# Patient Record
Sex: FEMALE | Race: WHITE | NOT HISPANIC OR LATINO | Employment: FULL TIME | URBAN - METROPOLITAN AREA
[De-identification: names, ages, dates, MRNs, and addresses within clinical notes are randomized per-mention and may not be internally consistent; named-entity substitution may affect disease eponyms.]

---

## 2018-09-13 ENCOUNTER — APPOINTMENT (OUTPATIENT)
Dept: GENERAL RADIOLOGY | Facility: HOSPITAL | Age: 58
End: 2018-09-13

## 2018-09-13 ENCOUNTER — APPOINTMENT (OUTPATIENT)
Dept: CARDIOLOGY | Facility: HOSPITAL | Age: 58
End: 2018-09-13
Attending: INTERNAL MEDICINE

## 2018-09-13 ENCOUNTER — HOSPITAL ENCOUNTER (INPATIENT)
Facility: HOSPITAL | Age: 58
LOS: 4 days | Discharge: HOME | End: 2018-09-17
Attending: EMERGENCY MEDICINE | Admitting: INTERNAL MEDICINE

## 2018-09-13 DIAGNOSIS — R94.31 ABNORMAL EKG: ICD-10-CM

## 2018-09-13 DIAGNOSIS — J96.01 ACUTE RESPIRATORY FAILURE WITH HYPOXIA (HCC): Primary | ICD-10-CM

## 2018-09-13 DIAGNOSIS — J44.1 COPD EXACERBATION (HCC): ICD-10-CM

## 2018-09-13 DIAGNOSIS — J18.9 PNEUMONIA OF LEFT LOWER LOBE DUE TO INFECTIOUS ORGANISM: ICD-10-CM

## 2018-09-13 PROBLEM — J96.00 ACUTE RESPIRATORY FAILURE (HCC): Status: ACTIVE | Noted: 2018-09-13

## 2018-09-13 LAB
ALBUMIN SERPL-MCNC: 4.2 G/DL (ref 3.5–5.2)
ALBUMIN/GLOB SERPL: 1.1 G/DL
ALP SERPL-CCNC: 73 U/L (ref 39–117)
ALT SERPL W P-5'-P-CCNC: 40 U/L (ref 1–33)
ANION GAP SERPL CALCULATED.3IONS-SCNC: 12.6 MMOL/L
ARTERIAL PATENCY WRIST A: POSITIVE
AST SERPL-CCNC: 51 U/L (ref 1–32)
ATMOSPHERIC PRESS: 756 MMHG
B PERT DNA SPEC QL NAA+PROBE: NOT DETECTED
BASE EXCESS BLDA CALC-SCNC: -1.7 MMOL/L (ref 0–2)
BASOPHILS # BLD AUTO: 0.06 10*3/MM3 (ref 0–0.2)
BASOPHILS NFR BLD AUTO: 0.7 % (ref 0–1.5)
BDY SITE: ABNORMAL
BH CV ECHO MEAS - ACS: 1.3 CM
BH CV ECHO MEAS - AO MAX PG: 5 MMHG
BH CV ECHO MEAS - AO MEAN PG (FULL): 0 MMHG
BH CV ECHO MEAS - AO MEAN PG: 2 MMHG
BH CV ECHO MEAS - AO ROOT AREA (BSA CORRECTED): 2
BH CV ECHO MEAS - AO ROOT AREA: 6.6 CM^2
BH CV ECHO MEAS - AO ROOT DIAM: 2.9 CM
BH CV ECHO MEAS - AO V2 MAX: 116 CM/SEC
BH CV ECHO MEAS - AO V2 MEAN: 65.5 CM/SEC
BH CV ECHO MEAS - AO V2 VTI: 17 CM
BH CV ECHO MEAS - AVA(I,A): 2.5 CM^2
BH CV ECHO MEAS - AVA(I,D): 2.5 CM^2
BH CV ECHO MEAS - BSA(HAYCOCK): 1.4 M^2
BH CV ECHO MEAS - BSA: 1.4 M^2
BH CV ECHO MEAS - BZI_BMI: 16.5 KILOGRAMS/M^2
BH CV ECHO MEAS - BZI_METRIC_HEIGHT: 162.6 CM
BH CV ECHO MEAS - BZI_METRIC_WEIGHT: 43.5 KG
BH CV ECHO MEAS - EDV(CUBED): 68.9 ML
BH CV ECHO MEAS - EDV(MOD-SP2): 95 ML
BH CV ECHO MEAS - EDV(MOD-SP4): 64 ML
BH CV ECHO MEAS - EDV(TEICH): 74.2 ML
BH CV ECHO MEAS - EF(CUBED): 60.8 %
BH CV ECHO MEAS - EF(MOD-BP): 35 %
BH CV ECHO MEAS - EF(MOD-SP2): 33.7 %
BH CV ECHO MEAS - EF(MOD-SP4): 34.4 %
BH CV ECHO MEAS - EF(TEICH): 52.8 %
BH CV ECHO MEAS - ESV(CUBED): 27 ML
BH CV ECHO MEAS - ESV(MOD-SP2): 63 ML
BH CV ECHO MEAS - ESV(MOD-SP4): 42 ML
BH CV ECHO MEAS - ESV(TEICH): 35 ML
BH CV ECHO MEAS - FS: 26.8 %
BH CV ECHO MEAS - IVS/LVPW: 1
BH CV ECHO MEAS - IVSD: 0.8 CM
BH CV ECHO MEAS - LAT PEAK E' VEL: 8 CM/SEC
BH CV ECHO MEAS - LV DIASTOLIC VOL/BSA (35-75): 44.7 ML/M^2
BH CV ECHO MEAS - LV MASS(C)D: 97.3 GRAMS
BH CV ECHO MEAS - LV MASS(C)DI: 68 GRAMS/M^2
BH CV ECHO MEAS - LV MEAN PG: 2 MMHG
BH CV ECHO MEAS - LV SYSTOLIC VOL/BSA (12-30): 29.3 ML/M^2
BH CV ECHO MEAS - LV V1 MAX: 96 CM/SEC
BH CV ECHO MEAS - LV V1 MEAN: 57.2 CM/SEC
BH CV ECHO MEAS - LV V1 VTI: 14.9 CM
BH CV ECHO MEAS - LVIDD: 4.1 CM
BH CV ECHO MEAS - LVIDS: 3 CM
BH CV ECHO MEAS - LVLD AP2: 6.2 CM
BH CV ECHO MEAS - LVLD AP4: 6 CM
BH CV ECHO MEAS - LVLS AP2: 5.9 CM
BH CV ECHO MEAS - LVLS AP4: 6 CM
BH CV ECHO MEAS - LVOT AREA (M): 2.8 CM^2
BH CV ECHO MEAS - LVOT AREA: 2.8 CM^2
BH CV ECHO MEAS - LVOT DIAM: 1.9 CM
BH CV ECHO MEAS - LVPWD: 0.8 CM
BH CV ECHO MEAS - MED PEAK E' VEL: 9 CM/SEC
BH CV ECHO MEAS - MR MAX PG: 53.9 MMHG
BH CV ECHO MEAS - MR MAX VEL: 365.3 CM/SEC
BH CV ECHO MEAS - MV A DUR: 0.13 SEC
BH CV ECHO MEAS - MV A MAX VEL: 58.7 CM/SEC
BH CV ECHO MEAS - MV DEC SLOPE: 576 CM/SEC^2
BH CV ECHO MEAS - MV DEC TIME: 0.13 SEC
BH CV ECHO MEAS - MV E MAX VEL: 84.9 CM/SEC
BH CV ECHO MEAS - MV E/A: 1.4
BH CV ECHO MEAS - MV MEAN PG: 2 MMHG
BH CV ECHO MEAS - MV P1/2T MAX VEL: 98.9 CM/SEC
BH CV ECHO MEAS - MV P1/2T: 50.3 MSEC
BH CV ECHO MEAS - MV V2 MEAN: 61.7 CM/SEC
BH CV ECHO MEAS - MV V2 VTI: 16 CM
BH CV ECHO MEAS - MVA P1/2T LCG: 2.2 CM^2
BH CV ECHO MEAS - MVA(P1/2T): 4.4 CM^2
BH CV ECHO MEAS - MVA(VTI): 2.6 CM^2
BH CV ECHO MEAS - PA ACC SLOPE: 11 CM/SEC^2
BH CV ECHO MEAS - PA ACC TIME: 0.14 SEC
BH CV ECHO MEAS - PA MAX PG (FULL): -0.94 MMHG
BH CV ECHO MEAS - PA MAX PG: 3.5 MMHG
BH CV ECHO MEAS - PA PR(ACCEL): 15.6 MMHG
BH CV ECHO MEAS - PA V2 MAX: 93.1 CM/SEC
BH CV ECHO MEAS - PULM A REVS DUR: 0.09 SEC
BH CV ECHO MEAS - PULM A REVS VEL: 31.1 CM/SEC
BH CV ECHO MEAS - PULM DIAS VEL: 54.3 CM/SEC
BH CV ECHO MEAS - PULM S/D: 0.75
BH CV ECHO MEAS - PULM SYS VEL: 40.5 CM/SEC
BH CV ECHO MEAS - RAP SYSTOLE: 3 MMHG
BH CV ECHO MEAS - RV MAX PG: 4.4 MMHG
BH CV ECHO MEAS - RV MEAN PG: 2 MMHG
BH CV ECHO MEAS - RV V1 MAX: 105 CM/SEC
BH CV ECHO MEAS - RV V1 MEAN: 68.2 CM/SEC
BH CV ECHO MEAS - RV V1 VTI: 14.4 CM
BH CV ECHO MEAS - SI(AO): 78.4 ML/M^2
BH CV ECHO MEAS - SI(CUBED): 29.3 ML/M^2
BH CV ECHO MEAS - SI(LVOT): 29.5 ML/M^2
BH CV ECHO MEAS - SI(MOD-SP2): 22.3 ML/M^2
BH CV ECHO MEAS - SI(MOD-SP4): 15.4 ML/M^2
BH CV ECHO MEAS - SI(TEICH): 27.4 ML/M^2
BH CV ECHO MEAS - SV(AO): 112.3 ML
BH CV ECHO MEAS - SV(CUBED): 41.9 ML
BH CV ECHO MEAS - SV(LVOT): 42.2 ML
BH CV ECHO MEAS - SV(MOD-SP2): 32 ML
BH CV ECHO MEAS - SV(MOD-SP4): 22 ML
BH CV ECHO MEAS - SV(TEICH): 39.2 ML
BH CV ECHO MEAS - TAPSE (>1.6): 1.6 CM2
BH CV ECHO MEASUREMENTS AVERAGE E/E' RATIO: 9.99
BH CV VAS BP RIGHT ARM: NORMAL MMHG
BH CV XLRA - TDI S': 10 CM/SEC
BILIRUB SERPL-MCNC: 0.4 MG/DL (ref 0.1–1.2)
BILIRUB UR QL STRIP: NEGATIVE
BUN BLD-MCNC: 14 MG/DL (ref 6–20)
BUN/CREAT SERPL: 21.9 (ref 7–25)
C PNEUM DNA NPH QL NAA+NON-PROBE: NOT DETECTED
CALCIUM SPEC-SCNC: 9.4 MG/DL (ref 8.6–10.5)
CHLORIDE SERPL-SCNC: 104 MMOL/L (ref 98–107)
CLARITY UR: CLEAR
CO2 SERPL-SCNC: 23.4 MMOL/L (ref 22–29)
COLOR UR: YELLOW
CREAT BLD-MCNC: 0.64 MG/DL (ref 0.57–1)
D DIMER PPP FEU-MCNC: 0.29 MCGFEU/ML (ref 0–0.49)
D-LACTATE SERPL-SCNC: 1.6 MMOL/L (ref 0.5–2)
D-LACTATE SERPL-SCNC: 4.5 MMOL/L (ref 0.5–2)
DEPRECATED RDW RBC AUTO: 48.7 FL (ref 37–54)
EOSINOPHIL # BLD AUTO: 0.51 10*3/MM3 (ref 0–0.7)
EOSINOPHIL NFR BLD AUTO: 5.7 % (ref 0.3–6.2)
ERYTHROCYTE [DISTWIDTH] IN BLOOD BY AUTOMATED COUNT: 13.3 % (ref 11.7–13)
FLUAV H1 2009 PAND RNA NPH QL NAA+PROBE: NOT DETECTED
FLUAV H1 HA GENE NPH QL NAA+PROBE: NOT DETECTED
FLUAV H3 RNA NPH QL NAA+PROBE: NOT DETECTED
FLUAV SUBTYP SPEC NAA+PROBE: NOT DETECTED
FLUBV RNA ISLT QL NAA+PROBE: NOT DETECTED
GFR SERPL CREATININE-BSD FRML MDRD: 96 ML/MIN/1.73
GLOBULIN UR ELPH-MCNC: 3.7 GM/DL
GLUCOSE BLD-MCNC: 197 MG/DL (ref 65–99)
GLUCOSE BLDC GLUCOMTR-MCNC: 122 MG/DL (ref 70–130)
GLUCOSE BLDC GLUCOMTR-MCNC: 157 MG/DL (ref 70–130)
GLUCOSE UR STRIP-MCNC: NEGATIVE MG/DL
HADV DNA SPEC NAA+PROBE: NOT DETECTED
HCO3 BLDA-SCNC: 22.7 MMOL/L (ref 22–28)
HCOV 229E RNA SPEC QL NAA+PROBE: NOT DETECTED
HCOV HKU1 RNA SPEC QL NAA+PROBE: NOT DETECTED
HCOV NL63 RNA SPEC QL NAA+PROBE: NOT DETECTED
HCOV OC43 RNA SPEC QL NAA+PROBE: NOT DETECTED
HCT VFR BLD AUTO: 47.7 % (ref 35.6–45.5)
HGB BLD-MCNC: 15.1 G/DL (ref 11.9–15.5)
HGB UR QL STRIP.AUTO: NEGATIVE
HMPV RNA NPH QL NAA+NON-PROBE: NOT DETECTED
HOLD SPECIMEN: NORMAL
HOROWITZ INDEX BLD+IHG-RTO: 25 %
HPIV1 RNA SPEC QL NAA+PROBE: NOT DETECTED
HPIV2 RNA SPEC QL NAA+PROBE: NOT DETECTED
HPIV3 RNA NPH QL NAA+PROBE: NOT DETECTED
HPIV4 P GENE NPH QL NAA+PROBE: NOT DETECTED
IMM GRANULOCYTES # BLD: 0.01 10*3/MM3 (ref 0–0.03)
IMM GRANULOCYTES NFR BLD: 0.1 % (ref 0–0.5)
INR PPP: 1.01 (ref 0.9–1.1)
KETONES UR QL STRIP: NEGATIVE
LEFT ATRIUM VOLUME INDEX: 22 ML/M2
LEUKOCYTE ESTERASE UR QL STRIP.AUTO: NEGATIVE
LIPASE SERPL-CCNC: 35 U/L (ref 13–60)
LV EF 2D ECHO EST: 30 %
LYMPHOCYTES # BLD AUTO: 4.33 10*3/MM3 (ref 0.9–4.8)
LYMPHOCYTES NFR BLD AUTO: 48.2 % (ref 19.6–45.3)
M PNEUMO IGG SER IA-ACNC: NOT DETECTED
MAGNESIUM SERPL-MCNC: 2.7 MG/DL (ref 1.6–2.6)
MAXIMAL PREDICTED HEART RATE: 163 BPM
MCH RBC QN AUTO: 31.5 PG (ref 26.9–32)
MCHC RBC AUTO-ENTMCNC: 31.7 G/DL (ref 32.4–36.3)
MCV RBC AUTO: 99.6 FL (ref 80.5–98.2)
MODALITY: ABNORMAL
MONOCYTES # BLD AUTO: 0.57 10*3/MM3 (ref 0.2–1.2)
MONOCYTES NFR BLD AUTO: 6.3 % (ref 5–12)
NEUTROPHILS # BLD AUTO: 3.52 10*3/MM3 (ref 1.9–8.1)
NEUTROPHILS NFR BLD AUTO: 39.1 % (ref 42.7–76)
NITRITE UR QL STRIP: NEGATIVE
NT-PROBNP SERPL-MCNC: 78.3 PG/ML (ref 0–900)
O2 A-A PPRESDIFF RESPIRATORY: 0.8 MMHG
PCO2 BLDA: 37.2 MM HG (ref 35–45)
PH BLDA: 7.39 PH UNITS (ref 7.35–7.45)
PH UR STRIP.AUTO: 5.5 [PH] (ref 5–8)
PLATELET # BLD AUTO: 311 10*3/MM3 (ref 140–500)
PMV BLD AUTO: 11.4 FL (ref 6–12)
PO2 BLDA: 111.8 MM HG (ref 80–100)
POTASSIUM BLD-SCNC: 4.3 MMOL/L (ref 3.5–5.2)
PROCALCITONIN SERPL-MCNC: <0.02 NG/ML (ref 0.1–0.25)
PROT SERPL-MCNC: 7.9 G/DL (ref 6–8.5)
PROT UR QL STRIP: NEGATIVE
PROTHROMBIN TIME: 13.1 SECONDS (ref 11.7–14.2)
RBC # BLD AUTO: 4.79 10*6/MM3 (ref 3.9–5.2)
RHINOVIRUS RNA SPEC NAA+PROBE: NOT DETECTED
RSV RNA NPH QL NAA+NON-PROBE: NOT DETECTED
SAO2 % BLDCOA: 98.4 % (ref 92–99)
SET MECH RESP RATE: 16
SODIUM BLD-SCNC: 140 MMOL/L (ref 136–145)
SP GR UR STRIP: 1.01 (ref 1–1.03)
STRESS TARGET HR: 139 BPM
TOTAL RATE: 22 BREATHS/MINUTE
TROPONIN T SERPL-MCNC: 0.02 NG/ML (ref 0–0.03)
TROPONIN T SERPL-MCNC: 0.41 NG/ML (ref 0–0.03)
UROBILINOGEN UR QL STRIP: NORMAL
VT ON VENT VENT: 436 ML
WBC NRBC COR # BLD: 8.99 10*3/MM3 (ref 4.5–10.7)

## 2018-09-13 PROCEDURE — 93458 L HRT ARTERY/VENTRICLE ANGIO: CPT | Performed by: INTERNAL MEDICINE

## 2018-09-13 PROCEDURE — 87798 DETECT AGENT NOS DNA AMP: CPT | Performed by: INTERNAL MEDICINE

## 2018-09-13 PROCEDURE — 25010000002 MIDAZOLAM PER 1 MG: Performed by: INTERNAL MEDICINE

## 2018-09-13 PROCEDURE — 85610 PROTHROMBIN TIME: CPT | Performed by: EMERGENCY MEDICINE

## 2018-09-13 PROCEDURE — 94799 UNLISTED PULMONARY SVC/PX: CPT

## 2018-09-13 PROCEDURE — 4A023N7 MEASUREMENT OF CARDIAC SAMPLING AND PRESSURE, LEFT HEART, PERCUTANEOUS APPROACH: ICD-10-PCS | Performed by: INTERNAL MEDICINE

## 2018-09-13 PROCEDURE — 25010000002 METHYLPREDNISOLONE PER 125 MG: Performed by: EMERGENCY MEDICINE

## 2018-09-13 PROCEDURE — B2151ZZ FLUOROSCOPY OF LEFT HEART USING LOW OSMOLAR CONTRAST: ICD-10-PCS | Performed by: INTERNAL MEDICINE

## 2018-09-13 PROCEDURE — 25010000002 AZITHROMYCIN PER 500 MG: Performed by: EMERGENCY MEDICINE

## 2018-09-13 PROCEDURE — 83690 ASSAY OF LIPASE: CPT | Performed by: EMERGENCY MEDICINE

## 2018-09-13 PROCEDURE — 25010000002 FUROSEMIDE PER 20 MG: Performed by: INTERNAL MEDICINE

## 2018-09-13 PROCEDURE — 93306 TTE W/DOPPLER COMPLETE: CPT | Performed by: INTERNAL MEDICINE

## 2018-09-13 PROCEDURE — 85018 HEMOGLOBIN: CPT

## 2018-09-13 PROCEDURE — 87040 BLOOD CULTURE FOR BACTERIA: CPT | Performed by: EMERGENCY MEDICINE

## 2018-09-13 PROCEDURE — 81003 URINALYSIS AUTO W/O SCOPE: CPT | Performed by: EMERGENCY MEDICINE

## 2018-09-13 PROCEDURE — 93005 ELECTROCARDIOGRAM TRACING: CPT | Performed by: INTERNAL MEDICINE

## 2018-09-13 PROCEDURE — C1894 INTRO/SHEATH, NON-LASER: HCPCS | Performed by: INTERNAL MEDICINE

## 2018-09-13 PROCEDURE — 99153 MOD SED SAME PHYS/QHP EA: CPT | Performed by: INTERNAL MEDICINE

## 2018-09-13 PROCEDURE — 36600 WITHDRAWAL OF ARTERIAL BLOOD: CPT

## 2018-09-13 PROCEDURE — 94660 CPAP INITIATION&MGMT: CPT

## 2018-09-13 PROCEDURE — 83735 ASSAY OF MAGNESIUM: CPT | Performed by: EMERGENCY MEDICINE

## 2018-09-13 PROCEDURE — 83605 ASSAY OF LACTIC ACID: CPT | Performed by: EMERGENCY MEDICINE

## 2018-09-13 PROCEDURE — 93306 TTE W/DOPPLER COMPLETE: CPT

## 2018-09-13 PROCEDURE — 87581 M.PNEUMON DNA AMP PROBE: CPT | Performed by: INTERNAL MEDICINE

## 2018-09-13 PROCEDURE — 85014 HEMATOCRIT: CPT

## 2018-09-13 PROCEDURE — 84484 ASSAY OF TROPONIN QUANT: CPT | Performed by: EMERGENCY MEDICINE

## 2018-09-13 PROCEDURE — 87633 RESP VIRUS 12-25 TARGETS: CPT | Performed by: INTERNAL MEDICINE

## 2018-09-13 PROCEDURE — 82803 BLOOD GASES ANY COMBINATION: CPT

## 2018-09-13 PROCEDURE — 85025 COMPLETE CBC W/AUTO DIFF WBC: CPT | Performed by: EMERGENCY MEDICINE

## 2018-09-13 PROCEDURE — 0 IOPAMIDOL PER 1 ML: Performed by: INTERNAL MEDICINE

## 2018-09-13 PROCEDURE — 5A09357 ASSISTANCE WITH RESPIRATORY VENTILATION, LESS THAN 24 CONSECUTIVE HOURS, CONTINUOUS POSITIVE AIRWAY PRESSURE: ICD-10-PCS | Performed by: INTERNAL MEDICINE

## 2018-09-13 PROCEDURE — 99285 EMERGENCY DEPT VISIT HI MDM: CPT

## 2018-09-13 PROCEDURE — 93005 ELECTROCARDIOGRAM TRACING: CPT | Performed by: EMERGENCY MEDICINE

## 2018-09-13 PROCEDURE — C1769 GUIDE WIRE: HCPCS | Performed by: INTERNAL MEDICINE

## 2018-09-13 PROCEDURE — 99152 MOD SED SAME PHYS/QHP 5/>YRS: CPT | Performed by: INTERNAL MEDICINE

## 2018-09-13 PROCEDURE — B2111ZZ FLUOROSCOPY OF MULTIPLE CORONARY ARTERIES USING LOW OSMOLAR CONTRAST: ICD-10-PCS | Performed by: INTERNAL MEDICINE

## 2018-09-13 PROCEDURE — 25010000002 PERFLUTREN (DEFINITY) 8.476 MG IN SODIUM CHLORIDE 0.9 % 10 ML INJECTION: Performed by: INTERNAL MEDICINE

## 2018-09-13 PROCEDURE — 80053 COMPREHEN METABOLIC PANEL: CPT | Performed by: EMERGENCY MEDICINE

## 2018-09-13 PROCEDURE — 99255 IP/OBS CONSLTJ NEW/EST HI 80: CPT | Performed by: INTERNAL MEDICINE

## 2018-09-13 PROCEDURE — 25010000002 HEPARIN (PORCINE) PER 1000 UNITS: Performed by: INTERNAL MEDICINE

## 2018-09-13 PROCEDURE — 25010000002 VANCOMYCIN 750 MG RECONSTITUTED SOLUTION: Performed by: EMERGENCY MEDICINE

## 2018-09-13 PROCEDURE — 84484 ASSAY OF TROPONIN QUANT: CPT | Performed by: INTERNAL MEDICINE

## 2018-09-13 PROCEDURE — 85379 FIBRIN DEGRADATION QUANT: CPT | Performed by: EMERGENCY MEDICINE

## 2018-09-13 PROCEDURE — 93010 ELECTROCARDIOGRAM REPORT: CPT | Performed by: INTERNAL MEDICINE

## 2018-09-13 PROCEDURE — 87486 CHLMYD PNEUM DNA AMP PROBE: CPT | Performed by: INTERNAL MEDICINE

## 2018-09-13 PROCEDURE — 25010000003 CEFTRIAXONE PER 250 MG: Performed by: EMERGENCY MEDICINE

## 2018-09-13 PROCEDURE — 25010000002 FENTANYL CITRATE (PF) 100 MCG/2ML SOLUTION: Performed by: INTERNAL MEDICINE

## 2018-09-13 PROCEDURE — 83880 ASSAY OF NATRIURETIC PEPTIDE: CPT | Performed by: EMERGENCY MEDICINE

## 2018-09-13 PROCEDURE — 71045 X-RAY EXAM CHEST 1 VIEW: CPT

## 2018-09-13 PROCEDURE — 94640 AIRWAY INHALATION TREATMENT: CPT

## 2018-09-13 PROCEDURE — 84145 PROCALCITONIN (PCT): CPT | Performed by: EMERGENCY MEDICINE

## 2018-09-13 PROCEDURE — 25010000002 MAGNESIUM SULFATE 2 GM/50ML SOLUTION: Performed by: EMERGENCY MEDICINE

## 2018-09-13 PROCEDURE — 25010000002 METHYLPREDNISOLONE PER 125 MG: Performed by: INTERNAL MEDICINE

## 2018-09-13 PROCEDURE — 82962 GLUCOSE BLOOD TEST: CPT

## 2018-09-13 RX ORDER — ACETAMINOPHEN 325 MG/1
650 TABLET ORAL EVERY 6 HOURS PRN
Status: DISCONTINUED | OUTPATIENT
Start: 2018-09-13 | End: 2018-09-18 | Stop reason: HOSPADM

## 2018-09-13 RX ORDER — METHYLPREDNISOLONE SODIUM SUCCINATE 125 MG/2ML
125 INJECTION, POWDER, LYOPHILIZED, FOR SOLUTION INTRAMUSCULAR; INTRAVENOUS EVERY 6 HOURS
Status: DISCONTINUED | OUTPATIENT
Start: 2018-09-13 | End: 2018-09-14

## 2018-09-13 RX ORDER — ALBUTEROL SULFATE 2.5 MG/3ML
10 SOLUTION RESPIRATORY (INHALATION) CONTINUOUS
Status: DISPENSED | OUTPATIENT
Start: 2018-09-13 | End: 2018-09-13

## 2018-09-13 RX ORDER — CHOLECALCIFEROL (VITAMIN D3) 125 MCG
5 CAPSULE ORAL NIGHTLY PRN
Status: DISCONTINUED | OUTPATIENT
Start: 2018-09-13 | End: 2018-09-18 | Stop reason: HOSPADM

## 2018-09-13 RX ORDER — CEFTRIAXONE SODIUM 1 G/50ML
1 INJECTION, SOLUTION INTRAVENOUS EVERY 24 HOURS
Status: DISCONTINUED | OUTPATIENT
Start: 2018-09-13 | End: 2018-09-13

## 2018-09-13 RX ORDER — NALOXONE HCL 0.4 MG/ML
0.4 VIAL (ML) INJECTION
Status: DISCONTINUED | OUTPATIENT
Start: 2018-09-13 | End: 2018-09-18 | Stop reason: HOSPADM

## 2018-09-13 RX ORDER — ONDANSETRON 4 MG/1
4 TABLET, ORALLY DISINTEGRATING ORAL EVERY 6 HOURS PRN
Status: DISCONTINUED | OUTPATIENT
Start: 2018-09-13 | End: 2018-09-18 | Stop reason: HOSPADM

## 2018-09-13 RX ORDER — CEFTRIAXONE SODIUM 2 G/50ML
2 INJECTION, SOLUTION INTRAVENOUS ONCE
Status: COMPLETED | OUTPATIENT
Start: 2018-09-13 | End: 2018-09-13

## 2018-09-13 RX ORDER — NICOTINE POLACRILEX 4 MG
15 LOZENGE BUCCAL
Status: DISCONTINUED | OUTPATIENT
Start: 2018-09-13 | End: 2018-09-18 | Stop reason: HOSPADM

## 2018-09-13 RX ORDER — SODIUM CHLORIDE 9 MG/ML
125 INJECTION, SOLUTION INTRAVENOUS CONTINUOUS
Status: DISCONTINUED | OUTPATIENT
Start: 2018-09-13 | End: 2018-09-14

## 2018-09-13 RX ORDER — METHYLPREDNISOLONE SODIUM SUCCINATE 125 MG/2ML
125 INJECTION, POWDER, LYOPHILIZED, FOR SOLUTION INTRAMUSCULAR; INTRAVENOUS ONCE
Status: DISCONTINUED | OUTPATIENT
Start: 2018-09-13 | End: 2018-09-13

## 2018-09-13 RX ORDER — METHYLPREDNISOLONE SODIUM SUCCINATE 125 MG/2ML
125 INJECTION, POWDER, LYOPHILIZED, FOR SOLUTION INTRAMUSCULAR; INTRAVENOUS ONCE
Status: COMPLETED | OUTPATIENT
Start: 2018-09-13 | End: 2018-09-13

## 2018-09-13 RX ORDER — LIDOCAINE HYDROCHLORIDE 20 MG/ML
INJECTION, SOLUTION INFILTRATION; PERINEURAL AS NEEDED
Status: DISCONTINUED | OUTPATIENT
Start: 2018-09-13 | End: 2018-09-13 | Stop reason: HOSPADM

## 2018-09-13 RX ORDER — ONDANSETRON 4 MG/1
4 TABLET, FILM COATED ORAL EVERY 6 HOURS PRN
Status: DISCONTINUED | OUTPATIENT
Start: 2018-09-13 | End: 2018-09-18 | Stop reason: HOSPADM

## 2018-09-13 RX ORDER — CEFTRIAXONE SODIUM 1 G/50ML
1 INJECTION, SOLUTION INTRAVENOUS EVERY 24 HOURS
Status: DISCONTINUED | OUTPATIENT
Start: 2018-09-14 | End: 2018-09-18 | Stop reason: HOSPADM

## 2018-09-13 RX ORDER — MIDAZOLAM HYDROCHLORIDE 1 MG/ML
INJECTION INTRAMUSCULAR; INTRAVENOUS AS NEEDED
Status: DISCONTINUED | OUTPATIENT
Start: 2018-09-13 | End: 2018-09-13 | Stop reason: HOSPADM

## 2018-09-13 RX ORDER — ASPIRIN 325 MG
325 TABLET ORAL ONCE
Status: COMPLETED | OUTPATIENT
Start: 2018-09-13 | End: 2018-09-13

## 2018-09-13 RX ORDER — IPRATROPIUM BROMIDE AND ALBUTEROL SULFATE 2.5; .5 MG/3ML; MG/3ML
3 SOLUTION RESPIRATORY (INHALATION)
Status: DISCONTINUED | OUTPATIENT
Start: 2018-09-13 | End: 2018-09-14

## 2018-09-13 RX ORDER — ONDANSETRON 2 MG/ML
4 INJECTION INTRAMUSCULAR; INTRAVENOUS EVERY 6 HOURS PRN
Status: DISCONTINUED | OUTPATIENT
Start: 2018-09-13 | End: 2018-09-18 | Stop reason: HOSPADM

## 2018-09-13 RX ORDER — SODIUM CHLORIDE 0.9 % (FLUSH) 0.9 %
10 SYRINGE (ML) INJECTION AS NEEDED
Status: DISCONTINUED | OUTPATIENT
Start: 2018-09-13 | End: 2018-09-18 | Stop reason: HOSPADM

## 2018-09-13 RX ORDER — MORPHINE SULFATE 2 MG/ML
1 INJECTION, SOLUTION INTRAMUSCULAR; INTRAVENOUS EVERY 4 HOURS PRN
Status: DISCONTINUED | OUTPATIENT
Start: 2018-09-13 | End: 2018-09-18 | Stop reason: HOSPADM

## 2018-09-13 RX ORDER — SODIUM CHLORIDE 9 MG/ML
INJECTION, SOLUTION INTRAVENOUS CONTINUOUS PRN
Status: COMPLETED | OUTPATIENT
Start: 2018-09-13 | End: 2018-09-13

## 2018-09-13 RX ORDER — HYDROCODONE BITARTRATE AND ACETAMINOPHEN 5; 325 MG/1; MG/1
1 TABLET ORAL EVERY 4 HOURS PRN
Status: DISCONTINUED | OUTPATIENT
Start: 2018-09-13 | End: 2018-09-18 | Stop reason: HOSPADM

## 2018-09-13 RX ORDER — MAGNESIUM SULFATE HEPTAHYDRATE 40 MG/ML
2 INJECTION, SOLUTION INTRAVENOUS ONCE
Status: COMPLETED | OUTPATIENT
Start: 2018-09-13 | End: 2018-09-13

## 2018-09-13 RX ORDER — ALBUTEROL SULFATE 90 UG/1
2 AEROSOL, METERED RESPIRATORY (INHALATION) EVERY 4 HOURS PRN
COMMUNITY
End: 2019-02-11

## 2018-09-13 RX ORDER — SODIUM CHLORIDE 0.9 % (FLUSH) 0.9 %
1-10 SYRINGE (ML) INJECTION AS NEEDED
Status: DISCONTINUED | OUTPATIENT
Start: 2018-09-13 | End: 2018-09-18 | Stop reason: HOSPADM

## 2018-09-13 RX ORDER — FUROSEMIDE 10 MG/ML
40 INJECTION INTRAMUSCULAR; INTRAVENOUS ONCE
Status: COMPLETED | OUTPATIENT
Start: 2018-09-13 | End: 2018-09-13

## 2018-09-13 RX ORDER — DEXTROSE MONOHYDRATE 25 G/50ML
25 INJECTION, SOLUTION INTRAVENOUS
Status: DISCONTINUED | OUTPATIENT
Start: 2018-09-13 | End: 2018-09-18 | Stop reason: HOSPADM

## 2018-09-13 RX ORDER — FENTANYL CITRATE 50 UG/ML
INJECTION, SOLUTION INTRAMUSCULAR; INTRAVENOUS AS NEEDED
Status: DISCONTINUED | OUTPATIENT
Start: 2018-09-13 | End: 2018-09-13 | Stop reason: HOSPADM

## 2018-09-13 RX ADMIN — SODIUM CHLORIDE 125 ML/HR: 9 INJECTION, SOLUTION INTRAVENOUS at 12:40

## 2018-09-13 RX ADMIN — AZITHROMYCIN MONOHYDRATE 500 MG: 500 INJECTION, POWDER, LYOPHILIZED, FOR SOLUTION INTRAVENOUS at 17:21

## 2018-09-13 RX ADMIN — PERFLUTREN 3 ML: 6.52 INJECTION, SUSPENSION INTRAVENOUS at 13:59

## 2018-09-13 RX ADMIN — SODIUM CHLORIDE 1000 ML: 9 INJECTION, SOLUTION INTRAVENOUS at 12:31

## 2018-09-13 RX ADMIN — SODIUM CHLORIDE 125 ML/HR: 9 INJECTION, SOLUTION INTRAVENOUS at 20:53

## 2018-09-13 RX ADMIN — METHYLPREDNISOLONE SODIUM SUCCINATE 125 MG: 125 INJECTION, POWDER, FOR SOLUTION INTRAMUSCULAR; INTRAVENOUS at 12:04

## 2018-09-13 RX ADMIN — Medication 5 MG: at 22:57

## 2018-09-13 RX ADMIN — FUROSEMIDE 40 MG: 10 INJECTION, SOLUTION INTRAMUSCULAR; INTRAVENOUS at 17:32

## 2018-09-13 RX ADMIN — VANCOMYCIN HYDROCHLORIDE 750 MG: 750 INJECTION, POWDER, LYOPHILIZED, FOR SOLUTION INTRAVENOUS at 14:24

## 2018-09-13 RX ADMIN — SODIUM CHLORIDE 1305 ML: 9 INJECTION, SOLUTION INTRAVENOUS at 14:23

## 2018-09-13 RX ADMIN — CEFTRIAXONE SODIUM 2 G: 2 INJECTION, SOLUTION INTRAVENOUS at 13:41

## 2018-09-13 RX ADMIN — METHYLPREDNISOLONE SODIUM SUCCINATE 125 MG: 125 INJECTION, POWDER, FOR SOLUTION INTRAMUSCULAR; INTRAVENOUS at 19:50

## 2018-09-13 RX ADMIN — ASPIRIN 325 MG: 325 TABLET ORAL at 15:05

## 2018-09-13 RX ADMIN — IPRATROPIUM BROMIDE AND ALBUTEROL SULFATE 3 ML: .5; 3 SOLUTION RESPIRATORY (INHALATION) at 23:16

## 2018-09-13 RX ADMIN — ACETAMINOPHEN 650 MG: 325 TABLET ORAL at 21:20

## 2018-09-13 RX ADMIN — MAGNESIUM SULFATE IN WATER 2 G: 40 INJECTION, SOLUTION INTRAVENOUS at 12:10

## 2018-09-13 RX ADMIN — ALBUTEROL SULFATE 10 MG: 2.5 SOLUTION RESPIRATORY (INHALATION) at 12:06

## 2018-09-13 NOTE — PROGRESS NOTES
Clinical Pharmacy Services: Medication History    Scarlet Chakraborty is a 58 y.o. female presenting to [facility] for   Chief Complaint   Patient presents with   • Shortness of Breath       She  has a past medical history of Asthma and COPD (chronic obstructive pulmonary disease) (CMS/[facility]).    Allergies as of 09/13/2018   • (No Known Allergies)       Medication information was obtained from: Patient  Pharmacy and Phone Number: [place] [phone]    Prior to Admission Medications     Prescriptions Last Dose Informant Patient Reported? Taking?    albuterol (PROVENTIL HFA;VENTOLIN HFA) 108 (90 Base) MCG/ACT inhaler  Self Yes Yes    Inhale 2 puffs Every 4 (Four) Hours As Needed for Wheezing.    umeclidinium-vilanterol (ANORO ELLIPTA) 62.5-25 MCG/INH aerosol powder  inhaler  Self Yes Yes    Inhale 1 puff Daily.            Medication notes: None    This medication list is complete to the best of my knowledge as of 9/13/2018    Please call if questions.    Selene Barahona, Medication History Technician  9/13/2018 3:02 PM

## 2018-09-13 NOTE — PLAN OF CARE
Problem: Patient Care Overview  Goal: Plan of Care Review  Outcome: Ongoing (interventions implemented as appropriate)   09/13/18 5640   OTHER   Outcome Summary Presented to ED with SOA and chest pains. Positive for PNA. ABT therapy started. To cath lab. Coronary arteries clear, Takotsubo.      Goal: Individualization and Mutuality  Outcome: Ongoing (interventions implemented as appropriate)    Goal: Discharge Needs Assessment  Outcome: Ongoing (interventions implemented as appropriate)    Goal: Interprofessional Rounds/Family Conf  Outcome: Ongoing (interventions implemented as appropriate)      Problem: Pneumonia (Adult)  Goal: Signs and Symptoms of Listed Potential Problems Will be Absent, Minimized or Managed (Pneumonia)  Outcome: Ongoing (interventions implemented as appropriate)      Problem: Cardiac Output Decreased (Adult)  Goal: Identify Related Risk Factors and Signs and Symptoms  Outcome: Ongoing (interventions implemented as appropriate)    Goal: Effective Tissue Perfusion  Outcome: Ongoing (interventions implemented as appropriate)      Problem: Fall Risk (Adult)  Goal: Identify Related Risk Factors and Signs and Symptoms  Outcome: Ongoing (interventions implemented as appropriate)    Goal: Absence of Fall  Outcome: Ongoing (interventions implemented as appropriate)      Problem: Pain, Acute (Adult)  Goal: Identify Related Risk Factors and Signs and Symptoms  Outcome: Ongoing (interventions implemented as appropriate)    Goal: Acceptable Pain Control/Comfort Level  Outcome: Ongoing (interventions implemented as appropriate)

## 2018-09-13 NOTE — ED PROVIDER NOTES
" EMERGENCY DEPARTMENT ENCOUNTER    CHIEF COMPLAINT  Chief Complaint: SOA  History given by: Patient   History limited by: Due to severe respiratory distress on CPAP  Room Number: [place]/[facility]  PMD: Provider, No Known      HPI:  Pt is a 58 y.o. female who presents complaining of SOA that began a couple of weeks, worsening today. Pt reports productive cough (\"yellow and clear\" sputum), and chest tightness.  Pt denies fever or leg swelling. Pt denies h/o lung problems or heart problems and doesn't see a specialist. Pt has smoked her whole life. Pt has Hx of PNA with CPAP treatment. Pt states she is feeling better since when paramedics found her. Per EMS, pt was found in severe respiratory distress by coworker who called EMS. Per EMS, Pt was unresponsive and breathing 40 times a minute. Pt had decreased responsiveness. O2sats = 82% Pt Given bipap and half duo-neb.    This history in history of present illness was obtained after going to see the patient several times over the first 45 minutes to an hour when she was in the emergency department in her respiratory status improved and she was able to communicate much better and breathing much easier.    Duration:  Just PTA  Onset: gradual  Timing: constant  Radiation: NA  Quality: SOA  Intensity/Severity: severe  Progression: uncahnged  Associated Symptoms: productive cough (\"yellow and clear\" sputum), and chest tightness  Previous Episodes: Pt has Hx of PNA.  Treatment before arrival: PT was given BiPAP and half duo-neb en route.    PAST MEDICAL HISTORY  Active Ambulatory Problems     Diagnosis Date Noted   • No Active Ambulatory Problems     Resolved Ambulatory Problems     Diagnosis Date Noted   • No Resolved Ambulatory Problems     Past Medical History:   Diagnosis Date   • Asthma    • COPD (chronic obstructive pulmonary disease) (CMS/[facility])        PAST SURGICAL HISTORY  History reviewed. No pertinent surgical history.    FAMILY HISTORY  History reviewed. No pertinent family " "history.    SOCIAL HISTORY  Social History     Social History   • Marital status: Single     Spouse name: N/A   • Number of children: N/A   • Years of education: N/A     Occupational History   • Not on file.     Social History Main Topics   • Smoking status: Current Every Day Smoker     Packs/day: 0.50     Types: Cigarettes   • Smokeless tobacco: Not on file   • Alcohol use Yes      Comment: occassional   • Drug use: No   • Sexual activity: Defer     Other Topics Concern   • Not on file     Social History Narrative   • No narrative on file       ALLERGIES  Patient has no known allergies.    REVIEW OF SYSTEMS  Review of Systems   Constitutional: Negative for fever.   HENT: Negative for sore throat.    Eyes: Negative.    Respiratory: Positive for cough (productive) and shortness of breath.    Cardiovascular: Positive for chest pain (\"tightness\").   Gastrointestinal: Negative for abdominal pain, diarrhea and vomiting.   Genitourinary: Negative for dysuria.   Musculoskeletal: Negative for neck pain.   Skin: Negative for rash.   Allergic/Immunologic: Negative.    Neurological: Negative for weakness, numbness and headaches.   Hematological: Negative.    Psychiatric/Behavioral: Negative.    All other systems reviewed and are negative.      PHYSICAL EXAM  ED Triage Vitals [09/13/18 1153]   Temp Heart Rate Resp BP SpO2   -- (!) 144 24 -- 96 %      Temp src Heart Rate Source Patient Position BP Location FiO2 (%)   -- Monitor -- -- --       Physical Exam   Constitutional: She is oriented to person, place, and time. She appears distressed (Review respiratory distress).   Thin. Appears older than stated age.   HENT:   Head: Normocephalic and atraumatic.   Eyes: Pupils are equal, round, and reactive to light. EOM are normal.   Neck: Normal range of motion. Neck supple.   Cardiovascular: Regular rhythm and normal heart sounds.  Tachycardia present.    HR = 140-150s   Pulmonary/Chest: She is in respiratory distress (severe). She " has decreased breath sounds.   CPAP. Communicates in short phrases.  For decreased breath sounds were very likely because of decreased air movement.   Abdominal: Soft. There is no tenderness. There is no rebound and no guarding.   Musculoskeletal: Normal range of motion. She exhibits no edema.   Neurological: She is alert and oriented to person, place, and time. She has normal sensation and normal strength.   Able to follow commands. No neuro deficit.   Skin: Skin is warm. No rash noted. She is diaphoretic.   Psychiatric: Mood and affect normal.   Nursing note and vitals reviewed.      LAB RESULTS  Lab Results (last 24 hours)     Procedure Component Value Units Date/Time    Comprehensive Metabolic Panel [[id number]]  (Abnormal) Collected:  09/13/18 1157    Specimen:  Blood Updated:  09/13/18 1246     Glucose 197 (H) mg/dL      BUN 14 mg/dL      Creatinine 0.64 mg/dL      Sodium 140 mmol/L      Potassium 4.3 mmol/L      Chloride 104 mmol/L      CO2 23.4 mmol/L      Calcium 9.4 mg/dL      Total Protein 7.9 g/dL      Albumin 4.20 g/dL      ALT (SGPT) 40 (H) U/L      AST (SGOT) 51 (H) U/L      Alkaline Phosphatase 73 U/L      Total Bilirubin 0.4 mg/dL      eGFR Non African Amer 96 mL/min/1.73      Globulin 3.7 gm/dL      A/G Ratio 1.1 g/dL      BUN/Creatinine Ratio 21.9     Anion Gap 12.6 mmol/L     Protime-INR [[id number]]  (Normal) Collected:  09/13/18 1157    Specimen:  Blood Updated:  09/13/18 1237     Protime 13.1 Seconds      INR 1.01    BNP [[id number]]  (Normal) Collected:  09/13/18 1157    Specimen:  Blood Updated:  09/13/18 1242     proBNP 78.3 pg/mL     Narrative:       Among patients with dyspnea, NT-proBNP is highly sensitive for the detection of acute congestive heart failure. In addition NT-proBNP of <300 pg/ml effectively rules out acute congestive heart failure with 99% negative predictive value.    Troponin [[id number]]  (Normal) Collected:  09/13/18 1157    Specimen:  Blood Updated:  09/13/18 1246      "Troponin T 0.016 ng/mL     Narrative:       Troponin T Reference Ranges:  Less than 0.03 ng/mL:    Negative for AMI  0.03 to 0.09 ng/mL:      Indeterminant for AMI  Greater than 0.09 ng/mL: Positive for AMI    Lipase [[id number]]  (Normal) Collected:  09/13/18 1157    Specimen:  Blood Updated:  09/13/18 1246     Lipase 35 U/L     Procalcitonin [[id number]]  (Abnormal) Collected:  09/13/18 1157    Specimen:  Blood Updated:  09/13/18 1247     Procalcitonin <0.02 (L) ng/mL     Narrative:       As a Marker for Sepsis (Non-Neonates):   1. <0.5 ng/mL represents a low risk of severe sepsis and/or septic shock.  1. >2 ng/mL represents a high risk of severe sepsis and/or septic shock.    As a Marker for Lower Respiratory Tract Infections that require antibiotic therapy:  PCT on Admission     Antibiotic Therapy             6-12 Hrs later  > 0.5                Strongly Recommended            >0.25 - <0.5         Recommended  0.1 - 0.25           Discouraged                   Remeasure/reassess PCT  <0.1                 Strongly Discouraged          Remeasure/reassess PCT      As 28 day mortality risk marker: \"Change in Procalcitonin Result\" (> 80 % or <=80 %) if Day 0 (or Day 1) and Day 4 values are available. Refer to http://www.[email or web address]-pct-calculator.com/   Change in PCT <=80 %   A decrease of PCT levels below or equal to 80 % defines a positive change in PCT test result representing a higher risk for 28-day all-cause mortality of patients diagnosed with severe sepsis or septic shock.  Change in PCT > 80 %   A decrease of PCT levels of more than 80 % defines a negative change in PCT result representing a lower risk for 28-day all-cause mortality of patients diagnosed with severe sepsis or septic shock.                Blood Culture - Blood, [[id number]] Collected:  09/13/18 1157    Specimen:  Blood from Arm, Left Updated:  09/13/18 1207    Magnesium [[id number]]  (Abnormal) Collected:  09/13/18 1157    Specimen:  Blood Updated:  " 09/13/18 1246     Magnesium 2.7 (H) mg/dL     D-dimer, Quantitative [[id number]]  (Normal) Collected:  09/13/18 1157    Specimen:  Blood Updated:  09/13/18 1237     D-Dimer, Quantitative 0.29 MCGFEU/mL     Narrative:       The Stago D-Dimer test used in conjunction with a clinical pretest probability (PTP) assessment model, has been approved by the FDA to rule out the presence of venous thromboembolism (VTE) in outpatients suspected of deep venous thrombosis (DVT) or pulmonary embolism (PE).     CBC & Differential [[id number]] Collected:  09/13/18 1158    Specimen:  Blood Updated:  09/13/18 1220    Narrative:       The following orders were created for panel order CBC & Differential.  Procedure                               Abnormality         Status                     ---------                               -----------         ------                     CBC Auto Differential[[id number]]        Abnormal            Final result                 Please view results for these tests on the individual orders.    Lactic Acid, Plasma [[id number]]  (Abnormal) Collected:  09/13/18 1158    Specimen:  Blood Updated:  09/13/18 1250     Lactate 4.5 (C) mmol/L     CBC Auto Differential [[id number]]  (Abnormal) Collected:  09/13/18 1158    Specimen:  Blood Updated:  09/13/18 1220     WBC 8.99 10*3/mm3      RBC 4.79 10*6/mm3      Hemoglobin 15.1 g/dL      Hematocrit 47.7 (H) %      MCV 99.6 (H) fL      MCH 31.5 pg      MCHC 31.7 (L) g/dL      RDW 13.3 (H) %      RDW-SD 48.7 fl      MPV 11.4 fL      Platelets 311 10*3/mm3      Neutrophil % 39.1 (L) %      Lymphocyte % 48.2 (H) %      Monocyte % 6.3 %      Eosinophil % 5.7 %      Basophil % 0.7 %      Immature Grans % 0.1 %      Neutrophils, Absolute 3.52 10*3/mm3      Lymphocytes, Absolute 4.33 10*3/mm3      Monocytes, Absolute 0.57 10*3/mm3      Eosinophils, Absolute 0.51 10*3/mm3      Basophils, Absolute 0.06 10*3/mm3      Immature Grans, Absolute 0.01 10*3/mm3     Lactic Acid, Reflex  Timer (This will reflex a repeat order 3-3:15 hours after ordered.) [[id number]] Collected:  09/13/18 1158    Specimen:  Blood Updated:  09/13/18 1600     Extra Tube Hold for add-ons.     Comment: Auto resulted.       Blood Gas, Arterial [[id number]]  (Abnormal) Collected:  09/13/18 1228    Specimen:  Arterial Blood Updated:  09/13/18 1232     Site Arterial: left radial     Troy's Test Positive     pH, Arterial 7.394 pH units      pCO2, Arterial 37.2 mm Hg      pO2, Arterial 111.8 (H) mm Hg      HCO3, Arterial 22.7 mmol/L      Base Excess, Arterial -1.7 (L) mmol/L      O2 Saturation Calculated 98.4 %      A-a Gradiant 0.8 mmHg      Barometric Pressure for Blood Gas 756.0 mmHg      Modality BiPap     FIO2 25 %      Set Tidal Volume 436     Set Mech Resp Rate 16     Rate 22 Breaths/minute     Narrative:       o2 sat 98, avaps 380 Meter: [id number] : [id number] Lisandro Winkler    Blood Culture - Blood, [[id number]] Collected:  09/13/18 1256    Specimen:  Blood from Arm, Left Updated:  09/13/18 1302    Urinalysis With Microscopic If Indicated (No Culture) - Urine, Clean Catch [[id number]]  (Normal) Collected:  09/13/18 1352    Specimen:  Urine from Urine, Clean Catch Updated:  09/13/18 1425     Color, UA Yellow     Appearance, UA Clear     pH, UA 5.5     Specific Gravity, UA 1.010     Glucose, UA Negative     Ketones, UA Negative     Bilirubin, UA Negative     Blood, UA Negative     Protein, UA Negative     Leuk Esterase, UA Negative     Nitrite, UA Negative     Urobilinogen, UA 0.2 E.U./dL    Narrative:       Urine microscopic not indicated.    Respiratory Panel, PCR - Swab, Nasopharynx [[id number]] Collected:  09/13/18 1511    Specimen:  Swab from Nasopharynx Updated:  09/13/18 1517          I ordered the above labs and reviewed the results    RADIOLOGY  XR Chest 1 View   Final Result   1.  Suboptimal evaluation due to patient positioning. Bibasilar   pulmonary opacification and pulmonary vascular congestion, left  greater   than right. Findings most concerning for asymmetric pulmonary edema   versus left basilar pneumonia and clinical correlation is recommended as   there are currently no notes in the patient's chart.       This report was finalized on 9/13/2018 12:35 PM by Dr. Shashank Nieto M.D.               I ordered the above noted radiological studies. Interpreted by radiologist. Reviewed by me in PACS.       PROCEDURES  Critical Care  Performed by: LAVERNE OWEN  Authorized by: LAVERNE OWEN     Critical care provider statement:     Critical care time (minutes):  75    Critical care was necessary to treat or prevent imminent or life-threatening deterioration of the following conditions:  Sepsis, cardiac failure, circulatory failure and respiratory failure    Critical care was time spent personally by me on the following activities:  Development of treatment plan with patient or surrogate, discussions with consultants, evaluation of patient's response to treatment, examination of patient, interpretation of cardiac output measurements, obtaining history from patient or surrogate, ordering and performing treatments and interventions, ordering and review of laboratory studies, ordering and review of radiographic studies, pulse oximetry, re-evaluation of patient's condition and review of old charts          EKG          EKG time: 1159  Rhythm/Rate: Tachy, 139  P waves and MI: MI downsloping v3-v6 with Jpoint elevation v3-v6  QRS, axis: Narrow q waves v1 v2, nml     ST and T waves are nonspecific and there is definitely a lot of movement artifact     Interpreted Contemporaneously by me, independently viewed      EKG           EKG time: 1212  Rhythm/Rate: Tachy, 128  QRS, axis: Narrow, Nml   ST and T waves: St elevation [id number]. No reciprocal ST depression    Interpreted Contemporaneously by me, independently viewed          PROGRESS AND CONSULTS       1210  Pt breathing better. Pt states chest pain has improved. RR in  mid 20s. Blood pressure = 119/62; HR = 125    1221  Pt recheck.  Pt denies current CP.    1230  Dr. Dozier, Cardiology bedside evaluation.     1300  Pt breathing is much better.     1306  Dr. Brito bedside evaluation.    1406  Dr. Boston bedside evaluation.    1413  Pt recheck. Pt reports mild discomfort in chest. Pt looking much better. Pt is smiling and family at bedside.      MEDICAL DECISION MAKING  Results were reviewed/discussed with the patient and they were also made aware of online access. Pt also made aware that some labs, such as cultures, will not be resulted during ER visit and follow up with PMD is necessary.     MDM  Number of Diagnoses or Management Options  Abnormal EKG:   Acute respiratory failure with hypoxia (CMS/HCC):   COPD exacerbation (CMS/HCC):   Pneumonia of left lower lobe due to infectious organism (CMS/HCC):      Amount and/or Complexity of Data Reviewed  Clinical lab tests: ordered and reviewed (Lactate 4.5, D-Dimer negative, BNP - 78)  Tests in the radiology section of CPT®: ordered and reviewed (CXR shows bibasilar PNA. )  Tests in the medicine section of CPT®: reviewed and ordered (See EKG results in procedure. )  Discussion of test results with the performing providers: yes  Decide to obtain previous medical records or to obtain history from someone other than the patient: yes  Review and summarize past medical records: yes (No old records)  Discuss the patient with other providers: yes  Independent visualization of images, tracings, or specimens: yes    Critical Care  Total time providing critical care:  minutes         DIAGNOSIS  Final diagnoses:   Acute respiratory failure with hypoxia (CMS/HCC)   COPD exacerbation (CMS/HCC)   Abnormal EKG   Pneumonia of left lower lobe due to infectious organism (CMS/HCC)         DISPOSITION  ADMISSION    Discussed treatment plan and reason for admission with pt/family and admitting physician.  Pt/family voiced understanding of the  plan for admission for further testing/treatment as needed.         Latest Documented Vital Signs:  As of 4:17 PM  BP- 107/59 HR- 107 Temp-   O2 sat- 96%    --  Documentation assistance provided by singh Rico for Dr. Egan.  Information recorded by the scribe was done at my direction and has been verified and validated by me.       Ross Rico  09/13/18 1611       Holden Egan MD  09/13/18 1617       Holden Egan MD  09/13/18 1611

## 2018-09-13 NOTE — CONSULTS
[facility]  Consult Note                                                                              9/13/2018  No ref. provider found    Patient Identification:  Scarlet Chakraborty:   58 y.o.  female  1960     Date of Admission:9/13/2018    CC:   respiratory distress    History of Present Illness:   Ms. Chakraborty is a 57 y/o who has a history of COPD and smokes 1PPD. She presented to the ED today in severe respiratory distress, having chest pain and with a HR of 150 and SBP 87. She was placed on Bipap with improvement in her breathing, chest pain and HR (currently ST rate of 110). Labs show an unremarkable CBC, trop 0.016, proBNP 78, neg d dimer, lactate 4.5, and an unremarkable CMP except for slightly elevated AST/ALT. There are bilbasilar opacities and pulmonary vascular congestion on the CXR. Initial EKG showed some ST elevation, repeat pending. A stat echocardiogram has been ordered.     Patient on further questioning states that over the past 4 months she's had worsening of what she thought was her asthma.  She describes midsternal chest tightness that occurs with exertion and on occasion at rest.  There is no radiation.  She does get quite short of breath with this she usually uses her inhalers and rests and it resolves within 10 minutes.  On occasion she is awoken from her sleep with dyspnea in the recent weeks but has not had any chest tightness at those times.  She states that today her symptoms came on suddenly describes 10 out of 10 chest tightness associated with severe shortness of breath.  She is now currently resting comfortably in complaints of 2 out of 10 chest tightness.    Past Medical History:  Past Medical History:   Diagnosis Date   • Asthma    • COPD (chronic obstructive pulmonary disease) (CMS/[facility], located within [facility])        Past Surgical History:  History reviewed. No pertinent surgical history.    Allergies:  No Known Allergies    Home Meds:    (Not in a hospital admission)    Current  "Meds  Scheduled Meds:  sodium chloride 1,000 mL Intravenous Once     Continuous Infusions:  albuterol 10 mg    sodium chloride 125 mL/hr Last Rate: 125 mL/hr (09/13/18 1240)     Social History:   Social History     Social History   • Marital status: Single     Spouse name: N/A   • Number of children: N/A   • Years of education: N/A     Occupational History   • Not on file.     Social History Main Topics   • Smoking status: Current Every Day Smoker     Packs/day: 0.50     Types: Cigarettes   • Smokeless tobacco: Not on file   • Alcohol use Yes      Comment: occassional   • Drug use: No   • Sexual activity: Defer     Other Topics Concern   • Not on file     Social History Narrative   • No narrative on file       Family History:  History reviewed. No pertinent family history.    REVIEW OF SYSTEMS:   CONSTITUTIONAL: No weight loss, fever, chills   HEENT: Eyes: No visual loss, blurred vision, double vision or yellow sclerae. Ears, Nose, Throat: No hearing loss, sneezing, congestion, runny nose or sore throat.   SKIN: No rash or itching.     RESPIRATORY: No hemoptysis, cough or sputum.   GASTROINTESTINAL: No anorexia, nausea, vomiting or diarrhea. No abdominal pain, bright red blood per rectum or melena.  GENITOURINARY: No burning on urination, hematuria or increased frequency.  NEUROLOGICAL: No headache, dizziness, syncope, paralysis, ataxia, numbness or tingling in the extremities. No change in bowel or bladder control.   MUSCULOSKELETAL: No muscle, back pain, joint pain or stiffness.   HEMATOLOGIC: No anemia, bleeding or bruising.   LYMPHATICS: No enlarged nodes. No history of splenectomy.   PSYCHIATRIC: No history of depression, anxiety, hallucinations.   ENDOCRINOLOGIC: No reports of sweating, cold or heat intolerance. No polyuria or polydipsia.     Physical Exam    BP (!) 87/57   Pulse 119   Resp 24   Ht 162.6 cm (64\")   Wt 43.5 kg (96 lb)   SpO2 97%   BMI 16.48 kg/m²     General Appearance Well developed, " cooperative and well nourished and no acute distress   Head Normocephalic, without abnormality, atraumatic   Ears Ears appear intact with no abnormalities noted   Throat No oral lesions, no thrush, oral mucosa moist   Neck No adenopathy, supple, trachea midline, no thyromegaly, no carotid bruit, no JVD   Back No skin lesions, erythema or scars, no tenderness to percussion or palptaion,range of motion is normal   Lungs Clear to auscultation,respirations regular, even and unlabored   Heart Regular rhythm and normal rate, normal S1 and S2, no murmur, no gallop, no rub, no click   Chest wall No abnormalities observed   Abdomen Normal bowel sounds, no masses, no hepatomegaly,    Extremities Moves all extremities well, no edema, no cyanosis, no redness   Pulses Pulses palpable and equal bilaterally. Normal radial, carotid, femoral, dorsalis pedis and posterior tibial pulses bilaterally. Normal abdominal aorta   Skin No bleeding, bruising or rash   Psyhiatric Alert and oriented x 3, normal mood and affect      Results from last 7 days  Lab Units 09/13/18  1157   SODIUM mmol/L 140   POTASSIUM mmol/L 4.3   CHLORIDE mmol/L 104   CO2 mmol/L 23.4   BUN mg/dL 14   CREATININE mg/dL 0.64   CALCIUM mg/dL 9.4   BILIRUBIN mg/dL 0.4   ALK PHOS U/L 73   ALT (SGPT) U/L 40*   AST (SGOT) U/L 51*   GLUCOSE mg/dL 197*       Results from last 7 days  Lab Units 09/13/18  1157   TROPONIN T ng/mL 0.016     )  Results from last 7 days  Lab Units 09/13/18  1158   WBC 10*3/mm3 8.99   HEMOGLOBIN g/dL 15.1   HEMATOCRIT % 47.7*   PLATELETS 10*3/mm3 311       Results from last 7 days  Lab Units 09/13/18  1157   INR  1.01       Results from last 7 days  Lab Units 09/13/18  1157   MAGNESIUM mg/dL 2.7*     CXR:  IMPRESSION:  1.  Suboptimal evaluation due to patient positioning. Bibasilar  pulmonary opacification and pulmonary vascular congestion, left greater  than right. Findings most concerning for asymmetric pulmonary edema  versus left basilar  pneumonia and clinical correlation is recommended as  there are currently no notes in the patient's chart.      EKG:      I personally viewed and interpreted the patient's EKG/Telemetry data.    Assessment and Plan  1.  Acute respiratory failure.  Chest x-ray suggestive of heart failure though BNP normal.  EKG concerning with ST elevation in the setting of tachycardia.  Lactate level also quite high.  Repeat EKG with flattening of the T waves in the anterolateral leads. Echocardiogram showed significant wall motion abnormalities.  With these findings are recommended she proceed to cardiac catheterization.  The risks and benefits of this including risk of myocardial infarction, death, renal flow, bleeding trauma stroke and local injury to the artery nerve and vein were discussed.  She understands and wishes to proceed.  She had been listed as having an aspirin allergy however it appears this is not correct.  We'll therefore give aspirin.  Pressure borderline low to give beta blocker therapy at this point but will reconsider in the next few hours.  2.  COPD  3.  History of asthma  4.  Unknown lipid status  5.  Elevated lactic acid, blood cultures pending.  No obvious source of infection noted at this point  6.  Elevated liver function tests, likely due to passive congestion with LV systolic dysfunction.  Recheck in a.mDeyanira Boston  9/13/2018  12:48 PM    80min spent in reviewing records, discussion and examination of the patient and discussion with other members of the patient's medical team.     Dictated utilizing Dragon dictation

## 2018-09-13 NOTE — H&P
Patient Care Team:  Provider, No Known as PCP - General    Chief complaint:  Acute shortness of breath    History of present illness:  This is a 58-year-old female patient, current smoker with history of COPD.  He does not use oxygen at home.  She does not have a pulmonologist.  She stated that she was last hospitalized in April for pneumonia.  She is currently smoking in about 1 pack a day and she started smoking about 30 years ago.  She uses albuterol as needed and Anoro at home.  She stated that she uses the albuterol about 5 times a day.    She woke up today and had symptoms of acute shortness of breath associated with nonproductive cough.  Dyspnea was worse with minimal exertion.  Albuterol rescue inhaler was not alleviating her symptoms.  She had associated chest tightness but no chest pain or radiation.  She denies fever or chills or recent upper respiratory tract infection.    She called EMS.  He shouldn't was apparently in severe respiratory distress and placed on CPAP.  On arrival to the ED, she was promptly switched to BiPAP.  She received continuous nebulizer in the ED and reportedly improved.    On my assessment, patient was on BiPAP with obvious tripoding but she stated that she is feeling better.    She had some EKG changes with concerns for ST elevation but her troponin is normal.  Dr. Dozier has just assessed her few minutes ago and ordered a stat echocardiogram.    Labs reviewed:  ABG on BiPAP: 7.39/37/111; glucose 197; AST 51; ALP 40; lactic acid 4.5; lymphocytes 48%     Review of Systems:  Constitutional: No fever or chills.   ENMT: No sinus congestion  Cardiovascular: Chest tightness, mild in severity.  No radiation.  No palpitation no laxity edema  Respiratory: Dyspnea and shortness of breath and nonproductive cough  Gastrointestinal: No constipation, diarrhea or abdominal pain   Neurology: No headache, weakness, numbness or dizziness.   Musculoskeletal: No joints pain, stiffness  or swelling.   Psychiatry: No depression or anxiety  Genitourinary: No dysuria or frequent urination  Endo: No weight changes. No cold or warm intolerance.  Lymphatic: No swollen glands.  Integumentary: No rash.    History  Past Medical History:   Diagnosis Date   • Asthma    • COPD (chronic obstructive pulmonary disease) (CMS/HCC)      History reviewed. No pertinent surgical history.  History reviewed. No pertinent family history.  Social History   Substance Use Topics   • Smoking status: Current Every Day Smoker     Packs/day: 0.50     Types: Cigarettes   • Smokeless tobacco: Not on file   • Alcohol use Yes      Comment: occassional       (Not in a hospital admission)  Allergies:  Patient has no known allergies.    Vital Signs  Heart Rate:  [118-144] 119  Resp:  [24] 24  BP: ()/(57-87) 87/57  FiO2 (%):  [25 %-50 %] 25 %    Physical Exam:  Constitutional: In mild respiratory distress on BiPAP  Eyes: Injected conjunctiva, EOMI.  ENMT: Ron 3. No oral thrush.   Heart: RRR, no murmur distant heart sounds  Lungs: Significantly decreased air entry bilaterally.  Bilateral expiratory wheezing, mild        Abdomen: Soft. No tenderness or dullness.  Extremities: No cyanosis, clubbing or pitting edema. Moves all extremities.  Neuro: Conscious, alert, oriented x3  Psych: Appropriate mood and affect.    Integumentary: No rash  Lymphatic: No palpable cervical or supraclavicular lymph nodes.            Diagnostic imaging:  I personally and independently reviewed the following images:   CXR benign 9/13/18  COPD changes.  Lucent right lung which could represent emboli versus pneumothorax.  Left lower lobe infiltrates      EKG: High takeoff ST changes in the precordial leads.  Sinus tachycardia    Assessment:  1. Acute respiratory distress  2. COPD exacerbation, severe  3. Left lower lobe pneumonia, likely, unclear if viral or bacterial  4. Lactic acidosis  5. Elevated transaminases  6. Atypical chest pain  7. EKG  changes  8. Tobacco abuse      Plan:  I did change the BiPAP settings to AVAPS tidal volume of 350.  We can actually take the patient off the BiPAP for breaks.  Resume BiPAP for increased work of breathing or signs of respiratory distress.    I performed a quick chest ultrasound to look for pneumothorax given the presence of significant lucencies on the right lung on the chest x-ray.  There was good lung sliding sign.  Pneumothorax is very unlikely.    Initiate bronchodilators with DuoNeb every 4 hours    Solu-Medrol 125 every 6 hours.     Check respiratory viral panel.    Will treat possible bacterial pneumonia with the Rocephin and azithromycin.  Pro-calcitonin is negative.  Check urine strep antigen.    Check hepatitis panel due to elevated transaminase    IV hydration.  Lactic acid is elevated but could be related to hypoxemia.  We will follow lactic acid level.  Blood cultures collected.    The chest pain is atypical and likely related to COPD exacerbation.  Neurology on board.    DVT prophylaxis with Lovenox.    Counseled for smoking cessation            Rafael Brito MD  09/13/18  1:13 PM    Time: Critical care 39 min      This note was dictated utilizing [email or web address] dictation

## 2018-09-14 LAB
ALBUMIN SERPL-MCNC: 3.3 G/DL (ref 3.5–5.2)
ALBUMIN/GLOB SERPL: 1.2 G/DL
ALP SERPL-CCNC: 61 U/L (ref 39–117)
ALT SERPL W P-5'-P-CCNC: 77 U/L (ref 1–33)
ANION GAP SERPL CALCULATED.3IONS-SCNC: 12.8 MMOL/L
AST SERPL-CCNC: 68 U/L (ref 1–32)
BASOPHILS # BLD AUTO: 0 10*3/MM3 (ref 0–0.2)
BASOPHILS NFR BLD AUTO: 0 % (ref 0–1.5)
BILIRUB SERPL-MCNC: 0.3 MG/DL (ref 0.1–1.2)
BUN BLD-MCNC: 16 MG/DL (ref 6–20)
BUN/CREAT SERPL: 30.2 (ref 7–25)
CALCIUM SPEC-SCNC: 7.9 MG/DL (ref 8.6–10.5)
CHLORIDE SERPL-SCNC: 110 MMOL/L (ref 98–107)
CO2 SERPL-SCNC: 20.2 MMOL/L (ref 22–29)
CREAT BLD-MCNC: 0.53 MG/DL (ref 0.57–1)
D-LACTATE SERPL-SCNC: 1.4 MMOL/L (ref 0.5–2)
DEPRECATED RDW RBC AUTO: 49.2 FL (ref 37–54)
EOSINOPHIL # BLD AUTO: 0 10*3/MM3 (ref 0–0.7)
EOSINOPHIL NFR BLD AUTO: 0 % (ref 0.3–6.2)
ERYTHROCYTE [DISTWIDTH] IN BLOOD BY AUTOMATED COUNT: 13.5 % (ref 11.7–13)
GFR SERPL CREATININE-BSD FRML MDRD: 119 ML/MIN/1.73
GLOBULIN UR ELPH-MCNC: 2.8 GM/DL
GLUCOSE BLD-MCNC: 137 MG/DL (ref 65–99)
GLUCOSE BLDC GLUCOMTR-MCNC: 126 MG/DL (ref 70–130)
GLUCOSE BLDC GLUCOMTR-MCNC: 142 MG/DL (ref 70–130)
GLUCOSE BLDC GLUCOMTR-MCNC: 146 MG/DL (ref 70–130)
GLUCOSE BLDC GLUCOMTR-MCNC: 210 MG/DL (ref 70–130)
HCT VFR BLD AUTO: 40 % (ref 35.6–45.5)
HCT VFR BLDA CALC: 39 % (ref 38–51)
HGB BLD-MCNC: 12.6 G/DL (ref 11.9–15.5)
HGB BLDA-MCNC: 13.3 G/DL (ref 12–17)
IMM GRANULOCYTES # BLD: 0 10*3/MM3 (ref 0–0.03)
IMM GRANULOCYTES NFR BLD: 0 % (ref 0–0.5)
LYMPHOCYTES # BLD AUTO: 0.85 10*3/MM3 (ref 0.9–4.8)
LYMPHOCYTES NFR BLD AUTO: 14.9 % (ref 19.6–45.3)
MCH RBC QN AUTO: 31.2 PG (ref 26.9–32)
MCHC RBC AUTO-ENTMCNC: 31.5 G/DL (ref 32.4–36.3)
MCV RBC AUTO: 99 FL (ref 80.5–98.2)
MONOCYTES # BLD AUTO: 0.14 10*3/MM3 (ref 0.2–1.2)
MONOCYTES NFR BLD AUTO: 2.5 % (ref 5–12)
NEUTROPHILS # BLD AUTO: 4.7 10*3/MM3 (ref 1.9–8.1)
NEUTROPHILS NFR BLD AUTO: 82.6 % (ref 42.7–76)
PLATELET # BLD AUTO: 218 10*3/MM3 (ref 140–500)
PMV BLD AUTO: 12.3 FL (ref 6–12)
POTASSIUM BLD-SCNC: 4.1 MMOL/L (ref 3.5–5.2)
PROT SERPL-MCNC: 6.1 G/DL (ref 6–8.5)
RBC # BLD AUTO: 4.04 10*6/MM3 (ref 3.9–5.2)
SAO2 % BLDA: 97 % (ref 95–98)
SODIUM BLD-SCNC: 143 MMOL/L (ref 136–145)
TROPONIN T SERPL-MCNC: 0.22 NG/ML (ref 0–0.03)
WBC NRBC COR # BLD: 5.69 10*3/MM3 (ref 4.5–10.7)

## 2018-09-14 PROCEDURE — 99233 SBSQ HOSP IP/OBS HIGH 50: CPT | Performed by: INTERNAL MEDICINE

## 2018-09-14 PROCEDURE — 93005 ELECTROCARDIOGRAM TRACING: CPT | Performed by: INTERNAL MEDICINE

## 2018-09-14 PROCEDURE — 80053 COMPREHEN METABOLIC PANEL: CPT | Performed by: INTERNAL MEDICINE

## 2018-09-14 PROCEDURE — 25010000002 CEFTRIAXONE PER 250 MG: Performed by: INTERNAL MEDICINE

## 2018-09-14 PROCEDURE — 94640 AIRWAY INHALATION TREATMENT: CPT

## 2018-09-14 PROCEDURE — 63710000001 INSULIN ASPART PER 5 UNITS: Performed by: INTERNAL MEDICINE

## 2018-09-14 PROCEDURE — 25010000002 AZITHROMYCIN PER 500 MG: Performed by: INTERNAL MEDICINE

## 2018-09-14 PROCEDURE — 25010000002 METHYLPREDNISOLONE PER 40 MG: Performed by: INTERNAL MEDICINE

## 2018-09-14 PROCEDURE — 94664 DEMO&/EVAL PT USE INHALER: CPT

## 2018-09-14 PROCEDURE — 25010000002 ENOXAPARIN PER 10 MG: Performed by: INTERNAL MEDICINE

## 2018-09-14 PROCEDURE — 94799 UNLISTED PULMONARY SVC/PX: CPT

## 2018-09-14 PROCEDURE — 25010000002 FUROSEMIDE PER 20 MG: Performed by: INTERNAL MEDICINE

## 2018-09-14 PROCEDURE — 25010000002 METHYLPREDNISOLONE PER 125 MG: Performed by: INTERNAL MEDICINE

## 2018-09-14 PROCEDURE — 83605 ASSAY OF LACTIC ACID: CPT | Performed by: INTERNAL MEDICINE

## 2018-09-14 PROCEDURE — 85025 COMPLETE CBC W/AUTO DIFF WBC: CPT | Performed by: INTERNAL MEDICINE

## 2018-09-14 PROCEDURE — 84484 ASSAY OF TROPONIN QUANT: CPT | Performed by: INTERNAL MEDICINE

## 2018-09-14 PROCEDURE — 82962 GLUCOSE BLOOD TEST: CPT

## 2018-09-14 PROCEDURE — 93010 ELECTROCARDIOGRAM REPORT: CPT | Performed by: INTERNAL MEDICINE

## 2018-09-14 RX ORDER — IPRATROPIUM BROMIDE AND ALBUTEROL SULFATE 2.5; .5 MG/3ML; MG/3ML
3 SOLUTION RESPIRATORY (INHALATION) 3 TIMES DAILY
Status: DISCONTINUED | OUTPATIENT
Start: 2018-09-14 | End: 2018-09-14

## 2018-09-14 RX ORDER — METHYLPREDNISOLONE SODIUM SUCCINATE 40 MG/ML
40 INJECTION, POWDER, LYOPHILIZED, FOR SOLUTION INTRAMUSCULAR; INTRAVENOUS EVERY 6 HOURS
Status: DISCONTINUED | OUTPATIENT
Start: 2018-09-14 | End: 2018-09-15

## 2018-09-14 RX ORDER — BUDESONIDE AND FORMOTEROL FUMARATE DIHYDRATE 160; 4.5 UG/1; UG/1
2 AEROSOL RESPIRATORY (INHALATION)
Status: DISCONTINUED | OUTPATIENT
Start: 2018-09-14 | End: 2018-09-18 | Stop reason: HOSPADM

## 2018-09-14 RX ORDER — FUROSEMIDE 10 MG/ML
20 INJECTION INTRAMUSCULAR; INTRAVENOUS ONCE
Status: COMPLETED | OUTPATIENT
Start: 2018-09-14 | End: 2018-09-14

## 2018-09-14 RX ORDER — IPRATROPIUM BROMIDE AND ALBUTEROL SULFATE 2.5; .5 MG/3ML; MG/3ML
3 SOLUTION RESPIRATORY (INHALATION)
Status: DISCONTINUED | OUTPATIENT
Start: 2018-09-14 | End: 2018-09-16

## 2018-09-14 RX ADMIN — SODIUM CHLORIDE 125 ML/HR: 9 INJECTION, SOLUTION INTRAVENOUS at 03:59

## 2018-09-14 RX ADMIN — BUDESONIDE AND FORMOTEROL FUMARATE DIHYDRATE 2 PUFF: 160; 4.5 AEROSOL RESPIRATORY (INHALATION) at 20:35

## 2018-09-14 RX ADMIN — FUROSEMIDE 20 MG: 10 INJECTION, SOLUTION INTRAMUSCULAR; INTRAVENOUS at 09:11

## 2018-09-14 RX ADMIN — AZITHROMYCIN MONOHYDRATE 500 MG: 500 INJECTION, POWDER, LYOPHILIZED, FOR SOLUTION INTRAVENOUS at 11:41

## 2018-09-14 RX ADMIN — IPRATROPIUM BROMIDE AND ALBUTEROL SULFATE 3 ML: .5; 3 SOLUTION RESPIRATORY (INHALATION) at 16:24

## 2018-09-14 RX ADMIN — METHYLPREDNISOLONE SODIUM SUCCINATE 40 MG: 40 INJECTION, POWDER, LYOPHILIZED, FOR SOLUTION INTRAMUSCULAR; INTRAVENOUS at 20:23

## 2018-09-14 RX ADMIN — CEFTRIAXONE SODIUM 1 G: 1 INJECTION, SOLUTION INTRAVENOUS at 11:42

## 2018-09-14 RX ADMIN — IPRATROPIUM BROMIDE AND ALBUTEROL SULFATE 3 ML: .5; 3 SOLUTION RESPIRATORY (INHALATION) at 20:35

## 2018-09-14 RX ADMIN — INSULIN ASPART 4 UNITS: 100 INJECTION, SOLUTION INTRAVENOUS; SUBCUTANEOUS at 20:21

## 2018-09-14 RX ADMIN — METHYLPREDNISOLONE SODIUM SUCCINATE 125 MG: 125 INJECTION, POWDER, FOR SOLUTION INTRAMUSCULAR; INTRAVENOUS at 09:06

## 2018-09-14 RX ADMIN — METHYLPREDNISOLONE SODIUM SUCCINATE 125 MG: 125 INJECTION, POWDER, FOR SOLUTION INTRAMUSCULAR; INTRAVENOUS at 02:35

## 2018-09-14 RX ADMIN — IPRATROPIUM BROMIDE AND ALBUTEROL SULFATE 3 ML: .5; 3 SOLUTION RESPIRATORY (INHALATION) at 08:07

## 2018-09-14 RX ADMIN — METHYLPREDNISOLONE SODIUM SUCCINATE 125 MG: 125 INJECTION, POWDER, FOR SOLUTION INTRAMUSCULAR; INTRAVENOUS at 13:42

## 2018-09-14 RX ADMIN — ENOXAPARIN SODIUM 30 MG: 30 INJECTION SUBCUTANEOUS at 15:14

## 2018-09-14 NOTE — PAYOR COMM NOTE
"Scarlet Chavez  (58 y.o. Female)                     ATTENTION;   AUTH PENDING [id number], PATIENT ADMITTED TO CRITICAL CARE UNIT                  REPLY TO [facility] DEPT, ERICA JETT LPN  [phone] OR CALL        Date of Birth Social Security Number Address Home Phone MRN    1960  [id number]   [facility] [address] [phone] [id number]    [employer] Mar[employer] Status          None Single       Admission Date Admission Type Admitting Provider Attending Provider Department, Room/Bed    9/13/18 Emergency Rafael Brito MD Jambeih, Rami, MD [facility] CORONARY CARE, N341/1    Discharge Date Discharge Disposition Discharge Destination                       Attending Provider:  Rafael Brito MD    Allergies:  No Known Allergies    Isolation:  None   Infection:  None   Code Status:  CPR    Ht:  162.6 cm (64\")   Wt:  43.5 kg (96 lb)    Admission Cmt:  None   Principal Problem:  None                Active Insurance as of 9/13/2018     Primary Coverage     Payor Plan Insurance Group Employer/Plan Group    [employer] [facility] [employer] [facility] PPO [id number]     Payor Plan Address Payor Plan Phone Number Effective From Effective To    [address] [phone] 9/1/2018     [facility] [id number]       Subscriber Name Subscriber Birth Date Member ID       SCARLET CHAVEZ 1960 [id number]                 Emergency Contacts      (Rel.) Home Phone Work Phone Mobile Phone    Yvonne Mccollum (Sister) [phone] -- --               History & Physical      Rafael Brito MD at 9/13/2018  1:13 PM                        Patient Care Team:  Provider, No Known as PCP - General    Chief complaint:  Acute shortness of breath    History of present illness:  This is a 58-year-old female patient, current smoker with history of COPD.  He does not use oxygen at home.  She does not have a pulmonologist.  She stated that she was last hospitalized in April for pneumonia.  She " is currently smoking in about 1 pack a day and she started smoking about 30 years ago.  She uses albuterol as needed and Anoro at home.  She stated that she uses the albuterol about 5 times a day.    She woke up today and had symptoms of acute shortness of breath associated with nonproductive cough.  Dyspnea was worse with minimal exertion.  Albuterol rescue inhaler was not alleviating her symptoms.  She had associated chest tightness but no chest pain or radiation.  She denies fever or chills or recent upper respiratory tract infection.    She called EMS.  He shouldn't was apparently in severe respiratory distress and placed on CPAP.  On arrival to the ED, she was promptly switched to BiPAP.  She received continuous nebulizer in the ED and reportedly improved.    On my assessment, patient was on BiPAP with obvious tripoding but she stated that she is feeling better.    She had some EKG changes with concerns for ST elevation but her troponin is normal.  Dr. Dozier has just assessed her few minutes ago and ordered a stat echocardiogram.    Labs reviewed:  ABG on BiPAP: 7.39/37/111; glucose 197; AST 51; ALP 40; lactic acid 4.5; lymphocytes 48%     Review of Systems:  Constitutional: No fever or chills.   ENMT: No sinus congestion  Cardiovascular: Chest tightness, mild in severity.  No radiation.  No palpitation no laxity edema  Respiratory: Dyspnea and shortness of breath and nonproductive cough  Gastrointestinal: No constipation, diarrhea or abdominal pain   Neurology: No headache, weakness, numbness or dizziness.   Musculoskeletal: No joints pain, stiffness or swelling.   Psychiatry: No depression or anxiety  Genitourinary: No dysuria or frequent urination  Endo: No weight changes. No cold or warm intolerance.  Lymphatic: No swollen glands.  Integumentary: No rash.    History  Past Medical History:   Diagnosis Date   • Asthma    • COPD (chronic obstructive pulmonary disease) (CMS/[facility])      History reviewed. No  pertinent surgical history.  History reviewed. No pertinent family history.  Social History   Substance Use Topics   • Smoking status: Current Every Day Smoker     Packs/day: 0.50     Types: Cigarettes   • Smokeless tobacco: Not on file   • Alcohol use Yes      Comment: occassional       (Not in a hospital admission)  Allergies:  Patient has no known allergies.    Vital Signs  Heart Rate:  [118-144] 119  Resp:  [24] 24  BP: ()/(57-87) 87/57  FiO2 (%):  [25 %-50 %] 25 %    Physical Exam:  Constitutional: In mild respiratory distress on BiPAP  Eyes: Injected conjunctiva, EOMI.  ENMT: Ron 3. No oral thrush.   Heart: RRR, no murmur distant heart sounds  Lungs: Significantly decreased air entry bilaterally.  Bilateral expiratory wheezing, mild        Abdomen: Soft. No tenderness or dullness.  Extremities: No cyanosis, clubbing or pitting edema. Moves all extremities.  Neuro: Conscious, alert, oriented x3  Psych: Appropriate mood and affect.    Integumentary: No rash  Lymphatic: No palpable cervical or supraclavicular lymph nodes.            Diagnostic imaging:  I personally and independently reviewed the following images:   CXR benign 9/13/18  COPD changes.  Lucent right lung which could represent emboli versus pneumothorax.  Left lower lobe infiltrates      EKG: High takeoff ST changes in the precordial leads.  Sinus tachycardia    Assessment:  1. Acute respiratory distress  2. COPD exacerbation, severe  3. Left lower lobe pneumonia, likely, unclear if viral or bacterial  4. Lactic acidosis  5. Elevated transaminases  6. Atypical chest pain  7. EKG changes  8. Tobacco abuse      Plan:  I did change the BiPAP settings to AVAPS tidal volume of 350.  We can actually take the patient off the BiPAP for breaks.  Resume BiPAP for increased work of breathing or signs of respiratory distress.    I performed a quick chest ultrasound to look for pneumothorax given the presence of significant lucencies on the right lung  "on the chest x-ray.  There was good lung sliding sign.  Pneumothorax is very unlikely.    Initiate bronchodilators with DuoNeb every 4 hours    Solu-Medrol 125 every 6 hours.     Check respiratory viral panel.    Will treat possible bacterial pneumonia with the Rocephin and azithromycin.  Pro-calcitonin is negative.  Check urine strep antigen.    Check hepatitis panel due to elevated transaminase    IV hydration.  Lactic acid is elevated but could be related to hypoxemia.  We will follow lactic acid level.  Blood cultures collected.    The chest pain is atypical and likely related to COPD exacerbation.  Neurology on board.    DVT prophylaxis with Lovenox.    Counseled for smoking cessation            Rafael Brito MD  09/13/18  1:13 PM    Time: Critical care 39 min      This note was dictated utilizing Dragon dictation    Electronically signed by Rafael Brito MD at 9/13/2018  2:33 PM          Emergency Department Notes      Holden Egan MD at 9/13/2018 11:58 AM      Procedure Orders:    1. Critical Care [[id number]] ordered by Holden Egan MD at 09/13/18 1553                 EMERGENCY DEPARTMENT ENCOUNTER    CHIEF COMPLAINT  Chief Complaint: SOA  History given by: Patient   History limited by: Due to severe respiratory distress on CPAP  Room Number: [place]  PMD: Provider, No Known      HPI:  Pt is a 58 y.o. female who presents complaining of SOA that began a couple of weeks, worsening today. Pt reports productive cough (\"yellow and clear\" sputum), and chest tightness.  Pt denies fever or leg swelling. Pt denies h/o lung problems or heart problems and doesn't see a specialist. Pt has smoked her whole life. Pt has Hx of PNA with CPAP treatment. Pt states she is feeling better since when paramedics found her. Per EMS, pt was found in severe respiratory distress by coworker who called EMS. Per EMS, Pt was unresponsive and breathing 40 times a minute. Pt had decreased responsiveness. O2sats = 82% Pt Given bipap " "and half duo-neb.    This history in history of present illness was obtained after going to see the patient several times over the first 45 minutes to an hour when she was in the emergency department in her respiratory status improved and she was able to communicate much better and breathing much easier.    Duration:  Just PTA  Onset: gradual  Timing: constant  Radiation: NA  Quality: SOA  Intensity/Severity: severe  Progression: uncahnged  Associated Symptoms: productive cough (\"yellow and clear\" sputum), and chest tightness  Previous Episodes: Pt has Hx of PNA.  Treatment before arrival: PT was given BiPAP and half duo-neb en route.    PAST MEDICAL HISTORY  Active Ambulatory Problems     Diagnosis Date Noted   • No Active Ambulatory Problems     Resolved Ambulatory Problems     Diagnosis Date Noted   • No Resolved Ambulatory Problems     Past Medical History:   Diagnosis Date   • Asthma    • COPD (chronic obstructive pulmonary disease) (CMS/[facility])        PAST SURGICAL HISTORY  History reviewed. No pertinent surgical history.    FAMILY HISTORY  History reviewed. No pertinent family history.    SOCIAL HISTORY  Social History     Social History   • Marital status: Single     Spouse name: N/A   • Number of children: N/A   • Years of education: N/A     Occupational History   • Not on file.     Social History Main Topics   • Smoking status: Current Every Day Smoker     Packs/day: 0.50     Types: Cigarettes   • Smokeless tobacco: Not on file   • Alcohol use Yes      Comment: occassional   • Drug use: No   • Sexual activity: Defer     Other Topics Concern   • Not on file     Social History Narrative   • No narrative on file       ALLERGIES  Patient has no known allergies.    REVIEW OF SYSTEMS  Review of Systems   Constitutional: Negative for fever.   HENT: Negative for sore throat.    Eyes: Negative.    Respiratory: Positive for cough (productive) and shortness of breath.    Cardiovascular: Positive for chest pain " "(\"tightness\").   Gastrointestinal: Negative for abdominal pain, diarrhea and vomiting.   Genitourinary: Negative for dysuria.   Musculoskeletal: Negative for neck pain.   Skin: Negative for rash.   Allergic/Immunologic: Negative.    Neurological: Negative for weakness, numbness and headaches.   Hematological: Negative.    Psychiatric/Behavioral: Negative.    All other systems reviewed and are negative.      PHYSICAL EXAM  ED Triage Vitals [09/13/18 1153]   Temp Heart Rate Resp BP SpO2   -- (!) 144 24 -- 96 %      Temp src Heart Rate Source Patient Position BP Location FiO2 (%)   -- Monitor -- -- --       Physical Exam   Constitutional: She is oriented to person, place, and time. She appears distressed (Review respiratory distress).   Thin. Appears older than stated age.   HENT:   Head: Normocephalic and atraumatic.   Eyes: Pupils are equal, round, and reactive to light. EOM are normal.   Neck: Normal range of motion. Neck supple.   Cardiovascular: Regular rhythm and normal heart sounds.  Tachycardia present.    HR = 140-150s   Pulmonary/Chest: She is in respiratory distress (severe). She has decreased breath sounds.   CPAP. Communicates in short phrases.  For decreased breath sounds were very likely because of decreased air movement.   Abdominal: Soft. There is no tenderness. There is no rebound and no guarding.   Musculoskeletal: Normal range of motion. She exhibits no edema.   Neurological: She is alert and oriented to person, place, and time. She has normal sensation and normal strength.   Able to follow commands. No neuro deficit.   Skin: Skin is warm. No rash noted. She is diaphoretic.   Psychiatric: Mood and affect normal.   Nursing note and vitals reviewed.      LAB RESULTS  Lab Results (last 24 hours)     Procedure Component Value Units Date/Time    Comprehensive Metabolic Panel [[id number]]  (Abnormal) Collected:  09/13/18 1157    Specimen:  Blood Updated:  09/13/18 1246     Glucose 197 (H) mg/dL      BUN 14 " mg/dL      Creatinine 0.64 mg/dL      Sodium 140 mmol/L      Potassium 4.3 mmol/L      Chloride 104 mmol/L      CO2 23.4 mmol/L      Calcium 9.4 mg/dL      Total Protein 7.9 g/dL      Albumin 4.20 g/dL      ALT (SGPT) 40 (H) U/L      AST (SGOT) 51 (H) U/L      Alkaline Phosphatase 73 U/L      Total Bilirubin 0.4 mg/dL      eGFR Non African Amer 96 mL/min/1.73      Globulin 3.7 gm/dL      A/G Ratio 1.1 g/dL      BUN/Creatinine Ratio 21.9     Anion Gap 12.6 mmol/L     Protime-INR [[id number]]  (Normal) Collected:  09/13/18 1157    Specimen:  Blood Updated:  09/13/18 1237     Protime 13.1 Seconds      INR 1.01    BNP [[id number]]  (Normal) Collected:  09/13/18 1157    Specimen:  Blood Updated:  09/13/18 1242     proBNP 78.3 pg/mL     Narrative:       Among patients with dyspnea, NT-proBNP is highly sensitive for the detection of acute congestive heart failure. In addition NT-proBNP of <300 pg/ml effectively rules out acute congestive heart failure with 99% negative predictive value.    Troponin [[id number]]  (Normal) Collected:  09/13/18 1157    Specimen:  Blood Updated:  09/13/18 1246     Troponin T 0.016 ng/mL     Narrative:       Troponin T Reference Ranges:  Less than 0.03 ng/mL:    Negative for AMI  0.03 to 0.09 ng/mL:      Indeterminant for AMI  Greater than 0.09 ng/mL: Positive for AMI    Lipase [[id number]]  (Normal) Collected:  09/13/18 1157    Specimen:  Blood Updated:  09/13/18 1246     Lipase 35 U/L     Procalcitonin [[id number]]  (Abnormal) Collected:  09/13/18 1157    Specimen:  Blood Updated:  09/13/18 1247     Procalcitonin <0.02 (L) ng/mL     Narrative:       As a Marker for Sepsis (Non-Neonates):   1. <0.5 ng/mL represents a low risk of severe sepsis and/or septic shock.  1. >2 ng/mL represents a high risk of severe sepsis and/or septic shock.    As a Marker for Lower Respiratory Tract Infections that require antibiotic therapy:  PCT on Admission     Antibiotic Therapy             6-12 Hrs later  > 0.5      "           Strongly Recommended            >0.25 - <0.5         Recommended  0.1 - 0.25           Discouraged                   Remeasure/reassess PCT  <0.1                 Strongly Discouraged          Remeasure/reassess PCT      As 28 day mortality risk marker: \"Change in Procalcitonin Result\" (> 80 % or <=80 %) if Day 0 (or Day 1) and Day 4 values are available. Refer to http://[email or web address]-calculator.com/   Change in PCT <=80 %   A decrease of PCT levels below or equal to 80 % defines a positive change in PCT test result representing a higher risk for 28-day all-cause mortality of patients diagnosed with severe sepsis or septic shock.  Change in PCT > 80 %   A decrease of PCT levels of more than 80 % defines a negative change in PCT result representing a lower risk for 28-day all-cause mortality of patients diagnosed with severe sepsis or septic shock.                Blood Culture - Blood, [[id number]] Collected:  09/13/18 1157    Specimen:  Blood from Arm, Left Updated:  09/13/18 1207    Magnesium [[id number]]  (Abnormal) Collected:  09/13/18 1157    Specimen:  Blood Updated:  09/13/18 1246     Magnesium 2.7 (H) mg/dL     D-dimer, Quantitative [[id number]]  (Normal) Collected:  09/13/18 1157    Specimen:  Blood Updated:  09/13/18 1237     D-Dimer, Quantitative 0.29 MCGFEU/mL     Narrative:       The Stago D-Dimer test used in conjunction with a clinical pretest probability (PTP) assessment model, has been approved by the FDA to rule out the presence of venous thromboembolism (VTE) in outpatients suspected of deep venous thrombosis (DVT) or pulmonary embolism (PE).     CBC & Differential [[id number]] Collected:  09/13/18 1158    Specimen:  Blood Updated:  09/13/18 1220    Narrative:       The following orders were created for panel order CBC & Differential.  Procedure                               Abnormality         Status                     ---------                               -----------         ------      "                CBC Auto Differential[[id number]]        Abnormal            Final result                 Please view results for these tests on the individual orders.    Lactic Acid, Plasma [[id number]]  (Abnormal) Collected:  09/13/18 1158    Specimen:  Blood Updated:  09/13/18 1250     Lactate 4.5 (C) mmol/L     CBC Auto Differential [[id number]]  (Abnormal) Collected:  09/13/18 1158    Specimen:  Blood Updated:  09/13/18 1220     WBC 8.99 10*3/mm3      RBC 4.79 10*6/mm3      Hemoglobin 15.1 g/dL      Hematocrit 47.7 (H) %      MCV 99.6 (H) fL      MCH 31.5 pg      MCHC 31.7 (L) g/dL      RDW 13.3 (H) %      RDW-SD 48.7 fl      MPV 11.4 fL      Platelets 311 10*3/mm3      Neutrophil % 39.1 (L) %      Lymphocyte % 48.2 (H) %      Monocyte % 6.3 %      Eosinophil % 5.7 %      Basophil % 0.7 %      Immature Grans % 0.1 %      Neutrophils, Absolute 3.52 10*3/mm3      Lymphocytes, Absolute 4.33 10*3/mm3      Monocytes, Absolute 0.57 10*3/mm3      Eosinophils, Absolute 0.51 10*3/mm3      Basophils, Absolute 0.06 10*3/mm3      Immature Grans, Absolute 0.01 10*3/mm3     Lactic Acid, Reflex Timer (This will reflex a repeat order 3-3:15 hours after ordered.) [[id number]] Collected:  09/13/18 1158    Specimen:  Blood Updated:  09/13/18 1600     Extra Tube Hold for add-ons.     Comment: Auto resulted.       Blood Gas, Arterial [[id number]]  (Abnormal) Collected:  09/13/18 1228    Specimen:  Arterial Blood Updated:  09/13/18 1232     Site Arterial: left radial     Troy's Test Positive     pH, Arterial 7.394 pH units      pCO2, Arterial 37.2 mm Hg      pO2, Arterial 111.8 (H) mm Hg      HCO3, Arterial 22.7 mmol/L      Base Excess, Arterial -1.7 (L) mmol/L      O2 Saturation Calculated 98.4 %      A-a Gradiant 0.8 mmHg      Barometric Pressure for Blood Gas 756.0 mmHg      Modality BiPap     FIO2 25 %      Set Tidal Volume 436     Set Mech Resp Rate 16     Rate 22 Breaths/minute     Narrative:       o2 sat 98, avaps 380 Meter:  [id number] : [id number] Lisandro Winkler    Blood Culture - Blood, [[id number]] Collected:  09/13/18 1256    Specimen:  Blood from Arm, Left Updated:  09/13/18 1302    Urinalysis With Microscopic If Indicated (No Culture) - Urine, Clean Catch [[id number]]  (Normal) Collected:  09/13/18 1352    Specimen:  Urine from Urine, Clean Catch Updated:  09/13/18 1425     Color, UA Yellow     Appearance, UA Clear     pH, UA 5.5     Specific Gravity, UA 1.010     Glucose, UA Negative     Ketones, UA Negative     Bilirubin, UA Negative     Blood, UA Negative     Protein, UA Negative     Leuk Esterase, UA Negative     Nitrite, UA Negative     Urobilinogen, UA 0.2 E.U./dL    Narrative:       Urine microscopic not indicated.    Respiratory Panel, PCR - Swab, Nasopharynx [[id number]] Collected:  09/13/18 1511    Specimen:  Swab from Nasopharynx Updated:  09/13/18 1517          I ordered the above labs and reviewed the results    RADIOLOGY  XR Chest 1 View   Final Result   1.  Suboptimal evaluation due to patient positioning. Bibasilar   pulmonary opacification and pulmonary vascular congestion, left greater   than right. Findings most concerning for asymmetric pulmonary edema   versus left basilar pneumonia and clinical correlation is recommended as   there are currently no notes in the patient's chart.       This report was finalized on 9/13/2018 12:35 PM by Dr. Shashank Nieto M.D.               I ordered the above noted radiological studies. Interpreted by radiologist. Reviewed by me in PACS.       PROCEDURES  Critical Care  Performed by: LAVERNE OWEN  Authorized by: LAVERNE OWEN     Critical care provider statement:     Critical care time (minutes):  75    Critical care was necessary to treat or prevent imminent or life-threatening deterioration of the following conditions:  Sepsis, cardiac failure, circulatory failure and respiratory failure    Critical care was time spent personally by me on the following activities:   Development of treatment plan with patient or surrogate, discussions with consultants, evaluation of patient's response to treatment, examination of patient, interpretation of cardiac output measurements, obtaining history from patient or surrogate, ordering and performing treatments and interventions, ordering and review of laboratory studies, ordering and review of radiographic studies, pulse oximetry, re-evaluation of patient's condition and review of old charts          EKG          EKG time: 1159  Rhythm/Rate: Tachy, 139  P waves and CT: CT downsloping v3-v6 with Jpoint elevation v3-v6  QRS, axis: Narrow q waves v1 v2, nml     ST and T waves are nonspecific and there is definitely a lot of movement artifact     Interpreted Contemporaneously by me, independently viewed      EKG           EKG time: 1212  Rhythm/Rate: Tachy, 128  QRS, axis: Narrow, Nml   ST and T waves: St elevation r1h6v6x5. No reciprocal ST depression    Interpreted Contemporaneously by me, independently viewed          PROGRESS AND CONSULTS       1210  Pt breathing better. Pt states chest pain has improved. RR in mid 20s. Blood pressure = 119/62; HR = 125    1221  Pt recheck.  Pt denies current CP.    1230  Dr. Dozier, Cardiology bedside evaluation.     1300  Pt breathing is much better.     1306  Dr. Brito bedside evaluation.    1406  Dr. Boston bedside evaluation.    1413  Pt recheck. Pt reports mild discomfort in chest. Pt looking much better. Pt is smiling and family at bedside.      MEDICAL DECISION MAKING  Results were reviewed/discussed with the patient and they were also made aware of online access. Pt also made aware that some labs, such as cultures, will not be resulted during ER visit and follow up with PMD is necessary.     MDM  Number of Diagnoses or Management Options  Abnormal EKG:   Acute respiratory failure with hypoxia (CMS/HCC):   COPD exacerbation (CMS/HCC):   Pneumonia of left lower lobe due to infectious organism  (CMS/[facility]):      Amount and/or Complexity of Data Reviewed  Clinical lab tests: ordered and reviewed (Lactate 4.5, D-Dimer negative, BNP - 78)  Tests in the radiology section of CPT®:  ordered and reviewed (CXR shows bibasilar PNA. )  Tests in the medicine section of CPT®:  reviewed and ordered (See EKG results in procedure. )  Discussion of test results with the performing providers: yes  Decide to obtain previous medical records or to obtain history from someone other than the patient: yes  Review and summarize past medical records: yes (No old records)  Discuss the patient with other providers: yes  Independent visualization of images, tracings, or specimens: yes    Critical Care  Total time providing critical care:  minutes         DIAGNOSIS  Final diagnoses:   Acute respiratory failure with hypoxia (CMS/HCC)   COPD exacerbation (CMS/HCC)   Abnormal EKG   Pneumonia of left lower lobe due to infectious organism (CMS/HCC)         DISPOSITION  ADMISSION    Discussed treatment plan and reason for admission with pt/family and admitting physician.  Pt/family voiced understanding of the plan for admission for further testing/treatment as needed.         Latest Documented Vital Signs:  As of 4:17 PM  BP- 107/59 HR- 107 Temp-   O2 sat- 96%    --  Documentation assistance provided by singh Rico for Dr. Egan.  Information recorded by the scribe was done at my direction and has been verified and validated by me.       Ross Rico  09/13/18 1611       Holden Egan MD  09/13/18 1617       Holden Egan MD  09/13/18 1618      Electronically signed by Holden Egan MD at 9/13/2018  4:18 PM             ICU Vital Signs     Row Name 09/14/18 1234 09/14/18 1233 09/14/18 1221 09/14/18 1203 09/14/18 1200       Vitals    Temp 98.8 °F (37.1 °C)  --  --  --  --    Pulse 91  -- 90  --  --    BP  -- 110/64  -- 112/62  --    Noninvasive MAP (mmHg)  -- 78  -- 72  --       Oxygen Therapy    SpO2 94 %  -- 93 %  --   --    Device (Oxygen Therapy)  --  --  --  -- nasal cannula    Flow (L/min)  --  --  --  -- 3    Row Name 09/14/18 1149 09/14/18 1133 09/14/18 1119 09/14/18 1118 09/14/18 1104       Vitals    Pulse 95  --  -- 98  --    BP  -- 108/60  --  -- 106/61    Noninvasive MAP (mmHg)  -- 74  --  -- 73       Oxygen Therapy    SpO2 92 %  --  --  --  --    Device (Oxygen Therapy)  --  -- nasal cannula  --  --    Flow (L/min)  --  -- 3  --  --    Row Name 09/14/18 1052 09/14/18 1032 09/14/18 1030 09/14/18 1002 09/14/18 0920       Vitals    Pulse 96  -- 92  -- 103    BP  -- 101/61  -- 103/69  --    Noninvasive MAP (mmHg)  -- 70  -- 78  --       Oxygen Therapy    SpO2 93 %  -- 92 %  -- 90 %    Row Name 09/14/18 0903 09/14/18 0836 09/14/18 0833 09/14/18 0807 09/14/18 0803       Vitals    Pulse  -- 101  -- 96  --    Heart Rate Source  --  --  -- Monitor  --    Resp  --  --  -- 18  --    Resp Rate Source  --  --  -- Visual  --    /57  -- 101/56  -- 97/60    Noninvasive MAP (mmHg) 73  -- 71  -- 70       Oxygen Therapy    SpO2  --  --  -- 93 %  --    Pulse Oximetry Type  --  --  -- Continuous  --    Device (Oxygen Therapy)  --  --  -- nasal cannula  --    Flow (L/min)  --  --  -- 1  --    Row Name 09/14/18 0800 09/14/18 0733 09/14/18 0703 09/14/18 0632 09/14/18 0603       Vitals    Pulse  -- 82 81 98 80    BP  -- 98/53 94/56 94/62 103/70    Noninvasive MAP (mmHg)  -- 67 69 71 78       Oxygen Therapy    SpO2  -- 94 % 94 % 93 % 94 %    Device (Oxygen Therapy) nasal cannula  --  --  --  --    Flow (L/min) 1  --  --  --  --    Row Name 09/14/18 0557 09/14/18 0537 09/14/18 0532 09/14/18 0502 09/14/18 0433       Vitals    Pulse  --  -- 89 80 76    BP  --  -- 97/55 94/55 103/64    Noninvasive MAP (mmHg)  --  -- 63 67 76       Oxygen Therapy    SpO2  --  -- 95 % 93 % 93 %    Device (Oxygen Therapy) nasal cannula room air  --  --  --    Flow (L/min) 1  --  --  --  --    Row Name 09/14/18 0408 09/14/18 0403 09/14/18 0333 09/14/18 0302  09/14/18 0252       Vitals    Pulse  -- 80 80 79  --    BP  -- 95/52 94/56 (!)  86/55  --    Noninvasive MAP (mmHg)  -- 68 67 66  --       Oxygen Therapy    SpO2  -- 92 % 91 % 94 %  --    Device (Oxygen Therapy) nasal cannula  --  --  -- nasal cannula    Flow (L/min) 1.5  --  --  -- 1.5    Row Name 09/14/18 0233 09/14/18 0203 09/14/18 0200 09/14/18 0133 09/14/18 0102       Vitals    Pulse 82 83  -- 84 82    BP (!)  82/56 (!)  84/54  -- (!)  85/56 92/53    Noninvasive MAP (mmHg) 62 69  -- 73 73       Oxygen Therapy    SpO2 96 % 98 %  -- 95 % 96 %    Device (Oxygen Therapy)  --  -- nasal cannula  --  --    Flow (L/min)  --  -- 2.5  --  --    Row Name 09/14/18 0033 09/14/18 0007 09/14/18 0006 09/14/18 0002 09/14/18 0000       Vitals    Temp  --  -- 98.1 °F (36.7 °C)  --  --    Temp src  --  -- Oral  --  --    Pulse 87 87  -- 86  --    BP (!)  86/66 (!)  86/56  -- (!)  77/48  --    Noninvasive MAP (mmHg) 74 62  -- 56  --       Oxygen Therapy    SpO2 92 % 91 %  -- (!)  88 %  --    Device (Oxygen Therapy)  --  --  --  -- nasal cannula    Flow (L/min)  --  --  --  -- 3    Row Name 09/13/18 2332 09/13/18 2320 09/13/18 2316 09/13/18 2305 09/13/18 2303       Vitals    Pulse 88 84 82  -- 87    Heart Rate Source  --  -- Monitor  --  --    Resp  -- 20 20  --  --    Resp Rate Source  --  -- Visual  --  --    BP (!)  84/47  --  --  -- 90/53    Noninvasive MAP (mmHg) 62  --  --  -- 65       Oxygen Therapy    SpO2 92 % 92 % 92 %  -- 92 %    Pulse Oximetry Type  --  -- Continuous  --  --    Device (Oxygen Therapy)  --  -- nasal cannula nasal cannula  --    Flow (L/min)  --  -- 3 3  --    Row Name 09/13/18 2233 09/13/18 2202 09/13/18 2132 09/13/18 2103 09/13/18 2059       Vitals    Pulse 96 96 87 91 96    Heart Rate Source  --  --  --  -- Monitor    Resp  --  --  --  -- 20    Resp Rate Source  --  --  --  -- Visual    BP (!)  87/50 95/57 (!)  86/59 90/55  --    Noninvasive MAP (mmHg) 62 70 65 68  --       Oxygen Therapy    SpO2 92 %  92 % 96 % 93 % 95 %    Pulse Oximetry Type  --  --  --  -- Continuous    Device (Oxygen Therapy)  --  --  --  -- nasal cannula    Flow (L/min)  --  --  --  -- 4    Row Name 09/13/18 2033 09/13/18 2003 09/13/18 2000 09/13/18 1932 09/13/18 1903       Vitals    Pulse 88 97  -- 97 98    BP (!)  82/53 90/57  -- (!)  84/54 (!)  86/56    Noninvasive MAP (mmHg) 64 73  -- 69 71       Oxygen Therapy    SpO2 94 % 96 %  -- 94 % 95 %    Device (Oxygen Therapy)  --  -- nasal cannula  --  --    Flow (L/min)  --  -- 2  --  --    Row Name 09/13/18 1832 09/13/18 1817 09/13/18 1802 09/13/18 1747 09/13/18 1733       Vitals    Pulse 101 101 104 103 96    BP (!)  85/52 (!)  86/60 (!)  77/58 (!)  89/60 (!)  86/50    Noninvasive MAP (mmHg) 70 69 66 72 63       Oxygen Therapy    SpO2 95 % 95 % 94 % 93 % 93 %    Row Name 09/13/18 1718 09/13/18 1703 09/13/18 1648 09/13/18 1638 09/13/18 1631       Vitals    Pulse 97 95 96 102 105    BP (!)  78/63 (!)  87/52 (!)  85/48 90/57  --    Noninvasive MAP (mmHg) 68 64 66 72  --       Oxygen Therapy    SpO2 93 % 95 % 95 % 97 % 97 %    Row Name 09/13/18 1615 09/13/18 16:07:16 09/13/18 15:59:46 09/13/18 15:54:59 09/13/18 15:54:27       Vitals    Resp  -- 22 24 22 22       Oxygen Therapy    Device (Oxygen Therapy) nasal cannula  --  --  --  --    Flow (L/min) 4  --  --  --  --    Row Name 09/13/18 15:49:33 09/13/18 15:44:16 09/13/18 15:38:59 09/13/18 1538 09/13/18 1517       Vitals    Resp 22 24 26  --  --    BP  --  --  --  -- 107/59    Noninvasive MAP (mmHg)  --  --  --  -- 82       Oxygen Therapy    SpO2  --  --  --  -- 96 %    Device (Oxygen Therapy)  --  --  -- nasal cannula  --    Flow (L/min)  --  --  -- 4  --    Row Name 09/13/18 15:15:18 09/13/18 1502 09/13/18 15:00:26 09/13/18 1452 09/13/18 1448       Vitals    Pulse  -- 107  -- 109 111    BP  -- 100/51  -- 105/83 (!)  76/62    Noninvasive MAP (mmHg)  -- 79  -- 88 66       Oxygen Therapy    SpO2 96 % 100 % 100 % 95 % 97 %    Row Name 09/13/18  1445 09/13/18 14:44:35 09/13/18 1433 09/13/18 14:29:43 09/13/18 1417       Vitals    Pulse  --  -- 105 106 102    BP (!)  87/65  -- (!)  81/70  -- 97/64    Noninvasive MAP (mmHg) 72  -- 75  -- 74       Oxygen Therapy    SpO2 91 % 96 % 97 % 97 % 96 %    Row Name 09/13/18 1332                   Vitals    BP 99/75        Noninvasive MAP (mmHg) 86           Oxygen Therapy    SpO2 96 %            Lines, Drains & Airways    Active LDAs     Name:   Placement date:   Placement time:   Site:   Days:    Peripheral IV 09/13/18 1200 Right Wrist  09/13/18    1200    Wrist    1    Peripheral IV 09/13/18 2000 Left Antecubital  09/13/18    2000    Antecubital    less than 1    External Urinary Catheter  09/13/18    1757        less than 1         Inactive LDAs     Name:   Placement date:   Placement time:   Removal date:   Removal time:   Site:   Days:    [REMOVED] Peripheral IV 09/13/18 1200 Right Antecubital  09/13/18    1200    09/13/18    2000 incorrect site   Antecubital    less than 1    [REMOVED] Arterial Sheath  Right Radial  09/13/18    1550    09/13/18    1604    Radial    less than 1                Hospital Medications (all)       Dose Frequency Start End    acetaminophen (TYLENOL) tablet 650 mg 650 mg Every 6 Hours PRN 9/13/2018     Sig - Route: Take 2 tablets by mouth Every 6 (Six) Hours As Needed for Mild Pain . - Oral    albuterol (PROVENTIL) nebulizer solution 0.083% 2.5 mg/3mL 10 mg Continuous 9/13/2018 9/13/2018    Sig - Route: Take 10 mg by nebulization Continuous. - Nebulization    aspirin tablet 325 mg 325 mg Once 9/13/2018 9/13/2018    Sig - Route: Take 1 tablet by mouth 1 (One) Time. - Oral    azithromycin (ZITHROMAX) 500 mg 0.9% NaCl (Add-vantage) 250 mL 500 mg Once 9/13/2018 9/13/2018    Sig - Route: Infuse 250 mL into a venous catheter 1 (One) Time. - Intravenous    azithromycin (ZITHROMAX) 500 mg 0.9% NaCl (Add-vantage) 250 mL 500 mg Every 24 Hours 9/14/2018 9/18/2018    Sig - Route: Infuse 250 mL into a  venous catheter Daily. - Intravenous    cefTRIAXone (ROCEPHIN) IVPB 1 g 1 g Every 24 Hours 9/14/2018 9/20/2018    Sig - Route: Infuse 50 mL into a venous catheter Daily. - Intravenous    cefTRIAXone (ROCEPHIN) IVPB 2 g 2 g Once 9/13/2018 9/13/2018    Sig - Route: Infuse 50 mL into a venous catheter 1 (One) Time. - Intravenous    dextrose (D50W) 25 g/ 50mL Intravenous Solution 25 g 25 g Every 15 Minutes PRN 9/13/2018     Sig - Route: Infuse 50 mL into a venous catheter Every 15 (Fifteen) Minutes As Needed for Low Blood Sugar (Blood Sugar Less Than 70). - Intravenous    dextrose (GLUTOSE) oral gel 15 g 15 g Every 15 Minutes PRN 9/13/2018     Sig - Route: Take 15 g by mouth Every 15 (Fifteen) Minutes As Needed for Low Blood Sugar (Blood sugar less than 70). - Oral    furosemide (LASIX) injection 20 mg 20 mg Once 9/14/2018 9/14/2018    Sig - Route: Infuse 2 mL into a venous catheter 1 (One) Time. - Intravenous    furosemide (LASIX) injection 40 mg 40 mg Once 9/13/2018 9/13/2018    Sig - Route: Infuse 4 mL into a venous catheter 1 (One) Time. - Intravenous    glucagon (human recombinant) (GLUCAGEN DIAGNOSTIC) injection 1 mg 1 mg As Needed 9/13/2018     Sig - Route: Inject 1 mg under the skin into the appropriate area as directed As Needed (Blood Glucose Less Than 70). - Subcutaneous    HYDROcodone-acetaminophen (NORCO) 5-325 MG per tablet 1 tablet 1 tablet Every 4 Hours PRN 9/13/2018 9/23/2018    Sig - Route: Take 1 tablet by mouth Every 4 (Four) Hours As Needed for Moderate Pain . - Oral    insulin aspart (novoLOG) injection 0-9 Units 0-9 Units Every 4 Hours 9/14/2018     Sig - Route: Inject 0-9 Units under the skin into the appropriate area as directed Every 4 (Four) Hours. - Subcutaneous    ipratropium-albuterol (DUO-NEB) nebulizer solution 3 mL 3 mL 3 Times Daily 9/14/2018     Sig - Route: Take 3 mL by nebulization 3 (Three) Times a Day. - Nebulization    magnesium sulfate 2g/50 mL (PREMIX) infusion 2 g Once  "9/13/2018 9/13/2018    Sig - Route: Infuse 50 mL into a venous catheter 1 (One) Time. - Intravenous    melatonin tablet 5 mg 5 mg Nightly PRN 9/13/2018     Sig - Route: Take 1 tablet by mouth At Night As Needed for Sleep. - Oral    methylPREDNISolone sodium succinate (SOLU-Medrol) injection 125 mg 125 mg Once 9/13/2018 9/13/2018    Sig - Route: Infuse 2 mL into a venous catheter 1 (One) Time. - Intravenous    methylPREDNISolone sodium succinate (SOLU-Medrol) injection 125 mg 125 mg Every 6 Hours 9/13/2018     Sig - Route: Infuse 2 mL into a venous catheter Every 6 (Six) Hours. - Intravenous    morphine injection 1 mg 1 mg Every 4 Hours PRN 9/13/2018 9/23/2018    Sig - Route: Infuse 0.5 mL into a venous catheter Every 4 (Four) Hours As Needed for Severe Pain . - Intravenous    Linked Group 1:  \"And\" Linked Group Details        naloxone (NARCAN) injection 0.4 mg 0.4 mg Every 5 Minutes PRN 9/13/2018     Sig - Route: Infuse 1 mL into a venous catheter Every 5 (Five) Minutes As Needed for Respiratory Depression. - Intravenous    Linked Group 1:  \"And\" Linked Group Details        ondansetron (ZOFRAN) injection 4 mg 4 mg Every 6 Hours PRN 9/13/2018     Sig - Route: Infuse 2 mL into a venous catheter Every 6 (Six) Hours As Needed for Nausea or Vomiting. - Intravenous    Linked Group 2:  \"Or\" Linked Group Details        ondansetron (ZOFRAN) tablet 4 mg 4 mg Every 6 Hours PRN 9/13/2018     Sig - Route: Take 1 tablet by mouth Every 6 (Six) Hours As Needed for Nausea or Vomiting. - Oral    Linked Group 2:  \"Or\" Linked Group Details        ondansetron ODT (ZOFRAN-ODT) disintegrating tablet 4 mg 4 mg Every 6 Hours PRN 9/13/2018     Sig - Route: Take 1 tablet by mouth Every 6 (Six) Hours As Needed for Nausea or Vomiting. - Oral    Linked Group 2:  \"Or\" Linked Group Details        perflutren (DEFINITY) 8.476 mg in sodium chloride 0.9 % 10 mL injection 3 mL Once 9/13/2018 9/13/2018    Sig - Route: Infuse 3 mL into a venous catheter " "1 (One) Time. - Intravenous    pneumococcal polysaccharide 23-valent (PNEUMOVAX-23) vaccine 0.5 mL 0.5 mL During Hospitalization 9/13/2018     Sig - Route: Inject 0.5 mL into the appropriate muscle as directed by prescriber During Hospitalization for Immunization. - Intramuscular    sodium chloride 0.9 % bolus 1,000 mL 1,000 mL Once 9/13/2018 9/13/2018    Sig - Route: Infuse 1,000 mL into a venous catheter 1 (One) Time. - Intravenous    sodium chloride 0.9 % bolus 1,305 mL 30 mL/kg × 43.5 kg Once 9/13/2018 9/13/2018    Sig - Route: Infuse 1,305 mL into a venous catheter 1 (One) Time. - Intravenous    sodium chloride 0.9 % bolus  Continuous PRN 9/13/2018 9/13/2018    Sig: Continuous As Needed.    sodium chloride 0.9 % flush 1-10 mL 1-10 mL As Needed 9/13/2018     Sig - Route: Infuse 1-10 mL into a venous catheter As Needed for Line Care. - Intravenous    sodium chloride 0.9 % flush 10 mL 10 mL As Needed 9/13/2018     Sig - Route: Infuse 10 mL into a venous catheter As Needed for Line Care. - Intravenous    Linked Group 3:  \"And\" Linked Group Details        sodium chloride 0.9 % infusion 125 mL/hr Continuous 9/13/2018     Sig - Route: Infuse 125 mL/hr into a venous catheter Continuous. - Intravenous    sodium chloride 0.9 % infusion  Continuous PRN 9/13/2018 9/13/2018    Sig: Continuous As Needed.    sodium chloride 0.9 % infusion 125 mL/hr Continuous 9/13/2018 9/14/2018    Sig - Route: Infuse 125 mL/hr into a venous catheter Continuous. - Intravenous    vancomycin 750 mg/250 mL 0.9% NS add-vantage 20 mg/kg × 43.5 kg Once 9/13/2018 9/13/2018    Sig - Route: Infuse 250 mL into a venous catheter 1 (One) Time. - Intravenous    cefTRIAXone (ROCEPHIN) IVPB 1 g (Discontinued) 1 g Every 24 Hours 9/13/2018 9/13/2018    Sig - Route: Infuse 50 mL into a venous catheter Daily. - Intravenous    enoxaparin (LOVENOX) syringe 40 mg (Discontinued) 40 mg Every 24 Hours 9/13/2018 9/13/2018    Sig - Route: Inject 0.4 mL under the skin " into the appropriate area as directed Daily. - Subcutaneous    fentaNYL citrate (PF) (SUBLIMAZE) injection (Discontinued)  As Needed 9/13/2018 9/13/2018    Sig: As Needed.    Reason for Discontinue: Patient Discharge    iopamidol (ISOVUE-370) 76 % injection (Discontinued)  As Needed 9/13/2018 9/13/2018    Sig: As Needed.    Reason for Discontinue: Patient Discharge    ipratropium-albuterol (DUO-NEB) nebulizer solution 3 mL (Discontinued) 3 mL Every 4 Hours - RT 9/13/2018 9/14/2018    Sig - Route: Take 3 mL by nebulization Every 4 (Four) Hours. - Nebulization    lidocaine (XYLOCAINE) 2% injection (Discontinued)  As Needed 9/13/2018 9/13/2018    Sig: As Needed.    Reason for Discontinue: Patient Discharge    methylPREDNISolone sodium succinate (SOLU-Medrol) injection 125 mg (Discontinued) 125 mg Once 9/13/2018 9/13/2018    Sig - Route: Infuse 2 mL into a venous catheter 1 (One) Time. - Intravenous    midazolam (VERSED) injection (Discontinued)  As Needed 9/13/2018 9/13/2018    Sig: As Needed.    Reason for Discontinue: Patient Discharge    verapamil (ISOPTIN) 500 mcg, nitroglycerin (TRIDIL) 200 mcg, heparin (porcine) 3,000 Units radial artery injection (Discontinued)  As Needed 9/13/2018 9/13/2018    Sig: As Needed.    Reason for Discontinue: Patient Discharge          Orders (last 24 hrs)     Start     Ordered    09/15/18 0600  Basic Metabolic Panel  Morning Draw      09/14/18 0836    09/14/18 1223  POC Glucose Once  Once      09/14/18 1222    09/14/18 1103  Transfer Patient  Once      09/14/18 1102    09/14/18 1100  cefTRIAXone (ROCEPHIN) IVPB 1 g  Every 24 Hours      09/13/18 1318    09/14/18 1100  azithromycin (ZITHROMAX) 500 mg 0.9% NaCl (Add-vantage) 250 mL  Every 24 Hours      09/13/18 1318    09/14/18 0930  furosemide (LASIX) injection 20 mg  Once      09/14/18 0836    09/14/18 0900  ipratropium-albuterol (DUO-NEB) nebulizer solution 3 mL  3 Times Daily      09/14/18 0334    09/14/18 0842  POC Glucose Once   Once      09/14/18 0841    09/14/18 0700  ECG 12 Lead  Once      09/13/18 1616    09/14/18 0649  POC Panel 9, ISTAT  Once      09/14/18 0648    09/14/18 0600  Lactic Acid, Plasma  Morning Draw      09/13/18 1320    09/14/18 0600  CBC & Differential  Morning Draw      09/13/18 1320    09/14/18 0600  Basic Metabolic Panel  Morning Draw,   Status:  Canceled      09/13/18 1320    09/14/18 0600  Comprehensive Metabolic Panel  Morning Draw      09/13/18 1421    09/14/18 0600  CBC (No Diff)  Morning Draw,   Status:  Canceled      09/13/18 1616    09/14/18 0600  Basic Metabolic Panel  Morning Draw,   Status:  Canceled      09/13/18 1616    09/14/18 0600  Troponin  Morning Draw      09/13/18 1616    09/14/18 0600  CBC Auto Differential  PROCEDURE ONCE      09/14/18 0002    09/14/18 0500  ECG 12 Lead  Tomorrow AM,   Status:  Canceled      09/13/18 1305    09/14/18 0020  Keep MAP >60  Physician Communication Order  Once     Comments:  Keep MAP >60    09/14/18 0020    09/14/18 0006  POC Glucose Once  Once      09/14/18 0006    09/14/18 0000  POC Glucose Q4H  Every 4 Hours      09/13/18 2248    09/14/18 0000  insulin aspart (novoLOG) injection 0-9 Units  Every 4 Hours      09/13/18 2248    09/13/18 2245  Do NOT Hold Basal Insulin When Patient is NPO, Hold Bolus Dose if NPO  Continuous      09/13/18 2248    09/13/18 2245  Follow [facility] Hypoglycemia Standing Orders For Blood Glucose Less Than 70 mg/dL  Until Discontinued      09/13/18 2248 09/13/18 2245  Hypoglycemia Treatment - Alert Patient That is Not NPO and Can Safely Swallow  Until Discontinued     Comments:  Administer 4 oz Fruit Juice OR 4 oz Regular Soda OR 8 oz Milk OR 15-30 grams (1 tube) of Glucose Gel.  Recheck Blood Glucose 15 Minutes After Ingestion, Repeat Treatment & Continue to Recheck Blood Sugar Every 15 Minutes Until Blood Glucose is 70 mg/dL or Higher.  Once Blood Glucose is 70 mg/dL or Higher and if It Will Be more than 60 Minutes Until the Next Meal,  Provide Appropriate Snack (Including Carbohydrate Food) Based on Meal Plan Order. Give Meal Tray As Soon As Possible.    09/13/18 2248 09/13/18 2245  Hypoglycemia Treatment - Patient Has IV Access - Unresponsive, NPO or Unable To Safely Swallow  Until Discontinued     Comments:  Administer 25g (50ml) D50W IV Push.  Recheck Blood Glucose 15 Minutes After Administration, if Blood Glucose Remains Less Than 70 mg/dl, Repeat Treatment   Recheck Blood Glucose 15 Minutes After 2nd Administration, if Blood Glucose Remains Less Than 70 mg/dL After 2nd Dose of D50W, Contact Provider for Further Treatment Orders & Consider Adding IVF With D5W for Maintenance    09/13/18 2248 09/13/18 2245  Hypoglycemia Treatment - Patient Without IV Access - Unresponsive, NPO or Unable To Safely Swallow  Until Discontinued     Comments:  Administer 1mg Glucagon SQ & Establish IV Access.  Turn Patient on Side - Nausea / Vomiting May Occur.  Recheck Blood Glucose 15 Minutes After Administration.  If Blood Glucose Remains Less Than 70, Administer 25g D50W IV Push (50ml).  Recheck Blood Glucose 15 Minutes After Administration of D50W, if Blood Glucose Remains Less Than 70 mg/dl, Contact Provider for Further Treatment Orders & Consider Adding IVF With D5 for Maintenance    09/13/18 2248 09/13/18 2245  Notify Provider of event. Communicate needs and document in EMR.  Once      09/13/18 2248 09/13/18 2245  Document event and patients response to treatment in EMR, any medications given to be documented on MAR.  Once      09/13/18 2248    09/13/18 2244  dextrose (GLUTOSE) oral gel 15 g  Every 15 Minutes PRN      09/13/18 2248 09/13/18 2244  dextrose (D50W) 25 g/ 50mL Intravenous Solution 25 g  Every 15 Minutes PRN      09/13/18 2248    09/13/18 2244  glucagon (human recombinant) (GLUCAGEN DIAGNOSTIC) injection 1 mg  As Needed      09/13/18 2248 09/13/18 2052  POC Glucose Once  Once      09/13/18 2051 09/13/18 2045  melatonin tablet  5 mg  Nightly PRN      09/13/18 2045 09/13/18 2044  acetaminophen (TYLENOL) tablet 650 mg  Every 6 Hours PRN      09/13/18 2045    09/13/18 2000  Strict intake and output  Every 4 Hours      09/13/18 1616    09/13/18 1800  Troponin  Once      09/13/18 1303    09/13/18 1800  methylPREDNISolone sodium succinate (SOLU-Medrol) injection 125 mg  Every 6 Hours      09/13/18 1314    09/13/18 1657  Inpatient Case Management  Consult  Once     Provider:  (Not yet assigned)    09/13/18 1657    09/13/18 1650  POC Glucose Once  Once      09/13/18 1649    09/13/18 1649  pneumococcal polysaccharide 23-valent (PNEUMOVAX-23) vaccine 0.5 mL  During Hospitalization      09/13/18 1657    09/13/18 1618  sodium chloride 0.9 % infusion  Continuous      09/13/18 1616    09/13/18 1618  furosemide (LASIX) injection 40 mg  Once      09/13/18 1616    09/13/18 1613  Obtain STAT EKG during chest pain. Notify MD of any change in rhythm, ST segments or complaints of chest pain.  Until Discontinued      09/13/18 1616    09/13/18 1613  Encourage fluids  Until Discontinued      09/13/18 1616    09/13/18 1613  Verify Discontinuation of enoxaparin (LOVENOX) and / or heparin  Once      09/13/18 1616    09/13/18 1613  Notify MD of hypotension (SBP less than 95), bleeding, or dysrythmia and follow Sheath Removal Policy if needed.  Until Discontinued      09/13/18 1616 09/13/18 1613  Oxygen Therapy- Nasal Cannula; Titrate for SPO2: equal to or greater than, 92%, per policy  Continuous      09/13/18 1616    09/13/18 1613  Continuous Pulse Oximetry  Continuous      09/13/18 1616    09/13/18 1613  Diet Regular; Cardiac  Diet Effective Now      09/13/18 1616    09/13/18 1613  Advance diet as tolerated  Until Discontinued      09/13/18 1616    09/13/18 1613  Remove Radial Pressure Device / Dressing  Once      09/13/18 1616    09/13/18 1613  Vital Signs & Reverse Troy's Test (While Radial Compression Device in Place)  Continuous      Comments:  Every 15 Minutes x4, Every 30 Minutes x4, & Every 1 Hour x2    09/13/18 1616    09/13/18 1613  Keep Affected Arm Straight & Elevated  Continuous      09/13/18 1616    09/13/18 1613  Activity As Tolerated  Until Discontinued      09/13/18 1616    09/13/18 1612  HYDROcodone-acetaminophen (NORCO) 5-325 MG per tablet 1 tablet  Every 4 Hours PRN      09/13/18 1616    09/13/18 1612  morphine injection 1 mg  Every 4 Hours PRN      09/13/18 1616    09/13/18 1612  naloxone (NARCAN) injection 0.4 mg  Every 5 Minutes PRN      09/13/18 1616    09/13/18 1612  ondansetron (ZOFRAN) tablet 4 mg  Every 6 Hours PRN      09/13/18 1616    09/13/18 1612  ondansetron ODT (ZOFRAN-ODT) disintegrating tablet 4 mg  Every 6 Hours PRN      09/13/18 1616    09/13/18 1612  ondansetron (ZOFRAN) injection 4 mg  Every 6 Hours PRN      09/13/18 1616    09/13/18 1605  iopamidol (ISOVUE-370) 76 % injection  As Needed,   Status:  Discontinued      09/13/18 1606    09/13/18 1601  Lactic Acid, Reflex  STAT      09/13/18 1600    09/13/18 1554  Critical Care  Once     Comments:  This order was created via procedure documentation    09/13/18 1553    09/13/18 1553  sodium chloride 0.9 % bolus  Continuous PRN      09/13/18 1554    09/13/18 1551  verapamil (ISOPTIN) 500 mcg, nitroglycerin (TRIDIL) 200 mcg, heparin (porcine) 3,000 Units radial artery injection  As Needed,   Status:  Discontinued      09/13/18 1551    09/13/18 1548  lidocaine (XYLOCAINE) 2% injection  As Needed,   Status:  Discontinued      09/13/18 1548    09/13/18 1540  sodium chloride 0.9 % infusion  Continuous PRN      09/13/18 1540    09/13/18 1540  fentaNYL citrate (PF) (SUBLIMAZE) injection  As Needed,   Status:  Discontinued      09/13/18 1540    09/13/18 1540  midazolam (VERSED) injection  As Needed,   Status:  Discontinued      09/13/18 1540    09/13/18 1530  ipratropium-albuterol (DUO-NEB) nebulizer solution 3 mL  Every 4 Hours - RT,   Status:  Discontinued      09/13/18  1433    09/13/18 1415  aspirin tablet 325 mg  Once      09/13/18 1413    09/13/18 1413  Cardiac Catheterization/Vascular Study  Once      09/13/18 1412    09/13/18 1411  Case Request Cath Lab: Left Heart Cath and cors  Once      09/13/18 1411    09/13/18 1411  Obtain Informed Consent  Once      09/13/18 1411    09/13/18 1400  Vital Signs Every Hour and Per Hospital Policy Based on Patient Condition  Every Hour      09/13/18 1314    09/13/18 1400  Intake and Output  Every Hour      09/13/18 1314    09/13/18 1359  ECG 12 Lead  Once      09/13/18 1359    09/13/18 1358  perflutren (DEFINITY) 8.476 mg in sodium chloride 0.9 % 10 mL injection  Once      09/13/18 1357    09/13/18 1317  cefTRIAXone (ROCEPHIN) IVPB 1 g  Every 24 Hours,   Status:  Discontinued      09/13/18 1315    09/13/18 1316  enoxaparin (LOVENOX) syringe 40 mg  Every 24 Hours,   Status:  Discontinued      09/13/18 1314    09/13/18 1313  sodium chloride 0.9 % flush 1-10 mL  As Needed      09/13/18 1314    09/13/18 1259  sodium chloride 0.9 % bolus 1,305 mL  Once      09/13/18 1257    09/13/18 1259  cefTRIAXone (ROCEPHIN) IVPB 2 g  Once      09/13/18 1257    09/13/18 1259  azithromycin (ZITHROMAX) 500 mg 0.9% NaCl (Add-vantage) 250 mL  Once      09/13/18 1257    09/13/18 1259  vancomycin 750 mg/250 mL 0.9% NS add-vantage  Once      09/13/18 1257    09/13/18 1158  magnesium sulfate 2g/50 mL (PREMIX) infusion  Once      09/13/18 1156    09/13/18 1158  albuterol (PROVENTIL) nebulizer solution 0.083% 2.5 mg/3mL  Continuous      09/13/18 1156    09/13/18 1158  sodium chloride 0.9 % bolus 1,000 mL  Once      09/13/18 1156    09/13/18 1158  sodium chloride 0.9 % infusion  Continuous      09/13/18 1156    09/13/18 1154  sodium chloride 0.9 % flush 10 mL  As Needed      09/13/18 1156    Unscheduled  Vital Signs  As Needed      09/13/18 1616    Unscheduled  Check distal extremity for warmth, color, sensation and pulses with each vital sign and site check.  As Needed       18 1616    Unscheduled  Change site dressing  As Needed      18 1616    --  umeclidinium-vilanterol (ANORO ELLIPTA) 62.5-25 MCG/INH aerosol powder  inhaler  Daily - RT      18 1225    --  albuterol (PROVENTIL HFA;VENTOLIN HFA) 108 (90 Base) MCG/ACT inhaler  Every 4 Hours PRN      18 1225             Physician Progress Notes (last 24 hours) (Notes from 2018  1:28 PM through 2018  1:28 PM)      Emi Boston MD at 2018  7:38 AM          [facility]  Progress note: 2018    Patient Identification:  Name:Scarlet Chakraborty  Age:58 y.o.  Sex: female  :  1960  MRN: [id number]           CC:  Follow-up of cardiomyopathy    Interval history:  Cardiac catheter results noted and agree likely variant of Takotsubo is cardiomyopathy, blood pressure low yesterday.  O2 was able to be weaned to 1 L.  Continued to have chest pain treated with Tylenol he feels better with less dyspnea.    Vital Signs:   Temp:  [98.1 °F (36.7 °C)] 98.1 °F (36.7 °C)  Heart Rate:  [] 82  Resp:  [20-26] 20  BP: ()/(47-87) 98/53  FiO2 (%):  [25 %-50 %] 25 %    Intake/Output Summary (Last 24 hours) at 18 0738  Last data filed at 18 0532   Gross per 24 hour   Intake             3559 ml   Output             1550 ml   Net             2009 ml       Physical Examination:    General Appearance No acute distress   Neck No adenopathy, supple, trachea midline, no thyromegaly, no carotid bruit, no JVD   Lungs Bilateral scattered wheeze,respirations regular, even and unlabored   Heart Regular rhythm and normal rate, normal S1 and S2, no murmur, no gallop, no rub, no click   Chest wall No abnormalities observed   Abdomen Normal bowel sounds, no masses, no hepatomegaly, soft   Extremities Moves all extremities well, no edema, no cyanosis, no redness   Neurological Alert and oriented x 3     Lab Review:  Personally reviewed the labs, radiology imaging and other cardiac procedures.    Results from last 7 days  Lab Units 09/14/18  0327   SODIUM mmol/L 143   POTASSIUM mmol/L 4.1   CHLORIDE mmol/L 110*   CO2 mmol/L 20.2*   BUN mg/dL 16   CREATININE mg/dL 0.53*   CALCIUM mg/dL 7.9*   BILIRUBIN mg/dL 0.3   ALK PHOS U/L 61   ALT (SGPT) U/L 77*   AST (SGOT) U/L 68*   GLUCOSE mg/dL 137*       Results from last 7 days  Lab Units 09/14/18  0327 09/13/18  1840 09/13/18  1157   TROPONIN T ng/mL 0.219* 0.413* 0.016     )  Results from last 7 days  Lab Units 09/14/18  0327 09/13/18  1554 09/13/18  1158   WBC 10*3/mm3 5.69  --  8.99   HEMOGLOBIN g/dL 12.6  --  15.1   HEMOGLOBIN, POC g/dL  --  13.3  --    HEMATOCRIT % 40.0  --  47.7*   HEMATOCRIT POC %  --  39  --    PLATELETS 10*3/mm3 218  --  311       Results from last 7 days  Lab Units 09/13/18  1157   INR  1.01     Medication Review:   Meds reviewed  Scheduled Meds:  azithromycin 500 mg Intravenous Q24H   ceftriaxone 1 g Intravenous Q24H   insulin aspart 0-9 Units Subcutaneous Q4H   ipratropium-albuterol 3 mL Nebulization TID   methylPREDNISolone sodium succinate 125 mg Intravenous Q6H     Continuous Infusions:  sodium chloride 125 mL/hr Last Rate: Stopped (09/13/18 1344)     I personally viewed and interpreted the patient's EKG/Telemetry data    Assessment and Plan    1.  Acute respiratory failure.  Congestive heart flight and cardiomyopathy certainly playing a role but pro-calcitonin was also quite high.  Suspect underlying pulmonary etiology also playing a role.  2.  Cardiomyopathy, likely Takotsubo cardiomyopathy with increased stresses at her work.  Continue with supportive measures and ace inhibitors as blood pressure and her blockers pulmonary status allow.   In addition would diurese gently.  Would discontinue IV fluids and give 20 mg of Lasix IV  3.  History of asthma and COPD, recommendations per pulmonary service  4.  Unknown lipid status  5.  Elevated lactic acid, blood cultures pending.  No obvious source of infection noted at this point  6.   Elevated liver function tests, still elevated, would observe with diuresis      Mini Boston  9/14/20187:38 AM  35min spent in reviewing records, discussion and examination of the patient and discussion with other members of the patient's medical team.     Dictated utilizing Dragon dictation      Electronically signed by Emi Boston MD at 9/14/2018 10:12 AM

## 2018-09-14 NOTE — PROGRESS NOTES
Discharge Planning Assessment  [facility]     Patient Name: Scarlet Chakraborty  MRN: [id number]  Today's Date: 9/14/2018    Admit Date: 9/13/2018          Discharge Needs Assessment     Row Name 09/14/18 1348       Living Environment    Lives With alone    Name(s) of Who Lives With Patient frequently either her boyfriend or her niece stay with her    Current Living Arrangements home/apartment/condo    Primary Care Provided by self    Provides Primary Care For no one    Family Caregiver if Needed none    Quality of Family Relationships unable to assess    Able to Return to Prior Arrangements yes       Resource/Environmental Concerns    Resource/Environmental Concerns none    Transportation Concerns car, none       Transition Planning    Patient/Family Anticipates Transition to home with family    Patient/Family Anticipated Services at Transition none    Transportation Anticipated family or friend will provide       Discharge Needs Assessment    Readmission Within the Last 30 Days no previous admission in last 30 days    Concerns to be Addressed denies needs/concerns at this time    Equipment Currently Used at Home none    Anticipated Changes Related to Illness none    Equipment Needed After Discharge none    Offered/Gave Vendor List yes    Current Discharge Risk dependent with mobility/activities of daily living            Discharge Plan     Row Name 09/14/18 0889       Plan    Plan Home with family support    Patient/Family in Agreement with Plan yes    Plan Comments CCP spoke with pt at bedside.  Introduced self and explained role.  CCP contact information provided.  Face sheet and pharmacy verified.  Pt was independent with all ADLs prior to admission.  She lives alone usually but at times her niece or her boyfriend stay with her.  She drives.  She uses no DME.  She denies past use of HH or SNF.  No needs identified at this time.  CCP will follow.        Destination     No service coordination in this encounter.       Durable Medical Equipment     No service coordination in this encounter.      Dialysis/Infusion     No service coordination in this encounter.      Home Medical Care     No service coordination in this encounter.      Social Care     No service coordination in this encounter.                Demographic Summary     Row Name 09/14/18 1343       General Information    Admission Type inpatient    Arrived From home    Referral Source admission list    Reason for Consult discharge planning    Preferred Language English     Used During This Interaction no       Contact Information    Permission Granted to Share Info With family/designee            Functional Status     Row Name 09/14/18 1341       Functional Status    Usual Activity Tolerance good    Current Activity Tolerance good       Functional Status, IADL    Medications independent    Meal Preparation independent    Housekeeping independent    Laundry independent    Shopping independent       Mental Status    General Appearance WDL WDL       Mental Status Summary    Recent Changes in Mental Status/Cognitive Functioning no changes       Employment/    Employment Status employed full time            Psychosocial    No documentation.           Abuse/Neglect    No documentation.           Legal    No documentation.           Substance Abuse    No documentation.           Patient Forms    No documentation.         Maggie Anand RN

## 2018-09-14 NOTE — PROGRESS NOTES
[facility]  Progress note: 2018    Patient Identification:  Name:Scarlet Chakraborty  Age:58 y.o.  Sex: female  :  1960  MRN: [id number]           CC:  Follow-up of cardiomyopathy    Interval history:  Cardiac catheter results noted and agree likely variant of Takotsubo is cardiomyopathy, blood pressure low yesterday.  O2 was able to be weaned to 1 L.  Continued to have chest pain treated with Tylenol he feels better with less dyspnea.    Vital Signs:   Temp:  [98.1 °F (36.7 °C)] 98.1 °F (36.7 °C)  Heart Rate:  [] 82  Resp:  [20-26] 20  BP: ()/(47-87) 98/53  FiO2 (%):  [25 %-50 %] 25 %    Intake/Output Summary (Last 24 hours) at 18 0738  Last data filed at 18 0532   Gross per 24 hour   Intake             3559 ml   Output             1550 ml   Net             2009 ml       Physical Examination:    General Appearance No acute distress   Neck No adenopathy, supple, trachea midline, no thyromegaly, no carotid bruit, no JVD   Lungs Bilateral scattered wheeze,respirations regular, even and unlabored   Heart Regular rhythm and normal rate, normal S1 and S2, no murmur, no gallop, no rub, no click   Chest wall No abnormalities observed   Abdomen Normal bowel sounds, no masses, no hepatomegaly, soft   Extremities Moves all extremities well, no edema, no cyanosis, no redness   Neurological Alert and oriented x 3     Lab Review:  Personally reviewed the labs, radiology imaging and other cardiac procedures.   Results from last 7 days  Lab Units 18  0327   SODIUM mmol/L 143   POTASSIUM mmol/L 4.1   CHLORIDE mmol/L 110*   CO2 mmol/L 20.2*   BUN mg/dL 16   CREATININE mg/dL 0.53*   CALCIUM mg/dL 7.9*   BILIRUBIN mg/dL 0.3   ALK PHOS U/L 61   ALT (SGPT) U/L 77*   AST (SGOT) U/L 68*   GLUCOSE mg/dL 137*       Results from last 7 days  Lab Units 18  0327 18  1840 18  1157   TROPONIN T ng/mL 0.219* 0.413* 0.016     )  Results from last 7 days  Lab Units 18  5798  09/13/18  1554 09/13/18  1158   WBC 10*3/mm3 5.69  --  8.99   HEMOGLOBIN g/dL 12.6  --  15.1   HEMOGLOBIN, POC g/dL  --  13.3  --    HEMATOCRIT % 40.0  --  47.7*   HEMATOCRIT POC %  --  39  --    PLATELETS 10*3/mm3 218  --  311       Results from last 7 days  Lab Units 09/13/18  1157   INR  1.01     Medication Review:   Meds reviewed  Scheduled Meds:  azithromycin 500 mg Intravenous Q24H   ceftriaxone 1 g Intravenous Q24H   insulin aspart 0-9 Units Subcutaneous Q4H   ipratropium-albuterol 3 mL Nebulization TID   methylPREDNISolone sodium succinate 125 mg Intravenous Q6H     Continuous Infusions:  sodium chloride 125 mL/hr Last Rate: Stopped (09/13/18 1344)     I personally viewed and interpreted the patient's EKG/Telemetry data    Assessment and Plan    1.  Acute respiratory failure.  Congestive heart flight and cardiomyopathy certainly playing a role but pro-calcitonin was also quite high.  Suspect underlying pulmonary etiology also playing a role.  2.  Cardiomyopathy, likely Takotsubo cardiomyopathy with increased stresses at her work.  Continue with supportive measures and ace inhibitors as blood pressure and her blockers pulmonary status allow.   In addition would diurese gently.  Would discontinue IV fluids and give 20 mg of Lasix IV  3.  History of asthma and COPD, recommendations per pulmonary service  4.  Unknown lipid status  5.  Elevated lactic acid, blood cultures pending.  No obvious source of infection noted at this point  6.  Elevated liver function tests, still elevated, would observe with diuresis      Mini Darvin  9/14/20187:38 AM  35min spent in reviewing records, discussion and examination of the patient and discussion with other members of the patient's medical team.     Dictated utilizing Dragon dictation

## 2018-09-14 NOTE — PLAN OF CARE
Problem: Pneumonia (Adult)  Goal: Signs and Symptoms of Listed Potential Problems Will be Absent, Minimized or Managed (Pneumonia)  Outcome: Ongoing (interventions implemented as appropriate)      Problem: Cardiac Output Decreased (Adult)  Goal: Identify Related Risk Factors and Signs and Symptoms  Outcome: Ongoing (interventions implemented as appropriate)    Goal: Effective Tissue Perfusion  Outcome: Ongoing (interventions implemented as appropriate)      Problem: Fall Risk (Adult)  Goal: Identify Related Risk Factors and Signs and Symptoms  Outcome: Ongoing (interventions implemented as appropriate)    Goal: Absence of Fall  Outcome: Ongoing (interventions implemented as appropriate)      Problem: Pain, Acute (Adult)  Goal: Identify Related Risk Factors and Signs and Symptoms  Outcome: Ongoing (interventions implemented as appropriate)    Goal: Acceptable Pain Control/Comfort Level  Outcome: Ongoing (interventions implemented as appropriate)      Problem: Skin Injury Risk (Adult)  Goal: Identify Related Risk Factors and Signs and Symptoms  Outcome: Ongoing (interventions implemented as appropriate)    Goal: Skin Health and Integrity  Outcome: Ongoing (interventions implemented as appropriate)

## 2018-09-14 NOTE — PROGRESS NOTES
"                                              LOS: 1 day   Patient Care Team:  MARILYN Fernandez MD as PCP - General (General Practice)    Chief Complaint:  Follow-up on severe COPD, possible pneumonia, respiratory distress needing BiPAP and critical care management    Interval History:   She used the BiPAP for a few hours yesterday.  She did not use at night.  She feels better today but continues to have intermittent shortness of breath.  She has mild dry cough.  No sputum production.  She denies chest pain.     REVIEW OF SYSTEMS:   CARDIOVASCULAR: No chest pain, chest pressure or chest discomfort. No palpitations or edema.   RESPIRATORY: Intermittent shortness of breath and dry cough.  She is on oxygen 2 L/m.  GASTROINTESTINAL: No anorexia, nausea, vomiting or diarrhea. No abdominal pain or blood.   HEMATOLOGIC: No bleeding or bruising.     Ventilator/Non-Invasive Ventilation Settings     None            Vital Signs  Temp:  [98.1 °F (36.7 °C)-98.8 °F (37.1 °C)] 98.8 °F (37.1 °C)  Heart Rate:  [] 103  Resp:  [18-26] 18  BP: ()/(42-70) 112/57    Intake/Output Summary (Last 24 hours) at 09/14/18 1530  Last data filed at 09/14/18 1118   Gross per 24 hour   Intake             2559 ml   Output             4000 ml   Net            -1441 ml     Flowsheet Rows      First Filed Value   Admission Height  162.6 cm (64\") Documented at 09/13/2018 1227   Admission Weight  43.5 kg (96 lb) Documented at 09/13/2018 1227          Physical Exam:   General Appearance:    Alert, cooperative, in no acute distress   HEENT:  Mallampati score 2, moist mucous membrane   Neck:   Soft   Lungs:    Significantly decreased air entry bilaterally.  Minimal crackles on the left lower lobe.  No wheezing on today's exam     Heart:    Regular rhythm and normal rate, normal S1 and S2, no            murmur   Skin:    No abnormalities observed   Abdomen:     Soft. No tonderness. No dullness.   Neuro:   Conscious, alert, oriented x3 "   Extremities:   Moves all extremities well, no edema, no cyanosis, no             Redness          Results Review:          Results from last 7 days  Lab Units 09/14/18  0327 09/13/18  1157   SODIUM mmol/L 143 140   POTASSIUM mmol/L 4.1 4.3   CHLORIDE mmol/L 110* 104   CO2 mmol/L 20.2* 23.4   BUN mg/dL 16 14   CREATININE mg/dL 0.53* 0.64   GLUCOSE mg/dL 137* 197*   CALCIUM mg/dL 7.9* 9.4       Results from last 7 days  Lab Units 09/14/18  0327 09/13/18  1840 09/13/18  1157   TROPONIN T ng/mL 0.219* 0.413* 0.016       Results from last 7 days  Lab Units 09/14/18  0327 09/13/18  1554 09/13/18  1158   WBC 10*3/mm3 5.69  --  8.99   HEMOGLOBIN g/dL 12.6  --  15.1   HEMOGLOBIN, POC g/dL  --  13.3  --    HEMATOCRIT % 40.0  --  47.7*   HEMATOCRIT POC %  --  39  --    PLATELETS 10*3/mm3 218  --  311       Results from last 7 days  Lab Units 09/13/18  1157   INR  1.01       Results from last 7 days  Lab Units 09/13/18  1157   PROBNP pg/mL 78.3       I reviewed the patient's new clinical results.  I personally viewed and interpreted the patient's CXR        Medication Review:     azithromycin 500 mg Intravenous Q24H   ceftriaxone 1 g Intravenous Q24H   enoxaparin 30 mg Subcutaneous Q24H   insulin aspart 0-9 Units Subcutaneous Q4H   ipratropium-albuterol 3 mL Nebulization TID   methylPREDNISolone sodium succinate 125 mg Intravenous Q6H         sodium chloride 125 mL/hr Last Rate: Stopped (09/13/18 1344)     Diagnostic imaging:  I personally and independently reviewed the following images:   CXR benign 9/13/18  COPD changes.  Lucent right lung which could represent emboli versus pneumothorax.  Left lower lobe infiltrates          Assessment:  1. Acute respiratory distress  2. COPD exacerbation, severe  3. Left lower lobe pneumonia, Procal and RVP -ve but crackles on exam  4. Acute systolic CHF, Takotsubo cardiomyopathy, likely secondary to the above  5. Lactic acidosis, resolved  6. Steroid-induced hyperglycemia  7. Elevated  transaminases  8. Atypical chest pain, resolved  9. EKG changes  10. Tobacco abuse        Plan:  · Adjust nebulized bronchodilators to 4 times a day  · Add Symbicort.  Needs outpatient PFT and alpha-1 antitrypsin  · Off BiPAP for now  · I'll decrease the IV steroid dose to 40 mg every 8 hours.  She received 125 mg every 6 for 24 hours due to severe COPD  · Continue antibiotics for possible community-acquired pneumonia  · Stop IV fluid.  Agree with Lasix administered by cardiology  · Subcutaneous subcutaneous for hyperglycemia  · Check hepatitis panel  · Transfer to floor    Rafael Brito MD  09/14/18  3:30 PM            This note was dictated utilizing Dragon dictation

## 2018-09-14 NOTE — PLAN OF CARE
Problem: Nutrition, Imbalanced: Inadequate Oral Intake (Adult)  Intervention: Promote/Optimize Nutrition   09/14/18 1544   Nutrition Interventions   Oral Nutrition Promotion nutritional therapy counseling provided;calorie dense foods provided;calorie dense liquids provided       Goal: Identify Related Risk Factors and Signs and Symptoms  Outcome: Outcome(s) achieved Date Met: 09/14/18    Goal: Improved Oral Intake  Outcome: Ongoing (interventions implemented as appropriate)    Goal: Prevent Further Weight Loss  Outcome: Ongoing (interventions implemented as appropriate)

## 2018-09-14 NOTE — PROGRESS NOTES
Adult Nutrition  Assessment/PES    Patient Name:  Scarlet Chakraborty  YOB: 1960  MRN: [id number]  Admit Date:  9/13/2018    Assessment Date:  9/14/2018    Comments:  Nutrition assessment complete due to low BMI, Pt reports decreased po intake and weight loss of about 24 lbs over the past few years.  Willing to drink Ensure. Will add with meals. MSA complete as well.           Reason for Assessment     Row Name 09/14/18 1531          Reason for Assessment    Reason For Assessment identified at risk by screening criteria     Diagnosis pulmonary disease   COPD, smoker     Identified At Risk by Screening Criteria BMI             Nutrition/Diet History     Row Name 09/14/18 1533          Nutrition/Diet History    Factors Affecting Nutritional Intake compromised airway   appetite is fair             Anthropometrics     Row Name 09/14/18 1533          Usual Body Weight (UBW)    Usual Body Weight 54.4 kg (120 lb)     Weight Loss unintentional     Weight Loss Time Frame 2-3 years        Body Mass Index (BMI)    BMI Assessment BMI 16-16.9: protein-energy malnutrition grade II             Labs/Tests/Procedures/Meds     Row Name 09/14/18 1533          Labs/Procedures/Meds    Lab Results Reviewed reviewed, pertinent     Lab Results Comments glu, alt, ast        Diagnostic Tests/Procedures    Diagnostic Test/Procedure Reviewed reviewed, pertinent        Medications    Pertinent Medications Reviewed reviewed, pertinent     Pertinent Medications Comments Abx, insulin, steroids             Physical Findings     Row Name 09/14/18 1535          Physical Findings    Overall Physical Appearance underweight     Skin other (see comments)   intact             Estimated/Assessed Needs     Row Name 09/14/18 1536          Estimated/Assessed Needs    Additional Documentation --   1522 kcals (35/kg), 52 g pro (1.2/kg), 1500cc fluid             Nutrition Prescription Ordered     Row Name 09/14/18 1537          Nutrition  Prescription PO    Current PO Diet Regular             Evaluation of Received Nutrient/Fluid Intake     Row Name 09/14/18 1538          PO Evaluation    Number of Meals 1     % PO Intake 50%               Malnutrition Severity Assessment     Row Name 09/14/18 1538          Malnutrition Severity Assessment    Malnutrition Type Chronic Illness Malnutrition        Physical Signs of Malnutrition (Chronic)    Muscle Wasting Mild     Fat Loss Mild        Weight Status (Chronic)    BMI Mod (<17)     %UBW Mod (75-85%)     Weight Loss Mod (20% / 1 yr)        Energy Intake Status (Chronic)    Energy Intake Mod (<75% / > or equal to 1 mo)        Criteria Met (Must meet criteria for severity in at least 2 of these categories: M Wasting, Fat Loss, Fluid, Secondary Signs, Wt. Status, Intake)    Patient meets criteria for  Moderate malnutrition         Problem/Interventions:        Problem 1     Row Name 09/14/18 1539          Nutrition Diagnoses Problem 1    Problem 1 Malnutrition     Signs/Symptoms (evidenced by) Unintended Weight Change   poor po intake     Unintended Weight Change Loss     Number of Pounds Lost 24 lbs     Weight loss time period 2-3 years               Intervention Goal     Row Name 09/14/18 1539          Intervention Goal    General Maintain nutrition;Reduce/improve symptoms;Meet nutritional needs for age/condition;Disease management/therapy     PO Tolerate PO;Increase intake;PO intake (%)     PO Intake % 75 %     Weight Appropriate weight gain             Nutrition Intervention     Row Name 09/14/18 1540          Nutrition Intervention    RD/Tech Action Advise alternate selection;Interview for preference;Advise available snack;Encourage intake;Care plan reviewd;Follow Tx progress;Recommend/ordered     Recommended/Ordered Supplement             Nutrition Prescription     Row Name 09/14/18 1540          Nutrition Prescription PO    PO Prescription Begin/change supplement     Supplement Ensure Enlive      Supplement Frequency 3 times a day     New PO Prescription Ordered? Yes             Education/Evaluation     Row Name 09/14/18 3343          Education    Education Will Instruct as appropriate        Monitor/Evaluation    Monitor Per protocol;Symptoms;I&O;PO intake;Supplement intake;Pertinent labs;Weight;Skin status         Electronically signed by:  Jeannie Kilpatrick RD  09/14/18 3:40 PM

## 2018-09-14 NOTE — PLAN OF CARE
Problem: Patient Care Overview  Goal: Plan of Care Review  Outcome: Ongoing (interventions implemented as appropriate)   09/14/18 0610   OTHER   Outcome Summary Pt remains in CCU. VSS through night, borderline hypotension, MAP >60 per MD order. O2 weaned to 1L NC. Tylenol x1 for chest (respiratory) pain. Troponin trending down. Urine output adequate. See am labs. CTM.    Coping/Psychosocial   Plan of Care Reviewed With patient   Plan of Care Review   Progress improving

## 2018-09-14 NOTE — PROGRESS NOTES
Malnutrition Severity Assessment    Patient Name:  Scarlet Chakraborty  YOB: 1960  MRN: [id number]  Admit Date:  9/13/2018    Patient meets criteria for : Moderate malnutrition    Comments:  Pt exhibits mild hollowing/scooping of temporal region, prominent & protruding clavicle bone, depression between ribs and thigh is noted. These findings are suggestive of muscle wasting and loss of subcutaneous fat.  Significant weight loss as well. Adding Ensure with meals. See assessment for details.    Malnutrition Type: Chronic Illness Malnutrition     Malnutrition Type (last 8 hours)      Malnutrition Severity Assessment     Row Name 09/14/18 1538       Malnutrition Severity Assessment    Malnutrition Type Chronic Illness Malnutrition    Row Name 09/14/18 1538       Physical Signs of Malnutrition (Chronic)    Muscle Wasting Mild    Fat Loss Mild    Row Name 09/14/18 1538       Weight Status (Chronic)    BMI Mod (<17)    %UBW Mod (75-85%)    Weight Loss Mod (20% / 1 yr)    Row Name 09/14/18 1538       Energy Intake Status (Chronic)    Energy Intake Mod (<75% / > or equal to 1 mo)    Row Name 09/14/18 1538       Criteria Met (Must meet criteria for severity in at least 2 of these categories: M Wasting, Fat Loss, Fluid, Secondary Signs, Wt. Status, Intake)    Patient meets criteria for  Moderate malnutrition          Electronically signed by:  Jeannie Kilpatrick RD  09/14/18 3:42 PM

## 2018-09-14 NOTE — PROGRESS NOTES
Continued Stay Note  [facility]     Patient Name: Scarlet Chakraborty  MRN: [id number]  Today's Date: 9/14/2018    Admit Date: 9/13/2018          Discharge Plan     Row Name 09/14/18 2848       Plan    Plan Comments CCP spoke with pt concerning her medication and cost concerns.  According to her pharmacy, her insurance has a deductible for medications and she has not yet reached her deductible.  Pt gave CCP permission to search online for a coupon for Anoro Ellipta.  Coupon obtained and given to pt.  Pt's cost for a 30 day supply will be $10 for the next 12 months.  Pt states that she can afford the rest of her medications.      Row Name 09/14/18 8312       Plan    Plan Home with family support    Patient/Family in Agreement with Plan yes    Plan Comments CCP spoke with pt at bedside.  Introduced self and explained role.  CCP contact information provided.  Face sheet and pharmacy verified.  Pt was independent with all ADLs prior to admission.  She lives alone usually but at times her niece or her boyfriend stay with her.  She drives.  She uses no DME.  She denies past use of HH or SNF.  No needs identified at this time.  CCP will follow.              Discharge Codes    No documentation.           Maggie Anand RN

## 2018-09-15 LAB
ANION GAP SERPL CALCULATED.3IONS-SCNC: 9.8 MMOL/L
BUN BLD-MCNC: 20 MG/DL (ref 6–20)
BUN/CREAT SERPL: 40.8 (ref 7–25)
CALCIUM SPEC-SCNC: 8.6 MG/DL (ref 8.6–10.5)
CHLORIDE SERPL-SCNC: 107 MMOL/L (ref 98–107)
CO2 SERPL-SCNC: 26.2 MMOL/L (ref 22–29)
CREAT BLD-MCNC: 0.49 MG/DL (ref 0.57–1)
GFR SERPL CREATININE-BSD FRML MDRD: 130 ML/MIN/1.73
GLUCOSE BLD-MCNC: 128 MG/DL (ref 65–99)
GLUCOSE BLDC GLUCOMTR-MCNC: 101 MG/DL (ref 70–130)
GLUCOSE BLDC GLUCOMTR-MCNC: 129 MG/DL (ref 70–130)
GLUCOSE BLDC GLUCOMTR-MCNC: 130 MG/DL (ref 70–130)
GLUCOSE BLDC GLUCOMTR-MCNC: 132 MG/DL (ref 70–130)
GLUCOSE BLDC GLUCOMTR-MCNC: 171 MG/DL (ref 70–130)
GLUCOSE BLDC GLUCOMTR-MCNC: 197 MG/DL (ref 70–130)
POTASSIUM BLD-SCNC: 4.2 MMOL/L (ref 3.5–5.2)
SODIUM BLD-SCNC: 143 MMOL/L (ref 136–145)

## 2018-09-15 PROCEDURE — 82962 GLUCOSE BLOOD TEST: CPT

## 2018-09-15 PROCEDURE — 94799 UNLISTED PULMONARY SVC/PX: CPT

## 2018-09-15 PROCEDURE — 25010000002 AZITHROMYCIN PER 500 MG: Performed by: INTERNAL MEDICINE

## 2018-09-15 PROCEDURE — 63710000001 INSULIN ASPART PER 5 UNITS: Performed by: INTERNAL MEDICINE

## 2018-09-15 PROCEDURE — 25010000002 METHYLPREDNISOLONE PER 40 MG: Performed by: INTERNAL MEDICINE

## 2018-09-15 PROCEDURE — 99232 SBSQ HOSP IP/OBS MODERATE 35: CPT | Performed by: NURSE PRACTITIONER

## 2018-09-15 PROCEDURE — 80048 BASIC METABOLIC PNL TOTAL CA: CPT | Performed by: INTERNAL MEDICINE

## 2018-09-15 PROCEDURE — 25010000002 ENOXAPARIN PER 10 MG: Performed by: INTERNAL MEDICINE

## 2018-09-15 PROCEDURE — 25010000002 CEFTRIAXONE PER 250 MG: Performed by: INTERNAL MEDICINE

## 2018-09-15 RX ORDER — PREDNISONE 20 MG/1
40 TABLET ORAL
Status: DISCONTINUED | OUTPATIENT
Start: 2018-09-16 | End: 2018-09-18 | Stop reason: HOSPADM

## 2018-09-15 RX ORDER — AZITHROMYCIN 250 MG/1
500 TABLET, FILM COATED ORAL EVERY 24 HOURS
Status: COMPLETED | OUTPATIENT
Start: 2018-09-16 | End: 2018-09-17

## 2018-09-15 RX ORDER — LISINOPRIL 2.5 MG/1
2.5 TABLET ORAL DAILY
Status: DISCONTINUED | OUTPATIENT
Start: 2018-09-15 | End: 2018-09-18 | Stop reason: HOSPADM

## 2018-09-15 RX ADMIN — METHYLPREDNISOLONE SODIUM SUCCINATE 40 MG: 40 INJECTION, POWDER, LYOPHILIZED, FOR SOLUTION INTRAMUSCULAR; INTRAVENOUS at 08:50

## 2018-09-15 RX ADMIN — INSULIN ASPART 2 UNITS: 100 INJECTION, SOLUTION INTRAVENOUS; SUBCUTANEOUS at 21:01

## 2018-09-15 RX ADMIN — METHYLPREDNISOLONE SODIUM SUCCINATE 40 MG: 40 INJECTION, POWDER, LYOPHILIZED, FOR SOLUTION INTRAMUSCULAR; INTRAVENOUS at 03:00

## 2018-09-15 RX ADMIN — IPRATROPIUM BROMIDE AND ALBUTEROL SULFATE 3 ML: .5; 3 SOLUTION RESPIRATORY (INHALATION) at 08:29

## 2018-09-15 RX ADMIN — METHYLPREDNISOLONE SODIUM SUCCINATE 40 MG: 40 INJECTION, POWDER, LYOPHILIZED, FOR SOLUTION INTRAMUSCULAR; INTRAVENOUS at 13:43

## 2018-09-15 RX ADMIN — BUDESONIDE AND FORMOTEROL FUMARATE DIHYDRATE 2 PUFF: 160; 4.5 AEROSOL RESPIRATORY (INHALATION) at 20:32

## 2018-09-15 RX ADMIN — LISINOPRIL 2.5 MG: 2.5 TABLET ORAL at 13:42

## 2018-09-15 RX ADMIN — CEFTRIAXONE SODIUM 1 G: 1 INJECTION, SOLUTION INTRAVENOUS at 11:13

## 2018-09-15 RX ADMIN — IPRATROPIUM BROMIDE AND ALBUTEROL SULFATE 3 ML: .5; 3 SOLUTION RESPIRATORY (INHALATION) at 15:43

## 2018-09-15 RX ADMIN — AZITHROMYCIN MONOHYDRATE 500 MG: 500 INJECTION, POWDER, LYOPHILIZED, FOR SOLUTION INTRAVENOUS at 13:43

## 2018-09-15 RX ADMIN — BUDESONIDE AND FORMOTEROL FUMARATE DIHYDRATE 2 PUFF: 160; 4.5 AEROSOL RESPIRATORY (INHALATION) at 08:30

## 2018-09-15 RX ADMIN — ENOXAPARIN SODIUM 30 MG: 30 INJECTION SUBCUTANEOUS at 18:35

## 2018-09-15 RX ADMIN — IPRATROPIUM BROMIDE AND ALBUTEROL SULFATE 3 ML: .5; 3 SOLUTION RESPIRATORY (INHALATION) at 12:06

## 2018-09-15 NOTE — PROGRESS NOTES
Cardiology Follow-Up Note      Patient Name: Scarlet Chakraborty  Age/Sex: 58 y.o. female  : 1960  MRN: [id number]      Day of Service: 09/15/18       Chief Complaint/Follow-up: Cardiomyopathy (stress induced)    S: Breathing better today      Temp:  [97.6 °F (36.4 °C)-98.8 °F (37.1 °C)] 97.6 °F (36.4 °C)  Heart Rate:  [] 91  Resp:  [16-18] 16  BP: ()/(42-71) 108/60     PHYSICAL EXAM:    General Appearance: No acute distress, thin female.   Eyes: Conjunctiva and lids: No erythema, swelling, or discharge. Sclera non-icteric.   HENT: Atraumatic, normocephalic. External eyes, ears, and nose normal.   Respiratory: No signs of respiratory distress. Respiration rhythm and depth normal.   Lungs decreased with few scattered wheezes.   Cardiovascular:  Heart Rate and Rhythm: Normal, Heart Sounds: Normal S1 and S2. No S3 or S4 noted.  Murmurs: No murmurs noted. No rubs, thrills, or gallops.   Arterial Pulses: Posterior tibialis and dorsalis pedis pulses normal.   Lower Extremities: No edema noted.  Gastrointestinal:  Abdomen soft, non-distended, non-tender.  Musculoskeletal: Normal movement of extremities  Skin: Warm and dry.   Psychiatric: Patient alert and oriented to person, place, and time. Speech and behavior appropriate. Normal mood and affect.       ECG/TELE:           Results from last 7 days  Lab Units 09/15/18  0715 18  0327 18  1157   SODIUM mmol/L 143 143 140   POTASSIUM mmol/L 4.2 4.1 4.3   CHLORIDE mmol/L 107 110* 104   CO2 mmol/L 26.2 20.2* 23.4   BUN mg/dL 20 16 14   CREATININE mg/dL 0.49* 0.53* 0.64   GLUCOSE mg/dL 128* 137* 197*   CALCIUM mg/dL 8.6 7.9* 9.4       Results from last 7 days  Lab Units 18  0327 18  1554 18  1158   WBC 10*3/mm3 5.69  --  8.99   HEMOGLOBIN g/dL 12.6  --  15.1   HEMOGLOBIN, POC g/dL  --  13.3  --    HEMATOCRIT % 40.0  --  47.7*   HEMATOCRIT POC %  --  39  --    PLATELETS 10*3/mm3 218  --  311       Results from last 7  days  Lab Units 09/13/18  1157   INR  1.01       Results from last 7 days  Lab Units 09/14/18  0327 09/13/18  1840 09/13/18  1157   TROPONIN T ng/mL 0.219* 0.413* 0.016               Current Medications:   Scheduled Meds:  azithromycin 500 mg Intravenous Q24H   budesonide-formoterol 2 puff Inhalation BID - RT   ceftriaxone 1 g Intravenous Q24H   enoxaparin 30 mg Subcutaneous Q24H   insulin aspart 0-9 Units Subcutaneous Q4H   ipratropium-albuterol 3 mL Nebulization 4x Daily - RT   methylPREDNISolone sodium succinate 40 mg Intravenous Q6H         Active Problems:    Acute respiratory failure (CMS/HCC)    Acute respiratory failure with hypoxia (CMS/HCC)     9/13/18 Echo:    Interpretation Summary     · Calculated EF = 35%. Estimated EF was in disagreement with the calculated EF. Estimated EF = 30%. Normal left ventricular wall thickness noted. There are extensive wall motion abnormalities as described below suggestive of either multivessel coronary artery disease or Takotsubo is cardiomyopathy. The left ventricular cavity is mildly dilated. Left ventricular diastolic function was indeterminate.  · Trace mitral valve regurgitation is present  · Physiologic tricuspid valve regurgitation is present. Insufficient TR velocity profile to estimate the right ventricular systolic pressure.     9/13/18 Cath:       SUMMARY: Essentially normal coronary arteries severely reduced LV function severely elevated left ventricular end-diastolic pressure        RECOMMENDATIONS: Likely this is a variant of a Takasabo cardiomyopathy  Plan:     Assessment and Plan     1.  Acute respiratory failure.  CHF and cardiomyopathy certainly playing a role but pro-calcitonin was also quite high, possible PNA, on antibiotics per pulmonary.  2.  Cardiomyopathy, likely Takotsubo cardiomyopathy with increased stresses at her work. Will trying adding low dose ACE, see if b/p tolerates.   3.  History of asthma and COPD, recommendations per pulmonary  service  4.  Unknown lipid status  5.  Elevated lactic acid, blood cultures NGTD, viral panel negative.  No obvious source of infection noted at this point. No fever.  6.  Elevated liver function tests, will check labs tomorrow.    Anette Castellanos, APRN  09/15/18  11:57 AM

## 2018-09-15 NOTE — PROGRESS NOTES
"                                              LOS: 2 days   Patient Care Team:  MARILYN Fernandez MD as PCP - General (General Practice)    Chief Complaint:  Follow-up on severe COPD, possible pneumonia, respiratory distress needing BiPAP and critical care management    Interval History:   Dong much better. Likes nebs but not much symbicort. Not using bipap anymore.      REVIEW OF SYSTEMS: .   RESPIRATORY: Intermittent shortness of breath and dry cough.  She is on oxygen 2 L/m.  GASTROINTESTINAL: No anorexia, nausea, vomiting or diarrhea. No abdominal pain or blood.   HEMATOLOGIC: No bleeding or bruising.     Ventilator/Non-Invasive Ventilation Settings     None        Vital Signs  Temp:  [97.3 °F (36.3 °C)-98.4 °F (36.9 °C)] 97.3 °F (36.3 °C)  Heart Rate:  [] 99  Resp:  [16-18] 16  BP: (108-119)/(56-71) 119/59    Intake/Output Summary (Last 24 hours) at 09/15/18 1724  Last data filed at 09/15/18 0829   Gross per 24 hour   Intake              670 ml   Output                0 ml   Net              670 ml     Flowsheet Rows      First Filed Value   Admission Height  162.6 cm (64\") Documented at 09/13/2018 1227   Admission Weight  43.5 kg (96 lb) Documented at 09/13/2018 1227          Physical Exam:   General Appearance:    Alert, cooperative, in no acute distress   HEENT:  Mallampati score 2, moist mucous membrane   Neck:   Soft   Lungs:    Significantly decreased air entry bilaterally. No crackles/ wheezing    Heart:    Regular rhythm and normal rate, normal S1 and S2, no            murmur   Skin:    No abnormalities observed   Abdomen:     Soft. No tonderness. No dullness.   Neuro:   Conscious, alert, oriented x3   Extremities:   Moves all extremities well, no edema, no cyanosis, no             Redness          Results Review:          Results from last 7 days  Lab Units 09/15/18  0715 09/14/18  0327 09/13/18  1157   SODIUM mmol/L 143 143 140   POTASSIUM mmol/L 4.2 4.1 4.3   CHLORIDE mmol/L 107 110* 104   CO2 " mmol/L 26.2 20.2* 23.4   BUN mg/dL 20 16 14   CREATININE mg/dL 0.49* 0.53* 0.64   GLUCOSE mg/dL 128* 137* 197*   CALCIUM mg/dL 8.6 7.9* 9.4       Results from last 7 days  Lab Units 09/14/18  0327 09/13/18  1840 09/13/18  1157   TROPONIN T ng/mL 0.219* 0.413* 0.016       Results from last 7 days  Lab Units 09/14/18  0327 09/13/18  1554 09/13/18  1158   WBC 10*3/mm3 5.69  --  8.99   HEMOGLOBIN g/dL 12.6  --  15.1   HEMOGLOBIN, POC g/dL  --  13.3  --    HEMATOCRIT % 40.0  --  47.7*   HEMATOCRIT POC %  --  39  --    PLATELETS 10*3/mm3 218  --  311       Results from last 7 days  Lab Units 09/13/18  1157   INR  1.01       Results from last 7 days  Lab Units 09/13/18  1157   PROBNP pg/mL 78.3       I reviewed the patient's new clinical results.  I personally viewed and interpreted the patient's CXR        Medication Review:     [START ON 9/16/2018] azithromycin 500 mg Oral Q24H   budesonide-formoterol 2 puff Inhalation BID - RT   ceftriaxone 1 g Intravenous Q24H   enoxaparin 30 mg Subcutaneous Q24H   insulin aspart 0-9 Units Subcutaneous Q4H   ipratropium-albuterol 3 mL Nebulization 4x Daily - RT   lisinopril 2.5 mg Oral Daily   methylPREDNISolone sodium succinate 40 mg Intravenous Q6H          Diagnostic imaging:  I personally and independently reviewed the following images:   CXR benign 9/13/18  COPD changes.  Lucent right lung which could represent emboli versus pneumothorax.  Left lower lobe infiltrates          Assessment:  1. Acute respiratory distress  2. COPD exacerbation, severe  3. Left lower lobe pneumonia, Procal and RVP -ve but crackles on exam  4. Acute systolic CHF, Takotsubo cardiomyopathy, likely secondary to the above  5. Lactic acidosis, resolved  6. Steroid-induced hyperglycemia  7. Elevated transaminases  8. Atypical chest pain, resolved  9. EKG changes  10. Tobacco abuse        Plan:  · Adjust nebulized bronchodilators to 4 times a day  · C/w Symbicort.   · Needs outpatient PFT and alpha-1  antitrypsin  · Doing well off BiPAP for now  · Wean down steroids  · Finish antibiotics for possible community-acquired pneumonia  · Subcutaneous insulin for hyperglycemia  · Appreciate cards input    Flaco Aguayo MD  09/15/18  5:24 PM

## 2018-09-15 NOTE — PLAN OF CARE
Problem: Patient Care Overview  Goal: Plan of Care Review  Outcome: Ongoing (interventions implemented as appropriate)   09/15/18 8152   OTHER   Outcome Summary Pt A&O. No c/o pain. Does c/o SOB with activity. Currently at 1L O2. Q4 blood sugar checks. Given insulin x1 with blood sugar of 210. Solu-medrol given throughout night. No other complaints. VSS, will continue to monitor pt.   Coping/Psychosocial   Plan of Care Reviewed With patient   Plan of Care Review   Progress improving     Goal: Individualization and Mutuality  Outcome: Ongoing (interventions implemented as appropriate)    Goal: Discharge Needs Assessment  Outcome: Ongoing (interventions implemented as appropriate)    Goal: Interprofessional Rounds/Family Conf  Outcome: Ongoing (interventions implemented as appropriate)      Problem: Pneumonia (Adult)  Goal: Signs and Symptoms of Listed Potential Problems Will be Absent, Minimized or Managed (Pneumonia)  Outcome: Ongoing (interventions implemented as appropriate)      Problem: Cardiac Output Decreased (Adult)  Goal: Identify Related Risk Factors and Signs and Symptoms  Outcome: Ongoing (interventions implemented as appropriate)    Goal: Effective Tissue Perfusion  Outcome: Ongoing (interventions implemented as appropriate)      Problem: Fall Risk (Adult)  Goal: Identify Related Risk Factors and Signs and Symptoms  Outcome: Ongoing (interventions implemented as appropriate)    Goal: Absence of Fall  Outcome: Ongoing (interventions implemented as appropriate)      Problem: Pain, Acute (Adult)  Goal: Identify Related Risk Factors and Signs and Symptoms  Outcome: Ongoing (interventions implemented as appropriate)    Goal: Acceptable Pain Control/Comfort Level  Outcome: Ongoing (interventions implemented as appropriate)      Problem: Skin Injury Risk (Adult)  Goal: Identify Related Risk Factors and Signs and Symptoms  Outcome: Ongoing (interventions implemented as appropriate)    Goal: Skin Health and  Integrity  Outcome: Ongoing (interventions implemented as appropriate)      Problem: Nutrition, Imbalanced: Inadequate Oral Intake (Adult)  Goal: Improved Oral Intake  Outcome: Ongoing (interventions implemented as appropriate)    Goal: Prevent Further Weight Loss  Outcome: Ongoing (interventions implemented as appropriate)

## 2018-09-15 NOTE — PLAN OF CARE
Problem: Patient Care Overview  Goal: Plan of Care Review  Outcome: Ongoing (interventions implemented as appropriate)   09/15/18 6755   OTHER   Outcome Summary Continued complaints of throat pain during shift. Medicated twice for pain. Resting in bed at this time. WIll continue to monitor   Coping/Psychosocial   Plan of Care Reviewed With patient   Plan of Care Review   Progress no change       Problem: Pneumonia (Adult)  Goal: Signs and Symptoms of Listed Potential Problems Will be Absent, Minimized or Managed (Pneumonia)  Outcome: Ongoing (interventions implemented as appropriate)      Problem: Cardiac Output Decreased (Adult)  Goal: Identify Related Risk Factors and Signs and Symptoms  Outcome: Ongoing (interventions implemented as appropriate)    Goal: Effective Tissue Perfusion  Outcome: Ongoing (interventions implemented as appropriate)      Problem: Fall Risk (Adult)  Goal: Identify Related Risk Factors and Signs and Symptoms  Outcome: Ongoing (interventions implemented as appropriate)    Goal: Absence of Fall  Outcome: Ongoing (interventions implemented as appropriate)      Problem: Pain, Acute (Adult)  Goal: Identify Related Risk Factors and Signs and Symptoms  Outcome: Ongoing (interventions implemented as appropriate)    Goal: Acceptable Pain Control/Comfort Level  Outcome: Ongoing (interventions implemented as appropriate)      Problem: Skin Injury Risk (Adult)  Goal: Identify Related Risk Factors and Signs and Symptoms  Outcome: Ongoing (interventions implemented as appropriate)    Goal: Skin Health and Integrity  Outcome: Ongoing (interventions implemented as appropriate)      Problem: Nutrition, Imbalanced: Inadequate Oral Intake (Adult)  Goal: Improved Oral Intake  Outcome: Ongoing (interventions implemented as appropriate)    Goal: Prevent Further Weight Loss  Outcome: Ongoing (interventions implemented as appropriate)

## 2018-09-16 LAB
ALBUMIN SERPL-MCNC: 3.4 G/DL (ref 3.5–5.2)
ALBUMIN/GLOB SERPL: 1.9 G/DL
ALP SERPL-CCNC: 47 U/L (ref 39–117)
ALT SERPL W P-5'-P-CCNC: 40 U/L (ref 1–33)
ANION GAP SERPL CALCULATED.3IONS-SCNC: 6.4 MMOL/L
AST SERPL-CCNC: 17 U/L (ref 1–32)
BILIRUB SERPL-MCNC: 0.2 MG/DL (ref 0.1–1.2)
BUN BLD-MCNC: 21 MG/DL (ref 6–20)
BUN/CREAT SERPL: 33.3 (ref 7–25)
CALCIUM SPEC-SCNC: 8.5 MG/DL (ref 8.6–10.5)
CHLORIDE SERPL-SCNC: 107 MMOL/L (ref 98–107)
CO2 SERPL-SCNC: 29.6 MMOL/L (ref 22–29)
CREAT BLD-MCNC: 0.63 MG/DL (ref 0.57–1)
GFR SERPL CREATININE-BSD FRML MDRD: 97 ML/MIN/1.73
GLOBULIN UR ELPH-MCNC: 1.8 GM/DL
GLUCOSE BLD-MCNC: 87 MG/DL (ref 65–99)
GLUCOSE BLDC GLUCOMTR-MCNC: 103 MG/DL (ref 70–130)
GLUCOSE BLDC GLUCOMTR-MCNC: 139 MG/DL (ref 70–130)
GLUCOSE BLDC GLUCOMTR-MCNC: 83 MG/DL (ref 70–130)
GLUCOSE BLDC GLUCOMTR-MCNC: 95 MG/DL (ref 70–130)
POTASSIUM BLD-SCNC: 4 MMOL/L (ref 3.5–5.2)
PROT SERPL-MCNC: 5.2 G/DL (ref 6–8.5)
SODIUM BLD-SCNC: 143 MMOL/L (ref 136–145)

## 2018-09-16 PROCEDURE — 99232 SBSQ HOSP IP/OBS MODERATE 35: CPT | Performed by: NURSE PRACTITIONER

## 2018-09-16 PROCEDURE — 94799 UNLISTED PULMONARY SVC/PX: CPT

## 2018-09-16 PROCEDURE — 82962 GLUCOSE BLOOD TEST: CPT

## 2018-09-16 PROCEDURE — 80053 COMPREHEN METABOLIC PANEL: CPT | Performed by: NURSE PRACTITIONER

## 2018-09-16 PROCEDURE — 25010000002 ENOXAPARIN PER 10 MG: Performed by: INTERNAL MEDICINE

## 2018-09-16 PROCEDURE — 63710000001 PREDNISONE PER 1 MG: Performed by: INTERNAL MEDICINE

## 2018-09-16 PROCEDURE — 25010000002 CEFTRIAXONE PER 250 MG: Performed by: INTERNAL MEDICINE

## 2018-09-16 RX ORDER — IPRATROPIUM BROMIDE AND ALBUTEROL SULFATE 2.5; .5 MG/3ML; MG/3ML
3 SOLUTION RESPIRATORY (INHALATION) EVERY 4 HOURS PRN
Status: DISCONTINUED | OUTPATIENT
Start: 2018-09-16 | End: 2018-09-18 | Stop reason: HOSPADM

## 2018-09-16 RX ADMIN — PREDNISONE 40 MG: 20 TABLET ORAL at 09:14

## 2018-09-16 RX ADMIN — BUDESONIDE AND FORMOTEROL FUMARATE DIHYDRATE 2 PUFF: 160; 4.5 AEROSOL RESPIRATORY (INHALATION) at 07:57

## 2018-09-16 RX ADMIN — ENOXAPARIN SODIUM 30 MG: 30 INJECTION SUBCUTANEOUS at 17:35

## 2018-09-16 RX ADMIN — IPRATROPIUM BROMIDE AND ALBUTEROL SULFATE 3 ML: .5; 3 SOLUTION RESPIRATORY (INHALATION) at 11:25

## 2018-09-16 RX ADMIN — AZITHROMYCIN 500 MG: 250 TABLET, FILM COATED ORAL at 11:39

## 2018-09-16 RX ADMIN — CEFTRIAXONE SODIUM 1 G: 1 INJECTION, SOLUTION INTRAVENOUS at 11:39

## 2018-09-16 RX ADMIN — BUDESONIDE AND FORMOTEROL FUMARATE DIHYDRATE 2 PUFF: 160; 4.5 AEROSOL RESPIRATORY (INHALATION) at 19:45

## 2018-09-16 RX ADMIN — LISINOPRIL 2.5 MG: 2.5 TABLET ORAL at 09:14

## 2018-09-16 RX ADMIN — IPRATROPIUM BROMIDE AND ALBUTEROL SULFATE 3 ML: .5; 3 SOLUTION RESPIRATORY (INHALATION) at 07:57

## 2018-09-16 NOTE — PROGRESS NOTES
Cardiology Follow-Up Note      Patient Name: Scarlet Chakraborty  Age/Sex: 58 y.o. female  : 1960  MRN: [id number]      Day of Service: 18       Chief Complaint/Follow-up: Cardiomyopathy (stress induced)    S: No complaints, thinks she will get to go home tomorrow.      Temp:  [97.3 °F (36.3 °C)-98.4 °F (36.9 °C)] 98.4 °F (36.9 °C)  Heart Rate:  [] 95  Resp:  [16-18] 16  BP: (106-120)/(59-64) 111/62     PHYSICAL EXAM:    General Appearance: No acute distress, well developed and well nourished.   Eyes: Conjunctiva and lids: No erythema, swelling, or discharge. Sclera non-icteric.   HENT: Atraumatic, normocephalic. External eyes, ears, and nose normal.   Respiratory: No signs of respiratory distress. Respiration rhythm and depth normal.   Decreased bases, right >left but moving more air and no wheezing.   Cardiovascular:  Heart Rate and Rhythm: Normal, Heart Sounds: Normal S1 and S2. No S3 or S4 noted.  Murmurs: No murmurs noted. No rubs, thrills, or gallops.   Arterial Pulses: Posterior tibialis and dorsalis pedis pulses normal.   Lower Extremities: No edema noted.  Gastrointestinal:  Abdomen soft, non-distended, non-tender.  Musculoskeletal: Normal movement of extremities  Skin: Warm and dry.   Psychiatric: Patient alert and oriented to person, place, and time. Speech and behavior appropriate. Normal mood and affect.       ECG/TELE:         Results from last 7 days  Lab Units 18  0641 09/15/18  0715 18  0327   SODIUM mmol/L 143 143 143   POTASSIUM mmol/L 4.0 4.2 4.1   CHLORIDE mmol/L 107 107 110*   CO2 mmol/L 29.6* 26.2 20.2*   BUN mg/dL 21* 20 16   CREATININE mg/dL 0.63 0.49* 0.53*   GLUCOSE mg/dL 87 128* 137*   CALCIUM mg/dL 8.5* 8.6 7.9*       Results from last 7 days  Lab Units 18  0327 18  1554 18  1158   WBC 10*3/mm3 5.69  --  8.99   HEMOGLOBIN g/dL 12.6  --  15.1   HEMOGLOBIN, POC g/dL  --  13.3  --    HEMATOCRIT % 40.0  --  47.7*   HEMATOCRIT POC %  --   39  --    PLATELETS 10*3/mm3 218  --  311       Results from last 7 days  Lab Units 09/13/18  1157   INR  1.01       Results from last 7 days  Lab Units 09/14/18  0327 09/13/18  1840 09/13/18  1157   TROPONIN T ng/mL 0.219* 0.413* 0.016               Current Medications:   Scheduled Meds:  azithromycin 500 mg Oral Q24H   budesonide-formoterol 2 puff Inhalation BID - RT   ceftriaxone 1 g Intravenous Q24H   enoxaparin 30 mg Subcutaneous Q24H   insulin aspart 0-9 Units Subcutaneous Q4H   ipratropium-albuterol 3 mL Nebulization 4x Daily - RT   lisinopril 2.5 mg Oral Daily   predniSONE 40 mg Oral Daily With Breakfast         Active Problems:    Acute respiratory failure (CMS/HCC)    Acute respiratory failure with hypoxia (CMS/HCC)       Plan:        1.  Acute respiratory failure.  CHF and cardiomyopathy certainly playing a role but pro-calcitonin was also quite high, possible PNA, on antibiotics per pulmonary.  2.  Cardiomyopathy, likely Takotsubo cardiomyopathy with increased stresses at her work. Added low dose ACE yesterday, tolerating okay.   3.  History of asthma and COPD, recommendations per pulmonary service  4.  Unknown lipid status  5.  Elevated lactic acid, blood cultures NGTD, viral panel negative.  No obvious source of infection noted at this point. No fever.  6.  Elevated liver function tests, improved, AST normal, ALT still mildly elevated     Doing okay, lungs sound better today. Pt says she thinks she will get to go home tomorrow. Tolerating addition of low dose ACE.    Anette Castellanos, APRTAMEKA  09/16/18  10:47 AM

## 2018-09-16 NOTE — PLAN OF CARE
Problem: Patient Care Overview  Goal: Plan of Care Review  Outcome: Ongoing (interventions implemented as appropriate)   09/16/18 0358   OTHER   Outcome Summary Pt A&O. Pt does not have any needs or complaints. Given 2 units insulin for blood sugar of 171 at 2000. No more insulin has been needed so far. VSS, will continue to monitor.   Coping/Psychosocial   Plan of Care Reviewed With patient   Plan of Care Review   Progress improving     Goal: Individualization and Mutuality  Outcome: Ongoing (interventions implemented as appropriate)    Goal: Discharge Needs Assessment  Outcome: Ongoing (interventions implemented as appropriate)    Goal: Interprofessional Rounds/Family Conf  Outcome: Ongoing (interventions implemented as appropriate)      Problem: Pneumonia (Adult)  Goal: Signs and Symptoms of Listed Potential Problems Will be Absent, Minimized or Managed (Pneumonia)  Outcome: Ongoing (interventions implemented as appropriate)      Problem: Cardiac Output Decreased (Adult)  Goal: Identify Related Risk Factors and Signs and Symptoms  Outcome: Ongoing (interventions implemented as appropriate)    Goal: Effective Tissue Perfusion  Outcome: Ongoing (interventions implemented as appropriate)      Problem: Fall Risk (Adult)  Goal: Identify Related Risk Factors and Signs and Symptoms  Outcome: Ongoing (interventions implemented as appropriate)    Goal: Absence of Fall  Outcome: Ongoing (interventions implemented as appropriate)      Problem: Pain, Acute (Adult)  Goal: Identify Related Risk Factors and Signs and Symptoms  Outcome: Ongoing (interventions implemented as appropriate)    Goal: Acceptable Pain Control/Comfort Level  Outcome: Ongoing (interventions implemented as appropriate)      Problem: Skin Injury Risk (Adult)  Goal: Identify Related Risk Factors and Signs and Symptoms  Outcome: Ongoing (interventions implemented as appropriate)    Goal: Skin Health and Integrity  Outcome: Ongoing (interventions implemented  as appropriate)      Problem: Nutrition, Imbalanced: Inadequate Oral Intake (Adult)  Goal: Improved Oral Intake  Outcome: Ongoing (interventions implemented as appropriate)    Goal: Prevent Further Weight Loss  Outcome: Ongoing (interventions implemented as appropriate)

## 2018-09-16 NOTE — PROGRESS NOTES
"                                              LOS: 3 days   Patient Care Team:  MARILYN Fernandez MD as PCP - General (General Practice)    Chief Complaint:  Follow-up on severe COPD, possible pneumonia, respiratory distress needing BiPAP and critical care management    Interval History:   Doing much better. Walking around without issues. ON ra.       REVIEW OF SYSTEMS: .   RESPIRATORY: Intermittent shortness of breath and dry cough.  She is on oxygen 2 L/m.  GASTROINTESTINAL: No anorexia, nausea, vomiting or diarrhea. No abdominal pain or blood.   HEMATOLOGIC: No bleeding or bruising.     Ventilator/Non-Invasive Ventilation Settings     None        Vital Signs  Temp:  [98 °F (36.7 °C)-98.4 °F (36.9 °C)] 98.1 °F (36.7 °C)  Heart Rate:  [] 96  Resp:  [16-18] 16  BP: (106-120)/(50-64) 109/50    Intake/Output Summary (Last 24 hours) at 09/16/18 1456  Last data filed at 09/16/18 1418   Gross per 24 hour   Intake              930 ml   Output                0 ml   Net              930 ml     Flowsheet Rows      First Filed Value   Admission Height  162.6 cm (64\") Documented at 09/13/2018 1227   Admission Weight  43.5 kg (96 lb) Documented at 09/13/2018 1227          Physical Exam:   General Appearance:    Alert, cooperative, in no acute distress   HEENT:  Mallampati score 2, moist mucous membrane   Neck:   Soft   Lungs:    Significantly decreased air entry bilaterally. No crackles/ wheezing    Heart:    Regular rhythm and normal rate, normal S1 and S2, no            murmur   Skin:    No abnormalities observed   Abdomen:     Soft. No tonderness. No dullness.   Neuro:   Conscious, alert, oriented x3   Extremities:   Moves all extremities well, no edema, no cyanosis, no             Redness          Results Review:          Results from last 7 days  Lab Units 09/16/18  0641 09/15/18  0715 09/14/18  0327   SODIUM mmol/L 143 143 143   POTASSIUM mmol/L 4.0 4.2 4.1   CHLORIDE mmol/L 107 107 110*   CO2 mmol/L 29.6* 26.2 20.2* "   BUN mg/dL 21* 20 16   CREATININE mg/dL 0.63 0.49* 0.53*   GLUCOSE mg/dL 87 128* 137*   CALCIUM mg/dL 8.5* 8.6 7.9*       Results from last 7 days  Lab Units 09/14/18  0327 09/13/18  1840 09/13/18  1157   TROPONIN T ng/mL 0.219* 0.413* 0.016       Results from last 7 days  Lab Units 09/14/18  0327 09/13/18  1554 09/13/18  1158   WBC 10*3/mm3 5.69  --  8.99   HEMOGLOBIN g/dL 12.6  --  15.1   HEMOGLOBIN, POC g/dL  --  13.3  --    HEMATOCRIT % 40.0  --  47.7*   HEMATOCRIT POC %  --  39  --    PLATELETS 10*3/mm3 218  --  311       Results from last 7 days  Lab Units 09/13/18  1157   INR  1.01       Results from last 7 days  Lab Units 09/13/18  1157   PROBNP pg/mL 78.3       I reviewed the patient's new clinical results.  I personally viewed and interpreted the patient's CXR        Medication Review:     azithromycin 500 mg Oral Q24H   budesonide-formoterol 2 puff Inhalation BID - RT   ceftriaxone 1 g Intravenous Q24H   enoxaparin 30 mg Subcutaneous Q24H   insulin aspart 0-9 Units Subcutaneous Q4H   ipratropium-albuterol 3 mL Nebulization 4x Daily - RT   lisinopril 2.5 mg Oral Daily   predniSONE 40 mg Oral Daily With Breakfast          Diagnostic imaging:  I personally and independently reviewed the following images:   CXR benign 9/13/18  COPD changes.  Lucent right lung which could represent emboli versus pneumothorax.  Left lower lobe infiltrates          Assessment:  1. Acute respiratory distress  2. COPD exacerbation, severe  3. Left lower lobe pneumonia, Procal and RVP -ve but crackles on exam  4. Acute systolic CHF, Takotsubo cardiomyopathy s/p LHC - Neg  5. Lactic acidosis, resolved  6. Steroid-induced hyperglycemia  7. Elevated transaminases  8. Atypical chest pain, resolved  9. EKG changes  10. Tobacco abuse      Plan:  · C/w Symbicort and prn nebs.   · Needs outpatient PFT and alpha-1 antitrypsin  · Wean down steroids  · Finish antibiotics for possible community-acquired pneumonia  · Subcutaneous insulin for  hyperglycemia  · Appreciate cards input - tolerating low dose ACEi    DISPO - Will dc in am if ok per cards.     Flaco Aguayo MD  09/16/18  2:56 PM

## 2018-09-17 VITALS
OXYGEN SATURATION: 93 % | DIASTOLIC BLOOD PRESSURE: 55 MMHG | WEIGHT: 98 LBS | RESPIRATION RATE: 18 BRPM | HEART RATE: 93 BPM | HEIGHT: 64 IN | SYSTOLIC BLOOD PRESSURE: 100 MMHG | TEMPERATURE: 98.4 F | BODY MASS INDEX: 16.73 KG/M2

## 2018-09-17 PROBLEM — J96.00 ACUTE RESPIRATORY FAILURE: Status: RESOLVED | Noted: 2018-09-13 | Resolved: 2018-09-17

## 2018-09-17 PROBLEM — J96.01 ACUTE RESPIRATORY FAILURE WITH HYPOXIA (HCC): Status: RESOLVED | Noted: 2018-09-13 | Resolved: 2018-09-17

## 2018-09-17 LAB
GLUCOSE BLDC GLUCOMTR-MCNC: 103 MG/DL (ref 70–130)
GLUCOSE BLDC GLUCOMTR-MCNC: 113 MG/DL (ref 70–130)
GLUCOSE BLDC GLUCOMTR-MCNC: 76 MG/DL (ref 70–130)
GLUCOSE BLDC GLUCOMTR-MCNC: 78 MG/DL (ref 70–130)

## 2018-09-17 PROCEDURE — 94799 UNLISTED PULMONARY SVC/PX: CPT

## 2018-09-17 PROCEDURE — 99232 SBSQ HOSP IP/OBS MODERATE 35: CPT | Performed by: INTERNAL MEDICINE

## 2018-09-17 PROCEDURE — 82962 GLUCOSE BLOOD TEST: CPT

## 2018-09-17 PROCEDURE — 25010000002 CEFTRIAXONE PER 250 MG: Performed by: INTERNAL MEDICINE

## 2018-09-17 PROCEDURE — 63710000001 PREDNISONE PER 1 MG: Performed by: INTERNAL MEDICINE

## 2018-09-17 RX ORDER — LISINOPRIL 2.5 MG/1
2.5 TABLET ORAL DAILY
Qty: 30 TABLET | Refills: 0 | Status: SHIPPED | OUTPATIENT
Start: 2018-09-18 | End: 2018-10-29 | Stop reason: SDUPTHER

## 2018-09-17 RX ORDER — PREDNISONE 20 MG/1
40 TABLET ORAL
Qty: 6 TABLET | Refills: 0 | Status: SHIPPED | OUTPATIENT
Start: 2018-09-18 | End: 2018-09-17

## 2018-09-17 RX ORDER — PREDNISONE 20 MG/1
40 TABLET ORAL
Qty: 6 TABLET | Refills: 0 | Status: SHIPPED | OUTPATIENT
Start: 2018-09-18 | End: 2018-09-21

## 2018-09-17 RX ORDER — IPRATROPIUM BROMIDE AND ALBUTEROL SULFATE 2.5; .5 MG/3ML; MG/3ML
3 SOLUTION RESPIRATORY (INHALATION) EVERY 4 HOURS PRN
Qty: 360 ML | Refills: 0 | Status: SHIPPED | OUTPATIENT
Start: 2018-09-17 | End: 2019-02-11

## 2018-09-17 RX ORDER — IPRATROPIUM BROMIDE AND ALBUTEROL SULFATE 2.5; .5 MG/3ML; MG/3ML
3 SOLUTION RESPIRATORY (INHALATION) EVERY 4 HOURS PRN
Qty: 360 ML | Refills: 0 | Status: SHIPPED | OUTPATIENT
Start: 2018-09-17 | End: 2018-09-17

## 2018-09-17 RX ORDER — CEFDINIR 300 MG/1
300 CAPSULE ORAL DAILY
Qty: 4 CAPSULE | Refills: 0 | Status: SHIPPED | OUTPATIENT
Start: 2018-09-17 | End: 2018-09-21

## 2018-09-17 RX ORDER — CEFDINIR 300 MG/1
300 CAPSULE ORAL DAILY
Qty: 4 CAPSULE | Refills: 0 | Status: SHIPPED | OUTPATIENT
Start: 2018-09-17 | End: 2018-09-17

## 2018-09-17 RX ORDER — LISINOPRIL 2.5 MG/1
2.5 TABLET ORAL DAILY
Qty: 30 TABLET | Refills: 0 | Status: SHIPPED | OUTPATIENT
Start: 2018-09-18 | End: 2018-09-17

## 2018-09-17 RX ADMIN — PREDNISONE 40 MG: 20 TABLET ORAL at 09:11

## 2018-09-17 RX ADMIN — LISINOPRIL 2.5 MG: 2.5 TABLET ORAL at 09:11

## 2018-09-17 RX ADMIN — CEFTRIAXONE SODIUM 1 G: 1 INJECTION, SOLUTION INTRAVENOUS at 12:24

## 2018-09-17 RX ADMIN — BUDESONIDE AND FORMOTEROL FUMARATE DIHYDRATE 2 PUFF: 160; 4.5 AEROSOL RESPIRATORY (INHALATION) at 07:14

## 2018-09-17 RX ADMIN — AZITHROMYCIN 500 MG: 250 TABLET, FILM COATED ORAL at 12:23

## 2018-09-17 NOTE — PROGRESS NOTES
[facility]  Progress note: 2018    Patient Identification:  Name:Scarlet Chakraborty  Age:58 y.o.  Sex: female  :  1960  MRN: [id number]           CC:  Followup of cardiomyopathy, congestive heart failure    Interval history:  No chest pains.  Still with modest dyspnea.    Vital Signs:   Temp:  [97.7 °F (36.5 °C)-98.4 °F (36.9 °C)] 98.4 °F (36.9 °C)  Heart Rate:  [65-99] 93  Resp:  [14-18] 18  BP: (100-114)/(55-66) 100/55    Intake/Output Summary (Last 24 hours) at 18 1354  Last data filed at 18 1300   Gross per 24 hour   Intake              670 ml   Output                0 ml   Net              670 ml       Physical Examination:    General Appearance No acute distress   Neck No adenopathy, supple, trachea midline, no thyromegaly, no carotid bruit, no JVD   Lungs Clear to auscultation,respirations regular, even and unlabored   Heart Regular rhythm and normal rate, normal S1 and S2, no murmur, no gallop, no rub, no click   Chest wall No abnormalities observed   Abdomen Normal bowel sounds, no masses, no hepatomegaly, soft   Extremities Moves all extremities well, no edema, no cyanosis, no redness   Neurological Alert and oriented x 3     Lab Review:  Personally reviewed the labs, radiology imaging and other cardiac procedures.   Results from last 7 days  Lab Units 18  0641   SODIUM mmol/L 143   POTASSIUM mmol/L 4.0   CHLORIDE mmol/L 107   CO2 mmol/L 29.6*   BUN mg/dL 21*   CREATININE mg/dL 0.63   CALCIUM mg/dL 8.5*   BILIRUBIN mg/dL 0.2   ALK PHOS U/L 47   ALT (SGPT) U/L 40*   AST (SGOT) U/L 17   GLUCOSE mg/dL 87       Results from last 7 days  Lab Units 18  0327 18  1840 18  1157   TROPONIN T ng/mL 0.219* 0.413* 0.016     )  Results from last 7 days  Lab Units 18  0327 18  1554 18  1158   WBC 10*3/mm3 5.69  --  8.99   HEMOGLOBIN g/dL 12.6  --  15.1   HEMOGLOBIN, POC g/dL  --  13.3  --    HEMATOCRIT % 40.0  --  47.7*   HEMATOCRIT POC %  --  39   --    PLATELETS 10*3/mm3 218  --  311       Results from last 7 days  Lab Units 09/13/18  1157   INR  1.01     Medication Review:   Meds reviewed  Scheduled Meds:  budesonide-formoterol 2 puff Inhalation BID - RT   ceftriaxone 1 g Intravenous Q24H   enoxaparin 30 mg Subcutaneous Q24H   insulin aspart 0-9 Units Subcutaneous Q4H   lisinopril 2.5 mg Oral Daily   predniSONE 40 mg Oral Daily With Breakfast     I personally viewed and interpreted the patient's EKG/Telemetry data    Assessment and Plan    1.  Acute respiratory failure.   Bllood cultures negative and to be dismissed home later today.  2.  Cardiomyopathy, likely Takotsubo cardiomyopathy with increased stresses at her work. on suboptimal medical therapy due to hypotension and pulmonary wheeze.  We'll continue with lisinopril for now and try to add low-dose beta blocker therapy in the next 2 weeks at follow-up.  Hopefully her blood pressure will have improved by then.  Also could consider aEntresto as blood pressure allows.  I also recommended she not work for the next 4-6 weeks as this was quite stressful for her.  3.  History of asthma and COPD, recommendations per pulmonary service  4.  Unknown lipid status  5.  Elevated lactic acid, blood cultures NGTD, viral panel negative.  No obvious source of infection noted at this point. No fever.  6.  Elevated liver function tests, improved, AST normal, ALT still mildly elevated       Mini Boston  9/17/20185:34 PM  25min spent in reviewing records, discussion and examination of the patient and discussion with other members of the patient's medical team.     Dictated utilizing Dragon dictation

## 2018-09-17 NOTE — PLAN OF CARE
Problem: Patient Care Overview  Goal: Plan of Care Review  Outcome: Ongoing (interventions implemented as appropriate)   09/17/18 4967   OTHER   Outcome Summary Pt A&O. No c/o pain or SOB. Pt has not needed insulin during night. Pt is on room air, lungs sound diminished, equal bilateral. Anticipating d/c later today. VSS, will continue to monitor.   Coping/Psychosocial   Plan of Care Reviewed With patient   Plan of Care Review   Progress improving     Goal: Individualization and Mutuality  Outcome: Ongoing (interventions implemented as appropriate)    Goal: Discharge Needs Assessment  Outcome: Ongoing (interventions implemented as appropriate)    Goal: Interprofessional Rounds/Family Conf  Outcome: Ongoing (interventions implemented as appropriate)      Problem: Pneumonia (Adult)  Goal: Signs and Symptoms of Listed Potential Problems Will be Absent, Minimized or Managed (Pneumonia)  Outcome: Ongoing (interventions implemented as appropriate)      Problem: Cardiac Output Decreased (Adult)  Goal: Identify Related Risk Factors and Signs and Symptoms  Outcome: Ongoing (interventions implemented as appropriate)    Goal: Effective Tissue Perfusion  Outcome: Ongoing (interventions implemented as appropriate)      Problem: Fall Risk (Adult)  Goal: Identify Related Risk Factors and Signs and Symptoms  Outcome: Ongoing (interventions implemented as appropriate)    Goal: Absence of Fall  Outcome: Ongoing (interventions implemented as appropriate)      Problem: Pain, Acute (Adult)  Goal: Identify Related Risk Factors and Signs and Symptoms  Outcome: Ongoing (interventions implemented as appropriate)    Goal: Acceptable Pain Control/Comfort Level  Outcome: Ongoing (interventions implemented as appropriate)      Problem: Skin Injury Risk (Adult)  Goal: Identify Related Risk Factors and Signs and Symptoms  Outcome: Ongoing (interventions implemented as appropriate)    Goal: Skin Health and Integrity  Outcome: Ongoing  (interventions implemented as appropriate)      Problem: Nutrition, Imbalanced: Inadequate Oral Intake (Adult)  Goal: Improved Oral Intake  Outcome: Ongoing (interventions implemented as appropriate)    Goal: Prevent Further Weight Loss  Outcome: Ongoing (interventions implemented as appropriate)

## 2018-09-17 NOTE — PROGRESS NOTES
Continued Stay Note  [facility]     Patient Name: Scarlet Chakraborty  MRN: [id number]  Today's Date: 9/17/2018    Admit Date: 9/13/2018          Discharge Plan     Row Name 09/17/18 1454       Plan    Plan Plan home.  BISI Glez    Patient/Family in Agreement with Plan yes    Plan Comments Spoke to pt at bedside.  Pt denies any discharge needs.  Pt states may need home nebulizer.  Plan home.  BISI Glez              Discharge Codes    No documentation.           Brooke Benavides RN

## 2018-09-18 ENCOUNTER — READMISSION MANAGEMENT (OUTPATIENT)
Dept: CALL CENTER | Facility: HOSPITAL | Age: 58
End: 2018-09-18

## 2018-09-18 LAB
BACTERIA SPEC AEROBE CULT: NORMAL
BACTERIA SPEC AEROBE CULT: NORMAL

## 2018-09-18 NOTE — PROGRESS NOTES
Case Management Discharge Note    Final Note: Pt discharged home.  BISI Glez    Destination     No service has been selected for the patient.      Durable Medical Equipment     No service has been selected for the patient.      Dialysis/Infusion     No service has been selected for the patient.      Home Medical Care     No service has been selected for the patient.      Social Care     No service has been selected for the patient.        Other: Other (Private Auto)    Final Discharge Disposition Code: 01 - home or self-care

## 2018-09-18 NOTE — PROGRESS NOTES
Continued Stay Note  [facility]     Patient Name: Scarlet Chakraborty  MRN: [id number]  Today's Date: 9/18/2018    Admit Date: 9/13/2018          Discharge Plan     Row Name 09/18/18 1018       Plan    Plan Plan home with nebulizer provided by Karlos Glez RN    Patient/Family in Agreement with Plan yes    Plan Comments Called and spoke to pt.  Pt was discharged late yesterday evening without her nebulizer.  Odette  ( [facility]  [phone]) called and order faxed to [facility].  Pt to  at [employer].  Plan home with nebulizer provided by Abner Benavides RN    Row Name 09/18/18 1011       Plan    Final Discharge Disposition Code 01 - home or self-care    Final Note Pt discharged home.  BISI Glez              Discharge Codes    No documentation.       Expected Discharge Date and Time     Expected Discharge Date Expected Discharge Time    Sep 17, 2018             Brooke Benavides RN

## 2018-09-19 NOTE — OUTREACH NOTE
Prep Survey      Responses   Facility patient discharged from?  [facility]   Is patient eligible?  Yes   Discharge diagnosis  Resp failure-heart cath this visit   Does the patient have one of the following disease processes/diagnoses(primary or secondary)?  Other   Does the patient have Home health ordered?  No   Is there a DME ordered?  Yes   What DME was ordered?  nebulizer via [facility]   Prep survey completed?  Yes          Yamile Cheek RN

## 2018-09-19 NOTE — PAYOR COMM NOTE
"Scarlet Chavez  (58 y.o. Female)     Date of Birth Social Security Number Address Home Phone MRN    1960  [id number]  [facility] [address] [phone] [id number]    Buddhist Marital Status          None Single       Admission Date Admission Type Admitting Provider Attending Provider Department, Room/Bed    9/13/18 Emergency Rafael Brito MD  [address], [address]1    Discharge Date Discharge Disposition Discharge Destination        9/17/2018 Home or Self Care              Attending Provider:  (none)   Allergies:  No Known Allergies    Isolation:  None   Infection:  None   Code Status:  Prior    Ht:  162.6 cm (64\")   Wt:  44.5 kg (98 lb)    Admission Cmt:  None   Principal Problem:  None                Active Insurance as of 9/13/2018     Primary Coverage     Payor Plan Insurance Group Employer/Plan Group    [employer] BLUE CROSS [employer] CROSS BLUE SHIELD PPO [id number]     Payor Plan Address Payor Plan Phone Number Effective From Effective To    [address] [phone] 9/1/2018     [facility] [id number]       Subscriber Name Subscriber Birth Date Member ID       SCARLET CHAVEZ 1960 [id number]                 Emergency Contacts      (Rel.) Home Phone Work Phone Mobile Phone    Yvonne Mccollum (Sister) [phone] -- --            "

## 2018-09-19 NOTE — DISCHARGE SUMMARY
"                                                                  PHYSICIAN DISCHARGE SUMMARY                                                                          [facility]      Patient Identification:    Name: Scarlet Chakraborty  Age: 58 y.o.  Sex: female  :  1960  MRN: [id number]    Admit date: 2018    Discharge date 2018    Discharged Condition: Stable    Discharge Diagnoses:    1. Acute respiratory distress  2. COPD exacerbation, severe  3. Left lower lobe pneumonia, Procal and RVP -ve but crackles on exam  4. Acute systolic CHF, Takotsubo cardiomyopathy s/p LHC - Neg  5. Lactic acidosis, resolved  6. Steroid-induced hyperglycemia  7. Elevated transaminases  8. Atypical chest pain, resolved  9. EKG changes  10. Tobacco abuse    HPI  \"This is a 58-year-old female patient, current smoker with history of COPD.  He does not use oxygen at home.  She does not have a pulmonologist.  She stated that she was last hospitalized in April for pneumonia.  She is currently smoking in about 1 pack a day and she started smoking about 30 years ago.  She uses albuterol as needed and Anoro at home.  She stated that she uses the albuterol about 5 times a day.     She woke up today and had symptoms of acute shortness of breath associated with nonproductive cough.  Dyspnea was worse with minimal exertion.  Albuterol rescue inhaler was not alleviating her symptoms.  She had associated chest tightness but no chest pain or radiation.  She denies fever or chills or recent upper respiratory tract infection.     She called EMS.  He shouldn't was apparently in severe respiratory distress and placed on CPAP.  On arrival to the ED, she was promptly switched to BiPAP.  She received continuous nebulizer in the ED and reportedly improved.     On my assessment, patient was on BiPAP with obvious tripoding but she stated that she is feeling better.     She had some EKG changes with concerns for ST elevation but " "her troponin is normal.  Dr. Dozier has just assessed her few minutes ago and ordered a stat echocardiogram.\" per     Consults:   Patient Care Team:  MARILYN Fernandez MD as PCP - General (General Practice)    Procedures Performed:  Procedure(s):  Left Heart Cath and cors  Left ventriculography       Hospital Course:     Patient was admitted to ICU due to respiratory failure and severe COPD exacerbation.  She was also noted to be in acute systolic heart failure.  She underwent a left heart catheterization by cardiology which was within normal limits.  She was diagnosed with stress-induced cardiomyopathy.  Her lactic acidosis and pneumonia improved.  Her COPD exacerbation has improved as well.  She was discharged on a course of antibiotics as well as steroids as mentioned below.  She'll need to follow up with the cardiologist as well as set up an appointment with pulmonology to diagnose and treat her COPD has an outpatient.  She was given a prescription for nebulizer as well as new rescue inhaler and maintenance inhaler.  She was asked to quit smoking and limit stressors in her life.     Objective:          on       No intake or output data in the 24 hours ending 09/19/18 1729  Body mass index is 16.82 kg/m².  1    09/15/18  0600 09/16/18  0621 09/17/18  0500   Weight: 44.4 kg (97 lb 15.9 oz) 45.9 kg (101 lb 4.8 oz) 44.5 kg (98 lb)     Weight change:       Physical Exam:  General Appearance:    Alert, cooperative, in no acute distress   HEENT:  Mallampati score 2, moist mucous membrane   Neck:   Soft   Lungs:    Significantly decreased air entry bilaterally. No crackles/ wheezing    Heart:    Regular rhythm and normal rate, normal S1 and S2, no            murmur   Skin:    No abnormalities observed   Abdomen:     Soft. No tonderness. No dullness.   Neuro:   Conscious, alert, oriented x3   Extremities:   Moves all extremities well, no edema, no cyanosis, no             Redness              Significant " Discharge Diagnostics     Pertinent Lab Results:    Results from last 7 days  Lab Units 09/16/18  0641 09/15/18  0715 09/14/18  0327 09/13/18  1157   SODIUM mmol/L 143 143 143 140   POTASSIUM mmol/L 4.0 4.2 4.1 4.3   CHLORIDE mmol/L 107 107 110* 104   CO2 mmol/L 29.6* 26.2 20.2* 23.4   BUN mg/dL 21* 20 16 14   CREATININE mg/dL 0.63 0.49* 0.53* 0.64   GLUCOSE mg/dL 87 128* 137* 197*   CALCIUM mg/dL 8.5* 8.6 7.9* 9.4   AST (SGOT) U/L 17  --  68* 51*   ALT (SGPT) U/L 40*  --  77* 40*       Results from last 7 days  Lab Units 09/14/18  0327 09/13/18  1840 09/13/18  1157   TROPONIN T ng/mL 0.219* 0.413* 0.016       Results from last 7 days  Lab Units 09/14/18  0327 09/13/18  1554 09/13/18  1158   WBC 10*3/mm3 5.69  --  8.99   HEMOGLOBIN g/dL 12.6  --  15.1   HEMOGLOBIN, POC g/dL  --  13.3  --    HEMATOCRIT % 40.0  --  47.7*   HEMATOCRIT POC %  --  39  --    PLATELETS 10*3/mm3 218  --  311   MCV fL 99.0*  --  99.6*   MCH pg 31.2  --  31.5   MCHC g/dL 31.5*  --  31.7*   RDW % 13.5*  --  13.3*   RDW-SD fl 49.2  --  48.7   MPV fL 12.3*  --  11.4   NEUTROPHIL % % 82.6*  --  39.1*   LYMPHOCYTE % % 14.9*  --  48.2*   MONOCYTES % % 2.5*  --  6.3   EOSINOPHIL % % 0.0*  --  5.7   BASOPHIL % % 0.0  --  0.7   IMM GRAN % % 0.0  --  0.1   NEUTROS ABS 10*3/mm3 4.70  --  3.52   LYMPHS ABS 10*3/mm3 0.85*  --  4.33   MONOS ABS 10*3/mm3 0.14*  --  0.57   EOS ABS 10*3/mm3 0.00  --  0.51   BASOS ABS 10*3/mm3 0.00  --  0.06   IMMATURE GRANS (ABS) 10*3/mm3 0.00  --  0.01       Results from last 7 days  Lab Units 09/13/18  1157   INR  1.01       Results from last 7 days  Lab Units 09/13/18  1157   MAGNESIUM mg/dL 2.7*           Invalid input(s): LDLCALC    Results from last 7 days  Lab Units 09/13/18  1157   PROBNP pg/mL 78.3           Results from last 7 days  Lab Units 09/13/18  1228   PH, ARTERIAL pH units 7.394   PO2 ART mm Hg 111.8*   PCO2, ARTERIAL mm Hg 37.2   HCO3 ART mmol/L 22.7       Results from last 7 days  Lab Units 09/14/18  0327  09/13/18  1720 09/13/18  1158 09/13/18  1157   PROCALCITONIN ng/mL  --   --   --  <0.02*   LACTATE mmol/L 1.4 1.6 4.5*  --            Results from last 7 days  Lab Units 09/13/18  1157   LIPASE U/L 35       Results from last 7 days  Lab Units 09/13/18  1256 09/13/18  1157   BLOODCX  No growth at 5 days No growth at 5 days       Results from last 7 days  Lab Units 09/13/18  1352   NITRITE UA  Negative       Results from last 7 days  Lab Units 09/13/18  1511   ADENOVIRUS DETECTION BY PCR  Not Detected   CORONAVIRUS 229E  Not Detected   CORONAVIRUS HKU1  Not Detected   CORONAVIRUS NL63  Not Detected   CORONAVIRUS OC43  Not Detected   HUMAN METAPNEUMOVIRUS  Not Detected   HUMAN RHINOVIRUS/ENTEROVIRUS  Not Detected   INFLUENZA B PCR  Not Detected   PARAINFLUENZA 1  Not Detected   PARAINFLUENZA VIRUS 2  Not Detected   PARAINFLUENZA VIRUS 3  Not Detected   PARAINFLUENZA VIRUS 4  Not Detected   BORDETELLA PERTUSSIS PCR  Not Detected   CHLAMYDOPHILA PNEUMONIAE PCR  Not Detected   MYCOPLAMA PNEUMO PCR  Not Detected   INFLUENZA A PCR  Not Detected   INFLUENZA A H3  Not Detected   INFLUENZA A H1  Not Detected   RSV, PCR  Not Detected           Imaging Results:  Imaging Results (all)     Procedure Component Value Units Date/Time    XR Chest 1 View [[id number]] Collected:  09/13/18 1231     Updated:  09/13/18 1238    Narrative:       Portable chest radiograph     HISTORY: Shortness of air     TECHNIQUE: Single AP portable radiograph of the chest     COMPARISON: None     FINDINGS:  Evaluation is suboptimal due to patient positioning.     There is increased bibasilar pulmonary opacification, left greater  right. Pulmonary vascular congestion is also present and asymmetrically  affecting the left side. The heart is normal in size. No pneumothorax or  pleural effusion is seen.       Impression:       1.  Suboptimal evaluation due to patient positioning. Bibasilar  pulmonary opacification and pulmonary vascular congestion, left  greater  than right. Findings most concerning for asymmetric pulmonary edema  versus left basilar pneumonia and clinical correlation is recommended as  there are currently no notes in the patient's chart.     This report was finalized on 9/13/2018 12:35 PM by Dr. Shashank Nieto M.D.             Discharge Medications and Instructions:     Discharge Medications     Discharge Medications      New Medications      Instructions Start Date   cefdinir 300 MG capsule  Commonly known as:  OMNICEF   300 mg, Oral, Daily      ipratropium-albuterol 0.5-2.5 mg/3 ml nebulizer  Commonly known as:  DUO-NEB   3 mL, Nebulization, Every 4 Hours PRN      lisinopril 2.5 MG tablet  Commonly known as:  PRINIVIL,ZESTRIL   2.5 mg, Oral, Daily      predniSONE 20 MG tablet  Commonly known as:  DELTASONE   40 mg, Oral, Daily With Breakfast         Continue These Medications      Instructions Start Date   albuterol 108 (90 Base) MCG/ACT inhaler  Commonly known as:  PROVENTIL HFA;VENTOLIN HFA   2 puffs, Inhalation, Every 4 Hours PRN      umeclidinium-vilanterol 62.5-25 MCG/INH aerosol powder  inhaler  Commonly known as:  ANORO ELLIPTA   1 puff, Inhalation, Daily - RT             Disposition:  Home    Follow-up Information     Emi Boston MD. Schedule an appointment as soon as possible for a visit in 2 week(s).    Specialty:  Cardiology  Contact information:  [address]  [place] [address]  [phone]             Flaco Aguayo MD. Schedule an appointment as soon as possible for a visit in 1 week(s).    Specialty:  Pulmonary Disease  Contact information:  [address]  [place] [id number] [phone]             MARILYN Fernandez MD. Schedule an appointment as soon as possible for a visit in 3 day(s).    Specialty:  General Practice  Contact information:  [address] [facility] [address]  [place] [id number] [phone]                   Total time spent discharging patient including evaluation, medication  reconciliation, arranging follow up, and post hospitalization instructions and education total time exceeds 30 minutes.    Signed:  Flaco Aguayo MD  9/19/2018  5:29 PM

## 2018-09-20 ENCOUNTER — TELEPHONE (OUTPATIENT)
Dept: CARDIOLOGY | Facility: CLINIC | Age: 58
End: 2018-09-20

## 2018-09-22 ENCOUNTER — READMISSION MANAGEMENT (OUTPATIENT)
Dept: CALL CENTER | Facility: HOSPITAL | Age: 58
End: 2018-09-22

## 2018-09-22 NOTE — OUTREACH NOTE
Medical Week 1 Survey      Responses   Facility patient discharged from?  [place]   Does the patient have one of the following disease processes/diagnoses(primary or secondary)?  Other   Is there a successful TCM telephone encounter documented?  No   Week 1 attempt successful?  No   Unsuccessful attempts  Attempt 1          Meenu Tinajero RN

## 2018-09-24 ENCOUNTER — APPOINTMENT (OUTPATIENT)
Dept: MRI IMAGING | Facility: HOSPITAL | Age: 58
End: 2018-09-24

## 2018-09-24 ENCOUNTER — APPOINTMENT (OUTPATIENT)
Dept: CT IMAGING | Facility: HOSPITAL | Age: 58
End: 2018-09-24

## 2018-09-24 ENCOUNTER — APPOINTMENT (OUTPATIENT)
Dept: GENERAL RADIOLOGY | Facility: HOSPITAL | Age: 58
End: 2018-09-24

## 2018-09-24 ENCOUNTER — READMISSION MANAGEMENT (OUTPATIENT)
Dept: CALL CENTER | Facility: HOSPITAL | Age: 58
End: 2018-09-24

## 2018-09-24 ENCOUNTER — HOSPITAL ENCOUNTER (INPATIENT)
Facility: HOSPITAL | Age: 58
LOS: 5 days | Discharge: HOME | End: 2018-09-29
Attending: EMERGENCY MEDICINE | Admitting: INTERNAL MEDICINE

## 2018-09-24 DIAGNOSIS — I63.9 CEREBROVASCULAR ACCIDENT (CVA), UNSPECIFIED MECHANISM (HCC): Primary | ICD-10-CM

## 2018-09-24 LAB
ABO GROUP BLD: NORMAL
AMPHET+METHAMPHET UR QL: NEGATIVE
ANION GAP SERPL CALCULATED.3IONS-SCNC: 9.1 MMOL/L
APTT PPP: 23.4 SECONDS (ref 22.7–35.4)
BARBITURATES UR QL SCN: NEGATIVE
BASOPHILS # BLD AUTO: 0.01 10*3/MM3 (ref 0–0.2)
BASOPHILS NFR BLD AUTO: 0.1 % (ref 0–1.5)
BENZODIAZ UR QL SCN: NEGATIVE
BLD GP AB SCN SERPL QL: NEGATIVE
BUN BLD-MCNC: 13 MG/DL (ref 6–20)
BUN/CREAT SERPL: 17.8 (ref 7–25)
CALCIUM SPEC-SCNC: 9.1 MG/DL (ref 8.6–10.5)
CANNABINOIDS SERPL QL: NEGATIVE
CHLORIDE SERPL-SCNC: 103 MMOL/L (ref 98–107)
CO2 SERPL-SCNC: 27.9 MMOL/L (ref 22–29)
COCAINE UR QL: NEGATIVE
CREAT BLD-MCNC: 0.73 MG/DL (ref 0.57–1)
DEPRECATED RDW RBC AUTO: 47.2 FL (ref 37–54)
EOSINOPHIL # BLD AUTO: 0.32 10*3/MM3 (ref 0–0.7)
EOSINOPHIL NFR BLD AUTO: 3.4 % (ref 0.3–6.2)
ERYTHROCYTE [DISTWIDTH] IN BLOOD BY AUTOMATED COUNT: 13.4 % (ref 11.7–13)
ETHANOL BLD-MCNC: <10 MG/DL (ref 0–10)
ETHANOL UR QL: <0.01 %
GFR SERPL CREATININE-BSD FRML MDRD: 82 ML/MIN/1.73
GLUCOSE BLD-MCNC: 95 MG/DL (ref 65–99)
GLUCOSE BLDC GLUCOMTR-MCNC: 102 MG/DL (ref 70–130)
GLUCOSE BLDC GLUCOMTR-MCNC: 75 MG/DL (ref 70–130)
HCT VFR BLD AUTO: 45 % (ref 35.6–45.5)
HGB BLD-MCNC: 14.5 G/DL (ref 11.9–15.5)
IMM GRANULOCYTES # BLD: 0.05 10*3/MM3 (ref 0–0.03)
IMM GRANULOCYTES NFR BLD: 0.5 % (ref 0–0.5)
INR PPP: 0.97 (ref 0.9–1.1)
LYMPHOCYTES # BLD AUTO: 1.63 10*3/MM3 (ref 0.9–4.8)
LYMPHOCYTES NFR BLD AUTO: 17.2 % (ref 19.6–45.3)
MCH RBC QN AUTO: 31.2 PG (ref 26.9–32)
MCHC RBC AUTO-ENTMCNC: 32.2 G/DL (ref 32.4–36.3)
MCV RBC AUTO: 96.8 FL (ref 80.5–98.2)
METHADONE UR QL SCN: NEGATIVE
MONOCYTES # BLD AUTO: 1.04 10*3/MM3 (ref 0.2–1.2)
MONOCYTES NFR BLD AUTO: 10.9 % (ref 5–12)
NEUTROPHILS # BLD AUTO: 6.5 10*3/MM3 (ref 1.9–8.1)
NEUTROPHILS NFR BLD AUTO: 68.4 % (ref 42.7–76)
OPIATES UR QL: NEGATIVE
OXYCODONE UR QL SCN: NEGATIVE
PLATELET # BLD AUTO: 258 10*3/MM3 (ref 140–500)
PMV BLD AUTO: 10.7 FL (ref 6–12)
POTASSIUM BLD-SCNC: 4.3 MMOL/L (ref 3.5–5.2)
PROTHROMBIN TIME: 12.7 SECONDS (ref 11.7–14.2)
RBC # BLD AUTO: 4.65 10*6/MM3 (ref 3.9–5.2)
RH BLD: POSITIVE
SODIUM BLD-SCNC: 140 MMOL/L (ref 136–145)
T&S EXPIRATION DATE: NORMAL
TROPONIN T SERPL-MCNC: <0.01 NG/ML (ref 0–0.03)
TSH SERPL DL<=0.05 MIU/L-ACNC: 1.01 MIU/ML (ref 0.27–4.2)
WBC NRBC COR # BLD: 9.5 10*3/MM3 (ref 4.5–10.7)

## 2018-09-24 PROCEDURE — 70450 CT HEAD/BRAIN W/O DYE: CPT

## 2018-09-24 PROCEDURE — 70553 MRI BRAIN STEM W/O & W/DYE: CPT

## 2018-09-24 PROCEDURE — 71275 CT ANGIOGRAPHY CHEST: CPT

## 2018-09-24 PROCEDURE — 80307 DRUG TEST PRSMV CHEM ANLYZR: CPT | Performed by: EMERGENCY MEDICINE

## 2018-09-24 PROCEDURE — 80048 BASIC METABOLIC PNL TOTAL CA: CPT | Performed by: EMERGENCY MEDICINE

## 2018-09-24 PROCEDURE — A9577 INJ MULTIHANCE: HCPCS | Performed by: INTERNAL MEDICINE

## 2018-09-24 PROCEDURE — 0 IOPAMIDOL PER 1 ML: Performed by: EMERGENCY MEDICINE

## 2018-09-24 PROCEDURE — 93010 ELECTROCARDIOGRAM REPORT: CPT | Performed by: INTERNAL MEDICINE

## 2018-09-24 PROCEDURE — 3E03317 INTRODUCTION OF OTHER THROMBOLYTIC INTO PERIPHERAL VEIN, PERCUTANEOUS APPROACH: ICD-10-PCS | Performed by: PSYCHIATRY & NEUROLOGY

## 2018-09-24 PROCEDURE — 94799 UNLISTED PULMONARY SVC/PX: CPT

## 2018-09-24 PROCEDURE — 85025 COMPLETE CBC W/AUTO DIFF WBC: CPT | Performed by: EMERGENCY MEDICINE

## 2018-09-24 PROCEDURE — 82962 GLUCOSE BLOOD TEST: CPT

## 2018-09-24 PROCEDURE — 70498 CT ANGIOGRAPHY NECK: CPT

## 2018-09-24 PROCEDURE — 0042T HC CT CEREBRAL PERFUSION W/WO CONTRAST: CPT

## 2018-09-24 PROCEDURE — 25010000002 ALTEPLASE PER 1 MG

## 2018-09-24 PROCEDURE — 86850 RBC ANTIBODY SCREEN: CPT | Performed by: EMERGENCY MEDICINE

## 2018-09-24 PROCEDURE — 82565 ASSAY OF CREATININE: CPT

## 2018-09-24 PROCEDURE — 99253 IP/OBS CNSLTJ NEW/EST LOW 45: CPT | Performed by: PSYCHIATRY & NEUROLOGY

## 2018-09-24 PROCEDURE — 99285 EMERGENCY DEPT VISIT HI MDM: CPT

## 2018-09-24 PROCEDURE — 0 GADOBENATE DIMEGLUMINE 529 MG/ML SOLUTION: Performed by: INTERNAL MEDICINE

## 2018-09-24 PROCEDURE — 85730 THROMBOPLASTIN TIME PARTIAL: CPT | Performed by: EMERGENCY MEDICINE

## 2018-09-24 PROCEDURE — 84443 ASSAY THYROID STIM HORMONE: CPT | Performed by: PSYCHIATRY & NEUROLOGY

## 2018-09-24 PROCEDURE — 25010000002 ALTEPLASE PER 1 MG: Performed by: EMERGENCY MEDICINE

## 2018-09-24 PROCEDURE — 93005 ELECTROCARDIOGRAM TRACING: CPT | Performed by: EMERGENCY MEDICINE

## 2018-09-24 PROCEDURE — 85610 PROTHROMBIN TIME: CPT | Performed by: EMERGENCY MEDICINE

## 2018-09-24 PROCEDURE — 94640 AIRWAY INHALATION TREATMENT: CPT

## 2018-09-24 PROCEDURE — 71046 X-RAY EXAM CHEST 2 VIEWS: CPT

## 2018-09-24 PROCEDURE — 70496 CT ANGIOGRAPHY HEAD: CPT

## 2018-09-24 PROCEDURE — 84484 ASSAY OF TROPONIN QUANT: CPT | Performed by: EMERGENCY MEDICINE

## 2018-09-24 PROCEDURE — 99255 IP/OBS CONSLTJ NEW/EST HI 80: CPT | Performed by: NEUROLOGICAL SURGERY

## 2018-09-24 PROCEDURE — 0 IOPAMIDOL PER 1 ML: Performed by: INTERNAL MEDICINE

## 2018-09-24 PROCEDURE — 86900 BLOOD TYPING SEROLOGIC ABO: CPT | Performed by: EMERGENCY MEDICINE

## 2018-09-24 PROCEDURE — 86901 BLOOD TYPING SEROLOGIC RH(D): CPT | Performed by: EMERGENCY MEDICINE

## 2018-09-24 RX ORDER — IPRATROPIUM BROMIDE AND ALBUTEROL SULFATE 2.5; .5 MG/3ML; MG/3ML
3 SOLUTION RESPIRATORY (INHALATION) EVERY 4 HOURS PRN
Status: DISCONTINUED | OUTPATIENT
Start: 2018-09-24 | End: 2018-09-29 | Stop reason: HOSPADM

## 2018-09-24 RX ORDER — SODIUM CHLORIDE 9 MG/ML
100 INJECTION, SOLUTION INTRAVENOUS ONCE
Status: COMPLETED | OUTPATIENT
Start: 2018-09-24 | End: 2018-09-24

## 2018-09-24 RX ORDER — SODIUM CHLORIDE 0.9 % (FLUSH) 0.9 %
1-10 SYRINGE (ML) INJECTION AS NEEDED
Status: DISCONTINUED | OUTPATIENT
Start: 2018-09-24 | End: 2018-09-26

## 2018-09-24 RX ORDER — LISINOPRIL 2.5 MG/1
2.5 TABLET ORAL DAILY
Status: DISCONTINUED | OUTPATIENT
Start: 2018-09-24 | End: 2018-09-29 | Stop reason: HOSPADM

## 2018-09-24 RX ORDER — ASPIRIN 81 MG/1
324 TABLET, CHEWABLE ORAL ONCE
Status: DISCONTINUED | OUTPATIENT
Start: 2018-09-24 | End: 2018-09-25

## 2018-09-24 RX ORDER — IPRATROPIUM BROMIDE AND ALBUTEROL SULFATE 2.5; .5 MG/3ML; MG/3ML
3 SOLUTION RESPIRATORY (INHALATION)
Status: DISCONTINUED | OUTPATIENT
Start: 2018-09-24 | End: 2018-09-29 | Stop reason: HOSPADM

## 2018-09-24 RX ORDER — SODIUM CHLORIDE 0.9 % (FLUSH) 0.9 %
1-10 SYRINGE (ML) INJECTION AS NEEDED
Status: DISCONTINUED | OUTPATIENT
Start: 2018-09-24 | End: 2018-09-29 | Stop reason: HOSPADM

## 2018-09-24 RX ORDER — ATORVASTATIN CALCIUM 80 MG/1
80 TABLET, FILM COATED ORAL NIGHTLY
Status: DISCONTINUED | OUTPATIENT
Start: 2018-09-24 | End: 2018-09-29 | Stop reason: HOSPADM

## 2018-09-24 RX ORDER — SODIUM CHLORIDE, SODIUM LACTATE, POTASSIUM CHLORIDE, CALCIUM CHLORIDE 600; 310; 30; 20 MG/100ML; MG/100ML; MG/100ML; MG/100ML
75 INJECTION, SOLUTION INTRAVENOUS CONTINUOUS
Status: DISCONTINUED | OUTPATIENT
Start: 2018-09-24 | End: 2018-09-26

## 2018-09-24 RX ORDER — THIAMINE MONONITRATE (VIT B1) 100 MG
100 TABLET ORAL ONCE
Status: DISCONTINUED | OUTPATIENT
Start: 2018-09-24 | End: 2018-09-29 | Stop reason: HOSPADM

## 2018-09-24 RX ORDER — ASPIRIN 300 MG/1
300 SUPPOSITORY RECTAL DAILY
Status: DISCONTINUED | OUTPATIENT
Start: 2018-09-25 | End: 2018-09-25

## 2018-09-24 RX ORDER — SODIUM CHLORIDE 0.9 % (FLUSH) 0.9 %
10 SYRINGE (ML) INJECTION AS NEEDED
Status: DISCONTINUED | OUTPATIENT
Start: 2018-09-24 | End: 2018-09-29 | Stop reason: HOSPADM

## 2018-09-24 RX ORDER — ONDANSETRON 2 MG/ML
4 INJECTION INTRAMUSCULAR; INTRAVENOUS EVERY 6 HOURS PRN
Status: DISCONTINUED | OUTPATIENT
Start: 2018-09-24 | End: 2018-09-29 | Stop reason: HOSPADM

## 2018-09-24 RX ORDER — ASPIRIN 325 MG
325 TABLET ORAL DAILY
Status: DISCONTINUED | OUTPATIENT
Start: 2018-09-25 | End: 2018-09-25

## 2018-09-24 RX ADMIN — IOPAMIDOL 95 ML: 755 INJECTION, SOLUTION INTRAVENOUS at 11:35

## 2018-09-24 RX ADMIN — SODIUM CHLORIDE 100 ML: 9 INJECTION, SOLUTION INTRAVENOUS at 13:00

## 2018-09-24 RX ADMIN — SODIUM CHLORIDE, POTASSIUM CHLORIDE, SODIUM LACTATE AND CALCIUM CHLORIDE 1000 ML: 600; 310; 30; 20 INJECTION, SOLUTION INTRAVENOUS at 13:32

## 2018-09-24 RX ADMIN — WATER 36.2 MG: 1 INJECTION INTRAMUSCULAR; INTRAVENOUS; SUBCUTANEOUS at 12:29

## 2018-09-24 RX ADMIN — IOPAMIDOL 150 ML: 755 INJECTION, SOLUTION INTRAVENOUS at 12:16

## 2018-09-24 RX ADMIN — IPRATROPIUM BROMIDE AND ALBUTEROL SULFATE 3 ML: .5; 3 SOLUTION RESPIRATORY (INHALATION) at 19:10

## 2018-09-24 RX ADMIN — GADOBENATE DIMEGLUMINE 8 ML: 529 INJECTION, SOLUTION INTRAVENOUS at 15:50

## 2018-09-24 RX ADMIN — SODIUM CHLORIDE, POTASSIUM CHLORIDE, SODIUM LACTATE AND CALCIUM CHLORIDE 75 ML/HR: 600; 310; 30; 20 INJECTION, SOLUTION INTRAVENOUS at 17:38

## 2018-09-24 RX ADMIN — IOPAMIDOL 95 ML: 755 INJECTION, SOLUTION INTRAVENOUS at 16:15

## 2018-09-24 NOTE — OUTREACH NOTE
Medical Week 1 Survey      Responses   Facility patient discharged from?  [place]   Does the patient have one of the following disease processes/diagnoses(primary or secondary)?  Other   Is there a successful TCM telephone encounter documented?  No   Week 1 attempt successful?  No   Revoke  Readmitted          Gilma Taylor RN

## 2018-09-25 ENCOUNTER — APPOINTMENT (OUTPATIENT)
Dept: CARDIOLOGY | Facility: HOSPITAL | Age: 58
End: 2018-09-25
Attending: INTERNAL MEDICINE

## 2018-09-25 ENCOUNTER — APPOINTMENT (OUTPATIENT)
Dept: CT IMAGING | Facility: HOSPITAL | Age: 58
End: 2018-09-25

## 2018-09-25 LAB
ANION GAP SERPL CALCULATED.3IONS-SCNC: 12.6 MMOL/L
AORTIC DIMENSIONLESS INDEX: 0.7 (DI)
BASOPHILS # BLD AUTO: 0.02 10*3/MM3 (ref 0–0.2)
BASOPHILS NFR BLD AUTO: 0.3 % (ref 0–1.5)
BH CV ECHO MEAS - ACS: 2.1 CM
BH CV ECHO MEAS - AO MAX PG (FULL): 2.9 MMHG
BH CV ECHO MEAS - AO MAX PG: 8.8 MMHG
BH CV ECHO MEAS - AO MEAN PG (FULL): 1 MMHG
BH CV ECHO MEAS - AO MEAN PG: 4 MMHG
BH CV ECHO MEAS - AO ROOT AREA (BSA CORRECTED): 1.9
BH CV ECHO MEAS - AO ROOT AREA: 5.7 CM^2
BH CV ECHO MEAS - AO ROOT DIAM: 2.7 CM
BH CV ECHO MEAS - AO V2 MAX: 148 CM/SEC
BH CV ECHO MEAS - AO V2 MEAN: 96.6 CM/SEC
BH CV ECHO MEAS - AO V2 VTI: 27.4 CM
BH CV ECHO MEAS - AVA(I,A): 2.3 CM^2
BH CV ECHO MEAS - AVA(I,D): 2.3 CM^2
BH CV ECHO MEAS - AVA(V,A): 2.6 CM^2
BH CV ECHO MEAS - AVA(V,D): 2.6 CM^2
BH CV ECHO MEAS - BSA(HAYCOCK): 1.4 M^2
BH CV ECHO MEAS - BSA: 1.4 M^2
BH CV ECHO MEAS - BZI_BMI: 16.5 KILOGRAMS/M^2
BH CV ECHO MEAS - BZI_METRIC_HEIGHT: 162.6 CM
BH CV ECHO MEAS - BZI_METRIC_WEIGHT: 43.5 KG
BH CV ECHO MEAS - EDV(CUBED): 54.9 ML
BH CV ECHO MEAS - EDV(MOD-SP2): 48 ML
BH CV ECHO MEAS - EDV(MOD-SP4): 76 ML
BH CV ECHO MEAS - EDV(TEICH): 62 ML
BH CV ECHO MEAS - EF(CUBED): 80.6 %
BH CV ECHO MEAS - EF(MOD-BP): 72 %
BH CV ECHO MEAS - EF(MOD-SP2): 68.8 %
BH CV ECHO MEAS - EF(MOD-SP4): 72.4 %
BH CV ECHO MEAS - EF(TEICH): 73.8 %
BH CV ECHO MEAS - ESV(CUBED): 10.6 ML
BH CV ECHO MEAS - ESV(MOD-SP2): 15 ML
BH CV ECHO MEAS - ESV(MOD-SP4): 21 ML
BH CV ECHO MEAS - ESV(TEICH): 16.2 ML
BH CV ECHO MEAS - FS: 42.1 %
BH CV ECHO MEAS - IVS/LVPW: 0.88
BH CV ECHO MEAS - IVSD: 0.7 CM
BH CV ECHO MEAS - LAT PEAK E' VEL: 10 CM/SEC
BH CV ECHO MEAS - LV DIASTOLIC VOL/BSA (35-75): 53.1 ML/M^2
BH CV ECHO MEAS - LV MASS(C)D: 78.8 GRAMS
BH CV ECHO MEAS - LV MASS(C)DI: 55 GRAMS/M^2
BH CV ECHO MEAS - LV MAX PG: 5.9 MMHG
BH CV ECHO MEAS - LV MEAN PG: 3 MMHG
BH CV ECHO MEAS - LV SYSTOLIC VOL/BSA (12-30): 14.7 ML/M^2
BH CV ECHO MEAS - LV V1 MAX: 121 CM/SEC
BH CV ECHO MEAS - LV V1 MEAN: 75 CM/SEC
BH CV ECHO MEAS - LV V1 VTI: 19.7 CM
BH CV ECHO MEAS - LVIDD: 3.8 CM
BH CV ECHO MEAS - LVIDS: 2.2 CM
BH CV ECHO MEAS - LVLD AP2: 5.6 CM
BH CV ECHO MEAS - LVLD AP4: 6.2 CM
BH CV ECHO MEAS - LVLS AP2: 5.1 CM
BH CV ECHO MEAS - LVLS AP4: 4.8 CM
BH CV ECHO MEAS - LVOT AREA (M): 3.1 CM^2
BH CV ECHO MEAS - LVOT AREA: 3.1 CM^2
BH CV ECHO MEAS - LVOT DIAM: 2 CM
BH CV ECHO MEAS - LVPWD: 0.8 CM
BH CV ECHO MEAS - MED PEAK E' VEL: 7 CM/SEC
BH CV ECHO MEAS - MV A DUR: 0.1 SEC
BH CV ECHO MEAS - MV A MAX VEL: 83.4 CM/SEC
BH CV ECHO MEAS - MV DEC SLOPE: 340 CM/SEC^2
BH CV ECHO MEAS - MV DEC TIME: 0.27 SEC
BH CV ECHO MEAS - MV E MAX VEL: 69.4 CM/SEC
BH CV ECHO MEAS - MV E/A: 0.83
BH CV ECHO MEAS - MV MAX PG: 3.3 MMHG
BH CV ECHO MEAS - MV MEAN PG: 2 MMHG
BH CV ECHO MEAS - MV P1/2T MAX VEL: 90.9 CM/SEC
BH CV ECHO MEAS - MV P1/2T: 78.3 MSEC
BH CV ECHO MEAS - MV V2 MAX: 90.4 CM/SEC
BH CV ECHO MEAS - MV V2 MEAN: 59.2 CM/SEC
BH CV ECHO MEAS - MV V2 VTI: 29.1 CM
BH CV ECHO MEAS - MVA P1/2T LCG: 2.4 CM^2
BH CV ECHO MEAS - MVA(P1/2T): 2.8 CM^2
BH CV ECHO MEAS - MVA(VTI): 2.1 CM^2
BH CV ECHO MEAS - PA ACC TIME: 0.1 SEC
BH CV ECHO MEAS - PA MAX PG (FULL): 1.5 MMHG
BH CV ECHO MEAS - PA MAX PG: 6 MMHG
BH CV ECHO MEAS - PA PR(ACCEL): 33.1 MMHG
BH CV ECHO MEAS - PA V2 MAX: 122 CM/SEC
BH CV ECHO MEAS - PULM A REVS DUR: 0.1 SEC
BH CV ECHO MEAS - PULM A REVS VEL: 37.3 CM/SEC
BH CV ECHO MEAS - PULM DIAS VEL: 58.2 CM/SEC
BH CV ECHO MEAS - PULM S/D: 0.9
BH CV ECHO MEAS - PULM SYS VEL: 52.4 CM/SEC
BH CV ECHO MEAS - PVA(V,A): 2 CM^2
BH CV ECHO MEAS - PVA(V,D): 2 CM^2
BH CV ECHO MEAS - QP/QS: 0.77
BH CV ECHO MEAS - RAP SYSTOLE: 3 MMHG
BH CV ECHO MEAS - RV MAX PG: 4.4 MMHG
BH CV ECHO MEAS - RV MEAN PG: 2 MMHG
BH CV ECHO MEAS - RV V1 MAX: 105 CM/SEC
BH CV ECHO MEAS - RV V1 MEAN: 72.6 CM/SEC
BH CV ECHO MEAS - RV V1 VTI: 20.9 CM
BH CV ECHO MEAS - RVOT AREA: 2.3 CM^2
BH CV ECHO MEAS - RVOT DIAM: 1.7 CM
BH CV ECHO MEAS - RVSP: 17 MMHG
BH CV ECHO MEAS - SI(AO): 109.6 ML/M^2
BH CV ECHO MEAS - SI(CUBED): 30.9 ML/M^2
BH CV ECHO MEAS - SI(LVOT): 43.2 ML/M^2
BH CV ECHO MEAS - SI(MOD-SP2): 23 ML/M^2
BH CV ECHO MEAS - SI(MOD-SP4): 38.4 ML/M^2
BH CV ECHO MEAS - SI(TEICH): 32 ML/M^2
BH CV ECHO MEAS - SUP REN AO DIAM: 1.8 CM
BH CV ECHO MEAS - SV(AO): 156.9 ML
BH CV ECHO MEAS - SV(CUBED): 44.2 ML
BH CV ECHO MEAS - SV(LVOT): 61.9 ML
BH CV ECHO MEAS - SV(MOD-SP2): 33 ML
BH CV ECHO MEAS - SV(MOD-SP4): 55 ML
BH CV ECHO MEAS - SV(RVOT): 47.4 ML
BH CV ECHO MEAS - SV(TEICH): 45.7 ML
BH CV ECHO MEAS - TAPSE (>1.6): 2.58 CM2
BH CV ECHO MEAS - TR MAX VEL: 185 CM/SEC
BH CV ECHO MEASUREMENTS AVERAGE E/E' RATIO: 8.16
BH CV VAS BP RIGHT ARM: NORMAL MMHG
BH CV XLRA - RV BASE: 2 CM
BH CV XLRA - TDI S': 15.2 CM/SEC
BUN BLD-MCNC: 11 MG/DL (ref 6–20)
BUN/CREAT SERPL: 18.6 (ref 7–25)
CALCIUM SPEC-SCNC: 8.9 MG/DL (ref 8.6–10.5)
CHLORIDE SERPL-SCNC: 105 MMOL/L (ref 98–107)
CHOLEST SERPL-MCNC: 168 MG/DL (ref 0–200)
CO2 SERPL-SCNC: 23.4 MMOL/L (ref 22–29)
CREAT BLD-MCNC: 0.59 MG/DL (ref 0.57–1)
DEPRECATED RDW RBC AUTO: 48.1 FL (ref 37–54)
EOSINOPHIL # BLD AUTO: 0.23 10*3/MM3 (ref 0–0.7)
EOSINOPHIL NFR BLD AUTO: 2.9 % (ref 0.3–6.2)
ERYTHROCYTE [DISTWIDTH] IN BLOOD BY AUTOMATED COUNT: 13.5 % (ref 11.7–13)
GFR SERPL CREATININE-BSD FRML MDRD: 105 ML/MIN/1.73
GLUCOSE BLD-MCNC: 69 MG/DL (ref 65–99)
GLUCOSE BLDC GLUCOMTR-MCNC: 70 MG/DL (ref 70–130)
GLUCOSE BLDC GLUCOMTR-MCNC: 70 MG/DL (ref 70–130)
GLUCOSE BLDC GLUCOMTR-MCNC: 71 MG/DL (ref 70–130)
GLUCOSE BLDC GLUCOMTR-MCNC: 91 MG/DL (ref 70–130)
HBA1C MFR BLD: 5.4 % (ref 4.8–5.6)
HCT VFR BLD AUTO: 37 % (ref 35.6–45.5)
HDLC SERPL-MCNC: 53 MG/DL (ref 40–60)
HGB BLD-MCNC: 11.7 G/DL (ref 11.9–15.5)
IMM GRANULOCYTES # BLD: 0.02 10*3/MM3 (ref 0–0.03)
IMM GRANULOCYTES NFR BLD: 0.3 % (ref 0–0.5)
LDLC SERPL CALC-MCNC: 90 MG/DL (ref 0–100)
LDLC/HDLC SERPL: 1.7 {RATIO}
LEFT ATRIUM VOLUME INDEX: 18 ML/M2
LYMPHOCYTES # BLD AUTO: 2.08 10*3/MM3 (ref 0.9–4.8)
LYMPHOCYTES NFR BLD AUTO: 26.3 % (ref 19.6–45.3)
MAXIMAL PREDICTED HEART RATE: 163 BPM
MCH RBC QN AUTO: 30.6 PG (ref 26.9–32)
MCHC RBC AUTO-ENTMCNC: 31.6 G/DL (ref 32.4–36.3)
MCV RBC AUTO: 96.9 FL (ref 80.5–98.2)
MONOCYTES # BLD AUTO: 0.67 10*3/MM3 (ref 0.2–1.2)
MONOCYTES NFR BLD AUTO: 8.5 % (ref 5–12)
NEUTROPHILS # BLD AUTO: 4.91 10*3/MM3 (ref 1.9–8.1)
NEUTROPHILS NFR BLD AUTO: 62 % (ref 42.7–76)
NRBC BLD MANUAL-RTO: 0 /100 WBC (ref 0–0)
PLATELET # BLD AUTO: 206 10*3/MM3 (ref 140–500)
PMV BLD AUTO: 11.8 FL (ref 6–12)
POTASSIUM BLD-SCNC: 4.4 MMOL/L (ref 3.5–5.2)
RBC # BLD AUTO: 3.82 10*6/MM3 (ref 3.9–5.2)
SODIUM BLD-SCNC: 141 MMOL/L (ref 136–145)
STRESS TARGET HR: 139 BPM
TRIGL SERPL-MCNC: 125 MG/DL (ref 0–150)
VIT B12 BLD-MCNC: 327 PG/ML (ref 211–946)
VLDLC SERPL-MCNC: 25 MG/DL (ref 5–40)
WBC NRBC COR # BLD: 7.91 10*3/MM3 (ref 4.5–10.7)

## 2018-09-25 PROCEDURE — 80048 BASIC METABOLIC PNL TOTAL CA: CPT | Performed by: INTERNAL MEDICINE

## 2018-09-25 PROCEDURE — 97162 PT EVAL MOD COMPLEX 30 MIN: CPT

## 2018-09-25 PROCEDURE — 93306 TTE W/DOPPLER COMPLETE: CPT | Performed by: INTERNAL MEDICINE

## 2018-09-25 PROCEDURE — 70450 CT HEAD/BRAIN W/O DYE: CPT

## 2018-09-25 PROCEDURE — 97110 THERAPEUTIC EXERCISES: CPT

## 2018-09-25 PROCEDURE — 94799 UNLISTED PULMONARY SVC/PX: CPT

## 2018-09-25 PROCEDURE — 85025 COMPLETE CBC W/AUTO DIFF WBC: CPT | Performed by: INTERNAL MEDICINE

## 2018-09-25 PROCEDURE — 25010000002 IOPAMIDOL 61 % SOLUTION: Performed by: INTERNAL MEDICINE

## 2018-09-25 PROCEDURE — 83036 HEMOGLOBIN GLYCOSYLATED A1C: CPT | Performed by: PSYCHIATRY & NEUROLOGY

## 2018-09-25 PROCEDURE — 71260 CT THORAX DX C+: CPT

## 2018-09-25 PROCEDURE — 80061 LIPID PANEL: CPT | Performed by: PSYCHIATRY & NEUROLOGY

## 2018-09-25 PROCEDURE — 82962 GLUCOSE BLOOD TEST: CPT

## 2018-09-25 PROCEDURE — 99254 IP/OBS CNSLTJ NEW/EST MOD 60: CPT | Performed by: INTERNAL MEDICINE

## 2018-09-25 PROCEDURE — 82607 VITAMIN B-12: CPT | Performed by: PSYCHIATRY & NEUROLOGY

## 2018-09-25 PROCEDURE — 92610 EVALUATE SWALLOWING FUNCTION: CPT

## 2018-09-25 PROCEDURE — 99232 SBSQ HOSP IP/OBS MODERATE 35: CPT | Performed by: NURSE PRACTITIONER

## 2018-09-25 PROCEDURE — 25010000002 PERFLUTREN (DEFINITY) 8.476 MG IN SODIUM CHLORIDE 0.9 % 10 ML INJECTION: Performed by: INTERNAL MEDICINE

## 2018-09-25 PROCEDURE — 25010000002 CYANOCOBALAMIN PER 1000 MCG: Performed by: NURSE PRACTITIONER

## 2018-09-25 PROCEDURE — 93306 TTE W/DOPPLER COMPLETE: CPT

## 2018-09-25 RX ORDER — ASPIRIN 81 MG/1
81 TABLET, CHEWABLE ORAL DAILY
Status: DISCONTINUED | OUTPATIENT
Start: 2018-09-26 | End: 2018-09-29

## 2018-09-25 RX ORDER — CYANOCOBALAMIN 1000 UG/ML
1000 INJECTION, SOLUTION INTRAMUSCULAR; SUBCUTANEOUS ONCE
Status: COMPLETED | OUTPATIENT
Start: 2018-09-25 | End: 2018-09-25

## 2018-09-25 RX ORDER — CHOLECALCIFEROL (VITAMIN D3) 125 MCG
1000 CAPSULE ORAL DAILY
Status: DISCONTINUED | OUTPATIENT
Start: 2018-09-26 | End: 2018-09-29 | Stop reason: HOSPADM

## 2018-09-25 RX ORDER — CLOPIDOGREL BISULFATE 75 MG/1
75 TABLET ORAL DAILY
Status: DISCONTINUED | OUTPATIENT
Start: 2018-09-26 | End: 2018-09-29

## 2018-09-25 RX ADMIN — IPRATROPIUM BROMIDE AND ALBUTEROL SULFATE 3 ML: .5; 3 SOLUTION RESPIRATORY (INHALATION) at 12:41

## 2018-09-25 RX ADMIN — PERFLUTREN 2 ML: 6.52 INJECTION, SUSPENSION INTRAVENOUS at 14:52

## 2018-09-25 RX ADMIN — ASPIRIN 325 MG: 325 TABLET ORAL at 15:27

## 2018-09-25 RX ADMIN — ATORVASTATIN CALCIUM 80 MG: 80 TABLET, FILM COATED ORAL at 20:11

## 2018-09-25 RX ADMIN — IPRATROPIUM BROMIDE AND ALBUTEROL SULFATE 3 ML: .5; 3 SOLUTION RESPIRATORY (INHALATION) at 16:14

## 2018-09-25 RX ADMIN — IPRATROPIUM BROMIDE AND ALBUTEROL SULFATE 3 ML: .5; 3 SOLUTION RESPIRATORY (INHALATION) at 08:45

## 2018-09-25 RX ADMIN — IPRATROPIUM BROMIDE AND ALBUTEROL SULFATE 3 ML: .5; 3 SOLUTION RESPIRATORY (INHALATION) at 19:40

## 2018-09-25 RX ADMIN — IOPAMIDOL 75 ML: 612 INJECTION, SOLUTION INTRAVENOUS at 13:15

## 2018-09-25 RX ADMIN — CYANOCOBALAMIN 1000 MCG: 1000 INJECTION, SOLUTION INTRAMUSCULAR at 17:38

## 2018-09-25 NOTE — PAYOR COMM NOTE
"UR CONTACT:   CHRISTIAN                             P:  [phone]                               F:  [phone]        Scarlet Chavez  (58 y.o. Female)     Date of Birth Social Security Number Address Home Phone MRN    1960  [id number] [facility]   [facility] [id number] [phone] [id number]    Voodoo Marital Status          None Single       Admission Date Admission Type Admitting Provider Attending Provider Department, Room/Bed    18 Emergency Flaco Aguayo MD  [address], [address]/1    Discharge Date Discharge Disposition Discharge Destination        2018 Home or Self Care              Attending Provider:  (none)   Allergies:  No Known Allergies    Isolation:  None   Infection:  None   Code Status:  CPR    Ht:  162.6 cm (64\")   Wt:  44.5 kg (98 lb)    Admission Cmt:  None   Principal Problem:  None                Active Insurance as of 2018     Primary Coverage     Payor Plan Insurance Group Employer/Plan Group    [employer]HEM BLUE CROSS ANTHEM BLUE CROSS BLUE SHIELD PPO [id number]     Payor Plan Address Payor Plan Phone Number Effective From Effective To    [address] [phone] 2018     [facility] [id number]       Subscriber Name Subscriber Birth Date Member ID       SCARLET CHAVEZ 1960 [id number]                 Emergency Contacts      (Rel.) Home Phone Work Phone Mobile Phone    Yvonne Mccollum (Sister) [phone] -- --    BrandonDon (Significant Other) -- -- [phone]               Physician Progress Notes       Emi Boston MD at 2018  5:34 PM          [facility]  Progress note: 2018    Patient Identification:  Name:Scarlet Chavez  Age:58 y.o.  Sex: female  :  1960  MRN: [id number]           CC:  Followup of cardiomyopathy, congestive heart failure    Interval history:  No chest pains.  Still with modest dyspnea.    Vital Signs:   Temp:  [97.7 °F (36.5 °C)-98.4 °F (36.9 °C)] 98.4 °F (36.9 " °C)  Heart Rate:  [65-99] 93  Resp:  [14-18] 18  BP: (100-114)/(55-66) 100/55    Intake/Output Summary (Last 24 hours) at 09/17/18 1734  Last data filed at 09/17/18 1300   Gross per 24 hour   Intake              670 ml   Output                0 ml   Net              670 ml       Physical Examination:    General Appearance No acute distress   Neck No adenopathy, supple, trachea midline, no thyromegaly, no carotid bruit, no JVD   Lungs Clear to auscultation,respirations regular, even and unlabored   Heart Regular rhythm and normal rate, normal S1 and S2, no murmur, no gallop, no rub, no click   Chest wall No abnormalities observed   Abdomen Normal bowel sounds, no masses, no hepatomegaly, soft   Extremities Moves all extremities well, no edema, no cyanosis, no redness   Neurological Alert and oriented x 3     Lab Review:  Personally reviewed the labs, radiology imaging and other cardiac procedures.   Results from last 7 days  Lab Units 09/16/18  0641   SODIUM mmol/L 143   POTASSIUM mmol/L 4.0   CHLORIDE mmol/L 107   CO2 mmol/L 29.6*   BUN mg/dL 21*   CREATININE mg/dL 0.63   CALCIUM mg/dL 8.5*   BILIRUBIN mg/dL 0.2   ALK PHOS U/L 47   ALT (SGPT) U/L 40*   AST (SGOT) U/L 17   GLUCOSE mg/dL 87       Results from last 7 days  Lab Units 09/14/18  0327 09/13/18  1840 09/13/18  1157   TROPONIN T ng/mL 0.219* 0.413* 0.016     )  Results from last 7 days  Lab Units 09/14/18  0327 09/13/18  1554 09/13/18  1158   WBC 10*3/mm3 5.69  --  8.99   HEMOGLOBIN g/dL 12.6  --  15.1   HEMOGLOBIN, POC g/dL  --  13.3  --    HEMATOCRIT % 40.0  --  47.7*   HEMATOCRIT POC %  --  39  --    PLATELETS 10*3/mm3 218  --  311       Results from last 7 days  Lab Units 09/13/18  1157   INR  1.01     Medication Review:   Meds reviewed  Scheduled Meds:  budesonide-formoterol 2 puff Inhalation BID - RT   ceftriaxone 1 g Intravenous Q24H   enoxaparin 30 mg Subcutaneous Q24H   insulin aspart 0-9 Units Subcutaneous Q4H   lisinopril 2.5 mg Oral Daily    predniSONE 40 mg Oral Daily With Breakfast     I personally viewed and interpreted the patient's EKG/Telemetry data    Assessment and Plan    1.  Acute respiratory failure.   Bllood cultures negative and to be dismissed home later today.  2.  Cardiomyopathy, likely Takotsubo cardiomyopathy with increased stresses at her work. on suboptimal medical therapy due to hypotension and pulmonary wheeze.  We'll continue with lisinopril for now and try to add low-dose beta blocker therapy in the next 2 weeks at follow-up.  Hopefully her blood pressure will have improved by then.  Also could consider aEntresto as blood pressure allows.  I also recommended she not work for the next 4-6 weeks as this was quite stressful for her.  3.  History of asthma and COPD, recommendations per pulmonary service  4.  Unknown lipid status  5.  Elevated lactic acid, blood cultures NGTD, viral panel negative.  No obvious source of infection noted at this point. No fever.  6.  Elevated liver function tests, improved, AST normal, ALT still mildly elevated       Emi Boston  9/17/20185:34 PM  25min spent in reviewing records, discussion and examination of the patient and discussion with other members of the patient's medical team.     Dictated utilizing Dragon dictation      Electronically signed by Emi Boston MD at 9/17/2018  7:08 PM     Flaco Aguayo MD at 9/16/2018  2:56 PM                                                        LOS: 3 days   Patient Care Team:  MRAILYN Fernandez MD as PCP - General (General Practice)    Chief Complaint:  Follow-up on severe COPD, possible pneumonia, respiratory distress needing BiPAP and critical care management    Interval History:   Doing much better. Walking around without issues. ON ra.    Objective   REVIEW OF SYSTEMS: .   RESPIRATORY: Intermittent shortness of breath and dry cough.  She is on oxygen 2 L/m.  GASTROINTESTINAL: No anorexia, nausea, vomiting or diarrhea. No abdominal pain or blood.  "  HEMATOLOGIC: No bleeding or bruising.     Ventilator/Non-Invasive Ventilation Settings     None        Vital Signs  Temp:  [98 °F (36.7 °C)-98.4 °F (36.9 °C)] 98.1 °F (36.7 °C)  Heart Rate:  [] 96  Resp:  [16-18] 16  BP: (106-120)/(50-64) 109/50    Intake/Output Summary (Last 24 hours) at 09/16/18 1456  Last data filed at 09/16/18 1418   Gross per 24 hour   Intake              930 ml   Output                0 ml   Net              930 ml     Flowsheet Rows      First Filed Value   Admission Height  162.6 cm (64\") Documented at 09/13/2018 1227   Admission Weight  43.5 kg (96 lb) Documented at 09/13/2018 1227          Physical Exam:   General Appearance:    Alert, cooperative, in no acute distress   HEENT:  Mallampati score 2, moist mucous membrane   Neck:   Soft   Lungs:    Significantly decreased air entry bilaterally. No crackles/ wheezing    Heart:    Regular rhythm and normal rate, normal S1 and S2, no            murmur   Skin:    No abnormalities observed   Abdomen:     Soft. No tonderness. No dullness.   Neuro:   Conscious, alert, oriented x3   Extremities:   Moves all extremities well, no edema, no cyanosis, no             Redness          Results Review:          Results from last 7 days  Lab Units 09/16/18  0641 09/15/18  0715 09/14/18  0327   SODIUM mmol/L 143 143 143   POTASSIUM mmol/L 4.0 4.2 4.1   CHLORIDE mmol/L 107 107 110*   CO2 mmol/L 29.6* 26.2 20.2*   BUN mg/dL 21* 20 16   CREATININE mg/dL 0.63 0.49* 0.53*   GLUCOSE mg/dL 87 128* 137*   CALCIUM mg/dL 8.5* 8.6 7.9*       Results from last 7 days  Lab Units 09/14/18  0327 09/13/18  1840 09/13/18  1157   TROPONIN T ng/mL 0.219* 0.413* 0.016       Results from last 7 days  Lab Units 09/14/18  0327 09/13/18  1554 09/13/18  1158   WBC 10*3/mm3 5.69  --  8.99   HEMOGLOBIN g/dL 12.6  --  15.1   HEMOGLOBIN, POC g/dL  --  13.3  --    HEMATOCRIT % 40.0  --  47.7*   HEMATOCRIT POC %  --  39  --    PLATELETS 10*3/mm3 218  --  311       Results from last " 7 days  Lab Units 18  1157   INR  1.01       Results from last 7 days  Lab Units 18  1157   PROBNP pg/mL 78.3       I reviewed the patient's new clinical results.  I personally viewed and interpreted the patient's CXR        Medication Review:     azithromycin 500 mg Oral Q24H   budesonide-formoterol 2 puff Inhalation BID - RT   ceftriaxone 1 g Intravenous Q24H   enoxaparin 30 mg Subcutaneous Q24H   insulin aspart 0-9 Units Subcutaneous Q4H   ipratropium-albuterol 3 mL Nebulization 4x Daily - RT   lisinopril 2.5 mg Oral Daily   predniSONE 40 mg Oral Daily With Breakfast          Diagnostic imaging:  I personally and independently reviewed the following images:   CXR benign 18  COPD changes.  Lucent right lung which could represent emboli versus pneumothorax.  Left lower lobe infiltrates          Assessment:  1. Acute respiratory distress  2. COPD exacerbation, severe  3. Left lower lobe pneumonia, Procal and RVP -ve but crackles on exam  4. Acute systolic CHF, Takotsubo cardiomyopathy s/p LHC - Neg  5. Lactic acidosis, resolved  6. Steroid-induced hyperglycemia  7. Elevated transaminases  8. Atypical chest pain, resolved  9. EKG changes  10. Tobacco abuse      Plan:  · C/w Symbicort and prn nebs.   · Needs outpatient PFT and alpha-1 antitrypsin  · Wean down steroids  · Finish antibiotics for possible community-acquired pneumonia  · Subcutaneous insulin for hyperglycemia  · Appreciate cards input - tolerating low dose ACEi    DISPO - Will dc in am if ok per cards.     Flaco Aguayo MD  18  2:56 PM      Electronically signed by Flaco Aguayo MD at 2018  3:00 PM     Anette Castellanos APRN at 2018 10:47 AM                    Cardiology Follow-Up Note      Patient Name: Scarlet Chakraborty  Age/Sex: 58 y.o. female  : 1960  MRN: [id number]      Day of Service: 18       Chief Complaint/Follow-up: Cardiomyopathy (stress induced)    S: No complaints, thinks she  will get to go home tomorrow.      Temp:  [97.3 °F (36.3 °C)-98.4 °F (36.9 °C)] 98.4 °F (36.9 °C)  Heart Rate:  [] 95  Resp:  [16-18] 16  BP: (106-120)/(59-64) 111/62     PHYSICAL EXAM:    General Appearance: No acute distress, well developed and well nourished.   Eyes: Conjunctiva and lids: No erythema, swelling, or discharge. Sclera non-icteric.   HENT: Atraumatic, normocephalic. External eyes, ears, and nose normal.   Respiratory: No signs of respiratory distress. Respiration rhythm and depth normal.   Decreased bases, right >left but moving more air and no wheezing.   Cardiovascular:  Heart Rate and Rhythm: Normal, Heart Sounds: Normal S1 and S2. No S3 or S4 noted.  Murmurs: No murmurs noted. No rubs, thrills, or gallops.   Arterial Pulses: Posterior tibialis and dorsalis pedis pulses normal.   Lower Extremities: No edema noted.  Gastrointestinal:  Abdomen soft, non-distended, non-tender.  Musculoskeletal: Normal movement of extremities  Skin: Warm and dry.   Psychiatric: Patient alert and oriented to person, place, and time. Speech and behavior appropriate. Normal mood and affect.       ECG/TELE:         Results from last 7 days  Lab Units 09/16/18  0641 09/15/18  0715 09/14/18  0327   SODIUM mmol/L 143 143 143   POTASSIUM mmol/L 4.0 4.2 4.1   CHLORIDE mmol/L 107 107 110*   CO2 mmol/L 29.6* 26.2 20.2*   BUN mg/dL 21* 20 16   CREATININE mg/dL 0.63 0.49* 0.53*   GLUCOSE mg/dL 87 128* 137*   CALCIUM mg/dL 8.5* 8.6 7.9*       Results from last 7 days  Lab Units 09/14/18  0327 09/13/18  1554 09/13/18  1158   WBC 10*3/mm3 5.69  --  8.99   HEMOGLOBIN g/dL 12.6  --  15.1   HEMOGLOBIN, POC g/dL  --  13.3  --    HEMATOCRIT % 40.0  --  47.7*   HEMATOCRIT POC %  --  39  --    PLATELETS 10*3/mm3 218  --  311       Results from last 7 days  Lab Units 09/13/18  1157   INR  1.01       Results from last 7 days  Lab Units 09/14/18  0327 09/13/18  1840 09/13/18  1157   TROPONIN T ng/mL 0.219* 0.413* 0.016                Current Medications:   Scheduled Meds:  azithromycin 500 mg Oral Q24H   budesonide-formoterol 2 puff Inhalation BID - RT   ceftriaxone 1 g Intravenous Q24H   enoxaparin 30 mg Subcutaneous Q24H   insulin aspart 0-9 Units Subcutaneous Q4H   ipratropium-albuterol 3 mL Nebulization 4x Daily - RT   lisinopril 2.5 mg Oral Daily   predniSONE 40 mg Oral Daily With Breakfast         Active Problems:    Acute respiratory failure (CMS/HCC)    Acute respiratory failure with hypoxia (CMS/HCC)       Plan:        1.  Acute respiratory failure.  CHF and cardiomyopathy certainly playing a role but pro-calcitonin was also quite high, possible PNA, on antibiotics per pulmonary.  2.  Cardiomyopathy, likely Takotsubo cardiomyopathy with increased stresses at her work. Added low dose ACE yesterday, tolerating okay.   3.  History of asthma and COPD, recommendations per pulmonary service  4.  Unknown lipid status  5.  Elevated lactic acid, blood cultures NGTD, viral panel negative.  No obvious source of infection noted at this point. No fever.  6.  Elevated liver function tests, improved, AST normal, ALT still mildly elevated     Doing okay, lungs sound better today. Pt says she thinks she will get to go home tomorrow. Tolerating addition of low dose ACE.    SHANKAR Chowdhury  09/16/18  10:47 AM      Electronically signed by Anette Castellanos APRN at 9/16/2018 11:12 AM     Flaco Aguayo MD at 9/15/2018  5:23 PM                                                        LOS: 2 days   Patient Care Team:  MARILYN Fernandez MD as PCP - General (General Practice)    Chief Complaint:  Follow-up on severe COPD, possible pneumonia, respiratory distress needing BiPAP and critical care management    Interval History:   Dong much better. Likes nebs but not much symbicort. Not using bipap anymore.   Objective   REVIEW OF SYSTEMS: .   RESPIRATORY: Intermittent shortness of breath and dry cough.  She is on oxygen 2 L/m.  GASTROINTESTINAL:  "No anorexia, nausea, vomiting or diarrhea. No abdominal pain or blood.   HEMATOLOGIC: No bleeding or bruising.     Ventilator/Non-Invasive Ventilation Settings     None        Vital Signs  Temp:  [97.3 °F (36.3 °C)-98.4 °F (36.9 °C)] 97.3 °F (36.3 °C)  Heart Rate:  [] 99  Resp:  [16-18] 16  BP: (108-119)/(56-71) 119/59    Intake/Output Summary (Last 24 hours) at 09/15/18 1724  Last data filed at 09/15/18 0829   Gross per 24 hour   Intake              670 ml   Output                0 ml   Net              670 ml     Flowsheet Rows      First Filed Value   Admission Height  162.6 cm (64\") Documented at 09/13/2018 1227   Admission Weight  43.5 kg (96 lb) Documented at 09/13/2018 1227          Physical Exam:   General Appearance:    Alert, cooperative, in no acute distress   HEENT:  Mallampati score 2, moist mucous membrane   Neck:   Soft   Lungs:    Significantly decreased air entry bilaterally. No crackles/ wheezing    Heart:    Regular rhythm and normal rate, normal S1 and S2, no            murmur   Skin:    No abnormalities observed   Abdomen:     Soft. No tonderness. No dullness.   Neuro:   Conscious, alert, oriented x3   Extremities:   Moves all extremities well, no edema, no cyanosis, no             Redness          Results Review:          Results from last 7 days  Lab Units 09/15/18  0715 09/14/18  0327 09/13/18  1157   SODIUM mmol/L 143 143 140   POTASSIUM mmol/L 4.2 4.1 4.3   CHLORIDE mmol/L 107 110* 104   CO2 mmol/L 26.2 20.2* 23.4   BUN mg/dL 20 16 14   CREATININE mg/dL 0.49* 0.53* 0.64   GLUCOSE mg/dL 128* 137* 197*   CALCIUM mg/dL 8.6 7.9* 9.4       Results from last 7 days  Lab Units 09/14/18  0327 09/13/18  1840 09/13/18  1157   TROPONIN T ng/mL 0.219* 0.413* 0.016       Results from last 7 days  Lab Units 09/14/18  0327 09/13/18  1554 09/13/18  1158   WBC 10*3/mm3 5.69  --  8.99   HEMOGLOBIN g/dL 12.6  --  15.1   HEMOGLOBIN, POC g/dL  --  13.3  --    HEMATOCRIT % 40.0  --  47.7*   HEMATOCRIT POC " %  --  39  --    PLATELETS 10*3/mm3 218  --  311       Results from last 7 days  Lab Units 18  1157   INR  1.01       Results from last 7 days  Lab Units 18  1157   PROBNP pg/mL 78.3       I reviewed the patient's new clinical results.  I personally viewed and interpreted the patient's CXR        Medication Review:     [START ON 2018] azithromycin 500 mg Oral Q24H   budesonide-formoterol 2 puff Inhalation BID - RT   ceftriaxone 1 g Intravenous Q24H   enoxaparin 30 mg Subcutaneous Q24H   insulin aspart 0-9 Units Subcutaneous Q4H   ipratropium-albuterol 3 mL Nebulization 4x Daily - RT   lisinopril 2.5 mg Oral Daily   methylPREDNISolone sodium succinate 40 mg Intravenous Q6H          Diagnostic imaging:  I personally and independently reviewed the following images:   CXR benign 18  COPD changes.  Lucent right lung which could represent emboli versus pneumothorax.  Left lower lobe infiltrates          Assessment:  11. Acute respiratory distress  12. COPD exacerbation, severe  13. Left lower lobe pneumonia, Procal and RVP -ve but crackles on exam  14. Acute systolic CHF, Takotsubo cardiomyopathy, likely secondary to the above  15. Lactic acidosis, resolved  16. Steroid-induced hyperglycemia  17. Elevated transaminases  18. Atypical chest pain, resolved  19. EKG changes  20. Tobacco abuse        Plan:  · Adjust nebulized bronchodilators to 4 times a day  · C/w Symbicort.   · Needs outpatient PFT and alpha-1 antitrypsin  · Doing well off BiPAP for now  · Wean down steroids  · Finish antibiotics for possible community-acquired pneumonia  · Subcutaneous insulin for hyperglycemia  · Appreciate cards input    Flaco Aguayo MD  09/15/18  5:24 PM      Electronically signed by Flaco Aguayo MD at 9/15/2018  5:27 PM     Anette Castellanos APRN at 9/15/2018 11:57 AM                    Cardiology Follow-Up Note      Patient Name: Scarlet Chakraborty  Age/Sex: 58 y.o. female  : 1960  MRN:  [id number]      Day of Service: 09/15/18       Chief Complaint/Follow-up: Cardiomyopathy (stress induced)    S: Breathing better today      Temp:  [97.6 °F (36.4 °C)-98.8 °F (37.1 °C)] 97.6 °F (36.4 °C)  Heart Rate:  [] 91  Resp:  [16-18] 16  BP: ()/(42-71) 108/60     PHYSICAL EXAM:    General Appearance: No acute distress, thin female.   Eyes: Conjunctiva and lids: No erythema, swelling, or discharge. Sclera non-icteric.   HENT: Atraumatic, normocephalic. External eyes, ears, and nose normal.   Respiratory: No signs of respiratory distress. Respiration rhythm and depth normal.   Lungs decreased with few scattered wheezes.   Cardiovascular:  Heart Rate and Rhythm: Normal, Heart Sounds: Normal S1 and S2. No S3 or S4 noted.  Murmurs: No murmurs noted. No rubs, thrills, or gallops.   Arterial Pulses: Posterior tibialis and dorsalis pedis pulses normal.   Lower Extremities: No edema noted.  Gastrointestinal:  Abdomen soft, non-distended, non-tender.  Musculoskeletal: Normal movement of extremities  Skin: Warm and dry.   Psychiatric: Patient alert and oriented to person, place, and time. Speech and behavior appropriate. Normal mood and affect.       ECG/TELE:           Results from last 7 days  Lab Units 09/15/18  0715 09/14/18  0327 09/13/18  1157   SODIUM mmol/L 143 143 140   POTASSIUM mmol/L 4.2 4.1 4.3   CHLORIDE mmol/L 107 110* 104   CO2 mmol/L 26.2 20.2* 23.4   BUN mg/dL 20 16 14   CREATININE mg/dL 0.49* 0.53* 0.64   GLUCOSE mg/dL 128* 137* 197*   CALCIUM mg/dL 8.6 7.9* 9.4       Results from last 7 days  Lab Units 09/14/18  0327 09/13/18  1554 09/13/18  1158   WBC 10*3/mm3 5.69  --  8.99   HEMOGLOBIN g/dL 12.6  --  15.1   HEMOGLOBIN, POC g/dL  --  13.3  --    HEMATOCRIT % 40.0  --  47.7*   HEMATOCRIT POC %  --  39  --    PLATELETS 10*3/mm3 218  --  311       Results from last 7 days  Lab Units 09/13/18  1157   INR  1.01       Results from last 7 days  Lab Units 09/14/18  0327 09/13/18  0476  09/13/18  1157   TROPONIN T ng/mL 0.219* 0.413* 0.016               Current Medications:   Scheduled Meds:  azithromycin 500 mg Intravenous Q24H   budesonide-formoterol 2 puff Inhalation BID - RT   ceftriaxone 1 g Intravenous Q24H   enoxaparin 30 mg Subcutaneous Q24H   insulin aspart 0-9 Units Subcutaneous Q4H   ipratropium-albuterol 3 mL Nebulization 4x Daily - RT   methylPREDNISolone sodium succinate 40 mg Intravenous Q6H         Active Problems:    Acute respiratory failure (CMS/HCC)    Acute respiratory failure with hypoxia (CMS/HCC)     9/13/18 Echo:    Interpretation Summary     · Calculated EF = 35%. Estimated EF was in disagreement with the calculated EF. Estimated EF = 30%. Normal left ventricular wall thickness noted. There are extensive wall motion abnormalities as described below suggestive of either multivessel coronary artery disease or Takotsubo is cardiomyopathy. The left ventricular cavity is mildly dilated. Left ventricular diastolic function was indeterminate.  · Trace mitral valve regurgitation is present  · Physiologic tricuspid valve regurgitation is present. Insufficient TR velocity profile to estimate the right ventricular systolic pressure.     9/13/18 Cath:       SUMMARY: Essentially normal coronary arteries severely reduced LV function severely elevated left ventricular end-diastolic pressure        RECOMMENDATIONS: Likely this is a variant of a Takasabo cardiomyopathy  Plan:     Assessment and Plan     1.  Acute respiratory failure.  C HF and cardiomyopathy certainly playing a role but pro-calcitonin was also quite high, possible PNA, on antibiotics per pulmonary.  2.  Cardiomyopathy, likely Takotsubo cardiomyopathy with increased stresses at her work. Will trying adding low dose ACE, see if b/p tolerates.   3.  History of asthma and COPD, recommendations per pulmonary service  4.  Unknown lipid status  5.  Elevated lactic acid, blood cultures NGTD, viral panel negative.  No obvious  "source of infection noted at this point. No fever.  6.  Elevated liver function tests, will check labs tomorrow.    SHANKAR Chowdhury  09/15/18  11:57 AM      Electronically signed by Anette Castellanos APRN at 9/15/2018 12:19 PM            Discharge Summary      Flaco Aguayo MD at 2018 10:27 PM                                                                            PHYSICIAN DISCHARGE SUMMARY                                                                          [facility]      Patient Identification:    Name: Scarlet Chakraborty  Age: 58 y.o.  Sex: female  :  1960  MRN: [id number]    Admit date: 2018    Discharge date 2018    Discharged Condition: Stable    Discharge Diagnoses:    1. Acute respiratory distress  2. COPD exacerbation, severe  3. Left lower lobe pneumonia, Procal and RVP -ve but crackles on exam  4. Acute systolic CHF, Takotsubo cardiomyopathy s/p LHC - Neg  5. Lactic acidosis, resolved  6. Steroid-induced hyperglycemia  7. Elevated transaminases  8. Atypical chest pain, resolved  9. EKG changes  10. Tobacco abuse    HPI  \"This is a 58-year-old female patient, current smoker with history of COPD.  He does not use oxygen at home.  She does not have a pulmonologist.  She stated that she was last hospitalized in April for pneumonia.  She is currently smoking in about 1 pack a day and she started smoking about 30 years ago.  She uses albuterol as needed and Anoro at home.  She stated that she uses the albuterol about 5 times a day.     She woke up today and had symptoms of acute shortness of breath associated with nonproductive cough.  Dyspnea was worse with minimal exertion.  Albuterol rescue inhaler was not alleviating her symptoms.  She had associated chest tightness but no chest pain or radiation.  She denies fever or chills or recent upper respiratory tract infection.     She called EMS.  He shouldn't was apparently in severe respiratory distress " "and placed on CPAP.  On arrival to the ED, she was promptly switched to BiPAP.  She received continuous nebulizer in the ED and reportedly improved.     On my assessment, patient was on BiPAP with obvious tripoding but she stated that she is feeling better.     She had some EKG changes with concerns for ST elevation but her troponin is normal.  Dr. Dozier has just assessed her few minutes ago and ordered a stat echocardiogram.\" per     Consults:   Patient Care Team:  MARILYN Fernandez MD as PCP - General (General Practice)    Procedures Performed:  Procedure(s):  Left Heart Cath and cors  Left ventriculography       Hospital Course:     Patient was admitted to ICU due to respiratory failure and severe COPD exacerbation.  She was also noted to be in acute systolic heart failure.  She underwent a left heart catheterization by cardiology which was within normal limits.  She was diagnosed with stress-induced cardiomyopathy.  Her lactic acidosis and pneumonia improved.  Her COPD exacerbation has improved as well.  She was discharged on a course of antibiotics as well as steroids as mentioned below.  She'll need to follow up with the cardiologist as well as set up an appointment with pulmonology to diagnose and treat her COPD has an outpatient.  She was given a prescription for nebulizer as well as new rescue inhaler and maintenance inhaler.  She was asked to quit smoking and limit stressors in her life.     Objective:          on       No intake or output data in the 24 hours ending 09/19/18 1729  Body mass index is 16.82 kg/m².  1    09/15/18  0600 09/16/18  0621 09/17/18  0500   Weight: 44.4 kg (97 lb 15.9 oz) 45.9 kg (101 lb 4.8 oz) 44.5 kg (98 lb)     Weight change:       Physical Exam:  General Appearance:    Alert, cooperative, in no acute distress   HEENT:  Mallampati score 2, moist mucous membrane   Neck:   Soft   Lungs:    Significantly decreased air entry bilaterally. No crackles/ wheezing    Heart:    " Regular rhythm and normal rate, normal S1 and S2, no            murmur   Skin:    No abnormalities observed   Abdomen:     Soft. No tonderness. No dullness.   Neuro:   Conscious, alert, oriented x3   Extremities:   Moves all extremities well, no edema, no cyanosis, no             Redness              Significant Discharge Diagnostics     Pertinent Lab Results:    Results from last 7 days  Lab Units 09/16/18  0641 09/15/18  0715 09/14/18  0327 09/13/18  1157   SODIUM mmol/L 143 143 143 140   POTASSIUM mmol/L 4.0 4.2 4.1 4.3   CHLORIDE mmol/L 107 107 110* 104   CO2 mmol/L 29.6* 26.2 20.2* 23.4   BUN mg/dL 21* 20 16 14   CREATININE mg/dL 0.63 0.49* 0.53* 0.64   GLUCOSE mg/dL 87 128* 137* 197*   CALCIUM mg/dL 8.5* 8.6 7.9* 9.4   AST (SGOT) U/L 17  --  68* 51*   ALT (SGPT) U/L 40*  --  77* 40*       Results from last 7 days  Lab Units 09/14/18  0327 09/13/18  1840 09/13/18  1157   TROPONIN T ng/mL 0.219* 0.413* 0.016       Results from last 7 days  Lab Units 09/14/18  0327 09/13/18  1554 09/13/18  1158   WBC 10*3/mm3 5.69  --  8.99   HEMOGLOBIN g/dL 12.6  --  15.1   HEMOGLOBIN, POC g/dL  --  13.3  --    HEMATOCRIT % 40.0  --  47.7*   HEMATOCRIT POC %  --  39  --    PLATELETS 10*3/mm3 218  --  311   MCV fL 99.0*  --  99.6*   MCH pg 31.2  --  31.5   MCHC g/dL 31.5*  --  31.7*   RDW % 13.5*  --  13.3*   RDW-SD fl 49.2  --  48.7   MPV fL 12.3*  --  11.4   NEUTROPHIL % % 82.6*  --  39.1*   LYMPHOCYTE % % 14.9*  --  48.2*   MONOCYTES % % 2.5*  --  6.3   EOSINOPHIL % % 0.0*  --  5.7   BASOPHIL % % 0.0  --  0.7   IMM GRAN % % 0.0  --  0.1   NEUTROS ABS 10*3/mm3 4.70  --  3.52   LYMPHS ABS 10*3/mm3 0.85*  --  4.33   MONOS ABS 10*3/mm3 0.14*  --  0.57   EOS ABS 10*3/mm3 0.00  --  0.51   BASOS ABS 10*3/mm3 0.00  --  0.06   IMMATURE GRANS (ABS) 10*3/mm3 0.00  --  0.01       Results from last 7 days  Lab Units 09/13/18  1157   INR  1.01       Results from last 7 days  Lab Units 09/13/18  1157   MAGNESIUM mg/dL 2.7*           Invalid  input(s): LDLCALC    Results from last 7 days  Lab Units 09/13/18  1157   PROBNP pg/mL 78.3           Results from last 7 days  Lab Units 09/13/18  1228   PH, ARTERIAL pH units 7.394   PO2 ART mm Hg 111.8*   PCO2, ARTERIAL mm Hg 37.2   HCO3 ART mmol/L 22.7       Results from last 7 days  Lab Units 09/14/18  0327 09/13/18  1720 09/13/18  1158 09/13/18  1157   PROCALCITONIN ng/mL  --   --   --  <0.02*   LACTATE mmol/L 1.4 1.6 4.5*  --            Results from last 7 days  Lab Units 09/13/18  1157   LIPASE U/L 35       Results from last 7 days  Lab Units 09/13/18  1256 09/13/18  1157   BLOODCX  No growth at 5 days No growth at 5 days       Results from last 7 days  Lab Units 09/13/18  1352   NITRITE UA  Negative       Results from last 7 days  Lab Units 09/13/18  1511   ADENOVIRUS DETECTION BY PCR  Not Detected   CORONAVIRUS 229E  Not Detected   CORONAVIRUS HKU1  Not Detected   CORONAVIRUS NL63  Not Detected   CORONAVIRUS OC43  Not Detected   HUMAN METAPNEUMOVIRUS  Not Detected   HUMAN RHINOVIRUS/ENTEROVIRUS  Not Detected   INFLUENZA B PCR  Not Detected   PARAINFLUENZA 1  Not Detected   PARAINFLUENZA VIRUS 2  Not Detected   PARAINFLUENZA VIRUS 3  Not Detected   PARAINFLUENZA VIRUS 4  Not Detected   BORDETELLA PERTUSSIS PCR  Not Detected   CHLAMYDOPHILA PNEUMONIAE PCR  Not Detected   MYCOPLAMA PNEUMO PCR  Not Detected   INFLUENZA A PCR  Not Detected   INFLUENZA A H3  Not Detected   INFLUENZA A H1  Not Detected   RSV, PCR  Not Detected           Imaging Results:  Imaging Results (all)     Procedure Component Value Units Date/Time    XR Chest 1 View [[id number]] Collected:  09/13/18 1231     Updated:  09/13/18 1238    Narrative:       Portable chest radiograph     HISTORY: Shortness of air     TECHNIQUE: Single AP portable radiograph of the chest     COMPARISON: None     FINDINGS:  Evaluation is suboptimal due to patient positioning.     There is increased bibasilar pulmonary opacification, left greater  right. Pulmonary  vascular congestion is also present and asymmetrically  affecting the left side. The heart is normal in size. No pneumothorax or  pleural effusion is seen.       Impression:       1.  Suboptimal evaluation due to patient positioning. Bibasilar  pulmonary opacification and pulmonary vascular congestion, left greater  than right. Findings most concerning for asymmetric pulmonary edema  versus left basilar pneumonia and clinical correlation is recommended as  there are currently no notes in the patient's chart.     This report was finalized on 9/13/2018 12:35 PM by Dr. Shashank Nieto M.D.             Discharge Medications and Instructions:     Discharge Medications     Discharge Medications      New Medications      Instructions Start Date   cefdinir 300 MG capsule  Commonly known as:  OMNICEF   300 mg, Oral, Daily      ipratropium-albuterol 0.5-2.5 mg/3 ml nebulizer  Commonly known as:  DUO-NEB   3 mL, Nebulization, Every 4 Hours PRN      lisinopril 2.5 MG tablet  Commonly known as:  PRINIVIL,ZESTRIL   2.5 mg, Oral, Daily      predniSONE 20 MG tablet  Commonly known as:  DELTASONE   40 mg, Oral, Daily With Breakfast         Continue These Medications      Instructions Start Date   albuterol 108 (90 Base) MCG/ACT inhaler  Commonly known as:  PROVENTIL HFA;VENTOLIN HFA   2 puffs, Inhalation, Every 4 Hours PRN      umeclidinium-vilanterol 62.5-25 MCG/INH aerosol powder  inhaler  Commonly known as:  ANORO ELLIPTA   1 puff, Inhalation, Daily - RT             Disposition:  Home    Follow-up Information     Emi Boston MD. Schedule an appointment as soon as possible for a visit in 2 week(s).    Specialty:  Cardiology  Contact information:  [address]  [facility] [address] [phone]             Flaco Aguayo MD. Schedule an appointment as soon as possible for a visit in 1 week(s).    Specialty:  Pulmonary Disease  Contact information:  [address]  [facility]  [address]  [phone]             MARILYN Fernandez MD. Schedule an appointment as soon as possible for a visit in 3 day(s).    Specialty:  General Practice  Contact information:  [address] [id number] [phone]                   Total time spent discharging patient including evaluation, medication reconciliation, arranging follow up, and post hospitalization instructions and education total time exceeds 30 minutes.    Signed:  Flaco Aguayo MD  9/19/2018  5:29 PM    Electronically signed by Flaco Aguayo MD at 9/19/2018  5:31 PM

## 2018-09-26 LAB
ANION GAP SERPL CALCULATED.3IONS-SCNC: 11 MMOL/L
BASOPHILS # BLD AUTO: 0.03 10*3/MM3 (ref 0–0.2)
BASOPHILS NFR BLD AUTO: 0.4 % (ref 0–1.5)
BUN BLD-MCNC: 9 MG/DL (ref 6–20)
BUN/CREAT SERPL: 16.4 (ref 7–25)
CALCIUM SPEC-SCNC: 8.7 MG/DL (ref 8.6–10.5)
CHLORIDE SERPL-SCNC: 108 MMOL/L (ref 98–107)
CO2 SERPL-SCNC: 24 MMOL/L (ref 22–29)
CREAT BLD-MCNC: 0.55 MG/DL (ref 0.57–1)
CREAT BLDA-MCNC: 0.7 MG/DL (ref 0.6–1.3)
DEPRECATED RDW RBC AUTO: 46.9 FL (ref 37–54)
EOSINOPHIL # BLD AUTO: 0.29 10*3/MM3 (ref 0–0.7)
EOSINOPHIL NFR BLD AUTO: 3.9 % (ref 0.3–6.2)
ERYTHROCYTE [DISTWIDTH] IN BLOOD BY AUTOMATED COUNT: 13.2 % (ref 11.7–13)
GFR SERPL CREATININE-BSD FRML MDRD: 114 ML/MIN/1.73
GLUCOSE BLD-MCNC: 91 MG/DL (ref 65–99)
GLUCOSE BLDC GLUCOMTR-MCNC: 93 MG/DL (ref 70–130)
HCT VFR BLD AUTO: 41.5 % (ref 35.6–45.5)
HGB BLD-MCNC: 12.9 G/DL (ref 11.9–15.5)
IMM GRANULOCYTES # BLD: 0.02 10*3/MM3 (ref 0–0.03)
IMM GRANULOCYTES NFR BLD: 0.3 % (ref 0–0.5)
LYMPHOCYTES # BLD AUTO: 2.1 10*3/MM3 (ref 0.9–4.8)
LYMPHOCYTES NFR BLD AUTO: 27.9 % (ref 19.6–45.3)
MCH RBC QN AUTO: 30.3 PG (ref 26.9–32)
MCHC RBC AUTO-ENTMCNC: 31.1 G/DL (ref 32.4–36.3)
MCV RBC AUTO: 97.4 FL (ref 80.5–98.2)
MONOCYTES # BLD AUTO: 0.62 10*3/MM3 (ref 0.2–1.2)
MONOCYTES NFR BLD AUTO: 8.2 % (ref 5–12)
NEUTROPHILS # BLD AUTO: 4.46 10*3/MM3 (ref 1.9–8.1)
NEUTROPHILS NFR BLD AUTO: 59.3 % (ref 42.7–76)
PLATELET # BLD AUTO: 247 10*3/MM3 (ref 140–500)
PMV BLD AUTO: 10.7 FL (ref 6–12)
POTASSIUM BLD-SCNC: 3.8 MMOL/L (ref 3.5–5.2)
RBC # BLD AUTO: 4.26 10*6/MM3 (ref 3.9–5.2)
SODIUM BLD-SCNC: 143 MMOL/L (ref 136–145)
WBC NRBC COR # BLD: 7.52 10*3/MM3 (ref 4.5–10.7)

## 2018-09-26 PROCEDURE — 99231 SBSQ HOSP IP/OBS SF/LOW 25: CPT | Performed by: NEUROLOGICAL SURGERY

## 2018-09-26 PROCEDURE — 99231 SBSQ HOSP IP/OBS SF/LOW 25: CPT | Performed by: NURSE PRACTITIONER

## 2018-09-26 PROCEDURE — 99233 SBSQ HOSP IP/OBS HIGH 50: CPT | Performed by: INTERNAL MEDICINE

## 2018-09-26 PROCEDURE — 94799 UNLISTED PULMONARY SVC/PX: CPT

## 2018-09-26 PROCEDURE — 82962 GLUCOSE BLOOD TEST: CPT

## 2018-09-26 PROCEDURE — 85025 COMPLETE CBC W/AUTO DIFF WBC: CPT | Performed by: INTERNAL MEDICINE

## 2018-09-26 PROCEDURE — 80048 BASIC METABOLIC PNL TOTAL CA: CPT | Performed by: INTERNAL MEDICINE

## 2018-09-26 PROCEDURE — 92526 ORAL FUNCTION THERAPY: CPT

## 2018-09-26 RX ADMIN — CLOPIDOGREL 75 MG: 75 TABLET, FILM COATED ORAL at 09:13

## 2018-09-26 RX ADMIN — IPRATROPIUM BROMIDE AND ALBUTEROL SULFATE 3 ML: .5; 3 SOLUTION RESPIRATORY (INHALATION) at 08:22

## 2018-09-26 RX ADMIN — IPRATROPIUM BROMIDE AND ALBUTEROL SULFATE 3 ML: .5; 3 SOLUTION RESPIRATORY (INHALATION) at 12:03

## 2018-09-26 RX ADMIN — ASPIRIN 81 MG: 81 TABLET, CHEWABLE ORAL at 09:13

## 2018-09-26 RX ADMIN — ATORVASTATIN CALCIUM 80 MG: 80 TABLET, FILM COATED ORAL at 22:21

## 2018-09-26 RX ADMIN — IPRATROPIUM BROMIDE AND ALBUTEROL SULFATE 3 ML: .5; 3 SOLUTION RESPIRATORY (INHALATION) at 15:28

## 2018-09-26 RX ADMIN — Medication 1000 MCG: at 09:12

## 2018-09-27 ENCOUNTER — APPOINTMENT (OUTPATIENT)
Dept: CARDIOLOGY | Facility: HOSPITAL | Age: 58
End: 2018-09-27
Attending: INTERNAL MEDICINE

## 2018-09-27 LAB
ANION GAP SERPL CALCULATED.3IONS-SCNC: 10 MMOL/L
BH CV ECHO MEAS - BSA(HAYCOCK): 1.4 M^2
BH CV ECHO MEAS - BSA: 1.4 M^2
BH CV ECHO MEAS - BZI_BMI: 16.3 KILOGRAMS/M^2
BH CV ECHO MEAS - BZI_METRIC_HEIGHT: 162.6 CM
BH CV ECHO MEAS - BZI_METRIC_WEIGHT: 43.1 KG
BUN BLD-MCNC: 9 MG/DL (ref 6–20)
BUN/CREAT SERPL: 15.5 (ref 7–25)
CALCIUM SPEC-SCNC: 8.7 MG/DL (ref 8.6–10.5)
CHLORIDE SERPL-SCNC: 110 MMOL/L (ref 98–107)
CO2 SERPL-SCNC: 25 MMOL/L (ref 22–29)
CREAT BLD-MCNC: 0.58 MG/DL (ref 0.57–1)
DEPRECATED RDW RBC AUTO: 48.2 FL (ref 37–54)
ERYTHROCYTE [DISTWIDTH] IN BLOOD BY AUTOMATED COUNT: 13.4 % (ref 11.7–13)
GFR SERPL CREATININE-BSD FRML MDRD: 107 ML/MIN/1.73
GLUCOSE BLD-MCNC: 102 MG/DL (ref 65–99)
HCT VFR BLD AUTO: 39.9 % (ref 35.6–45.5)
HGB BLD-MCNC: 12.6 G/DL (ref 11.9–15.5)
MCH RBC QN AUTO: 30.8 PG (ref 26.9–32)
MCHC RBC AUTO-ENTMCNC: 31.6 G/DL (ref 32.4–36.3)
MCV RBC AUTO: 97.6 FL (ref 80.5–98.2)
PLATELET # BLD AUTO: 247 10*3/MM3 (ref 140–500)
PMV BLD AUTO: 10.7 FL (ref 6–12)
POTASSIUM BLD-SCNC: 3.7 MMOL/L (ref 3.5–5.2)
RBC # BLD AUTO: 4.09 10*6/MM3 (ref 3.9–5.2)
SODIUM BLD-SCNC: 145 MMOL/L (ref 136–145)
WBC NRBC COR # BLD: 7.39 10*3/MM3 (ref 4.5–10.7)

## 2018-09-27 PROCEDURE — 0JH632Z INSERTION OF MONITORING DEVICE INTO CHEST SUBCUTANEOUS TISSUE AND FASCIA, PERCUTANEOUS APPROACH: ICD-10-PCS | Performed by: INTERNAL MEDICINE

## 2018-09-27 PROCEDURE — 25010000002 MIDAZOLAM PER 1 MG: Performed by: INTERNAL MEDICINE

## 2018-09-27 PROCEDURE — 93312 ECHO TRANSESOPHAGEAL: CPT | Performed by: INTERNAL MEDICINE

## 2018-09-27 PROCEDURE — 85027 COMPLETE CBC AUTOMATED: CPT | Performed by: INTERNAL MEDICINE

## 2018-09-27 PROCEDURE — 93325 DOPPLER ECHO COLOR FLOW MAPG: CPT | Performed by: INTERNAL MEDICINE

## 2018-09-27 PROCEDURE — 33282 HC INSERTION PT ACTIV CARD EVNT REC: CPT | Performed by: INTERNAL MEDICINE

## 2018-09-27 PROCEDURE — 80048 BASIC METABOLIC PNL TOTAL CA: CPT | Performed by: INTERNAL MEDICINE

## 2018-09-27 PROCEDURE — 93312 ECHO TRANSESOPHAGEAL: CPT

## 2018-09-27 PROCEDURE — 99232 SBSQ HOSP IP/OBS MODERATE 35: CPT | Performed by: NURSE PRACTITIONER

## 2018-09-27 PROCEDURE — 93325 DOPPLER ECHO COLOR FLOW MAPG: CPT

## 2018-09-27 PROCEDURE — 99152 MOD SED SAME PHYS/QHP 5/>YRS: CPT

## 2018-09-27 PROCEDURE — 33282 PR IMPLANTATION PT-ACTIVATED CARDIAC EVENT RECORDER: CPT | Performed by: INTERNAL MEDICINE

## 2018-09-27 PROCEDURE — B246ZZ4 ULTRASONOGRAPHY OF RIGHT AND LEFT HEART, TRANSESOPHAGEAL: ICD-10-PCS | Performed by: INTERNAL MEDICINE

## 2018-09-27 PROCEDURE — 94799 UNLISTED PULMONARY SVC/PX: CPT

## 2018-09-27 PROCEDURE — 93320 DOPPLER ECHO COMPLETE: CPT

## 2018-09-27 PROCEDURE — 99232 SBSQ HOSP IP/OBS MODERATE 35: CPT | Performed by: INTERNAL MEDICINE

## 2018-09-27 PROCEDURE — 25010000002 FENTANYL CITRATE (PF) 100 MCG/2ML SOLUTION: Performed by: INTERNAL MEDICINE

## 2018-09-27 PROCEDURE — C1764 EVENT RECORDER, CARDIAC: HCPCS | Performed by: INTERNAL MEDICINE

## 2018-09-27 PROCEDURE — 93320 DOPPLER ECHO COMPLETE: CPT | Performed by: INTERNAL MEDICINE

## 2018-09-27 DEVICE — ICM LP/RECRD REVEAL LINQ MEDTRONIC: Type: IMPLANTABLE DEVICE | Status: FUNCTIONAL

## 2018-09-27 RX ORDER — FENTANYL CITRATE 50 UG/ML
INJECTION, SOLUTION INTRAMUSCULAR; INTRAVENOUS
Status: COMPLETED | OUTPATIENT
Start: 2018-09-27 | End: 2018-09-27

## 2018-09-27 RX ORDER — LIDOCAINE HYDROCHLORIDE AND EPINEPHRINE 10; 10 MG/ML; UG/ML
INJECTION, SOLUTION INFILTRATION; PERINEURAL AS NEEDED
Status: DISCONTINUED | OUTPATIENT
Start: 2018-09-27 | End: 2018-09-27 | Stop reason: HOSPADM

## 2018-09-27 RX ORDER — MIDAZOLAM HYDROCHLORIDE 1 MG/ML
INJECTION INTRAMUSCULAR; INTRAVENOUS
Status: COMPLETED | OUTPATIENT
Start: 2018-09-27 | End: 2018-09-27

## 2018-09-27 RX ORDER — SODIUM CHLORIDE 9 MG/ML
INJECTION, SOLUTION INTRAVENOUS CONTINUOUS PRN
Status: COMPLETED | OUTPATIENT
Start: 2018-09-27 | End: 2018-09-27

## 2018-09-27 RX ORDER — SODIUM CHLORIDE 9 MG/ML
INJECTION, SOLUTION INTRAVENOUS
Status: COMPLETED | OUTPATIENT
Start: 2018-09-27 | End: 2018-09-27

## 2018-09-27 RX ADMIN — MIDAZOLAM 1 MG: 1 INJECTION INTRAMUSCULAR; INTRAVENOUS at 11:08

## 2018-09-27 RX ADMIN — IPRATROPIUM BROMIDE AND ALBUTEROL SULFATE 3 ML: .5; 3 SOLUTION RESPIRATORY (INHALATION) at 08:42

## 2018-09-27 RX ADMIN — SODIUM CHLORIDE 50 ML/HR: 9 INJECTION, SOLUTION INTRAVENOUS at 10:45

## 2018-09-27 RX ADMIN — MIDAZOLAM 1 MG: 1 INJECTION INTRAMUSCULAR; INTRAVENOUS at 11:04

## 2018-09-27 RX ADMIN — ATORVASTATIN CALCIUM 80 MG: 80 TABLET, FILM COATED ORAL at 20:05

## 2018-09-27 RX ADMIN — ASPIRIN 81 MG: 81 TABLET, CHEWABLE ORAL at 16:03

## 2018-09-27 RX ADMIN — LIDOCAINE HYDROCHLORIDE 10 ML: 20 SOLUTION ORAL; TOPICAL at 10:46

## 2018-09-27 RX ADMIN — CLOPIDOGREL 75 MG: 75 TABLET, FILM COATED ORAL at 16:03

## 2018-09-27 RX ADMIN — IPRATROPIUM BROMIDE AND ALBUTEROL SULFATE 3 ML: .5; 3 SOLUTION RESPIRATORY (INHALATION) at 20:39

## 2018-09-27 RX ADMIN — FENTANYL CITRATE 25 MCG: 50 INJECTION INTRAMUSCULAR; INTRAVENOUS at 11:01

## 2018-09-27 RX ADMIN — IPRATROPIUM BROMIDE AND ALBUTEROL SULFATE 3 ML: .5; 3 SOLUTION RESPIRATORY (INHALATION) at 12:12

## 2018-09-27 RX ADMIN — Medication 1000 MCG: at 16:03

## 2018-09-27 RX ADMIN — LISINOPRIL 2.5 MG: 2.5 TABLET ORAL at 16:03

## 2018-09-27 RX ADMIN — MIDAZOLAM 2 MG: 1 INJECTION INTRAMUSCULAR; INTRAVENOUS at 11:01

## 2018-09-28 PROCEDURE — 94799 UNLISTED PULMONARY SVC/PX: CPT

## 2018-09-28 PROCEDURE — 99232 SBSQ HOSP IP/OBS MODERATE 35: CPT | Performed by: INTERNAL MEDICINE

## 2018-09-28 PROCEDURE — 99231 SBSQ HOSP IP/OBS SF/LOW 25: CPT | Performed by: NURSE PRACTITIONER

## 2018-09-28 RX ADMIN — IPRATROPIUM BROMIDE AND ALBUTEROL SULFATE 3 ML: .5; 3 SOLUTION RESPIRATORY (INHALATION) at 21:16

## 2018-09-28 RX ADMIN — IPRATROPIUM BROMIDE AND ALBUTEROL SULFATE 3 ML: .5; 3 SOLUTION RESPIRATORY (INHALATION) at 16:25

## 2018-09-28 RX ADMIN — ASPIRIN 81 MG: 81 TABLET, CHEWABLE ORAL at 09:30

## 2018-09-28 RX ADMIN — ATORVASTATIN CALCIUM 80 MG: 80 TABLET, FILM COATED ORAL at 21:03

## 2018-09-28 RX ADMIN — IPRATROPIUM BROMIDE AND ALBUTEROL SULFATE 3 ML: .5; 3 SOLUTION RESPIRATORY (INHALATION) at 08:51

## 2018-09-28 RX ADMIN — LISINOPRIL 2.5 MG: 2.5 TABLET ORAL at 09:30

## 2018-09-28 RX ADMIN — Medication 1000 MCG: at 09:30

## 2018-09-28 RX ADMIN — CLOPIDOGREL 75 MG: 75 TABLET, FILM COATED ORAL at 09:30

## 2018-09-28 RX ADMIN — IPRATROPIUM BROMIDE AND ALBUTEROL SULFATE 3 ML: .5; 3 SOLUTION RESPIRATORY (INHALATION) at 12:44

## 2018-09-29 ENCOUNTER — APPOINTMENT (OUTPATIENT)
Dept: CT IMAGING | Facility: HOSPITAL | Age: 58
End: 2018-09-29

## 2018-09-29 VITALS
WEIGHT: 94.36 LBS | BODY MASS INDEX: 16.11 KG/M2 | DIASTOLIC BLOOD PRESSURE: 57 MMHG | OXYGEN SATURATION: 100 % | TEMPERATURE: 98.5 F | HEIGHT: 64 IN | HEART RATE: 79 BPM | RESPIRATION RATE: 18 BRPM | SYSTOLIC BLOOD PRESSURE: 93 MMHG

## 2018-09-29 PROBLEM — I63.9 CEREBROVASCULAR ACCIDENT (CVA) (HCC): Status: RESOLVED | Noted: 2018-09-24 | Resolved: 2018-09-29

## 2018-09-29 PROCEDURE — 94799 UNLISTED PULMONARY SVC/PX: CPT

## 2018-09-29 PROCEDURE — 70450 CT HEAD/BRAIN W/O DYE: CPT

## 2018-09-29 RX ORDER — ATORVASTATIN CALCIUM 80 MG/1
80 TABLET, FILM COATED ORAL NIGHTLY
Qty: 30 TABLET | Refills: 0 | Status: SHIPPED | OUTPATIENT
Start: 2018-09-29 | End: 2019-06-03

## 2018-09-29 RX ORDER — ATORVASTATIN CALCIUM 80 MG/1
80 TABLET, FILM COATED ORAL NIGHTLY
Qty: 30 TABLET | Refills: 0 | Status: SHIPPED | OUTPATIENT
Start: 2018-09-29 | End: 2018-09-29

## 2018-09-29 RX ADMIN — LISINOPRIL 2.5 MG: 2.5 TABLET ORAL at 08:21

## 2018-09-29 RX ADMIN — IPRATROPIUM BROMIDE AND ALBUTEROL SULFATE 3 ML: .5; 3 SOLUTION RESPIRATORY (INHALATION) at 10:59

## 2018-09-29 RX ADMIN — CLOPIDOGREL 75 MG: 75 TABLET, FILM COATED ORAL at 08:21

## 2018-09-29 RX ADMIN — IPRATROPIUM BROMIDE AND ALBUTEROL SULFATE 3 ML: .5; 3 SOLUTION RESPIRATORY (INHALATION) at 07:08

## 2018-09-29 RX ADMIN — ASPIRIN 81 MG: 81 TABLET, CHEWABLE ORAL at 08:21

## 2018-09-29 RX ADMIN — APIXABAN 5 MG: 5 TABLET, FILM COATED ORAL at 12:22

## 2018-09-29 RX ADMIN — Medication 1000 MCG: at 08:21

## 2018-09-30 ENCOUNTER — READMISSION MANAGEMENT (OUTPATIENT)
Dept: CALL CENTER | Facility: HOSPITAL | Age: 58
End: 2018-09-30

## 2018-09-30 NOTE — OUTREACH NOTE
Prep Survey      Responses   Facility patient discharged from?  [facility]   Is patient eligible?  Yes   Discharge diagnosis  CVA - TPA given - symptoms resolved   Does the patient have one of the following disease processes/diagnoses(primary or secondary)?  Stroke (TIA)   Does the patient have Home health ordered?  No   Is there a DME ordered?  No   Medication alerts for this patient  Eliquis started   Prep survey completed?  Yes          Guera Gerard RN

## 2018-10-01 NOTE — PAYOR COMM NOTE
"UR CONTACT:   CHRISTIAN              P: [phone]  F: [phone]          Scarlet Chavez  (58 y.o. Female)     Date of Birth Social Security Number Address Home Phone MRN    1960  [id number] [facility] [address]  [facility] [address] [phone] [id number]    [employer] Marital Status          None Single       Admission Date Admission Type Admitting Provider Attending Provider Department, Room/Bed    9/24/18 Emergency Donald Ryder MD  [address], S519/1    Discharge Date Discharge Disposition Discharge Destination        9/29/2018 Home or Self Care              Attending Provider:  (none)   Allergies:  No Known Allergies    Isolation:  None   Infection:  None   Code Status:  Prior    Ht:  162.6 cm (64\")   Wt:  42.8 kg (94 lb 5.7 oz)    Admission Cmt:  None   Principal Problem:  None                Active Insurance as of 9/24/2018     Primary Coverage     Payor Plan Insurance Group Employer/Plan Group    [id number] PPO [id number]     Payor Plan Address Payor Plan Phone Number Effective From Effective To    [address] [phone] 9/1/2018     [facility] [id number]       Subscriber Name Subscriber Birth Date Member ID       SCARLET CHAVEZ 1960 [id number]                 Emergency Contacts      (Rel.) Home Phone Work Phone Mobile Phone    Gaye Mccollumdy (Sister) [phone] -- --    Don Taylor (Significant Other) -- -- [phone]               Physician Progress Notes      Raeann Alexis APRN at 9/29/2018 10:23 AM        Results note:     9/29/18 Head CT: per Dr. King, evolving RMCA stroke, no hemorrhage    Plan: okay to start anticoagulation today, no antiplatelets. FU in 3 months with SHANKAR Wilson D/W RN who will notify admitting and or cardiology     Electronically signed by Raeann Alexis APRN at 9/29/2018 10:25 AM     Donald Ryder MD at 9/28/2018  7:15 PM                   LOS: 4 days "   Patient Care Team:  MARILYN Fernandez MD as PCP - General (General Practice)    Subjective      No Complaints whatsoever no pain no neuro deficits          Objective     Vital Signs  Vital Sign Min/Max for last 24 hours  Temp  Min: 98.3 °F (36.8 °C)  Max: 98.6 °F (37 °C)   BP  Min: 99/55  Max: 117/57   Pulse  Min: 68  Max: 91   Resp  Min: 14  Max: 16   SpO2  Min: 95 %  Max: 98 %   No Data Recorded   Weight  Min: 42.8 kg (94 lb 5.7 oz)  Max: 42.8 kg (94 lb 5.7 oz)        Ventilator/Non-Invasive Ventilation Settings     None                       Body mass index is 16.2 kg/m².  I/O last 3 completed shifts:  In: 480 [P.O.:480]  Out: -   No intake/output data recorded.        Physical Exam:  General Appearance:   Well-developed thin almost cachectic white female resting in bed she was sleeping on my arrival she awakened easily does not appear in any acute distress  Eyes:  Conjunctiva are clear and anicteric pupils are equal and reactive to light extraocular muscles are intact she is not the best doing visual field checks at think she is intact   ENT:  No facial asymmetry tongue is midline oral mucous membranes are little dry nasal septum midline  Neck:  No adenopathy or thyromegaly no jugular venous distention trachea midline  Lungs:  Clear nonlabored symmetric expansion no wheezes rales or rhonchi  Cardiac:  Regular rate and rhythm no murmur or gallop  Abdomen: soft nontender no palpable organomegaly or masses  :  Not examined  Musc/Skel:  She doesn't have a whole lot of muscle mass but otherwise grossly normal  Skin:  No jaundice no petechiae no rashes noted  Neuro:  Patient is alert and oriented ×3, dorsiflexion plantar flexion good ,straight leg raise bilaterally and heel-to-shin without difficulty there is no pronator drift she's got good  bilaterally and equal and finger to nose without difficulty  Extremities/P Vascular:  No clubbing no cyanosis no edema palpable radial and dorsalis pedis pulses  bilaterally  MSE:  Seems to be in very good spirits today           aspirin 81 mg Oral Daily   atorvastatin 80 mg Oral Nightly   clopidogrel 75 mg Oral Daily   ipratropium-albuterol 3 mL Nebulization 4x Daily - RT   lisinopril 2.5 mg Oral Daily   thiamine 100 mg Oral Once   vitamin B-12 1,000 mcg Oral Daily              Active Hospital Problems    Diagnosis Date Noted   • Cerebrovascular accident (CVA) (CMS/[facility], located within [facility]) [I63.9] 09/24/2018      Resolved Hospital Problems    Diagnosis Date Noted Date Resolved   No resolved problems to display.         Assessment/Plan     1. Left MCA territory CVA status post TPA currently appears to have  complete resolution of symptoms we'll continue to observe her on stroke protocol the stroke team is following and we will follow their leads on workup and management of stroke. MRI suggested multiple strokes  and involvement of both hemispears suggesting an embolic source.  Likely cardioembolic even though echocardiogram doesn't show clot now entire clot may have embolized.  Neurology recommends repeating CT head on 5/29/18 and if no bleeding starting anticoagulation their note reviewed details outlined.  ZARINA  negative linq device placed today  2. Stress cardiomyopathy recently diagnosed with an LVEF of 35% last week LVEF up to 72% now grade 1 diastolic dysfunction, no clot noted in cardiology following and I will certainly defer to cardiology on management  3. COPD with recent exacerbation will continue bronchodilators.  She is moving air very well I think if beta blockers are needed for her cardiac function that will be fine  4. Tobacco abuse she stopped smoking after her last admission hopefully she will be able to remain off cigarettes  5. Regular alcohol use she was off for 6 days and only drank for a couple of days since then I don't think we have to worry about withdrawal at this point  6.  chronic malnourished  she apparently took by mouth well yesterday    Plan for disposition:  "Following of neurology's lead CT head has been ordered for Saturday the 29th per neurology and if that is good start anticoagulation at that time, cardiology has chosen Eliquis.    Donald Ryder MD  09/28/18  7:15 PM    Time:       Electronically signed by Donald Ryder MD at 9/28/2018  7:21 PM     Raeann Alexis APRN at 9/28/2018 10:20 AM             LOS: 4 days   Patient Care Team:  MARILYN Fernandez MD as PCP - General (General Practice)    Chief Complaint:    Chief Complaint   Patient presents with   • Extremity Weakness     pt was standing in the kitchen when her \"legs gave out\". per EMS pt was flaccid on the left side , upon arrival to ED all symptoms are resolved.        Subjective     Interval History: we discussed that by state law she has to be seen by her eye doctor and the degree of vision loss needs to be determined and then determine if she can drive.     Patient Complaints: none  Patient Denies: H/A, new or recurrent S/S of stroke  History taken from: patient chart family RN    Objective     Vital Signs  Temp:  [98.4 °F (36.9 °C)-98.8 °F (37.1 °C)] 98.4 °F (36.9 °C)  Heart Rate:  [68-91] 91  Resp:  [6-18] 16  BP: ()/(42-71) 106/42      Physical Examination: patient was examined   HEENT: Normocephalic, atraumatic   COR: RRR  Resp: Even and unlabored      Neurological:   MS: AO. Language normal. No neglect. Higher integrative function normal  CN: decreased sensation on the left with mild extinction, left inferior quadrantanopsia         Results Review:     I reviewed the patient's new clinical results.      Results from last 7 days  Lab Units 09/25/18  0419   HEMOGLOBIN A1C % 5.40       Results from last 7 days  Lab Units 09/27/18  0354 09/26/18  0348 09/25/18  0419   WBC 10*3/mm3 7.39 7.52 7.91   HEMOGLOBIN g/dL 12.6 12.9 11.7*   HEMATOCRIT % 39.9 41.5 37.0   PLATELETS 10*3/mm3 247 247 206       Lab Results   Component Value Date    CHOL 168 09/25/2018     Lab Results   Component Value " Date    HDL 53 09/25/2018     Lab Results   Component Value Date    LDL 90 09/25/2018     Lab Results   Component Value Date    TRIG 125 09/25/2018         Results from last 7 days  Lab Units 09/27/18  0354 09/26/18  0348 09/25/18  0419   SODIUM mmol/L 145 143 141   POTASSIUM mmol/L 3.7 3.8 4.4   CHLORIDE mmol/L 110* 108* 105   CO2 mmol/L 25.0 24.0 23.4   BUN mg/dL 9 9 11   CREATININE mg/dL 0.58 0.55* 0.59   CALCIUM mg/dL 8.7 8.7 8.9   GLUCOSE mg/dL 102* 91 69     Lab Results   Component Value Date    GWOWMEUV49 327 09/25/2018     Lab Results   Component Value Date    TSH 1.010 09/24/2018         Medication Review: reviewed, no changes made    Assessment/Plan   Ms. Chakraborty is a 57yo with COPD, tobacco abuse and cardiomyopathy (now resolved) who presented on 9/24 with sudden onset left side weakness and a fall. She was also noted to have neglect and vision loss on the left. She had an NIHSS of 3. Imaging revealed a distal Right M1 occlusion. She was given IV thrombolysis and improved neurologically. Considering her improving symptoms she was not an endovascular candidate. Repeat arterial imaging revealed that the Right M1 thrombus was persistent but she has remained neurologically stable. The MRI revealed bilateral infarcts of different ages. The etiology is likely cardioembolic, cardiomyopathy resolved, ? Atrial fib. Per nursing the ZARINA was negative. The patient is going for a LINQ today.     Continue ASA and Plavix for now, on no antithrombotics at home. Dr. King recommends anticoagualtion for 3 months, starting day 5 92/29/18, and if no afib on LINQ then okay to switch back to a single antiplatelet.  Begin to mobilize, PT/OT/ST. CCP for D/C planning.     Plan:   - CT head in the morning    - continue ASA and plavix until anticoagulation started   - we recommend anticoagulation starting 9/29/18 for 3 months and if LINQ negative then okay to put back on a single antiplatelet    - Lipitor 80   - B12 replacement     - Neurochecks   - Non-pharmacological DVT prophylaxis   - EKG Tele   - PT/OT/ST   - Stroke Education   - Blood pressure control to <130/80   - Goal LDL <70-recommend high dose statins-    - Serum glucose < 140   - CCP for D/C planning  Active Problems:    Cerebrovascular accident (CVA) (CMS/HCC)      I have discussed the above with the patient and family     SHANKAR Jones  18  10:20 AM            Electronically signed by Raeann Alexis APRN at 2018 10:21 AM     Emi Boston MD at 2018  8:53 AM          [facility]  Progress note: 2018    Patient Identification:  Name:Scarlet Chakraborty  Age:58 y.o.  Sex: female  :  1960  MRN: [id number]           CC:  Follow-up of stroke, transient cardiomyopthy    Interval history:  Vitals stable overnight.  No chest pain, shortness of breath, palpitations    Vital Signs:   Temp:  [98.4 °F (36.9 °C)-98.8 °F (37.1 °C)] 98.4 °F (36.9 °C)  Heart Rate:  [68-87] 71  Resp:  [6-18] 16  BP: ()/(42-71) 106/42  No intake or output data in the 24 hours ending 18 0853    Physical Examination:    General Appearance No acute distress   Neck No adenopathy, supple, trachea midline, no thyromegaly, no carotid bruit, no JVD   Lungs Clear to auscultation,respirations regular, even and unlabored   Heart Regular rhythm and normal rate, normal S1 and S2, no murmur, no gallop, no rub, no click.  Leg incision site dressed   Chest wall No abnormalities observed   Abdomen Normal bowel sounds, no masses, no hepatomegaly, soft   Extremities Moves all extremities well, no edema, no cyanosis, no redness   Neurological Alert and oriented x 3     Lab Review:  Personally reviewed the labs, radiology imaging and other cardiac procedures.   Results from last 7 days  Lab Units 18  0354   SODIUM mmol/L 145   POTASSIUM mmol/L 3.7   CHLORIDE mmol/L 110*   CO2 mmol/L 25.0   BUN mg/dL 9   CREATININE mg/dL 0.58   CALCIUM mg/dL 8.7   GLUCOSE mg/dL 102*        Results from last 7 days  Lab Units 09/24/18  1106   TROPONIN T ng/mL <0.010     )  Results from last 7 days  Lab Units 09/27/18  0354 09/26/18  0348 09/25/18  0419   WBC 10*3/mm3 7.39 7.52 7.91   HEMOGLOBIN g/dL 12.6 12.9 11.7*   HEMATOCRIT % 39.9 41.5 37.0   PLATELETS 10*3/mm3 247 247 206       Results from last 7 days  Lab Units 09/24/18  1147   INR  0.97   APTT seconds 23.4       Medication Review:   Meds reviewed  Scheduled Meds:  aspirin 81 mg Oral Daily   atorvastatin 80 mg Oral Nightly   clopidogrel 75 mg Oral Daily   ipratropium-albuterol 3 mL Nebulization 4x Daily - RT   lisinopril 2.5 mg Oral Daily   thiamine 100 mg Oral Once   vitamin B-12 1,000 mcg Oral Daily     I personally viewed and interpreted the patient's EKG/Telemetry data    Assessment and Plan  1.  Acute stroke.    ZARINA with out evidence of cardiomyopathy first.  Linq in place.  Noted plans for repeat CT scan by neurology on 9/29/2018 with plans for anticoagulation if no evidence of bleeding at that time and to continue for 3 months.  If length device is negative in the interim, we'll transition to single antiplatelet therapy such as Plavix at that point.  Defer choice of anticoagulant therapy to neurology.   2.  Tansient nonischemic cardiomyopathy, likely Takotsubo variant and significantly improved already.  Blood pressure remains still too low to add beta blocker therapy.  3.  Severe COPD with recent exacerbation   4.  History of regular alcohol use.  Again I once again recommended abstinence  5.  History of nicotine use.  She has been smoke free since her last admission       CV status stable. F/p with me in office on 10/10/2018.  We'll sign off.    Emi Boston  9/28/20188:53 AM  25min spent in reviewing records, discussion and examination of the patient and discussion with other members of the patient's medical team.     Dictated utilizing Dragon dictation      Electronically signed by Emi Boston MD at 9/28/2018  1:21 PM           "  Discharge Summary      Flaco Aguayo MD at 2018  4:12 PM                                                                            PHYSICIAN DISCHARGE SUMMARY                                                                          [facility]      Patient Identification:    Name: Scarlet Chakraborty  Age: 58 y.o.  Sex: female  :  1960  MRN: [id number]    Admit date: 2018    Discharge date 2018    Discharged Condition: Stable    Discharge Diagnoses:   Left MCA territory CVA status post TPA   Stress Cardiomyopathy EF - 35% improved to 72%  Suspected Embolic Stroke - Started on eliquis    COPD not in exac   Tobacco Abuse   Alcohol Abuse     HPI  \" Patient is a 58 y.o. female.  Whom around 9:30 today had some difficulty speaking on the fall with some left-sided weakness.  She's never had anything similar her symptoms have completely resolved.  She was evaluated by the stroke team in the emergency room she had an NIH stroke scale apparently of 3 on arrival to the head was negative for acute stroke but CT of the head and neck demonstrated distal right M1 MCA occlusive thrombus that she was administered TPA.  No prior history of seizures or strokes.  She was recently in the hospital with an exacerbation of COPD     She has fairly significant COPD also was found to have acute systolic CHF she underwent a left heart catheterization was negative and it was felt to be a Takotsubo cardiomyopathy he also had some elevated transaminases when she came in and diffuse EKG changes.\"    Consults:   Patient Care Team:  MARILYN Fernandez MD as PCP - General (General Practice)    Procedures Performed:  Procedure(s):  Loop insertion  LINQ       Hospital Course:     Pt was admitted to the ICU. She was given Tpa and we noted complete resolution of symptoms. Cardiology and neurology consulted and rec'd to start AC with eliquis @ dc due to concern for embolic stroke. She will need to be off " antiplatelets while on AC. She will restart home meds and inhalers as below. Started on high dose lipitor as well. F/u with stroke and cards as o/p I will see her in clinic in a month for COPD.     LINQ device @ DC     Objective:   Temp:  [97.6 °F (36.4 °C)-98.5 °F (36.9 °C)] 98.5 °F (36.9 °C)  Heart Rate:  [71-81] 79  Resp:  [16-18] 18  BP: ()/(43-58) 93/57   SpO2:  [95 %-100 %] 100 %  on    Device (Oxygen Therapy): room air    Intake/Output Summary (Last 24 hours) at 09/29/18 1613  Last data filed at 09/28/18 2105   Gross per 24 hour   Intake              240 ml   Output                0 ml   Net              240 ml     Body mass index is 16.2 kg/m².  1    09/26/18  0500 09/28/18  0524 09/29/18  0533   Weight: 43.3 kg (95 lb 7.4 oz) 42.8 kg (94 lb 5.7 oz) 42.8 kg (94 lb 5.7 oz)     Weight change: 0 kg (0 lb)      Physical Exam:  Constitutional: No acute distress  Head: - NCAT  Eyes: No pallor, Anicteric conjunctiva, EOMI.  ENMT:  Mallampati 1 ,No oral thrush. No palpable cervical LApathy  Heart: RRR, no murmur  Lungs: WANDY +, Distant BS, No wheezes/ crackles heard    Abdomen: Soft. No tenderness, guarding or rigidity  Extremities: No cyanosis or clubbing.No Pitting edema  Neuro: Conscious, alert, oriented x3, no gross focal neuro deficits        Significant Discharge Diagnostics     Pertinent Lab Results:    Results from last 7 days  Lab Units 09/27/18  0354 09/26/18  0348 09/25/18  0419 09/24/18  1205 09/24/18  1106   SODIUM mmol/L 145 143 141  --  140   POTASSIUM mmol/L 3.7 3.8 4.4  --  4.3   CHLORIDE mmol/L 110* 108* 105  --  103   CO2 mmol/L 25.0 24.0 23.4  --  27.9   BUN mg/dL 9 9 11  --  13   CREATININE mg/dL 0.58 0.55* 0.59 0.70 0.73   GLUCOSE mg/dL 102* 91 69  --  95   CALCIUM mg/dL 8.7 8.7 8.9  --  9.1       Results from last 7 days  Lab Units 09/24/18  1106   TROPONIN T ng/mL <0.010       Results from last 7 days  Lab Units 09/27/18  0354 09/26/18  0348 09/25/18  0419 09/24/18  1147   WBC 10*3/mm3  7.39 7.52 7.91 9.50   HEMOGLOBIN g/dL 12.6 12.9 11.7* 14.5   HEMATOCRIT % 39.9 41.5 37.0 45.0   PLATELETS 10*3/mm3 247 247 206 258   MCV fL 97.6 97.4 96.9 96.8   MCH pg 30.8 30.3 30.6 31.2   MCHC g/dL 31.6* 31.1* 31.6* 32.2*   RDW % 13.4* 13.2* 13.5* 13.4*   RDW-SD fl 48.2 46.9 48.1 47.2   MPV fL 10.7 10.7 11.8 10.7   NEUTROPHIL % %  --  59.3 62.0 68.4   LYMPHOCYTE % %  --  27.9 26.3 17.2*   MONOCYTES % %  --  8.2 8.5 10.9   EOSINOPHIL % %  --  3.9 2.9 3.4   BASOPHIL % %  --  0.4 0.3 0.1   IMM GRAN % %  --  0.3 0.3 0.5   NEUTROS ABS 10*3/mm3  --  4.46 4.91 6.50   LYMPHS ABS 10*3/mm3  --  2.10 2.08 1.63   MONOS ABS 10*3/mm3  --  0.62 0.67 1.04   EOS ABS 10*3/mm3  --  0.29 0.23 0.32   BASOS ABS 10*3/mm3  --  0.03 0.02 0.01   IMMATURE GRANS (ABS) 10*3/mm3  --  0.02 0.02 0.05*   NRBC /100 WBC  --   --  0.0  --        Results from last 7 days  Lab Units 09/24/18  1147   INR  0.97   APTT seconds 23.4           Results from last 7 days  Lab Units 09/25/18  0419   CHOLESTEROL mg/dL 168   TRIGLYCERIDES mg/dL 125   HDL CHOL mg/dL 53           Results from last 7 days  Lab Units 09/24/18  1106   TSH mIU/mL 1.010                                       Imaging Results:  Imaging Results (all)     Procedure Component Value Units Date/Time    CT Head Without Contrast [[id number]] Collected:  09/29/18 0809     Updated:  09/29/18 0820    Narrative:       CT HEAD WO CONTRAST-     INDICATIONS: Stroke     TECHNIQUE: Radiation dose reduction techniques were utilized, including  automated exposure control and exposure modulation based on body size.      Noncontrast head CT     COMPARISON: 9/25/2018     FINDINGS:              No acute intracranial hemorrhage, midline shift or mass effect. Interval  increase in conspicuity of infarct changes predominantly in the right  frontal lobe and insula. Associated edema in the right frontal lobe  slightly effaces the anterior horn of the right lateral ventricle.           Ventricles, cisterns, cerebral  sulci are otherwise unremarkable for  patient age.     The visualized paranasal sinuses, orbits, mastoid air cells are  unremarkable.                   Impression:              No acute intracranial hemorrhage or hydrocephalus. Increased  conspicuity of previously demonstrated infarct changes in right MCA  distribution.     This report was finalized on 9/29/2018 8:17 AM by Dr. Wilber Florence M.D.       CT Angiogram Chest With Contrast [[id number]] Collected:  09/24/18 1240     Updated:  09/26/18 1315    Narrative:       CT ANGIOGRAM CHEST WITH CONTRAST, PULMONARY EMBOLISM PROTOCOL     HISTORY: Syncope. Abnormal ECG. Shortness of breath. Rule out pulmonary  embolism.     TECHNIQUE: Spiral CT images were obtained from the lung apices to the  diaphragmatic domes following administration of intravenous contrast in  the angiographic phase. Coronal, sagittal and 3-D volume rendered  reformats were then obtained.     COMPARISON: Chest radiograph performed same day.     FINDINGS: The pulmonary arteries are well opacified with intravenous  contrast. No convincing filling defects are identified within the  pulmonary artery to suggest pulmonary artery, embolism. No pleural or  pericardial effusion is seen. No pathological mediastinal  lymphadenopathy. The central airways are patent without endobronchial  lesion. Small amount of dependent secretions are seen within the  trachea. Lung windows demonstrate advanced background emphysematous  change that is upper lobe predominant. No suspicious airspace disease is  present.     No pathological axillary lymphadenopathy. The visualized upper abdomen  is unremarkable.        Impression:       1. No convincing evidence of pulmonary artery thromboembolism.  2. Severe emphysema.     These findings were discussed with Dr. Naga Kilpatrick by telephone.     Radiation dose reduction techniques were utilized, including automated  exposure control and exposure modulation based on body size.      This report was finalized on 9/26/2018 1:12 PM by Dr. Yeimy Ayala M.D.       MRI Brain With & Without Contrast [[id number]] Collected:  09/24/18 1705     Updated:  09/25/18 1543    Narrative:       MRI EXAMINATION OF THE BRAIN WITH AND WITHOUT CONTRAST     HISTORY: Stroke.     TECHNIQUE: A MRI examination of the brain was performed before and after  the intravenous administration of contrast utilizing sagittal T1, axial  diffusion, T1, T2, T2 FLAIR, gradient echo T2, sagittal, axial and  coronal T1 postcontrast weighted sequences.     FINDINGS: The diffusion sequence demonstrates increased signal intensity  involving the insular cortex, gyriform as well as involving the anterior  aspect of the operculum. There is gyriform increased signal intensity on  the diffusion sequence related to the superior aspect of the right  temporal lobe posteriorly. The areas of restricted diffusion  corresponding to areas of decreased attenuation noted on the prior CT  examination. Axial T1 sequence demonstrates increased signal intensity  involving the proximal aspect of the posterior division of the right  middle cerebral artery. There is increased signal intensity on the T2  FLAIR sequence consistent with slow flow within the sylvian branches of  the posterior division of the right middle cerebral artery. After  contrast administration, there was vascular enhancement involving the  posterior division of the right middle cerebral artery.     The axial T2 FLAIR sequence also demonstrates increased signal intensity  involving the white matter of the parietal lobes bilaterally, more  prominent to the left. A small subcortical area of increased signal  intensity was appreciated involving the left occipital lobe posteriorly  on the T2 FLAIR sequence. This measures approximately 8 mm in AP  dimension and corresponds to the area of increased attenuation noted on  the CT examination which was performed immediately prior to the  MRI  examination. There is no evidence of signal loss on the gradient echo T2  sequence at this location. After contrast administration, there is mild  enhancement at the same location.       Impression:       1. There is no evidence of intracranial hemorrhage. There is an area of  enhancement involving the subcortical white matter of the left frontal  lobe suggesting a subacute infarct. There is increased signal intensity  on the diffusion sequence at this location but with no convincing signal  loss on the ADC map. The presence of infarcts in multiple vascular  distributions and of different ages suggest a proximal or cardiac  source.  2. There is abnormal signal within the branches of the posterior  division of the right middle cerebral artery consistent with slow flow.  3. Areas of acute infarction including the insular cortex on the right,  superior aspect of the right temporal lobe posteriorly and to a lesser  extent operculum.  4. Mild small vessel ischemic disease.     The findings were called to and discussed with both Dr. Green and Dr. King prior to the dictation.     This report was finalized on 9/25/2018 3:40 PM by Dr. Ellis Amador M.D.       CT Angiogram Head With & Without Contrast [[id number]] Collected:  09/24/18 1626     Updated:  09/25/18 1543    Narrative:       CT ANGIOGRAM OF THE HEAD WITH CONTRAST     HISTORY: Stroke.     TECHNIQUE: A CT angiogram of the head was performed prior to the  intravenous administration of contrast. However, residual contrast from  the recent CT examinations remains. This limits evaluation somewhat.     FINDINGS: The initial CT examination demonstrates an ovoid area of  increased attenuation involving the occipital lobe on the left  posteriorly measuring 7 mm x 3 mm x 4 mm in size. The appearance is  nonspecific. This may represent an area of enhancement or potentially  subcortical hemorrhage. There is no subjacent edema. There was no  evidence of hemorrhage on  the CT examination from earlier the same day.     There is decreased attenuation appreciated involving the insular cortex  on the right.     TECHNIQUE: A CT angiogram of the head was performed. Multiplanar as well  as 3-dimensional reconstructions were also generated.     FINDINGS: The initial CT angiogram of the head demonstrated thrombus  within the distal aspect of the right M1 segment and proximal aspect of  the posterior division of the right middle cerebral artery. The distal  M1 segment appears improved and there may be slight recanalization of  the most proximal 2 mm of the posterior division but thrombus still  remains within the posterior division of the right middle cerebral  artery proximally.  The thrombus measures approximately 9 mm in size. No  new thrombus or occlusion is identified. The standard postcontrast CT  examination of the brain demonstrated an ovoid area of increased  attenuation involving the subcortical white matter of the left occipital  lobe.       Impression:       1. Persistent thrombus within the proximal aspect of the posterior  division of the right middle cerebral artery measuring approximately 9  mm in length. The thrombus within the most distal aspect of the right M1  segment has diminished.  2. There is an ovoid area of increased attenuation involving the  subcortical white matter of the left frontal lobe posteriorly. This may  represent an area of enhancement. Hemorrhage cannot be entirely  excluded.     The above information was called to and discussed with Dr. King at 1520  hours. The information was then called to Dr. Green.      Radiation dose reduction techniques were utilized, including automated  exposure control and exposure modulation based on body size.     This report was finalized on 9/25/2018 3:40 PM by Dr. Ellis Amador M.D.       CT Angiogram Neck With & Without Contrast [[id number]] Collected:  09/24/18 1403     Updated:  09/25/18 1541    Narrative:       CT  ANGIOGRAM NECK AND HEAD WITH CONTRAST AND CT PERFUSION WITH CONTRAST     HISTORY: Left arm weakness, abnormal head CT.     The patient was brought back to the scanner and a CT perfusion  performed.     CT PERFUSION: CT perfusion is abnormal with a large area of delayed  perfusion. 4 slices were obtained with abnormal perfusion scan. The area  of perfusion is likely larger. The visualized volume is 85 millimeters.  This involves the lateral aspect of the right frontal, parietal and  temporal lobes and is consistent with a right MCA distribution thrombus.  There is decreased cerebral blood flow with a volume of 19 mL involving  the right MCA vascular distribution.     CT ANGIOGRAM NECK AND HEAD WITH CONTRAST: A CT angiogram of the neck and  head was performed. Multiplanar as well as 3-dimensional reconstructions  were also generated.     FINDINGS: Severe emphysematous disease involving the lung apices are  noted bilaterally.     The great vessels are arranged in a classic configuration. There is mild  calcified plaque appreciated involving the right internal carotid artery  proximally but with 0% stenosis using NASCET criteria. The distal  aspects of the internal carotid arteries are of normal caliber. The left  A1 segment is mildly hypoplastic. There is thrombus present within the  right middle cerebral artery beginning at the distal right M1 segment  and extending into the posterior division of the right middle cerebral  artery. The thrombus measures approximately 1 cm in length. There is  decreased perfusion involving the right middle cerebral artery of  vascular distribution most prominent laterally. The left middle cerebral  artery is of normal caliber.     Both vertebral arteries were opacified. The vertebral arteries are  codominant. The basilar artery and the proximal aspects of the posterior  cerebral arteries appear unremarkable. A standard postcontrast CT  examination of brain showed no evidence of abnormal  enhancement.     The CT angiogram was made available for interpretation at 1201 hours.  The results of the CT angiogram and the CT perfusion were called to Dr. King at 1201 hours while the patient was still on the scanner.        Radiation dose reduction techniques were utilized, including automated  exposure control and exposure modulation based on body size.     This report was finalized on 9/25/2018 3:38 PM by Dr. Ellis Amador M.D.       CT Angiogram Head With & Without Contrast [[id number]] Collected:  09/24/18 1403     Updated:  09/25/18 1541    Narrative:       CT ANGIOGRAM NECK AND HEAD WITH CONTRAST AND CT PERFUSION WITH CONTRAST     HISTORY: Left arm weakness, abnormal head CT.     The patient was brought back to the scanner and a CT perfusion  performed.     CT PERFUSION: CT perfusion is abnormal with a large area of delayed  perfusion. 4 slices were obtained with abnormal perfusion scan. The area  of perfusion is likely larger. The visualized volume is 85 millimeters.  This involves the lateral aspect of the right frontal, parietal and  temporal lobes and is consistent with a right MCA distribution thrombus.  There is decreased cerebral blood flow with a volume of 19 mL involving  the right MCA vascular distribution.     CT ANGIOGRAM NECK AND HEAD WITH CONTRAST: A CT angiogram of the neck and  head was performed. Multiplanar as well as 3-dimensional reconstructions  were also generated.     FINDINGS: Severe emphysematous disease involving the lung apices are  noted bilaterally.     The great vessels are arranged in a classic configuration. There is mild  calcified plaque appreciated involving the right internal carotid artery  proximally but with 0% stenosis using NASCET criteria. The distal  aspects of the internal carotid arteries are of normal caliber. The left  A1 segment is mildly hypoplastic. There is thrombus present within the  right middle cerebral artery beginning at the distal right M1  segment  and extending into the posterior division of the right middle cerebral  artery. The thrombus measures approximately 1 cm in length. There is  decreased perfusion involving the right middle cerebral artery of  vascular distribution most prominent laterally. The left middle cerebral  artery is of normal caliber.     Both vertebral arteries were opacified. The vertebral arteries are  codominant. The basilar artery and the proximal aspects of the posterior  cerebral arteries appear unremarkable. A standard postcontrast CT  examination of brain showed no evidence of abnormal enhancement.     The CT angiogram was made available for interpretation at 1201 hours.  The results of the CT angiogram and the CT perfusion were called to Dr. King at 1201 hours while the patient was still on the scanner.        Radiation dose reduction techniques were utilized, including automated  exposure control and exposure modulation based on body size.     This report was finalized on 9/25/2018 3:38 PM by Dr. Ellis Amador M.D.       CT Cerebral Perfusion With & Without Contrast [[id number]] Collected:  09/24/18 1403     Updated:  09/25/18 1541    Narrative:       CT ANGIOGRAM NECK AND HEAD WITH CONTRAST AND CT PERFUSION WITH CONTRAST     HISTORY: Left arm weakness, abnormal head CT.     The patient was brought back to the scanner and a CT perfusion  performed.     CT PERFUSION: CT perfusion is abnormal with a large area of delayed  perfusion. 4 slices were obtained with abnormal perfusion scan. The area  of perfusion is likely larger. The visualized volume is 85 millimeters.  This involves the lateral aspect of the right frontal, parietal and  temporal lobes and is consistent with a right MCA distribution thrombus.  There is decreased cerebral blood flow with a volume of 19 mL involving  the right MCA vascular distribution.     CT ANGIOGRAM NECK AND HEAD WITH CONTRAST: A CT angiogram of the neck and  head was performed.  Multiplanar as well as 3-dimensional reconstructions  were also generated.     FINDINGS: Severe emphysematous disease involving the lung apices are  noted bilaterally.     The great vessels are arranged in a classic configuration. There is mild  calcified plaque appreciated involving the right internal carotid artery  proximally but with 0% stenosis using NASCET criteria. The distal  aspects of the internal carotid arteries are of normal caliber. The left  A1 segment is mildly hypoplastic. There is thrombus present within the  right middle cerebral artery beginning at the distal right M1 segment  and extending into the posterior division of the right middle cerebral  artery. The thrombus measures approximately 1 cm in length. There is  decreased perfusion involving the right middle cerebral artery of  vascular distribution most prominent laterally. The left middle cerebral  artery is of normal caliber.     Both vertebral arteries were opacified. The vertebral arteries are  codominant. The basilar artery and the proximal aspects of the posterior  cerebral arteries appear unremarkable. A standard postcontrast CT  examination of brain showed no evidence of abnormal enhancement.     The CT angiogram was made available for interpretation at 1201 hours.  The results of the CT angiogram and the CT perfusion were called to Dr. King at 1201 hours while the patient was still on the scanner.        Radiation dose reduction techniques were utilized, including automated  exposure control and exposure modulation based on body size.     This report was finalized on 9/25/2018 3:38 PM by Dr. Ellis Amador M.D.       CT Head Without Contrast [[id number]] Collected:  09/25/18 1253     Updated:  09/25/18 1413    Narrative:       NONCONTRAST HEAD CT 09/25/2018     CLINICAL HISTORY: Follow-up stroke.      TECHNIQUE: Spiral CT images were obtained from the base of the skull to  the vertex without intravenous contrast and images were  reformatted and  are submitted in 3 mm thick axial CT section with brain algorithm and 2  mm thick sagittal and coronal reconstructions were performed.      This is correlated to an MRI of the brain from [facility]  yesterday, 09/24/2018 at 3:26 PM, CT angiogram of the head and neck  yesterday, 09/24/2018 at 3 PM.     FINDINGS: There is blurring of the gray-white matter differentiation  involving the majority of the right insular cortex and right frontal  temporal operculum extending into the right external capsule.  The area  is consistent with an acute infarct measuring 4.4 x 1.5 x 2.5 cm in  anterior posterior, medial lateral and cranial caudal dimension in the  right middle cerebral artery territory. There is an additional patchy  area of low-density in the adjacent anterior inferior lateral right  frontal lobe compatible with an acute infarct.  These were present on  yesterday's MRI of the brain.  The remainder of the brain parenchyma is  normal in attenuation.  The ventricles are normal in size.  There is no  midline shift.  No extra-axial fluid collections are identified. There  is no evidence of acute intracranial hemorrhage. Paranasal sinuses and  the mastoid air cells and middle ear cavities are clear.       Impression:       No significant change when compared to yesterday's MRI of  the brain 09/24/2018 at 3:26 PM. There is a 4.4 x 1.5 x 2.5 cm acute  infarct throughout the right insular cortex involving the right  frontotemporal operculum and external capsule within the distribution of  the frontal temporal branches of the right middle cerebral artery  territory.  The remainder of the head CT is within normal limits. There  is an additional 2.5 x 1 cm infarct in the anterior inferior lateral  right frontal lobe, also in the right middle cerebral artery territory.   The remainder of the head CT is within normal limits.     Radiation dose reduction techniques were utilized, including  automated  exposure control and exposure modulation based on body size.     This report was finalized on 9/25/2018 2:10 PM by Dr. Manfred Larson M.D.       CT Chest With Contrast [[id number]] Collected:  09/25/18 1248     Updated:  09/25/18 1257    Narrative:       CT CHEST WITHOUT CONTRAST     HISTORY: COPD     COMPARISON: September 24, 2018     FINDINGS:  Background emphysematous changes are noted. Chronic scarring is  identified within the right middle lobe and lingula. No suspicious  pulmonary nodules or masses are seen. The thyroid gland, trachea, and  esophagus are unremarkable. There is a trace pericardial effusion. This  appears stable when compared to yesterday's exam. The thoracic aorta  measures within normal size limits. There is no evidence of dissection.  Mediastinal lymph nodes do not appear pathologically enlarged.     Excreted contrast material is seen within the gallbladder. No acute  findings are seen within the upper abdomen. No aggressive osseous  abnormalities are seen.       Impression:       No significant interval change when compared to yesterday's exam.     Radiation dose reduction techniques were utilized, including automated  exposure control and exposure modulation based on body size.     This report was finalized on 9/25/2018 12:53 PM by Dr. Faby Quiros M.D.       CT Head Without Contrast [[id number]] Collected:  09/24/18 1219     Updated:  09/24/18 1703    Narrative:       CT HEAD WITHOUT CONTRAST     HISTORY: Left-sided weakness, fall, hit head.     TECHNIQUE: A noncontrasted CT examination of the brain was performed.     FINDINGS: The brain and ventricles are symmetrical. There is no evidence  of hemorrhage, hydrocephalus or of abnormal extra-axial fluid. There is  an area of decreased attenuation suggested, subtle, involving the  posterior aspect of the insular cortex of the right and the superior  aspect of the right temporal lobe. This may represent an area of  acute  infarction. It potentially may be artifactual secondary to be  beam-hardening artifact. Further evaluation could be performed with a CT  perfusion/CT angiography as well as MRI imaging of the brain.     The above information was called to and discussed with Dr. Kilpatrick prior  to the dictation.     Radiation dose reduction techniques were utilized, including automated  exposure control and exposure modulation based on body size.     This report was finalized on 9/24/2018 5:00 PM by Dr. Ellis Amador M.D.       XR Chest 2 View [[id number]] Collected:  09/24/18 1145     Updated:  09/24/18 1150    Narrative:       Chest radiograph     HISTORY: TIA     TECHNIQUE: Two PA and lateral radiographs     COMPARISON: Chest radiograph 9/13/2018     FINDINGS:  The lungs are hyperinflated otherwise clear. The hemidiaphragms are  flattened. There is no pleural effusion. No pneumothorax is seen. The  heart is normal in size.       Impression:       No findings of acute cardiopulmonary pathology. Findings of emphysema.     This report was finalized on 9/24/2018 11:47 AM by Dr. Shashank Nieto M.D.             Discharge Medications and Instructions:     Discharge Medications     Discharge Medications      New Medications      Instructions Start Date   apixaban 5 MG tablet tablet  Commonly known as:  ELIQUIS   5 mg, Oral, Every 12 Hours Scheduled      atorvastatin 80 MG tablet  Commonly known as:  LIPITOR   80 mg, Oral, Nightly      cyanocobalamin 1000 MCG tablet  Commonly known as:  VITAMIN B-12   1,000 mcg, Oral, Daily         Continue These Medications      Instructions Start Date   albuterol 108 (90 Base) MCG/ACT inhaler  Commonly known as:  PROVENTIL HFA;VENTOLIN HFA   2 puffs, Inhalation, Every 4 Hours PRN      ipratropium-albuterol 0.5-2.5 mg/3 ml nebulizer  Commonly known as:  DUO-NEB   3 mL, Nebulization, Every 4 Hours PRN      lisinopril 2.5 MG tablet  Commonly known as:  PRINIVIL,ZESTRIL   2.5 mg, Oral, Daily       umeclidinium-vilanterol 62.5-25 MCG/INH aerosol powder  inhaler  Commonly known as:  ANORO ELLIPTA   1 puff, Inhalation, Daily - RT             Disposition:  Home    Follow-up Information     Beulah Joshi APRN. Schedule an appointment as soon as possible for a visit in 3 month(s).    Specialty:  Neurology  Contact information:  [address]  [place] [address]  [phone]             Flaco Aguayo MD. Schedule an appointment as soon as possible for a visit in 1 month(s).    Specialty:  Pulmonary Disease  Contact information:  [address]  [place] [address]  [phone]             Emi Boston MD. Schedule an appointment as soon as possible for a visit in 2 week(s).    Specialty:  Cardiology  Contact information:  [address]  [place] [address]  [phone]             MARILYN Fernandez MD. Schedule an appointment as soon as possible for a visit in 1 week(s).    Specialty:  General Practice  Contact information:  [address]  [place] [address]  [phone]                   Total time spent discharging patient including evaluation, medication reconciliation, arranging follow up, and post hospitalization instructions and education total time exceeds 30 minutes.    Signed:  Flaco Aguayo MD  9/29/2018  4:13 PM    Electronically signed by Flaco Aguayo MD at 9/29/2018  4:18 PM

## 2018-10-03 ENCOUNTER — READMISSION MANAGEMENT (OUTPATIENT)
Dept: CALL CENTER | Facility: HOSPITAL | Age: 58
End: 2018-10-03

## 2018-10-03 NOTE — OUTREACH NOTE
Stroke Week 1 Survey      Responses   Facility patient discharged from?  [facility]   Does the patient have one of the following disease processes/diagnoses(primary or secondary)?  Stroke (TIA)   Is there a successful TCM telephone encounter documented?  No   Week 1 attempt successful?  Yes   Call start time  1403   Call end time  1412   Discharge diagnosis  CVA - TPA given - symptoms resolved   List who call center can speak with  Don shearer   Medication alerts for this patient  Eliquis    Meds reviewed with patient/caregiver?  Yes   Is the patient having any side effects they believe may be caused by any medication additions or changes?  No   Does the patient have all medications ordered at discharge?  Yes   Is the patient taking all medications as directed (includes completed medication regime)?  Yes   Does the patient have a primary care provider?   Yes   Does the patient have an appointment with their PCP within 7 days of discharge?  Greater than 7 days   Comments regarding PCP  Dr Fernandez   What is preventing the patient from scheduling follow up appointments within 7 days of discharge?  Earlier appointment not available   Nursing Interventions  Verified appointment date/time/provider, Advised patient to make appointment   Has the patient kept scheduled appointments due by today?  N/A   Has home health visited the patient within 72 hours of discharge?  N/A   Has all DME been delivered?  Yes   Psychosocial issues?  No   Does the patient require any assistance with activities of daily living such as eating, bathing, dressing, walking, etc.?  No   Does the patient have any residual symptoms from stroke/TIA?  No   Does the patient understand the diet ordered at discharge?  Yes   Did the patient receive a copy of their discharge instructions?  Yes   Nursing interventions  Reviewed instructions with patient, Educated on MyChart   What is the patient's perception of their health status since discharge?  Returned to  baseline/stable   Nursing interventions  Nurse provided patient education   Is the patient able to teach back FAST for Stroke?  Yes   Is the patient/caregiver able to teach back the risk factors for a stroke?  High blood pressure-goal below 120/80, Smoking, High Cholesterol, Excessive alcohol intake, History of Afib, History of TIAs, Sleep apnea, Physical inactivity and obesity   Is the patient/caregiver able to teach back signs and symptoms related to disease process for when to call PCP?  Yes   Is the patient/caregiver able to teach back signs and symptoms related to disease process for when to call 911?  Yes   If the patient is a current smoker, are they able to teach back resources for cessation?  -- [Has quit smoking]   Is the patient/caregiver able to teach back the hierarchy of who to call/visit for symptoms/problems? PCP, Specialist, Home health nurse, Urgent Care, ED, 911  Yes   Week 1 call completed?  Yes          Marie Pepe RN

## 2018-10-10 ENCOUNTER — OFFICE VISIT (OUTPATIENT)
Dept: CARDIOLOGY | Facility: CLINIC | Age: 58
End: 2018-10-10

## 2018-10-10 ENCOUNTER — CLINICAL SUPPORT NO REQUIREMENTS (OUTPATIENT)
Dept: CARDIOLOGY | Facility: CLINIC | Age: 58
End: 2018-10-10

## 2018-10-10 VITALS
WEIGHT: 98 LBS | HEIGHT: 64 IN | BODY MASS INDEX: 16.73 KG/M2 | SYSTOLIC BLOOD PRESSURE: 118 MMHG | HEART RATE: 71 BPM | DIASTOLIC BLOOD PRESSURE: 74 MMHG

## 2018-10-10 DIAGNOSIS — I51.81 STRESS-INDUCED CARDIOMYOPATHY: ICD-10-CM

## 2018-10-10 DIAGNOSIS — I63.9 CRYPTOGENIC STROKE (HCC): Primary | ICD-10-CM

## 2018-10-10 DIAGNOSIS — I63.311 CEREBROVASCULAR ACCIDENT (CVA) DUE TO THROMBOSIS OF RIGHT MIDDLE CEREBRAL ARTERY (HCC): ICD-10-CM

## 2018-10-10 DIAGNOSIS — I42.8 OTHER CARDIOMYOPATHY (HCC): Primary | ICD-10-CM

## 2018-10-10 PROCEDURE — 93000 ELECTROCARDIOGRAM COMPLETE: CPT | Performed by: INTERNAL MEDICINE

## 2018-10-10 PROCEDURE — 93291 INTERROG DEV EVAL SCRMS IP: CPT | Performed by: INTERNAL MEDICINE

## 2018-10-10 PROCEDURE — 99214 OFFICE O/P EST MOD 30 MIN: CPT | Performed by: INTERNAL MEDICINE

## 2018-10-10 NOTE — PROGRESS NOTES
Date of Office Visit: 10/10/2018  Encounter Provider: Emi Boston MD  Place of Service: [facility] CARDIOLOGY  Patient Name: Scarlet Chakraborty  :1960    Chief complaint  Follow-up of cardiomyopathy, stroke    History of Present Illness the patient is a 58-year-old female with history of modest alcohol use, nicotine use, COPD who presented to [facility] on  with acute respiratory failure, congestive heart failure and ST elevation myocardial infarction.  However cardiac catheterization showed an ejection fraction of 20% with normal coronary arteries.  Her echocardiogram showed an ejection fraction of 35% and she was felt to have probable Takotsubo cardiomyopathy.  Her blood pressure was too low for adequate goal-directed medical therapy and only lisinopril 2.5 mg a day was able to be utilized.  She also had severe bronchospasm from COPD exacerbation.  She was subsequently dismissed home but then presented on  with left-sided weakness, slurred speech and a fall.  She was felt to have had a embolic stroke for which she received TPA with good response.  She had minimal residual symptoms.  A transthoracic echocardiogram revealed normal systolic function with grade 1 diastolic dysfunction.  A transesophageal echocardiogram was unremarkable including a negative saline study.  She subsequently underwent Linq placement.    Since dismissal from hospital.  She her dyspnea on exertion is present.  Still slightly more than the baseline for her.  She denies any palpitations chest pain syncope near syncope.  Her blood pressure home is slow typically with systolics in the upper 90s.  She is ambulating around her home and did go to the ball game this weekend and noticed dyspnea when having to walk extensively.    Past Medical History:   Diagnosis Date   • Asthma    • COPD (chronic obstructive pulmonary disease) (CMS/HCC)    • Nonischemic cardiomyopathy (CMS/HCC)    •  Stroke (CMS/HCC)      Past Surgical History:   Procedure Laterality Date   • CARDIAC CATHETERIZATION N/A 9/13/2018    Procedure: Left Heart Cath and cors;  Surgeon: Gabino Dozier MD;  Location: [facility] CATH INVASIVE LOCATION;  Service: Cardiology   • CARDIAC CATHETERIZATION N/A 9/13/2018    Procedure: Left ventriculography;  Surgeon: Gabino Dozier MD;  Location: [facility] CATH INVASIVE LOCATION;  Service: Cardiology   • CARDIAC ELECTROPHYSIOLOGY PROCEDURE N/A 9/27/2018    Procedure: Loop insertion  LINQ;  Surgeon: Jose R Barker MD;  Location: [facility] CATH INVASIVE LOCATION;  Service: Cardiovascular     Outpatient Medications Prior to Visit   Medication Sig Dispense Refill   • albuterol (PROVENTIL HFA;VENTOLIN HFA) 108 (90 Base) MCG/ACT inhaler Inhale 2 puffs Every 4 (Four) Hours As Needed for Wheezing.     • apixaban (ELIQUIS) 5 MG tablet tablet Take 1 tablet by mouth Every 12 (Twelve) Hours. 60 tablet 0   • atorvastatin (LIPITOR) 80 MG tablet Take 1 tablet by mouth Every Night. 30 tablet 0   • cyanocobalamin (VITAMIN B-12) 1000 MCG tablet Take 1 tablet by mouth Daily. 30 tablet 0   • ipratropium-albuterol (DUO-NEB) 0.5-2.5 mg/3 ml nebulizer Take 3 mL by nebulization Every 4 (Four) Hours As Needed for Shortness of Air. 360 mL 0   • lisinopril (PRINIVIL,ZESTRIL) 2.5 MG tablet Take 1 tablet by mouth Daily. 30 tablet 0   • umeclidinium-vilanterol (ANORO ELLIPTA) 62.5-25 MCG/INH aerosol powder  inhaler Inhale 1 puff by mouth Daily. 60 each 0     No facility-administered medications prior to visit.        Allergies as of 10/10/2018   • (No Known Allergies)     Social History     Social History   • Marital status: Single     Spouse name: N/A   • Number of children: N/A   • Years of education: N/A     Occupational History   • Not on file.     Social History Main Topics   • Smoking status: Current Every Day Smoker     Packs/day: 0.50     Years: 15.00     Types: Cigarettes   • Smokeless tobacco: Not on file   • Alcohol  "use Yes      Comment: occassional   • Drug use: No   • Sexual activity: Defer     Other Topics Concern   • Not on file     Social History Narrative   • No narrative on file     History reviewed. No pertinent family history.  Review of Systems   Constitution: Negative for fever, malaise/fatigue, weight gain and weight loss.   HENT: Negative for ear pain, hearing loss, nosebleeds and sore throat.    Eyes: Negative for double vision, pain, vision loss in left eye and vision loss in right eye.   Cardiovascular:        See history of present illness.   Respiratory: Positive for cough and shortness of breath. Negative for sleep disturbances due to breathing, snoring and wheezing.    Endocrine: Negative for cold intolerance, heat intolerance and polyuria.   Skin: Negative for itching, poor wound healing and rash.   Musculoskeletal: Negative for joint pain, joint swelling and myalgias.   Gastrointestinal: Negative for abdominal pain, diarrhea, hematochezia, nausea and vomiting.   Genitourinary: Negative for hematuria and hesitancy.   Neurological: Positive for headaches. Negative for numbness, paresthesias and seizures.   Psychiatric/Behavioral: Negative for depression. The patient is not nervous/anxious.         Objective:     Vitals:    10/10/18 1353   BP: 118/74   Pulse: 71   Weight: 44.5 kg (98 lb)   Height: 162.6 cm (64\")     Body mass index is 16.82 kg/m².    Physical Exam   Constitutional: She is oriented to person, place, and time. She appears well-developed and well-nourished.   HENT:   Head: Normocephalic.   Nose: Nose normal.   Mouth/Throat: Oropharynx is clear and moist.   Eyes: Pupils are equal, round, and reactive to light. Conjunctivae and EOM are normal. Right eye exhibits no discharge. No scleral icterus.   Neck: Normal range of motion. Neck supple. No JVD present. No thyromegaly present.   Cardiovascular: Normal rate, regular rhythm, normal heart sounds and intact distal pulses.  Exam reveals no friction " rub.    No murmur heard.  Pulses:       Carotid pulses are 2+ on the right side, and 2+ on the left side.       Radial pulses are 2+ on the right side, and 2+ on the left side.        Femoral pulses are 2+ on the right side, and 2+ on the left side.       Popliteal pulses are 2+ on the right side, and 2+ on the left side.        Dorsalis pedis pulses are 2+ on the right side, and 2+ on the left side.        Posterior tibial pulses are 2+ on the right side, and 2+ on the left side.   Pulmonary/Chest: Effort normal and breath sounds normal. No respiratory distress. She has no wheezes. She has no rales.   Abdominal: Soft. Bowel sounds are normal. She exhibits no distension. There is no hepatosplenomegaly. There is no tenderness. There is no rebound.   Musculoskeletal: Normal range of motion. She exhibits no edema or tenderness.   Neurological: She is alert and oriented to person, place, and time.   Skin: Skin is warm and dry. No rash noted. No erythema.   Psychiatric: She has a normal mood and affect. Her behavior is normal. Judgment and thought content normal.   Vitals reviewed.    Lab Review:     ECG 12 Lead  Date/Time: 10/10/2018 2:02 PM  Performed by: MARYSOL MARTINEZ  Authorized by: MARYSOL MARTINEZ   Comparison: compared with previous ECG   Similar to previous ECG  Rhythm: sinus rhythm  Conduction comments: Nonspecific ST-T wave changes  Clinical impression: abnormal ECG          Assessment:       Diagnosis Plan   1. Other cardiomyopathy (CMS/HCC)  ECG 12 Lead   2. Stress-induced cardiomyopathy     3. Cerebrovascular accident (CVA) due to thrombosis of right middle cerebral artery (CMS/HCC)       Plan:       1.  Recent demand related non-ST elevation myocardial infarction with nonischemic cardiomyopathy, likely Takotsubo related.  Ejection fraction had improved/normalized though EKG still with significant ST-T wave changes.  We'll continue with lisinopril and again have recommended she not return to work for at least a  month.  We'll have her return for follow-up at that time.  In addition to return back to work full-time she'll also need neurology clearance given recent stroke  2.  Recent stroke felt to be embolic.  Linq device in place and she is on Eliquis.  If after 6 months there is no arrhythmia and ejection fraction remains preserved, can discontinue and change to coated aspirin  3.  Severe COPD    Heart Failure  Assessment  • NYHA class I - There is no limitation of physical activity. Physical activity does not cause fatigue, palpitations or shortness of breath.  • ACE inhibitor prescribed  • Left ventricular function is normal by qualitative assessment    Subjective/Objective  • The patient reports dyspnea    • Physical exam findings negative for rales, peripheral edema and elevated JVP.             Your medication list          Accurate as of 10/10/18 11:59 PM. If you have any questions, ask your nurse or doctor.               CONTINUE taking these medications      Instructions Last Dose Given Next Dose Due   albuterol 108 (90 Base) MCG/ACT inhaler  Commonly known as:  PROVENTIL HFA;VENTOLIN HFA      Inhale 2 puffs Every 4 (Four) Hours As Needed for Wheezing.       apixaban 5 MG tablet tablet  Commonly known as:  ELIQUIS      Take 1 tablet by mouth Every 12 (Twelve) Hours.       atorvastatin 80 MG tablet  Commonly known as:  LIPITOR      Take 1 tablet by mouth Every Night.       cyanocobalamin 1000 MCG tablet  Commonly known as:  VITAMIN B-12      Take 1 tablet by mouth Daily.       ipratropium-albuterol 0.5-2.5 mg/3 ml nebulizer  Commonly known as:  DUO-NEB      Take 3 mL by nebulization Every 4 (Four) Hours As Needed for Shortness of Air.       lisinopril 2.5 MG tablet  Commonly known as:  PRINIVIL,ZESTRIL      Take 1 tablet by mouth Daily.          STOP taking these medications    umeclidinium-vilanterol 62.5-25 MCG/INH aerosol powder  inhaler  Commonly known as:  ANORO ELLIPTA  Stopped by:  Emi Boston MD                Patient is no longer taking -.  I corrected the med list to reflect this.  I did not stop these medications.    Dictated utilizing Dragon dictation

## 2018-10-11 ENCOUNTER — READMISSION MANAGEMENT (OUTPATIENT)
Dept: CALL CENTER | Facility: HOSPITAL | Age: 58
End: 2018-10-11

## 2018-10-11 NOTE — OUTREACH NOTE
Stroke Week 2 Survey      Responses   Facility patient discharged from?  [facility]   Does the patient have one of the following disease processes/diagnoses(primary or secondary)?  Stroke (TIA)   Week 2 attempt successful?  No   Unsuccessful attempts  Attempt 1          Bertrand Ellis RN

## 2018-10-12 ENCOUNTER — READMISSION MANAGEMENT (OUTPATIENT)
Dept: CALL CENTER | Facility: HOSPITAL | Age: 58
End: 2018-10-12

## 2018-10-12 NOTE — OUTREACH NOTE
Stroke Week 2 Survey      Responses   Facility patient discharged from?  [facility]   Does the patient have one of the following disease processes/diagnoses(primary or secondary)?  Stroke (TIA)   Week 2 attempt successful?  Yes   Call start time  1357   Call end time  1359   Discharge diagnosis  CVA - TPA given - symptoms resolved   Is patient permission given to speak with other caregiver?  Yes   List who call center can speak with  Don friend   Meds reviewed with patient/caregiver?  Yes   Is the patient taking all medications as directed (includes completed medication regime)?  Yes   Does the patient have a primary care provider?   Yes   Has the patient kept scheduled appointments due by today?  Yes   What DME was ordered?  nebulizer via [facility]   Has all DME been delivered?  Yes   Psychosocial issues?  No   Does the patient require any assistance with activities of daily living such as eating, bathing, dressing, walking, etc.?  No   Does the patient have any residual symptoms from stroke/TIA?  No   Does the patient understand the diet ordered at discharge?  Yes   Did the patient receive a copy of their discharge instructions?  Yes   Nursing interventions  Reviewed instructions with patient   What is the patient's perception of their health status since discharge?  Improving   Nursing interventions  Nurse provided patient education   Is the patient able to teach back FAST for Stroke?  Yes   Is the patient/caregiver able to teach back the risk factors for a stroke?  High blood pressure-goal below 120/80, Diabetes, High Cholesterol, Physical inactivity and obesity   Week 2 call completed?  Yes          Yazmin Cano RN

## 2018-10-14 ENCOUNTER — CLINICAL SUPPORT NO REQUIREMENTS (OUTPATIENT)
Dept: CARDIOLOGY | Facility: CLINIC | Age: 58
End: 2018-10-14

## 2018-10-14 DIAGNOSIS — I63.9 CRYPTOGENIC STROKE (HCC): Primary | ICD-10-CM

## 2018-10-14 PROBLEM — I51.81 STRESS-INDUCED CARDIOMYOPATHY: Status: ACTIVE | Noted: 2018-10-14

## 2018-10-14 PROBLEM — J44.1 CHRONIC OBSTRUCTIVE PULMONARY DISEASE WITH ACUTE EXACERBATION (HCC): Status: ACTIVE | Noted: 2018-10-14

## 2018-10-17 ENCOUNTER — TELEPHONE (OUTPATIENT)
Dept: NEUROLOGY | Facility: CLINIC | Age: 58
End: 2018-10-17

## 2018-10-17 NOTE — TELEPHONE ENCOUNTER
Two Week Stroke Phone Call  Spoke with the patient    · Admission Date:  9/24/2018    · Discharge Date: 9/29/2018    · Discharge Destination: Home    · Meds reviewed with patient/caregiver?     [x]Yes [] No   o Cholesterol Reducing: Lipitor  o Understands purpose   [x]  Yes     []  No    o Understands how to take    [x]  Yes     []  No   o Anit-Coagulate: Eliquis  o Understands purpose   [x]  Yes     []  No    o Understands how to take    [x]  Yes     []  No      · Is the patient taking all medication as directed?    [x]  Yes  []  No    · Discussed personal risk factors     [x]  Yes []  No    o Smoking or other tobacco use  - Has attempted to quit smoking [x]  Yes     []  No   o High blood pressure   - Has been monitoring BP []  Yes     [x]  No  - Encouraged monitoring  - Bp goal <130/80   o High cholesterol   - Review desired LDL goal <70      • Discussed signs and symptoms of stroke and when patient to call 911?      [x]  Yes []  No  o Sudden weakness or numbness of the face, arm, or leg especially on one side of the body  o Sudden confusion, trouble speaking or understanding  o Sudden trouble seeing in one or both eyes   o Sudden trouble walking, dizziness, loss of balance or coordination  o Sudden severe headaches with no known cause      Notified Patient that if any of these symptoms occur to call 911    · Does the patient have any new signs or symptoms of a stroke?     []  Yes     [x]  No    · Does the patietnt have an appointment with PCP?    []  Yes     [x]  No     Encouraged following up with her PCP     · Does the patient have 3 month Stroke Clinic appointment?   [x]  Yes     []  No    With L.Q 12/18/18    · Is the patient currently in therapy, outpatient, or home health?    []  Yes     [x]  No    Needs a referral?  []  Yes     [x]  No      Patient Satisfaction     · How often were you kept up to date with your plan of care?  []  Never  [] Sometimes  [x] Usually  [] Always    · When test were ordered how  often did someone explain to you the results?   []  Never  [] Sometimes  [x] Usually  [] Always    · What suggestions does the patient have of ways to improve the care to stroke patients?    No suggestions at this time       · Overall how satisfied were you with the stroke care you received at [facility]?   []  Very Dissatisfied [] Dissatisfied   [x] Satisfied [] Very Satisfied

## 2018-10-20 ENCOUNTER — READMISSION MANAGEMENT (OUTPATIENT)
Dept: CALL CENTER | Facility: HOSPITAL | Age: 58
End: 2018-10-20

## 2018-10-20 NOTE — OUTREACH NOTE
Stroke Week 3 Survey      Responses   Facility patient discharged from?  [place]   Does the patient have one of the following disease processes/diagnoses(primary or secondary)?  Stroke (TIA)   Week 3 attempt successful?  No   Unsuccessful attempts  Attempt 1          Odette Jay RN

## 2018-10-22 ENCOUNTER — READMISSION MANAGEMENT (OUTPATIENT)
Dept: CALL CENTER | Facility: HOSPITAL | Age: 58
End: 2018-10-22

## 2018-10-22 NOTE — OUTREACH NOTE
Stroke Week 3 Survey      Responses   Facility patient discharged from?  [facility]   Does the patient have one of the following disease processes/diagnoses(primary or secondary)?  Stroke (TIA)   Week 3 attempt successful?  No   Unsuccessful attempts  Attempt 2          Odette Jay RN

## 2018-10-29 RX ORDER — LISINOPRIL 2.5 MG/1
2.5 TABLET ORAL DAILY
Qty: 30 TABLET | Refills: 3 | Status: SHIPPED | OUTPATIENT
Start: 2018-10-29 | End: 2019-06-03

## 2018-11-07 ENCOUNTER — OFFICE VISIT (OUTPATIENT)
Dept: CARDIOLOGY | Facility: CLINIC | Age: 58
End: 2018-11-07

## 2018-11-07 VITALS
SYSTOLIC BLOOD PRESSURE: 112 MMHG | HEART RATE: 75 BPM | HEIGHT: 64 IN | WEIGHT: 98 LBS | DIASTOLIC BLOOD PRESSURE: 68 MMHG | BODY MASS INDEX: 16.73 KG/M2

## 2018-11-07 DIAGNOSIS — I51.81 STRESS-INDUCED CARDIOMYOPATHY: ICD-10-CM

## 2018-11-07 DIAGNOSIS — I63.311 CEREBROVASCULAR ACCIDENT (CVA) DUE TO THROMBOSIS OF RIGHT MIDDLE CEREBRAL ARTERY (HCC): ICD-10-CM

## 2018-11-07 DIAGNOSIS — I42.8 OTHER CARDIOMYOPATHY (HCC): Primary | ICD-10-CM

## 2018-11-07 DIAGNOSIS — J44.1 CHRONIC OBSTRUCTIVE PULMONARY DISEASE WITH ACUTE EXACERBATION (HCC): ICD-10-CM

## 2018-11-07 PROCEDURE — 99213 OFFICE O/P EST LOW 20 MIN: CPT | Performed by: INTERNAL MEDICINE

## 2018-11-07 PROCEDURE — 93000 ELECTROCARDIOGRAM COMPLETE: CPT | Performed by: INTERNAL MEDICINE

## 2018-11-07 NOTE — PROGRESS NOTES
Date of Office Visit: 2018  Encounter Provider: Emi Boston MD  Place of Service: [facility] CARDIOLOGY  Patient Name: Scarlet Chakraborty  :1960    Chief complaint  Follow-up of cardiomyopathy, stroke    History of Present Illness  The patient is a 58-year-old female with history of modest alcohol use, nicotine use, COPD who presented to [facility] on  with acute respiratory failure, congestive heart failure and ST elevation myocardial infarction.  However cardiac catheterization showed an ejection fraction of 20% with normal coronary arteries.  Her echocardiogram showed an ejection fraction of 35% and she was felt to have probable Takotsubo cardiomyopathy.  Her blood pressure was too low for adequate goal-directed medical therapy and only lisinopril 2.5 mg a day was able to be utilized.  She also had severe bronchospasm from COPD exacerbation.  She was subsequently dismissed home but then presented on  with left-sided weakness, slurred speech and a fall.  She was felt to have had a embolic stroke for which she received TPA with good response.  She had minimal residual symptoms.  A transthoracic echocardiogram revealed normal systolic function with grade 1 diastolic dysfunction.  A transesophageal echocardiogram was unremarkable including a negative saline study.  She subsequently underwent Linq placement.    Since last visit she still has dyspnea on exertion.  She denies any syncope near syncope chest pain or palpitations.    Past Medical History:   Diagnosis Date   • Asthma    • Cerebrovascular accident (CVA) due to thrombosis of right middle cerebral artery (CMS/HCC)    • COPD (chronic obstructive pulmonary disease) (CMS/HCC)    • Nonischemic cardiomyopathy (CMS/HCC)    • Stroke (CMS/HCC)      History reviewed. No pertinent surgical history.  Outpatient Medications Prior to Visit   Medication Sig Dispense Refill   • albuterol (PROVENTIL  HFA;VENTOLIN HFA) 108 (90 Base) MCG/ACT inhaler Inhale 2 puffs Every 4 (Four) Hours As Needed for Wheezing.     • atorvastatin (LIPITOR) 80 MG tablet Take 1 tablet by mouth Every Night. 30 tablet 0   • cyanocobalamin (VITAMIN B-12) 1000 MCG tablet Take 1 tablet by mouth Daily. 30 tablet 0   • ipratropium-albuterol (DUO-NEB) 0.5-2.5 mg/3 ml nebulizer Take 3 mL by nebulization Every 4 (Four) Hours As Needed for Shortness of Air. 360 mL 0   • lisinopril (PRINIVIL,ZESTRIL) 2.5 MG tablet Take 1 tablet by mouth Daily. 30 tablet 3   • apixaban (ELIQUIS) 5 MG tablet tablet Take 1 tablet by mouth Every 12 (Twelve) Hours. 60 tablet 0     No facility-administered medications prior to visit.        Allergies as of 11/07/2018   • (No Known Allergies)     Social History     Socioeconomic History   • Marital status: Single     Spouse name: Not on file   • Number of children: Not on file   • Years of education: Not on file   • Highest education level: Not on file   Social Needs   • Financial resource strain: Not on file   • Food insecurity - worry: Not on file   • Food insecurity - inability: Not on file   • Transportation needs - medical: Not on file   • Transportation needs - non-medical: Not on file   Occupational History   • Not on file   Tobacco Use   • Smoking status: Current Every Day Smoker     Packs/day: 0.50     Years: 15.00     Pack years: 7.50     Types: Cigarettes   Substance and Sexual Activity   • Alcohol use: Yes     Comment: occassional   • Drug use: No   • Sexual activity: Defer   Other Topics Concern   • Not on file   Social History Narrative   • Not on file     History reviewed. No pertinent family history.  Review of Systems   Constitution: Negative for fever, malaise/fatigue, weight gain and weight loss.   HENT: Negative for ear pain, hearing loss, nosebleeds and sore throat.    Eyes: Negative for double vision, pain, vision loss in left eye and vision loss in right eye.   Cardiovascular:        See history of  "present illness.   Respiratory: Positive for cough and shortness of breath. Negative for sleep disturbances due to breathing, snoring and wheezing.    Endocrine: Negative for cold intolerance, heat intolerance and polyuria.   Skin: Negative for itching, poor wound healing and rash.   Musculoskeletal: Negative for joint pain, joint swelling and myalgias.   Gastrointestinal: Negative for abdominal pain, diarrhea, hematochezia, nausea and vomiting.   Genitourinary: Negative for hematuria and hesitancy.   Neurological: Negative for numbness, paresthesias and seizures.   Psychiatric/Behavioral: Negative for depression. The patient is not nervous/anxious.         Objective:     Vitals:    11/07/18 1314   BP: 112/68   Pulse: 75   Weight: 44.5 kg (98 lb)   Height: 162.6 cm (64\")     Body mass index is 16.82 kg/m².    Physical Exam   Constitutional: She is oriented to person, place, and time. She appears well-developed and well-nourished.   HENT:   Head: Normocephalic.   Nose: Nose normal.   Mouth/Throat: Oropharynx is clear and moist.   Eyes: Conjunctivae and EOM are normal. Pupils are equal, round, and reactive to light. Right eye exhibits no discharge. No scleral icterus.   Neck: Normal range of motion. Neck supple. No JVD present. No thyromegaly present.   Cardiovascular: Normal rate, regular rhythm, normal heart sounds and intact distal pulses. Exam reveals no friction rub.   No murmur heard.  Pulses:       Carotid pulses are 2+ on the right side, and 2+ on the left side.       Radial pulses are 2+ on the right side, and 2+ on the left side.        Femoral pulses are 2+ on the right side, and 2+ on the left side.       Popliteal pulses are 2+ on the right side, and 2+ on the left side.        Dorsalis pedis pulses are 2+ on the right side, and 2+ on the left side.        Posterior tibial pulses are 2+ on the right side, and 2+ on the left side.   Pulmonary/Chest: Effort normal and breath sounds normal. No respiratory " distress. She has no wheezes. She has no rales.   Abdominal: Soft. Bowel sounds are normal. She exhibits no distension. There is no hepatosplenomegaly. There is no tenderness. There is no rebound.   Musculoskeletal: Normal range of motion. She exhibits no edema or tenderness.   Neurological: She is alert and oriented to person, place, and time.   Skin: Skin is warm and dry. No rash noted. No erythema.   Psychiatric: She has a normal mood and affect. Her behavior is normal. Judgment and thought content normal.   Vitals reviewed.    Lab Review:     ECG 12 Lead  Date/Time: 11/7/2018 1:17 PM  Performed by: Emi Boston MD  Authorized by: Emi Boston MD   Comparison: compared with previous ECG   Similar to previous ECG  Rhythm: sinus rhythm  Conduction comments: Nonspecific ST T wave changes  Clinical impression: abnormal ECG          Assessment:       Diagnosis Plan   1. Other cardiomyopathy (CMS/HCC)  ECG 12 Lead   2. Stress-induced cardiomyopathy     3. Cerebrovascular accident (CVA) due to thrombosis of right middle cerebral artery (CMS/HCC)     4. Chronic obstructive pulmonary disease with acute exacerbation (CMS/HCC)       Plan:       1.  Recent demand related non-ST elevation myocardial infarction with nonischemic cardiomyopathy, likely Takotsubo related.  Ejection fraction had improved/normalized though EKG still with significant ST-T wave changes.  Clinically doing quite well though EKG still with significant ST-T wave changes.  I told her that she could do work from home and nonstressful manner for 3-4 hours a day from a cardiac standpoint.  We'll have her return for follow-up in 3 months  2.  Recent stroke felt to be embolic.  Linq device in place and she is on Eliquis.  If after 6 months there is no arrhythmia and ejection fraction remains preserved, can discontinue and change to coated aspirin  3.  Severe COPD     Heart Failure  Assessment  • NYHA class I - There is no limitation of physical activity.  Physical activity does not cause fatigue, palpitations or shortness of breath.  • ACE inhibitor prescribed  • Beta blocker not prescribed  • Diuretics not prescribed  • Angiotensin receptor blocker (ARB) not prescribed  • Calcium channel blockers not prescribed  • Left ventricular function is normal by qualitative assessment    Subjective/Objective  • The patient reports dyspnea    • Physical exam findings negative for rales, peripheral edema and elevated JVP.             Your medication list           Accurate as of 11/7/18 11:59 PM. If you have any questions, ask your nurse or doctor.               CONTINUE taking these medications      Instructions Last Dose Given Next Dose Due   albuterol 108 (90 Base) MCG/ACT inhaler  Commonly known as:  PROVENTIL HFA;VENTOLIN HFA      Inhale 2 puffs Every 4 (Four) Hours As Needed for Wheezing.       apixaban 5 MG tablet tablet  Commonly known as:  ELIQUIS      Take 1 tablet by mouth Every 12 (Twelve) Hours.       atorvastatin 80 MG tablet  Commonly known as:  LIPITOR      Take 1 tablet by mouth Every Night.       cyanocobalamin 1000 MCG tablet  Commonly known as:  VITAMIN B-12      Take 1 tablet by mouth Daily.       ipratropium-albuterol 0.5-2.5 mg/3 ml nebulizer  Commonly known as:  DUO-NEB      Take 3 mL by nebulization Every 4 (Four) Hours As Needed for Shortness of Air.       lisinopril 2.5 MG tablet  Commonly known as:  PRINIVIL,ZESTRIL      Take 1 tablet by mouth Daily.             Where to Get Your Medications      [facility] [address] [facility] [address] [facility] - [phone]  - [phone]   [address] [facility], [facility] [address]    Phone:  [phone]   · apixaban 5 MG tablet tablet         Patient is no longer taking -.  I corrected the med list to reflect this.  I did not stop these medications.    Dictated utilizing Dragon dictation

## 2018-11-15 ENCOUNTER — TELEPHONE (OUTPATIENT)
Dept: CARDIOLOGY | Facility: CLINIC | Age: 58
End: 2018-11-15

## 2018-11-15 PROBLEM — I42.9 MYOCARDIOPATHY: Status: ACTIVE | Noted: 2018-10-14

## 2018-11-15 NOTE — TELEPHONE ENCOUNTER
11/15/18  Pt left vmsg - states she needs doctor's notes faxed for her short term disability to update her status.  Originally sent in October, but states Dr. Boston told her ok to work part time at home.  So they are asking for update regarding that.  Should fax these to [facility]; Attn:  [facility], Fax [phone].  Patient's ph is [phone]/luisa

## 2018-11-15 NOTE — TELEPHONE ENCOUNTER
Spoke with pt and all she needs is the OV note from 11/7 faxed.  Let me know when you have signed off on this so I can forward note.

## 2018-11-28 ENCOUNTER — CLINICAL SUPPORT NO REQUIREMENTS (OUTPATIENT)
Dept: CARDIOLOGY | Facility: CLINIC | Age: 58
End: 2018-11-28

## 2018-11-28 DIAGNOSIS — I63.9 CRYPTOGENIC STROKE (HCC): Primary | ICD-10-CM

## 2018-11-28 PROCEDURE — 93299 PR REM INTERROG ICPMS/SCRMS <30 D TECH REVIEW: CPT | Performed by: INTERNAL MEDICINE

## 2018-11-28 PROCEDURE — 93298 REM INTERROG DEV EVAL SCRMS: CPT | Performed by: INTERNAL MEDICINE

## 2018-12-18 ENCOUNTER — TELEPHONE (OUTPATIENT)
Dept: CARDIOLOGY | Facility: CLINIC | Age: 58
End: 2018-12-18

## 2018-12-18 ENCOUNTER — OFFICE VISIT (OUTPATIENT)
Dept: NEUROLOGY | Facility: CLINIC | Age: 58
End: 2018-12-18

## 2018-12-18 ENCOUNTER — LAB (OUTPATIENT)
Dept: LAB | Facility: HOSPITAL | Age: 58
End: 2018-12-18

## 2018-12-18 VITALS
HEART RATE: 62 BPM | OXYGEN SATURATION: 98 % | HEIGHT: 64 IN | BODY MASS INDEX: 17.24 KG/M2 | WEIGHT: 101 LBS | DIASTOLIC BLOOD PRESSURE: 62 MMHG | SYSTOLIC BLOOD PRESSURE: 108 MMHG

## 2018-12-18 DIAGNOSIS — E78.2 MIXED HYPERLIPIDEMIA: ICD-10-CM

## 2018-12-18 DIAGNOSIS — Z72.0 TOBACCO ABUSE: ICD-10-CM

## 2018-12-18 DIAGNOSIS — I63.311 CEREBROVASCULAR ACCIDENT (CVA) DUE TO THROMBOSIS OF RIGHT MIDDLE CEREBRAL ARTERY (HCC): ICD-10-CM

## 2018-12-18 LAB
CHOLEST SERPL-MCNC: 143 MG/DL (ref 0–200)
HDLC SERPL-MCNC: 73 MG/DL (ref 40–60)
LDLC SERPL CALC-MCNC: 50 MG/DL (ref 0–100)
LDLC/HDLC SERPL: 0.69 {RATIO}
TRIGL SERPL-MCNC: 99 MG/DL (ref 0–150)
VLDLC SERPL-MCNC: 19.8 MG/DL (ref 5–40)

## 2018-12-18 PROCEDURE — 80061 LIPID PANEL: CPT | Performed by: NURSE PRACTITIONER

## 2018-12-18 PROCEDURE — 99214 OFFICE O/P EST MOD 30 MIN: CPT | Performed by: NURSE PRACTITIONER

## 2018-12-18 PROCEDURE — 36415 COLL VENOUS BLD VENIPUNCTURE: CPT

## 2018-12-18 NOTE — PROGRESS NOTES
"DOS: 2018  NAME: Scarlet Chakraborty   : 1960  PCP: MARILYN Fernandez MD    Chief Complaint   Patient presents with   • Cerebrovascular Accident      SUBJECTIVE  Neurological Problem:  58 y.o. RHW female with COPD, tobacco abuse and cardiomyopathy who presents today for stroke follow-up. She is accompanied by her spouse. Patient and problem are new to examiner. History is provided by patient, spouse and review of records.     Interval History:   Ms. Chakraborty initially presented to [facility] on 18 with left sided weakness and a fall, NIH 3.  Her CTA showed a right distal M1 occlusive thrombus with significant mismatch.  She received IV TPA with good improvement and therefore not a candidate for embolectomy.  This occurred in the setting of a recent discharge for COPD exacerbation with acute respiratory failure, CHF and STEMI, with a cardiac workup showing likely Takosubo cardiomyopathy.  Her stroke workup included an MRI showing bilateral infarcts of different ages, consistent with a central source, likely cardioembolic.  Her ZARINA was negative and the patient underwent ILR placement.  She was subsequently discharged home on Eliquis 5 mg BID, Lipitor 80 mg and B12 replacement.     She presents today and continues on a medication regimen and is tolerating well. She states she is about \"80-85%\" with residual issues being mainly soreness in left arm and some neck pain. She she did not receive any physical therapy after discharge; however, she states that has been ordered for her by pulmonology.  She has recently started back to the gym and some of her soreness can be attributed to her new workout routine.  She denies any worsening or new stroke/TIA symptoms.  She has not returned to work yet, but is otherwise back to all of her regular activities.  He denies any other changes in her health.  She as  followed up with cardiology and plans are for continued anticoagulation for at least 6 months.  She takes her blood " pressure regularly and states it is well-controlled.  he is currently on Chantix and is tolerating fairly well; however, she continues to smoke 2-3/day (down from 3/4 pack).  Occasional alcohol use.    Review of Systems:Review of Systems   Constitutional: Negative for activity change, appetite change and fatigue.   HENT: Negative for ear pain, facial swelling and trouble swallowing.    Eyes: Negative for photophobia, pain and visual disturbance.   Respiratory: Positive for shortness of breath. Negative for choking and chest tightness.    Cardiovascular: Negative for chest pain, palpitations and leg swelling.   Gastrointestinal: Negative for abdominal pain, constipation and nausea.   Endocrine: Negative for polydipsia, polyphagia and polyuria.   Genitourinary: Negative for difficulty urinating, frequency and urgency.   Musculoskeletal: Positive for neck stiffness. Negative for back pain and gait problem.   Skin: Negative for color change, rash and wound.   Allergic/Immunologic: Negative for environmental allergies, food allergies and immunocompromised state.   Neurological: Negative for dizziness, tremors, seizures, syncope, facial asymmetry, speech difficulty, weakness, light-headedness, numbness and headaches.   Hematological: Negative for adenopathy. Does not bruise/bleed easily.   Psychiatric/Behavioral: Negative for agitation, behavioral problems, confusion, decreased concentration, dysphoric mood, hallucinations, self-injury, sleep disturbance and suicidal ideas. The patient is not nervous/anxious and is not hyperactive.        Current Medications:   Current Outpatient Medications:   •  albuterol (PROVENTIL HFA;VENTOLIN HFA) 108 (90 Base) MCG/ACT inhaler, Inhale 2 puffs Every 4 (Four) Hours As Needed for Wheezing., Disp: , Rfl:   •  apixaban (ELIQUIS) 5 MG tablet tablet, Take 1 tablet by mouth Every 12 (Twelve) Hours., Disp: 60 tablet, Rfl: 3  •  atorvastatin (LIPITOR) 80 MG tablet, Take 1 tablet by mouth Every  Night., Disp: 30 tablet, Rfl: 0  •  Cyanocobalamin 1000 MCG capsule, cyanocobalamin (vit B-12) 1,000 mcg tablet  Take 1 tablet every day by oral route for 30 days., Disp: , Rfl:   •  Fluticasone-Umeclidin-Vilant (TRELEGY ELLIPTA) 100-62.5-25 MCG/INH aerosol powder , Inhale As Needed., Disp: , Rfl:   •  ipratropium-albuterol (DUO-NEB) 0.5-2.5 mg/3 ml nebulizer, Take 3 mL by nebulization Every 4 (Four) Hours As Needed for Shortness of Air., Disp: 360 mL, Rfl: 0  •  lisinopril (PRINIVIL,ZESTRIL) 2.5 MG tablet, Take 1 tablet by mouth Daily., Disp: 30 tablet, Rfl: 3  •  varenicline (CHANTIX STARTING MONTH FEDER[place]) 0.5 MG X 11 & 1 MG X 42 tablet, Chantix Starting Month Box 0.5 mg (11)-1 mg (42) tablets in dose pack  Chantix starter pack per pharmacy, then 2 month maintenance., Disp: , Rfl:     The following portions of the patient's history were reviewed and updated as appropriate: allergies, current medications, past family history, past medical history, past social history, past surgical history and problem list.    OBJECTIVE  Diagnostics:  CT head 9/24/18: FINDINGS: The brain and ventricles are symmetrical. There is no evidence  of hemorrhage, hydrocephalus or of abnormal extra-axial fluid. There is  an area of decreased attenuation suggested, subtle, involving the  posterior aspect of the insular cortex of the right and the superior  aspect of the right temporal lobe. This may represent an area of acute  infarction. It potentially may be artifactual secondary to be  beam-hardening artifact  CTP 9/24/18: CT perfusion is abnormal with a large area of delayed  perfusion. 4 slices were obtained with abnormal perfusion scan. The area  of perfusion is likely larger. The visualized volume is 85 millimeters.  This involves the lateral aspect of the right frontal, parietal and  temporal lobes and is consistent with a right MCA distribution thrombus.  There is decreased cerebral blood flow with a volume of 19 mL involving  the  right MCA vascular distribution  CTA h/n 9/24/18: Severe emphysematous disease involving the lung apices are  noted bilaterally.  The great vessels are arranged in a classic configuration. There is mild  calcified plaque appreciated involving the right internal carotid artery  proximally but with 0% stenosis using NASCET criteria. The distal  aspects of the internal carotid arteries are of normal caliber. The left  A1 segment is mildly hypoplastic. There is thrombus present within the  right middle cerebral artery beginning at the distal right M1 segment  and extending into the posterior division of the right middle cerebral  artery. The thrombus measures approximately 1 cm in length. There is  decreased perfusion involving the right middle cerebral artery of  vascular distribution most prominent laterally. The left middle cerebral  artery is of normal caliber. Both vertebral arteries were opacified. The vertebral arteries are  codominant. The basilar artery and the proximal aspects of the posterior  cerebral arteries appear unremarkable. A standard postcontrast CT  examination of brain showed no evidence of abnormal enhancement.   CTA head 9/24/18: IMPRESSION:  1. Persistent thrombus within the proximal aspect of the posterior  division of the right middle cerebral artery measuring approximately 9  mm in length. The thrombus within the most distal aspect of the right M1  segment has diminished.  2. There is an ovoid area of increased attenuation involving the  subcortical white matter of the left frontal lobe posteriorly. This may  represent an area of enhancement. Hemorrhage cannot be entirely  Excluded.  MRI brain 9/24/18: IMPRESSION:  1. There is no evidence of intracranial hemorrhage. There is an area of  enhancement involving the subcortical white matter of the left frontal  lobe suggesting a subacute infarct. There is increased signal intensity  on the diffusion sequence at this location but with no  convincing signal  loss on the ADC map. The presence of infarcts in multiple vascular  distributions and of different ages suggest a proximal or cardiac  source.  2. There is abnormal signal within the branches of the posterior  division of the right middle cerebral artery consistent with slow flow.  3. Areas of acute infarction including the insular cortex on the right,  superior aspect of the right temporal lobe posteriorly and to a lesser  extent operculum.  4. Mild small vessel ischemic disease.  CT head 9/29/18: No acute intracranial hemorrhage or hydrocephalus. Increased  conspicuity of previously demonstrated infarct changes in right MCA  distribution.     TTE 9/25/18: Interpretation Summary   · Left ventricular systolic function is hyperdynamic (EF > 70%). Calculated EF = 72%. Estimated EF was in agreement with the calculated EF. Normal left ventricular cavity size and wall thickness noted. All left ventricular wall segments contract normally. Left ventricular diastolic dysfunction is noted (grade I) consistent with impaired relaxation.  · Normal left atrial size noted. Saline test results are negative   ZARINA 9/27/18: Echocardiogram Findings   Left Ventricle Left ventricular systolic function is normal. Normal left ventricular cavity size and wall thickness noted. All left ventricular wall segments contract normally. Left ventricular diastolic function not assessed on this study.   Right Ventricle Normal right ventricular cavity size, wall thickness, systolic function and septal motion noted.   Left Atrium Normal left atrial size and volume noted. The left atrial appendage was not visualized through multiple planes, Left atrial appendage was found to be singularly lobar in nature. Doppler interrogation shows normal flow within the left atrial appendage. No evidence of a left atrial appendage thrombus was present. Saline test results are negative.   Right Atrium Normal right atrial size noted.   Aortic Valve  The aortic valve is structurally normal. No aortic valve regurgitation is present. No aortic valve stenosis is present.   Mitral Valve The mitral valve is normal in structure. Trace mitral valve regurgitation is present with a centrally-directed jet noted. No significant mitral valve stenosis is present.   Tricuspid Valve The tricuspid valve is normal. No tricuspid valve stenosis is present. Trace tricuspid valve regurgitation is present.   Pulmonic Valve The pulmonic valve is structurally normal. There is no significant pulmonic valve stenosis present. There is trace pulmonic valve regurgitation present.   Greater Vessels No dilation of the aortic root is present.   Pericardium The pericardium is normal. There is no evidence of pericardial effusion.     EKG 11/15/18: SR  LINQ report 11/28/18: No evidence of afib    Laboratory Results:         Lab Results   Component Value Date    WBC 7.39 09/27/2018    HGB 12.6 09/27/2018    HCT 39.9 09/27/2018    MCV 97.6 09/27/2018     09/27/2018     Lab Results   Component Value Date    GLUCOSE 102 (H) 09/27/2018    BUN 9 09/27/2018    CREATININE 0.58 09/27/2018    EGFRIFNONA 107 09/27/2018    BCR 15.5 09/27/2018    K 3.7 09/27/2018    CO2 25.0 09/27/2018    CALCIUM 8.7 09/27/2018    ALBUMIN 3.40 (L) 09/16/2018    AST 17 09/16/2018    ALT 40 (H) 09/16/2018     Lab Results   Component Value Date    HGBA1C 5.40 09/25/2018     Lab Results   Component Value Date    CHOL 168 09/25/2018     Lab Results   Component Value Date    HDL 53 09/25/2018     Lab Results   Component Value Date    LDL 90 09/25/2018     Lab Results   Component Value Date    TRIG 125 09/25/2018     No results found for: RPR  Lab Results   Component Value Date    TSH 1.010 09/24/2018     Lab Results   Component Value Date    YINWKITJ31 327 09/25/2018       Physical Examination:   General Appearance:   Well developed, thin, well groomed, alert, and cooperative.  HEENT: Normocephalic.    Neck and Spine: Normal  range of motion.  Normal alignment. No mass or tenderness. No bruits.  Cardiac: Regular rate and rhythm. No murmurs.  Peripheral Vasculature: Radial pulses are equal and symmetric. No signs of distal embolization.  Extremities:    No edema or deformities. Normal joint ROM.  Skin:    No rashes or birth marks.    Neurological examination:  Higher Integrative  Function: Oriented to time, place and person. Normal registration, recall (3/3), attention span and concentration. Normal language including comprehension, spontaneous speech, repetition, reading, writing, naming and vocabulary. No neglect with normal visual-spatial function and construction. Normal fund of knowledge and higher integrative function.  CN II: Pupils are equal, round, and reactive to light. Normal visual acuity and visual fields.    CN III IV VI: Extraocular movements are full without nystagmus.   CN V: Normal facial sensation and strength of muscles of mastication.  CN VII: Facial movements are symmetric. No weakness.  CN VIII:   Auditory acuity is normal.  CN IX & X:   Symmetric palatal movement.  CN XI: Sternocleidomastoid and trapezius are normal.  No weakness.  CN XII:   The tongue is midline.  No atrophy or fasciculations.  Motor: Normal muscle strength, bulk and tone in upper and lower extremities.  No fasciculations, rigidity, spasticity, or abnormal movements.  Reflexes: 2+ in the upper and lower extremities. Plantar responses are flexor.  Sensation: Normal to light touch and proprioception in arms and legs. Normal graphesthesia and no extinction on DSS.  Station and Gait: Normal gait and station.    Coordination: Finger to nose test shows no dysmetria. Heel to shin normal.    Impression:  Ms. Chakraborty is doing well following her admission in September revealing bilateral infarcts of different ages consistent with a central source, likely cardioembolic.  This occurred in the setting of recent discharge for acute respiratory distress,  ?STEMI and was thought to have recurrent Takotsubo.  Following her admission for stroke she has been maintained on Eliquis and Lipitor 80 mg with no recurrent or new stroke/TIA symptoms and a normal neurologic exam. Per cardiology, plans are to continue anticoagulation for 6 months unless arrhythmias are detected on Linq.  She has not returned to work but plans are to start back part time from home, as she has a stressful job.  He has not had a follow-up lipid panel since discharge, will check today. We discussed her personal risk factors for stroke and the importance of risk factor control for stroke prevention, including BP and cholesterol control as well as importance of smoking cessation. She will monitor BP regularly. We discussed importance of calling 911 for any signs/symptoms of stroke. F/U in 9 months, sooner if symptoms warrant.     Plan:     Continue current medication regimen  Check lipid panel  Reduce Lipitor pending above  Monitor BP regularly  Keep well hydrated  Counseled  >5 minutes on the importance of smoking cessation.  Secondary stroke prevention: Ideal targets for stroke prevention would be Blood pressure < 130/80; B12 > 500 TSH in normal range and LDL < 70; HbA1c < 6.5 and smoking cessation if applicable.    Call 911 for stroke symptoms  Follow-up in 9 months    I spent 30 minutes face to face with patient, with  > 50% spent counseling patient and spouse regarding diagnosis, review of diagnostics, personal risk factors for stroke and importance of risk factor control for stroke prevention.     Scarlet was seen today for cerebrovascular accident.    Diagnoses and all orders for this visit:    Cerebrovascular accident (CVA) due to thrombosis of right middle cerebral artery (CMS/HCC)    Mixed hyperlipidemia  -     Lipid Panel; Future    Tobacco abuse        Coding

## 2018-12-19 ENCOUNTER — TELEPHONE (OUTPATIENT)
Dept: NEUROLOGY | Facility: CLINIC | Age: 58
End: 2018-12-19

## 2018-12-19 NOTE — TELEPHONE ENCOUNTER
Called and notified patient that her LDL was 50 which is at goal of < 70. Notified her that per [facility] she can decrease her dose to 40 mg (take 1/2 her 80 mg tab).

## 2018-12-20 NOTE — TELEPHONE ENCOUNTER
This was already addressed on November 15 and noted on my last office note.  Not sure what else she needs.  That isn't my note and would just repeat the same.. darrion

## 2018-12-26 NOTE — TELEPHONE ENCOUNTER
Message sent 12/21/18 1217  Antonella Multani MA Moser, Juana E RN             I spoke with this patient yesterday, letter was faxed    Previous Messages      ----- Message -----   From: Nury Robins RN   Sent: 12/19/2018   9:13 AM   To: Antonella Multani MA   Subject: wrok clearance form                               Antonella,     I called this patient today for her results of the lipid panel. After I spoke with her she asked about her work clearance form she had requested yesterday from [facility] so she may return to work.     Thank you

## 2018-12-27 ENCOUNTER — CALL CENTER PROGRAMS (OUTPATIENT)
Dept: CALL CENTER | Facility: HOSPITAL | Age: 58
End: 2018-12-27

## 2018-12-27 NOTE — OUTREACH NOTE
Stroke Ally Survey      Responses   Facility patient discharged from?  [facility]   Attempt successful  Yes   Call start time  1506   Person spoke with today (if not patient) and relationship  Patient   Call end time  1508   Patient location 30 days post discharge if known  Home   Was the patient readmitted within 30 days of discharge?  No   Could you live alone without any help from another person?  Yes   Can you do everything that you were doing right before your stroke even if slower and not as much?  Yes   Are you completely back to the way you were right before your stroke?  Yes   Can you walk from one room to another without help from another person?  Yes   Can the patient sit up in bed without any help?  Yes   Call Center Ally Score  0   Hurt score call completed  Yes   Comments  Patient reports no residual stroke symptoms.  She is back to all her usual daily activities.          Nona Fuller RN

## 2019-01-12 ENCOUNTER — CLINICAL SUPPORT NO REQUIREMENTS (OUTPATIENT)
Dept: CARDIOLOGY | Facility: CLINIC | Age: 59
End: 2019-01-12

## 2019-01-12 DIAGNOSIS — I63.9 CRYPTOGENIC STROKE (HCC): Primary | ICD-10-CM

## 2019-01-12 PROCEDURE — 93298 REM INTERROG DEV EVAL SCRMS: CPT | Performed by: INTERNAL MEDICINE

## 2019-01-12 PROCEDURE — 93299 PR REM INTERROG ICPMS/SCRMS <30 D TECH REVIEW: CPT | Performed by: INTERNAL MEDICINE

## 2019-02-11 ENCOUNTER — OFFICE VISIT (OUTPATIENT)
Dept: CARDIOLOGY | Facility: CLINIC | Age: 59
End: 2019-02-11

## 2019-02-11 VITALS
HEART RATE: 74 BPM | DIASTOLIC BLOOD PRESSURE: 68 MMHG | WEIGHT: 102 LBS | SYSTOLIC BLOOD PRESSURE: 94 MMHG | HEIGHT: 64 IN | BODY MASS INDEX: 17.42 KG/M2

## 2019-02-11 DIAGNOSIS — F17.200 TOBACCO DEPENDENCE: ICD-10-CM

## 2019-02-11 DIAGNOSIS — Z72.0 TOBACCO ABUSE: ICD-10-CM

## 2019-02-11 DIAGNOSIS — I51.81 STRESS-INDUCED CARDIOMYOPATHY: ICD-10-CM

## 2019-02-11 DIAGNOSIS — I51.81 TAKOTSUBO CARDIOMYOPATHY: Primary | ICD-10-CM

## 2019-02-11 DIAGNOSIS — I25.2 HX OF NON-ST ELEVATION MYOCARDIAL INFARCTION (NSTEMI): ICD-10-CM

## 2019-02-11 DIAGNOSIS — Z86.73 HISTORY OF STROKE: ICD-10-CM

## 2019-02-11 PROCEDURE — 99406 BEHAV CHNG SMOKING 3-10 MIN: CPT | Performed by: NURSE PRACTITIONER

## 2019-02-11 PROCEDURE — 99214 OFFICE O/P EST MOD 30 MIN: CPT | Performed by: NURSE PRACTITIONER

## 2019-02-11 PROCEDURE — 93000 ELECTROCARDIOGRAM COMPLETE: CPT | Performed by: NURSE PRACTITIONER

## 2019-02-11 NOTE — PROGRESS NOTES
Date of Office Visit: 2019  Encounter Provider: Isa Beck, KEVIN, APRN  Place of Service: [facility] CARDIOLOGY  Patient Name: Scarlet Chakraborty  :1960        Subjective:     Chief Complaint:  Follow-up, cardiomyopathy, nicotine use, hx stroke.       History of Present Illness:  Scarlet Chakraborty is a 59 y.o. female patient of Dr. Boston.  This is my first time seeing this patient in the office today and I have reviewed her records.    Patient has a history of cardiomyopathy, stroke, modest alcohol use, nicotine use, COPD.    Patient presented to [facility]  with acute respiratory failure, congestive heart failure, and ST elevation MI.  Cardiac catheterization showed EF of 20% with normal coronary arteries.  Echocardiogram showed EF of 35% and she was felt to have probable Takotsubo cardiomyopathy.  Blood pressure was too low for adequate goal directed medical therapy and only lisinopril 2.5 mg daily was able to be utilized.  She has severe bronchospasm from COPD exacerbation.  She was subsequently dismissed home but then presented back  with left-sided weakness, slurred speech, and fall.  She was felt to have an embolic stroke for which she received TPA with good response.  She had minimal residual systems.  Transthoracic echocardiogram showed normal systolic function with grade 1 diastolic dysfunction.  Transesophageal echocardiogram was unremarkable including negative saline study.  She subsequently underwent Linq placement.    Patient was last seen in office by Dr. Boston 18.  At this point she was still having dyspnea on exertion.  She denied syncope, near syncope, chest pain, or palpitations.  She was cleared to work from home for 3-4 hours a day watching stress levels, from a cardiac standpoint.  She was instructed to follow-up in 3 months.  She was on Eliquis.  Plan was to discontinue Eliquis if after 6 months no arrhythmia was  seen, EF remained preserved, and could change to coated aspirin at that time.      Patient presents to office today for 3 month follow-up appointment.  Patient reports she has been feeling good overall since last visit.  About the same as last visit.  She reports she has some chronic intermittent shortness of breath with heavier exertion on treadmill but it resolves if she slows down.  Reports this is not new or worsening.  She continues to workout on treadmill for 30 minutes and exercise bike for 15-20 minutes 3 days a week without increased exercise intolerance or worsening symptoms. Unfortunately she continues to smoke 3-4 cigarettes per day.  She remains on chantix-- prescribed by outside provider.  Recommended complete smoking cessation.  She denies chest pain, SOA at rest, palpitations, racing heartbeat sensation, edema, dizziness, syncope, near-syncope, falls, or fatigue.  BP is a little low in the office today; she is asymptomatic.  She has not been checking BP outside office recently.  Recommended she check BP and call office if it is outside the 100-130/60-80 range, especially if SBP <100.  Not drinking much water-- patient to increase.  She is currently working 4 hours per day without any symptoms.  She requests increasing to 6-8 hours per day.  I discussed with Dr. Boston and she agrees to try this as long as patient keep stress levels low and call for any new/ worsening symptoms.  Not given.     Patient to follow-up with Dr. Boston in 3 months or sooner if needed.        Past Medical History:   Diagnosis Date   • Asthma    • Cerebrovascular accident (CVA) due to thrombosis of right middle cerebral artery (CMS/HCC)    • COPD (chronic obstructive pulmonary disease) (CMS/HCC)    • Nonischemic cardiomyopathy (CMS/HCC)    • Stroke (CMS/HCC)      Past Surgical History:   Procedure Laterality Date   • CARDIAC CATHETERIZATION N/A 9/13/2018    Procedure: Left Heart Cath and cors;  Surgeon: Gabino Dozier MD;   Location: [facility] CATH INVASIVE LOCATION;  Service: Cardiology   • CARDIAC CATHETERIZATION N/A 9/13/2018    Procedure: Left ventriculography;  Surgeon: Gabino Dozier MD;  Location: [facility] INVASIVE LOCATION;  Service: Cardiology   • CARDIAC ELECTROPHYSIOLOGY PROCEDURE N/A 9/27/2018    Procedure: Loop insertion  LINQ;  Surgeon: Jose R Barker MD;  Location: [facility] INVASIVE LOCATION;  Service: Cardiovascular     Outpatient Medications Prior to Visit   Medication Sig Dispense Refill   • apixaban (ELIQUIS) 5 MG tablet tablet Take 1 tablet by mouth Every 12 (Twelve) Hours. 60 tablet 3   • atorvastatin (LIPITOR) 80 MG tablet Take 1 tablet by mouth Every Night. 30 tablet 0   • Fluticasone-Umeclidin-Vilant (TRELEGY ELLIPTA) 100-62.5-25 MCG/INH aerosol powder  Inhale As Needed.     • lisinopril (PRINIVIL,ZESTRIL) 2.5 MG tablet Take 1 tablet by mouth Daily. 30 tablet 3   • varenicline (CHANTIX STARTING MONTH FEDERICA) 0.5 MG X 11 & 1 MG X 42 tablet Chantix Starting Month Box 0.5 mg (11)-1 mg (42) tablets in dose pack   Chantix starter pack per pharmacy, then 2 month maintenance.     • albuterol (PROVENTIL HFA;VENTOLIN HFA) 108 (90 Base) MCG/ACT inhaler Inhale 2 puffs Every 4 (Four) Hours As Needed for Wheezing.     • Cyanocobalamin 1000 MCG capsule cyanocobalamin (vit B-12) 1,000 mcg tablet   Take 1 tablet every day by oral route for 30 days.     • ipratropium-albuterol (DUO-NEB) 0.5-2.5 mg/3 ml nebulizer Take 3 mL by nebulization Every 4 (Four) Hours As Needed for Shortness of Air. 360 mL 0     No facility-administered medications prior to visit.        Allergies as of 02/11/2019   • (No Known Allergies)     Social History     Socioeconomic History   • Marital status: Single     Spouse name: Not on file   • Number of children: Not on file   • Years of education: Not on file   • Highest education level: Not on file   Social Needs   • Financial resource strain: Not on file   • Food insecurity - worry: Not on file   •  "Food insecurity - inability: Not on file   • Transportation needs - medical: Not on file   • Transportation needs - non-medical: Not on file   Occupational History   • Not on file   Tobacco Use   • Smoking status: Light Tobacco Smoker     Packs/day: 0.50     Years: 15.00     Pack years: 7.50     Types: Cigarettes   • Smokeless tobacco: Never Used   • Tobacco comment: currently 3-4 cigs daily   Substance and Sexual Activity   • Alcohol use: Yes     Comment: occassional   • Drug use: No   • Sexual activity: Defer   Other Topics Concern   • Not on file   Social History Narrative   • Not on file     Family History   Problem Relation Age of Onset   • Cancer Sister        Review of Systems   Constitution: Negative for chills, fever, malaise/fatigue, weight gain and weight loss.   HENT: Negative for ear pain, hearing loss, nosebleeds and sore throat.    Eyes: Negative for blurred vision, double vision, redness, vision loss in left eye and vision loss in right eye.   Cardiovascular:        SEE HPI   Respiratory: Negative for cough, shortness of breath, snoring and wheezing.    Endocrine: Negative for cold intolerance and heat intolerance.   Skin: Negative for itching, rash and suspicious lesions.   Musculoskeletal: Negative for joint pain, joint swelling and myalgias.   Gastrointestinal: Negative for abdominal pain, diarrhea, hematemesis, melena, nausea and vomiting.   Genitourinary: Negative for dysuria, frequency and hematuria.   Neurological: Negative for dizziness, headaches, numbness, paresthesias and seizures.   Psychiatric/Behavioral: Negative for altered mental status and depression. The patient is not nervous/anxious.           Objective:     Vitals:    02/11/19 1509   BP: 94/68   BP Location: Right arm   Pulse: 74   Weight: 46.3 kg (102 lb)   Height: 162.6 cm (64\")     Body mass index is 17.51 kg/m².      PHYSICAL EXAM:  Physical Exam   Constitutional: She is oriented to person, place, and time. She appears " well-developed and well-nourished. No distress.   HENT:   Head: Normocephalic and atraumatic.   Eyes: Pupils are equal, round, and reactive to light.   Neck: Neck supple. No JVD present. Carotid bruit is not present. No tracheal deviation present.   Cardiovascular: Normal rate, regular rhythm, normal heart sounds and intact distal pulses. Exam reveals no gallop and no friction rub.   No murmur heard.  Pulses:       Radial pulses are 2+ on the right side, and 2+ on the left side.        Posterior tibial pulses are 2+ on the right side, and 2+ on the left side.   Pulmonary/Chest: Effort normal and breath sounds normal. No respiratory distress. She has no wheezes. She has no rales.   Abdominal: Soft. Bowel sounds are normal. She exhibits no distension. There is no tenderness.   Musculoskeletal: She exhibits no edema, tenderness or deformity.   Neurological: She is alert and oriented to person, place, and time.   Skin: Skin is warm and dry. No rash noted. She is not diaphoretic. No erythema.   Psychiatric: She has a normal mood and affect. Her behavior is normal. Judgment normal.           ECG 12 Lead  Date/Time: 2/11/2019 3:31 PM  Performed by: Isa Beck DNP, SHANKAR  Authorized by: Isa Beck DNP, SHANKAR   Comparison: compared with previous ECG from 11/7/2018  Similar to previous ECG  Rhythm: sinus rhythm  Rate: normal  BPM: 74  QRS axis: right  Other findings comments: Nonspecific ST-T wave changes, similar to previous ECG.  Clinical impression: abnormal ECG              Assessment:       Diagnosis Plan   1. Takotsubo cardiomyopathy  ECG 12 Lead   2. Stress-induced cardiomyopathy     3. Hx of non-ST elevation myocardial infarction (NSTEMI)     4. History of stroke     5. Tobacco abuse     6. Tobacco dependence           Plan:     1. Stress-induced cardiomyopathy: patient feeling well since last visit.  Needs to stop smoking.  Ok to cautiously increase work to 6-8 hours per day.  Needs to keep stress  levels low and take a break for symptoms or issues. Call office right away for any new or worsening symptoms.   2. Hx demand-related NSTEMI: nonischemic, as above.   3. Hx stroke: felt to be embolic.  Has linq in place.  No episodes as of 1/12/19.  On eliquis. Per previous office note, if not arrhythmia after 6 months and EF remains preserved then will likely change to EC aspirin.   4. COPD: managed by outside provider.  5. Nicotine dependence: on chantix, per outside provider.  Still smoking 3-4 cigarettes per day.  Recommend complete cessation.  Discussed for ~ 3-5 minutes today.       Patient to follow-up with Dr. Boston in 3 months or sooner if needed for any new, recurrent, or worsening symptoms or other problems/ concerns.            Your medication list           Accurate as of 2/11/19 11:59 PM. If you have any questions, ask your nurse or doctor.               CONTINUE taking these medications      Instructions Last Dose Given Next Dose Due   apixaban 5 MG tablet tablet  Commonly known as:  ELIQUIS      Take 1 tablet by mouth Every 12 (Twelve) Hours.       atorvastatin 80 MG tablet  Commonly known as:  LIPITOR      Take 1 tablet by mouth Every Night.       CHANTIX STARTING MONTH FEDERICA 0.5 MG X 11 & 1 MG X 42 tablet  Generic drug:  varenicline      Chantix Starting Month Box 0.5 mg (11)-1 mg (42) tablets in dose pack   Chantix starter pack per pharmacy, then 2 month maintenance.       lisinopril 2.5 MG tablet  Commonly known as:  PRINIVIL,ZESTRIL      Take 1 tablet by mouth Daily.       TRELEGY ELLIPTA 100-62.5-25 MCG/INH aerosol powder   Generic drug:  Fluticasone-Umeclidin-Vilant      Inhale As Needed.          STOP taking these medications    albuterol sulfate  (90 Base) MCG/ACT inhaler  Commonly known as:  PROVENTIL HFA;VENTOLIN HFA;PROAIR HFA  Stopped by:  Isa Beck DNP, APRN        Cyanocobalamin 1000 MCG capsule  Stopped by:  Isa Beck DNP, APRN        ipratropium-albuterol 0.5-2.5  mg/3 ml nebulizer  Commonly known as:  DUO-NEB  Stopped by:  Isa Beck DNP, APRN               I did not stop or change the above medications.  Patient's medication list was updated to reflect medications they are currently taking including medication changes made by other providers.            Thanks,    Isa Beck DNP, APRN  02/11/2019             Dictated utilizing Dragon dictation

## 2019-02-17 PROBLEM — F17.200 TOBACCO DEPENDENCE: Status: ACTIVE | Noted: 2019-02-17

## 2019-02-17 PROBLEM — Z86.73 HISTORY OF STROKE: Status: ACTIVE | Noted: 2019-02-17

## 2019-02-17 PROBLEM — I25.2 HX OF NON-ST ELEVATION MYOCARDIAL INFARCTION (NSTEMI): Status: ACTIVE | Noted: 2019-02-17

## 2019-02-26 ENCOUNTER — CLINICAL SUPPORT NO REQUIREMENTS (OUTPATIENT)
Dept: CARDIOLOGY | Facility: CLINIC | Age: 59
End: 2019-02-26

## 2019-02-26 DIAGNOSIS — I63.9 CRYPTOGENIC STROKE (HCC): Primary | ICD-10-CM

## 2019-02-26 PROCEDURE — 93299 PR REM INTERROG ICPMS/SCRMS <30 D TECH REVIEW: CPT | Performed by: INTERNAL MEDICINE

## 2019-02-26 PROCEDURE — 93298 REM INTERROG DEV EVAL SCRMS: CPT | Performed by: INTERNAL MEDICINE

## 2019-04-12 ENCOUNTER — CLINICAL SUPPORT NO REQUIREMENTS (OUTPATIENT)
Dept: CARDIOLOGY | Facility: CLINIC | Age: 59
End: 2019-04-12

## 2019-04-12 DIAGNOSIS — I63.9 CRYPTOGENIC STROKE (HCC): Primary | ICD-10-CM

## 2019-04-12 PROCEDURE — 93298 REM INTERROG DEV EVAL SCRMS: CPT | Performed by: INTERNAL MEDICINE

## 2019-04-12 PROCEDURE — 93299 PR REM INTERROG ICPMS/SCRMS <30 D TECH REVIEW: CPT | Performed by: INTERNAL MEDICINE

## 2019-05-27 ENCOUNTER — CLINICAL SUPPORT NO REQUIREMENTS (OUTPATIENT)
Dept: CARDIOLOGY | Facility: CLINIC | Age: 59
End: 2019-05-27

## 2019-05-27 DIAGNOSIS — I63.9 CRYPTOGENIC STROKE (HCC): Primary | ICD-10-CM

## 2019-05-27 PROCEDURE — 93299 PR REM INTERROG ICPMS/SCRMS <30 D TECH REVIEW: CPT | Performed by: INTERNAL MEDICINE

## 2019-05-27 PROCEDURE — 93298 REM INTERROG DEV EVAL SCRMS: CPT | Performed by: INTERNAL MEDICINE

## 2019-06-03 ENCOUNTER — OFFICE VISIT (OUTPATIENT)
Dept: CARDIOLOGY | Facility: CLINIC | Age: 59
End: 2019-06-03

## 2019-06-03 VITALS
HEART RATE: 73 BPM | HEIGHT: 64 IN | BODY MASS INDEX: 17.31 KG/M2 | DIASTOLIC BLOOD PRESSURE: 60 MMHG | WEIGHT: 101.4 LBS | SYSTOLIC BLOOD PRESSURE: 100 MMHG

## 2019-06-03 DIAGNOSIS — I42.2 HYPERTROPHIC CARDIOMYOPATHY (HCC): Primary | ICD-10-CM

## 2019-06-03 DIAGNOSIS — F17.200 TOBACCO DEPENDENCE: ICD-10-CM

## 2019-06-03 DIAGNOSIS — I51.81 STRESS-INDUCED CARDIOMYOPATHY: ICD-10-CM

## 2019-06-03 DIAGNOSIS — J44.1 CHRONIC OBSTRUCTIVE PULMONARY DISEASE WITH ACUTE EXACERBATION (HCC): ICD-10-CM

## 2019-06-03 PROCEDURE — 99214 OFFICE O/P EST MOD 30 MIN: CPT | Performed by: INTERNAL MEDICINE

## 2019-06-03 PROCEDURE — 93000 ELECTROCARDIOGRAM COMPLETE: CPT | Performed by: INTERNAL MEDICINE

## 2019-06-03 RX ORDER — LISINOPRIL 2.5 MG/1
2.5 TABLET ORAL DAILY
Qty: 90 TABLET | Refills: 3 | Status: SHIPPED | OUTPATIENT
Start: 2019-06-03 | End: 2020-11-02 | Stop reason: SDUPTHER

## 2019-06-03 NOTE — PROGRESS NOTES
Date of Office Visit: 2019  Encounter Provider: Emi Boston MD  Place of Service: [facility] CARDIOLOGY  Patient Name: Scarlet Chakraborty  :1960    Chief complaint  Follow-up of cardiomyopathy, stroke    History of Present Illness  The patient is a 58-year-old female with history of modest alcohol use, nicotine use, COPD who presented to [facility] on  with acute respiratory failure, congestive heart failure and ST elevation myocardial infarction.  However cardiac catheterization showed an ejection fraction of 20% with normal coronary arteries.  Her echocardiogram showed an ejection fraction of 35% and she was felt to have probable Takotsubo cardiomyopathy.  Her blood pressure was too low for adequate goal-directed medical therapy and only lisinopril 2.5 mg a day was able to be utilized.  She also had severe bronchospasm from COPD exacerbation.  She was subsequently dismissed home but then presented on  with left-sided weakness, slurred speech and a fall.  She was felt to have had a embolic stroke for which she received TPA with good response.  She had minimal residual symptoms.  A transthoracic echocardiogram revealed normal systolic function with grade 1 diastolic dysfunction.  A transesophageal echocardiogram was unremarkable including a negative saline study.  She subsequently underwent Linq placement.    Since last visit she is exercising on the treadmill for 30 minutes and bicycle for 15 minutes 3 times a week.  She denies any chest pain, shortness of breath, palpitations, syncope near syncope.  She is on Chantix and has cut down her nicotine use significantly though still smoking a few cigarettes a day.  Unclear reasons she discontinued lisinopril on her own.    Past Medical History:   Diagnosis Date   • Asthma    • Cerebrovascular accident (CVA) due to thrombosis of right middle cerebral artery (CMS/HCC)    • COPD (chronic obstructive  pulmonary disease) (CMS/HCC)    • Nonischemic cardiomyopathy (CMS/HCC)    • NSTEMI (non-ST elevated myocardial infarction) (CMS/HCC)    • Stress-induced cardiomyopathy    • Stroke (CMS/HCC)    • Takotsubo cardiomyopathy    • Tobacco abuse      Past Surgical History:   Procedure Laterality Date   • CARDIAC CATHETERIZATION N/A 9/13/2018    Procedure: Left Heart Cath and cors;  Surgeon: Gabino Dozier MD;  Location: [facility] CATH INVASIVE LOCATION;  Service: Cardiology   • CARDIAC CATHETERIZATION N/A 9/13/2018    Procedure: Left ventriculography;  Surgeon: Gabino Dozier MD;  Location: [facility] CATH INVASIVE LOCATION;  Service: Cardiology   • CARDIAC ELECTROPHYSIOLOGY PROCEDURE N/A 9/27/2018    Procedure: Loop insertion  LINQ;  Surgeon: Jose R Barker MD;  Location: [facility] CATH INVASIVE LOCATION;  Service: Cardiovascular     Outpatient Medications Prior to Visit   Medication Sig Dispense Refill   • apixaban (ELIQUIS) 5 MG tablet tablet Take 1 tablet by mouth Every 12 (Twelve) Hours. 60 tablet 3   • Fluticasone-Umeclidin-Vilant (TRELEGY ELLIPTA) 100-62.5-25 MCG/INH aerosol powder  Inhale As Needed.     • atorvastatin (LIPITOR) 80 MG tablet Take 1 tablet by mouth Every Night. 30 tablet 0   • lisinopril (PRINIVIL,ZESTRIL) 2.5 MG tablet Take 1 tablet by mouth Daily. 30 tablet 3   • varenicline (CHANTIX STARTING MONTH FEDERICA) 0.5 MG X 11 & 1 MG X 42 tablet Chantix Starting Month Box 0.5 mg (11)-1 mg (42) tablets in dose pack   Chantix starter pack per pharmacy, then 2 month maintenance.       No facility-administered medications prior to visit.        Allergies as of 06/03/2019   • (No Known Allergies)     Social History     Socioeconomic History   • Marital status: Single     Spouse name: Not on file   • Number of children: Not on file   • Years of education: Not on file   • Highest education level: Not on file   Tobacco Use   • Smoking status: Light Tobacco Smoker     Packs/day: 0.50     Years: 15.00     Pack years: 7.50     " Types: Cigarettes   • Smokeless tobacco: Never Used   • Tobacco comment: currently 3-4 cigs daily   Substance and Sexual Activity   • Alcohol use: Yes     Comment: occassional   • Drug use: No   • Sexual activity: Defer     Family History   Problem Relation Age of Onset   • Cancer Sister      Review of Systems   Constitution: Negative for fever, malaise/fatigue, weight gain and weight loss.   HENT: Negative for ear pain, hearing loss, nosebleeds and sore throat.    Eyes: Negative for double vision, pain, vision loss in left eye and vision loss in right eye.   Cardiovascular:        See history of present illness.   Respiratory: Negative for cough, shortness of breath, sleep disturbances due to breathing, snoring and wheezing.    Endocrine: Negative for cold intolerance, heat intolerance and polyuria.   Skin: Negative for itching, poor wound healing and rash.   Musculoskeletal: Negative for joint pain, joint swelling and myalgias.   Gastrointestinal: Negative for abdominal pain, diarrhea, hematochezia, nausea and vomiting.   Genitourinary: Negative for hematuria and hesitancy.   Neurological: Negative for numbness, paresthesias and seizures.   Psychiatric/Behavioral: Negative for depression. The patient is not nervous/anxious.         Objective:     Vitals:    06/03/19 1248   BP: 100/60   Pulse: 73   Weight: 46 kg (101 lb 6.4 oz)   Height: 162.6 cm (64\")     Body mass index is 17.41 kg/m².    Physical Exam   Constitutional: She is oriented to person, place, and time. She appears well-developed and well-nourished.   HENT:   Head: Normocephalic.   Nose: Nose normal.   Mouth/Throat: Oropharynx is clear and moist.   Eyes: Conjunctivae and EOM are normal. Pupils are equal, round, and reactive to light. Right eye exhibits no discharge. No scleral icterus.   Neck: Normal range of motion. Neck supple. No JVD present. No thyromegaly present.   Cardiovascular: Normal rate, regular rhythm, normal heart sounds and intact distal " pulses. Exam reveals no friction rub.   No murmur heard.  Pulses:       Carotid pulses are 2+ on the right side, and 2+ on the left side.       Radial pulses are 2+ on the right side, and 2+ on the left side.        Femoral pulses are 2+ on the right side, and 2+ on the left side.       Popliteal pulses are 2+ on the right side, and 2+ on the left side.        Dorsalis pedis pulses are 2+ on the right side, and 2+ on the left side.        Posterior tibial pulses are 2+ on the right side, and 2+ on the left side.   Pulmonary/Chest: Effort normal and breath sounds normal. No respiratory distress. She has no wheezes. She has no rales.   Abdominal: Soft. Bowel sounds are normal. She exhibits no distension. There is no hepatosplenomegaly. There is no tenderness. There is no rebound.   Musculoskeletal: Normal range of motion. She exhibits no edema or tenderness.   Neurological: She is alert and oriented to person, place, and time.   Skin: Skin is warm and dry. No rash noted. No erythema.   Psychiatric: She has a normal mood and affect. Her behavior is normal. Judgment and thought content normal.   Vitals reviewed.    Lab Review:     ECG 12 Lead  Date/Time: 6/3/2019 12:48 PM  Performed by: Emi Boston MD  Authorized by: Emi Boston MD   Comparison: compared with previous ECG   Similar to previous ECG  Rhythm: sinus rhythm  Other findings: non-specific ST-T wave changes    Clinical impression: abnormal EKG          Assessment:       Diagnosis Plan   1. Hypertrophic cardiomyopathy (CMS/HCC)  ECG 12 Lead   2. Stress-induced cardiomyopathy     3. Chronic obstructive pulmonary disease with acute exacerbation (CMS/HCC)     4. Tobacco dependence       Plan:       1.  Recent demand related non-ST elevation myocardial infarction with nonischemic cardiomyopathy, likely Takotsubo related.  Ejection fraction had improved/normalized though EKG still with significant ST-T wave changes.  Resume lisinopril 2.5 mg a day and have again  advised her to avoid running out of these medications and call in advance for refills.  I have given her enough with refills to last for a year.  To watch for hypotension.  2.  Recent stroke felt to be embolic.  Linq device in place and she is on Eliquis.  Reassess in 6 months as she was off lisinopril for several months now.  3.  Severe COPD   4.  Nicotine use strongly recommended abstinence which she told me she will contact Dr. Fernandez for a refill on the Chantix.    Heart Failure  Assessment  • NYHA class I - There is no limitation of physical activity. Physical activity does not cause fatigue, palpitations or shortness of breath.  • ACE inhibitor prescribed  • Beta blocker not prescribed for medical reasons  • Diuretics not prescribed for medical reasons  • Angiotensin receptor blocker (ARB) not prescribed  • Left ventricular function is moderately reduced by qualitative assessment    Subjective/Objective    • Physical exam findings negative for rales, peripheral edema and elevated JVP.         Your medication list           Accurate as of 6/3/19 11:59 PM. If you have any questions, ask your nurse or doctor.               CONTINUE taking these medications      Instructions Last Dose Given Next Dose Due   apixaban 5 MG tablet tablet  Commonly known as:  ELIQUIS      Take 1 tablet by mouth Every 12 (Twelve) Hours.       lisinopril 2.5 MG tablet  Commonly known as:  PRINIVIL,ZESTRIL      Take 1 tablet by mouth Daily.       TRELEGY ELLIPTA 100-62.5-25 MCG/INH aerosol powder   Generic drug:  Fluticasone-Umeclidin-Vilant      Inhale As Needed.          STOP taking these medications    atorvastatin 80 MG tablet  Commonly known as:  LIPITOR  Stopped by:  Emi Boston MD        CHANTIX STARTING MONTH FEDERICA 0.5 MG X 11 & 1 MG X 42 tablet  Generic drug:  varenicline  Stopped by:  Emi Boston MD              Where to Get Your Medications      These medications were sent to [address] [address] [address]  [place] - [phone]  - [phone] FX  [address], [facility] [address]    Phone:  [phone]   · lisinopril 2.5 MG tablet       Dictated utilizing Dragon dictation

## 2019-07-11 ENCOUNTER — CLINICAL SUPPORT NO REQUIREMENTS (OUTPATIENT)
Dept: CARDIOLOGY | Facility: CLINIC | Age: 59
End: 2019-07-11

## 2019-07-11 DIAGNOSIS — I63.9 CRYPTOGENIC STROKE (HCC): Primary | ICD-10-CM

## 2019-07-11 PROCEDURE — 93298 REM INTERROG DEV EVAL SCRMS: CPT | Performed by: INTERNAL MEDICINE

## 2019-07-11 PROCEDURE — 93299 PR REM INTERROG ICPMS/SCRMS <30 D TECH REVIEW: CPT | Performed by: INTERNAL MEDICINE

## 2019-08-26 ENCOUNTER — CLINICAL SUPPORT NO REQUIREMENTS (OUTPATIENT)
Dept: CARDIOLOGY | Facility: CLINIC | Age: 59
End: 2019-08-26

## 2019-08-26 DIAGNOSIS — I63.9 CRYPTOGENIC STROKE (HCC): Primary | ICD-10-CM

## 2019-08-26 PROCEDURE — 93299 PR REM INTERROG ICPMS/SCRMS <30 D TECH REVIEW: CPT | Performed by: INTERNAL MEDICINE

## 2019-08-26 PROCEDURE — 93298 REM INTERROG DEV EVAL SCRMS: CPT | Performed by: INTERNAL MEDICINE

## 2019-09-10 ENCOUNTER — HOSPITAL ENCOUNTER (OUTPATIENT)
Facility: HOSPITAL | Age: 59
Setting detail: OBSERVATION
Discharge: HOME | End: 2019-09-11
Attending: EMERGENCY MEDICINE | Admitting: HOSPITALIST

## 2019-09-10 ENCOUNTER — APPOINTMENT (OUTPATIENT)
Dept: GENERAL RADIOLOGY | Facility: HOSPITAL | Age: 59
End: 2019-09-10

## 2019-09-10 DIAGNOSIS — J44.1 CHRONIC OBSTRUCTIVE PULMONARY DISEASE WITH ACUTE EXACERBATION (HCC): ICD-10-CM

## 2019-09-10 DIAGNOSIS — J44.1 COPD EXACERBATION (HCC): Primary | ICD-10-CM

## 2019-09-10 LAB
ALBUMIN SERPL-MCNC: 5.2 G/DL (ref 3.5–5.2)
ALBUMIN/GLOB SERPL: 2.1 G/DL
ALP SERPL-CCNC: 63 U/L (ref 39–117)
ALT SERPL W P-5'-P-CCNC: 12 U/L (ref 1–33)
ANION GAP SERPL CALCULATED.3IONS-SCNC: 12.9 MMOL/L (ref 5–15)
AST SERPL-CCNC: 16 U/L (ref 1–32)
B PARAPERT DNA SPEC QL NAA+PROBE: NOT DETECTED
B PERT DNA SPEC QL NAA+PROBE: NOT DETECTED
BASOPHILS # BLD AUTO: 0.05 10*3/MM3 (ref 0–0.2)
BASOPHILS NFR BLD AUTO: 0.5 % (ref 0–1.5)
BILIRUB SERPL-MCNC: 0.5 MG/DL (ref 0.2–1.2)
BUN BLD-MCNC: 15 MG/DL (ref 6–20)
BUN/CREAT SERPL: 23.4 (ref 7–25)
C PNEUM DNA NPH QL NAA+NON-PROBE: NOT DETECTED
CALCIUM SPEC-SCNC: 9.7 MG/DL (ref 8.6–10.5)
CHLORIDE SERPL-SCNC: 102 MMOL/L (ref 98–107)
CO2 SERPL-SCNC: 24.1 MMOL/L (ref 22–29)
CREAT BLD-MCNC: 0.64 MG/DL (ref 0.57–1)
DEPRECATED RDW RBC AUTO: 48.7 FL (ref 37–54)
EOSINOPHIL # BLD AUTO: 0.71 10*3/MM3 (ref 0–0.4)
EOSINOPHIL NFR BLD AUTO: 7.2 % (ref 0.3–6.2)
ERYTHROCYTE [DISTWIDTH] IN BLOOD BY AUTOMATED COUNT: 13.2 % (ref 12.3–15.4)
FLUAV H1 2009 PAND RNA NPH QL NAA+PROBE: NOT DETECTED
FLUAV H1 HA GENE NPH QL NAA+PROBE: NOT DETECTED
FLUAV H3 RNA NPH QL NAA+PROBE: NOT DETECTED
FLUAV SUBTYP SPEC NAA+PROBE: NOT DETECTED
FLUBV RNA ISLT QL NAA+PROBE: NOT DETECTED
GFR SERPL CREATININE-BSD FRML MDRD: 95 ML/MIN/1.73
GLOBULIN UR ELPH-MCNC: 2.5 GM/DL
GLUCOSE BLD-MCNC: 143 MG/DL (ref 65–99)
HADV DNA SPEC NAA+PROBE: NOT DETECTED
HCOV 229E RNA SPEC QL NAA+PROBE: NOT DETECTED
HCOV HKU1 RNA SPEC QL NAA+PROBE: NOT DETECTED
HCOV NL63 RNA SPEC QL NAA+PROBE: NOT DETECTED
HCOV OC43 RNA SPEC QL NAA+PROBE: NOT DETECTED
HCT VFR BLD AUTO: 52.8 % (ref 34–46.6)
HGB BLD-MCNC: 16.4 G/DL (ref 12–15.9)
HMPV RNA NPH QL NAA+NON-PROBE: NOT DETECTED
HPIV1 RNA SPEC QL NAA+PROBE: NOT DETECTED
HPIV2 RNA SPEC QL NAA+PROBE: NOT DETECTED
HPIV3 RNA NPH QL NAA+PROBE: NOT DETECTED
HPIV4 P GENE NPH QL NAA+PROBE: NOT DETECTED
IMM GRANULOCYTES # BLD AUTO: 0.05 10*3/MM3 (ref 0–0.05)
IMM GRANULOCYTES NFR BLD AUTO: 0.5 % (ref 0–0.5)
LYMPHOCYTES # BLD AUTO: 2.47 10*3/MM3 (ref 0.7–3.1)
LYMPHOCYTES NFR BLD AUTO: 24.9 % (ref 19.6–45.3)
M PNEUMO IGG SER IA-ACNC: NOT DETECTED
MCH RBC QN AUTO: 30.9 PG (ref 26.6–33)
MCHC RBC AUTO-ENTMCNC: 31.1 G/DL (ref 31.5–35.7)
MCV RBC AUTO: 99.6 FL (ref 79–97)
MONOCYTES # BLD AUTO: 0.79 10*3/MM3 (ref 0.1–0.9)
MONOCYTES NFR BLD AUTO: 8 % (ref 5–12)
NEUTROPHILS # BLD AUTO: 5.86 10*3/MM3 (ref 1.7–7)
NEUTROPHILS NFR BLD AUTO: 58.9 % (ref 42.7–76)
NRBC BLD AUTO-RTO: 0 /100 WBC (ref 0–0.2)
NT-PROBNP SERPL-MCNC: 76.2 PG/ML (ref 5–900)
PLATELET # BLD AUTO: 267 10*3/MM3 (ref 140–450)
PMV BLD AUTO: 11 FL (ref 6–12)
POTASSIUM BLD-SCNC: 4.2 MMOL/L (ref 3.5–5.2)
PROT SERPL-MCNC: 7.7 G/DL (ref 6–8.5)
RBC # BLD AUTO: 5.3 10*6/MM3 (ref 3.77–5.28)
RHINOVIRUS RNA SPEC NAA+PROBE: NOT DETECTED
RSV RNA NPH QL NAA+NON-PROBE: NOT DETECTED
SODIUM BLD-SCNC: 139 MMOL/L (ref 136–145)
TROPONIN T SERPL-MCNC: <0.01 NG/ML (ref 0–0.03)
WBC NRBC COR # BLD: 9.93 10*3/MM3 (ref 3.4–10.8)

## 2019-09-10 PROCEDURE — 85025 COMPLETE CBC W/AUTO DIFF WBC: CPT | Performed by: EMERGENCY MEDICINE

## 2019-09-10 PROCEDURE — G0378 HOSPITAL OBSERVATION PER HR: HCPCS

## 2019-09-10 PROCEDURE — 96374 THER/PROPH/DIAG INJ IV PUSH: CPT

## 2019-09-10 PROCEDURE — 84484 ASSAY OF TROPONIN QUANT: CPT | Performed by: EMERGENCY MEDICINE

## 2019-09-10 PROCEDURE — 93005 ELECTROCARDIOGRAM TRACING: CPT | Performed by: EMERGENCY MEDICINE

## 2019-09-10 PROCEDURE — 94799 UNLISTED PULMONARY SVC/PX: CPT

## 2019-09-10 PROCEDURE — 99285 EMERGENCY DEPT VISIT HI MDM: CPT

## 2019-09-10 PROCEDURE — 83880 ASSAY OF NATRIURETIC PEPTIDE: CPT | Performed by: EMERGENCY MEDICINE

## 2019-09-10 PROCEDURE — 25010000002 METHYLPREDNISOLONE PER 125 MG: Performed by: EMERGENCY MEDICINE

## 2019-09-10 PROCEDURE — 80053 COMPREHEN METABOLIC PANEL: CPT | Performed by: EMERGENCY MEDICINE

## 2019-09-10 PROCEDURE — 96376 TX/PRO/DX INJ SAME DRUG ADON: CPT

## 2019-09-10 PROCEDURE — 25010000002 METHYLPREDNISOLONE PER 40 MG: Performed by: NURSE PRACTITIONER

## 2019-09-10 PROCEDURE — 71046 X-RAY EXAM CHEST 2 VIEWS: CPT

## 2019-09-10 PROCEDURE — 0100U HC BIOFIRE FILMARRAY RESP PANEL 2: CPT | Performed by: NURSE PRACTITIONER

## 2019-09-10 PROCEDURE — 94640 AIRWAY INHALATION TREATMENT: CPT

## 2019-09-10 RX ORDER — SODIUM CHLORIDE 0.9 % (FLUSH) 0.9 %
10 SYRINGE (ML) INJECTION AS NEEDED
Status: DISCONTINUED | OUTPATIENT
Start: 2019-09-10 | End: 2019-09-11 | Stop reason: HOSPADM

## 2019-09-10 RX ORDER — IPRATROPIUM BROMIDE AND ALBUTEROL SULFATE 2.5; .5 MG/3ML; MG/3ML
3 SOLUTION RESPIRATORY (INHALATION)
Status: DISCONTINUED | OUTPATIENT
Start: 2019-09-10 | End: 2019-09-10

## 2019-09-10 RX ORDER — ALBUTEROL SULFATE 2.5 MG/3ML
2.5 SOLUTION RESPIRATORY (INHALATION)
Status: COMPLETED | OUTPATIENT
Start: 2019-09-10 | End: 2019-09-10

## 2019-09-10 RX ORDER — ACETAMINOPHEN 325 MG/1
650 TABLET ORAL EVERY 4 HOURS PRN
Status: DISCONTINUED | OUTPATIENT
Start: 2019-09-10 | End: 2019-09-10 | Stop reason: SDUPTHER

## 2019-09-10 RX ORDER — ACETAMINOPHEN 650 MG/1
650 SUPPOSITORY RECTAL EVERY 4 HOURS PRN
Status: DISCONTINUED | OUTPATIENT
Start: 2019-09-10 | End: 2019-09-10 | Stop reason: SDUPTHER

## 2019-09-10 RX ORDER — SODIUM CHLORIDE 0.9 % (FLUSH) 0.9 %
10 SYRINGE (ML) INJECTION EVERY 12 HOURS SCHEDULED
Status: DISCONTINUED | OUTPATIENT
Start: 2019-09-10 | End: 2019-09-11

## 2019-09-10 RX ORDER — NITROGLYCERIN 0.4 MG/1
0.4 TABLET SUBLINGUAL
Status: DISCONTINUED | OUTPATIENT
Start: 2019-09-10 | End: 2019-09-11 | Stop reason: HOSPADM

## 2019-09-10 RX ORDER — ACETAMINOPHEN 160 MG/5ML
650 SOLUTION ORAL EVERY 4 HOURS PRN
Status: DISCONTINUED | OUTPATIENT
Start: 2019-09-10 | End: 2019-09-11 | Stop reason: HOSPADM

## 2019-09-10 RX ORDER — METHYLPREDNISOLONE SODIUM SUCCINATE 125 MG/2ML
125 INJECTION, POWDER, LYOPHILIZED, FOR SOLUTION INTRAMUSCULAR; INTRAVENOUS ONCE
Status: COMPLETED | OUTPATIENT
Start: 2019-09-10 | End: 2019-09-10

## 2019-09-10 RX ORDER — IPRATROPIUM BROMIDE AND ALBUTEROL SULFATE 2.5; .5 MG/3ML; MG/3ML
3 SOLUTION RESPIRATORY (INHALATION)
Status: DISCONTINUED | OUTPATIENT
Start: 2019-09-10 | End: 2019-09-11 | Stop reason: HOSPADM

## 2019-09-10 RX ORDER — METHYLPREDNISOLONE SODIUM SUCCINATE 125 MG/2ML
80 INJECTION, POWDER, LYOPHILIZED, FOR SOLUTION INTRAMUSCULAR; INTRAVENOUS EVERY 8 HOURS
Status: DISCONTINUED | OUTPATIENT
Start: 2019-09-10 | End: 2019-09-10

## 2019-09-10 RX ORDER — ONDANSETRON 2 MG/ML
4 INJECTION INTRAMUSCULAR; INTRAVENOUS EVERY 6 HOURS PRN
Status: DISCONTINUED | OUTPATIENT
Start: 2019-09-10 | End: 2019-09-11 | Stop reason: HOSPADM

## 2019-09-10 RX ORDER — ACETAMINOPHEN 650 MG/1
650 SUPPOSITORY RECTAL EVERY 4 HOURS PRN
Status: DISCONTINUED | OUTPATIENT
Start: 2019-09-10 | End: 2019-09-11 | Stop reason: HOSPADM

## 2019-09-10 RX ORDER — GUAIFENESIN 600 MG/1
600 TABLET, EXTENDED RELEASE ORAL EVERY 12 HOURS
Status: DISCONTINUED | OUTPATIENT
Start: 2019-09-10 | End: 2019-09-11 | Stop reason: HOSPADM

## 2019-09-10 RX ORDER — ALBUTEROL SULFATE 90 UG/1
2 AEROSOL, METERED RESPIRATORY (INHALATION) EVERY 4 HOURS PRN
COMMUNITY

## 2019-09-10 RX ORDER — LISINOPRIL 2.5 MG/1
2.5 TABLET ORAL DAILY
Status: DISCONTINUED | OUTPATIENT
Start: 2019-09-11 | End: 2019-09-11 | Stop reason: HOSPADM

## 2019-09-10 RX ORDER — ACETAMINOPHEN 325 MG/1
650 TABLET ORAL EVERY 4 HOURS PRN
Status: DISCONTINUED | OUTPATIENT
Start: 2019-09-10 | End: 2019-09-11 | Stop reason: HOSPADM

## 2019-09-10 RX ORDER — METHYLPREDNISOLONE SODIUM SUCCINATE 40 MG/ML
40 INJECTION, POWDER, LYOPHILIZED, FOR SOLUTION INTRAMUSCULAR; INTRAVENOUS EVERY 8 HOURS
Status: DISCONTINUED | OUTPATIENT
Start: 2019-09-10 | End: 2019-09-11

## 2019-09-10 RX ORDER — ACETAMINOPHEN 160 MG/5ML
650 SOLUTION ORAL EVERY 4 HOURS PRN
Status: DISCONTINUED | OUTPATIENT
Start: 2019-09-10 | End: 2019-09-10 | Stop reason: SDUPTHER

## 2019-09-10 RX ADMIN — SODIUM CHLORIDE, PRESERVATIVE FREE 10 ML: 5 INJECTION INTRAVENOUS at 08:49

## 2019-09-10 RX ADMIN — IPRATROPIUM BROMIDE AND ALBUTEROL SULFATE 3 ML: 2.5; .5 SOLUTION RESPIRATORY (INHALATION) at 19:09

## 2019-09-10 RX ADMIN — ALBUTEROL SULFATE 2.5 MG: 2.5 SOLUTION RESPIRATORY (INHALATION) at 03:39

## 2019-09-10 RX ADMIN — APIXABAN 5 MG: 5 TABLET, FILM COATED ORAL at 20:24

## 2019-09-10 RX ADMIN — METHYLPREDNISOLONE SODIUM SUCCINATE 125 MG: 125 INJECTION, POWDER, FOR SOLUTION INTRAMUSCULAR; INTRAVENOUS at 03:34

## 2019-09-10 RX ADMIN — IPRATROPIUM BROMIDE AND ALBUTEROL SULFATE 3 ML: 2.5; .5 SOLUTION RESPIRATORY (INHALATION) at 15:24

## 2019-09-10 RX ADMIN — GUAIFENESIN 600 MG: 600 TABLET, EXTENDED RELEASE ORAL at 17:52

## 2019-09-10 RX ADMIN — METHYLPREDNISOLONE SODIUM SUCCINATE 40 MG: 40 INJECTION, POWDER, LYOPHILIZED, FOR SOLUTION INTRAMUSCULAR; INTRAVENOUS at 19:00

## 2019-09-10 RX ADMIN — SODIUM CHLORIDE, PRESERVATIVE FREE 10 ML: 5 INJECTION INTRAVENOUS at 20:24

## 2019-09-10 RX ADMIN — ALBUTEROL SULFATE 2.5 MG: 2.5 SOLUTION RESPIRATORY (INHALATION) at 03:43

## 2019-09-10 RX ADMIN — IPRATROPIUM BROMIDE AND ALBUTEROL SULFATE 3 ML: 2.5; .5 SOLUTION RESPIRATORY (INHALATION) at 07:19

## 2019-09-10 RX ADMIN — METHYLPREDNISOLONE SODIUM SUCCINATE 40 MG: 40 INJECTION, POWDER, LYOPHILIZED, FOR SOLUTION INTRAMUSCULAR; INTRAVENOUS at 12:08

## 2019-09-10 RX ADMIN — IPRATROPIUM BROMIDE AND ALBUTEROL SULFATE 3 ML: 2.5; .5 SOLUTION RESPIRATORY (INHALATION) at 11:44

## 2019-09-10 RX ADMIN — GUAIFENESIN 600 MG: 600 TABLET, EXTENDED RELEASE ORAL at 08:46

## 2019-09-10 RX ADMIN — ALBUTEROL SULFATE 2.5 MG: 2.5 SOLUTION RESPIRATORY (INHALATION) at 03:51

## 2019-09-10 RX ADMIN — SODIUM CHLORIDE, PRESERVATIVE FREE 10 ML: 5 INJECTION INTRAVENOUS at 11:00

## 2019-09-10 NOTE — ED PROVIDER NOTES
EMERGENCY DEPARTMENT ENCOUNTER    Room Number:  14/14  Date of encounter:  9/10/2019  PCP: MARILYN Fernandez MD  Historian: Patient, EMS      HPI:  Chief Complaint: Dyspnea  A complete HPI/ROS/PMH/PSH/SH/FH are unobtainable due to: none    Context: Scarlet Chakraborty is a 59 y.o. female who presents to the ED c/o dyspnea that began 5 days ago. She reports that she has had a productive cough with clear sputum and some chest tightness since that time. Sxs became acutely worse tonight and on EMS arrival, they report she was in tripod position. Pt was given 2x Albuterol breathing tx and was placed on O2. Since that time sxs have improved. Pt has hx of COPD.       PAST MEDICAL HISTORY  Active Ambulatory Problems     Diagnosis Date Noted   • Cerebrovascular accident (CVA) due to thrombosis (CMS/HCC) 09/24/2018   • Stress-induced cardiomyopathy 10/14/2018   • Chronic obstructive pulmonary disease with acute exacerbation (CMS/HCC) 10/14/2018   • Tobacco abuse 12/18/2018   • Mixed hyperlipidemia 12/18/2018   • Hx of non-ST elevation myocardial infarction (NSTEMI) 02/17/2019   • History of stroke 02/17/2019   • Tobacco dependence 02/17/2019     Resolved Ambulatory Problems     Diagnosis Date Noted   • Acute respiratory failure (CMS/HCC) 09/13/2018   • Acute respiratory failure with hypoxia (CMS/HCC) 09/13/2018     Past Medical History:   Diagnosis Date   • Asthma    • Cerebrovascular accident (CVA) due to thrombosis of right middle cerebral artery (CMS/HCC)    • COPD (chronic obstructive pulmonary disease) (CMS/HCC)    • Nonischemic cardiomyopathy (CMS/HCC)    • NSTEMI (non-ST elevated myocardial infarction) (CMS/HCC)    • Stress-induced cardiomyopathy    • Stroke (CMS/HCC)    • Takotsubo cardiomyopathy    • Tobacco abuse          PAST SURGICAL HISTORY  Past Surgical History:   Procedure Laterality Date   • CARDIAC CATHETERIZATION N/A 9/13/2018    Procedure: Left Heart Cath and cors;  Surgeon: Gabino Dozier MD;  Location:   [facility];  Service: Cardiology   • CARDIAC CATHETERIZATION N/A 9/13/2018    Procedure: Left ventriculography;  Surgeon: Gabino Dozier MD;  Location:  [facility];  Service: Cardiology   • CARDIAC ELECTROPHYSIOLOGY PROCEDURE N/A 9/27/2018    Procedure: Loop insertion  LINQ;  Surgeon: Jose R Barker MD;  Location:  [facility];  Service: Cardiovascular         FAMILY HISTORY  Family History   Problem Relation Age of Onset   • Cancer Sister          SOCIAL HISTORY  Social History     Socioeconomic History   • Marital status: Single     Spouse name: Not on file   • Number of children: Not on file   • Years of education: Not on file   • Highest education level: Not on file   Tobacco Use   • Smoking status: Light Tobacco Smoker     Packs/day: 0.50     Years: 15.00     Pack years: 7.50     Types: Cigarettes   • Smokeless tobacco: Never Used   • Tobacco comment: currently 3-4 cigs daily   Substance and Sexual Activity   • Alcohol use: Yes     Comment: occassional   • Drug use: No   • Sexual activity: Defer         ALLERGIES  Patient has no known allergies.        REVIEW OF SYSTEMS  Review of Systems   Constitutional: Negative for fever.   HENT: Negative for sore throat.    Eyes: Negative.    Respiratory: Positive for cough, chest tightness and shortness of breath.    Cardiovascular: Negative for chest pain.   Gastrointestinal: Negative for abdominal pain, diarrhea and vomiting.   Genitourinary: Negative for dysuria.   Musculoskeletal: Negative for neck pain.   Skin: Negative for rash.   Allergic/Immunologic: Negative.    Neurological: Negative for weakness, numbness and headaches.   Hematological: Negative.    Psychiatric/Behavioral: Negative.    All other systems reviewed and are negative.    All systems reviewed and negative except for those discussed in HPI.       PHYSICAL EXAM    I have reviewed the triage vital signs and nursing notes.    ED Triage Vitals   Temp  Pulse Resp BP SpO2   -- -- -- -- --      Temp src Heart Rate Source Patient Position BP Location FiO2 (%)   -- -- -- -- --       Physical Exam  GENERAL: not distressed  HENT: nares patent  EYES: no scleral icterus  CV: regular rhythm, tachycardia  RESPIRATORY: normal effort, decreased breath sounds throughout, rhonchi, no rales or wheezes  ABDOMEN: soft  MUSCULOSKELETAL: no deformity  NEURO: alert, moves all extremities, follows commands  SKIN: warm, dry        LAB RESULTS  Recent Results (from the past 24 hour(s))   Comprehensive Metabolic Panel    Collection Time: 09/10/19  3:31 AM   Result Value Ref Range    Glucose 143 (H) 65 - 99 mg/dL    BUN 15 6 - 20 mg/dL    Creatinine 0.64 0.57 - 1.00 mg/dL    Sodium 139 136 - 145 mmol/L    Potassium 4.2 3.5 - 5.2 mmol/L    Chloride 102 98 - 107 mmol/L    CO2 24.1 22.0 - 29.0 mmol/L    Calcium 9.7 8.6 - 10.5 mg/dL    Total Protein 7.7 6.0 - 8.5 g/dL    Albumin 5.20 3.50 - 5.20 g/dL    ALT (SGPT) 12 1 - 33 U/L    AST (SGOT) 16 1 - 32 U/L    Alkaline Phosphatase 63 39 - 117 U/L    Total Bilirubin 0.5 0.2 - 1.2 mg/dL    eGFR Non African Amer 95 >60 mL/min/1.73    Globulin 2.5 gm/dL    A/G Ratio 2.1 g/dL    BUN/Creatinine Ratio 23.4 7.0 - 25.0    Anion Gap 12.9 5.0 - 15.0 mmol/L   Troponin    Collection Time: 09/10/19  3:31 AM   Result Value Ref Range    Troponin T <0.010 0.000 - 0.030 ng/mL   BNP    Collection Time: 09/10/19  3:31 AM   Result Value Ref Range    proBNP 76.2 5.0 - 900.0 pg/mL   CBC Auto Differential    Collection Time: 09/10/19  3:31 AM   Result Value Ref Range    WBC 9.93 3.40 - 10.80 10*3/mm3    RBC 5.30 (H) 3.77 - 5.28 10*6/mm3    Hemoglobin 16.4 (H) 12.0 - 15.9 g/dL    Hematocrit 52.8 (H) 34.0 - 46.6 %    MCV 99.6 (H) 79.0 - 97.0 fL    MCH 30.9 26.6 - 33.0 pg    MCHC 31.1 (L) 31.5 - 35.7 g/dL    RDW 13.2 12.3 - 15.4 %    RDW-SD 48.7 37.0 - 54.0 fl    MPV 11.0 6.0 - 12.0 fL    Platelets 267 140 - 450 10*3/mm3    Neutrophil % 58.9 42.7 - 76.0 %    Lymphocyte %  24.9 19.6 - 45.3 %    Monocyte % 8.0 5.0 - 12.0 %    Eosinophil % 7.2 (H) 0.3 - 6.2 %    Basophil % 0.5 0.0 - 1.5 %    Immature Grans % 0.5 0.0 - 0.5 %    Neutrophils, Absolute 5.86 1.70 - 7.00 10*3/mm3    Lymphocytes, Absolute 2.47 0.70 - 3.10 10*3/mm3    Monocytes, Absolute 0.79 0.10 - 0.90 10*3/mm3    Eosinophils, Absolute 0.71 (H) 0.00 - 0.40 10*3/mm3    Basophils, Absolute 0.05 0.00 - 0.20 10*3/mm3    Immature Grans, Absolute 0.05 0.00 - 0.05 10*3/mm3    nRBC 0.0 0.0 - 0.2 /100 WBC       Ordered the above labs and independently reviewed the results.        RADIOLOGY  Xr Chest 2 View    Result Date: 9/10/2019  PA AND LATERAL CHEST RADIOGRAPH  HISTORY: Shortness of air since Friday  COMPARISON: 09/24/2018  FINDINGS: Heart size is within normal limits. Cardiac loop recorder is noted. There are advanced background emphysematous changes. No pneumothorax or pleural effusion is seen. No definite acute infiltrates are identified.      Background emphysematous changes.  This report was finalized on 9/10/2019 4:32 AM by Dr. Faby Quiros M.D.        I ordered the above noted radiological studies. Reviewed by me and discussed with radiologist.  See dictation for official radiology interpretation.      PROCEDURES    Procedures      MEDICATIONS GIVEN IN ER    Medications   sodium chloride 0.9 % flush 10 mL (not administered)   methylPREDNISolone sodium succinate (SOLU-Medrol) injection 125 mg (not administered)   albuterol (PROVENTIL) nebulizer solution 0.083% 2.5 mg/3mL (2.5 mg Nebulization Given 9/10/19 0351)         PROGRESS, DATA ANALYSIS, CONSULTS, AND MEDICAL DECISION MAKING    All labs have been independently reviewed by me.  All radiology studies have been reviewed by me and discussed with radiologist dictating the report.   EKG's independently viewed and interpreted by me.  Discussion below represents my analysis of pertinent findings related to patient's condition, differential diagnosis, treatment plan and  final disposition.    3:35 AM  Ordered blood work, EKG, and CXR. Albuterol and Solu-medrol ordered.    4:37 AM  Rechecked with pt. She reports improvement of sxs, but still present. On repeat exam, there are still decreased breath sounds. Discussed with pt about plan to admit. Pt understands and agrees with plan. All concerns were addressed.    Time 4:49 AM  Discussed case with SHANKAR Belcher for [facility]  Reviewed history, exam, results and treatments.  Discussed concerns and plan of care. Carol accepts pt to be admitted to telemetry per Dr Dunn.           AS OF 4:50 AM VITALS:    BP - 141/96  HR - 114  TEMP - 97.9 °F (36.6 °C) (Tympanic)  02 SATS - 100%        DIAGNOSIS  Final diagnoses:   COPD exacerbation (CMS/[facility])         DISPOSITION  ADMISSION    Discussed treatment plan and reason for admission with pt/family and admitting physician.  Pt/family voiced understanding of the plan for admission for further testing/treatment as needed.         Documentation assistance provided by singh Frank for Dr Vang.  Information recorded by the singh was done at my direction and has been verified and validated by me.       Epi Frank  09/10/19 5540       Epi Frank  09/10/19 5910       Holden Vang MD  09/10/19 5286

## 2019-09-10 NOTE — ED NOTES
80 on RA per EMS.  Pt Hx of COPD placed on 3L. No o2 at home  x2 albuterol Tx per EMS     Felicia Drake RN  09/10/19 6712

## 2019-09-10 NOTE — CONSULTS
Adult Nutrition  Assessment/PES    Patient Name:  Sacrlet Chakraborty  YOB: 1960  MRN: [id number]  Admit Date:  9/10/2019    Assessment Date:  9/10/2019    Comments:  Nutrition assessment completed for BMI 17.51. Pt agrees to snacks. Food pref's obtained.    Reason for Assessment     Row Name 09/10/19 1321          Reason for Assessment    Reason For Assessment  identified at risk by screening criteria     Diagnosis  -- COPD     Identified At Risk by Screening Criteria  BMI         Nutrition/Diet History     Row Name 09/10/19 1321          Nutrition/Diet History    Typical Food/Fluid Intake  use to weigh 98lbs as is back up to 102 now, states appetite is good           Labs/Tests/Procedures/Meds     Row Name 09/10/19 1322          Labs/Procedures/Meds    Lab Results Reviewed  reviewed     Lab Results Comments  glu        Diagnostic Tests/Procedures    Diagnostic Test/Procedure Reviewed  reviewed        Medications    Pertinent Medications Reviewed  reviewed     Pertinent Medications Comments  steroid         Physical Findings     Row Name 09/10/19 1323          Physical Findings    Overall Physical Appearance  underweight     Skin  -- intact         Estimated/Assessed Needs     Row Name 09/10/19 1323          Calculation Measurements    Weight Used For Calculations  46.3 kg (102 lb 1.2 oz)        Estimated/Assessed Needs    Additional Documentation  KCAL/KG (Group);Protein Requirements (Group);Fluid Requirements (Group)        KCAL/KG    KCAL/KG  35 Kcal/Kg (kcal);40 Kcal/Kg (kcal)     35 Kcal/Kg (kcal)  1620.5     40 Kcal/Kg (kcal)  1852        Protein Requirements    Est Protein Requirement Amount (gms/kg)  1.2 gm protein     Estimated Protein Requirements (gms/day)  55.56        Fluid Requirements    Estimated Fluid Requirements (mL/day)  1620     RDA Method (mL)  1620     Swapnil-Segar Method (over 20 kg)  2426         Nutrition Prescription Ordered     Row Name 09/10/19 1323          Nutrition  Prescription PO    Current PO Diet  Regular     Common Modifiers  Cardiac                 Problem/Interventions:  Problem 1     Row Name 09/10/19 1324          Nutrition Diagnoses Problem 1    Problem 1  Increased Nutrient Needs     Etiology (related to)  MNT for Treatment/Condition                 Intervention Goal     Row Name 09/10/19 1325          Intervention Goal    General  Maintain nutrition     PO  PO intake (%)     PO Intake %  80 %     Weight  Appropriate weight gain         Nutrition Intervention     Row Name 09/10/19 1325          Nutrition Intervention    RD/Tech Action  Care plan reviewd;Follow Tx progress;Interview for preference         Nutrition Prescription     Row Name 09/10/19 1325          Nutrition Prescription PO    PO Prescription  Begin/change supplement     Supplement Frequency  Snack time sweets, pretzels per pt request         Education/Evaluation     Row Name 09/10/19 1326          Education    Education  Will Instruct as appropriate        Monitor/Evaluation    Monitor  Per protocol           Electronically signed by:  Malia Castillo RD  09/10/19 1:26 PM

## 2019-09-10 NOTE — H&P
Patient Name:  Scarlet Chakraborty  YOB: 1960  MRN:  [id number]  Admit Date:  9/10/2019  Patient Care Team:  MARILYN Fernandez MD as PCP - General (General Practice)  Beulah Joshi APRN as Nurse Practitioner (Neurology)      Subjective   History Present Illness     Chief Complaint   Patient presents with   • Shortness of Breath     HPI  Ms. Chakraborty is a 59 y.o. current smoker with a history of asthma, COPD, CVA, tobacco abuse and cardiomyopathy that presents to [facility] complaining of shortness of breath.  Patient states she recently got back from vacationing in [place].  She is a current everyday smoker but has cut back.  While in [place] she had to use a friend's inhaler due to shortness of breath.  When she got back Friday night symptoms still had not resolved and to need to worsen. Last night she could not catch her breath therefore EMS was called.  Upon EMS arrival, O2 saturations were in the 70s and she was noted to be in a tripod position. She was given breathing treatments and IV steroid with great response. She has been able to remain off O2 and is resting comfortably on room air. She denies chest pain, palpitations, SOA at present, edema, fever and chills. She did report a cough producing clear sputum as well as wheezing. Wheezing has resolved.      Review of Systems   Constitutional: Negative for chills and fever.   HENT: Negative for congestion and dental problem.    Eyes: Negative.    Respiratory: Positive for cough, shortness of breath and wheezing.    Cardiovascular: Negative.    Gastrointestinal: Negative for abdominal distention and nausea.   Endocrine: Negative.    Genitourinary: Negative for difficulty urinating and dysuria.   Musculoskeletal: Negative for back pain and gait problem.   Skin: Negative.    Allergic/Immunologic: Negative for environmental allergies and food allergies.   Neurological: Negative for dizziness and headaches.   Hematological:  Negative for adenopathy. Does not bruise/bleed easily.   Psychiatric/Behavioral: Negative for agitation and behavioral problems.        Personal History     Past Medical History:   Diagnosis Date   • Asthma    • Cerebrovascular accident (CVA) due to thrombosis of right middle cerebral artery (CMS/HCC)    • COPD (chronic obstructive pulmonary disease) (CMS/[facility])    • Nonischemic cardiomyopathy (CMS/[facility])    • NSTEMI (non-ST elevated myocardial infarction) (CMS/[facility])    • Stress-induced cardiomyopathy    • Stroke (CMS/[facility])    • Takotsubo cardiomyopathy    • Tobacco abuse      Past Surgical History:   Procedure Laterality Date   • CARDIAC CATHETERIZATION N/A 9/13/2018    Procedure: Left Heart Cath and cors;  Surgeon: Gabino Dozier MD;  Location:  [facility];  Service: Cardiology   • CARDIAC CATHETERIZATION N/A 9/13/2018    Procedure: Left ventriculography;  Surgeon: Gabino Dozier MD;  Location:  [facility];  Service: Cardiology   • CARDIAC ELECTROPHYSIOLOGY PROCEDURE N/A 9/27/2018    Procedure: Loop insertion  LINQ;  Surgeon: Jose R Barker MD;  Location:  [facility];  Service: Cardiovascular     Family History   Problem Relation Age of Onset   • Cancer Sister      Social History     Tobacco Use   • Smoking status: Light Tobacco Smoker     Packs/day: 0.50     Years: 15.00     Pack years: 7.50     Types: Cigarettes   • Smokeless tobacco: Never Used   • Tobacco comment: currently 3-4 cigs daily   Substance Use Topics   • Alcohol use: Yes     Comment: occassional   • Drug use: No     No current facility-administered medications on file prior to encounter.      Current Outpatient Medications on File Prior to Encounter   Medication Sig Dispense Refill   • albuterol sulfate  (90 Base) MCG/ACT inhaler Inhale 2 puffs Every 4 (Four) Hours As Needed for Wheezing.     • apixaban (ELIQUIS) 5 MG tablet tablet Take 1 tablet by mouth Every 12 (Twelve) Hours. 60 tablet 3    • lisinopril (PRINIVIL,ZESTRIL) 2.5 MG tablet Take 1 tablet by mouth Daily. 90 tablet 3   • [DISCONTINUED] Fluticasone-Umeclidin-Vilant (TRELEGY ELLIPTA) 100-62.5-25 MCG/INH aerosol powder  Inhale As Needed.       No Known Allergies    Objective    Objective     Vital Signs  Temp:  [97.9 °F (36.6 °C)-98.5 °F (36.9 °C)] 98.5 °F (36.9 °C)  Heart Rate:  [102-120] 103  Resp:  [18-24] 18  BP: ()/(55-96) 91/55  SpO2:  [99 %-100 %] 100 %  on  Flow (L/min):  [2-4] 2;   Device (Oxygen Therapy): room air  Body mass index is 17.51 kg/m².    Physical Exam   Constitutional: She is oriented to person, place, and time. She appears well-developed and well-nourished. No distress.   HENT:   Head: Normocephalic and atraumatic.   Eyes: Conjunctivae and EOM are normal.   Neck: Normal range of motion. Neck supple.   Cardiovascular: Normal rate and regular rhythm.   Pulmonary/Chest: Effort normal. She has no wheezes.   decreased right lobe   Abdominal: Soft. Bowel sounds are normal. She exhibits no distension. There is no tenderness.   Musculoskeletal: Normal range of motion. She exhibits no edema.   Neurological: She is alert and oriented to person, place, and time.   Skin: Skin is warm and dry.   Psychiatric: She has a normal mood and affect. Her behavior is normal.   Nursing note and vitals reviewed.      Results Review:  I reviewed the patient's new clinical results.  I reviewed the patient's new imaging results and agree with the interpretation.  I reviewed the patient's other test results and agree with the interpretation  I personally viewed and interpreted the patient's EKG/Telemetry data  Discussed with ED provider.    Lab Results (last 24 hours)     Procedure Component Value Units Date/Time    CBC & Differential [[id number]] Collected:  09/10/19 0331    Specimen:  Blood Updated:  09/10/19 0351    Narrative:       The following orders were created for panel order CBC & Differential.  Procedure                                Abnormality         Status                     ---------                               -----------         ------                     CBC Auto Differential[[id number]]        Abnormal            Final result                 Please view results for these tests on the individual orders.    Comprehensive Metabolic Panel [[id number]]  (Abnormal) Collected:  09/10/19 0331    Specimen:  Blood Updated:  09/10/19 0419     Glucose 143 mg/dL      BUN 15 mg/dL      Creatinine 0.64 mg/dL      Sodium 139 mmol/L      Potassium 4.2 mmol/L      Chloride 102 mmol/L      CO2 24.1 mmol/L      Calcium 9.7 mg/dL      Total Protein 7.7 g/dL      Albumin 5.20 g/dL      ALT (SGPT) 12 U/L      AST (SGOT) 16 U/L      Alkaline Phosphatase 63 U/L      Total Bilirubin 0.5 mg/dL      eGFR Non African Amer 95 mL/min/1.73      Globulin 2.5 gm/dL      A/G Ratio 2.1 g/dL      BUN/Creatinine Ratio 23.4     Anion Gap 12.9 mmol/L     Narrative:       GFR Normal >60  Chronic Kidney Disease <60  Kidney Failure <15    Troponin [[id number]]  (Normal) Collected:  09/10/19 0331    Specimen:  Blood Updated:  09/10/19 0419     Troponin T <0.010 ng/mL     Narrative:       Troponin T Reference Range:  <= 0.03 ng/mL-   Negative for AMI  >0.03 ng/mL-     Abnormal for myocardial necrosis.  Clinicians would have to utilize clinical acumen, EKG, Troponin and serial changes to determine if it is an Acute Myocardial Infarction or myocardial injury due to an underlying chronic condition.     BNP [[id number]]  (Normal) Collected:  09/10/19 0331    Specimen:  Blood Updated:  09/10/19 0417     proBNP 76.2 pg/mL     Narrative:       Among patients with dyspnea, NT-proBNP is highly sensitive for the detection of acute congestive heart failure. In addition NT-proBNP of <300 pg/ml effectively rules out acute congestive heart failure with 99% negative predictive value.    CBC Auto Differential [[id number]]  (Abnormal) Collected:  09/10/19 0331    Specimen:  Blood Updated:  09/10/19  0351     WBC 9.93 10*3/mm3      RBC 5.30 10*6/mm3      Hemoglobin 16.4 g/dL      Hematocrit 52.8 %      MCV 99.6 fL      MCH 30.9 pg      MCHC 31.1 g/dL      RDW 13.2 %      RDW-SD 48.7 fl      MPV 11.0 fL      Platelets 267 10*3/mm3      Neutrophil % 58.9 %      Lymphocyte % 24.9 %      Monocyte % 8.0 %      Eosinophil % 7.2 %      Basophil % 0.5 %      Immature Grans % 0.5 %      Neutrophils, Absolute 5.86 10*3/mm3      Lymphocytes, Absolute 2.47 10*3/mm3      Monocytes, Absolute 0.79 10*3/mm3      Eosinophils, Absolute 0.71 10*3/mm3      Basophils, Absolute 0.05 10*3/mm3      Immature Grans, Absolute 0.05 10*3/mm3      nRBC 0.0 /100 WBC     Respiratory Panel, PCR - Swab, Nasopharynx [[id number]]  (Normal) Collected:  09/10/19 0545    Specimen:  Swab from Nasopharynx Updated:  09/10/19 0700     ADENOVIRUS, PCR Not Detected     Coronavirus 229E Not Detected     Coronavirus HKU1 Not Detected     Coronavirus NL63 Not Detected     Coronavirus OC43 Not Detected     Human Metapneumovirus Not Detected     Human Rhinovirus/Enterovirus Not Detected     Influenza B PCR Not Detected     Parainfluenza Virus 1 Not Detected     Parainfluenza Virus 2 Not Detected     Parainfluenza Virus 3 Not Detected     Parainfluenza Virus 4 Not Detected     Bordetella pertussis pcr Not Detected     Influenza A H1 2009 PCR Not Detected     Chlamydophila pneumoniae PCR Not Detected     Mycoplasma pneumo by PCR Not Detected     Influenza A PCR Not Detected     Influenza A H3 Not Detected     Influenza A H1 Not Detected     RSV, PCR Not Detected     Bordetella parapertussis PCR Not Detected          Imaging Results (last 24 hours)     Procedure Component Value Units Date/Time    XR Chest 2 View [[id number]] Collected:  09/10/19 0430     Updated:  09/10/19 0435    Narrative:       PA AND LATERAL CHEST RADIOGRAPH     HISTORY: Shortness of air since Friday     COMPARISON: 09/24/2018     FINDINGS:  Heart size is within normal limits. Cardiac loop  recorder is noted.  There are advanced background emphysematous changes. No pneumothorax or  pleural effusion is seen. No definite acute infiltrates are identified.       Impression:       Background emphysematous changes.     This report was finalized on 9/10/2019 4:32 AM by Dr. Faby Quiros M.D.             Results for orders placed during the hospital encounter of 09/24/18   Adult Transesophageal Echo (ZARINA) W/ Cont if Necessary Per Protocol    Narrative · Left ventricular systolic function is normal.  · Essentially Normal ZARINA          ECG 12 Lead    (Results Pending)        Assessment/Plan     Active Hospital Problems    Diagnosis POA   • **Chronic obstructive pulmonary disease with acute exacerbation (CMS/[facility]) [J44.1] Yes   • History of stroke [Z86.73] Not Applicable   • Tobacco abuse [Z72.0] Yes     COPD  -continue duo nebs and IV steroids   -o2 as needed  - add spiriva at discharge    Tobacco abuse  -encouraged cessation, especially given her history of stroke and cardiomyopathy   -denies need for nicotine patch at this time    I discussed the patient's findings and my recommendations with patient and nursing staff.    VTE Prophylaxis - SCDs.  Code Status - Full code.       SHANKAR Hall  [facility]  09/10/19  10:18 AM

## 2019-09-10 NOTE — PLAN OF CARE
Problem: Patient Care Overview  Goal: Plan of Care Review  Outcome: Ongoing (interventions implemented as appropriate)   09/10/19 0644   Coping/Psychosocial   Plan of Care Reviewed With patient;spouse   Plan of Care Review   Progress no change   OTHER   Outcome Summary New admit, on 2L, up with standby assist, A&O x4, no nausea, breathing better per patient, will CTM       Problem: Chronic Obstructive Pulmonary Disease (Adult)  Goal: Signs and Symptoms of Listed Potential Problems Will be Absent, Minimized or Managed (Chronic Obstructive Pulmonary Disease)  Outcome: Ongoing (interventions implemented as appropriate)      Problem: Pain, Acute (Adult)  Goal: Identify Related Risk Factors and Signs and Symptoms  Outcome: Outcome(s) achieved Date Met: 09/10/19    Goal: Acceptable Pain Control/Comfort Level  Outcome: Ongoing (interventions implemented as appropriate)      Problem: Fall Risk (Adult)  Goal: Identify Related Risk Factors and Signs and Symptoms  Outcome: Outcome(s) achieved Date Met: 09/10/19    Goal: Absence of Fall  Outcome: Ongoing (interventions implemented as appropriate)

## 2019-09-11 VITALS
OXYGEN SATURATION: 96 % | SYSTOLIC BLOOD PRESSURE: 101 MMHG | WEIGHT: 102 LBS | RESPIRATION RATE: 16 BRPM | HEART RATE: 76 BPM | BODY MASS INDEX: 17.42 KG/M2 | HEIGHT: 64 IN | DIASTOLIC BLOOD PRESSURE: 58 MMHG | TEMPERATURE: 98.5 F

## 2019-09-11 PROBLEM — J44.1 COPD EXACERBATION: Status: ACTIVE | Noted: 2019-09-11

## 2019-09-11 LAB
ANION GAP SERPL CALCULATED.3IONS-SCNC: 11.6 MMOL/L (ref 5–15)
BUN BLD-MCNC: 12 MG/DL (ref 6–20)
BUN/CREAT SERPL: 20.7 (ref 7–25)
CALCIUM SPEC-SCNC: 9.4 MG/DL (ref 8.6–10.5)
CHLORIDE SERPL-SCNC: 105 MMOL/L (ref 98–107)
CO2 SERPL-SCNC: 25.4 MMOL/L (ref 22–29)
CREAT BLD-MCNC: 0.58 MG/DL (ref 0.57–1)
DEPRECATED RDW RBC AUTO: 49.3 FL (ref 37–54)
ERYTHROCYTE [DISTWIDTH] IN BLOOD BY AUTOMATED COUNT: 13.6 % (ref 12.3–15.4)
GFR SERPL CREATININE-BSD FRML MDRD: 107 ML/MIN/1.73
GLUCOSE BLD-MCNC: 112 MG/DL (ref 65–99)
HCT VFR BLD AUTO: 46.1 % (ref 34–46.6)
HGB BLD-MCNC: 14.5 G/DL (ref 12–15.9)
MCH RBC QN AUTO: 30.8 PG (ref 26.6–33)
MCHC RBC AUTO-ENTMCNC: 31.5 G/DL (ref 31.5–35.7)
MCV RBC AUTO: 97.9 FL (ref 79–97)
PLATELET # BLD AUTO: 253 10*3/MM3 (ref 140–450)
PMV BLD AUTO: 11.3 FL (ref 6–12)
POTASSIUM BLD-SCNC: 4.2 MMOL/L (ref 3.5–5.2)
RBC # BLD AUTO: 4.71 10*6/MM3 (ref 3.77–5.28)
SODIUM BLD-SCNC: 142 MMOL/L (ref 136–145)
WBC NRBC COR # BLD: 14.49 10*3/MM3 (ref 3.4–10.8)

## 2019-09-11 PROCEDURE — 80048 BASIC METABOLIC PNL TOTAL CA: CPT | Performed by: NURSE PRACTITIONER

## 2019-09-11 PROCEDURE — 85027 COMPLETE CBC AUTOMATED: CPT | Performed by: NURSE PRACTITIONER

## 2019-09-11 PROCEDURE — 94799 UNLISTED PULMONARY SVC/PX: CPT

## 2019-09-11 PROCEDURE — 96376 TX/PRO/DX INJ SAME DRUG ADON: CPT

## 2019-09-11 PROCEDURE — 63710000001 PREDNISONE PER 1 MG: Performed by: HOSPITALIST

## 2019-09-11 PROCEDURE — 25010000002 METHYLPREDNISOLONE PER 40 MG: Performed by: NURSE PRACTITIONER

## 2019-09-11 PROCEDURE — G0378 HOSPITAL OBSERVATION PER HR: HCPCS

## 2019-09-11 RX ORDER — ALBUTEROL SULFATE 2.5 MG/3ML
2.5 SOLUTION RESPIRATORY (INHALATION)
Qty: 360 ML | Refills: 0 | Status: SHIPPED | OUTPATIENT
Start: 2019-09-11 | End: 2019-10-04 | Stop reason: HOSPADM

## 2019-09-11 RX ORDER — GUAIFENESIN 600 MG/1
600 TABLET, EXTENDED RELEASE ORAL EVERY 12 HOURS
Status: ON HOLD
Start: 2019-09-11 | End: 2019-09-30

## 2019-09-11 RX ORDER — PREDNISONE 20 MG/1
40 TABLET ORAL
Status: DISCONTINUED | OUTPATIENT
Start: 2019-09-11 | End: 2019-09-11 | Stop reason: HOSPADM

## 2019-09-11 RX ORDER — PREDNISONE 10 MG/1
40 TABLET ORAL DAILY
Qty: 12 TABLET | Refills: 0 | Status: SHIPPED | OUTPATIENT
Start: 2019-09-11 | End: 2019-09-14

## 2019-09-11 RX ADMIN — SODIUM CHLORIDE, PRESERVATIVE FREE 10 ML: 5 INJECTION INTRAVENOUS at 08:46

## 2019-09-11 RX ADMIN — PREDNISONE 40 MG: 20 TABLET ORAL at 12:35

## 2019-09-11 RX ADMIN — IPRATROPIUM BROMIDE AND ALBUTEROL SULFATE 3 ML: 2.5; .5 SOLUTION RESPIRATORY (INHALATION) at 11:23

## 2019-09-11 RX ADMIN — GUAIFENESIN 600 MG: 600 TABLET, EXTENDED RELEASE ORAL at 04:13

## 2019-09-11 RX ADMIN — IPRATROPIUM BROMIDE AND ALBUTEROL SULFATE 3 ML: 2.5; .5 SOLUTION RESPIRATORY (INHALATION) at 07:11

## 2019-09-11 RX ADMIN — LISINOPRIL 2.5 MG: 2.5 TABLET ORAL at 08:46

## 2019-09-11 RX ADMIN — METHYLPREDNISOLONE SODIUM SUCCINATE 40 MG: 40 INJECTION, POWDER, LYOPHILIZED, FOR SOLUTION INTRAMUSCULAR; INTRAVENOUS at 04:13

## 2019-09-11 RX ADMIN — APIXABAN 5 MG: 5 TABLET, FILM COATED ORAL at 08:46

## 2019-09-11 NOTE — PLAN OF CARE
Problem: Patient Care Overview  Goal: Plan of Care Review  Outcome: Ongoing (interventions implemented as appropriate)   09/11/19 8052   Coping/Psychosocial   Plan of Care Reviewed With patient   Plan of Care Review   Progress improving   OTHER   Outcome Summary VSS, A&Ox4, up ad siva, lungs coarse with some expiratory wheezes, no soa reported, nonproductive cough, resp panel and CXR negative, possible dc home today, will continue to monitor.       Problem: Chronic Obstructive Pulmonary Disease (Adult)  Goal: Signs and Symptoms of Listed Potential Problems Will be Absent, Minimized or Managed (Chronic Obstructive Pulmonary Disease)  Outcome: Ongoing (interventions implemented as appropriate)

## 2019-09-11 NOTE — PROGRESS NOTES
Discharge Planning Assessment  [facility]     Patient Name: Scarlet Chakraborty  MRN: [id number]  Today's Date: 9/11/2019    Admit Date: 9/10/2019    Discharge Needs Assessment     Row Name 09/11/19 1500       Living Environment    Lives With  significant other    Current Living Arrangements  home/apartment/condo    Primary Care Provided by  self    Provides Primary Care For  no one    Family Caregiver if Needed  sibling(s);significant other    Quality of Family Relationships  helpful;involved;supportive    Able to Return to Prior Arrangements  yes       Resource/Environmental Concerns    Resource/Environmental Concerns  none    Transportation Concerns  car, none       Transition Planning    Patient/Family Anticipates Transition to  home with family    Patient/Family Anticipated Services at Transition  durable medical equipment    Transportation Anticipated  family or friend will provide       Discharge Needs Assessment    Readmission Within the Last 30 Days  no previous admission in last 30 days    Concerns Comments  DME    Equipment Currently Used at Home  none    Anticipated Changes Related to Illness  none    Equipment Needed After Discharge  nebulizer    Offered/Gave Vendor List  yes        Discharge Plan     Row Name 09/11/19 1426       Plan    Plan  Home with boyfriend and nebulizer from [facility]    Patient/Family in Agreement with Plan  yes    Plan Comments  Spoke with pt at bedside, introduced self and explained CCP role. Verified facesheet and confirmed local pharmacy. Pt is enrolled into meds to beds program. Pt lives with her boyfriend Don Taylor in a s/s home with 3 steps to enter. Prior to admission she was IADl at home with no DME. Pt denies HH or SNF hx. CCP explained MD ordered Nebulizer, pt states she denies Nebulizer. CCP discussed DME Providers and pt chose [facility] as she used them before. CCP called Odette/Marv for deliverly. Pt states bf will drive her home. Ailyn MATHEWS/JAMI         Destination      No service coordination in this encounter.      Durable Medical Equipment      Service Provider Request Status Selected Services Address Phone Number Fax Number    [facility] [place] [facility] Selected Durable Medical Equipment [address] [address] [phone]      Dialysis/Infusion      No service coordination in this encounter.      Home Medical Care      No service coordination in this encounter.      Therapy      No service coordination in this encounter.      Community Resources      No service coordination in this encounter.        Expected Discharge Date and Time     Expected Discharge Date Expected Discharge Time    Sep 11, 2019         Demographic Summary     Row Name 09/11/19 1446       General Information    Admission Type  inpatient    Referral Source  admission list    Reason for Consult  discharge planning    Preferred Language  English     Used During This Interaction  no       Contact Information    Permission Granted to Share Info With  family/designee        Functional Status     Row Name 09/11/19 1446       Functional Status    Usual Activity Tolerance  excellent    Current Activity Tolerance  good       Functional Status, IADL    Medications  independent    Meal Preparation  independent    Housekeeping  independent    Laundry  independent    Shopping  independent       Mental Status    General Appearance WDL  WDL       Mental Status Summary    Recent Changes in Mental Status/Cognitive Functioning  no changes       Employment/    Employment Status  employed full time        Psychosocial    No documentation.       Abuse/Neglect    No documentation.       Legal    No documentation.       Substance Abuse    No documentation.       Patient Forms     Row Name 09/11/19 1501       Patient Forms    Provider Choice List  Delivered dme providers    Delivered to  Patient    Method of delivery  In person            America NGUYEN   Rosemary RN

## 2019-09-11 NOTE — PROGRESS NOTES
Case Management Discharge Note    Final Note: home with nebulizer. george cherry/ccp    Destination      No service has been selected for the patient.      Durable Medical Equipment      Service Provider Request Status Selected Services Address Phone Number Fax Number    [facility] Selected Durable Medical Equipment [address] [id number] [phone]      Dialysis/Infusion      No service has been selected for the patient.      Home Medical Care      No service has been selected for the patient.      Therapy      No service has been selected for the patient.      Community Resources      No service has been selected for the patient.        Transportation Services  Private: Car    Final Discharge Disposition Code: 01 - home or self-care

## 2019-09-11 NOTE — DISCHARGE SUMMARY
LOS: 1 day      Name: Scarlet Chakraborty  Age/Sex: 59 y.o. female  :  1960        PCP: MARILYN Fernandez MD      Chief Complaint   Patient presents with   • Shortness of Breath         Subjective      Overall she is feeling much better.  She denies any new symptoms or complaints today.  She still coughing and things are little more productive today.  She is less short of breath with exertion and is no longer requiring any oxygen with rest or with activity.  I ambulated the patient around the nurses station 3 times and checked O2 saturations on returning.  She remained in the 90s.  She has used nebulizers in the past but does not have a nebulizer machine.  She only uses an albuterol inhaler at home.  General: No Fever or Chills, Cardiac: No Chest Pain or Palpitations, GI: No Nausea, Vomiting, or Diarrhea and Other: No bleeding        apixaban 5 mg Oral Q12H   guaiFENesin 600 mg Oral Q12H   ipratropium-albuterol 3 mL Nebulization Q4H - RT   lisinopril 2.5 mg Oral Daily   methylPREDNISolone sodium succinate 40 mg Intravenous Q8H   sodium chloride 10 mL Intravenous Q12H   sodium chloride 10 mL Intravenous Q12H            Objective      Vital Signs  Temp:  [98.1 °F (36.7 °C)-98.5 °F (36.9 °C)] 98.5 °F (36.9 °C)  Heart Rate:  [] 84  Resp:  [16-18] 16  BP: (100-120)/(57-71) 101/58  Body mass index is 17.51 kg/m².     Intake/Output Summary (Last 24 hours) at 2019 0843  Last data filed at 9/10/2019 2100      Gross per 24 hour   Intake 680 ml   Output --   Net 680 ml         Physical Exam   Constitutional: She is oriented to person, place, and time. She appears well-developed and well-nourished.   HENT:   Head: Normocephalic and atraumatic.   Cardiovascular: Normal rate, regular rhythm and normal heart sounds.   Pulmonary/Chest: Effort normal. No respiratory distress. She has wheezes.   Abdominal: Soft. Bowel sounds are normal.   Neurological: She is alert and oriented to person, place, and time.   Skin:  Skin is warm and dry. Capillary refill takes less than 2 seconds.   Psychiatric: She has a normal mood and affect. Her behavior is normal.   Nursing note and vitals reviewed.           Results Review:       I reviewed the patient's new clinical results.        Results from last 7 days   Lab Units 09/11/19  0524 09/10/19  0331   WBC 10*3/mm3 14.49* 9.93   HEMOGLOBIN g/dL 14.5 16.4*   PLATELETS 10*3/mm3 253 267            Results from last 7 days   Lab Units 09/11/19  0524 09/10/19  0331   SODIUM mmol/L 142 139   POTASSIUM mmol/L 4.2 4.2   CHLORIDE mmol/L 105 102   CO2 mmol/L 25.4 24.1   BUN mg/dL 12 15   CREATININE mg/dL 0.58 0.64   CALCIUM mg/dL 9.4 9.7   Estimated Creatinine Clearance: 77.3 mL/min (by C-G formula based on SCr of 0.58 mg/dL).           Assessment/Plan        Chronic obstructive pulmonary disease with acute exacerbation (CMS/HCC)    Tobacco abuse    History of stroke        PLAN  -She is moving good air she is no longer hypoxic she would like to go home.  She is received 24 hours of IV steroids and scheduled breathing treatments and is shown quick improvement.  -I have ordered a nebulizer machine for home and I recommended she take the breathing treatments scheduled at least 4 times throughout the day.  Of also started the patient on Spiriva.  I recommended tobacco cessation.  -I asked her to take it easy and remain indoors for the next few days as there would be acute advisory today and tomorrow.  This should help her continue to recover at home.        Disposition  Plan Home today        Ellis Kurtz MD  [facility]  09/11/19  8:43 AM                                Your medication list      START taking these medications      Instructions Last Dose Given Next Dose Due   guaiFENesin 600 MG 12 hr tablet  Commonly known as:  MUCINEX      Take 1 tablet by mouth Every 12 (Twelve) Hours.       predniSONE 20 MG tablet  Commonly known as:  DELTASONE      Take 2 tablets by  mouth Daily for 3 days.       Tiotropium Bromide Monohydrate 1.25 MCG/ACT aerosol solution inhaler  Commonly known as:  SPIRIVA RESPIMAT      Inhale 2 puffs Daily.          CHANGE how you take these medications      Instructions Last Dose Given Next Dose Due   albuterol sulfate  (90 Base) MCG/ACT inhaler  Commonly known as:  PROVENTIL HFA;VENTOLIN HFA;PROAIR HFA  What changed:  Another medication with the same name was added. Make sure you understand how and when to take each.      Inhale 2 puffs Every 4 (Four) Hours As Needed for Wheezing.       albuterol (2.5 MG/3ML) 0.083% nebulizer solution  Commonly known as:  PROVENTIL  What changed:  You were already taking a medication with the same name, and this prescription was added. Make sure you understand how and when to take each.      Take 2.5 mg by nebulization 4 Times a Day for 30 days.          CONTINUE taking these medications      Instructions Last Dose Given Next Dose Due   apixaban 5 MG tablet tablet  Commonly known as:  ELIQUIS      Take 1 tablet by mouth Every 12 (Twelve) Hours.       lisinopril 2.5 MG tablet  Commonly known as:  PRINIVIL,ZESTRIL      Take 1 tablet by mouth Daily.             Where to Get Your Medications      These medications were sent to [facility] - [place] [address]    Hours:  7:00AM-6PM Mon-Fri Phone:  [phone]   · albuterol (2.5 MG/3ML) 0.083% nebulizer solution  · predniSONE 20 MG tablet  · Tiotropium Bromide Monohydrate 1.25 MCG/ACT aerosol solution inhaler     Information about where to get these medications is not yet available    Ask your nurse or doctor about these medications  · guaiFENesin 600 MG 12 hr tablet

## 2019-09-11 NOTE — PROGRESS NOTES
LOS: 1 day     Name: Scarlet Chakraborty  Age/Sex: 59 y.o. female  :  1960        PCP: MARILYN Fernandez MD  Chief Complaint   Patient presents with   • Shortness of Breath      Subjective   Overall she is feeling much better.  She denies any new symptoms or complaints today.  She still coughing and things are little more productive today.  She is less short of breath with exertion and is no longer requiring any oxygen with rest or with activity.  I ambulated the patient around the nurses station 3 times and checked O2 saturations on returning.  She remained in the 90s.  She has used nebulizers in the past but does not have a nebulizer machine.  She only uses an albuterol inhaler at home.  General: No Fever or Chills, Cardiac: No Chest Pain or Palpitations, GI: No Nausea, Vomiting, or Diarrhea and Other: No bleeding      apixaban 5 mg Oral Q12H   guaiFENesin 600 mg Oral Q12H   ipratropium-albuterol 3 mL Nebulization Q4H - RT   lisinopril 2.5 mg Oral Daily   methylPREDNISolone sodium succinate 40 mg Intravenous Q8H   sodium chloride 10 mL Intravenous Q12H   sodium chloride 10 mL Intravenous Q12H          Objective   Vital Signs  Temp:  [98.1 °F (36.7 °C)-98.5 °F (36.9 °C)] 98.5 °F (36.9 °C)  Heart Rate:  [] 84  Resp:  [16-18] 16  BP: (100-120)/(57-71) 101/58  Body mass index is 17.51 kg/m².    Intake/Output Summary (Last 24 hours) at 2019 0843  Last data filed at 9/10/2019 2100  Gross per 24 hour   Intake 680 ml   Output --   Net 680 ml       Physical Exam   Constitutional: She is oriented to person, place, and time. She appears well-developed and well-nourished.   HENT:   Head: Normocephalic and atraumatic.   Cardiovascular: Normal rate, regular rhythm and normal heart sounds.   Pulmonary/Chest: Effort normal. No respiratory distress. She has wheezes.   Abdominal: Soft. Bowel sounds are normal.   Neurological: She is alert and oriented to person, place, and time.   Skin: Skin is warm and dry.  Capillary refill takes less than 2 seconds.   Psychiatric: She has a normal mood and affect. Her behavior is normal.   Nursing note and vitals reviewed.        Results Review:       I reviewed the patient's new clinical results.  Results from last 7 days   Lab Units 09/11/19  0524 09/10/19  0331   WBC 10*3/mm3 14.49* 9.93   HEMOGLOBIN g/dL 14.5 16.4*   PLATELETS 10*3/mm3 253 267     Results from last 7 days   Lab Units 09/11/19  0524 09/10/19  0331   SODIUM mmol/L 142 139   POTASSIUM mmol/L 4.2 4.2   CHLORIDE mmol/L 105 102   CO2 mmol/L 25.4 24.1   BUN mg/dL 12 15   CREATININE mg/dL 0.58 0.64   CALCIUM mg/dL 9.4 9.7   Estimated Creatinine Clearance: 77.3 mL/min (by C-G formula based on SCr of 0.58 mg/dL).      Assessment/Plan     Chronic obstructive pulmonary disease with acute exacerbation (CMS/HCC)    Tobacco abuse    History of stroke      PLAN  -She is moving good air she is no longer hypoxic she would like to go home.  She is received 24 hours of IV steroids and scheduled breathing treatments and is shown quick improvement.  -I have ordered a nebulizer machine for home and I recommended she take the breathing treatments scheduled at least 4 times throughout the day.  Of also started the patient on Spiriva.  I recommended tobacco cessation.  -I asked her to take it easy and remain indoors for the next few days as there would be acute advisory today and tomorrow.  This should help her continue to recover at home.      Disposition  Plan Home today      Ellis Kurtz MD  [facility]  09/11/19  8:43 AM

## 2019-09-11 NOTE — PROGRESS NOTES
Discharge Planning Assessment  [facility]     Patient Name: Scarlet Chakraborty  MRN: [id number]  Today's Date: 9/11/2019    Admit Date: 9/10/2019    Discharge Needs Assessment    No documentation.       Discharge Plan     Row Name 09/11/19 1426       Plan    Plan  Home with boyfriend and nebulizer from [facility]    Patient/Family in Agreement with Plan  yes    Plan Comments  Spoke with pt at bedside, introduced self and explained CCP role. Verified facesheet and confirmed local pharmacy. Pt is enrolled into meds to beds program. Pt lives with her boyfriend Don Taylor in a s/s home with 3 steps to enter. Prior to admission she was IADl at home with no DME. Pt denies HH or SNF hx. CCP explained MD ordered Nebulizer, pt states she denies Nebulizer. CCP discussed DME Providers and pt chose [facility] as she used them before. CCP called Odette/Marv for deliverly. Pt states bf will drive her home. Ailyn MATHEWS/CCP        Destination      No service coordination in this encounter.      Durable Medical Equipment      No service coordination in this encounter.      Dialysis/Infusion      No service coordination in this encounter.      Home Medical Care      No service coordination in this encounter.      Therapy      No service coordination in this encounter.      Community Resources      No service coordination in this encounter.        Expected Discharge Date and Time     Expected Discharge Date Expected Discharge Time    Sep 11, 2019         Demographic Summary    No documentation.       Functional Status    No documentation.       Psychosocial    No documentation.       Abuse/Neglect    No documentation.       Legal    No documentation.       Substance Abuse    No documentation.       Patient Forms    No documentation.           America Riley, RN

## 2019-09-12 ENCOUNTER — READMISSION MANAGEMENT (OUTPATIENT)
Dept: CALL CENTER | Facility: HOSPITAL | Age: 59
End: 2019-09-12

## 2019-09-12 NOTE — OUTREACH NOTE
Prep Survey      Responses   Facility patient discharged from?  [facility]   Is patient eligible?  Yes   Discharge diagnosis  COPD   Does the patient have one of the following disease processes/diagnoses(primary or secondary)?  COPD/Pneumonia   Does the patient have Home health ordered?  No   Is there a DME ordered?  Yes   What DME was ordered?  nebulizer from [facility]   Prep survey completed?  Yes          Odette Figueredo RN

## 2019-09-13 ENCOUNTER — READMISSION MANAGEMENT (OUTPATIENT)
Dept: CALL CENTER | Facility: HOSPITAL | Age: 59
End: 2019-09-13

## 2019-09-13 NOTE — OUTREACH NOTE
COPD/PN Week 1 Survey      Responses   Facility patient discharged from?  [facility]   Does the patient have one of the following disease processes/diagnoses(primary or secondary)?  COPD/Pneumonia   Is there a successful TCM telephone encounter documented?  No   Was the primary reason for admission:  COPD exacerbation   Week 1 attempt successful?  No   Unsuccessful attempts  Attempt 1          Alexandria Escamilla RN

## 2019-09-16 ENCOUNTER — READMISSION MANAGEMENT (OUTPATIENT)
Dept: CALL CENTER | Facility: HOSPITAL | Age: 59
End: 2019-09-16

## 2019-09-16 NOTE — OUTREACH NOTE
COPD/PN Week 1 Survey      Responses   Facility patient discharged from?  [facility]   Does the patient have one of the following disease processes/diagnoses(primary or secondary)?  COPD/Pneumonia   Is there a successful TCM telephone encounter documented?  No   Was the primary reason for admission:  COPD exacerbation   Week 1 attempt successful?  Yes   Call start time  0920   Call end time  0926   Discharge diagnosis  COPD   Is patient permission given to speak with other caregiver?  No   Meds reviewed with patient/caregiver?  Yes   Is the patient having any side effects they believe may be caused by any medication additions or changes?  Yes   Side effects comments   Difficulty sleeping,  possibly from steroid.    Does the patient have all medications ordered at discharge?  Yes   Is the patient taking all medications as directed (includes completed medication regime)?  Yes   Does the patient have a primary care provider?   Yes   Does the patient have an appointment with their PCP or pulmonologist within 7 days of discharge?  No   Comments regarding PCP  DELANEY Fernandez MD   What is preventing the patient from scheduling follow up appointments within 7 days of discharge?  Haven't had time   Nursing Interventions  Educated patient on importance of making appointment, Advised patient to make appointment   Has the patient kept scheduled appointments due by today?  N/A   Comments  Patient states seeing pulmonary Dr tomorrow.    Has home health visited the patient within 72 hours of discharge?  N/A   What DME was ordered?  nebulizer from [facility]   Has all DME been delivered?  Yes   Psychosocial issues?  No   Did the patient receive a copy of their discharge instructions?  Yes   Nursing interventions  Reviewed instructions with patient   What is the patient's perception of their health status since discharge?  Improving   Nursing Interventions  Nurse provided patient education   Is the patient/caregiver able to teach back  the hierarchy of who to call/visit for symptoms/problems? PCP, Specialist, Home health nurse, Urgent Care, ED, 911  Yes   Is the patient able to teach back COPD zones?  No   Nursing interventions  Education provided on various zones   Patient reports what zone on this call?  Green Zone   Green Zone  Reports doing well, Breathing without shortness of breath, Usual activity and exercise level, Usual amount of phlegm/mucus without difficulty coughing up, Appetite is good   Green Zone interventions:  Take daily medications, Avoid indoor/outdoor triggers, Do not smoke, Continue regular exercise/diet plan   Week 1 call completed?  Yes          Odette Jay RN

## 2019-09-19 ENCOUNTER — OFFICE VISIT (OUTPATIENT)
Dept: NEUROLOGY | Facility: CLINIC | Age: 59
End: 2019-09-19

## 2019-09-19 VITALS
BODY MASS INDEX: 17.04 KG/M2 | HEIGHT: 64 IN | DIASTOLIC BLOOD PRESSURE: 82 MMHG | WEIGHT: 99.8 LBS | HEART RATE: 68 BPM | SYSTOLIC BLOOD PRESSURE: 126 MMHG | RESPIRATION RATE: 17 BRPM | OXYGEN SATURATION: 98 %

## 2019-09-19 DIAGNOSIS — I63.512 CEREBROVASCULAR ACCIDENT (CVA) DUE TO OCCLUSION OF LEFT MIDDLE CEREBRAL ARTERY (HCC): ICD-10-CM

## 2019-09-19 DIAGNOSIS — Z72.0 TOBACCO ABUSE: ICD-10-CM

## 2019-09-19 DIAGNOSIS — J44.1 CHRONIC OBSTRUCTIVE PULMONARY DISEASE WITH ACUTE EXACERBATION (HCC): ICD-10-CM

## 2019-09-19 DIAGNOSIS — I51.81 STRESS-INDUCED CARDIOMYOPATHY: ICD-10-CM

## 2019-09-19 PROBLEM — I42.9 CARDIOMYOPATHY (HCC): Status: ACTIVE | Noted: 2019-09-19

## 2019-09-19 PROBLEM — I50.9 CONGESTIVE HEART FAILURE (HCC): Status: ACTIVE | Noted: 2018-11-02

## 2019-09-19 PROBLEM — J44.9 CHRONIC OBSTRUCTIVE LUNG DISEASE: Status: ACTIVE | Noted: 2018-11-02

## 2019-09-19 PROCEDURE — 99214 OFFICE O/P EST MOD 30 MIN: CPT | Performed by: NURSE PRACTITIONER

## 2019-09-24 ENCOUNTER — READMISSION MANAGEMENT (OUTPATIENT)
Dept: CALL CENTER | Facility: HOSPITAL | Age: 59
End: 2019-09-24

## 2019-09-24 NOTE — OUTREACH NOTE
COPD/PN Week 2 Survey      Responses   Facility patient discharged from?  [facility]   Does the patient have one of the following disease processes/diagnoses(primary or secondary)?  COPD/Pneumonia   Was the primary reason for admission:  COPD exacerbation   Week 2 attempt successful?  Yes   Call start time  1732   Call end time  1736   Discharge diagnosis  COPD   Meds reviewed with patient/caregiver?  Yes   Is the patient having any side effects they believe may be caused by any medication additions or changes?  No   Does the patient have all medications ordered at discharge?  Yes   Is the patient taking all medications as directed (includes completed medication regime)?  Yes   Does the patient have a primary care provider?   Yes   Does the patient have an appointment with their PCP or pulmonologist within 7 days of discharge?  Yes   Has the patient kept scheduled appointments due by today?  Yes   Has home health visited the patient within 72 hours of discharge?  N/A   Psychosocial issues?  No   Did the patient receive a copy of their discharge instructions?  Yes   Nursing interventions  Reviewed instructions with patient   What is the patient's perception of their health status since discharge?  Improving   Nursing Interventions  Nurse provided patient education   If the patient is a current smoker, are they able to teach back resources for cessation?  -- [has not smoked since leaving hospital]   Is the patient/caregiver able to teach back the hierarchy of who to call/visit for symptoms/problems? PCP, Specialist, Home health nurse, Urgent Care, ED, 911  Yes   Is the patient able to teach back COPD zones?  Yes   Nursing interventions  Education provided on various zones   Patient reports what zone on this call?  Green Zone   Green Zone  Reports doing well, Breathing without shortness of breath, Usual activity and exercise level, Usual amount of phlegm/mucus without difficulty coughing up, Sleeping well   Week 2 call  completed?  Yes          Moon Hickman RN

## 2019-09-30 ENCOUNTER — APPOINTMENT (OUTPATIENT)
Dept: CT IMAGING | Facility: HOSPITAL | Age: 59
End: 2019-09-30

## 2019-09-30 ENCOUNTER — APPOINTMENT (OUTPATIENT)
Dept: GENERAL RADIOLOGY | Facility: HOSPITAL | Age: 59
End: 2019-09-30

## 2019-09-30 ENCOUNTER — HOSPITAL ENCOUNTER (INPATIENT)
Facility: HOSPITAL | Age: 59
LOS: 4 days | Discharge: HOME | End: 2019-10-04
Attending: EMERGENCY MEDICINE | Admitting: INTERNAL MEDICINE

## 2019-09-30 ENCOUNTER — READMISSION MANAGEMENT (OUTPATIENT)
Dept: CALL CENTER | Facility: HOSPITAL | Age: 59
End: 2019-09-30

## 2019-09-30 ENCOUNTER — APPOINTMENT (OUTPATIENT)
Dept: CARDIOLOGY | Facility: HOSPITAL | Age: 59
End: 2019-09-30

## 2019-09-30 DIAGNOSIS — I21.4 NSTEMI (NON-ST ELEVATED MYOCARDIAL INFARCTION) (HCC): ICD-10-CM

## 2019-09-30 DIAGNOSIS — J44.1 COPD WITH ACUTE EXACERBATION (HCC): ICD-10-CM

## 2019-09-30 DIAGNOSIS — J96.00 ACUTE RESPIRATORY FAILURE, UNSPECIFIED WHETHER WITH HYPOXIA OR HYPERCAPNIA (HCC): Primary | ICD-10-CM

## 2019-09-30 LAB
ALBUMIN SERPL-MCNC: 4.5 G/DL (ref 3.5–5.2)
ALBUMIN/GLOB SERPL: 1.8 G/DL
ALP SERPL-CCNC: 73 U/L (ref 39–117)
ALT SERPL W P-5'-P-CCNC: 22 U/L (ref 1–33)
ANION GAP SERPL CALCULATED.3IONS-SCNC: 13.3 MMOL/L (ref 5–15)
AORTIC DIMENSIONLESS INDEX: 0.9 (DI)
ARTERIAL PATENCY WRIST A: POSITIVE
AST SERPL-CCNC: 27 U/L (ref 1–32)
ATMOSPHERIC PRESS: 759 MMHG
BASE EXCESS BLDA CALC-SCNC: -3.9 MMOL/L (ref 0–2)
BASOPHILS # BLD MANUAL: 0.05 10*3/MM3 (ref 0–0.2)
BASOPHILS NFR BLD AUTO: 0.4 % (ref 0–1.5)
BDY SITE: ABNORMAL
BH CV ECHO MEAS - ACS: 1.7 CM
BH CV ECHO MEAS - AO MAX PG (FULL): 1.2 MMHG
BH CV ECHO MEAS - AO MAX PG: 4.8 MMHG
BH CV ECHO MEAS - AO MEAN PG (FULL): 0 MMHG
BH CV ECHO MEAS - AO MEAN PG: 2 MMHG
BH CV ECHO MEAS - AO ROOT DIAM: 2.6 CM
BH CV ECHO MEAS - AO V2 MAX: 109 CM/SEC
BH CV ECHO MEAS - AO V2 MEAN: 72 CM/SEC
BH CV ECHO MEAS - AO V2 VTI: 19 CM
BH CV ECHO MEAS - AVA(I,A): 2.5 CM^2
BH CV ECHO MEAS - AVA(I,D): 2.5 CM^2
BH CV ECHO MEAS - AVA(V,A): 2.5 CM^2
BH CV ECHO MEAS - AVA(V,D): 2.5 CM^2
BH CV ECHO MEAS - BSA(HAYCOCK): 1.4 M^2
BH CV ECHO MEAS - BSA: 1.4 M^2
BH CV ECHO MEAS - BZI_BMI: 16 KILOGRAMS/M^2
BH CV ECHO MEAS - BZI_METRIC_HEIGHT: 162.6 CM
BH CV ECHO MEAS - BZI_METRIC_WEIGHT: 42.2 KG
BH CV ECHO MEAS - EDV(CUBED): 97.3 ML
BH CV ECHO MEAS - EDV(MOD-SP2): 103 ML
BH CV ECHO MEAS - EDV(MOD-SP4): 96 ML
BH CV ECHO MEAS - EDV(TEICH): 97.3 ML
BH CV ECHO MEAS - EF(CUBED): 52.1 %
BH CV ECHO MEAS - EF(MOD-BP): 24 %
BH CV ECHO MEAS - EF(MOD-SP2): 18.4 %
BH CV ECHO MEAS - EF(MOD-SP4): 25 %
BH CV ECHO MEAS - EF(TEICH): 44.1 %
BH CV ECHO MEAS - ESV(CUBED): 46.7 ML
BH CV ECHO MEAS - ESV(MOD-SP2): 84 ML
BH CV ECHO MEAS - ESV(MOD-SP4): 72 ML
BH CV ECHO MEAS - ESV(TEICH): 54.4 ML
BH CV ECHO MEAS - FS: 21.7 %
BH CV ECHO MEAS - IVS/LVPW: 1
BH CV ECHO MEAS - IVSD: 0.5 CM
BH CV ECHO MEAS - LAT PEAK E' VEL: 8 CM/SEC
BH CV ECHO MEAS - LV DIASTOLIC VOL/BSA (35-75): 68 ML/M^2
BH CV ECHO MEAS - LV MASS(C)D: 65.7 GRAMS
BH CV ECHO MEAS - LV MASS(C)DI: 46.5 GRAMS/M^2
BH CV ECHO MEAS - LV MAX PG: 3.6 MMHG
BH CV ECHO MEAS - LV MEAN PG: 2 MMHG
BH CV ECHO MEAS - LV SYSTOLIC VOL/BSA (12-30): 51 ML/M^2
BH CV ECHO MEAS - LV V1 MAX: 94.6 CM/SEC
BH CV ECHO MEAS - LV V1 MEAN: 63.4 CM/SEC
BH CV ECHO MEAS - LV V1 VTI: 16.6 CM
BH CV ECHO MEAS - LVIDD: 4.6 CM
BH CV ECHO MEAS - LVIDS: 3.6 CM
BH CV ECHO MEAS - LVLD AP2: 6.9 CM
BH CV ECHO MEAS - LVLD AP4: 7.2 CM
BH CV ECHO MEAS - LVLS AP2: 6.8 CM
BH CV ECHO MEAS - LVLS AP4: 6.7 CM
BH CV ECHO MEAS - LVOT AREA (M): 2.8 CM^2
BH CV ECHO MEAS - LVOT AREA: 2.8 CM^2
BH CV ECHO MEAS - LVOT DIAM: 1.9 CM
BH CV ECHO MEAS - LVPWD: 0.5 CM
BH CV ECHO MEAS - MED PEAK E' VEL: 7 CM/SEC
BH CV ECHO MEAS - MR MAX PG: 56.9 MMHG
BH CV ECHO MEAS - MR MAX VEL: 377 CM/SEC
BH CV ECHO MEAS - MV A DUR: 0.13 SEC
BH CV ECHO MEAS - MV A MAX VEL: 66.7 CM/SEC
BH CV ECHO MEAS - MV DEC SLOPE: 436 CM/SEC^2
BH CV ECHO MEAS - MV DEC TIME: 130 SEC
BH CV ECHO MEAS - MV E MAX VEL: 79.6 CM/SEC
BH CV ECHO MEAS - MV E/A: 1.2
BH CV ECHO MEAS - MV MAX PG: 2.7 MMHG
BH CV ECHO MEAS - MV MEAN PG: 1 MMHG
BH CV ECHO MEAS - MV P1/2T MAX VEL: 82 CM/SEC
BH CV ECHO MEAS - MV P1/2T: 55.1 MSEC
BH CV ECHO MEAS - MV V2 MAX: 81.9 CM/SEC
BH CV ECHO MEAS - MV V2 MEAN: 56 CM/SEC
BH CV ECHO MEAS - MV V2 VTI: 18.9 CM
BH CV ECHO MEAS - MVA P1/2T LCG: 2.7 CM^2
BH CV ECHO MEAS - MVA(P1/2T): 4 CM^2
BH CV ECHO MEAS - MVA(VTI): 2.5 CM^2
BH CV ECHO MEAS - PA ACC TIME: 0.11 SEC
BH CV ECHO MEAS - PA MAX PG (FULL): 0.21 MMHG
BH CV ECHO MEAS - PA MAX PG: 7.1 MMHG
BH CV ECHO MEAS - PA PR(ACCEL): 27.7 MMHG
BH CV ECHO MEAS - PA V2 MAX: 133 CM/SEC
BH CV ECHO MEAS - PULM A REVS DUR: 0.12 SEC
BH CV ECHO MEAS - PULM A REVS VEL: 48.3 CM/SEC
BH CV ECHO MEAS - PULM DIAS VEL: 40.3 CM/SEC
BH CV ECHO MEAS - PULM S/D: 0.82
BH CV ECHO MEAS - PULM SYS VEL: 33 CM/SEC
BH CV ECHO MEAS - PVA(V,A): 2.2 CM^2
BH CV ECHO MEAS - PVA(V,D): 2.2 CM^2
BH CV ECHO MEAS - QP/QS: 1
BH CV ECHO MEAS - RAP SYSTOLE: 3 MMHG
BH CV ECHO MEAS - RV MAX PG: 6.9 MMHG
BH CV ECHO MEAS - RV MEAN PG: 4 MMHG
BH CV ECHO MEAS - RV V1 MAX: 131 CM/SEC
BH CV ECHO MEAS - RV V1 MEAN: 89.9 CM/SEC
BH CV ECHO MEAS - RV V1 VTI: 20.7 CM
BH CV ECHO MEAS - RVOT AREA: 2.3 CM^2
BH CV ECHO MEAS - RVOT DIAM: 1.7 CM
BH CV ECHO MEAS - RVSP: 21 MMHG
BH CV ECHO MEAS - SI(CUBED): 35.9 ML/M^2
BH CV ECHO MEAS - SI(LVOT): 33.3 ML/M^2
BH CV ECHO MEAS - SI(MOD-SP2): 13.4 ML/M^2
BH CV ECHO MEAS - SI(MOD-SP4): 17 ML/M^2
BH CV ECHO MEAS - SI(TEICH): 30.4 ML/M^2
BH CV ECHO MEAS - SV(CUBED): 50.7 ML
BH CV ECHO MEAS - SV(LVOT): 47.1 ML
BH CV ECHO MEAS - SV(MOD-SP2): 19 ML
BH CV ECHO MEAS - SV(MOD-SP4): 24 ML
BH CV ECHO MEAS - SV(RVOT): 47 ML
BH CV ECHO MEAS - SV(TEICH): 42.9 ML
BH CV ECHO MEAS - TAPSE (>1.6): 1.3 CM
BH CV ECHO MEAS - TR MAX PG: 18
BH CV ECHO MEAS - TR MAX VEL: 213 CM/SEC
BH CV ECHO MEASUREMENTS AVERAGE E/E' RATIO: 10.61
BH CV VAS BP RIGHT ARM: NORMAL MMHG
BH CV XLRA - RV BASE: 1.3 CM
BH CV XLRA - TDI S': 13 CM/SEC
BILIRUB SERPL-MCNC: 0.3 MG/DL (ref 0.2–1.2)
BUN BLD-MCNC: 17 MG/DL (ref 6–20)
BUN/CREAT SERPL: 21.3 (ref 7–25)
CALCIUM SPEC-SCNC: 9 MG/DL (ref 8.6–10.5)
CHLORIDE SERPL-SCNC: 96 MMOL/L (ref 98–107)
CO2 SERPL-SCNC: 25.7 MMOL/L (ref 22–29)
CREAT BLD-MCNC: 0.8 MG/DL (ref 0.57–1)
D DIMER PPP FEU-MCNC: 0.31 MCGFEU/ML (ref 0–0.49)
D-LACTATE SERPL-SCNC: 1.8 MMOL/L (ref 0.5–2)
DEPRECATED RDW RBC AUTO: 45.2 FL (ref 37–54)
EOSINOPHIL # BLD MANUAL: 0.66 10*3/MM3 (ref 0–0.4)
EOSINOPHIL NFR BLD MANUAL: 5.3 % (ref 0.3–6.2)
ERYTHROCYTE [DISTWIDTH] IN BLOOD BY AUTOMATED COUNT: 12.6 % (ref 12.3–15.4)
GFR SERPL CREATININE-BSD FRML MDRD: 74 ML/MIN/1.73
GLOBULIN UR ELPH-MCNC: 2.5 GM/DL
GLUCOSE BLD-MCNC: 340 MG/DL (ref 65–99)
GLUCOSE BLDC GLUCOMTR-MCNC: 109 MG/DL (ref 70–130)
GLUCOSE BLDC GLUCOMTR-MCNC: 139 MG/DL (ref 70–130)
GLUCOSE BLDC GLUCOMTR-MCNC: 151 MG/DL (ref 70–130)
GLUCOSE BLDC GLUCOMTR-MCNC: 175 MG/DL (ref 70–130)
GLUCOSE BLDC GLUCOMTR-MCNC: 199 MG/DL (ref 70–130)
HBA1C MFR BLD: 5.2 % (ref 4.8–5.6)
HCO3 BLDA-SCNC: 23.1 MMOL/L (ref 22–28)
HCT VFR BLD AUTO: 46.2 % (ref 34–46.6)
HGB BLD-MCNC: 14.7 G/DL (ref 12–15.9)
HOROWITZ INDEX BLD+IHG-RTO: 60 %
LEFT ATRIUM VOLUME INDEX: 22 ML/M2
LEFT ATRIUM VOLUME: 29 CM3
LYMPHOCYTES # BLD MANUAL: 4.54 10*3/MM3 (ref 0.7–3.1)
LYMPHOCYTES NFR BLD MANUAL: 3.4 % (ref 5–12)
LYMPHOCYTES NFR BLD MANUAL: 36.4 % (ref 19.6–45.3)
MAXIMAL PREDICTED HEART RATE: 162 BPM
MCH RBC QN AUTO: 30.5 PG (ref 26.6–33)
MCHC RBC AUTO-ENTMCNC: 31.8 G/DL (ref 31.5–35.7)
MCV RBC AUTO: 95.9 FL (ref 79–97)
MODALITY: ABNORMAL
MONOCYTES # BLD AUTO: 0.42 10*3/MM3 (ref 0.1–0.9)
MRSA DNA SPEC QL NAA+PROBE: NORMAL
NEUTROPHILS # BLD AUTO: 6.79 10*3/MM3 (ref 1.7–7)
NEUTROPHILS NFR BLD MANUAL: 54.5 % (ref 42.7–76)
NRBC SPEC MANUAL: 0.4 /100 WBC (ref 0–0.2)
NT-PROBNP SERPL-MCNC: 74.6 PG/ML (ref 5–900)
O2 A-A PPRESDIFF RESPIRATORY: 0.6 MMHG
PCO2 BLDA: 48 MM HG (ref 35–45)
PH BLDA: 7.29 PH UNITS (ref 7.35–7.45)
PLAT MORPH BLD: NORMAL
PLATELET # BLD AUTO: 340 10*3/MM3 (ref 140–450)
PMV BLD AUTO: 10.4 FL (ref 6–12)
PO2 BLDA: 239.2 MM HG (ref 80–100)
POTASSIUM BLD-SCNC: 4.4 MMOL/L (ref 3.5–5.2)
PROCALCITONIN SERPL-MCNC: 0.15 NG/ML (ref 0.1–0.25)
PROT SERPL-MCNC: 7 G/DL (ref 6–8.5)
RBC # BLD AUTO: 4.82 10*6/MM3 (ref 3.77–5.28)
RBC MORPH BLD: NORMAL
SAO2 % BLDCOA: 99.8 % (ref 92–99)
SET MECH RESP RATE: 20
SODIUM BLD-SCNC: 135 MMOL/L (ref 136–145)
STRESS TARGET HR: 138 BPM
TOTAL RATE: 21 BREATHS/MINUTE
TROPONIN T SERPL-MCNC: 0.12 NG/ML (ref 0–0.03)
TROPONIN T SERPL-MCNC: 0.24 NG/ML (ref 0–0.03)
TROPONIN T SERPL-MCNC: 0.34 NG/ML (ref 0–0.03)
VENTILATOR MODE: ABNORMAL
VT ON VENT VENT: 500 ML
WBC MORPH BLD: NORMAL
WBC NRBC COR # BLD: 12.46 10*3/MM3 (ref 3.4–10.8)

## 2019-09-30 PROCEDURE — 93005 ELECTROCARDIOGRAM TRACING: CPT | Performed by: EMERGENCY MEDICINE

## 2019-09-30 PROCEDURE — 25010000002 PIPERACILLIN SOD-TAZOBACTAM PER 1 G: Performed by: INTERNAL MEDICINE

## 2019-09-30 PROCEDURE — 94799 UNLISTED PULMONARY SVC/PX: CPT

## 2019-09-30 PROCEDURE — 84484 ASSAY OF TROPONIN QUANT: CPT | Performed by: INTERNAL MEDICINE

## 2019-09-30 PROCEDURE — 25010000002 PERFLUTREN (DEFINITY) 8.476 MG IN SODIUM CHLORIDE 0.9 % 10 ML INJECTION: Performed by: INTERNAL MEDICINE

## 2019-09-30 PROCEDURE — 93010 ELECTROCARDIOGRAM REPORT: CPT | Performed by: INTERNAL MEDICINE

## 2019-09-30 PROCEDURE — 71250 CT THORAX DX C-: CPT

## 2019-09-30 PROCEDURE — 36600 WITHDRAWAL OF ARTERIAL BLOOD: CPT

## 2019-09-30 PROCEDURE — 83880 ASSAY OF NATRIURETIC PEPTIDE: CPT | Performed by: EMERGENCY MEDICINE

## 2019-09-30 PROCEDURE — 25010000002 METHYLPREDNISOLONE PER 125 MG: Performed by: EMERGENCY MEDICINE

## 2019-09-30 PROCEDURE — 82803 BLOOD GASES ANY COMBINATION: CPT

## 2019-09-30 PROCEDURE — 80053 COMPREHEN METABOLIC PANEL: CPT | Performed by: EMERGENCY MEDICINE

## 2019-09-30 PROCEDURE — 94770: CPT

## 2019-09-30 PROCEDURE — 93306 TTE W/DOPPLER COMPLETE: CPT | Performed by: INTERNAL MEDICINE

## 2019-09-30 PROCEDURE — 84484 ASSAY OF TROPONIN QUANT: CPT | Performed by: EMERGENCY MEDICINE

## 2019-09-30 PROCEDURE — 25010000002 LORAZEPAM PER 2 MG: Performed by: EMERGENCY MEDICINE

## 2019-09-30 PROCEDURE — 93005 ELECTROCARDIOGRAM TRACING: CPT | Performed by: INTERNAL MEDICINE

## 2019-09-30 PROCEDURE — 93306 TTE W/DOPPLER COMPLETE: CPT

## 2019-09-30 PROCEDURE — 71045 X-RAY EXAM CHEST 1 VIEW: CPT

## 2019-09-30 PROCEDURE — 85007 BL SMEAR W/DIFF WBC COUNT: CPT | Performed by: EMERGENCY MEDICINE

## 2019-09-30 PROCEDURE — 85025 COMPLETE CBC W/AUTO DIFF WBC: CPT | Performed by: EMERGENCY MEDICINE

## 2019-09-30 PROCEDURE — 99254 IP/OBS CNSLTJ NEW/EST MOD 60: CPT | Performed by: INTERNAL MEDICINE

## 2019-09-30 PROCEDURE — 82962 GLUCOSE BLOOD TEST: CPT

## 2019-09-30 PROCEDURE — 84145 PROCALCITONIN (PCT): CPT | Performed by: INTERNAL MEDICINE

## 2019-09-30 PROCEDURE — 63710000001 PREDNISONE PER 1 MG: Performed by: INTERNAL MEDICINE

## 2019-09-30 PROCEDURE — 83605 ASSAY OF LACTIC ACID: CPT | Performed by: INTERNAL MEDICINE

## 2019-09-30 PROCEDURE — 83036 HEMOGLOBIN GLYCOSYLATED A1C: CPT | Performed by: INTERNAL MEDICINE

## 2019-09-30 PROCEDURE — 85379 FIBRIN DEGRADATION QUANT: CPT | Performed by: INTERNAL MEDICINE

## 2019-09-30 PROCEDURE — 94660 CPAP INITIATION&MGMT: CPT

## 2019-09-30 PROCEDURE — 99285 EMERGENCY DEPT VISIT HI MDM: CPT

## 2019-09-30 PROCEDURE — 25010000002 VANCOMYCIN PER 500 MG: Performed by: INTERNAL MEDICINE

## 2019-09-30 PROCEDURE — 87641 MR-STAPH DNA AMP PROBE: CPT | Performed by: INTERNAL MEDICINE

## 2019-09-30 RX ORDER — DEXTROSE MONOHYDRATE 25 G/50ML
25 INJECTION, SOLUTION INTRAVENOUS
Status: DISCONTINUED | OUTPATIENT
Start: 2019-09-30 | End: 2019-10-04 | Stop reason: HOSPADM

## 2019-09-30 RX ORDER — SODIUM CHLORIDE, SODIUM LACTATE, POTASSIUM CHLORIDE, CALCIUM CHLORIDE 600; 310; 30; 20 MG/100ML; MG/100ML; MG/100ML; MG/100ML
75 INJECTION, SOLUTION INTRAVENOUS CONTINUOUS
Status: DISCONTINUED | OUTPATIENT
Start: 2019-09-30 | End: 2019-10-03

## 2019-09-30 RX ORDER — METHYLPREDNISOLONE SODIUM SUCCINATE 40 MG/ML
40 INJECTION, POWDER, LYOPHILIZED, FOR SOLUTION INTRAMUSCULAR; INTRAVENOUS EVERY 8 HOURS
Status: DISCONTINUED | OUTPATIENT
Start: 2019-09-30 | End: 2019-09-30

## 2019-09-30 RX ORDER — SODIUM CHLORIDE 0.9 % (FLUSH) 0.9 %
10 SYRINGE (ML) INJECTION EVERY 12 HOURS SCHEDULED
Status: DISCONTINUED | OUTPATIENT
Start: 2019-09-30 | End: 2019-10-04 | Stop reason: HOSPADM

## 2019-09-30 RX ORDER — ASPIRIN 325 MG
325 TABLET ORAL ONCE
Status: COMPLETED | OUTPATIENT
Start: 2019-09-30 | End: 2019-09-30

## 2019-09-30 RX ORDER — IPRATROPIUM BROMIDE AND ALBUTEROL SULFATE 2.5; .5 MG/3ML; MG/3ML
3 SOLUTION RESPIRATORY (INHALATION) ONCE
Status: DISCONTINUED | OUTPATIENT
Start: 2019-09-30 | End: 2019-10-04 | Stop reason: HOSPADM

## 2019-09-30 RX ORDER — SODIUM CHLORIDE 0.9 % (FLUSH) 0.9 %
10 SYRINGE (ML) INJECTION AS NEEDED
Status: DISCONTINUED | OUTPATIENT
Start: 2019-09-30 | End: 2019-10-04 | Stop reason: HOSPADM

## 2019-09-30 RX ORDER — PREDNISONE 20 MG/1
40 TABLET ORAL
Status: DISCONTINUED | OUTPATIENT
Start: 2019-09-30 | End: 2019-10-01

## 2019-09-30 RX ORDER — VANCOMYCIN HYDROCHLORIDE 1 G/200ML
20 INJECTION, SOLUTION INTRAVENOUS ONCE
Status: COMPLETED | OUTPATIENT
Start: 2019-09-30 | End: 2019-09-30

## 2019-09-30 RX ORDER — NICOTINE POLACRILEX 4 MG
15 LOZENGE BUCCAL
Status: DISCONTINUED | OUTPATIENT
Start: 2019-09-30 | End: 2019-10-04 | Stop reason: HOSPADM

## 2019-09-30 RX ORDER — LORAZEPAM 2 MG/ML
0.5 INJECTION INTRAMUSCULAR ONCE
Status: COMPLETED | OUTPATIENT
Start: 2019-09-30 | End: 2019-09-30

## 2019-09-30 RX ORDER — METHYLPREDNISOLONE SODIUM SUCCINATE 125 MG/2ML
125 INJECTION, POWDER, LYOPHILIZED, FOR SOLUTION INTRAMUSCULAR; INTRAVENOUS ONCE
Status: COMPLETED | OUTPATIENT
Start: 2019-09-30 | End: 2019-09-30

## 2019-09-30 RX ORDER — IPRATROPIUM BROMIDE AND ALBUTEROL SULFATE 2.5; .5 MG/3ML; MG/3ML
3 SOLUTION RESPIRATORY (INHALATION)
Status: DISCONTINUED | OUTPATIENT
Start: 2019-09-30 | End: 2019-10-03

## 2019-09-30 RX ORDER — IPRATROPIUM BROMIDE AND ALBUTEROL SULFATE 2.5; .5 MG/3ML; MG/3ML
3 SOLUTION RESPIRATORY (INHALATION) EVERY 4 HOURS PRN
Status: DISCONTINUED | OUTPATIENT
Start: 2019-09-30 | End: 2019-10-04 | Stop reason: HOSPADM

## 2019-09-30 RX ADMIN — ASPIRIN 325 MG: 325 TABLET ORAL at 03:15

## 2019-09-30 RX ADMIN — SODIUM CHLORIDE, PRESERVATIVE FREE 10 ML: 5 INJECTION INTRAVENOUS at 08:20

## 2019-09-30 RX ADMIN — APIXABAN 5 MG: 5 TABLET, FILM COATED ORAL at 08:19

## 2019-09-30 RX ADMIN — IPRATROPIUM BROMIDE AND ALBUTEROL SULFATE 3 ML: 2.5; .5 SOLUTION RESPIRATORY (INHALATION) at 07:08

## 2019-09-30 RX ADMIN — LORAZEPAM 0.5 MG: 2 INJECTION INTRAMUSCULAR; INTRAVENOUS at 00:56

## 2019-09-30 RX ADMIN — TAZOBACTAM SODIUM AND PIPERACILLIN SODIUM 3.38 G: 375; 3 INJECTION, SOLUTION INTRAVENOUS at 17:02

## 2019-09-30 RX ADMIN — METHYLPREDNISOLONE SODIUM SUCCINATE 125 MG: 125 INJECTION, POWDER, FOR SOLUTION INTRAMUSCULAR; INTRAVENOUS at 00:18

## 2019-09-30 RX ADMIN — IPRATROPIUM BROMIDE AND ALBUTEROL SULFATE 3 ML: 2.5; .5 SOLUTION RESPIRATORY (INHALATION) at 14:27

## 2019-09-30 RX ADMIN — PERFLUTREN 2 ML: 6.52 INJECTION, SUSPENSION INTRAVENOUS at 11:31

## 2019-09-30 RX ADMIN — TAZOBACTAM SODIUM AND PIPERACILLIN SODIUM 3.38 G: 375; 3 INJECTION, SOLUTION INTRAVENOUS at 08:21

## 2019-09-30 RX ADMIN — IPRATROPIUM BROMIDE AND ALBUTEROL SULFATE 3 ML: 2.5; .5 SOLUTION RESPIRATORY (INHALATION) at 20:11

## 2019-09-30 RX ADMIN — VANCOMYCIN HYDROCHLORIDE 1000 MG: 1 INJECTION, SOLUTION INTRAVENOUS at 03:28

## 2019-09-30 RX ADMIN — IPRATROPIUM BROMIDE AND ALBUTEROL SULFATE 3 ML: 2.5; .5 SOLUTION RESPIRATORY (INHALATION) at 11:34

## 2019-09-30 RX ADMIN — APIXABAN 5 MG: 5 TABLET, FILM COATED ORAL at 20:54

## 2019-09-30 RX ADMIN — SODIUM CHLORIDE, PRESERVATIVE FREE 10 ML: 5 INJECTION INTRAVENOUS at 20:55

## 2019-09-30 RX ADMIN — PREDNISONE 40 MG: 20 TABLET ORAL at 08:19

## 2019-09-30 RX ADMIN — SODIUM CHLORIDE, POTASSIUM CHLORIDE, SODIUM LACTATE AND CALCIUM CHLORIDE 1300 ML: 600; 310; 30; 20 INJECTION, SOLUTION INTRAVENOUS at 08:20

## 2019-09-30 RX ADMIN — TAZOBACTAM SODIUM AND PIPERACILLIN SODIUM 3.38 G: 375; 3 INJECTION, SOLUTION INTRAVENOUS at 03:15

## 2019-09-30 NOTE — OUTREACH NOTE
COPD/PN Week 3 Survey      Responses   Facility patient discharged from?  [facility]   Does the patient have one of the following disease processes/diagnoses(primary or secondary)?  COPD/Pneumonia   Was the primary reason for admission:  COPD exacerbation   Week 3 attempt successful?  No   Revoke  Readmitted          Ivana Bray RN

## 2019-10-01 LAB
ANION GAP SERPL CALCULATED.3IONS-SCNC: 9.5 MMOL/L (ref 5–15)
BUN BLD-MCNC: 15 MG/DL (ref 6–20)
BUN/CREAT SERPL: 23.8 (ref 7–25)
CALCIUM SPEC-SCNC: 8.5 MG/DL (ref 8.6–10.5)
CHLORIDE SERPL-SCNC: 106 MMOL/L (ref 98–107)
CO2 SERPL-SCNC: 24.5 MMOL/L (ref 22–29)
CREAT BLD-MCNC: 0.63 MG/DL (ref 0.57–1)
GFR SERPL CREATININE-BSD FRML MDRD: 97 ML/MIN/1.73
GLUCOSE BLD-MCNC: 100 MG/DL (ref 65–99)
GLUCOSE BLDC GLUCOMTR-MCNC: 104 MG/DL (ref 70–130)
GLUCOSE BLDC GLUCOMTR-MCNC: 107 MG/DL (ref 70–130)
GLUCOSE BLDC GLUCOMTR-MCNC: 134 MG/DL (ref 70–130)
GLUCOSE BLDC GLUCOMTR-MCNC: 87 MG/DL (ref 70–130)
GLUCOSE BLDC GLUCOMTR-MCNC: 90 MG/DL (ref 70–130)
MAGNESIUM SERPL-MCNC: 2.3 MG/DL (ref 1.6–2.6)
POTASSIUM BLD-SCNC: 3.7 MMOL/L (ref 3.5–5.2)
SODIUM BLD-SCNC: 140 MMOL/L (ref 136–145)

## 2019-10-01 PROCEDURE — 82962 GLUCOSE BLOOD TEST: CPT

## 2019-10-01 PROCEDURE — 94799 UNLISTED PULMONARY SVC/PX: CPT

## 2019-10-01 PROCEDURE — 81332 SERPINA1 GENE: CPT | Performed by: INTERNAL MEDICINE

## 2019-10-01 PROCEDURE — 25010000002 DIGOXIN PER 500 MCG: Performed by: INTERNAL MEDICINE

## 2019-10-01 PROCEDURE — 93010 ELECTROCARDIOGRAM REPORT: CPT | Performed by: INTERNAL MEDICINE

## 2019-10-01 PROCEDURE — 93005 ELECTROCARDIOGRAM TRACING: CPT | Performed by: INTERNAL MEDICINE

## 2019-10-01 PROCEDURE — 82103 ALPHA-1-ANTITRYPSIN TOTAL: CPT | Performed by: INTERNAL MEDICINE

## 2019-10-01 PROCEDURE — 80048 BASIC METABOLIC PNL TOTAL CA: CPT | Performed by: INTERNAL MEDICINE

## 2019-10-01 PROCEDURE — 25010000002 METHYLPREDNISOLONE PER 40 MG: Performed by: INTERNAL MEDICINE

## 2019-10-01 PROCEDURE — 99233 SBSQ HOSP IP/OBS HIGH 50: CPT | Performed by: INTERNAL MEDICINE

## 2019-10-01 PROCEDURE — 25010000002 PIPERACILLIN SOD-TAZOBACTAM PER 1 G: Performed by: INTERNAL MEDICINE

## 2019-10-01 PROCEDURE — 83735 ASSAY OF MAGNESIUM: CPT | Performed by: INTERNAL MEDICINE

## 2019-10-01 RX ORDER — ADENOSINE 3 MG/ML
12 INJECTION, SOLUTION INTRAVENOUS ONCE
Status: DISCONTINUED | OUTPATIENT
Start: 2019-10-01 | End: 2019-10-03

## 2019-10-01 RX ORDER — METHYLPREDNISOLONE SODIUM SUCCINATE 40 MG/ML
20 INJECTION, POWDER, LYOPHILIZED, FOR SOLUTION INTRAMUSCULAR; INTRAVENOUS EVERY 12 HOURS
Status: DISCONTINUED | OUTPATIENT
Start: 2019-10-01 | End: 2019-10-03

## 2019-10-01 RX ORDER — ADENOSINE 3 MG/ML
INJECTION, SOLUTION INTRAVENOUS
Status: DISPENSED
Start: 2019-10-01 | End: 2019-10-01

## 2019-10-01 RX ORDER — DIGOXIN 0.25 MG/ML
250 INJECTION INTRAMUSCULAR; INTRAVENOUS EVERY 8 HOURS
Status: COMPLETED | OUTPATIENT
Start: 2019-10-01 | End: 2019-10-02

## 2019-10-01 RX ADMIN — IPRATROPIUM BROMIDE AND ALBUTEROL SULFATE 3 ML: 2.5; .5 SOLUTION RESPIRATORY (INHALATION) at 20:39

## 2019-10-01 RX ADMIN — DIGOXIN 250 MCG: 0.25 INJECTION INTRAMUSCULAR; INTRAVENOUS at 11:55

## 2019-10-01 RX ADMIN — METHYLPREDNISOLONE SODIUM SUCCINATE 20 MG: 40 INJECTION, POWDER, FOR SOLUTION INTRAMUSCULAR; INTRAVENOUS at 09:29

## 2019-10-01 RX ADMIN — IPRATROPIUM BROMIDE AND ALBUTEROL SULFATE 3 ML: 2.5; .5 SOLUTION RESPIRATORY (INHALATION) at 04:34

## 2019-10-01 RX ADMIN — IPRATROPIUM BROMIDE AND ALBUTEROL SULFATE 3 ML: 2.5; .5 SOLUTION RESPIRATORY (INHALATION) at 07:54

## 2019-10-01 RX ADMIN — TAZOBACTAM SODIUM AND PIPERACILLIN SODIUM 3.38 G: 375; 3 INJECTION, SOLUTION INTRAVENOUS at 09:19

## 2019-10-01 RX ADMIN — APIXABAN 5 MG: 5 TABLET, FILM COATED ORAL at 11:56

## 2019-10-01 RX ADMIN — SODIUM CHLORIDE, PRESERVATIVE FREE 10 ML: 5 INJECTION INTRAVENOUS at 18:03

## 2019-10-01 RX ADMIN — TAZOBACTAM SODIUM AND PIPERACILLIN SODIUM 3.38 G: 375; 3 INJECTION, SOLUTION INTRAVENOUS at 16:33

## 2019-10-01 RX ADMIN — DIGOXIN 250 MCG: 0.25 INJECTION INTRAMUSCULAR; INTRAVENOUS at 18:16

## 2019-10-01 RX ADMIN — SODIUM CHLORIDE, PRESERVATIVE FREE 10 ML: 5 INJECTION INTRAVENOUS at 21:27

## 2019-10-01 RX ADMIN — APIXABAN 5 MG: 5 TABLET, FILM COATED ORAL at 21:27

## 2019-10-01 RX ADMIN — METHYLPREDNISOLONE SODIUM SUCCINATE 20 MG: 40 INJECTION, POWDER, FOR SOLUTION INTRAMUSCULAR; INTRAVENOUS at 21:29

## 2019-10-01 RX ADMIN — TAZOBACTAM SODIUM AND PIPERACILLIN SODIUM 3.38 G: 375; 3 INJECTION, SOLUTION INTRAVENOUS at 00:04

## 2019-10-01 NOTE — PLAN OF CARE
Problem: Patient Care Overview  Goal: Plan of Care Review   10/01/19 5157   OTHER   Outcome Summary Remains in CCU. Had a COPD exacerbation with some SVT this am. Went back to sinus tachy on own. Put back on BiPap but converted to NC by 1100. Breathing better throughout the day and seems more at ease. Using bedpan to go to the bathroom. Remains in the bed for today. Will cont to monitor.      Goal: Individualization and Mutuality  Outcome: Ongoing (interventions implemented as appropriate)    Goal: Discharge Needs Assessment  Outcome: Ongoing (interventions implemented as appropriate)    Goal: Interprofessional Rounds/Family Conf  Outcome: Ongoing (interventions implemented as appropriate)      Problem: Chronic Obstructive Pulmonary Disease (Adult)  Goal: Signs and Symptoms of Listed Potential Problems Will be Absent, Minimized or Managed (Chronic Obstructive Pulmonary Disease)  Outcome: Ongoing (interventions implemented as appropriate)      Problem: Fall Risk (Adult)  Goal: Absence of Fall  Outcome: Ongoing (interventions implemented as appropriate)      Problem: Sepsis/Septic Shock (Adult)  Goal: Signs and Symptoms of Listed Potential Problems Will be Absent, Minimized or Managed (Sepsis/Septic Shock)  Outcome: Ongoing (interventions implemented as appropriate)      Problem: Nutrition, Imbalanced: Inadequate Oral Intake (Adult)  Goal: Identify Related Risk Factors and Signs and Symptoms  Outcome: Ongoing (interventions implemented as appropriate)    Goal: Improved Oral Intake  Outcome: Ongoing (interventions implemented as appropriate)    Goal: Prevent Further Weight Loss  Outcome: Ongoing (interventions implemented as appropriate)

## 2019-10-01 NOTE — CONSULTS
"Adult Nutrition  Assessment/PES    Patient Name:  Scarlet Chakraborty  YOB: 1960  MRN: [id number]  Admit Date:  9/30/2019    Assessment Date:  10/1/2019    Comments:  Nutrition assessment and MSA completed. Discussed food pref's and supplements with pt.    Reason for Assessment     Row Name 10/01/19 0950          Reason for Assessment    Reason For Assessment  identified at risk by screening criteria     Diagnosis  -- respiratory failure, MI, CVA, heart failure         Nutrition/Diet History     Row Name 10/01/19 0957          Nutrition/Diet History    Typical Food/Fluid Intake  has not eaten this am due to respiratory issues     Supplemental Drinks/Foods/Additives  dislikes ensure/boost         Anthropometrics     Row Name 10/01/19 0952 10/01/19 0600       Anthropometrics    Height  162.6 cm (64.02\")  --    Weight  45.5 kg (100 lb 5 oz)  45.5 kg (100 lb 5 oz)       Ideal Body Weight (IBW)    Ideal Body Weight (IBW) (kg)  55.04  --    % Ideal Body Weight  82.67  --       Body Mass Index (BMI)    BMI (kg/m2)  17.25  --    BMI Assessment  BMI 17-18.4: protein-energy malnutrition grade I  --        Labs/Tests/Procedures/Meds     Row Name 10/01/19 0952          Labs/Procedures/Meds    Lab Results Reviewed  reviewed     Lab Results Comments  na, wbc        Diagnostic Tests/Procedures    Diagnostic Test/Procedure Reviewed  reviewed        Medications    Pertinent Medications Reviewed  reviewed     Pertinent Medications Comments  insulin, steroid, abx, iv         Physical Findings     Row Name 10/01/19 0954          Physical Findings    Overall Physical Appearance  loss of subcutaneous fat;underweight;on oxygen therapy     Skin  other (see comments) bruised         Estimated/Assessed Needs     Row Name 10/01/19 0958 10/01/19 0952       Calculation Measurements    Weight Used For Calculations  45.5 kg (100 lb 5 oz)  --    Height  --  162.6 cm (64.02\")       Estimated/Assessed Needs    Additional Documentation  " Fluid Requirements (Group);Protein Requirements (Group);KCAL/KG (Group)  --       KCAL/KG    KCAL/KG  35 Kcal/Kg (kcal);40 Kcal/Kg (kcal)  --    35 Kcal/Kg (kcal)  1592.5  --    40 Kcal/Kg (kcal)  1820  --       Protein Requirements    Est Protein Requirement Amount (gms/kg)  1.5 gm protein  --    Estimated Protein Requirements (gms/day)  68.25  --       Fluid Requirements    Estimated Fluid Requirements (mL/day)  1592  --    RDA Method (mL)  1592  --    Swapnil-Segar Method (over 20 kg)  2410  --        Nutrition Prescription Ordered     Row Name 10/01/19 0957          Nutrition Prescription PO    Common Modifiers  Cardiac;Consistent Carbohydrate         Evaluation of Received Nutrient/Fluid Intake     Row Name 10/01/19 0959          PO Evaluation    % PO Intake  decreased           Malnutrition Severity Assessment     Row Name 10/01/19 0959          Malnutrition Severity Assessment    Malnutrition Type  Chronic Disease - Related Malnutrition        Muscle Loss    [place] Region  Moderate - slight depression     [place]rom[place] Bone Region  Moderate - acromion may slightly protrude     Patellar Region  Moderate - patella more prominent, less muscle definition around patella        Criteria Met (Must meet criteria for severity in at least 2 of these categories: M Wasting, Fat Loss, Fluid, Secondary Signs, Wt. Status, Intake)    Patient meets criteria for   Moderate (non-severe) Malnutrition           Problem/Interventions:  Problem 1     Row Name 10/01/19 1001          Nutrition Diagnoses Problem 1    Problem 1  Malnutrition     Etiology (related to)  MNT for Treatment/Condition                 Intervention Goal     Row Name 10/01/19 1001          Intervention Goal    General  Meet nutritional needs for age/condition;Disease management/therapy     PO  PO intake (%)     PO Intake %  80 %     Weight  Appropriate weight gain         Nutrition Intervention     Row Name 10/01/19 1002          Nutrition Intervention    RD/Tech  Action  Follow Tx progress;Care plan reviewd;Interview for preference;Supplement offered/refused           Education/Evaluation     Row Name 10/01/19 1002          Education    Education  Will Instruct as appropriate        Monitor/Evaluation    Monitor  Per protocol           Electronically signed by:  Malia Castillo RD  10/01/19 10:03 AM

## 2019-10-01 NOTE — PLAN OF CARE
Problem: Nutrition, Imbalanced: Inadequate Oral Intake (Adult)  Intervention: Promote/Optimize Nutrition   10/01/19 1006   Nutrition Interventions   Oral Nutrition Promotion nutritional therapy counseling provided

## 2019-10-01 NOTE — CONSULTS
Malnutrition Severity Assessment    Patient Name:  Scarlet Chakraborty  YOB: 1960  MRN: [id number]  Admit Date:  9/30/2019    Patient meets criteria for : Moderate (non-severe) Malnutrition    Comments:  MSA completed.    Malnutrition Severity Assessment  Malnutrition Type: Chronic Disease - Related Malnutrition     Malnutrition Type (last 8 hours)      Malnutrition Severity Assessment     Row Name 10/01/19 0959       Malnutrition Severity Assessment    Malnutrition Type  Chronic Disease - Related Malnutrition    Row Name 10/01/19 0959       Muscle Loss    [place] Region  Moderate - slight depression    Acromion Bone Region  Moderate - acromion may slightly protrude    Patellar Region  Moderate - patella more prominent, less muscle definition around patella    Row Name 10/01/19 0959       Criteria Met (Must meet criteria for severity in at least 2 of these categories: M Wasting, Fat Loss, Fluid, Secondary Signs, Wt. Status, Intake)    Patient meets criteria for   Moderate (non-severe) Malnutrition          Electronically signed by:  Malia Castillo RD  10/01/19 10:02 AM

## 2019-10-01 NOTE — PROGRESS NOTES
[facility]  Progress note: 10/1/2019    Patient Identification:  Name:Scarlet Chakraborty  Age:59 y.o.  Sex: female  :  1960  MRN: [id number]           CC:  Cardiomyopathy, tachycardia.    Interval history:  Episode of SVT this morning noted on telemetry which converted spontaneously and left her with sinus tachycardia.  Worsening ST-T wave changes with worsening QTC prolongation.  Respiratory distress noted with a tachycardia.  This occurred after albuterol treatment.  Resting on BiPAP and slightly agitated.  No chest pain.    Vital Signs:   Temp:  [97.8 °F (36.6 °C)-98.7 °F (37.1 °C)] 98.2 °F (36.8 °C)  Heart Rate:  [] 80  Resp:  [16-20] 19  BP: ()/(53-74) 105/64    Intake/Output Summary (Last 24 hours) at 10/1/2019 0941  Last data filed at 10/1/2019 0600  Gross per 24 hour   Intake 1302 ml   Output 700 ml   Net 602 ml       Physical Examination:    General Appearance  mild respiratory distress on BiPAP   Neck No adenopathy, supple, trachea midline, no thyromegaly, no carotid bruit, no JVD   Lungs Distant breath sounds, respirations regular, even and unlabored   Heart Regular rhythm with tachycardia, normal S1 and S2, no murmur, no gallop, no rub, no click   Chest wall No abnormalities observed   Abdomen Normal bowel sounds, no masses, no hepatomegaly, soft   Extremities Moves all extremities well, no edema, no cyanosis, no redness   Neurological Alert and oriented x 3     Lab Review:  Personally reviewed the labs, radiology imaging and other cardiac procedures. Results from last 7 days   Lab Units 19  0017   SODIUM mmol/L 135*   POTASSIUM mmol/L 4.4   CHLORIDE mmol/L 96*   CO2 mmol/L 25.7   BUN mg/dL 17   CREATININE mg/dL 0.80   CALCIUM mg/dL 9.0   BILIRUBIN mg/dL 0.3   ALK PHOS U/L 73   ALT (SGPT) U/L 22   AST (SGOT) U/L 27   GLUCOSE mg/dL 340*     Results from last 7 days   Lab Units 19  1112 19  0544 19  0017   TROPONIN T ng/mL 0.243* 0.335* 0.118*      )  Results from last 7 days   Lab Units 09/30/19  0017   WBC 10*3/mm3 12.46*   HEMOGLOBIN g/dL 14.7   HEMATOCRIT % 46.2   PLATELETS 10*3/mm3 340       Medication Review:   Meds reviewed  Scheduled Meds:  adenosine      adenosine 12 mg Intravenous Once   apixaban 5 mg Oral Q12H   insulin lispro 0-24 Units Subcutaneous 4x Daily With Meals & Nightly   ipratropium-albuterol 3 mL Nebulization Once   ipratropium-albuterol 3 mL Nebulization 4x Daily - RT   methylPREDNISolone sodium succinate 20 mg Intravenous Q12H   piperacillin-tazobactam 3.375 g Intravenous Q8H   sodium chloride 10 mL Intravenous Q12H     Continuous Infusions:  lactated ringers 75 mL/hr Last Rate: 75 mL/hr (09/30/19 0923)     I personally viewed and interpreted the patient's EKG/Telemetry data    Assessment and Plan  1.    Recurrent cardiomyopathy likelyTakotsubo cardiomyopathy once more.  Had negative cath a year ago.  Will defer cardiac cath at this point.  Treatment limited by hypotension and respiratory disease.  Avoiding beta-blockers.  Add ACE inhibitor as blood pressure allows.  Continue with Eliquis.  2.  Paroxysmal supraventricular tachycardia.  Terminated on its own before adenosine was given today.  Will start digoxin.  3.  Prolonged QTc interval.  Check magnesium potassium levels.  Address hypoxia and cardiomyopathy as above  4.  COPD exacerbation with possible pneumonia and atypical chest pain.  On steroids with now side effects from inhalers.  Unfortunately still wheezing.  Additional recommendations per Dr. Ryder  6.  Hyperglycemia  7.  Tobacco abuse  6.  Malnutrition  7.  History of embolic stroke in the setting of cardiomyopathy.  Linq device in place without arrhythmia.  This is felt to be due to probable LV thrombus at the time and she has been on Eliquis.        Mini Boston  10/1/12679:41 AM  35min spent in reviewing records, discussion and examination of the patient and discussion with other members of the patient's medical team.      Dictated utilizing Dragon dictation

## 2019-10-01 NOTE — PROGRESS NOTES
LOS: 1 day   Patient Care Team:  MARILYN Fernandez MD as PCP - General (General Practice)  Beulah Joshi APRN as Nurse Practitioner (Neurology)    Subjective     Patient in severe distress this morning sitting up in the bed not able to breathe.  Not able to talk.    Review of Systems:          Objective     Vital Signs  Vital Sign Min/Max for last 24 hours  Temp  Min: 97.5 °F (36.4 °C)  Max: 98.7 °F (37.1 °C)   BP  Min: 83/57  Max: 130/57   Pulse  Min: 67  Max: 110   Resp  Min: 16  Max: 20   SpO2  Min: 90 %  Max: 100 %   No Data Recorded   Weight  Min: 42.2 kg (93 lb)  Max: 45.5 kg (100 lb 5 oz)        Ventilator/Non-Invasive Ventilation Settings (From admission, onward)    Start     Ordered    09/30/19 0227  NIPPV (CPAP or BIPAP)  Until Discontinued     Question Answer Comment   Indication: Acute Respiratory Failure    Type: BIPAP    NIPPV Mask Interface: Per Patient Preference    IPAP 20    EPAP 10    Titrate for SPO2 90%        09/30/19 0226    09/30/19 0040  NIPPV (CPAP or BIPAP)  Until Discontinued     Question Answer Comment   Type: BIPAP    NIPPV Mask Interface: Per Patient Preference        09/30/19 0039                       Body mass index is 17.22 kg/m².  I/O last 3 completed shifts:  In: 2867 [P.O.:120; I.V.:1141; IV Piggyback:1606]  Out: 700 [Urine:700]  No intake/output data recorded.        Physical Exam:  General Appearance: Well-developed white female in severe respiratory distress placed on noninvasive ventilation this morning.  By myself  Eyes: Conjunctiva are clear and anicteric, pupils are equal  ENT: Mucous membranes are little dry nasal septum midline Mallampati type I airway  Neck: No adenopathy or thyromegaly no jugular venous distention trachea midline  Lungs: She does not move much air she has a few wheezes  Cardiac: Tachycardic heart rates in the 150s I thought initially this might be in SVT as it persisted but she has gradually slowed down into the 130s EKG is sinus  rhythm  Abdomen: She is using abdominal muscles some for respiration there is no palpable hepatosplenomegaly or masses occult to palpate with her severe respiratory distress and her sitting upright  : Not examined  Musculoskeletal: She does not have a whole lot of muscle mass in all she has no palpable cords or calf tenderness no pain with dorsiflexion of the feet  Skin: No jaundice no petechiae is at least peripherally cool and clammy  Neuro: She is not really able to talk at all nor do much she is holding herself sitting upright  Extremities/P Vascular: No clubbing no cyanosis no edema palpable radial dorsalis pedis pulses bilaterally  MSE: Anxious very       Labs:  Results from last 7 days   Lab Units 09/30/19  0017   GLUCOSE mg/dL 340*   SODIUM mmol/L 135*   POTASSIUM mmol/L 4.4   CO2 mmol/L 25.7   CHLORIDE mmol/L 96*   ANION GAP mmol/L 13.3   CREATININE mg/dL 0.80   BUN mg/dL 17   BUN / CREAT RATIO  21.3   CALCIUM mg/dL 9.0   EGFR IF NONAFRICN AM mL/min/1.73 74   ALK PHOS U/L 73   TOTAL PROTEIN g/dL 7.0   ALT (SGPT) U/L 22   AST (SGOT) U/L 27   BILIRUBIN mg/dL 0.3   ALBUMIN g/dL 4.50   GLOBULIN gm/dL 2.5     Estimated Creatinine Clearance: 55.1 mL/min (by C-G formula based on SCr of 0.8 mg/dL).      Results from last 7 days   Lab Units 09/30/19  0017   WBC 10*3/mm3 12.46*   RBC 10*6/mm3 4.82   HEMOGLOBIN g/dL 14.7   HEMATOCRIT % 46.2   MCV fL 95.9   MCH pg 30.5   MCHC g/dL 31.8   RDW % 12.6   RDW-SD fl 45.2   MPV fL 10.4   PLATELETS 10*3/mm3 340   NEUTROS ABS 10*3/mm3 6.79   EOS ABS 10*3/mm3 0.66*   BASOS ABS 10*3/mm3 0.05   NRBC /100 WBC 0.4*     Results from last 7 days   Lab Units 09/30/19  0137   PH, ARTERIAL pH units 7.291*   PO2 ART mm Hg 239.2*   PCO2, ARTERIAL mm Hg 48.0*   HCO3 ART mmol/L 23.1     Results from last 7 days   Lab Units 09/30/19  1112 09/30/19  0544 09/30/19  0017   TROPONIN T ng/mL 0.243* 0.335* 0.118*     Results from last 7 days   Lab Units 09/30/19  0017   PROBNP pg/mL 74.6          Results from last 7 days   Lab Units 09/30/19  0818 09/30/19  0544   LACTATE mmol/L 1.8  --    PROCALCITONIN ng/mL  --  0.15         Microbiology Results (last 10 days)     Procedure Component Value - Date/Time    MRSA Screen, PCR - Swab, Nares [[id number]]  (Normal) Collected:  09/30/19 0818    Lab Status:  Final result Specimen:  Swab from Nares Updated:  09/30/19 1115     MRSA PCR No MRSA Detected                apixaban 5 mg Oral Q12H   insulin lispro 0-24 Units Subcutaneous 4x Daily With Meals & Nightly   ipratropium-albuterol 3 mL Nebulization Once   ipratropium-albuterol 3 mL Nebulization 4x Daily - RT   piperacillin-tazobactam 3.375 g Intravenous Q8H   predniSONE 40 mg Oral Daily With Breakfast   sodium chloride 10 mL Intravenous Q12H       lactated ringers 75 mL/hr Last Rate: 75 mL/hr (09/30/19 0923)       Diagnostics:  Xr Chest 2 View    Result Date: 9/10/2019  PA AND LATERAL CHEST RADIOGRAPH  HISTORY: Shortness of air since Friday  COMPARISON: 09/24/2018  FINDINGS: Heart size is within normal limits. Cardiac loop recorder is noted. There are advanced background emphysematous changes. No pneumothorax or pleural effusion is seen. No definite acute infiltrates are identified.      Background emphysematous changes.  This report was finalized on 9/10/2019 4:32 AM by Dr. Faby Quiros M.D.      Xr Chest 1 View    Result Date: 9/30/2019  PORTABLE CHEST RADIOGRAPH  HISTORY: Shortness of air  COMPARISON: 09/24/2018  FINDINGS: Heart size is within normal limits. Advanced background emphysematous changes are seen. No definite pneumothorax is identified. Patient does appear to have old anterior cues and some lateral right basilar infiltrate. No pleural effusion is seen.      Suspected right basilar infiltrate. Correlation with any evidence of pneumonia is recommended.  This report was finalized on 9/30/2019 1:07 AM by Dr. Faby Quiros M.D.      Results for orders placed during the hospital encounter of  09/30/19   Adult Transthoracic Echo Complete W/ Cont if Necessary Per Protocol    Narrative · Left ventricular systolic function is severely decreased. Calculated EF   = 24.0%. Estimated EF was in agreement with the calculated EF. Estimated   EF appears to be in the range of 21 - 25%. Normal left ventricular wall   thickness noted. Wall motion abnormalities consistent with recurrent   Takotsubo cardiomyopathy The left ventricular cavity is mildly dilated.   Left ventricular diastolic function was indeterminate. There is no   evidence of a left ventricular thrombus present.  · Extensive wall motion abnormalities as below, consistent with Takotsubo   cardiomyopathy  · Mild redundancy of the entry mitral valve leaflet without torn chordae.   Mild mitral valve regurgitation is present.  · Trace tricuspid valve regurgitation is present. Estimated right   ventricular systolic pressure from tricuspid regurgitation is normal (<35   mmHg). Calculated right ventricular systolic pressure from tricuspid   regurgitation is 21 mmHg.          EKG reviewed looks like some old anterior cues and some nonspecific lateral T wave changes that based on report I am having trouble getting up the 6/3/2019 EKG to directly compare  Chest x-ray reviewed and compared to one from earlier this month and certainly looks like she has a new right basilar infiltrate    Active Hospital Problems    Diagnosis  POA   • Acute respiratory failure (CMS/HCC) [J96.00]  Yes      Resolved Hospital Problems   No resolved problems to display.   EKG is a sinus tachycardia there is diffuse T wave changes particularly in the inferior lateral leads there is T wave inversion in V2 3 and little V4 there may be a little ST elevation as well.      Assessment/Plan     1. Non-ST elevation MI patient did present with chest pain and shortness of breath.  Discussed with Dr. Boston yesterday.  Echocardiogram is consistent with a Takotsubo cardiomyopathy her EF is down to about  20% to 25%.  Her EKG has ischemic looking changes this morning I have a call out to cardiology.  2. Chest pain the pain is right lower anterior lateral chest worse with respiration and movement certainly sounds atypical for cardiac.  She has an infiltrate in that region and I suspect this is more related to pneumonia.  CT yesterday just showed a couple little peripheral areas of infiltrate in that right base  3. Pneumonia right lung base continue antibiotics since he was recently in the hospital she is being treated for a healthcare associated pneumonia.  Unfortunately does not appear that blood cultures were obtained before antibiotics.  The MRSA swab was negative vancomycin discontinued  4. Acute exacerbation COPD continue steroids . and bronchodilators.  Check alpha 1 antitrypsin  5. Acute hypoxemic and hypercapnic respiratory failure improving wean O2 and monitor CO2.  She still is working quite hard to breathe.    6. Tobacco abuse she needs to permanently remain off of tobacco.  7. Protein calorie malnutrition pretty severe  8. Hyperglycemia this may be steroid related although her blood sugar s were elevated when she came in, hemoglobin A1c normal so this is probably all stress response.  9. Hypotension resolved.      Patient remains critically ill I am very concerned unfortunately I am afraid there is going to be little that we can do with her combined heart and lung disease.    Plan for disposition:    Donald Ryder MD  10/01/19  7:27 AM    Time: Critical care time 55 minutes minutes thus far today

## 2019-10-02 LAB
ANION GAP SERPL CALCULATED.3IONS-SCNC: 11.3 MMOL/L (ref 5–15)
BUN BLD-MCNC: 16 MG/DL (ref 6–20)
BUN/CREAT SERPL: 28.6 (ref 7–25)
CALCIUM SPEC-SCNC: 8.6 MG/DL (ref 8.6–10.5)
CHLORIDE SERPL-SCNC: 105 MMOL/L (ref 98–107)
CO2 SERPL-SCNC: 25.7 MMOL/L (ref 22–29)
CREAT BLD-MCNC: 0.56 MG/DL (ref 0.57–1)
GFR SERPL CREATININE-BSD FRML MDRD: 111 ML/MIN/1.73
GLUCOSE BLD-MCNC: 101 MG/DL (ref 65–99)
GLUCOSE BLDC GLUCOMTR-MCNC: 103 MG/DL (ref 70–130)
GLUCOSE BLDC GLUCOMTR-MCNC: 157 MG/DL (ref 70–130)
GLUCOSE BLDC GLUCOMTR-MCNC: 87 MG/DL (ref 70–130)
GLUCOSE BLDC GLUCOMTR-MCNC: 91 MG/DL (ref 70–130)
GLUCOSE BLDC GLUCOMTR-MCNC: 99 MG/DL (ref 70–130)
POTASSIUM BLD-SCNC: 4.4 MMOL/L (ref 3.5–5.2)
SODIUM BLD-SCNC: 142 MMOL/L (ref 136–145)

## 2019-10-02 PROCEDURE — 94799 UNLISTED PULMONARY SVC/PX: CPT

## 2019-10-02 PROCEDURE — 93010 ELECTROCARDIOGRAM REPORT: CPT | Performed by: INTERNAL MEDICINE

## 2019-10-02 PROCEDURE — 80048 BASIC METABOLIC PNL TOTAL CA: CPT | Performed by: INTERNAL MEDICINE

## 2019-10-02 PROCEDURE — 25010000002 DIGOXIN PER 500 MCG: Performed by: INTERNAL MEDICINE

## 2019-10-02 PROCEDURE — 99232 SBSQ HOSP IP/OBS MODERATE 35: CPT | Performed by: INTERNAL MEDICINE

## 2019-10-02 PROCEDURE — 25010000002 METHYLPREDNISOLONE PER 40 MG: Performed by: INTERNAL MEDICINE

## 2019-10-02 PROCEDURE — 97110 THERAPEUTIC EXERCISES: CPT

## 2019-10-02 PROCEDURE — 97162 PT EVAL MOD COMPLEX 30 MIN: CPT

## 2019-10-02 PROCEDURE — 25010000002 PIPERACILLIN SOD-TAZOBACTAM PER 1 G: Performed by: INTERNAL MEDICINE

## 2019-10-02 PROCEDURE — 82962 GLUCOSE BLOOD TEST: CPT

## 2019-10-02 PROCEDURE — 93005 ELECTROCARDIOGRAM TRACING: CPT | Performed by: INTERNAL MEDICINE

## 2019-10-02 RX ADMIN — METHYLPREDNISOLONE SODIUM SUCCINATE 20 MG: 40 INJECTION, POWDER, FOR SOLUTION INTRAMUSCULAR; INTRAVENOUS at 09:44

## 2019-10-02 RX ADMIN — SODIUM CHLORIDE, PRESERVATIVE FREE 10 ML: 5 INJECTION INTRAVENOUS at 21:26

## 2019-10-02 RX ADMIN — METHYLPREDNISOLONE SODIUM SUCCINATE 20 MG: 40 INJECTION, POWDER, FOR SOLUTION INTRAMUSCULAR; INTRAVENOUS at 21:26

## 2019-10-02 RX ADMIN — IPRATROPIUM BROMIDE AND ALBUTEROL SULFATE 3 ML: 2.5; .5 SOLUTION RESPIRATORY (INHALATION) at 06:36

## 2019-10-02 RX ADMIN — IPRATROPIUM BROMIDE AND ALBUTEROL SULFATE 3 ML: 2.5; .5 SOLUTION RESPIRATORY (INHALATION) at 10:34

## 2019-10-02 RX ADMIN — APIXABAN 5 MG: 5 TABLET, FILM COATED ORAL at 21:26

## 2019-10-02 RX ADMIN — IPRATROPIUM BROMIDE AND ALBUTEROL SULFATE 3 ML: 2.5; .5 SOLUTION RESPIRATORY (INHALATION) at 20:40

## 2019-10-02 RX ADMIN — TAZOBACTAM SODIUM AND PIPERACILLIN SODIUM 3.38 G: 375; 3 INJECTION, SOLUTION INTRAVENOUS at 17:15

## 2019-10-02 RX ADMIN — TAZOBACTAM SODIUM AND PIPERACILLIN SODIUM 3.38 G: 375; 3 INJECTION, SOLUTION INTRAVENOUS at 09:45

## 2019-10-02 RX ADMIN — TAZOBACTAM SODIUM AND PIPERACILLIN SODIUM 3.38 G: 375; 3 INJECTION, SOLUTION INTRAVENOUS at 01:26

## 2019-10-02 RX ADMIN — DIGOXIN 250 MCG: 0.25 INJECTION INTRAMUSCULAR; INTRAVENOUS at 03:17

## 2019-10-02 RX ADMIN — IPRATROPIUM BROMIDE AND ALBUTEROL SULFATE 3 ML: 2.5; .5 SOLUTION RESPIRATORY (INHALATION) at 15:48

## 2019-10-02 RX ADMIN — APIXABAN 5 MG: 5 TABLET, FILM COATED ORAL at 09:44

## 2019-10-02 NOTE — PLAN OF CARE
Problem: Patient Care Overview  Goal: Plan of Care Review  Outcome: Ongoing (interventions implemented as appropriate)   10/02/19 5655   Coping/Psychosocial   Plan of Care Reviewed With patient   OTHER   Outcome Summary Pt alert and oriented. VSS. 3L NC. Up to BSC multiple times; in chair for lunch and dinner. Pt walked around unit once with PT; minimal dyspnea on exertion. Activity tolerance improving. No episodes of SVT or respiratory distress today. May start digoxin and ACE inhibitor per cardiology. Hopefully will transfer out of CCU tomorrow. Plan of care discussed with pt.   Plan of Care Review   Progress improving       Problem: Chronic Obstructive Pulmonary Disease (Adult)  Goal: Signs and Symptoms of Listed Potential Problems Will be Absent, Minimized or Managed (Chronic Obstructive Pulmonary Disease)  Outcome: Ongoing (interventions implemented as appropriate)      Problem: Fall Risk (Adult)  Goal: Absence of Fall  Outcome: Ongoing (interventions implemented as appropriate)      Problem: Sepsis/Septic Shock (Adult)  Goal: Signs and Symptoms of Listed Potential Problems Will be Absent, Minimized or Managed (Sepsis/Septic Shock)  Outcome: Ongoing (interventions implemented as appropriate)      Problem: Nutrition, Imbalanced: Inadequate Oral Intake (Adult)  Goal: Improved Oral Intake  Outcome: Ongoing (interventions implemented as appropriate)    Goal: Prevent Further Weight Loss  Outcome: Ongoing (interventions implemented as appropriate)

## 2019-10-02 NOTE — PROGRESS NOTES
LOS: 2 days   Patient Care Team:  MARILYN Fernandez MD as PCP - General (General Practice)  Beulah Joshi APRN as Nurse Practitioner (Neurology)    Subjective     Patient in severe distress this morning sitting up in the bed not able to breathe.  Not able to talk.    Review of Systems:          Objective     Vital Signs  Vital Sign Min/Max for last 24 hours  Temp  Min: 97.7 °F (36.5 °C)  Max: 98.8 °F (37.1 °C)   BP  Min: 87/52  Max: 116/57   Pulse  Min: 72  Max: 124   Resp  Min: 16  Max: 19   SpO2  Min: 84 %  Max: 100 %   Flow (L/min)  Min: 3  Max: 4   Weight  Min: 45.3 kg (99 lb 13.9 oz)  Max: 45.5 kg (100 lb 5 oz)        Ventilator/Non-Invasive Ventilation Settings (From admission, onward)    Start     Ordered    09/30/19 0227  NIPPV (CPAP or BIPAP)  Until Discontinued     Question Answer Comment   Indication: Acute Respiratory Failure    Type: BIPAP    NIPPV Mask Interface: Per Patient Preference    IPAP 20    EPAP 10    Titrate for SPO2 90%        09/30/19 0226    09/30/19 0040  NIPPV (CPAP or BIPAP)  Until Discontinued     Question Answer Comment   Type: BIPAP    NIPPV Mask Interface: Per Patient Preference        09/30/19 0039                       Body mass index is 17.13 kg/m².  I/O last 3 completed shifts:  In: 1025 [P.O.:240; I.V.:408; IV Piggyback:377]  Out: 400 [Urine:400]  No intake/output data recorded.        Physical Exam:  General Appearance: Well-developed white female in severe respiratory distress placed on noninvasive ventilation this morning.  By myself  Eyes: Conjunctiva are clear and anicteric, pupils are equal  ENT: Mucous membranes are little dry nasal septum midline Mallampati type I airway  Neck: No adenopathy or thyromegaly no jugular venous distention trachea midline  Lungs: She does not move much air she has a few wheezes  Cardiac: Tachycardic heart rates in the 150s I thought initially this might be in SVT as it persisted but she has gradually slowed down into the 130s  EKG is sinus rhythm  Abdomen: She is using abdominal muscles some for respiration there is no palpable hepatosplenomegaly or masses occult to palpate with her severe respiratory distress and her sitting upright  : Not examined  Musculoskeletal: She does not have a whole lot of muscle mass in all she has no palpable cords or calf tenderness no pain with dorsiflexion of the feet  Skin: No jaundice no petechiae is at least peripherally cool and clammy  Neuro: She is not really able to talk at all nor do much she is holding herself sitting upright  Extremities/P Vascular: No clubbing no cyanosis no edema palpable radial dorsalis pedis pulses bilaterally  MSE: Anxious very       Labs:  Results from last 7 days   Lab Units 10/02/19  0500 10/01/19  1206 09/30/19  0017   GLUCOSE mg/dL 101* 100* 340*   SODIUM mmol/L 142 140 135*   POTASSIUM mmol/L 4.4 3.7 4.4   MAGNESIUM mg/dL  --  2.3  --    CO2 mmol/L 25.7 24.5 25.7   CHLORIDE mmol/L 105 106 96*   ANION GAP mmol/L 11.3 9.5 13.3   CREATININE mg/dL 0.56* 0.63 0.80   BUN mg/dL 16 15 17   BUN / CREAT RATIO  28.6* 23.8 21.3   CALCIUM mg/dL 8.6 8.5* 9.0   EGFR IF NONAFRICN AM mL/min/1.73 111 97 74   ALK PHOS U/L  --   --  73   TOTAL PROTEIN g/dL  --   --  7.0   ALT (SGPT) U/L  --   --  22   AST (SGOT) U/L  --   --  27   BILIRUBIN mg/dL  --   --  0.3   ALBUMIN g/dL  --   --  4.50   GLOBULIN gm/dL  --   --  2.5     Estimated Creatinine Clearance: 78.3 mL/min (A) (by C-G formula based on SCr of 0.56 mg/dL (L)).      Results from last 7 days   Lab Units 09/30/19  0017   WBC 10*3/mm3 12.46*   RBC 10*6/mm3 4.82   HEMOGLOBIN g/dL 14.7   HEMATOCRIT % 46.2   MCV fL 95.9   MCH pg 30.5   MCHC g/dL 31.8   RDW % 12.6   RDW-SD fl 45.2   MPV fL 10.4   PLATELETS 10*3/mm3 340   NEUTROS ABS 10*3/mm3 6.79   EOS ABS 10*3/mm3 0.66*   BASOS ABS 10*3/mm3 0.05   NRBC /100 WBC 0.4*     Results from last 7 days   Lab Units 09/30/19  0137   PH, ARTERIAL pH units 7.291*   PO2 ART mm Hg 239.2*   PCO2,  ARTERIAL mm Hg 48.0*   HCO3 ART mmol/L 23.1     Results from last 7 days   Lab Units 09/30/19  1112 09/30/19  0544 09/30/19  0017   TROPONIN T ng/mL 0.243* 0.335* 0.118*     Results from last 7 days   Lab Units 09/30/19  0017   PROBNP pg/mL 74.6         Results from last 7 days   Lab Units 09/30/19  0818 09/30/19  0544   LACTATE mmol/L 1.8  --    PROCALCITONIN ng/mL  --  0.15         Microbiology Results (last 10 days)     Procedure Component Value - Date/Time    MRSA Screen, PCR - Swab, Nares [[id number]]  (Normal) Collected:  09/30/19 0818    Lab Status:  Final result Specimen:  Swab from Nares Updated:  09/30/19 1115     MRSA PCR No MRSA Detected                adenosine 12 mg Intravenous Once   apixaban 5 mg Oral Q12H   insulin lispro 0-24 Units Subcutaneous 4x Daily With Meals & Nightly   ipratropium-albuterol 3 mL Nebulization Once   ipratropium-albuterol 3 mL Nebulization 4x Daily - RT   methylPREDNISolone sodium succinate 20 mg Intravenous Q12H   piperacillin-tazobactam 3.375 g Intravenous Q8H   sodium chloride 10 mL Intravenous Q12H       lactated ringers 75 mL/hr Last Rate: Stopped (10/01/19 0830)       Diagnostics:  Xr Chest 2 View    Result Date: 9/10/2019  PA AND LATERAL CHEST RADIOGRAPH  HISTORY: Shortness of air since Friday  COMPARISON: 09/24/2018  FINDINGS: Heart size is within normal limits. Cardiac loop recorder is noted. There are advanced background emphysematous changes. No pneumothorax or pleural effusion is seen. No definite acute infiltrates are identified.      Background emphysematous changes.  This report was finalized on 9/10/2019 4:32 AM by Dr. Faby Quiros M.D.      Xr Chest 1 View    Result Date: 9/30/2019  PORTABLE CHEST RADIOGRAPH  HISTORY: Shortness of air  COMPARISON: 09/24/2018  FINDINGS: Heart size is within normal limits. Advanced background emphysematous changes are seen. No definite pneumothorax is identified. Patient does appear to have old anterior cues and some lateral  right basilar infiltrate. No pleural effusion is seen.      Suspected right basilar infiltrate. Correlation with any evidence of pneumonia is recommended.  This report was finalized on 9/30/2019 1:07 AM by Dr. Faby Quiros M.D.      Results for orders placed during the hospital encounter of 09/30/19   Adult Transthoracic Echo Complete W/ Cont if Necessary Per Protocol    Narrative · Left ventricular systolic function is severely decreased. Calculated EF   = 24.0%. Estimated EF was in agreement with the calculated EF. Estimated   EF appears to be in the range of 21 - 25%. Normal left ventricular wall   thickness noted. Wall motion abnormalities consistent with recurrent   Takotsubo cardiomyopathy The left ventricular cavity is mildly dilated.   Left ventricular diastolic function was indeterminate. There is no   evidence of a left ventricular thrombus present.  · Extensive wall motion abnormalities as below, consistent with Takotsubo   cardiomyopathy  · Mild redundancy of the entry mitral valve leaflet without torn chordae.   Mild mitral valve regurgitation is present.  · Trace tricuspid valve regurgitation is present. Estimated right   ventricular systolic pressure from tricuspid regurgitation is normal (<35   mmHg). Calculated right ventricular systolic pressure from tricuspid   regurgitation is 21 mmHg.          EKG reviewed looks like some old anterior cues and some nonspecific lateral T wave changes that based on report I am having trouble getting up the 6/3/2019 EKG to directly compare  Chest x-ray reviewed and compared to one from earlier this month and certainly looks like she has a new right basilar infiltrate    Active Hospital Problems    Diagnosis  POA   • Acute respiratory failure (CMS/HCC) [J96.00]  Yes      Resolved Hospital Problems   No resolved problems to display.   EKG with diffuse T wave changes inversion suggesting more of a global ischemia      Assessment/Plan     1. Non-ST elevation MI  patient did present with chest pain and shortness of breath.  Discussed with Dr. Boston yesterday.  Echocardiogram is consistent with a Takotsubo cardiomyopathy her EF is down to about 20% to 25%.  Her EKG still has ischemic-looking changes.  She had a cardiac cath with her similar event last year and coronaries were clear.  Not likely acute coronary syndrome but again this is likely another Takotsubo cardiomyopathy.  2. SVT brief run yesterday she has not had recurrence.  3. Chest pain the pain is right lower anterior lateral chest worse with respiration and movement certainly sounds atypical for cardiac.  Probably pleuritic related to a little pneumonia has resolved.  4. Pneumonia right lung base continue antibiotics since he was recently in the hospital she is being treated for a healthcare associated pneumonia.  Unfortunately does not appear that blood cultures were obtained before antibiotics.  The MRSA swab was negative vancomycin discontinued.  Continue Zosyn  5. Acute exacerbation COPD continue steroids . and bronchodilators not using albuterol felt to be possibly because of her SVT..  Check alpha 1 antitrypsin  6. Acute hypoxemic and hypercapnic respiratory failure improving wean O2 and monitor CO2.  She looks a lot better today.  7. Tobacco abuse she needs to permanently remain off of tobacco.  8. Protein calorie malnutrition pretty severe  9. Hyperglycemia this may be steroid related although her blood sugar s were elevated when she came in, hemoglobin A1c normal so this is probably all stress response.  10. Hypotension resolved.      Patiently certainly looks better today working to try and mobilize her little and see how she tolerates this.    Plan for disposition:    Donald Ryder MD  10/02/19  7:03 AM    Time:

## 2019-10-02 NOTE — PROGRESS NOTES
.  [facility]  Progress note: 10/2/2019    Patient Identification:  Name:Scarlet Chakraborty  Age:59 y.o.  Sex: female  :  1960  MRN: [id number]           CC:  Follow-up of supraventricular tachycardia, congestive heart failure, recurrent cardiomyopathy and demand related ST elevation    Interval history:  Heart rate controlled overnight.  Received 3 doses of digoxin for load.  EKG with significant ST-T wave changes but QTc interval has improved    Vital Signs:   Temp:  [97.7 °F (36.5 °C)-98.8 °F (37.1 °C)] 98.6 °F (37 °C)  Heart Rate:  [] 87  Resp:  [18] 18  BP: ()/(52-83) 109/67    Intake/Output Summary (Last 24 hours) at 10/2/2019 0859  Last data filed at 10/2/2019 0600  Gross per 24 hour   Intake 667 ml   Output 200 ml   Net 467 ml       Physical Examination:    General Appearance No acute distress   Neck No adenopathy, supple, trachea midline, no thyromegaly, no carotid bruit, no JVD   Lungs Bilateral expiratory wheezes improved from yesterday respirations regular, even and unlabored   Heart Regular rhythm and normal rate, normal S1 and S2, no murmur, no gallop, no rub, no click   Chest wall No abnormalities observed   Abdomen Normal bowel sounds, no masses, no hepatomegaly, soft   Extremities Moves all extremities well, no edema, no cyanosis, no redness   Neurological Alert and oriented x 3     Lab Review:  Personally reviewed the labs, radiology imaging and other cardiac procedures. Results from last 7 days   Lab Units 10/02/19  0500  19  0017   SODIUM mmol/L 142   < > 135*   POTASSIUM mmol/L 4.4   < > 4.4   CHLORIDE mmol/L 105   < > 96*   CO2 mmol/L 25.7   < > 25.7   BUN mg/dL 16   < > 17   CREATININE mg/dL 0.56*   < > 0.80   CALCIUM mg/dL 8.6   < > 9.0   BILIRUBIN mg/dL  --   --  0.3   ALK PHOS U/L  --   --  73   ALT (SGPT) U/L  --   --  22   AST (SGOT) U/L  --   --  27   GLUCOSE mg/dL 101*   < > 340*    < > = values in this interval not displayed.     Results from last 7  days   Lab Units 09/30/19  1112 09/30/19  0544 09/30/19  0017   TROPONIN T ng/mL 0.243* 0.335* 0.118*       Results from last 7 days   Lab Units 09/30/19  0017   WBC 10*3/mm3 12.46*   HEMOGLOBIN g/dL 14.7   HEMATOCRIT % 46.2   PLATELETS 10*3/mm3 340       Medication Review:   Meds reviewed  Scheduled Meds:  adenosine 12 mg Intravenous Once   apixaban 5 mg Oral Q12H   insulin lispro 0-24 Units Subcutaneous 4x Daily With Meals & Nightly   ipratropium-albuterol 3 mL Nebulization Once   ipratropium-albuterol 3 mL Nebulization 4x Daily - RT   methylPREDNISolone sodium succinate 20 mg Intravenous Q12H   piperacillin-tazobactam 3.375 g Intravenous Q8H   sodium chloride 10 mL Intravenous Q12H     Continuous Infusions:  lactated ringers 75 mL/hr Last Rate: Stopped (10/01/19 0830)     I personally viewed and interpreted the patient's EKG/Telemetry data    Assessment and Plan    1.    Recurrent cardiomyopathy likelyTakotsubo cardiomyopathy once more.  Had negative cath a year ago.    Blood pressure controlled and if stable today we will add low-dose ACE inhibitor therapy  2.  Paroxysmal supraventricular tachycardia.  Terminated on its own before adenosine was given today.  Add oral diltiazem as blood pressure allows  3.  Prolonged QTc interval.    Potassium and magnesium normal.  Improved  4.  COPD exacerbation with possible pneumonia and atypical chest pain.  On steroids with now side effects from inhalers.    6.  Hyperglycemia  7.  Tobacco abuse  6.  Malnutrition  7.  History of embolic stroke in the setting of cardiomyopathy.  Linq device in place without arrhythmia.  This is felt to be due to probable LV thrombus at the time and she has been on Eliquis.        Mini Boston  10/2/80781:59 AM  25min spent in reviewing records, discussion and examination of the patient and discussion with other members of the patient's medical team.     Dictated utilizing Dragon dictation

## 2019-10-02 NOTE — PLAN OF CARE
Problem: Patient Care Overview  Goal: Plan of Care Review   10/02/19 1321   Coping/Psychosocial   Plan of Care Reviewed With patient   OTHER   Outcome Summary pt is 58 yo female admitted with acute respiratory failure, Non STEMI and has significant PMH of CVA, COPD, nonischemic cardiomyopathy, and tobacco abuse. She reports having been independent with mobility and ADL completion prior to hospitalization and plans to return home independently upon d/c. Today, she was able to walk approx 150 ft with CGA of therapist throughout due to slow gait speed and some minor unsteadiness consistent with immobility in bed the past few days. She completed bed mobility skills with conditional independence and exhibits functional abilities close to her reported baseline level. She will benefit from skilled therapy services to address some deficits in ambulation ability including low activity tolerance and slow gait speed due to unsteadiness. At this point, anticipated d/c will be to home with assist.

## 2019-10-02 NOTE — PAYOR COMM NOTE
"UR CONTACT:  CHRISTIAN  P: [phone]        F: [phone]    REF #[id number]      Scarlet Chavez (59 y.o. Female)     Date of Birth Social Security Number Address Home Phone MRN    1960  [id number] [id number] [phone] [id number]    Yazidi Marital Status          Denominational Single       Admission Date Admission Type Admitting Provider Attending Provider Department, Room/Bed    9/30/19 Emergency Zainab Regalado MD Haller, [place], MD [facility], N329/1    Discharge Date Discharge Disposition Discharge Destination                       Attending Provider:  Donald Ryder MD    Allergies:  No Known Allergies    Isolation:  None   Infection:  None   Code Status:  CPR    Ht:  162.6 cm (64.02\")   Wt:  45.3 kg (99 lb 13.9 oz)    Admission Cmt:  None   Principal Problem:  None                Active Insurance as of 9/30/2019     Primary Coverage     Payor Plan Insurance Group Employer/Plan Group    [facility] [id number]     Payor Plan Address Payor Plan Phone Number Payor Plan Fax Number Effective Dates    [address] [phone]  9/1/2019 - None Entered    [facility] [id number]       Subscriber Name Subscriber Birth Date Member ID       SCARLET CHAVEZ 1960 [id number]                 Emergency Contacts      (Rel.) Home Phone Work Phone Mobile Phone    Yvonne Mccollum (Sister) [phone] -- --    Don Taylor (Significant Other) -- -- [phone]               History & Physical      Zainab Regalado MD at 09/30/19 0151          Group: [facility]         H/p  NOTE    Patient Identification:  Scarlet Chavez  59 y.o.  female  1960  [id number]                CC: Acute shortness of breath    History of Present Illness:  Very pleasant 59-year-old female with history of CVA and COPD still actively smoking until recently.  She was recently discharged from the hospital with a COPD exacerbation.  Patient presented " "to the emergency room with shortness of breath accompanied by sharp right-sided chest pain.  She was noted to be in some respiratory distress and placed on BiPAP in the emergency room.  At the time of my evaluation patient on the BiPAP.  She reports no significant sputum no aspiration no fever chills as such.  Currently with steroid and breathing treatments she feels some better.  Currently wants to get off the BiPAP.  No active chest pain currently at the time of my evaluation.      Review of Systems  Constitutional: Negative for fever.   HENT: Negative for sore throat.    Eyes: Negative.    Respiratory: Positive for shortness of breath. Negative for cough.    Cardiovascular: Positive for chest pain (R sided, \"sharp\").   Gastrointestinal: Negative for abdominal pain, diarrhea and vomiting.   Genitourinary: Negative for dysuria.   Musculoskeletal: Negative for neck pain.   Skin: Negative for rash.   Allergic/Immunologic: Negative.    Neurological: Negative for weakness, numbness and headaches.   Hematological: Negative.    Psychiatric/Behavioral: Negative.    All other systems reviewed and are negative.  Past Medical History:  Past Medical History:   Diagnosis Date   • Asthma    • Cerebrovascular accident (CVA) due to thrombosis of right middle cerebral artery (CMS/HCC)    • COPD (chronic obstructive pulmonary disease) (CMS/HCC)    • Nonischemic cardiomyopathy (CMS/HCC)    • NSTEMI (non-ST elevated myocardial infarction) (CMS/HCC)    • Stress-induced cardiomyopathy    • Stroke (CMS/HCC)    • Takotsubo cardiomyopathy    • Tobacco abuse        Past Surgical History:  Past Surgical History:   Procedure Laterality Date   • CARDIAC CATHETERIZATION N/A 9/13/2018    Procedure: Left Heart Cath and cors;  Surgeon: Gabino Dozier MD;  Location: [facility] INVASIVE LOCATION;  Service: Cardiology   • CARDIAC CATHETERIZATION N/A 9/13/2018    Procedure: Left ventriculography;  Surgeon: Gabino Dozier MD;  Location: [facility]" "CATH INVASIVE LOCATION;  Service: Cardiology   • CARDIAC ELECTROPHYSIOLOGY PROCEDURE N/A 9/27/2018    Procedure: Loop insertion  LINQ;  Surgeon: Jose R Barker MD;  Location: [facility] CATH INVASIVE LOCATION;  Service: Cardiovascular        Home Meds:    (Not in a hospital admission)    Allergies:  No Known Allergies    Social History:   Social History     Socioeconomic History   • Marital status: Single     Spouse name: Not on file   • Number of children: Not on file   • Years of education: Not on file   • Highest education level: Not on file   Tobacco Use   • Smoking status: Light Tobacco Smoker     Packs/day: 0.50     Years: 15.00     Pack years: 7.50     Types: Cigarettes   • Smokeless tobacco: Never Used   • Tobacco comment: currently 3-4 cigs daily   Substance and Sexual Activity   • Alcohol use: Yes     Comment: occassional   • Drug use: No   • Sexual activity: Defer       Family History:  Family History   Problem Relation Age of Onset   • Cancer Sister        Physical Exam:  /74 (Patient Position: Sitting)   Pulse 110   Temp 98.5 °F (36.9 °C) (Tympanic)   Resp 22   Ht 162.6 cm (64\")   Wt 49.2 kg (108 lb 8 oz)   SpO2 97%   BMI 18.62 kg/m²   Body mass index is 18.62 kg/m². 97% 49.2 kg (108 lb 8 oz)  Physical Exam  Middle-aged female resting comfortably tolerating BiPAP well  Well-developed normal body habitus  Eyes pupils reactive to light with normal conjunctiva  ENT normal nasal exam unable to evaluate oral cavity on BiPAP  Neck midline trachea no thyromegaly  Chest diminished breath sounds bilaterally with wheezing bilaterally.  Tolerating BiPAP well no labored breathing  CVS regular rate and rhythm no lower extremity edema  Abdomen soft nontender no hepatosplenomegaly  CNS intact normal sensory exam  Skin no rashes no nodules  Psych oriented to time place and person normal memory  Musculoskeletal no cyanosis no clubbing normal range of motion    LABS:  Lab Results   Component Value Date    " CALCIUM 9.0 09/30/2019     Results from last 7 days   Lab Units 09/30/19  0017   SODIUM mmol/L 135*   POTASSIUM mmol/L 4.4   CHLORIDE mmol/L 96*   CO2 mmol/L 25.7   BUN mg/dL 17   CREATININE mg/dL 0.80   GLUCOSE mg/dL 340*   CALCIUM mg/dL 9.0   WBC 10*3/mm3 12.46*   HEMOGLOBIN g/dL 14.7   PLATELETS 10*3/mm3 340   ALT (SGPT) U/L 22   AST (SGOT) U/L 27   PROBNP pg/mL 74.6     Lab Results   Component Value Date    TROPONINT 0.118 (C) 09/30/2019     Results from last 7 days   Lab Units 09/30/19  0017   TROPONIN T ng/mL 0.118*             Results from last 7 days   Lab Units 09/30/19  0137   PH, ARTERIAL pH units 7.291*   PCO2, ARTERIAL mm Hg 48.0*   PO2 ART mm Hg 239.2*   MODALITY  BiPap   O2 SATURATION CALC % 99.8*                 Lab Results   Component Value Date    TSH 1.010 09/24/2018     Estimated Creatinine Clearance: 59.5 mL/min (by C-G formula based on SCr of 0.8 mg/dL).         Imaging: I personally visualized the images of scans/x-rays performed within last 3 days.      Assessment:  Acute respiratory distress  Acute hypoxemic respiratory failure  Acute exacerbation of COPD  Possible right lower lobe pneumonia  Elevated troponin/non-ST MI  History of CVA  Tobacco abuse      Recommendations:  At this time we have a female with history of COPD still actively tobacco abuse.  Wean off BiPAP as tolerated.  She will transition to nasal cannula oxygen soon.  Suspect COPD exacerbation with possibility of underlying pneumonia.  We will continue steroids and bronchodilators  Continue antibiotics de-escalate based on cultures.  This seemed to be subtle infiltrate right lower lobe on the chest x-ray I will check a CT chest to evaluate further.  Elevated troponin currently not having any chest pain.  Patient currently on Eliquis and aspirin started.  Will ask cardiology to see  Advised patient to quit smoking long-term  Monitor clinical response closely  Routine ICU orders      Critical care time 35 minutes        Tausif  "MD Sabine  9/30/2019  1:51 AM      Much of this encounter note is an electronic transcription/translation of spoken language to printed text using Dragon Software.    Electronically signed by Zainab Regalado MD at 09/30/19 0626          Emergency Department Notes      Neo Baum MD at 09/30/19 0004      Procedure Orders    1. Critical Care [[id number]] ordered by Neo Baum MD at 09/30/19 0103                 EMERGENCY DEPARTMENT ENCOUNTER    Room Number:  17/17  Date of encounter:  9/30/2019  PCP: MARILYN Fernandez MD  Historian: patient      HPI:  Chief Complaint: shortness of air  A complete HPI/ROS/PMH/PSH/SH/FH are unobtainable due to: acuity of condition  Context: Scarlet Chakraborty is a 59 y.o. female with Hx of COPD, who presents to the ED c/o severe SOA that began earlier today. Accompanying the SOA, the pt reports \"sharp\" R sided CP. Pt received an albuterol treatment and 324 mg ASA en route to the ED via EMS. These have mildly improved her SOA. Denies fever, nausea, and vomiting. There are no other complaints.     MEDICAL RECORD REVIEW  Pt was admitted 9/10/19 - 9/11/19 for COPD exacerbation.     PAST MEDICAL HISTORY  Active Ambulatory Problems     Diagnosis Date Noted   • Acute respiratory failure with hypoxia (CMS/[facility]) 09/13/2018   • Cerebrovascular accident (CVA) due to thrombosis (CMS/[facility]) 09/24/2018   • Stress-induced cardiomyopathy 10/14/2018   • Chronic obstructive pulmonary disease with acute exacerbation (CMS/[facility]) 10/14/2018   • Tobacco abuse 12/18/2018   • Mixed hyperlipidemia 12/18/2018   • Hx of non-ST elevation myocardial infarction (NSTEMI) 02/17/2019   • History of stroke 02/17/2019   • Tobacco dependence 02/17/2019   • COPD exacerbation (CMS/[facility]) 09/11/2019   • Breast mass 05/22/2012   • Cerebrovascular accident (CVA) due to occlusion of left middle cerebral artery (CMS/[facility]) 10/24/2018   • Congestive heart failure (CMS/HCC) 11/02/2018   • Chronic obstructive lung disease " (CMS/HCC) 11/02/2018   • Cardiomyopathy (CMS/HCC) 09/19/2019     Resolved Ambulatory Problems     Diagnosis Date Noted   • Acute respiratory failure (CMS/HCC) 09/13/2018     Past Medical History:   Diagnosis Date   • Asthma    • Cerebrovascular accident (CVA) due to thrombosis of right middle cerebral artery (CMS/[facility])    • COPD (chronic obstructive pulmonary disease) (CMS/[facility])    • Nonischemic cardiomyopathy (CMS/HCC)    • NSTEMI (non-ST elevated myocardial infarction) (CMS/HCC)    • Stress-induced cardiomyopathy    • Stroke (CMS/[facility])    • Takotsubo cardiomyopathy    • Tobacco abuse          PAST SURGICAL HISTORY  Past Surgical History:   Procedure Laterality Date   • CARDIAC CATHETERIZATION N/A 9/13/2018    Procedure: Left Heart Cath and cors;  Surgeon: Gabino Dozier MD;  [facility];  Service: Cardiology   • CARDIAC CATHETERIZATION N/A 9/13/2018    Procedure: Left ventriculography;  Surgeon: Gabino Dozier MD;  Location:  [facility] CATH INVASIVE LOCATION;  Service: Cardiology   • CARDIAC ELECTROPHYSIOLOGY PROCEDURE N/A 9/27/2018    Procedure: Loop insertion  LINQ;  Surgeon: Jose R Barker MD;  Location:  [facility] CATH INVASIVE LOCATION;  Service: Cardiovascular         FAMILY HISTORY  Family History   Problem Relation Age of Onset   • Cancer Sister          SOCIAL HISTORY  Social History     Socioeconomic History   • Marital status: Single     Spouse name: Not on file   • Number of children: Not on file   • Years of education: Not on file   • Highest education level: Not on file   Tobacco Use   • Smoking status: Light Tobacco Smoker     Packs/day: 0.50     Years: 15.00     Pack years: 7.50     Types: Cigarettes   • Smokeless tobacco: Never Used   • Tobacco comment: currently 3-4 cigs daily   Substance and Sexual Activity   • Alcohol use: Yes     Comment: occassional   • Drug use: No   • Sexual activity: Defer         ALLERGIES  Patient has no known allergies.        REVIEW OF  "SYSTEMS  Review of Systems   Constitutional: Negative for fever.   HENT: Negative for sore throat.    Eyes: Negative.    Respiratory: Positive for shortness of breath. Negative for cough.    Cardiovascular: Positive for chest pain (R sided, \"sharp\").   Gastrointestinal: Negative for abdominal pain, diarrhea and vomiting.   Genitourinary: Negative for dysuria.   Musculoskeletal: Negative for neck pain.   Skin: Negative for rash.   Allergic/Immunologic: Negative.    Neurological: Negative for weakness, numbness and headaches.   Hematological: Negative.    Psychiatric/Behavioral: Negative.    All other systems reviewed and are negative.         PHYSICAL EXAM    I have reviewed the triage vital signs and nursing notes.    ED Triage Vitals [09/30/19 0002]   Temp Pulse Resp BP SpO2   98.5 °F (36.9 °C) -- -- -- --      Temp src Heart Rate Source Patient Position BP Location FiO2 (%)   Tympanic -- -- -- --         Physical Exam   Constitutional: She is oriented to person, place, and time. She appears distressed.   HENT:   Head: Normocephalic and atraumatic.   Eyes: EOM are normal. Pupils are equal, round, and reactive to light.   Neck: Normal range of motion. Neck supple.   Cardiovascular: Regular rhythm and normal heart sounds. Tachycardia present.   Pulmonary/Chest: She is in respiratory distress (moderate). She has decreased breath sounds (diffuse).   Abdominal: Soft. There is no tenderness. There is no rebound and no guarding.   Musculoskeletal: Normal range of motion. She exhibits no edema.   Neurological: She is alert and oriented to person, place, and time. She has normal sensation and normal strength.   Skin: Skin is warm and dry. No rash noted.   Psychiatric: Mood and affect normal.   Nursing note and vitals reviewed.      LAB RESULTS  Recent Results (from the past 24 hour(s))   Comprehensive Metabolic Panel    Collection Time: 09/30/19 12:17 AM   Result Value Ref Range    Glucose 340 (H) 65 - 99 mg/dL    BUN 17 6 " - 20 mg/dL    Creatinine 0.80 0.57 - 1.00 mg/dL    Sodium 135 (L) 136 - 145 mmol/L    Potassium 4.4 3.5 - 5.2 mmol/L    Chloride 96 (L) 98 - 107 mmol/L    CO2 25.7 22.0 - 29.0 mmol/L    Calcium 9.0 8.6 - 10.5 mg/dL    Total Protein 7.0 6.0 - 8.5 g/dL    Albumin 4.50 3.50 - 5.20 g/dL    ALT (SGPT) 22 1 - 33 U/L    AST (SGOT) 27 1 - 32 U/L    Alkaline Phosphatase 73 39 - 117 U/L    Total Bilirubin 0.3 0.2 - 1.2 mg/dL    eGFR Non African Amer 74 >60 mL/min/1.73    Globulin 2.5 gm/dL    A/G Ratio 1.8 g/dL    BUN/Creatinine Ratio 21.3 7.0 - 25.0    Anion Gap 13.3 5.0 - 15.0 mmol/L   BNP    Collection Time: 09/30/19 12:17 AM   Result Value Ref Range    proBNP 74.6 5.0 - 900.0 pg/mL   Troponin    Collection Time: 09/30/19 12:17 AM   Result Value Ref Range    Troponin T 0.118 (C) 0.000 - 0.030 ng/mL   CBC Auto Differential    Collection Time: 09/30/19 12:17 AM   Result Value Ref Range    WBC 12.46 (H) 3.40 - 10.80 10*3/mm3    RBC 4.82 3.77 - 5.28 10*6/mm3    Hemoglobin 14.7 12.0 - 15.9 g/dL    Hematocrit 46.2 34.0 - 46.6 %    MCV 95.9 79.0 - 97.0 fL    MCH 30.5 26.6 - 33.0 pg    MCHC 31.8 31.5 - 35.7 g/dL    RDW 12.6 12.3 - 15.4 %    RDW-SD 45.2 37.0 - 54.0 fl    MPV 10.4 6.0 - 12.0 fL    Platelets 340 140 - 450 10*3/mm3   Manual Differential    Collection Time: 09/30/19 12:17 AM   Result Value Ref Range    Neutrophil % 54.5 42.7 - 76.0 %    Lymphocyte % 36.4 19.6 - 45.3 %    Monocyte % 3.4 (L) 5.0 - 12.0 %    Eosinophil % 5.3 0.3 - 6.2 %    Basophil % 0.4 0.0 - 1.5 %    Neutrophils Absolute 6.79 1.70 - 7.00 10*3/mm3    Lymphocytes Absolute 4.54 (H) 0.70 - 3.10 10*3/mm3    Monocytes Absolute 0.42 0.10 - 0.90 10*3/mm3    Eosinophils Absolute 0.66 (H) 0.00 - 0.40 10*3/mm3    Basophils Absolute 0.05 0.00 - 0.20 10*3/mm3    nRBC 0.4 (H) 0.0 - 0.2 /100 WBC    RBC Morphology Normal Normal    WBC Morphology Normal Normal    Platelet Morphology Normal Normal       Ordered the above labs and independently reviewed the  results.        RADIOLOGY  Xr Chest 1 View    Result Date: 9/30/2019  PORTABLE CHEST RADIOGRAPH  HISTORY: Shortness of air  COMPARISON: 09/24/2018  FINDINGS: Heart size is within normal limits. Advanced background emphysematous changes are seen. No definite pneumothorax is identified. Patient does appear to have some right basilar infiltrate. No pleural effusion is seen.      Suspected right basilar infiltrate. Correlation with any evidence of pneumonia is recommended.  This report was finalized on 9/30/2019 1:07 AM by Dr. Faby Quiros M.D.        I ordered the above noted radiological studies. Reviewed by me.  See dictation for official radiology interpretation.      PROCEDURES    Critical Care  Performed by: Neo Baum MD  Authorized by: Neo Baum MD     Critical care provider statement:     Critical care time (minutes):  36    Critical care time was exclusive of:  Separately billable procedures and treating other patients    Critical care was necessary to treat or prevent imminent or life-threatening deterioration of the following conditions:  Respiratory failure    Critical care was time spent personally by me on the following activities:  Development of treatment plan with patient or surrogate, discussions with consultants, evaluation of patient's response to treatment, examination of patient, interpretation of cardiac output measurements, obtaining history from patient or surrogate, ordering and performing treatments and interventions, ordering and review of laboratory studies, ordering and review of radiographic studies, pulse oximetry, re-evaluation of patient's condition and review of old charts          EKG          EKG time: 0038  Rhythm/Rate: sinus tach 128  P waves and MS: nml  QRS, axis: nml   ST and T waves: nml     Interpreted Contemporaneously by me, independently viewed  Changed compared to prior 7/2019      MEDICATIONS GIVEN IN ER    Medications   ipratropium-albuterol  (DUO-NEB) nebulizer solution 3 mL (3 mL Nebulization Not Given 9/30/19 0044)   sodium chloride 0.9 % flush 10 mL (not administered)   aspirin tablet 325 mg (not administered)   methylPREDNISolone sodium succinate (SOLU-Medrol) injection 125 mg (125 mg Intravenous Given 9/30/19 0018)   LORazepam (ATIVAN) injection 0.5 mg (0.5 mg Intravenous Given 9/30/19 0056)         PROGRESS, DATA ANALYSIS, CONSULTS, AND MEDICAL DECISION MAKING    All labs have been independently reviewed by me.  All radiology studies have been reviewed by me and discussed with radiologist dictating the report.   EKG's independently viewed and interpreted by me.  Discussion below represents my analysis of pertinent findings related to patient's condition, differential diagnosis, treatment plan and final disposition.         0015- Informed by RT that the pt is too tachycardic to administer albuterol treatment. Will place pt on BiPAP.    0102- Rechecked pt. Pt is stable. HR is in the 130s. She appears to be breathing much more comfortably on BiPAP. Appears to be in less distress.     0130- Pt continues to improve.  HR down to 110.  Pt states breathing much better than when she arrived.  Discussed plans to admit to hospital with pt and family.  All questions addressed and answered.    0140-  Discussed the pt with Dr. Regalado (Pulmonology). He agrees to admit the pt to the ICU.     --  AS OF 1:42 AM VITALS:    BP - 108/74  HR - 110  TEMP - 98.5 °F (36.9 °C) (Tympanic)  02 SATS - 97%  --    DIAGNOSIS  Final diagnoses:   Acute respiratory failure, unspecified whether with hypoxia or hypercapnia (CMS/HCC)   COPD with acute exacerbation (CMS/HCC)   NSTEMI (non-ST elevated myocardial infarction) (CMS/[facility])         DISPOSITION  ADMISSION    Discussed treatment plan and reason for admission with pt/family and admitting physician.  Pt/family voiced understanding of the plan for admission for further testing/treatment as needed.     --  Documentation assistance  provided by singh Garcia for Dr. Bautista MD.  Information recorded by the scribe was done at my direction and has been verified and validated by me.       Ross Garcia  09/30/19 0112       Neo Baum MD  09/30/19 0143      Electronically signed by Neo Baum MD at 09/30/19 0143     Abad Rasmussen at 09/30/19 0008        EKG attempted at this time. EKG showed marked artifact to the point of unable to interpret due to respiratory status. Dr. Baum shown first attempted EKG. Will re-atttempt when patient's respiratory status has improved.      Abad Rasmussen  09/30/19 0018      Electronically signed by Abad Rasmussen at 09/30/19 0018         Lines, Drains & Airways    Active LDAs     Name:   Placement date:   Placement time:   Site:   Days:    Peripheral IV 09/30/19 0010 Left Antecubital   09/30/19    0010    Antecubital   2    Peripheral IV 09/30/19 0300 Distal;Right;Posterior Forearm   09/30/19    0300    Forearm   2                Hospital Medications (active)       Dose Frequency Start End    adenosine (ADENOCARD) 6 MG/2ML injection  - ADS Override Pull   10/1/2019 10/1/2019    Notes to Pharmacy: Created by cabinet override    adenosine (ADENOCARD) injection 12 mg 12 mg Once 10/1/2019     Sig - Route: Infuse 4 mL into a venous catheter 1 (One) Time. - Intravenous    apixaban (ELIQUIS) tablet 5 mg 5 mg Every 12 Hours Scheduled 9/30/2019     Sig - Route: Take 1 tablet by mouth Every 12 (Twelve) Hours. - Oral    dextrose (D50W) 25 g/ 50mL Intravenous Solution 25 g 25 g Every 15 Minutes PRN 9/30/2019     Sig - Route: Infuse 50 mL into a venous catheter Every 15 (Fifteen) Minutes As Needed for Low Blood Sugar (Blood Sugar Less Than 70). - Intravenous    dextrose (GLUTOSE) oral gel 15 g 15 g Every 15 Minutes PRN 9/30/2019     Sig - Route: Take 15 application by mouth Every 15 (Fifteen) Minutes As Needed for Low Blood Sugar (Blood sugar less than 70). - Oral    digoxin (LANOXIN)  injection 250 mcg 250 mcg Every 8 Hours 10/1/2019 10/2/2019    Sig - Route: Infuse 1 mL into a venous catheter Every 8 (Eight) Hours. - Intravenous    glucagon (human recombinant) (GLUCAGEN DIAGNOSTIC) injection 1 mg 1 mg Every 15 Minutes PRN 9/30/2019     Sig - Route: Inject 1 mg under the skin into the appropriate area as directed Every 15 (Fifteen) Minutes As Needed for Low Blood Sugar (Blood Glucose Less Than 70). - Subcutaneous    insulin lispro (humaLOG) injection 0-24 Units 0-24 Units 4 Times Daily With Meals & Nightly 9/30/2019     Sig - Route: Inject 0-24 Units under the skin into the appropriate area as directed 4 (Four) Times a Day With Meals & at Bedtime. - Subcutaneous    ipratropium-albuterol (DUO-NEB) nebulizer solution 3 mL 3 mL Once 9/30/2019     Sig - Route: Take 3 mL by nebulization 1 (One) Time. - Nebulization    ipratropium-albuterol (DUO-NEB) nebulizer solution 3 mL 3 mL 4 Times Daily - RT 9/30/2019     Sig - Route: Take 3 mL by nebulization 4 (Four) Times a Day. - Nebulization    ipratropium-albuterol (DUO-NEB) nebulizer solution 3 mL 3 mL Every 4 Hours PRN 9/30/2019     Sig - Route: Take 3 mL by nebulization Every 4 (Four) Hours As Needed for Wheezing or Shortness of Air. - Nebulization    lactated ringers infusion 75 mL/hr Continuous 9/30/2019     Sig - Route: Infuse 75 mL/hr into a venous catheter Continuous. - Intravenous    methylPREDNISolone sodium succinate (SOLU-Medrol) injection 20 mg 20 mg Every 12 Hours 10/1/2019     Sig - Route: Infuse 0.5 mL into a venous catheter Every 12 (Twelve) Hours. - Intravenous    piperacillin-tazobactam (ZOSYN) 3.375 g in iso-osmotic dextrose 50 ml (premix) 3.375 g Every 8 Hours 9/30/2019 10/7/2019    Sig - Route: Infuse 50 mL into a venous catheter Every 8 (Eight) Hours. - Intravenous    sodium chloride 0.9 % flush 10 mL 10 mL As Needed 9/30/2019     Sig - Route: Infuse 10 mL into a venous catheter As Needed for Line Care. - Intravenous    Linked Group  "1:  \"And\" Linked Group Details        sodium chloride 0.9 % flush 10 mL 10 mL Every 12 Hours Scheduled 9/30/2019     Sig - Route: Infuse 10 mL into a venous catheter Every 12 (Twelve) Hours. - Intravenous    sodium chloride 0.9 % flush 10 mL 10 mL As Needed 9/30/2019     Sig - Route: Infuse 10 mL into a venous catheter As Needed for Line Care. - Intravenous            Orders (active)     Start     Ordered    10/02/19 0839  PT Consult: Eval & Treat Functional Mobility Below Baseline  Once      10/02/19 0838    10/02/19 0839  Trying to get her up out of bed at least to a chair for meals and see if she can ambulate some  Physician Communication Order  Once     Comments:  Trying to get her up out of bed at least to a chair for meals and see if she can ambulate some    10/02/19 0838    10/01/19 1000  methylPREDNISolone sodium succinate (SOLU-Medrol) injection 20 mg  Every 12 Hours      10/01/19 0906    10/01/19 0930  adenosine (ADENOCARD) injection 12 mg  Once      10/01/19 0841    10/01/19 0841  adenosine (ADENOCARD) 6 MG/2ML injection  - ADS Override Pull     Comments:  Created by cabinet override    10/01/19 0841    10/01/19 0600  A1A, Quant & Genotype (Rfx Pheno)  Morning Draw      09/30/19 0728    09/30/19 1100  POC Glucose 4x Daily AC & at Bedtime  4 Times Daily Before Meals & at Bedtime      09/30/19 0736    09/30/19 0900  apixaban (ELIQUIS) tablet 5 mg  Every 12 Hours Scheduled      09/30/19 0226    09/30/19 0900  sodium chloride 0.9 % flush 10 mL  Every 12 Hours Scheduled      09/30/19 0226    09/30/19 0834  piperacillin-tazobactam (ZOSYN) 3.375 g in iso-osmotic dextrose 50 ml (premix)  Every 8 Hours      09/30/19 0148    09/30/19 0830  ipratropium-albuterol (DUO-NEB) nebulizer solution 3 mL  4 Times Daily - RT      09/30/19 0145    09/30/19 0830  insulin lispro (humaLOG) injection 0-24 Units  4 Times Daily With Meals & Nightly      09/30/19 0736    09/30/19 0830  lactated ringers infusion  Continuous      " 09/30/19 0741 09/30/19 0741  Diet Regular; Cardiac, Consistent Carbohydrate  Diet Effective Now      09/30/19 0741    09/30/19 0736  Do NOT Hold Basal or Correction Scale Insulin When Patient is NPO, Hold Scheduled Mealtime (Bolus) Insulin if NPO  Continuous      09/30/19 0736 09/30/19 0736  Follow [facility] Hypoglycemia Standing Orders For Blood Glucose Less Than 70 mg/dL  Until Discontinued      09/30/19 0736 09/30/19 0736  Hypoglycemia Treatment - Alert Patient That is Not NPO and Can Safely Swallow  Until Discontinued     Comments:  Administer 4 oz Fruit Juice OR 4 oz Regular Soda OR 8 oz Milk OR 15-30 grams (1 tube) of Glucose Gel.  Recheck Blood Glucose 15 Minutes After Ingestion, Repeat Treatment & Continue to Recheck Blood Sugar Every 15 Minutes Until Blood Glucose is 70 mg/dL or Higher.  Once Blood Glucose is 70 mg/dL or Higher and if It Will Be more than 60 Minutes Until the Next Meal, Provide Appropriate Snack (Including Carbohydrate Food) Based on Meal Plan Order. Give Meal Tray As Soon As Possible.    09/30/19 0736 09/30/19 0736  Hypoglycemia Treatment - Patient Has IV Access - Unresponsive, NPO or Unable To Safely Swallow  Until Discontinued     Comments:  Administer 25g (50ml) D50W IV Push.  Recheck Blood Glucose 15 Minutes After Administration, if Blood Glucose Remains Less Than 70 mg/dl, Repeat Treatment   Recheck Blood Glucose 15 Minutes After 2nd Administration, if Blood Glucose Remains Less Than 70 mg/dL After 2nd Dose of D50W, Contact Provider for Further Treatment Orders & Consider Adding IVF With D5W for Maintenance    09/30/19 0736 09/30/19 0736  Hypoglycemia Treatment - Patient Without IV Access - Unresponsive, NPO or Unable To Safely Swallow  Until Discontinued     Comments:  Administer 1mg Glucagon SQ & Establish IV Access.  Turn Patient on Side - Nausea / Vomiting May Occur.  Recheck Blood Glucose 15 Minutes After Administration.  If Blood Glucose Remains Less Than 70, Administer  25g D50W IV Push (50ml).  Recheck Blood Glucose 15 Minutes After Administration of D50W, if Blood Glucose Remains Less Than 70 mg/dl, Contact Provider for Further Treatment Orders & Consider Adding IVF With D5 for Maintenance    09/30/19 0736    09/30/19 0736  Hypoglycemia Treatment - Document Event & Patient Response to Interventions EMR, Document Medications on MAR  Continuous      09/30/19 0736    09/30/19 0736  Notify Provider - Hypoglycemia Treatment  Until Discontinued      09/30/19 0736    09/30/19 0735  dextrose (GLUTOSE) oral gel 15 g  Every 15 Minutes PRN      09/30/19 0736    09/30/19 0735  dextrose (D50W) 25 g/ 50mL Intravenous Solution 25 g  Every 15 Minutes PRN      09/30/19 0736    09/30/19 0735  glucagon (human recombinant) (GLUCAGEN DIAGNOSTIC) injection 1 mg  Every 15 Minutes PRN      09/30/19 0736    09/30/19 0724  ipratropium-albuterol (DUO-NEB) nebulizer solution 3 mL  Every 4 Hours PRN      09/30/19 0724    09/30/19 0724  Capnography  Continuous      09/30/19 0723    09/30/19 0300  Vital Signs Every Hour and Per Hospital Policy Based on Patient Condition  Every Hour      09/30/19 0226    09/30/19 0300  Intake and Output  Every Hour      09/30/19 0226    09/30/19 0227  Cardiac Monitoring  Continuous      09/30/19 0226    09/30/19 0227  Continuous Pulse Oximetry  Continuous      09/30/19 0226    09/30/19 0227  Strict Bed Rest  Until Discontinued      09/30/19 0226    09/30/19 0227  Use Mobility Guidelines for Advancement of Activity  Continuous      09/30/19 0226    09/30/19 0227  Daily Weights  Daily      09/30/19 0226    09/30/19 0227  Insert Peripheral IV  Once      09/30/19 0226    09/30/19 0227  Saline Lock & Maintain IV Access  Continuous      09/30/19 0226    09/30/19 0227  NIPPV (CPAP or BIPAP)  Until Discontinued      09/30/19 0226    09/30/19 0226  sodium chloride 0.9 % flush 10 mL  As Needed      09/30/19 0226    09/30/19 0150  Code Status and Medical Interventions:  Continuous       09/30/19 0150    09/30/19 0146  Inpatient Cardiology Consult  Once     Specialty:  Cardiology  Provider:  Gaibno Dozier MD    09/30/19 0146    09/30/19 0040  NIPPV (CPAP or BIPAP)  Until Discontinued      09/30/19 0039    09/30/19 0010  ipratropium-albuterol (DUO-NEB) nebulizer solution 3 mL  Once      09/30/19 0008    09/30/19 0009  Insert peripheral IV  Once      09/30/19 0008    09/30/19 0008  sodium chloride 0.9 % flush 10 mL  As Needed      09/30/19 0008               Physician Progress Notes       Donald Ryder MD at 10/02/19 0703                   LOS: 2 days   Patient Care Team:  MARILYN Fernandez MD as PCP - General (General Practice)  Beulah Joshi APRN as Nurse Practitioner (Neurology)    Subjective     Patient in severe distress this morning sitting up in the bed not able to breathe.  Not able to talk.    Review of Systems:          Objective     Vital Signs  Vital Sign Min/Max for last 24 hours  Temp  Min: 97.7 °F (36.5 °C)  Max: 98.8 °F (37.1 °C)   BP  Min: 87/52  Max: 116/57   Pulse  Min: 72  Max: 124   Resp  Min: 16  Max: 19   SpO2  Min: 84 %  Max: 100 %   Flow (L/min)  Min: 3  Max: 4   Weight  Min: 45.3 kg (99 lb 13.9 oz)  Max: 45.5 kg (100 lb 5 oz)        Ventilator/Non-Invasive Ventilation Settings (From admission, onward)    Start     Ordered    09/30/19 0227  NIPPV (CPAP or BIPAP)  Until Discontinued     Question Answer Comment   Indication: Acute Respiratory Failure    Type: BIPAP    NIPPV Mask Interface: Per Patient Preference    IPAP 20    EPAP 10    Titrate for SPO2 90%        09/30/19 0226    09/30/19 0040  NIPPV (CPAP or BIPAP)  Until Discontinued     Question Answer Comment   Type: BIPAP    NIPPV Mask Interface: Per Patient Preference        09/30/19 0039                       Body mass index is 17.13 kg/m².  I/O last 3 completed shifts:  In: 1025 [P.O.:240; I.V.:408; IV Piggyback:377]  Out: 400 [Urine:400]  No intake/output data recorded.        Physical Exam:  General  Appearance: Well-developed white female in severe respiratory distress placed on noninvasive ventilation this morning.  By myself  Eyes: Conjunctiva are clear and anicteric, pupils are equal  ENT: Mucous membranes are little dry nasal septum midline Mallampati type I airway  Neck: No adenopathy or thyromegaly no jugular venous distention trachea midline  Lungs: She does not move much air she has a few wheezes  Cardiac: Tachycardic heart rates in the 150s I thought initially this might be in SVT as it persisted but she has gradually slowed down into the 130s EKG is sinus rhythm  Abdomen: She is using abdominal muscles some for respiration there is no palpable hepatosplenomegaly or masses occult to palpate with her severe respiratory distress and her sitting upright  : Not examined  Musculoskeletal: She does not have a whole lot of muscle mass in all she has no palpable cords or calf tenderness no pain with dorsiflexion of the feet  Skin: No jaundice no petechiae is at least peripherally cool and clammy  Neuro: She is not really able to talk at all nor do much she is holding herself sitting upright  Extremities/P Vascular: No clubbing no cyanosis no edema palpable radial dorsalis pedis pulses bilaterally  MSE: Anxious very       Labs:  Results from last 7 days   Lab Units 10/02/19  0500 10/01/19  1206 09/30/19  0017   GLUCOSE mg/dL 101* 100* 340*   SODIUM mmol/L 142 140 135*   POTASSIUM mmol/L 4.4 3.7 4.4   MAGNESIUM mg/dL  --  2.3  --    CO2 mmol/L 25.7 24.5 25.7   CHLORIDE mmol/L 105 106 96*   ANION GAP mmol/L 11.3 9.5 13.3   CREATININE mg/dL 0.56* 0.63 0.80   BUN mg/dL 16 15 17   BUN / CREAT RATIO  28.6* 23.8 21.3   CALCIUM mg/dL 8.6 8.5* 9.0   EGFR IF NONAFRICN AM mL/min/1.73 111 97 74   ALK PHOS U/L  --   --  73   TOTAL PROTEIN g/dL  --   --  7.0   ALT (SGPT) U/L  --   --  22   AST (SGOT) U/L  --   --  27   BILIRUBIN mg/dL  --   --  0.3   ALBUMIN g/dL  --   --  4.50   GLOBULIN gm/dL  --   --  2.5          adenosine 12 mg Intravenous Once   apixaban 5 mg Oral Q12H   insulin lispro 0-24 Units Subcutaneous 4x Daily With Meals & Nightly   ipratropium-albuterol 3 mL Nebulization Once   ipratropium-albuterol 3 mL Nebulization 4x Daily - RT   methylPREDNISolone sodium succinate 20 mg Intravenous Q12H   piperacillin-tazobactam 3.375 g Intravenous Q8H   sodium chloride 10 mL Intravenous Q12H       lactated ringers 75 mL/hr Last Rate: Stopped (10/01/19 0830)       Active Hospital Problems    Diagnosis  POA   • Acute respiratory failure (CMS/HCC) [J96.00]  Yes      Resolved Hospital Problems   No resolved problems to display.   EKG with diffuse T wave changes inversion suggesting more of a global ischemia      Assessment/Plan     1. Non-ST elevation MI patient did present with chest pain and shortness of breath.  Discussed with Dr. Boston yesterday.  Echocardiogram is consistent with a Takotsubo cardiomyopathy her EF is down to about 20% to 25%.  Her EKG still has ischemic-looking changes.  She had a cardiac cath with her similar event last year and coronaries were clear.  Not likely acute coronary syndrome but again this is likely another Takotsubo cardiomyopathy.  2. SVT brief run yesterday she has not had recurrence.  3. Chest pain the pain is right lower anterior lateral chest worse with respiration and movement certainly sounds atypical for cardiac.  Probably pleuritic related to a little pneumonia has resolved.  4. Pneumonia right lung base continue antibiotics since he was recently in the hospital she is being treated for a healthcare associated pneumonia.  Unfortunately does not appear that blood cultures were obtained before antibiotics.  The MRSA swab was negative vancomycin discontinued.  Continue Zosyn  5. Acute exacerbation COPD continue steroids . and bronchodilators not using albuterol felt to be possibly because of her SVT..  Check alpha 1 antitrypsin  6. Acute hypoxemic and hypercapnic respiratory failure  improving wean O2 and monitor CO2.  She looks a lot better today.  7. Tobacco abuse she needs to permanently remain off of tobacco.  8. Protein calorie malnutrition pretty severe  9. Hyperglycemia this may be steroid related although her blood sugar s were elevated when she came in, hemoglobin A1c normal so this is probably all stress response.  10. Hypotension resolved.      Patiently certainly looks better today working to try and mobilize her little and see how she tolerates this.    Plan for disposition:    Donald Ryder MD  10/02/19  7:03 AM    Time:       Electronically signed by Donald Ryder MD at 10/02/19 0841     Emi Boston MD at 10/01/19 0941          [facility]  Progress note: 10/1/2019    Patient Identification:  Name:Scarlet Chakraborty  Age:59 y.o.  Sex: female  :  1960  MRN: [id number]           CC:  Cardiomyopathy, tachycardia.    Interval history:  Episode of SVT this morning noted on telemetry which converted spontaneously and left her with sinus tachycardia.  Worsening ST-T wave changes with worsening QTC prolongation.  Respiratory distress noted with a tachycardia.  This occurred after albuterol treatment.  Resting on BiPAP and slightly agitated.  No chest pain.    Vital Signs:   Temp:  [97.8 °F (36.6 °C)-98.7 °F (37.1 °C)] 98.2 °F (36.8 °C)  Heart Rate:  [] 80  Resp:  [16-20] 19  BP: ()/(53-74) 105/64    Intake/Output Summary (Last 24 hours) at 10/1/2019 0941  Last data filed at 10/1/2019 0600  Gross per 24 hour   Intake 1302 ml   Output 700 ml   Net 602 ml       Physical Examination:    General Appearance  mild respiratory distress on BiPAP   Neck No adenopathy, supple, trachea midline, no thyromegaly, no carotid bruit, no JVD   Lungs Distant breath sounds, respirations regular, even and unlabored   Heart Regular rhythm with tachycardia, normal S1 and S2, no murmur, no gallop, no rub, no click   Chest wall No abnormalities observed   Abdomen Normal  bowel sounds, no masses, no hepatomegaly, soft   Extremities Moves all extremities well, no edema, no cyanosis, no redness   Neurological Alert and oriented x 3     Lab Review:  Personally reviewed the labs, radiology imaging and other cardiac procedures. Results from last 7 days   Lab Units 09/30/19  0017   SODIUM mmol/L 135*   POTASSIUM mmol/L 4.4   CHLORIDE mmol/L 96*   CO2 mmol/L 25.7   BUN mg/dL 17   CREATININE mg/dL 0.80   CALCIUM mg/dL 9.0   BILIRUBIN mg/dL 0.3   ALK PHOS U/L 73   ALT (SGPT) U/L 22   AST (SGOT) U/L 27   GLUCOSE mg/dL 340*     Results from last 7 days   Lab Units 09/30/19  1112 09/30/19  0544 09/30/19  0017   TROPONIN T ng/mL 0.243* 0.335* 0.118*     )  Results from last 7 days   Lab Units 09/30/19  0017   WBC 10*3/mm3 12.46*   HEMOGLOBIN g/dL 14.7   HEMATOCRIT % 46.2   PLATELETS 10*3/mm3 340       Medication Review:   Meds reviewed  Scheduled Meds:  adenosine      adenosine 12 mg Intravenous Once   apixaban 5 mg Oral Q12H   insulin lispro 0-24 Units Subcutaneous 4x Daily With Meals & Nightly   ipratropium-albuterol 3 mL Nebulization Once   ipratropium-albuterol 3 mL Nebulization 4x Daily - RT   methylPREDNISolone sodium succinate 20 mg Intravenous Q12H   piperacillin-tazobactam 3.375 g Intravenous Q8H   sodium chloride 10 mL Intravenous Q12H     Continuous Infusions:  lactated ringers 75 mL/hr Last Rate: 75 mL/hr (09/30/19 0923)     I personally viewed and interpreted the patient's EKG/Telemetry data    Assessment and Plan  1.     Recurrent cardiomyopathy likelyTakotsubo cardiomyopathy once more.  Had negative cath a year ago.  Will defer cardiac cath at this point.  Treatment limited by hypotension and respiratory disease.  Avoiding beta-blockers.  Add ACE inhibitor as blood pressure allows.  Continue with Eliquis.  2.  Paroxysmal supraventricular tachycardia.  Terminated on its own before adenosine was given today.  Will start digoxin.  3.  Prolonged QTc interval.  Check magnesium potassium  levels.  Address hypoxia and cardiomyopathy as above  4.  COPD exacerbation with possible pneumonia and atypical chest pain.  On steroids with now side effects from inhalers.  Unfortunately still wheezing.  Additional recommendations per Dr. Ryder  6.  Hyperglycemia  7.  Tobacco abuse  6.  Malnutrition  7.  History of embolic stroke in the setting of cardiomyopathy.  Linq device in place without arrhythmia.  This is felt to be due to probable LV thrombus at the time and she has been on Eliquis.        Mini Boston  10/1/23197:41 AM  35min spent in reviewing records, discussion and examination of the patient and discussion with other members of the patient's medical team.     Dictated utilizing Dragon dictation    Electronically signed by Emi Boston MD at 10/01/19 1111     Donald Ryder MD at 10/01/19 0727                   LOS: 1 day   Patient Care Team:  MARILYN Fernandez MD as PCP - General (General Practice)  Beulah Joshi APRN as Nurse Practitioner (Neurology)    Subjective     Patient in severe distress this morning sitting up in the bed not able to breathe.  Not able to talk.    Review of Systems:          Objective     Vital Signs  Vital Sign Min/Max for last 24 hours  Temp  Min: 97.5 °F (36.4 °C)  Max: 98.7 °F (37.1 °C)   BP  Min: 83/57  Max: 130/57   Pulse  Min: 67  Max: 110   Resp  Min: 16  Max: 20   SpO2  Min: 90 %  Max: 100 %   No Data Recorded   Weight  Min: 42.2 kg (93 lb)  Max: 45.5 kg (100 lb 5 oz)                  Body mass index is 17.22 kg/m².  I/O last 3 completed shifts:  In: 2867 [P.O.:120; I.V.:1141; IV Piggyback:1606]  Out: 700 [Urine:700]  No intake/output data recorded.        Physical Exam:  General Appearance: Well-developed white female in severe respiratory distress placed on noninvasive ventilation this morning.  By myself  Eyes: Conjunctiva are clear and anicteric, pupils are equal  ENT: Mucous membranes are little dry nasal septum midline Mallampati type I airway  Neck:  No adenopathy or thyromegaly no jugular venous distention trachea midline  Lungs: She does not move much air she has a few wheezes  Cardiac: Tachycardic heart rates in the 150s I thought initially this might be in SVT as it persisted but she has gradually slowed down into the 130s EKG is sinus rhythm  Abdomen: She is using abdominal muscles some for respiration there is no palpable hepatosplenomegaly or masses occult to palpate with her severe respiratory distress and her sitting upright  : Not examined  Musculoskeletal: She does not have a whole lot of muscle mass in all she has no palpable cords or calf tenderness no pain with dorsiflexion of the feet  Skin: No jaundice no petechiae is at least peripherally cool and clammy  Neuro: She is not really able to talk at all nor do much she is holding herself sitting upright  Extremities/P Vascular: No clubbing no cyanosis no edema palpable radial dorsalis pedis pulses bilaterally  MSE: Anxious very       Labs:  Results from last 7 days   Lab Units 09/30/19  0017   GLUCOSE mg/dL 340*   SODIUM mmol/L 135*   POTASSIUM mmol/L 4.4   CO2 mmol/L 25.7   CHLORIDE mmol/L 96*   ANION GAP mmol/L 13.3   CREATININE mg/dL 0.80   BUN mg/dL 17   BUN / CREAT RATIO  21.3   CALCIUM mg/dL 9.0   EGFR IF NONAFRICN AM mL/min/1.73 74   ALK PHOS U/L 73   TOTAL PROTEIN g/dL 7.0   ALT (SGPT) U/L 22   AST (SGOT) U/L 27   BILIRUBIN mg/dL 0.3   ALBUMIN g/dL 4.50   GLOBULIN gm/dL 2.5     Estimated Creatinine Clearance: 55.1 mL/min (by C-G formula based on SCr of 0.8 mg/dL).      Results from last 7 days   Lab Units 09/30/19  0017   WBC 10*3/mm3 12.46*   RBC 10*6/mm3 4.82   HEMOGLOBIN g/dL 14.7   HEMATOCRIT % 46.2   MCV fL 95.9   MCH pg 30.5   MCHC g/dL 31.8   RDW % 12.6   RDW-SD fl 45.2   MPV fL 10.4   PLATELETS 10*3/mm3 340   NEUTROS ABS 10*3/mm3 6.79   EOS ABS 10*3/mm3 0.66*   BASOS ABS 10*3/mm3 0.05   NRBC /100 WBC 0.4*     Results from last 7 days   Lab Units 09/30/19  0137   PH, ARTERIAL pH  units 7.291*   PO2 ART mm Hg 239.2*   PCO2, ARTERIAL mm Hg 48.0*   HCO3 ART mmol/L 23.1     Results from last 7 days   Lab Units 09/30/19  1112 09/30/19  0544 09/30/19  0017   TROPONIN T ng/mL 0.243* 0.335* 0.118*     Results from last 7 days   Lab Units 09/30/19  0017   PROBNP pg/mL 74.6         Results from last 7 days   Lab Units 09/30/19  0818 09/30/19  0544   LACTATE mmol/L 1.8  --    PROCALCITONIN ng/mL  --  0.15         Microbiology Results (last 10 days)     Procedure Component Value - Date/Time    MRSA Screen, PCR - Swab, Nares [[id number]]  (Normal) Collected:  09/30/19 0818    Lab Status:  Final result Specimen:  Swab from Nares Updated:  09/30/19 1115     MRSA PCR No MRSA Detected                apixaban 5 mg Oral Q12H   insulin lispro 0-24 Units Subcutaneous 4x Daily With Meals & Nightly   ipratropium-albuterol 3 mL Nebulization Once   ipratropium-albuterol 3 mL Nebulization 4x Daily - RT   piperacillin-tazobactam 3.375 g Intravenous Q8H   predniSONE 40 mg Oral Daily With Breakfast   sodium chloride 10 mL Intravenous Q12H       lactated ringers 75 mL/hr Last Rate: 75 mL/hr (09/30/19 0923)       Active Hospital Problems    Diagnosis  POA   • Acute respiratory failure (CMS/HCC) [J96.00]  Yes      Resolved Hospital Problems   No resolved problems to display.   EKG is a sinus tachycardia there is diffuse T wave changes particularly in the inferior lateral leads there is T wave inversion in V2 3 and little V4 there may be a little ST elevation as well.      Assessment/Plan     11. Non-ST elevation MI patient did present with chest pain and shortness of breath.  Discussed with Dr. Boston yesterday.  Echocardiogram is consistent with a Takotsubo cardiomyopathy her EF is down to about 20% to 25%.  Her EKG has ischemic looking changes this morning I have a call out to cardiology.  12. Chest pain the pain is right lower anterior lateral chest worse with respiration and movement certainly sounds atypical for cardiac.   She has an infiltrate in that region and I suspect this is more related to pneumonia.  CT yesterday just showed a couple little peripheral areas of infiltrate in that right base  13. Pneumonia right lung base continue antibiotics since he was recently in the hospital she is being treated for a healthcare associated pneumonia.  Unfortunately does not appear that blood cultures were obtained before antibiotics.  The MRSA swab was negative vancomycin discontinued  14. Acute exacerbation COPD continue steroids . and bronchodilators.  Check alpha 1 antitrypsin  15. Acute hypoxemic and hypercapnic respiratory failure improving wean O2 and monitor CO2.  She still is working quite hard to breathe.    16. Tobacco abuse she needs to permanently remain off of tobacco.  17. Protein calorie malnutrition pretty severe  18. Hyperglycemia this may be steroid related although her blood sugar s were elevated when she came in, hemoglobin A1c normal so this is probably all stress response.  19. Hypotension resolved.      Patient remains critically ill I am very concerned unfortunately I am afraid there is going to be little that we can do with her combined heart and lung disease.    Plan for disposition:    Donald Ryder MD  10/01/19  7:27 AM    Time: Critical care time 55 minutes minutes thus far today      Electronically signed by Donald Ryder MD at 10/01/19 0904     Donald Ryder MD at 09/30/19 0703                   LOS: 0 days   Patient Care Team:  MARILYN Fernandez MD as PCP - General (General Practice)  Beulah Joshi APRN as Nurse Practitioner (Neurology)    Subjective     Patient reports that she is feeling better and breathing better.  Chest pain is better she is still having some worse with deep breath and movement in the right anterior lateral lower chest.  She has had cough for the last couple of days but not producing any sputum.  She is not aware of fevers or chills she has had some sweats.  She  has been using nebulizer albuterol at home she also apparently did not stop Spiriva when she started Bevespi and getting both at home.  She is unaware of ever having had any alpha-1 antitrypsin testing there is no family history of lung disease she is a smoker.  She apparently has been trying to quit the last few weeks.    Review of Systems:          Objective     Vital Signs  Vital Sign Min/Max for last 24 hours  Temp  Min: 98.5 °F (36.9 °C)  Max: 98.5 °F (36.9 °C)   BP  Min: 82/67  Max: 111/53   Pulse  Min: 96  Max: 142   Resp  Min: 20  Max: 22   SpO2  Min: 82 %  Max: 99 %   Flow (L/min)  Min: 10  Max: 10   Weight  Min: 42.3 kg (93 lb 4.1 oz)  Max: 49.2 kg (108 lb 8 oz)        Ventilator/Non-Invasive Ventilation Settings (From admission, onward)    Start     Ordered    09/30/19 0227  NIPPV (CPAP or BIPAP)  Until Discontinued     Question Answer Comment   Indication: Acute Respiratory Failure    Type: BIPAP    NIPPV Mask Interface: Per Patient Preference    IPAP 20    EPAP 10    Titrate for SPO2 90%        09/30/19 0226    09/30/19 0040  NIPPV (CPAP or BIPAP)  Until Discontinued     Question Answer Comment   Type: BIPAP    NIPPV Mask Interface: Per Patient Preference        09/30/19 0039                       Body mass index is 16.01 kg/m².  I/O last 3 completed shifts:  In: 265 [I.V.:197; IV Piggyback:68]  Out: -   No intake/output data recorded.        Physical Exam:  General Appearance: Well-developed white female she is currently resting in bed she is using accessory muscles for respiration she is currently on nebulizer treatment and off noninvasive ventilation.  She really does not like noninvasive ventilation.  Her oxygen saturations are 97%.  We will get capnography.  Eyes: Conjunctiva are clear and anicteric, pupils are equal  ENT: Mucous membranes are little dry nasal septum midline Mallampati type I airway  Neck: No adenopathy or thyromegaly no jugular venous distention trachea midline  Lungs: She does  not move much air no wheezes rales or rhonchi no dullness on percussion she is very much barrel chested  Cardiac: Tachycardic heart rates right around 100 regular rhythm no murmur  Abdomen: She is using abdominal muscles some for respiration there is no palpable hepatosplenomegaly or masses  : Not examined  Musculoskeletal: She does not have a whole lot of muscle mass in all she has no palpable cords or calf tenderness no pain with dorsiflexion of the feet  Skin: No jaundice no petechiae skin is warm and dry  Neuro: She is alert and oriented she is cooperative she is following commands moving all extremities  Extremities/P Vascular: No clubbing no cyanosis no edema palpable radial dorsalis pedis pulses bilaterally  MSE: Anxious       Labs:  Results from last 7 days   Lab Units 09/30/19  0017   GLUCOSE mg/dL 340*   SODIUM mmol/L 135*   POTASSIUM mmol/L 4.4   CO2 mmol/L 25.7   CHLORIDE mmol/L 96*   ANION GAP mmol/L 13.3   CREATININE mg/dL 0.80   BUN mg/dL 17   BUN / CREAT RATIO  21.3   CALCIUM mg/dL 9.0   EGFR IF NONAFRICN AM mL/min/1.73 74   ALK PHOS U/L 73   TOTAL PROTEIN g/dL 7.0   ALT (SGPT) U/L 22   AST (SGOT) U/L 27   BILIRUBIN mg/dL 0.3   ALBUMIN g/dL 4.50   GLOBULIN gm/dL 2.5     Estimated Creatinine Clearance: 51.2 mL/min (by C-G formula based on SCr of 0.8 mg/dL).      Results from last 7 days   Lab Units 09/30/19  0017   WBC 10*3/mm3 12.46*   RBC 10*6/mm3 4.82   HEMOGLOBIN g/dL 14.7   HEMATOCRIT % 46.2   MCV fL 95.9   MCH pg 30.5   MCHC g/dL 31.8   RDW % 12.6   RDW-SD fl 45.2   MPV fL 10.4   PLATELETS 10*3/mm3 340   NEUTROS ABS 10*3/mm3 6.79   EOS ABS 10*3/mm3 0.66*   BASOS ABS 10*3/mm3 0.05   NRBC /100 WBC 0.4*     Results from last 7 days   Lab Units 09/30/19  0137   PH, ARTERIAL pH units 7.291*   PO2 ART mm Hg 239.2*   PCO2, ARTERIAL mm Hg 48.0*   HCO3 ART mmol/L 23.1     Results from last 7 days   Lab Units 09/30/19  0544 09/30/19  0017   TROPONIN T ng/mL 0.335* 0.118*     Results from last 7 days    Lab Units 09/30/19  0017   PROBNP pg/mL 74.6                 apixaban 5 mg Oral Q12H   ipratropium-albuterol 3 mL Nebulization Once   ipratropium-albuterol 3 mL Nebulization 4x Daily - RT   methylPREDNISolone sodium succinate 40 mg Intravenous Q8H   piperacillin-tazobactam 3.375 g Intravenous Q8H   sodium chloride 10 mL Intravenous Q12H       Pharmacy to dose vancomycin        Diagnostics:  Xr Chest 2 View    Result Date: 9/10/2019  PA AND LATERAL CHEST RADIOGRAPH  HISTORY: Shortness of air since Friday  COMPARISON: 09/24/2018  FINDINGS: Heart size is within normal limits. Cardiac loop recorder is noted. There are advanced background emphysematous changes. No pneumothorax or pleural effusion is seen. No definite acute infiltrates are identified.      Background emphysematous changes.  This report was finalized on 9/10/2019 4:32 AM by Dr. Faby Quiros M.D.      Xr Chest 1 View    Result Date: 9/30/2019  PORTABLE CHEST RADIOGRAPH  HISTORY: Shortness of air  COMPARISON: 09/24/2018  FINDINGS: Heart size is within normal limits. Advanced background emphysematous changes are seen. No definite pneumothorax is identified. Patient does appear to have old anterior cues and some lateral right basilar infiltrate. No pleural effusion is seen.      Suspected right basilar infiltrate. Correlation with any evidence of pneumonia is recommended.  This report was finalized on 9/30/2019 1:07 AM by Dr. Faby Quiros M.D.      Results for orders placed during the hospital encounter of 09/24/18   Adult Transesophageal Echo (ZARINA) W/ Cont if Necessary Per Protocol    Narrative · Left ventricular systolic function is normal.  · Essentially Normal ZARINA          EKG reviewed looks like some old anterior cues and some nonspecific lateral T wave changes that based on report I am having trouble getting up the 6/3/2019 EKG to directly compare  Chest x-ray reviewed and compared to one from earlier this month and certainly looks like she  has a new right basilar infiltrate    Active Hospital Problems    Diagnosis  POA   • Acute respiratory failure (CMS/[facility]) [J96.00]  Yes      Resolved Hospital Problems   No resolved problems to display.         Assessment/Plan     20. Non-ST elevation MI patient did present with chest pain and shortness of breath.  Cardiology has been consulted.  She does have a history of NST elevation MI with normal coronaries at catheterization last year was felt to be Takotsubo cardiomyopathy.  We may want to check an echocardiogram again but she follows regularly with Dr. Boston I will defer to her expertise  21. Chest pain the pain is right lower anterior lateral chest worse with respiration and movement certainly sounds atypical for cardiac.  She has an infiltrate in that region and I suspect this is more related to pneumonia.  One would always consider pulmonary embolism the patient is however fully anticoagulated making this much less likely.  22. Pneumonia right lung base continue antibiotics since he was recently in the hospital she is being treated for a healthcare associated pneumonia.  Unfortunately does not appear that blood cultures were obtained before antibiotics.  I will get a nasal MRSA swab if that is negative we can discontinue vancomycin.  23. Acute exacerbation COPD continue steroids changed to p.o. and bronchodilators.  Check alpha 1 antitrypsin  24. Acute hypoxemic and hypercapnic respiratory failure improving wean O2 and monitor CO2.  She still is working quite hard to breathe.  She may need to go back on the noninvasive ventilator I am awaiting her capnography results.  25. Tobacco abuse she needs to permanently remain off of tobacco.  26. Protein calorie malnutrition pretty severe  27. Hyperglycemia this may be steroid related although her blood sugars were elevated when she came in, could be stress related but I will check a hemoglobin A1c and use sliding scale insulin while we evaluate  28. Hypotension  blood pressures a little low patient looks dry to me know she has a history of cardiomyopathy I am going to give her some gentle IV fluids and watch her closely.  Does not look like she got a lot of fluid initially.  Looks like she probably did meet sepsis protocol earlier.  I will give her a  bolus now.    Patient remains critically ill I think she has very little reserve will need to monitor her closely    Plan for disposition:    Donald Ryder MD  09/30/19  7:03 AM    Time: Critical care time 47 minutes thus far today      Electronically signed by Donald Ryder MD at 09/30/19 0745          Consult Notes      Emi Boston MD at 09/30/19 0735          [facility]  Consult Note                                                                              9/30/2019  Zainab Regalado MD    Patient Identification:  Scarlet Chakraborty:   59 y.o.  female  1960     Date of Admission:9/30/2019    CC:  Consult requested by Dr. Brown for evaluation of shortness of breath, elevated troponin.    History of Present Illness:  The patient is a 59-year-old female with history of modest alcohol use, nicotine use, COPD who presented to [facility] on September 2018 with acute respiratory failure, congestive heart failure and ST elevation myocardial infarction.  However cardiac catheterization showed an ejection fraction of 20% with normal coronary arteries.  Her echocardiogram showed an ejection fraction of 35% and she was felt to have probable Takotsubo cardiomyopathy.  Her blood pressure was too low for adequate goal-directed medical therapy and only lisinopril 2.5 mg a day was able to be utilized.  She also had severe bronchospasm from COPD exacerbation and beta-blockers were avoided.  She was subsequently dismissed home but then presented several days later with left-sided weakness, slurred speech and a fall.  She was felt to have had a embolic stroke for which she received TPA with good response.  She  had minimal residual symptoms.  A transthoracic echocardiogram revealed normal systolic function with grade 1 diastolic dysfunction.  A transesophageal echocardiogram was unremarkable including a negative saline study.  She subsequently underwent Linq placement.  She was last seen in the office in June 2019 at which time she was feeling well and was attempting smoking cessation with Chantix.  She had discontinued lisinopril on her own and therefore this was subsequently resumed.  Linq device has not shown any arrhythmia as of August 26, 2019.    She was now admitted through the emergency room with worsening shortness of breath and COPD exacerbation.  She also complained of pleuritic right-sided chest pain with cough and inhalation.  She was placed on BiPAP in the emergency room due to respiratory distress.  With the use of steroids and breathing treatments her dyspnea has improved.  However troponin is elevated.  CT scan of the chest has been ordered for subtle right lower lobe infiltrate.  Unfortunately she continues to smoke.  EKG showed sinus tachycardia with Q waves in the anterior leads with minimal ST elevation possibly tachycardia related.  Repeat EKG showed resolution of sinus tachycardia with Q waves in the anterior leads without residual ST elevation.  Nonspecific ST-T wave changes are present.  She is currently resting comfortably on nasal cannula.    Past Medical History:  Past Medical History:   Diagnosis Date   • Asthma    • Cerebrovascular accident (CVA) due to thrombosis of right middle cerebral artery (CMS/HCC)    • COPD (chronic obstructive pulmonary disease) (CMS/HCC)    • Nonischemic cardiomyopathy (CMS/HCC)    • NSTEMI (non-ST elevated myocardial infarction) (CMS/HCC)    • Stress-induced cardiomyopathy    • Stroke (CMS/HCC)    • Takotsubo cardiomyopathy    • Tobacco abuse        Past Surgical History:  Past Surgical History:   Procedure Laterality Date   • CARDIAC CATHETERIZATION N/A 9/13/2018     Procedure: Left Heart Cath and cors;  Surgeon: Gabino Dozier MD;  Location: [facility] CATH INVASIVE LOCATION;  Service: Cardiology   • CARDIAC CATHETERIZATION N/A 9/13/2018    Procedure: Left ventriculography;  Surgeon: Gabino Dozier MD;  Location: [facility] CATH INVASIVE LOCATION;  Service: Cardiology   • CARDIAC ELECTROPHYSIOLOGY PROCEDURE N/A 9/27/2018    Procedure: Loop insertion  LINQ;  Surgeon: Jose R Barker MD;  Location: [facility] CATH INVASIVE LOCATION;  Service: Cardiovascular       Allergies:  No Known Allergies    Home Meds:  Medications Prior to Admission   Medication Sig Dispense Refill Last Dose   • albuterol (PROVENTIL) (2.5 MG/3ML) 0.083% nebulizer solution Take 2.5 mg by nebulization 4 Times a Day for 30 days. 360 mL 0 Taking   • albuterol sulfate  (90 Base) MCG/ACT inhaler Inhale 2 puffs Every 4 (Four) Hours As Needed for Wheezing.   Taking   • apixaban (ELIQUIS) 5 MG tablet tablet Take 1 tablet by mouth Every 12 (Twelve) Hours. 60 tablet 3 Taking   • lisinopril (PRINIVIL,ZESTRIL) 2.5 MG tablet Take 1 tablet by mouth Daily. 90 tablet 3 Taking   • Tiotropium Bromide Monohydrate (SPIRIVA RESPIMAT) 1.25 MCG/ACT aerosol solution inhaler Inhale 2 puffs Daily. 4 g 2 Taking       Current Meds  Scheduled Meds:  apixaban 5 mg Oral Q12H   ipratropium-albuterol 3 mL Nebulization Once   ipratropium-albuterol 3 mL Nebulization 4x Daily - RT   piperacillin-tazobactam 3.375 g Intravenous Q8H   predniSONE 40 mg Oral Daily With Breakfast   sodium chloride 10 mL Intravenous Q12H     Continuous Infusions:  Pharmacy to dose vancomycin      Social History:   Social History     Socioeconomic History   • Marital status: Single     Spouse name: Not on file   • Number of children: Not on file   • Years of education: Not on file   • Highest education level: Not on file   Tobacco Use   • Smoking status: Light Tobacco Smoker     Packs/day: 0.50     Years: 15.00     Pack years: 7.50     Types: Cigarettes   •  "Smokeless tobacco: Never Used   • Tobacco comment: currently 3-4 cigs daily   Substance and Sexual Activity   • Alcohol use: Yes     Comment: occassional   • Drug use: No   • Sexual activity: Defer       Family History:  Family History   Problem Relation Age of Onset   • Cancer Sister        REVIEW OF SYSTEMS:   CONSTITUTIONAL: No weight loss, fever, chill  HEENT: Eyes: No visual loss, blurred vision, double vision or yellow sclerae. Ears, Nose, Throat: No hearing loss, sneezing, congestion, runny nose or sore throat.   SKIN: No rash or itching.     RESPIRATORY: No hemoptysis  GASTROINTESTINAL: No anorexia, nausea, vomiting or diarrhea. No abdominal pain, bright red blood per rectum or melena.  GENITOURINARY: No burning on urination, hematuria or increased frequency.  NEUROLOGICAL: No headache, dizziness, syncope, paralysis, ataxia, numbness or tingling in the extremities. No change in bowel or bladder control.   MUSCULOSKELETAL: No muscle, back pain, joint pain or stiffness.   HEMATOLOGIC: No anemia, bleeding or bruising.   LYMPHATICS: No enlarged nodes. No history of splenectomy.   PSYCHIATRIC: No history of depression, anxiety, hallucinations.   ENDOCRINOLOGIC: No reports of sweating, cold or heat intolerance. No polyuria or polydipsia.     Physical Exam    BP (!) 81/53   Pulse 105   Temp 97.5 °F (36.4 °C) (Oral)   Resp 20   Ht 162.6 cm (64\")   Wt 42.3 kg (93 lb 4.1 oz)   SpO2 99%   BMI 16.01 kg/m²      General Appearance Well developed, cooperative and well nourished and no acute distress   Head Normocephalic, without abnormality, atraumatic   Ears Ears appear intact with no abnormalities noted   Throat No oral lesions, no thrush, oral mucosa moist   Neck No adenopathy, supple, trachea midline, no thyromegaly, no carotid bruit, no JVD   Back No skin lesions, erythema or scars, no tenderness to percussion or palpation,range of motion is normal   Lungs Distant breath sounds with few rhonchi right " base,respirations regular, even and unlabored   Heart Regular rhythm and normal rate, normal S1 and S2, no murmur, no gallop, no rub, no click   Chest wall No abnormalities observed   Abdomen Normal bowel sounds, no masses, no hepatomegaly,    Extremities Moves all extremities well, no edema, no cyanosis, no redness   Pulses Pulses palpable and equal bilaterally. Normal radial, carotid, femoral, dorsalis pedis and posterior tibial pulses bilaterally. Normal abdominal aorta   Skin No bleeding, bruising or rash   Psychiatric Alert and oriented x 3, normal mood and affect    Results from last 7 days   Lab Units 09/30/19  0017   SODIUM mmol/L 135*   POTASSIUM mmol/L 4.4   CHLORIDE mmol/L 96*   CO2 mmol/L 25.7   BUN mg/dL 17   CREATININE mg/dL 0.80   CALCIUM mg/dL 9.0   BILIRUBIN mg/dL 0.3   ALK PHOS U/L 73   ALT (SGPT) U/L 22   AST (SGOT) U/L 27   GLUCOSE mg/dL 340*     Results from last 7 days   Lab Units 09/30/19  0544 09/30/19  0017   TROPONIN T ng/mL 0.335* 0.118*     Results from last 7 days   Lab Units 09/30/19  0017   WBC 10*3/mm3 12.46*   HEMOGLOBIN g/dL 14.7   HEMATOCRIT % 46.2   PLATELETS 10*3/mm3 340     I personally viewed and interpreted the patient's EKG/Telemetry data    Assessment and Plan  1.  Elevated troponin likely demand related with history of known Takotsubo and negative cardiac cath a year ago.  We will try to treat medically though with her tenuous pulmonary status we have avoided beta-blockers in the past and will continue to do so especially as ejection fraction had resolved with out beta-blocker therapy.  We will check an echocardiogram and repeat EKG and troponin.  Add aspirin.  2.  COPD exacerbation with possible pneumonia and atypical chest pain  3.  History of transient Takotsubo cardiomyopathy.  Blood pressure was low and she was given IV fluids overnight.  4.  Hyperglycemia  5.  Tobacco abuse  6.  Malnutrition  7.  History of embolic stroke in the setting of cardiomyopathy.  Linq device  in place without arrhythmia.  This is felt to be due to probable LV thrombus at the time and she has been on Eliquis.  We had anticipated discontinue Eliquis at some point but with the current events we will continue for now and check an echocardiogram.    I have reviewed HPI and ROS above.    Emi Boston  9/30/2019  7:35 AM    60min spent in reviewing records, discussion and examination of the patient and discussion with other members of the patient's medical team.     Dictated utilizing Dragon dictation    Electronically signed by Emi Boston MD at 09/30/19 0826           Malia Castillo RD   Registered Dietitian   Nutrition   Consults   Cosign Needed   Date of Service:  10/01/19 1002   Creation Time:  10/01/19 1002            Cosign Needed             Malnutrition Severity Assessment     Patient Name:  Scarlet Chakraborty  YOB: 1960  MRN: [id number]  Admit Date:  9/30/2019     Patient meets criteria for : Moderate (non-severe) Malnutrition     Comments:  MSA completed.     Malnutrition Severity Assessment  Malnutrition Type: Chronic Disease - Related Malnutrition           Malnutrition Type (last 8 hours)            Malnutrition Severity Assessment      Row Name 10/01/19 0959           Malnutrition Severity Assessment     Malnutrition Type  Chronic Disease - Related Malnutrition     Row Name 10/01/19 0959           Muscle Loss     Caodaism Region  Moderate - slight depression     Acromion Bone Region  Moderate - acromion may slightly protrude     Patellar Region  Moderate - patella more prominent, less muscle definition around patella     Row Name 10/01/19 0959           Criteria Met (Must meet criteria for severity in at least 2 of these categories: M Wasting, Fat Loss, Fluid, Secondary Signs, Wt. Status, Intake)     Patient meets criteria for   Moderate (non-severe) Malnutrition             Electronically signed by:  Malia Castillo RD  10/01/19 10:02 AM                    Reza Boyle RN    Registered Nurse      Plan of Care   Signed   Date of Service:  10/01/19 1759   Creation Time:  10/01/19 1759            Signed           Problem: Patient Care Overview  Goal: Plan of Care Review    10/01/19 1755   OTHER   Outcome Summary Remains in CCU. Had a COPD exacerbation with some SVT this am. Went back to sinus tachy on own. Put back on BiPap but converted to NC by 1100. Breathing better throughout the day and seems more at ease. Using bedpan to go to the bathroom. Remains in the bed for today. Will cont to monitor.                      Sheila Ponce, RN      Case Management   Progress Notes   Signed   Date of Service:  09/30/19 1059   Creation Time:  09/30/19 1059            Signed             Discharge Planning Assessment  [facility]     Patient Name: Scarlet Chakraborty                   MRN: [id number]  Today's Date: 9/30/2019                     Admit Date: 9/30/2019                 Discharge Plan      Row Name 09/30/19 1056           Plan     Plan  Home no needs  Declines referrals     Plan Comments   CCP spoke to patient at bedside to discuss discharge planning.  CCP role explained.  Face sheet verified.  Pt emergency contact is her sister Yvonne, [phone].  Her PCP is Dr. Juani Fernandez or his NP Margaret.  Pt lives in a one story house with her son and his family.   She uses no DME to ambulate.    She is independent with ADL's.  She has no history of Home Health.  She obtains her medications from [facility]   Plan is home.  Pt declines referrals     CCP following

## 2019-10-02 NOTE — THERAPY EVALUATION
Patient Name: Scarlet Chakraborty  : 1960    MRN: [id number]                              Today's Date: 10/2/2019       Admit Date: 2019    Visit Dx:     ICD-10-CM ICD-9-CM   1. Acute respiratory failure, unspecified whether with hypoxia or hypercapnia (CMS/[facility]) J96.00 518.81   2. COPD with acute exacerbation (CMS/[facility]) J44.1 491.21   3. NSTEMI (non-ST elevated myocardial infarction) (CMS/[facility]) I21.4 410.70     Patient Active Problem List   Diagnosis   • Acute respiratory failure with hypoxia (CMS/[facility])   • Cerebrovascular accident (CVA) due to thrombosis (CMS/[facility])   • Stress-induced cardiomyopathy   • Chronic obstructive pulmonary disease with acute exacerbation (CMS/[facility])   • Tobacco abuse   • Mixed hyperlipidemia   • Hx of non-ST elevation myocardial infarction (NSTEMI)   • History of stroke   • Tobacco dependence   • COPD exacerbation (CMS/[facility])   • Breast mass   • Cerebrovascular accident (CVA) due to occlusion of left middle cerebral artery (CMS/[facility])   • Congestive heart failure (CMS/[facility])   • Chronic obstructive lung disease (CMS/[facility])   • Cardiomyopathy (CMS/[facility])   • Acute respiratory failure (CMS/[facility])     Past Medical History:   Diagnosis Date   • Asthma    • Cerebrovascular accident (CVA) due to thrombosis of right middle cerebral artery (CMS/[facility])    • COPD (chronic obstructive pulmonary disease) (CMS/[facility])    • Nonischemic cardiomyopathy (CMS/[facility])    • NSTEMI (non-ST elevated myocardial infarction) (CMS/[facility])    • Stress-induced cardiomyopathy    • Stroke (CMS/[facility])    • Takotsubo cardiomyopathy    • Tobacco abuse      Past Surgical History:   Procedure Laterality Date   • CARDIAC CATHETERIZATION N/A 2018    Procedure: Left Heart Cath and cors;  Surgeon: Gabino Dozier MD;  Location: [facility] CATH INVASIVE LOCATION;  Service: Cardiology   • CARDIAC CATHETERIZATION N/A 2018    Procedure: Left ventriculography;  Surgeon: Gabino Dozier MD;  Location: [facility] CATH INVASIVE LOCATION;  Service:  Cardiology   • CARDIAC ELECTROPHYSIOLOGY PROCEDURE N/A 9/27/2018    Procedure: Loop insertion  LINQ;  Surgeon: Jose R Barker MD;  Location: [facility] CATH INVASIVE LOCATION;  Service: Cardiovascular     General Information     Row Name 10/02/19 1314          PT Evaluation Time/Intention    Document Type  evaluation  (Pended)   -AP     Mode of Treatment  physical therapy  (Pended)   -AP     Row Name 10/02/19 1314          General Information    Patient Profile Reviewed?  yes  (Pended)   -AP     Prior Level of Function  independent:  (Pended)   -AP     Existing Precautions/Restrictions  cardiac  (Pended)   -AP     Barriers to Rehab  none identified  (Pended)   -AP     Row Name 10/02/19 1314          Relationship/Environment    Lives With  significant other  (Pended)   -AP     Row Name 10/02/19 1314          Resource/Environmental Concerns    Current Living Arrangements  home/apartment/condo  (Pended)   -AP     Row Name 10/02/19 1314          Home Main Entrance    Number of Stairs, Main Entrance  three;two  (Pended)   -AP     Row Name 10/02/19 1314          Stairs Within Home, Primary    Stairs, Within Home, Primary  stairs to basement, pt reports she doesnt use them  (Pended)   -AP     Row Name 10/02/19 1314          Cognitive Assessment/Intervention- PT/OT    Orientation Status (Cognition)  oriented x 4  (Pended)   -AP     Row Name 10/02/19 1314          Safety Issues, Functional Mobility    Impairments Affecting Function (Mobility)  endurance/activity tolerance;shortness of breath  (Pended)   -AP       User Key  (r) = Recorded By, (t) = Taken By, (c) = Cosigned By    Initials Name Provider Type    Dinah Connors, PT Student PT Student        Mobility     Row Name 10/02/19 1315          Bed Mobility Assessment/Treatment    Bed Mobility Assessment/Treatment  bed mobility (all) activities  (Pended)   -AP     [place] Level (Bed Mobility)  conditional independence  (Pended)   -AP     Row Name 10/02/19 1315           Sit-Stand Transfer    Sit-Stand [place] (Transfers)  stand by assist;1 person to manage equipment  (Pended)   -     Row Name 10/02/19 1315          Gait/Stairs Assessment/Training    Gait/Stairs Assessment/Training  gait/ambulation independence  (Pended)   -     [place] Level (Gait)  contact guard;1 person to manage equipment  (Pended)   -AP     Distance in Feet (Gait)  150  (Pended)   -AP     Deviations/Abnormal Patterns (Gait)  gait speed decreased  (Pended)   -AP       User Key  (r) = Recorded By, (t) = Taken By, (c) = Cosigned By    Initials Name Provider Type    AP Dinah Cope, PT Student PT Student        Obj/Interventions     Row Name 10/02/19 1316          General ROM    GENERAL ROM COMMENTS  WFL BLE/UE  (Pended)   -     Row Name 10/02/19 1316          MMT (Manual Muscle Testing)    General MMT Comments  No focal deficits identified.   (Pended)   -     Row Name 10/02/19 1316          Static Sitting Balance    Level of [place] (Unsupported Sitting, Static Balance)  conditional independence  (Pended)   -AP     Sitting Position (Unsupported Sitting, Static Balance)  sitting on edge of bed  (Pended)   -     Row Name 10/02/19 1316          Dynamic Sitting Balance    Level of [place], Reaches Outside Midline (Sitting, Dynamic Balance)  standby assist  (Pended)   -AP     Sitting Position, Reaches Outside Midline (Sitting, Dynamic Balance)  sitting on edge of bed  (Pended)   -     Row Name 10/02/19 1316          Static Standing Balance    Level of [place] (Supported Standing, Static Balance)  standby assist  (Pended)   -     Row Name 10/02/19 1316          Dynamic Standing Balance    Level of [place], Reaches Outside Midline (Standing, Dynamic Balance)  contact guard assist  (Pended)   -     Row Name 10/02/19 1316          Sensory Assessment/Intervention    Sensory General Assessment  no sensation deficits identified  (Pended)   -       User Key  (r) = Recorded By,  (t) = Taken By, (c) = Cosigned By    Initials Name Provider Type    AP Dinah Cope, PT Student PT Student        Goals/Plan     Row Name 10/02/19 1320          Gait Training Goal 1 (PT)    Activity/Assistive Device (Gait Training Goal 1, PT)  gait (walking locomotion)  (Pended)  with normal gait mechanics and speed  -AP     [place] Level (Gait Training Goal 1, PT)  supervision required  (Pended)   -AP     Distance (Gait Goal 1, PT)  250  (Pended)   -AP     Time Frame (Gait Training Goal 1, PT)  1 week  (Pended)   -AP       User Key  (r) = Recorded By, (t) = Taken By, (c) = Cosigned By    Initials Name Provider Type    AP Dinah Cope, PT Student PT Student        Clinical Impression     Row Name 10/02/19 1318          Pain Assessment    Additional Documentation  Pain Scale: Numbers Pre/Post-Treatment (Group)  (Pended)   -AP     Row Name 10/02/19 1318          Pain Scale: Numbers Pre/Post-Treatment    Pain Scale: Numbers, Pretreatment  0/10 - no pain  (Pended)   -AP     Row Name 10/02/19 1318          Plan of Care Review    Plan of Care Reviewed With  patient  (Pended)   -AP     Row Name 10/02/19 1318          Physical Therapy Clinical Impression    Patient/Family Goals Statement (PT Clinical Impression)  Return home at [facility] with walking, ADL completion, and returning to work.   (Pended)   -AP     Criteria for Skilled Interventions Met (PT Clinical Impression)  yes;treatment indicated  (Pended)   -AP     Rehab Potential (PT Clinical Summary)  good, to achieve stated therapy goals  (Pended)   -AP     Row Name 10/02/19 1318          Positioning and Restraints    Pre-Treatment Position  in bed  (Pended)   -AP     Post Treatment Position  chair  (Pended)   -AP     In Chair  notified nsg;sitting;call light within reach;encouraged to call for assist;with family/caregiver  (Pended)   -AP       User Key  (r) = Recorded By, (t) = Taken By, (c) = Cosigned By    Initials Name Provider Type    AP Dinah Cope, PT  Student PT Student        Outcome Measures     Row Name 10/02/19 1327          How much help from another person do you currently need...    Turning from your back to your side while in flat bed without using bedrails?  4  (Pended)   -AP     Moving from lying on back to sitting on the side of a flat bed without bedrails?  3  (Pended)   -AP     Moving to and from a bed to a chair (including a wheelchair)?  3  (Pended)   -AP     Standing up from a chair using your arms (e.g., wheelchair, bedside chair)?  3  (Pended)   -AP     Climbing 3-5 steps with a railing?  3  (Pended)   -AP     To walk in hospital room?  3  (Pended)   -AP     AM-PAC 6 Clicks Score (PT)  19  (Pended)   -AP     Row Name 10/02/19 1327          Functional Assessment    Outcome Measure Options  AM-PAC 6 Clicks Basic Mobility (PT)  (Pended)   -AP       User Key  (r) = Recorded By, (t) = Taken By, (c) = Cosigned By    Initials Name Provider Type     Dinah Cope, PT Student PT Student        Physical Therapy Education     Title: PT OT SLP Therapies (Done)     Topic: Physical Therapy (Done)     Point: Mobility training (Done)     Learning Progress Summary           Patient Acceptance, E, VU by  at 10/2/2019  1:21 PM                   Point: Home exercise program (Done)     Learning Progress Summary           Patient Acceptance, E, VU by  at 10/2/2019  1:21 PM                   Point: Body mechanics (Done)     Learning Progress Summary           Patient Acceptance, E, VU by  at 10/2/2019  1:21 PM                               User Key     Initials Effective Dates Name Provider Type Discipline     09/04/19 -  Dinah Cope, PT Student PT Student PT              PT Recommendation and Plan  Planned Therapy Interventions (PT Eval): (P) balance training, gait training, home exercise program, transfer training  Outcome Summary/Treatment Plan (PT)  Anticipated Discharge Disposition (PT): (P) home  Plan of Care Reviewed With: (P) patient  Outcome  Summary: (P) pt is 60 yo female admitted with acute respiratory failure, Non STEMI and has significant PMH of CVA, COPD, nonischemic cardiomyopathy, and tobacco abuse. She reports having been independent with mobility and ADL completion prior to hospitalization and plans to return home independently upon d/c. Today, she was able to walk approx 150 ft with CGA of therapist throughout due to slow gait speed and some minor unsteadiness consistent with immobility in bed the past few days. She completed bed mobility skills with conditional independence and exhibits functional abilities close to her reported baseline level. She will benefit from skilled therapy services to address some deficits in ambulation ability including low activity tolerance and slow gait speed due to unsteadiness. At this point, anticipated d/c will be to home with assist.      Time Calculation:   PT Charges     Row Name 10/02/19 1328             Time Calculation    Start Time  1145  (Pended)   -AP      Stop Time  1208  (Pended)   -AP      Time Calculation (min)  23 min  (Pended)   -AP      PT Received On  10/02/19  (Pended)   -AP      PT - Next Appointment  10/03/19  (Pended)   -AP      PT Goal Re-Cert Due Date  10/09/19  (Pended)   -AP         Time Calculation- PT    Total Timed Code Minutes- PT  15 minute(s)  (Pended)   -AP        User Key  (r) = Recorded By, (t) = Taken By, (c) = Cosigned By    Initials Name Provider Type    AP Dinah Cope, PT Student PT Student        Therapy Charges for Today     Code Description Service Date Service Provider Modifiers Qty    76801475781 HC PT THER PROC EA 15 MIN 10/2/2019 Dinah Cope, PT Student GP 1    68722100532 HC PT EVAL MOD COMPLEXITY 2 10/2/2019 Dinah Cope, PT Student GP 1    00711918111 HC PT THER SUPP EA 15 MIN 10/2/2019 Dinah Cope, PT Student GP 1          PT G-Codes  Outcome Measure Options: (P) AM-PAC 6 Clicks Basic Mobility (PT)  AM-PAC 6 Clicks Score (PT): (P) 19    Dinah Petty PT  Student  10/2/2019

## 2019-10-02 NOTE — CONSULTS
Adult Nutrition  Assessment/PES    Patient Name:  Scarlet Chakraborty  YOB: 1960  MRN: [id number]  Admit Date:  9/30/2019    Assessment Date:  10/2/2019    Comments:  Followed up with pt today during breakfast and encouraged good intake. Food pref's obtained.    Reason for Assessment     Row Name 10/02/19 1446          Reason for Assessment    Reason For Assessment  follow-up protocol         Nutrition/Diet History     Row Name 10/02/19 1446          Nutrition/Diet History    Typical Food/Fluid Intake  ate breakfast this am                 Nutrition Prescription Ordered     Row Name 10/02/19 1446          Nutrition Prescription PO    Common Modifiers  Cardiac;Consistent Carbohydrate         Evaluation of Received Nutrient/Fluid Intake     Row Name 10/02/19 1446          PO Evaluation    % PO Intake  50%               Problem/Interventions:    Problem 2     Row Name 10/02/19 1448          Nutrition Diagnoses Problem 2    Problem 2  Predicted Suboptimal Intake     Etiology (related to)  MNT for Treatment/Condition     Signs/Symptoms (evidenced by)  PO Intake               Intervention Goal     Row Name 10/02/19 1448          Intervention Goal    General  Maintain nutrition     PO  Tolerate PO;PO intake (%)     PO Intake %  80 %     Weight  Appropriate weight gain         Nutrition Intervention     Row Name 10/02/19 1448          Nutrition Intervention    RD/Tech Action  Follow Tx progress;Care plan reviewd;Encourage intake;Interview for preference;Supplement offered/refused           Education/Evaluation     Row Name 10/02/19 1448          Monitor/Evaluation    Monitor  Per protocol;PO intake           Electronically signed by:  Malia Castillo RD  10/02/19 2:48 PM

## 2019-10-03 LAB
GLUCOSE BLDC GLUCOMTR-MCNC: 101 MG/DL (ref 70–130)
GLUCOSE BLDC GLUCOMTR-MCNC: 141 MG/DL (ref 70–130)
GLUCOSE BLDC GLUCOMTR-MCNC: 152 MG/DL (ref 70–130)
GLUCOSE BLDC GLUCOMTR-MCNC: 96 MG/DL (ref 70–130)

## 2019-10-03 PROCEDURE — 93005 ELECTROCARDIOGRAM TRACING: CPT | Performed by: INTERNAL MEDICINE

## 2019-10-03 PROCEDURE — 94640 AIRWAY INHALATION TREATMENT: CPT

## 2019-10-03 PROCEDURE — 63710000001 PREDNISONE PER 1 MG: Performed by: INTERNAL MEDICINE

## 2019-10-03 PROCEDURE — 99232 SBSQ HOSP IP/OBS MODERATE 35: CPT | Performed by: INTERNAL MEDICINE

## 2019-10-03 PROCEDURE — 25010000002 PIPERACILLIN SOD-TAZOBACTAM PER 1 G: Performed by: INTERNAL MEDICINE

## 2019-10-03 PROCEDURE — 97110 THERAPEUTIC EXERCISES: CPT

## 2019-10-03 PROCEDURE — 94799 UNLISTED PULMONARY SVC/PX: CPT

## 2019-10-03 PROCEDURE — 82962 GLUCOSE BLOOD TEST: CPT

## 2019-10-03 PROCEDURE — 93010 ELECTROCARDIOGRAM REPORT: CPT | Performed by: INTERNAL MEDICINE

## 2019-10-03 RX ORDER — LISINOPRIL 2.5 MG/1
2.5 TABLET ORAL
Status: DISCONTINUED | OUTPATIENT
Start: 2019-10-03 | End: 2019-10-04 | Stop reason: HOSPADM

## 2019-10-03 RX ORDER — BUDESONIDE AND FORMOTEROL FUMARATE DIHYDRATE 160; 4.5 UG/1; UG/1
2 AEROSOL RESPIRATORY (INHALATION)
Status: DISCONTINUED | OUTPATIENT
Start: 2019-10-03 | End: 2019-10-04 | Stop reason: HOSPADM

## 2019-10-03 RX ORDER — PREDNISONE 20 MG/1
40 TABLET ORAL
Status: DISCONTINUED | OUTPATIENT
Start: 2019-10-03 | End: 2019-10-04 | Stop reason: HOSPADM

## 2019-10-03 RX ADMIN — BUDESONIDE AND FORMOTEROL FUMARATE DIHYDRATE 2 PUFF: 160; 4.5 AEROSOL RESPIRATORY (INHALATION) at 19:23

## 2019-10-03 RX ADMIN — BUDESONIDE AND FORMOTEROL FUMARATE DIHYDRATE 2 PUFF: 160; 4.5 AEROSOL RESPIRATORY (INHALATION) at 10:14

## 2019-10-03 RX ADMIN — LISINOPRIL 2.5 MG: 2.5 TABLET ORAL at 18:56

## 2019-10-03 RX ADMIN — TAZOBACTAM SODIUM AND PIPERACILLIN SODIUM 3.38 G: 375; 3 INJECTION, SOLUTION INTRAVENOUS at 23:47

## 2019-10-03 RX ADMIN — TAZOBACTAM SODIUM AND PIPERACILLIN SODIUM 3.38 G: 375; 3 INJECTION, SOLUTION INTRAVENOUS at 08:34

## 2019-10-03 RX ADMIN — APIXABAN 5 MG: 5 TABLET, FILM COATED ORAL at 08:34

## 2019-10-03 RX ADMIN — PREDNISONE 40 MG: 20 TABLET ORAL at 10:38

## 2019-10-03 RX ADMIN — SODIUM CHLORIDE, PRESERVATIVE FREE 10 ML: 5 INJECTION INTRAVENOUS at 10:40

## 2019-10-03 RX ADMIN — TAZOBACTAM SODIUM AND PIPERACILLIN SODIUM 3.38 G: 375; 3 INJECTION, SOLUTION INTRAVENOUS at 16:24

## 2019-10-03 RX ADMIN — SODIUM CHLORIDE, PRESERVATIVE FREE 10 ML: 5 INJECTION INTRAVENOUS at 20:31

## 2019-10-03 RX ADMIN — APIXABAN 5 MG: 5 TABLET, FILM COATED ORAL at 20:31

## 2019-10-03 RX ADMIN — TIOTROPIUM BROMIDE INHALATION SPRAY 2 PUFF: 3.12 SPRAY, METERED RESPIRATORY (INHALATION) at 10:14

## 2019-10-03 RX ADMIN — TAZOBACTAM SODIUM AND PIPERACILLIN SODIUM 3.38 G: 375; 3 INJECTION, SOLUTION INTRAVENOUS at 00:00

## 2019-10-03 NOTE — PROGRESS NOTES
LOS: 3 days   Patient Care Team:  MARILYN Fernandez MD as PCP - General (General Practice)  Beulah Joshi APRN as Nurse Practitioner (Neurology)    Subjective   Doing well she set up in a chair large part of the time yesterday she walked around the [place] once she has not had any of her attacks or tachycardias she is still gets quite dyspneic with exertion.  She has no specific complaints today no chest pain she is actually on room air and not short of breath    Review of Systems:          Objective     Vital Signs  Vital Sign Min/Max for last 24 hours  Temp  Min: 97.9 °F (36.6 °C)  Max: 98.3 °F (36.8 °C)   BP  Min: 102/57  Max: 123/70   Pulse  Min: 77  Max: 103   Resp  Min: 18  Max: 20   SpO2  Min: 91 %  Max: 100 %   Flow (L/min)  Min: 1  Max: 3   Weight  Min: 43.4 kg (95 lb 10.9 oz)  Max: 43.4 kg (95 lb 10.9 oz)        Ventilator/Non-Invasive Ventilation Settings (From admission, onward)    Start     Ordered    09/30/19 0227  NIPPV (CPAP or BIPAP)  Until Discontinued     Question Answer Comment   Indication: Acute Respiratory Failure    Type: BIPAP    NIPPV Mask Interface: Per Patient Preference    IPAP 20    EPAP 10    Titrate for SPO2 90%        09/30/19 0226    09/30/19 0040  NIPPV (CPAP or BIPAP)  Until Discontinued     Question Answer Comment   Type: BIPAP    NIPPV Mask Interface: Per Patient Preference        09/30/19 0039                       Body mass index is 16.42 kg/m².  I/O last 3 completed shifts:  In: 706.8 [P.O.:330; I.V.:182.4; IV Piggyback:194.4]  Out: 1175 [Urine:1175]  I/O this shift:  In: -   Out: 275 [Urine:275]        Physical Exam:  General Appearance: Well-developed white female sitting comfortably sitting up in bed on room air in no apparent distress  Eyes: Conjunctiva are clear and anicteric, pupils are equal  ENT: Mucous membranes are moist no exudates, nasal septum midline Mallampati type I airway  Neck: No adenopathy or thyromegaly no jugular venous distention trachea  midline  Lungs: Decreased but clear no wheezes rales rhonchi no dullness  Cardiac: Regular rate rhythm no murmur  Abdomen: Soft nontender no palpable hepatosplenomegaly or masses  : Not examined  Musculoskeletal: She does not have a whole lot of muscle mass in all she has no palpable cords or calf tenderness no pain with dorsiflexion of the feet  Skin: No jaundice no petechiae skin is warm and dry to palpation  Neuro: He is awake and alert oriented cooperative following commands grossly intact  Extremities/P Vascular: No clubbing no cyanosis no edema palpable radial dorsalis pedis pulses bilaterally  MSE: She is much more relaxed today seems appropriate today       Labs:  Results from last 7 days   Lab Units 10/02/19  0500 10/01/19  1206 09/30/19  0017   GLUCOSE mg/dL 101* 100* 340*   SODIUM mmol/L 142 140 135*   POTASSIUM mmol/L 4.4 3.7 4.4   MAGNESIUM mg/dL  --  2.3  --    CO2 mmol/L 25.7 24.5 25.7   CHLORIDE mmol/L 105 106 96*   ANION GAP mmol/L 11.3 9.5 13.3   CREATININE mg/dL 0.56* 0.63 0.80   BUN mg/dL 16 15 17   BUN / CREAT RATIO  28.6* 23.8 21.3   CALCIUM mg/dL 8.6 8.5* 9.0   EGFR IF NONAFRICN AM mL/min/1.73 111 97 74   ALK PHOS U/L  --   --  73   TOTAL PROTEIN g/dL  --   --  7.0   ALT (SGPT) U/L  --   --  22   AST (SGOT) U/L  --   --  27   BILIRUBIN mg/dL  --   --  0.3   ALBUMIN g/dL  --   --  4.50   GLOBULIN gm/dL  --   --  2.5     Estimated Creatinine Clearance: 75 mL/min (A) (by C-G formula based on SCr of 0.56 mg/dL (L)).      Results from last 7 days   Lab Units 09/30/19  0017   WBC 10*3/mm3 12.46*   RBC 10*6/mm3 4.82   HEMOGLOBIN g/dL 14.7   HEMATOCRIT % 46.2   MCV fL 95.9   MCH pg 30.5   MCHC g/dL 31.8   RDW % 12.6   RDW-SD fl 45.2   MPV fL 10.4   PLATELETS 10*3/mm3 340   NEUTROS ABS 10*3/mm3 6.79   EOS ABS 10*3/mm3 0.66*   BASOS ABS 10*3/mm3 0.05   NRBC /100 WBC 0.4*     Results from last 7 days   Lab Units 09/30/19  0137   PH, ARTERIAL pH units 7.291*   PO2 ART mm Hg 239.2*   PCO2, ARTERIAL mm Hg  48.0*   HCO3 ART mmol/L 23.1     Results from last 7 days   Lab Units 09/30/19  1112 09/30/19  0544 09/30/19  0017   TROPONIN T ng/mL 0.243* 0.335* 0.118*     Results from last 7 days   Lab Units 09/30/19  0017   PROBNP pg/mL 74.6         Results from last 7 days   Lab Units 09/30/19  0818 09/30/19  0544   LACTATE mmol/L 1.8  --    PROCALCITONIN ng/mL  --  0.15         Microbiology Results (last 10 days)     Procedure Component Value - Date/Time    MRSA Screen, PCR - Swab, Nares [[id number]]  (Normal) Collected:  09/30/19 0818    Lab Status:  Final result Specimen:  Swab from Nares Updated:  09/30/19 1115     MRSA PCR No MRSA Detected                adenosine 12 mg Intravenous Once   apixaban 5 mg Oral Q12H   budesonide-formoterol 2 puff Inhalation BID - RT   insulin lispro 0-24 Units Subcutaneous 4x Daily With Meals & Nightly   ipratropium-albuterol 3 mL Nebulization Once   methylPREDNISolone sodium succinate 20 mg Intravenous Q12H   piperacillin-tazobactam 3.375 g Intravenous Q8H   sodium chloride 10 mL Intravenous Q12H   tiotropium bromide monohydrate 2 puff Inhalation Daily - RT       lactated ringers 75 mL/hr Last Rate: Stopped (10/01/19 0830)       Diagnostics:  Xr Chest 2 View    Result Date: 9/10/2019  PA AND LATERAL CHEST RADIOGRAPH  HISTORY: Shortness of air since Friday  COMPARISON: 09/24/2018  FINDINGS: Heart size is within normal limits. Cardiac loop recorder is noted. There are advanced background emphysematous changes. No pneumothorax or pleural effusion is seen. No definite acute infiltrates are identified.      Background emphysematous changes.  This report was finalized on 9/10/2019 4:32 AM by Dr. Faby Quiros M.D.      Xr Chest 1 View    Result Date: 9/30/2019  PORTABLE CHEST RADIOGRAPH  HISTORY: Shortness of air  COMPARISON: 09/24/2018  FINDINGS: Heart size is within normal limits. Advanced background emphysematous changes are seen. No definite pneumothorax is identified. Patient does appear  to have old anterior cues and some lateral right basilar infiltrate. No pleural effusion is seen.      Suspected right basilar infiltrate. Correlation with any evidence of pneumonia is recommended.  This report was finalized on 9/30/2019 1:07 AM by Dr. Faby Quiros M.D.      Results for orders placed during the hospital encounter of 09/30/19   Adult Transthoracic Echo Complete W/ Cont if Necessary Per Protocol    Narrative · Left ventricular systolic function is severely decreased. Calculated EF   = 24.0%. Estimated EF was in agreement with the calculated EF. Estimated   EF appears to be in the range of 21 - 25%. Normal left ventricular wall   thickness noted. Wall motion abnormalities consistent with recurrent   Takotsubo cardiomyopathy The left ventricular cavity is mildly dilated.   Left ventricular diastolic function was indeterminate. There is no   evidence of a left ventricular thrombus present.  · Extensive wall motion abnormalities as below, consistent with Takotsubo   cardiomyopathy  · Mild redundancy of the entry mitral valve leaflet without torn chordae.   Mild mitral valve regurgitation is present.  · Trace tricuspid valve regurgitation is present. Estimated right   ventricular systolic pressure from tricuspid regurgitation is normal (<35   mmHg). Calculated right ventricular systolic pressure from tricuspid   regurgitation is 21 mmHg.              Active Hospital Problems    Diagnosis  POA   • Acute respiratory failure (CMS/HCC) [J96.00]  Yes      Resolved Hospital Problems   No resolved problems to display.   EKG unchanged from yesterday      Assessment/Plan     1. Non-ST elevation MI patient did present with chest pain and shortness of breath.    Echocardiogram is consistent with a Takotsubo cardiomyopathy her EF is down to about 20% to 25%.  Her EKG still has ischemic-looking changes.  She had a cardiac cath with her similar event last year and coronaries were clear.  Not likely acute coronary  syndrome , this is likely another Takotsubo cardiomyopathy.  2. SVT brief run 2 days ago she has not had recurrence.  3. Chest pain the pain is right lower anterior lateral chest worse with respiration and movement Josh probably secondary to pneumonia in that area has resolved  4. Pneumonia right lung base continue antibiotics since he was recently in the hospital she is being treated for a healthcare associated pneumonia.  Unfortunately does not appear that blood cultures were obtained before antibiotics.  The MRSA swab was negative vancomycin discontinued.  Continue Zosyn  5. Acute exacerbation COPD continue steroids . and bronchodilators not using albuterol felt to be possibly because of her SVT..  Check alpha 1 antitrypsin.  I am going to try and transition her to long-acting  6. Acute hypoxemic and hypercapnic respiratory failure improving wean O2 and monitor CO2.  She looks a lot better today.  7. Tobacco abuse she needs to permanently remain off of tobacco and we discussed again today.  8. Protein calorie malnutrition pretty severe  9. Hyperglycemia this may be steroid related although her blood sugar s were elevated when she came in, hemoglobin A1c normal so this is probably all stress response.  10. Hypotension resolved.      .    Plan for disposition: Transfer to floor    Donald Ryder MD  10/03/19  7:45 AM    Time: I have spent about 38 minutes on patient's care today including transfer etc.

## 2019-10-03 NOTE — PROGRESS NOTES
[facility]  Progress note: 10/3/2019    Patient Identification:  Name:Scarlet Chakraborty  Age:59 y.o.  Sex: female  :  1960  MRN: [id number]           CC:  Myopathy, non-ST elevation microinfarction, atrial fibrillation, congestive heart failure    Interval history:  Vitals stable.  Looks remarkably much better talking on phone without oxygen no dyspnea.    Vital Signs:   Temp:  [97.7 °F (36.5 °C)-98.3 °F (36.8 °C)] 98.1 °F (36.7 °C)  Heart Rate:  [] 84  Resp:  [16-18] 16  BP: ()/(57-80) 102/66    Intake/Output Summary (Last 24 hours) at 10/3/2019 1612  Last data filed at 10/3/2019 0720  Gross per 24 hour   Intake 237.8 ml   Output 850 ml   Net -612.2 ml       Physical Examination:    General Appearance No acute distress   Neck No adenopathy, supple, trachea midline, no thyromegaly, no carotid bruit, no JVD   Lungs  bilateral expiratory wheeze.,respirations regular, even and unlabored   Heart Regular rhythm and normal rate, normal S1 and S2, no murmur, no gallop, no rub, no click   Chest wall No abnormalities observed   Abdomen Normal bowel sounds, no masses, no hepatomegaly, soft   Extremities Moves all extremities well, no edema, no cyanosis, no redness   Neurological Alert and oriented x 3     Lab Review:  Personally reviewed the labs, radiology imaging and other cardiac procedures. Results from last 7 days   Lab Units 10/02/19  0500  19  0017   SODIUM mmol/L 142   < > 135*   POTASSIUM mmol/L 4.4   < > 4.4   CHLORIDE mmol/L 105   < > 96*   CO2 mmol/L 25.7   < > 25.7   BUN mg/dL 16   < > 17   CREATININE mg/dL 0.56*   < > 0.80   CALCIUM mg/dL 8.6   < > 9.0   BILIRUBIN mg/dL  --   --  0.3   ALK PHOS U/L  --   --  73   ALT (SGPT) U/L  --   --  22   AST (SGOT) U/L  --   --  27   GLUCOSE mg/dL 101*   < > 340*    < > = values in this interval not displayed.     Results from last 7 days   Lab Units 19  1112 19  0544 19  0017   TROPONIN T ng/mL 0.243* 0.335* 0.118*      Results from last 7 days   Lab Units 09/30/19  0017   WBC 10*3/mm3 12.46*   HEMOGLOBIN g/dL 14.7   HEMATOCRIT % 46.2   PLATELETS 10*3/mm3 340     Medication Review:   Meds reviewed  Scheduled Meds:  apixaban 5 mg Oral Q12H   budesonide-formoterol 2 puff Inhalation BID - RT   insulin lispro 0-24 Units Subcutaneous 4x Daily With Meals & Nightly   ipratropium-albuterol 3 mL Nebulization Once   piperacillin-tazobactam 3.375 g Intravenous Q8H   predniSONE 40 mg Oral Daily With Breakfast   sodium chloride 10 mL Intravenous Q12H   tiotropium bromide monohydrate 2 puff Inhalation Daily - RT     I personally viewed and interpreted the patient's EKG/Telemetry data    Assessment and Plan  1.    Recurrent cardiomyopathy likelyTakotsubo cardiomyopathy once more.  Had negative cath a year ago.   Add low-dose lisinopril.  2.  Paroxysmal supraventricular tachycardia.  Terminated on its own before adenosine was given today.    3.  Prolonged QTc interval.   QTC improving with some improvement in ST-T wave changes.  4.  COPD exacerbation with possible pneumonia and atypical chest pain.  On steroids with now side effects from inhalers.    6.  Hyperglycemia  7.  Tobacco abuse  8.  Malnutrition  9.  History of embolic stroke in the setting of cardiomyopathy.  Linq device in place without arrhythmia.  This is felt to be due to probable LV thrombus at the time and she has been on Eliquis.      Emi Boston  10/3/04720:12 PM  25min spent in reviewing records, discussion and examination of the patient and discussion with other members of the patient's medical team.     Dictated utilizing Dragon dictation

## 2019-10-03 NOTE — PLAN OF CARE
Problem: Patient Care Overview  Goal: Plan of Care Review   10/03/19 1534   Coping/Psychosocial   Plan of Care Reviewed With patient   OTHER   Outcome Summary pt demonstrates excellent improvement through therapy POC, walked 500 ft with supervision this afternoon and performed 4 to 4+/5 on MMT assessment of gross LE strength bilaterally with no significant assymetries. She has acheived baseline level of function and appropriate for therapy d/c at this time. Anticipate returning home upon d/c from hospital, significant other lives with her and able to provide help if needed.

## 2019-10-03 NOTE — PLAN OF CARE
Problem: Patient Care Overview  Goal: Plan of Care Review  Outcome: Ongoing (interventions implemented as appropriate)   10/03/19 1439   Coping/Psychosocial   Plan of Care Reviewed With patient   OTHER   Outcome Summary patient remains in ccu overnight. NSR, vitals stable on room air. No SVT. Up to chair and BSC. Otherwise stable. WIll continue to monitor.   Plan of Care Review   Progress improving       Problem: Chronic Obstructive Pulmonary Disease (Adult)  Goal: Signs and Symptoms of Listed Potential Problems Will be Absent, Minimized or Managed (Chronic Obstructive Pulmonary Disease)  Outcome: Ongoing (interventions implemented as appropriate)      Problem: Fall Risk (Adult)  Goal: Absence of Fall  Outcome: Ongoing (interventions implemented as appropriate)      Problem: Sepsis/Septic Shock (Adult)  Goal: Signs and Symptoms of Listed Potential Problems Will be Absent, Minimized or Managed (Sepsis/Septic Shock)  Outcome: Ongoing (interventions implemented as appropriate)      Problem: Nutrition, Imbalanced: Inadequate Oral Intake (Adult)  Goal: Identify Related Risk Factors and Signs and Symptoms  Outcome: Outcome(s) achieved Date Met: 10/03/19    Goal: Improved Oral Intake  Outcome: Ongoing (interventions implemented as appropriate)    Goal: Prevent Further Weight Loss  Outcome: Ongoing (interventions implemented as appropriate)

## 2019-10-03 NOTE — THERAPY DISCHARGE NOTE
Patient Name: Scarlet Chakraborty  : 1960    MRN: [id number]                              Today's Date: 10/3/2019       Admit Date: 2019    Visit Dx:     ICD-10-CM ICD-9-CM   1. Acute respiratory failure, unspecified whether with hypoxia or hypercapnia (CMS/Formerly [facility]) J96.00 518.81   2. COPD with acute exacerbation (CMS/Formerly [facility]) J44.1 491.21   3. NSTEMI (non-ST elevated myocardial infarction) (CMS/Formerly [facility]) I21.4 410.70     Patient Active Problem List   Diagnosis   • Acute respiratory failure with hypoxia (CMS/Formerly [facility])   • Cerebrovascular accident (CVA) due to thrombosis (CMS/HCC)   • Stress-induced cardiomyopathy   • Chronic obstructive pulmonary disease with acute exacerbation (CMS/Formerly [facility])   • Tobacco abuse   • Mixed hyperlipidemia   • Hx of non-ST elevation myocardial infarction (NSTEMI)   • History of stroke   • Tobacco dependence   • COPD exacerbation (CMS/[facility])   • Breast mass   • Cerebrovascular accident (CVA) due to occlusion of left middle cerebral artery (CMS/Formerly [facility])   • Congestive heart failure (CMS/[facility]ly [facility])   • Chronic obstructive lung disease (CMS/Formerly [facility])   • Cardiomyopathy (CMS/[facility]ly [facility])   • Acute respiratory failure (CMS/Formerly [facility])     Past Medical History:   Diagnosis Date   • Asthma    • Cerebrovascular accident (CVA) due to thrombosis of right middle cerebral artery (CMS/[facility])    • COPD (chronic obstructive pulmonary disease) (CMS/[facility]ly [facility])    • Nonischemic cardiomyopathy (CMS/[facility])    • NSTEMI (non-ST elevated myocardial infarction) (CMS/Formerly [facility])    • Stress-induced cardiomyopathy    • Stroke (CMS/Formerly [facility])    • Takotsubo cardiomyopathy    • Tobacco abuse      Past Surgical History:   Procedure Laterality Date   • CARDIAC CATHETERIZATION N/A 2018    Procedure: Left Heart Cath and cors;  Surgeon: Gabino Dozier MD;  Location: [facility] CATH INVASIVE LOCATION;  Service: Cardiology   • CARDIAC CATHETERIZATION N/A 2018    Procedure: Left ventriculography;  Surgeon: Gabino Dozier MD;  [facility];  Service:  Cardiology   • CARDIAC ELECTROPHYSIOLOGY PROCEDURE N/A 9/27/2018    Procedure: Loop insertion  LINQ;  Surgeon: Jose R Barker MD;  Location: [facility] INVASIVE LOCATION;  Service: Cardiovascular     General Information     Row Name 10/03/19 1525          PT Evaluation Time/Intention    Document Type  discharge treatment  -EM (r) AP (t) EM (c)     Mode of Treatment  physical therapy  -EM (r) AP (t) EM (c)     Row Name 10/03/19 1525          General Information    Patient Profile Reviewed?  yes  -EM (r) AP (t) EM (c)     Existing Precautions/Restrictions  cardiac  -EM (r) AP (t) EM (c)     Barriers to Rehab  none identified  -EM (r) AP (t) EM (c)     Row Name 10/03/19 1525          Cognitive Assessment/Intervention- PT/OT    Orientation Status (Cognition)  oriented x 4  -EM (r) AP (t) EM (c)     Row Name 10/03/19 1525          Safety Issues, Functional Mobility    Impairments Affecting Function (Mobility)  endurance/activity tolerance;shortness of breath  -EM (r) AP (t) EM (c)       User Key  (r) = Recorded By, (t) = Taken By, (c) = Cosigned By    Initials Name Provider Type    EM Radha Fay PT Physical Therapist    Dinah Connors, PT Student PT Student        Mobility     Row Name 10/03/19 1525          Sit-Stand Transfer    Sit-Stand [place] (Transfers)  conditional independence  -EM (r) AP (t) EM (c)     Row Name 10/03/19 1525          Gait/Stairs Assessment/Training    Gait/Stairs Assessment/Training  gait/ambulation independence  -EM (r) AP (t) EM (c)     [place] Level (Gait)  supervision  -EM (r) AP (t) EM (c)     Distance in Feet (Gait)  500  -EM (r) AP (t) EM (c)     Deviations/Abnormal Patterns (Gait)  gait speed decreased  -EM (r) AP (t) EM (c)       User Key  (r) = Recorded By, (t) = Taken By, (c) = Cosigned By    Initials Name Provider Type    Radha Chase PT Physical Therapist    Dinah Connors, PT Student PT Student        Obj/Interventions     Row Name 10/03/19  1529          MMT (Manual Muscle Testing)    General MMT Comments  re-assessed today, approx 4 to 4+/5 grossly.   -EM (r) AP (t) EM (c)     Row Name 10/03/19 1529          Therapeutic Exercise    Lower Extremity (Therapeutic Exercise)  LAQ (long arc quad), bilateral;marching while seated  -EM (r) AP (t) EM (c)     Exercise Type (Therapeutic Exercise)  AROM (active range of motion)  -EM (r) AP (t) EM (c)     Position (Therapeutic Exercise)  seated  -EM (r) AP (t) EM (c)     Sets/Reps (Therapeutic Exercise)  1/5 to assess strength  -EM (r) AP (t) EM (c)     Row Name 10/03/19 1529          Static Sitting Balance    Level of [place] (Unsupported Sitting, Static Balance)  independent  -EM (r) AP (t) EM (c)     Row Name 10/03/19 1529          Dynamic Sitting Balance    Level of [place], Reaches Outside Midline (Sitting, Dynamic Balance)  independent  -EM (r) AP (t) EM (c)     Row Name 10/03/19 1529          Static Standing Balance    Level of [place] (Supported Standing, Static Balance)  conditional independence  -EM (r) AP (t) EM (c)     Row Name 10/03/19 1529          Dynamic Standing Balance    Level of [place], Reaches Outside Midline (Standing, Dynamic Balance)  supervision  -EM (r) AP (t) EM (c)     Row Name 10/03/19 1529          Sensory Assessment/Intervention    Sensory General Assessment  no sensation deficits identified  -EM (r) AP (t) EM (c)       User Key  (r) = Recorded By, (t) = Taken By, (c) = Cosigned By    Initials Name Provider Type    Radha Chase, PT Physical Therapist    Dinah Connors, PT Student PT Student        Goals/Plan     Row Name 10/03/19 1539          Gait Training Goal 1 (PT)    Progress/Outcome (Gait Training Goal 1, PT)  goal met  -EM (r) AP (t) EM (c)       User Key  (r) = Recorded By, (t) = Taken By, (c) = Cosigned By    Initials Name Provider Type    Radha Chase PT Physical Therapist    Dinah Connors, PT Student PT Student         Clinical Impression     Row Name 10/03/19 1530          Pain Scale: Numbers Pre/Post-Treatment    Pain Scale: Numbers, Pretreatment  0/10 - no pain  -EM (r) AP (t) EM (c)     Row Name 10/03/19 1530          Vital Signs    Pre Systolic BP Rehab  102  -EM (r) AP (t) EM (c)     Pre Treatment Diastolic BP  66  -EM (r) AP (t) EM (c)     Pretreatment Heart Rate (beats/min)  93  -EM (r) AP (t) EM (c)     Row Name 10/03/19 1530          Positioning and Restraints    Pre-Treatment Position  sitting in chair/recliner  -EM (r) AP (t) EM (c)     Post Treatment Position  chair  -EM (r) AP (t) EM (c)     In Chair  sitting;call light within reach;encouraged to call for assist  -EM (r) AP (t) EM (c)       User Key  (r) = Recorded By, (t) = Taken By, (c) = Cosigned By    Initials Name Provider Type    Radha Chase, PT Physical Therapist    Dinah Connors, PT Student PT Student        Outcome Measures     Row Name 10/03/19 1533          How much help from another person do you currently need...    Turning from your back to your side while in flat bed without using bedrails?  4  -EM (r) AP (t) EM (c)     Moving from lying on back to sitting on the side of a flat bed without bedrails?  4  -EM (r) AP (t) EM (c)     Moving to and from a bed to a chair (including a wheelchair)?  4  -EM (r) AP (t) EM (c)     Standing up from a chair using your arms (e.g., wheelchair, bedside chair)?  4  -EM (r) AP (t) EM (c)     Climbing 3-5 steps with a railing?  3  -EM (r) AP (t) EM (c)     To walk in hospital room?  4  -EM (r) AP (t) EM (c)     AM-PAC 6 Clicks Score (PT)  23  -EM (r) AP (t)     Row Name 10/03/19 1537          Functional Assessment    Outcome Measure Options  AM-PAC 6 Clicks Basic Mobility (PT)  -EM (r) AP (t) EM (c)       User Key  (r) = Recorded By, (t) = Taken By, (c) = Cosigned By    Initials Name Provider Type    Radha Chase, PT Physical Therapist    Dinah Connors, PT Student PT Student         Physical Therapy Education     Title: PT OT SLP Therapies (Done)     Topic: Physical Therapy (Done)     Point: Mobility training (Done)     Learning Progress Summary           Patient Acceptance, E, VU by AP at 10/3/2019  3:34 PM    Acceptance, E, VU by AP at 10/2/2019  1:21 PM                   Point: Home exercise program (Done)     Learning Progress Summary           Patient Acceptance, E, VU by AP at 10/3/2019  3:34 PM    Acceptance, E, VU by AP at 10/2/2019  1:21 PM                   Point: Body mechanics (Done)     Learning Progress Summary           Patient Acceptance, E, VU by AP at 10/3/2019  3:34 PM    Acceptance, E, VU by AP at 10/2/2019  1:21 PM                               User Key     Initials Effective Dates Name Provider Type Discipline     09/04/19 -  Dinah Cope, PT Student PT Student PT              PT Recommendation and Plan  Planned Therapy Interventions (PT Eval): balance training, gait training, home exercise program, transfer training  Outcome Summary/Treatment Plan (PT)  Anticipated Discharge Disposition (PT): home  Plan of Care Reviewed With: patient  Outcome Summary: pt demonstrates excellent improvement through therapy POC, walked 500 ft with supervision this afteroon and performed 4 to 4+/5 on MMT assessment of gross LE strength bilaterally. She has acheived baseline level of function and appropriate for therapy d/c at this time. Anticipate returning home upon d/c from hospital, significant other lives with her and able to provide help if needed.      Time Calculation:   PT Charges     Row Name 10/03/19 1537             Time Calculation    Start Time  1513  -EM (r) AP (t) EM (c)      Stop Time  1524  -EM (r) AP (t) EM (c)      Time Calculation (min)  11 min  -EM (r) AP (t)      PT Received On  10/03/19  -EM (r) AP (t) EM (c)        User Key  (r) = Recorded By, (t) = Taken By, (c) = Cosigned By    Initials Name Provider Type    Radha Chase PT Physical Therapist    SARAHI  Dinah Cope, PT Student PT Student        Therapy Charges for Today     Code Description Service Date Service Provider Modifiers Qty    05788005162 HC PT THER PROC EA 15 MIN 10/2/2019 Dinah Cope, PT Student GP 1    27340588332 HC PT EVAL MOD COMPLEXITY 2 10/2/2019 Dinah Cope, PT Student GP 1    05128313531 HC PT THER SUPP EA 15 MIN 10/2/2019 Dinah Cope, PT Student GP 1    32961305093 HC PT THER PROC EA 15 MIN 10/3/2019 Dinah Cope, PT Student GP 1          PT G-Codes  Outcome Measure Options: AM-PAC 6 Clicks Basic Mobility (PT)  AM-PAC 6 Clicks Score (PT): 23    PT Discharge Summary  Anticipated Discharge Disposition (PT): home    Dinah Cope PT Student  10/3/2019

## 2019-10-04 ENCOUNTER — TELEPHONE (OUTPATIENT)
Dept: CARDIOLOGY | Facility: CLINIC | Age: 59
End: 2019-10-04

## 2019-10-04 VITALS
TEMPERATURE: 97.6 F | WEIGHT: 95.68 LBS | DIASTOLIC BLOOD PRESSURE: 56 MMHG | RESPIRATION RATE: 16 BRPM | HEART RATE: 70 BPM | OXYGEN SATURATION: 93 % | BODY MASS INDEX: 16.33 KG/M2 | SYSTOLIC BLOOD PRESSURE: 98 MMHG | HEIGHT: 64 IN

## 2019-10-04 LAB
GLUCOSE BLDC GLUCOMTR-MCNC: 104 MG/DL (ref 70–130)
GLUCOSE BLDC GLUCOMTR-MCNC: 83 MG/DL (ref 70–130)

## 2019-10-04 PROCEDURE — 82962 GLUCOSE BLOOD TEST: CPT

## 2019-10-04 PROCEDURE — 63710000001 PREDNISONE PER 1 MG: Performed by: INTERNAL MEDICINE

## 2019-10-04 PROCEDURE — 25010000002 PIPERACILLIN SOD-TAZOBACTAM PER 1 G: Performed by: INTERNAL MEDICINE

## 2019-10-04 PROCEDURE — 94799 UNLISTED PULMONARY SVC/PX: CPT

## 2019-10-04 PROCEDURE — 99231 SBSQ HOSP IP/OBS SF/LOW 25: CPT | Performed by: NURSE PRACTITIONER

## 2019-10-04 RX ORDER — PREDNISONE 20 MG/1
40 TABLET ORAL
Qty: 4 TABLET | Refills: 0 | Status: SHIPPED | OUTPATIENT
Start: 2019-10-05 | End: 2019-10-07

## 2019-10-04 RX ORDER — AMOXICILLIN AND CLAVULANATE POTASSIUM 875; 125 MG/1; MG/1
1 TABLET, FILM COATED ORAL 2 TIMES DAILY
Qty: 6 TABLET | Refills: 0 | Status: SHIPPED | OUTPATIENT
Start: 2019-10-04 | End: 2019-10-07

## 2019-10-04 RX ORDER — BUDESONIDE AND FORMOTEROL FUMARATE DIHYDRATE 160; 4.5 UG/1; UG/1
2 AEROSOL RESPIRATORY (INHALATION)
Qty: 10.2 G | Refills: 12 | Status: SHIPPED | OUTPATIENT
Start: 2019-10-04

## 2019-10-04 RX ADMIN — PREDNISONE 40 MG: 20 TABLET ORAL at 10:02

## 2019-10-04 RX ADMIN — APIXABAN 5 MG: 5 TABLET, FILM COATED ORAL at 10:01

## 2019-10-04 RX ADMIN — SODIUM CHLORIDE, PRESERVATIVE FREE 10 ML: 5 INJECTION INTRAVENOUS at 10:03

## 2019-10-04 RX ADMIN — TIOTROPIUM BROMIDE INHALATION SPRAY 2 PUFF: 3.12 SPRAY, METERED RESPIRATORY (INHALATION) at 09:38

## 2019-10-04 RX ADMIN — BUDESONIDE AND FORMOTEROL FUMARATE DIHYDRATE 2 PUFF: 160; 4.5 AEROSOL RESPIRATORY (INHALATION) at 09:38

## 2019-10-04 RX ADMIN — LISINOPRIL 2.5 MG: 2.5 TABLET ORAL at 10:01

## 2019-10-04 RX ADMIN — TAZOBACTAM SODIUM AND PIPERACILLIN SODIUM 3.38 G: 375; 3 INJECTION, SOLUTION INTRAVENOUS at 10:00

## 2019-10-04 NOTE — PLAN OF CARE
Problem: Patient Care Overview  Goal: Plan of Care Review   10/04/19 0225   Coping/Psychosocial   Plan of Care Reviewed With patient   OTHER   Outcome Summary continue to monitor vs, labs, soa, pain, safety maintained   Plan of Care Review   Progress improving     Goal: Individualization and Mutuality  Outcome: Ongoing (interventions implemented as appropriate)   10/04/19 0225   Individualization   Patient Specific Interventions monitor oxygen       Problem: Chronic Obstructive Pulmonary Disease (Adult)  Goal: Signs and Symptoms of Listed Potential Problems Will be Absent, Minimized or Managed (Chronic Obstructive Pulmonary Disease)  Outcome: Ongoing (interventions implemented as appropriate)   10/04/19 0225   Goal/Outcome Evaluation   Problems Assessed (Chronic Obstructive Pulmonary Disease (COPD)) all   Problems Present (COPD, Bronch/Emphy) respiratory compromise       Problem: Fall Risk (Adult)  Goal: Absence of Fall  Outcome: Ongoing (interventions implemented as appropriate)   10/04/19 0225   Fall Risk (Adult)   Absence of Fall making progress toward outcome       Problem: Sepsis/Septic Shock (Adult)  Goal: Signs and Symptoms of Listed Potential Problems Will be Absent, Minimized or Managed (Sepsis/Septic Shock)  Outcome: Ongoing (interventions implemented as appropriate)   10/04/19 0225   Goal/Outcome Evaluation   Problems Assessed (Sepsis) all   Problems Present (Sepsis) situational response       Problem: Nutrition, Imbalanced: Inadequate Oral Intake (Adult)  Goal: Improved Oral Intake  Outcome: Ongoing (interventions implemented as appropriate)   10/04/19 0225   Nutrition, Imbalanced: Inadequate Oral Intake (Adult)   Improved Oral Intake making progress toward outcome     Goal: Prevent Further Weight Loss  Outcome: Ongoing (interventions implemented as appropriate)   10/04/19 0225   Nutrition, Imbalanced: Inadequate Oral Intake (Adult)   Prevent Further Weight Loss making progress toward outcome

## 2019-10-04 NOTE — PAYOR COMM NOTE
"Scarlet Chavez (59 y.o. Female)     PLEASE SEE ATTACHED CLINICAL REVIEW.   PRIMARY DOCTOR HAS NOT ROUNDED AT THIS TIME.     REF#[id number]    PLEASE CALL   OR  1246 WITH CONTINUED STAY AUTH AND DAYS.     THANK YOU    GEMINI HARRIS LPN CCP    Date of Birth Social Security Number Address Home Phone MRN    1960  [id number] [id number] [phone] [id number]    Jewish Marital Status          [place] Single       Admission Date Admission Type Admitting Provider Attending Provider Department, Room/Bed    9/30/19 Emergency Zainab Regalado MD Haller, Harold Dale, MD [address], [address]    Discharge Date Discharge Disposition Discharge Destination                       Attending Provider:  Donald Ryder MD    Allergies:  No Known Allergies    Isolation:  None   Infection:  None   Code Status:  CPR    Ht:  162.6 cm (64.02\")   Wt:  43.4 kg (95 lb 10.9 oz)    Admission Cmt:  None   Principal Problem:  None                Active Insurance as of 9/30/2019     Primary Coverage     Payor Plan Insurance Group Employer/Plan Group    [id number]     Payor Plan Address Payor Plan Phone Number Payor Plan Fax Number Effective Dates    [address] [address] [phone]  9/1/2019 - None Entered    [facility] [id number]       Subscriber Name Subscriber Birth Date Member ID       SCARLET CHAVEZ 1960 [id number]                 Emergency Contacts      (Rel.) Home Phone Work Phone Mobile Phone    Yvonne Mccollum (Sister) [phone] -- --    Don Taylor (Significant Other) -- -- [phone]            Oxygen Therapy (last day)     Date/Time   SpO2   Device (Oxygen Therapy)   Flow (L/min)   Oxygen Concentration (%)   ETCO2 (mmHg)    10/04/19 1334   --   room air   --   --   --    10/04/19 1000   --   room air   --   --   --    10/04/19 0938   93   room air   --   --   --    10/04/19 0746   91   room air   --   --   --    10/03/19 2347 "   --   room air   --   --   --    10/03/19 2342   93   room air   --   --   --    10/03/19 2028   93   room air   --   --   --    10/03/19 2027   --   room air   --   --   --    10/03/19 2003   --   room air   --   --   --    10/03/19 1923   --   room air   --   --   --    10/03/19 1400   91   room air   --   --   --    10/03/19 1300   91   --   --   --   --    10/03/19 1200   97   --   --   --   --    10/03/19 1100   93   --   --   --   --    10/03/19 1014   94   room air   --   --   --    10/03/19 1000   96   --   --   --   --    10/03/19 0900   94   --   --   --   --    10/03/19 0800   94   room air   --   --   --    10/03/19 0700   93   --   --   --   --    10/03/19 0600   94   --   --   --   --    10/03/19 0500   94   --   --   --   --    10/03/19 0400   93   room air   --   --   --    10/03/19 0302   94   --   --   --   --    10/03/19 0200   94   --   --   --   --    10/03/19 0100   93   --   --   --   --    10/03/19 0040   --   nasal cannula   1   --   --    10/03/19 0002   91   --   --   --   --    10/03/19 0000   --   room air   --   --   --              Intake & Output (last day)       10/03 0701 - 10/04 0700 10/04 0701 - 10/05 0700    P.O.      I.V. (mL/kg)      IV Piggyback  50    Total Intake(mL/kg)  50 (1.2)    Urine (mL/kg/hr) 275 (0.3)     Stool 0     Total Output 275     Net -275 +50          Urine Unmeasured Occurrence 2 x     Stool Unmeasured Occurrence 1 x 1 x        Lines, Drains & Airways    Active LDAs     Name:   Placement date:   Placement time:   Site:   Days:    Peripheral IV 09/30/19 0010 Left Antecubital   09/30/19    0010    Antecubital   4    Peripheral IV 09/30/19 0300 Distal;Right;Posterior Forearm   09/30/19    0300    Forearm   4                Orders (last 24 hrs)     Start     Ordered    10/04/19 1203  POC Glucose Once  Once      10/04/19 1149    10/04/19 0700  POC Glucose Once  Once      10/04/19 0654    10/03/19 2023  POC Glucose Once  Once      10/03/19 2020    10/03/19 1800   lisinopril (PRINIVIL,ZESTRIL) tablet 2.5 mg  Every 24 Hours Scheduled      10/03/19 1701    10/03/19 1644  POC Glucose Once  Once      10/03/19 1637    10/03/19 1615  ECG 12 Lead  Once      10/03/19 1614    10/03/19 0930  budesonide-formoterol (SYMBICORT) 160-4.5 MCG/ACT inhaler 2 puff  2 Times Daily - RT      10/03/19 0745    10/03/19 0930  tiotropium (SPIRIVA RESPIMAT) 2.5 mcg/act aerosol solution inhaler  Daily - RT      10/03/19 0745    10/03/19 0930  predniSONE (DELTASONE) tablet 40 mg  Daily With Breakfast      10/03/19 0832    09/30/19 1100  POC Glucose 4x Daily AC & at Bedtime  4 Times Daily Before Meals & at Bedtime      09/30/19 0736    09/30/19 0900  apixaban (ELIQUIS) tablet 5 mg  Every 12 Hours Scheduled      09/30/19 0226    09/30/19 0900  sodium chloride 0.9 % flush 10 mL  Every 12 Hours Scheduled      09/30/19 0226    09/30/19 0834  piperacillin-tazobactam (ZOSYN) 3.375 g in iso-osmotic dextrose 50 ml (premix)  Every 8 Hours      09/30/19 0148    09/30/19 0830  insulin lispro (humaLOG) injection 0-24 Units  4 Times Daily With Meals & Nightly      09/30/19 0736    09/30/19 0735  dextrose (GLUTOSE) oral gel 15 g  Every 15 Minutes PRN      09/30/19 0736    09/30/19 0735  dextrose (D50W) 25 g/ 50mL Intravenous Solution 25 g  Every 15 Minutes PRN      09/30/19 0736    09/30/19 0735  glucagon (human recombinant) (GLUCAGEN DIAGNOSTIC) injection 1 mg  Every 15 Minutes PRN      09/30/19 0736    09/30/19 0724  ipratropium-albuterol (DUO-NEB) nebulizer solution 3 mL  Every 4 Hours PRN      09/30/19 0724    09/30/19 0226  sodium chloride 0.9 % flush 10 mL  As Needed      09/30/19 0226    09/30/19 0010  ipratropium-albuterol (DUO-NEB) nebulizer solution 3 mL  Once      09/30/19 0008 09/30/19 0008  sodium chloride 0.9 % flush 10 mL  As Needed      09/30/19 0008          Operative/Procedure Notes (last 24 hours) (Notes from 10/03/19 1411 through 10/04/19 1411)     No notes of this type exist for this encounter.            Physician Progress Notes (last 24 hours) (Notes from 10/03/19 1411 through 10/04/19 1411)      Isa Beck, KEVIN, APRN at 10/04/19 1330             LOS: 4 days   Patient Care Team:  MARILYN Fernandez MD as PCP - General (General Practice)  Beulah Joshi APRN as Nurse Practitioner (Neurology)      Chief Complaint:  Cardiomyopathy, NSTEMI, CHF      Interval History:   Patient feeling well today.  Denies shortness of breath.  Has some mild chest wall soreness from coughing but no typical or anginal chest pain symptoms.  Vital signs stable.          Objective   Vital Signs  Temp:  [97.1 °F (36.2 °C)-98.1 °F (36.7 °C)] 97.9 °F (36.6 °C)  Heart Rate:  [] 76  Resp:  [16] 16  BP: ()/(55-80) 109/71  No intake or output data in the 24 hours ending 10/04/19 0844        Physical Exam   Constitutional: She is oriented to person, place, and time. She appears well-developed and well-nourished. No distress.   Neck: No JVD present.   Cardiovascular: Normal rate, regular rhythm, normal heart sounds and intact distal pulses.   Pulmonary/Chest: Effort normal and breath sounds normal. No respiratory distress.   Abdominal: Soft. Bowel sounds are normal.   Musculoskeletal: She exhibits no edema or tenderness.   Neurological: She is oriented to person, place, and time.   Skin: Skin is warm and dry. No erythema.   Psychiatric: She has a normal mood and affect.           Results Review:      Results from last 7 days   Lab Units 10/02/19  0500 10/01/19  1206 09/30/19  0017   SODIUM mmol/L 142 140 135*   POTASSIUM mmol/L 4.4 3.7 4.4   CHLORIDE mmol/L 105 106 96*   CO2 mmol/L 25.7 24.5 25.7   BUN mg/dL 16 15 17   CREATININE mg/dL 0.56* 0.63 0.80   GLUCOSE mg/dL 101* 100* 340*   CALCIUM mg/dL 8.6 8.5* 9.0     Results from last 7 days   Lab Units 09/30/19  1112 09/30/19  0544 09/30/19  0017   TROPONIN T ng/mL 0.243* 0.335* 0.118*     Results from last 7 days   Lab Units 09/30/19  0017   WBC 10*3/mm3 12.46*    HEMOGLOBIN g/dL 14.7   HEMATOCRIT % 46.2   PLATELETS 10*3/mm3 340             Results from last 7 days   Lab Units 10/01/19  1206   MAGNESIUM mg/dL 2.3           I reviewed the patient's new clinical results.  I reviewedpatient's telemetry data.            Medication Review:     apixaban 5 mg Oral Q12H   budesonide-formoterol 2 puff Inhalation BID - RT   insulin lispro 0-24 Units Subcutaneous 4x Daily With Meals & Nightly   ipratropium-albuterol 3 mL Nebulization Once   lisinopril 2.5 mg Oral Q24H   piperacillin-tazobactam 3.375 g Intravenous Q8H   predniSONE 40 mg Oral Daily With Breakfast   sodium chloride 10 mL Intravenous Q12H   tiotropium bromide monohydrate 2 puff Inhalation Daily - RT                Assessment/Plan     1.  Recurrent cardiomyopathy: Likely Takotsubo cardiomyopathy.  Negative heart cath year ago.  Denies anginal symptoms.    2.  Paroxysmal SVT: Terminated on its own before adenosine was given.  Now maintaining sinus rhythm.    3.  Prolonged QTc interval: Avoid QTC prolonging medications.    4.  COPD exacerbation with possible pneumonia and atypical chest discomfort: Pulmonology following.    5.  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  This was felt to be from an LV thrombus at the time and she has been on Eliquis.    6.  Hyperglycemia    7.  Malnutrition    8.  Tobacco use        -We will go ahead and sign off at this time.    -Please feel free to contact cardiology with any questions or concerns or to reconsult at any time.  -Patient to follow-up with cardiology APRN within 1 week of hospital discharge and Dr. Boston within 8 weeks of hospital discharge.          Thanks,    Isa Beck DNP, APRN  10/04/19  8:44 AM        Electronically signed by Isa Beck DNP, APRN at 10/04/19 1403     Emi Boston MD at 10/03/19 1612          [facility]  Progress note: 10/3/2019    Patient Identification:  Name:Scarlet  Palmer  Age:59 y.o.  Sex: female  :  1960  MRN: [id number]           CC:  Myopathy, non-ST elevation microinfarction, atrial fibrillation, congestive heart failure    Interval history:  Vitals stable.  Looks remarkably much better talking on phone without oxygen no dyspnea.    Vital Signs:   Temp:  [97.7 °F (36.5 °C)-98.3 °F (36.8 °C)] 98.1 °F (36.7 °C)  Heart Rate:  [] 84  Resp:  [16-18] 16  BP: ()/(57-80) 102/66    Intake/Output Summary (Last 24 hours) at 10/3/2019 1612  Last data filed at 10/3/2019 0720  Gross per 24 hour   Intake 237.8 ml   Output 850 ml   Net -612.2 ml       Physical Examination:    General Appearance No acute distress   Neck No adenopathy, supple, trachea midline, no thyromegaly, no carotid bruit, no JVD   Lungs  bilateral expiratory wheeze.,respirations regular, even and unlabored   Heart Regular rhythm and normal rate, normal S1 and S2, no murmur, no gallop, no rub, no click   Chest wall No abnormalities observed   Abdomen Normal bowel sounds, no masses, no hepatomegaly, soft   Extremities Moves all extremities well, no edema, no cyanosis, no redness   Neurological Alert and oriented x 3     Lab Review:  Personally reviewed the labs, radiology imaging and other cardiac procedures. Results from last 7 days   Lab Units 10/02/19  0500  19  0017   SODIUM mmol/L 142   < > 135*   POTASSIUM mmol/L 4.4   < > 4.4   CHLORIDE mmol/L 105   < > 96*   CO2 mmol/L 25.7   < > 25.7   BUN mg/dL 16   < > 17   CREATININE mg/dL 0.56*   < > 0.80   CALCIUM mg/dL 8.6   < > 9.0   BILIRUBIN mg/dL  --   --  0.3   ALK PHOS U/L  --   --  73   ALT (SGPT) U/L  --   --  22   AST (SGOT) U/L  --   --  27   GLUCOSE mg/dL 101*   < > 340*    < > = values in this interval not displayed.     Results from last 7 days   Lab Units 19  1112 19  0544 19  0017   TROPONIN T ng/mL 0.243* 0.335* 0.118*     Results from last 7 days   Lab Units 19  0017   WBC 10*3/mm3 12.46*   HEMOGLOBIN  g/dL 14.7   HEMATOCRIT % 46.2   PLATELETS 10*3/mm3 340     Medication Review:   Meds reviewed  Scheduled Meds:  apixaban 5 mg Oral Q12H   budesonide-formoterol 2 puff Inhalation BID - RT   insulin lispro 0-24 Units Subcutaneous 4x Daily With Meals & Nightly   ipratropium-albuterol 3 mL Nebulization Once   piperacillin-tazobactam 3.375 g Intravenous Q8H   predniSONE 40 mg Oral Daily With Breakfast   sodium chloride 10 mL Intravenous Q12H   tiotropium bromide monohydrate 2 puff Inhalation Daily - RT     I personally viewed and interpreted the patient's EKG/Telemetry data    Assessment and Plan  1.    Recurrent cardiomyopathy likelyTakotsubo cardiomyopathy once more.  Had negative cath a year ago.    Add low-dose lisinopril.  2.  Paroxysmal supraventricular tachycardia.  Terminated on its own before adenosine was given today.    3.  Prolonged QTc interval.   QTC improving with some improvement in ST-T wave changes.  4.  COPD exacerbation with possible pneumonia and atypical chest pain.  On steroids with now side effects from inhalers.    6.  Hyperglycemia  7.  Tobacco abuse  8.  Malnutrition  9.  History of embolic stroke in the setting of cardiomyopathy.  Linq device in place without arrhythmia.  This is felt to be due to probable LV thrombus at the time and she has been on Eliquis.      Emi Boston  10/3/96578:12 PM  25min spent in reviewing records, discussion and examination of the patient and discussion with other members of the patient's medical team.     Dictated utilizing Dragon dictation    Electronically signed by Emi Boston MD at 10/03/19 1701       Consult Notes (last 24 hours) (Notes from 10/03/19 1411 through 10/04/19 1411)     No notes of this type exist for this encounter.

## 2019-10-04 NOTE — TELEPHONE ENCOUNTER
----- Message from Isa Beck DNP, APRN sent at 10/4/2019  2:09 PM EDT -----  Patient needs 1 week hospital follow-up appointment with an APRN and 8 week hospital follow-up appointment with Dr. Boston.

## 2019-10-04 NOTE — DISCHARGE SUMMARY
Date of Discharge:  10/4/2019    Discharge Diagnoses:  1. Pneumonia right lower lobe  2. Recurrent cardiomyopathy likely Takotsubo cardiomyopathy/non-ST elevation MI  3. Paroxysmal SVT  4. Prolonged QT interval  5. Acute hypoxemic respiratory failure  6. Acute exacerbation COPD  7. Protein calorie malnutrition  8. Tobacco abuse  9. Hyperglycemia heroine induced.      Hospital Course  Patient is a 59 y.o. female presented with presented with shortness of breath respiratory failure pleuritic right lateral chest pain.  CT scan showed a couple little areas of what I think are patchy infiltrates radiology felt more like bronchitis with mucous plugging in the right lung bases she was treated with antibiotics and improved she was having quite a bit of dyspnea and orthopnea echocardiogram showed marked decrease her LV function down to 2025% in a pattern that could be consistent with Takotsubo cardiomyopathy which she had a year prior and had recovered from nearly completely at that time she had a cardiac cath that showed normal coronaries.  Cardiology did not feel given the recent cath necessary to repeat a cardiac catheterization.  She is actually doing well she did have initially a couple of runs of SVT they were fairly brief and self-limited tracing back they both occurred after albuterol nebulizer treatments.  We treated her PRN with ipratropium bromide nebulizer treatments and she did not have a problem her breathing is cleared up her pleuritic changes his pain is resolved she is been up ambulating doing well and she is ready for discharge she does have signs of malnutrition and she is a smoker we have discussed smoking cessation and she needs to to probably get in pulmonary rehab and work on her nutrition.  She is a little mixed up on her breathing medications were going to send her out with Spiriva daily and Symbicort twice daily with as needed ipratropium bromide nebulizer treatments she does have albuterol  rescue inhaler to use hopefully just a couple of puffs of this will not be enough to induce SVT she needs quick relief.  To follow-up with cardiology in 1 week she will follow-up with our group with Dr. Cruz in a few weeks.  And with her primary care physician Dr. Fernandez.      Procedures Performed         Consults:   Consults     Date and Time Order Name Status Description    9/30/2019 0146 Inpatient Cardiology Consult      9/30/2019 0126 Pulmonology (on-call MD unless specified) Completed     9/10/2019 0436 L[facility] (on-call MD unless specified) Details Completed           Pertinent Test Results:   Labs:  Results from last 7 days   Lab Units 10/02/19  0500 10/01/19  1206 09/30/19  0017   GLUCOSE mg/dL 101* 100* 340*   SODIUM mmol/L 142 140 135*   POTASSIUM mmol/L 4.4 3.7 4.4   MAGNESIUM mg/dL  --  2.3  --    CO2 mmol/L 25.7 24.5 25.7   CHLORIDE mmol/L 105 106 96*   ANION GAP mmol/L 11.3 9.5 13.3   CREATININE mg/dL 0.56* 0.63 0.80   BUN mg/dL 16 15 17   BUN / CREAT RATIO  28.6* 23.8 21.3   CALCIUM mg/dL 8.6 8.5* 9.0   EGFR IF NONAFRICN AM mL/min/1.73 111 97 74   ALK PHOS U/L  --   --  73   TOTAL PROTEIN g/dL  --   --  7.0   ALT (SGPT) U/L  --   --  22   AST (SGOT) U/L  --   --  27   BILIRUBIN mg/dL  --   --  0.3   ALBUMIN g/dL  --   --  4.50   GLOBULIN gm/dL  --   --  2.5     Estimated Creatinine Clearance: 75 mL/min (A) (by C-G formula based on SCr of 0.56 mg/dL (L)).      Results from last 7 days   Lab Units 09/30/19  0017   WBC 10*3/mm3 12.46*   RBC 10*6/mm3 4.82   HEMOGLOBIN g/dL 14.7   HEMATOCRIT % 46.2   MCV fL 95.9   MCH pg 30.5   MCHC g/dL 31.8   RDW % 12.6   RDW-SD fl 45.2   MPV fL 10.4   PLATELETS 10*3/mm3 340   NEUTROS ABS 10*3/mm3 6.79   EOS ABS 10*3/mm3 0.66*   BASOS ABS 10*3/mm3 0.05   NRBC /100 WBC 0.4*     Results from last 7 days   Lab Units 09/30/19  0137   PH, ARTERIAL pH units 7.291*   PO2 ART mm Hg 239.2*   PCO2, ARTERIAL mm Hg 48.0*   HCO3 ART mmol/L 23.1     Results from last 7 days   Lab  Units 09/30/19  1112 09/30/19  0544 09/30/19  0017   TROPONIN T ng/mL 0.243* 0.335* 0.118*     Results from last 7 days   Lab Units 09/30/19  0017   PROBNP pg/mL 74.6         Results from last 7 days   Lab Units 09/30/19  0818 09/30/19  0544   LACTATE mmol/L 1.8  --    PROCALCITONIN ng/mL  --  0.15           Imaging Results (last 72 hours)     ** No results found for the last 72 hours. **        Results for orders placed during the hospital encounter of 09/30/19   Adult Transthoracic Echo Complete W/ Cont if Necessary Per Protocol    Narrative · Left ventricular systolic function is severely decreased. Calculated EF   = 24.0%. Estimated EF was in agreement with the calculated EF. Estimated   EF appears to be in the range of 21 - 25%. Normal left ventricular wall   thickness noted. Wall motion abnormalities consistent with recurrent   Takotsubo cardiomyopathy The left ventricular cavity is mildly dilated.   Left ventricular diastolic function was indeterminate. There is no   evidence of a left ventricular thrombus present.  · Extensive wall motion abnormalities as below, consistent with Takotsubo   cardiomyopathy  · Mild redundancy of the entry mitral valve leaflet without torn chordae.   Mild mitral valve regurgitation is present.  · Trace tricuspid valve regurgitation is present. Estimated right   ventricular systolic pressure from tricuspid regurgitation is normal (<35   mmHg). Calculated right ventricular systolic pressure from tricuspid   regurgitation is 21 mmHg.              Condition on Discharge: Much improved    Vital Signs  Temp:  [97.1 °F (36.2 °C)-97.9 °F (36.6 °C)] 97.6 °F (36.4 °C)  Heart Rate:  [] 70  Resp:  [16] 16  BP: ()/(55-71) 98/56    Physical Exam:  General Appearance: Well-developed very thin white female she is resting comfortably in bed does not appear in any acute distress on room air  Eyes: Gingiva are clear and anicteric  ENT: Mucous membranes are moist no erythema or  exudates  Neck: No adenopathy or thyromegaly no jugular venous distention  Lungs: Decreased but clear no wheezes rales or rhonchi she is not labored she is able to talk in full sentences no accessory muscle use  Cardiac: Regular rate and rhythm no murmur  Abdomen: Soft no palpable hepatosplenomegaly or masses  : Not examined  Musc/Skel: Does not have much muscle mass  Skin: No jaundice no petechiae skin is warm and dry  Neuro: Alert oriented cooperative following commands moving extremities grossly intact  Extremities/P Vascular: Rubbing no cyanosis no edema palpable radial dorsalis pedis pulses  MSE: Seems to be in very good spirits pleasant      Discharge Disposition  Home or Self Care    Discharge Medications     Discharge Medications      New Medications      Instructions Start Date   amoxicillin-clavulanate 875-125 MG per tablet  Commonly known as:  AUGMENTIN   1 tablet, Oral, 2 Times Daily      budesonide-formoterol 160-4.5 MCG/ACT inhaler  Commonly known as:  SYMBICORT   2 puffs, Inhalation, 2 Times Daily - RT      ipratropium 0.02 % nebulizer solution  Commonly known as:  ATROVENT   500 mcg, Nebulization, 4 Times Daily PRN      predniSONE 20 MG tablet  Commonly known as:  DELTASONE   40 mg, Oral, Daily With Breakfast   Start Date:  10/5/2019        Changes to Medications      Instructions Start Date   albuterol sulfate  (90 Base) MCG/ACT inhaler  Commonly known as:  PROVENTIL HFA;VENTOLIN HFA;PROAIR HFA  What changed:  Another medication with the same name was removed. Continue taking this medication, and follow the directions you see here.   2 puffs, Inhalation, Every 4 Hours PRN         Continue These Medications      Instructions Start Date   apixaban 5 MG tablet tablet  Commonly known as:  ELIQUIS   5 mg, Oral, Every 12 Hours Scheduled      lisinopril 2.5 MG tablet  Commonly known as:  PRINIVIL,ZESTRIL   2.5 mg, Oral, Daily      SPIRIVA RESPIMAT 1.25 MCG/ACT aerosol solution inhaler  Generic  drug:  Tiotropium Bromide Monohydrate   2 puffs, Inhalation, Daily - RT             Discharge Diet: Regular    Activity at Discharge:     Follow-up Appointments  Future Appointments   Date Time Provider Department Center   12/12/2019 12:45 PM Emi Boston MD [facility] None   9/22/2020  2:00 PM Beulah Joshi APRN [facility] None         Test Results Pending at Discharge   Order Current Status    A1A, Quant & Genotype (Rfx Pheno) In process           Donald Ryder MD  10/04/19  3:05 PM    Time:

## 2019-10-04 NOTE — PROGRESS NOTES
Discharge Planning Assessment  [facility]     Patient Name: Scarlet Chakraborty  MRN: [id number]  Today's Date: 10/4/2019    Admit Date: 9/30/2019    Discharge Needs Assessment    No documentation.       Discharge Plan     Row Name 10/04/19 1635       Plan    Final Discharge Disposition Code  01 - home or self-care    Final Note  Home         Destination      No service coordination in this encounter.      Durable Medical Equipment      No service coordination in this encounter.      Dialysis/Infusion      No service coordination in this encounter.      Home Medical Care      No service coordination in this encounter.      Therapy      No service coordination in this encounter.      Community Resources      No service coordination in this encounter.        Expected Discharge Date and Time     Expected Discharge Date Expected Discharge Time    Oct 4, 2019         Demographic Summary    No documentation.       Functional Status    No documentation.       Psychosocial    No documentation.       Abuse/Neglect    No documentation.       Legal    No documentation.       Substance Abuse    No documentation.       Patient Forms    No documentation.           Mindy Singer RN

## 2019-10-04 NOTE — PROGRESS NOTES
LOS: 4 days   Patient Care Team:  MARILYN Fernandez MD as PCP - General (General Practice)  Beulah Joshi APRN as Nurse Practitioner (Neurology)      Chief Complaint:  Cardiomyopathy, NSTEMI, CHF      Interval History:   Patient feeling well today.  Denies shortness of breath.  Has some mild chest wall soreness from coughing but no typical or anginal chest pain symptoms.  Vital signs stable.          Objective   Vital Signs  Temp:  [97.1 °F (36.2 °C)-98.1 °F (36.7 °C)] 97.9 °F (36.6 °C)  Heart Rate:  [] 76  Resp:  [16] 16  BP: ()/(55-80) 109/71  No intake or output data in the 24 hours ending 10/04/19 0844        Physical Exam   Constitutional: She is oriented to person, place, and time. She appears well-developed and well-nourished. No distress.   Neck: No JVD present.   Cardiovascular: Normal rate, regular rhythm, normal heart sounds and intact distal pulses.   Pulmonary/Chest: Effort normal and breath sounds normal. No respiratory distress.   Abdominal: Soft. Bowel sounds are normal.   Musculoskeletal: She exhibits no edema or tenderness.   Neurological: She is oriented to person, place, and time.   Skin: Skin is warm and dry. No erythema.   Psychiatric: She has a normal mood and affect.           Results Review:      Results from last 7 days   Lab Units 10/02/19  0500 10/01/19  1206 09/30/19  0017   SODIUM mmol/L 142 140 135*   POTASSIUM mmol/L 4.4 3.7 4.4   CHLORIDE mmol/L 105 106 96*   CO2 mmol/L 25.7 24.5 25.7   BUN mg/dL 16 15 17   CREATININE mg/dL 0.56* 0.63 0.80   GLUCOSE mg/dL 101* 100* 340*   CALCIUM mg/dL 8.6 8.5* 9.0     Results from last 7 days   Lab Units 09/30/19  1112 09/30/19  0544 09/30/19  0017   TROPONIN T ng/mL 0.243* 0.335* 0.118*     Results from last 7 days   Lab Units 09/30/19  0017   WBC 10*3/mm3 12.46*   HEMOGLOBIN g/dL 14.7   HEMATOCRIT % 46.2   PLATELETS 10*3/mm3 340             Results from last 7 days   Lab Units 10/01/19  1206   MAGNESIUM mg/dL 2.3           I  reviewed the patient's new clinical results.  I reviewedpatient's telemetry data.            Medication Review:     apixaban 5 mg Oral Q12H   budesonide-formoterol 2 puff Inhalation BID - RT   insulin lispro 0-24 Units Subcutaneous 4x Daily With Meals & Nightly   ipratropium-albuterol 3 mL Nebulization Once   lisinopril 2.5 mg Oral Q24H   piperacillin-tazobactam 3.375 g Intravenous Q8H   predniSONE 40 mg Oral Daily With Breakfast   sodium chloride 10 mL Intravenous Q12H   tiotropium bromide monohydrate 2 puff Inhalation Daily - RT                Assessment/Plan     1.  Recurrent cardiomyopathy: Likely Takotsubo cardiomyopathy.  Negative heart cath year ago.  Denies anginal symptoms.    2.  Paroxysmal SVT: Terminated on its own before adenosine was given.  Now maintaining sinus rhythm.    3.  Prolonged QTc interval: Avoid QTC prolonging medications.    4.  COPD exacerbation with possible pneumonia and atypical chest discomfort: Pulmonology following.    5.  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  This was felt to be from an LV thrombus at the time and she has been on Eliquis.    6.  Hyperglycemia    7.  Malnutrition    8.  Tobacco use        -We will go ahead and sign off at this time.    -Please feel free to contact cardiology with any questions or concerns or to reconsult at any time.  -Patient to follow-up with cardiology APRN within 1 week of hospital discharge and Dr. Boston within 8 weeks of hospital discharge.          Thanks,    Isa Beck DNP, APRN  10/04/19  8:44 AM

## 2019-10-05 ENCOUNTER — READMISSION MANAGEMENT (OUTPATIENT)
Dept: CALL CENTER | Facility: HOSPITAL | Age: 59
End: 2019-10-05

## 2019-10-05 NOTE — OUTREACH NOTE
Prep Survey      Responses   Facility patient discharged from?  [facility]   Is patient eligible?  Yes   Discharge diagnosis  PNA, recurrent cardiomyopathy   Does the patient have one of the following disease processes/diagnoses(primary or secondary)?  COPD/Pneumonia   Does the patient have Home health ordered?  No   Is there a DME ordered?  No   Prep survey completed?  Yes          Guera Gerard RN

## 2019-10-07 ENCOUNTER — READMISSION MANAGEMENT (OUTPATIENT)
Dept: CALL CENTER | Facility: HOSPITAL | Age: 59
End: 2019-10-07

## 2019-10-07 NOTE — PAYOR COMM NOTE
"Scarlet Chavez (59 y.o. Female)     PLEASE SEE ATTACHED DC SUMMARY    REF#[id number]    THANK YOU    GEMINI HARRIS LPN CCP    Date of Birth Social Security Number Address Home Phone MRN    1960  [id number] [facility] [phone] [id number]    Orthodox Marital Status          Mu-ism Single       Admission Date Admission Type Admitting Provider Attending Provider Department, Room/Bed    9/30/19 Emergency Zainab Regalado MD  [address], [address]    Discharge Date Discharge Disposition Discharge Destination        10/4/2019 Home or Self Care              Attending Provider:  (none)   Allergies:  No Known Allergies    Isolation:  None   Infection:  None   Code Status:  Prior    Ht:  162.6 cm (64.02\")   Wt:  43.4 kg (95 lb 10.9 oz)    Admission Cmt:  None   Principal Problem:  None                Active Insurance as of 9/30/2019     Primary Coverage     Payor Plan Insurance Group Employer/Plan Group    [id number]     Payor Plan Address Payor Plan Phone Number Payor Plan Fax Number Effective Dates    [address] [address] [phone]  9/1/2019 - None Entered    [facility] [id number]       Subscriber Name Subscriber Birth Date Member ID       SCARLET CHAVEZ 1960 [id number]                 Emergency Contacts      (Rel.) Home Phone Work Phone Mobile Phone    Yvonne Mccollum (Sister) [phone] -- --    GiancarloDon arceo (Significant Other) -- -- [phone]               Discharge Summary      Donald Ryder MD at 10/04/19 1505                  Date of Discharge:  10/4/2019    Discharge Diagnoses:  1. Pneumonia right lower lobe  2. Recurrent cardiomyopathy likely Takotsubo cardiomyopathy/non-ST elevation MI  3. Paroxysmal SVT  4. Prolonged QT interval  5. Acute hypoxemic respiratory failure  6. Acute exacerbation COPD  7. Protein calorie malnutrition  8. Tobacco abuse  9. Hyperglycemia heroine induced.      Hospital Course  Patient is a 59 " y.o. female presented with presented with shortness of breath respiratory failure pleuritic right lateral chest pain.  CT scan showed a couple little areas of what I think are patchy infiltrates radiology felt more like bronchitis with mucous plugging in the right lung bases she was treated with antibiotics and improved she was having quite a bit of dyspnea and orthopnea echocardiogram showed marked decrease her LV function down to 2025% in a pattern that could be consistent with Takotsubo cardiomyopathy which she had a year prior and had recovered from nearly completely at that time she had a cardiac cath that showed normal coronaries.  Cardiology did not feel given the recent cath necessary to repeat a cardiac catheterization.  She is actually doing well she did have initially a couple of runs of SVT they were fairly brief and self-limited tracing back they both occurred after albuterol nebulizer treatments.  We treated her PRN with ipratropium bromide nebulizer treatments and she did not have a problem her breathing is cleared up her pleuritic changes his pain is resolved she is been up ambulating doing well and she is ready for discharge she does have signs of malnutrition and she is a smoker we have discussed smoking cessation and she needs to to probably get in pulmonary rehab and work on her nutrition.  She is a little mixed up on her breathing medications were going to send her out with Spiriva daily and Symbicort twice daily with as needed ipratropium bromide nebulizer treatments she does have albuterol rescue inhaler to use hopefully just a couple of puffs of this will not be enough to induce SVT she needs quick relief.  To follow-up with cardiology in 1 week she will follow-up with our group with Dr. Cruz in a few weeks.  And with her primary care physician Dr. Fernandez.      Procedures Performed         Consults:   Consults     Date and Time Order Name Status Description    9/30/2019 0146 Inpatient  Cardiology Consult      9/30/2019 0126 Pulmonology (on-call MD unless specified) Completed     9/10/2019 0436 [facility] (on-call MD unless specified) Details Completed           Pertinent Test Results:   Labs:  Results from last 7 days   Lab Units 10/02/19  0500 10/01/19  1206 09/30/19  0017   GLUCOSE mg/dL 101* 100* 340*   SODIUM mmol/L 142 140 135*   POTASSIUM mmol/L 4.4 3.7 4.4   MAGNESIUM mg/dL  --  2.3  --    CO2 mmol/L 25.7 24.5 25.7   CHLORIDE mmol/L 105 106 96*   ANION GAP mmol/L 11.3 9.5 13.3   CREATININE mg/dL 0.56* 0.63 0.80   BUN mg/dL 16 15 17   BUN / CREAT RATIO  28.6* 23.8 21.3   CALCIUM mg/dL 8.6 8.5* 9.0   EGFR IF NONAFRICN AM mL/min/1.73 111 97 74   ALK PHOS U/L  --   --  73   TOTAL PROTEIN g/dL  --   --  7.0   ALT (SGPT) U/L  --   --  22   AST (SGOT) U/L  --   --  27   BILIRUBIN mg/dL  --   --  0.3   ALBUMIN g/dL  --   --  4.50   GLOBULIN gm/dL  --   --  2.5     Estimated Creatinine Clearance: 75 mL/min (A) (by C-G formula based on SCr of 0.56 mg/dL (L)).      Results from last 7 days   Lab Units 09/30/19  0017   WBC 10*3/mm3 12.46*   RBC 10*6/mm3 4.82   HEMOGLOBIN g/dL 14.7   HEMATOCRIT % 46.2   MCV fL 95.9   MCH pg 30.5   MCHC g/dL 31.8   RDW % 12.6   RDW-SD fl 45.2   MPV fL 10.4   PLATELETS 10*3/mm3 340   NEUTROS ABS 10*3/mm3 6.79   EOS ABS 10*3/mm3 0.66*   BASOS ABS 10*3/mm3 0.05   NRBC /100 WBC 0.4*     Results from last 7 days   Lab Units 09/30/19  0137   PH, ARTERIAL pH units 7.291*   PO2 ART mm Hg 239.2*   PCO2, ARTERIAL mm Hg 48.0*   HCO3 ART mmol/L 23.1     Results from last 7 days   Lab Units 09/30/19  1112 09/30/19  0544 09/30/19  0017   TROPONIN T ng/mL 0.243* 0.335* 0.118*     Results from last 7 days   Lab Units 09/30/19  0017   PROBNP pg/mL 74.6         Results from last 7 days   Lab Units 09/30/19  0818 09/30/19  0544   LACTATE mmol/L 1.8  --    PROCALCITONIN ng/mL  --  0.15           Imaging Results (last 72 hours)     ** No results found for the last 72 hours. **        Results for  orders placed during the hospital encounter of 09/30/19   Adult Transthoracic Echo Complete W/ Cont if Necessary Per Protocol    Narrative · Left ventricular systolic function is severely decreased. Calculated EF   = 24.0%. Estimated EF was in agreement with the calculated EF. Estimated   EF appears to be in the range of 21 - 25%. Normal left ventricular wall   thickness noted. Wall motion abnormalities consistent with recurrent   Takotsubo cardiomyopathy The left ventricular cavity is mildly dilated.   Left ventricular diastolic function was indeterminate. There is no   evidence of a left ventricular thrombus present.  · Extensive wall motion abnormalities as below, consistent with Takotsubo   cardiomyopathy  · Mild redundancy of the entry mitral valve leaflet without torn chordae.   Mild mitral valve regurgitation is present.  · Trace tricuspid valve regurgitation is present. Estimated right   ventricular systolic pressure from tricuspid regurgitation is normal (<35   mmHg). Calculated right ventricular systolic pressure from tricuspid   regurgitation is 21 mmHg.              Condition on Discharge: Much improved    Vital Signs  Temp:  [97.1 °F (36.2 °C)-97.9 °F (36.6 °C)] 97.6 °F (36.4 °C)  Heart Rate:  [] 70  Resp:  [16] 16  BP: ()/(55-71) 98/56    Physical Exam:  General Appearance: Well-developed very thin white female she is resting comfortably in bed does not appear in any acute distress on room air  Eyes: Gingiva are clear and anicteric  ENT: Mucous membranes are moist no erythema or exudates  Neck: No adenopathy or thyromegaly no jugular venous distention  Lungs: Decreased but clear no wheezes rales or rhonchi she is not labored she is able to talk in full sentences no accessory muscle use  Cardiac: Regular rate and rhythm no murmur  Abdomen: Soft no palpable hepatosplenomegaly or masses  : Not examined  Musc/Skel: Does not have much muscle mass  Skin: No jaundice no petechiae skin is warm  and dry  Neuro: Alert oriented cooperative following commands moving extremities grossly intact  Extremities/P Vascular: Rubbing no cyanosis no edema palpable radial dorsalis pedis pulses  MSE: Seems to be in very good spirits pleasant      Discharge Disposition  Home or Self Care    Discharge Medications     Discharge Medications      New Medications      Instructions Start Date   amoxicillin-clavulanate 875-125 MG per tablet  Commonly known as:  AUGMENTIN   1 tablet, Oral, 2 Times Daily      budesonide-formoterol 160-4.5 MCG/ACT inhaler  Commonly known as:  SYMBICORT   2 puffs, Inhalation, 2 Times Daily - RT      ipratropium 0.02 % nebulizer solution  Commonly known as:  ATROVENT   500 mcg, Nebulization, 4 Times Daily PRN      predniSONE 20 MG tablet  Commonly known as:  DELTASONE   40 mg, Oral, Daily With Breakfast   Start Date:  10/5/2019        Changes to Medications      Instructions Start Date   albuterol sulfate  (90 Base) MCG/ACT inhaler  Commonly known as:  PROVENTIL HFA;VENTOLIN HFA;PROAIR HFA  What changed:  Another medication with the same name was removed. Continue taking this medication, and follow the directions you see here.   2 puffs, Inhalation, Every 4 Hours PRN         Continue These Medications      Instructions Start Date   apixaban 5 MG tablet tablet  Commonly known as:  ELIQUIS   5 mg, Oral, Every 12 Hours Scheduled      lisinopril 2.5 MG tablet  Commonly known as:  PRINIVIL,ZESTRIL   2.5 mg, Oral, Daily      SPIRIVA RESPIMAT 1.25 MCG/ACT aerosol solution inhaler  Generic drug:  Tiotropium Bromide Monohydrate   2 puffs, Inhalation, Daily - RT             Discharge Diet: Regular    Activity at Discharge:     Follow-up Appointments  Future Appointments   Date Time Provider Department Center   12/12/2019 12:45 PM Emi Boston MD [facility] None   9/22/2020  2:00 PM Beulah oJshi APRN [facility] None         Test Results Pending at Discharge   Order Current Status    A1A, Quant  & Genotype (Rfx Pheno) In process           Donald Ryder MD  10/04/19  3:05 PM    Time:           Electronically signed by Donald Ryder MD at 10/04/19 6844

## 2019-10-07 NOTE — OUTREACH NOTE
COPD/PN Week 1 Survey      Responses   Facility patient discharged from?  [facility]   Does the patient have one of the following disease processes/diagnoses(primary or secondary)?  COPD/Pneumonia   Is there a successful TCM telephone encounter documented?  No   Was the primary reason for admission:  COPD exacerbation   Week 1 attempt successful?  Yes   Call start time  0935   Call end time  0938   Discharge diagnosis  PNA, recurrent cardiomyopathy   Is patient permission given to speak with other caregiver?  Yes   List who call center can speak with  SO   Meds reviewed with patient/caregiver?  Yes   Is the patient having any side effects they believe may be caused by any medication additions or changes?  No   Does the patient have all medications ordered at discharge?  Yes   Is the patient taking all medications as directed (includes completed medication regime)?  Yes   Does the patient have a primary care provider?   Yes   Does the patient have an appointment with their PCP or pulmonologist within 7 days of discharge?  Yes   Has the patient kept scheduled appointments due by today?  N/A   DME comments  Has home neb treatments   Psychosocial issues?  No   Comments  Denies SOA. Pt reports feeling better.    Did the patient receive a copy of their discharge instructions?  Yes   Nursing interventions  Reviewed instructions with patient   What is the patient's perception of their health status since discharge?  Improving   Nursing Interventions  Nurse provided patient education   If the patient is a current smoker, are they able to teach back resources for cessation?  -- [Pt reports not smoking since 9-26-19]   Is the patient/caregiver able to teach back the hierarchy of who to call/visit for symptoms/problems? PCP, Specialist, Home health nurse, Urgent Care, ED, 911  Yes   Is the patient able to teach back COPD zones?  Yes   Patient reports what zone on this call?  Green Zone   Green Zone  Reports doing well,  Breathing without shortness of breath, Usual activity and exercise level   Green Zone interventions:  Take daily medications, Continue regular exercise/diet plan, Do not smoke   Week 1 call completed?  Yes          Mirella Baeza RN

## 2019-10-09 LAB
A1A RFX TO PHENOTYPE: NORMAL
A1AT SERPL-MCNC: 118 MG/DL (ref 90–200)
PATHOLOGIST NAME: NORMAL
SERPINA1 GENE MUT ANL BLD/T: NORMAL
SERPINA1 GENE MUT TESTED BLD/T: NORMAL

## 2019-10-10 ENCOUNTER — CLINICAL SUPPORT NO REQUIREMENTS (OUTPATIENT)
Dept: CARDIOLOGY | Facility: CLINIC | Age: 59
End: 2019-10-10

## 2019-10-10 DIAGNOSIS — I63.9 CRYPTOGENIC STROKE (HCC): Primary | ICD-10-CM

## 2019-10-10 PROCEDURE — 93298 REM INTERROG DEV EVAL SCRMS: CPT | Performed by: INTERNAL MEDICINE

## 2019-10-10 PROCEDURE — 93299 PR REM INTERROG ICPMS/SCRMS <30 D TECH REVIEW: CPT | Performed by: INTERNAL MEDICINE

## 2019-10-11 ENCOUNTER — OFFICE VISIT (OUTPATIENT)
Dept: CARDIOLOGY | Facility: CLINIC | Age: 59
End: 2019-10-11

## 2019-10-11 VITALS
SYSTOLIC BLOOD PRESSURE: 110 MMHG | DIASTOLIC BLOOD PRESSURE: 70 MMHG | WEIGHT: 99.8 LBS | HEART RATE: 69 BPM | HEIGHT: 64 IN | BODY MASS INDEX: 17.04 KG/M2

## 2019-10-11 DIAGNOSIS — J44.1 CHRONIC OBSTRUCTIVE PULMONARY DISEASE WITH ACUTE EXACERBATION (HCC): ICD-10-CM

## 2019-10-11 DIAGNOSIS — R94.31 ABNORMAL ECG: ICD-10-CM

## 2019-10-11 DIAGNOSIS — I63.9 CRYPTOGENIC STROKE (HCC): ICD-10-CM

## 2019-10-11 DIAGNOSIS — I25.2 HX OF NON-ST ELEVATION MYOCARDIAL INFARCTION (NSTEMI): ICD-10-CM

## 2019-10-11 DIAGNOSIS — I51.81 STRESS-INDUCED CARDIOMYOPATHY: Primary | ICD-10-CM

## 2019-10-11 DIAGNOSIS — I51.81 TAKOTSUBO CARDIOMYOPATHY: ICD-10-CM

## 2019-10-11 DIAGNOSIS — Q24.5 CORONARY-MYOCARDIAL BRIDGE: ICD-10-CM

## 2019-10-11 PROCEDURE — 99214 OFFICE O/P EST MOD 30 MIN: CPT | Performed by: NURSE PRACTITIONER

## 2019-10-11 PROCEDURE — 93000 ELECTROCARDIOGRAM COMPLETE: CPT | Performed by: NURSE PRACTITIONER

## 2019-10-11 NOTE — PROGRESS NOTES
Date of Office Visit: 10/11/2019  Encounter Provider: SHANKAR Srivastava  Place of Service: [facility] CARDIOLOGY  Patient Name: Scarlet Chakraborty  :1960    Chief Complaint   Patient presents with   • Cardiomyopathy   :     HPI: Scarlet Chakraborty is a 59 y.o. female  with history of tobacco use, COPD, stress-induced cardiomyopathy, and CVA.    She is followed by Dr. Boston.  I will be seeing her for the first time today and have reviewed her medical record.    In 2018 she was admitted with COPD exacerbation.  Troponin was consistent with non-STEMI.  ECG showed T wave inversion in inferolateral leads.  Echocardiogram at that time showed ejection fraction 30% which was suggestive of either multivessel coronary disease or stress- induced cardiomyopathy.  Cardiac catheterization was completed which showed a normal LAD, normal in the proximal portion of the LAD with long myocardial bridge in the midportion, diagonals were normal, ramus was not present, circumflex was normal, right coronary artery was normal.  Felt to be variant of stress-induced cardiomyopathy.  She was dismissed home who presented back with left-sided weakness, slurred speech and a fall.  This was felt to be embolic she received TPA.  Transthoracic echocardiogram was unremarkable with normal systolic function and grade 1 diastolic dysfunction.  Transesophageal echocardiogram was unremarkable including negative saline study she subsequently underwent Linq placement.    She was admitted on 10/4/2019 with increased shortness of breath and right pleuritic chest pain.  CT showed some infiltrates.  She described orthopnea.  Echocardiogram showed marked decrease of her left ventricular systolic function down to 20-25% in a pattern consistent with Dr. Kelly cardiomyopathy.  She had to run of SVT which occurred after albuterol nebulizer.  Her inhalers were adjusted.  She had no further pleuritic chest pain.  Smoking  cessation was discussed.  She was to transition to pulmonary rehab and follow-up with cardiology.    She presents today for one-week hospital discharge follow-up.  She denies palpitation, shortness of breath, edema, lightheadedness, near-syncope, syncope, chest pain tightness pressure, or fatigue.  She denies orthopnea or PND.  She has not had a cigarette since 9/26/2019 but has significant cravings.  She is tried them in the past which caused her to vomit.  She tried Chantix which did not work.  She is requesting nicotine patches.  She is not performing any structured exercise.  Overall she feels much better      No Known Allergies    Past Medical History:   Diagnosis Date   • Asthma    • Cerebrovascular accident (CVA) due to thrombosis of right middle cerebral artery (CMS/HCC)    • COPD (chronic obstructive pulmonary disease) (CMS/HCC)    • Nonischemic cardiomyopathy (CMS/HCC)    • NSTEMI (non-ST elevated myocardial infarction) (CMS/[facility])    • Stress-induced cardiomyopathy    • Stroke (CMS/[facility])    • Takotsubo cardiomyopathy    • Tobacco abuse        Past Surgical History:   Procedure Laterality Date   • CARDIAC CATHETERIZATION N/A 9/13/2018    Procedure: Left Heart Cath and cors;  Surgeon: Gabino Dozier MD;  Location: [facility] CATH INVASIVE LOCATION;  Service: Cardiology   • CARDIAC CATHETERIZATION N/A 9/13/2018    Procedure: Left ventriculography;  Surgeon: Gabino Dozier MD;  Location: [facility] INVASIVE LOCATION;  Service: Cardiology   • CARDIAC ELECTROPHYSIOLOGY PROCEDURE N/A 9/27/2018    Procedure: Loop insertion  LINQ;  Surgeon: Jose R Barker MD;  Location: [facility] INVASIVE LOCATION;  Service: Cardiovascular         Family and social history reviewed.     Review of Systems   Respiratory: Positive for cough.      All other systems were reviewed and are negative          Objective:     Vitals:    10/11/19 1422   BP: 110/70   BP Location: Left arm   Patient Position: Sitting   Pulse: 69   Weight: 45.3  "kg (99 lb 12.8 oz)   Height: 162.6 cm (64.02\")     Body mass index is 17.12 kg/m².    PHYSICAL EXAM:  Physical Exam   Constitutional: She is oriented to person, place, and time. She appears well-developed and well-nourished. No distress.   HENT:   Head: Normocephalic.   Eyes: Conjunctivae are normal.   Neck: Normal range of motion. No JVD present.   Cardiovascular: Normal rate, regular rhythm, normal heart sounds and intact distal pulses.   No murmur heard.  Pulses:       Carotid pulses are 2+ on the right side, and 2+ on the left side.       Radial pulses are 2+ on the right side, and 2+ on the left side.        Posterior tibial pulses are 2+ on the right side, and 2+ on the left side.   Pulmonary/Chest: Effort normal and breath sounds normal. No respiratory distress. She has no wheezes. She has no rhonchi. She has no rales. She exhibits no tenderness.   Abdominal: Soft. Bowel sounds are normal. She exhibits no distension.   Musculoskeletal: Normal range of motion. She exhibits no edema.   Neurological: She is alert and oriented to person, place, and time.   Skin: Skin is warm, dry and intact. No rash noted. She is not diaphoretic. No cyanosis.   Psychiatric: She has a normal mood and affect. Her behavior is normal. Judgment and thought content normal.         ECG 12 Lead  Date/Time: 10/11/2019 4:36 PM  Performed by: Dinah Mclain APRN  Authorized by: Dinah Mclain APRN   Comparison: compared with previous ECG   Similar to previous ECG  Rhythm: sinus rhythm  Rate: normal  T inversion: I, aVL, II, III, aVF, V3, V4, V5 and V6  QRS axis: right    Clinical impression: abnormal EKG  Comments: No change            Current Outpatient Medications   Medication Sig Dispense Refill   • albuterol sulfate  (90 Base) MCG/ACT inhaler Inhale 2 puffs Every 4 (Four) Hours As Needed for Wheezing.     • apixaban (ELIQUIS) 5 MG tablet tablet Take 1 tablet by mouth Every 12 (Twelve) Hours. 180 tablet 3   • budesonide-formoterol " (SYMBICORT) 160-4.5 MCG/ACT inhaler Inhale 2 puffs 2 (Two) Times a Day. 10.2 g 12   • ipratropium (ATROVENT) 0.02 % nebulizer solution Take 2.5 mL by nebulization 4 (Four) Times a Day As Needed for Wheezing or Shortness of Air. 37.5 mL 3   • lisinopril (PRINIVIL,ZESTRIL) 2.5 MG tablet Take 1 tablet by mouth Daily. 90 tablet 3   • Tiotropium Bromide Monohydrate (SPIRIVA RESPIMAT) 1.25 MCG/ACT aerosol solution inhaler Inhale 2 puffs Daily. 4 g 2   • nicotine (NICODERM CQ) 7 MG/24HR patch Place 1 patch on the skin as directed by provider Daily. 14 each 3     No current facility-administered medications for this visit.      Assessment:       Diagnosis Plan   1. Stress-induced cardiomyopathy  Adult Transthoracic Echo Complete W/ Cont if Necessary Per Protocol   2. Cryptogenic stroke (CMS/HCC)     3. Takotsubo cardiomyopathy     4. Hx of non-ST elevation myocardial infarction (NSTEMI)     5. Chronic obstructive pulmonary disease with acute exacerbation (CMS/HCC)          Orders Placed This Encounter   Procedures   • ECG 12 Lead     This order was created via procedure documentation   • Adult Transthoracic Echo Complete W/ Cont if Necessary Per Protocol     Standing Status:   Future     Standing Expiration Date:   10/10/2020     Order Specific Question:   Reason for exam?     Answer:   Heart Failure, Cardiomyopathy, or Sytemic or Pulmonary Hypertension     Order Specific Question:   Hypertension, Heart Failure, or Cardiomyopathy specification?     Answer:   Known Cardiomyopathy     Order Specific Question:   Change in clinical status, cardiac exam, or medical therapy?     Answer:   Yes         Plan:       1.  Recurrent stress-induced cardiomyopathy.  Initial left ventricular ejection fraction 20-30% September 2018 in the setting of respiratory failure then returned to normal 2 weeks later.  Now with repeat episode ejection fraction 24% on echo 9/30/2019.  She will return for repeat echocardiogram 2014-21 days to reassess  left ventricular systolic function.  Her blood pressure is tolerating low-dose lisinopril.  -She will follow-up in 6 weeks.  2.  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  COPD followed by Dr. Bray  4.  Tobacco use-reportedly has not had a cigarette since 9/26/2019.  In the past she tried nicotine gum but had emesis on that.  Chantix did not work in the past.  She has requested nicotine patch. I have given her a prescription to get her started she will have to follow up with her PCP on this  5.  History of probable LV thrombus maintained on Eliquis  6.  History of cryptogenic stroke implantable loop recorder in place followed in our clinic no arrythmias          It has been a pleasure to participate in this patient's care.      Thank you,  SHANKAR Srivastava      **I used Dragon to dictate this note:**

## 2019-10-14 ENCOUNTER — READMISSION MANAGEMENT (OUTPATIENT)
Dept: CALL CENTER | Facility: HOSPITAL | Age: 59
End: 2019-10-14

## 2019-10-14 NOTE — OUTREACH NOTE
COPD/PN Week 2 Survey      Responses   Facility patient discharged from?  [facility]   Does the patient have one of the following disease processes/diagnoses(primary or secondary)?  COPD/Pneumonia   Was the primary reason for admission:  COPD exacerbation   Week 2 attempt successful?  No   Unsuccessful attempts  Attempt 1          Meenu Tinajero, RN

## 2019-10-15 ENCOUNTER — READMISSION MANAGEMENT (OUTPATIENT)
Dept: CALL CENTER | Facility: HOSPITAL | Age: 59
End: 2019-10-15

## 2019-10-15 NOTE — OUTREACH NOTE
COPD/PN Week 2 Survey      Responses   Facility patient discharged from?  [facility]   Does the patient have one of the following disease processes/diagnoses(primary or secondary)?  COPD/Pneumonia   Was the primary reason for admission:  Pneumonia   Week 2 attempt successful?  No   Unsuccessful attempts  Attempt 2          Yazmin Cano RN

## 2019-10-21 ENCOUNTER — READMISSION MANAGEMENT (OUTPATIENT)
Dept: CALL CENTER | Facility: HOSPITAL | Age: 59
End: 2019-10-21

## 2019-10-21 NOTE — OUTREACH NOTE
COPD/PN Week 3 Survey      Responses   Facility patient discharged from?  [facility]   Does the patient have one of the following disease processes/diagnoses(primary or secondary)?  COPD/Pneumonia   Was the primary reason for admission:  Pneumonia   Week 3 attempt successful?  Yes   Call start time  1537   Call end time  1540   Discharge diagnosis  PNA, recurrent cardiomyopathy   Is patient permission given to speak with other caregiver?  Yes   List who call center can speak with  SO   Meds reviewed with patient/caregiver?  Yes   Is the patient having any side effects they believe may be caused by any medication additions or changes?  No   Does the patient have all medications ordered at discharge?  Yes   Is the patient taking all medications as directed (includes completed medication regime)?  Yes   Does the patient have a primary care provider?   Yes   Does the patient have an appointment with their PCP or pulmonologist within 7 days of discharge?  Yes   Comments regarding PCP  Pt has yet to see PCP.  she states she is really busy, unsure if she can get an appt.    Has the patient kept scheduled appointments due by today?  Yes   What DME was ordered?  nebulizer from [facility]   Has all DME been delivered?  No   Psychosocial issues?  No   Did the patient receive a copy of their discharge instructions?  Yes   Nursing interventions  Reviewed instructions with patient   What is the patient's perception of their health status since discharge?  Improving   Are the patient's immunizations up to date?   Yes   Is the patient/caregiver able to teach back the hierarchy of who to call/visit for symptoms/problems? PCP, Specialist, Home health nurse, Urgent Care, ED, 911  Yes   Is the patient able to teach back COPD zones?  Yes   Patient reports what zone on this call?  Green Zone   Is the patient/caregiver able to teach back signs and symptoms of worsening condition:  Fever/chills, Shortness of breath, Chest pain   Is the  patient/caregiver able to teach back importance of completing antibiotic course of treatment?  Yes   Week 3 call completed?  Yes          Yamile Cheek RN

## 2019-10-24 ENCOUNTER — HOSPITAL ENCOUNTER (OUTPATIENT)
Dept: CARDIOLOGY | Facility: HOSPITAL | Age: 59
Discharge: HOME | End: 2019-10-24
Admitting: NURSE PRACTITIONER

## 2019-10-24 ENCOUNTER — TELEPHONE (OUTPATIENT)
Dept: CARDIOLOGY | Facility: CLINIC | Age: 59
End: 2019-10-24

## 2019-10-24 VITALS
HEART RATE: 88 BPM | DIASTOLIC BLOOD PRESSURE: 80 MMHG | BODY MASS INDEX: 16.9 KG/M2 | WEIGHT: 99 LBS | OXYGEN SATURATION: 97 % | SYSTOLIC BLOOD PRESSURE: 130 MMHG | HEIGHT: 64 IN

## 2019-10-24 DIAGNOSIS — I51.81 STRESS-INDUCED CARDIOMYOPATHY: ICD-10-CM

## 2019-10-24 LAB
AORTIC ARCH: 1.7 CM
AORTIC ROOT ANNULUS: 1.9 CM
ASCENDING AORTA: 2.3 CM
BH CV ECHO MEAS - ACS: 1.9 CM
BH CV ECHO MEAS - AO MAX PG (FULL): 2.8 MMHG
BH CV ECHO MEAS - AO MAX PG: 8.1 MMHG
BH CV ECHO MEAS - AO MEAN PG (FULL): 0.89 MMHG
BH CV ECHO MEAS - AO MEAN PG: 3.6 MMHG
BH CV ECHO MEAS - AO ROOT AREA (BSA CORRECTED): 1.8
BH CV ECHO MEAS - AO ROOT AREA: 5.2 CM^2
BH CV ECHO MEAS - AO ROOT DIAM: 2.6 CM
BH CV ECHO MEAS - AO V2 MAX: 142.4 CM/SEC
BH CV ECHO MEAS - AO V2 MEAN: 85.1 CM/SEC
BH CV ECHO MEAS - AO V2 VTI: 24 CM
BH CV ECHO MEAS - ASC AORTA: 2.3 CM
BH CV ECHO MEAS - AVA(I,A): 2.2 CM^2
BH CV ECHO MEAS - AVA(I,D): 2.2 CM^2
BH CV ECHO MEAS - AVA(V,A): 2 CM^2
BH CV ECHO MEAS - AVA(V,D): 2 CM^2
BH CV ECHO MEAS - BSA(HAYCOCK): 1.4 M^2
BH CV ECHO MEAS - BSA: 1.5 M^2
BH CV ECHO MEAS - BZI_BMI: 17 KILOGRAMS/M^2
BH CV ECHO MEAS - BZI_METRIC_HEIGHT: 162.6 CM
BH CV ECHO MEAS - BZI_METRIC_WEIGHT: 44.9 KG
BH CV ECHO MEAS - EDV(MOD-SP2): 45 ML
BH CV ECHO MEAS - EDV(MOD-SP4): 51 ML
BH CV ECHO MEAS - EDV(TEICH): 78 ML
BH CV ECHO MEAS - EF(CUBED): 74.7 %
BH CV ECHO MEAS - EF(MOD-BP): 65 %
BH CV ECHO MEAS - EF(MOD-SP2): 57.8 %
BH CV ECHO MEAS - EF(MOD-SP4): 68.6 %
BH CV ECHO MEAS - EF(TEICH): 67 %
BH CV ECHO MEAS - ESV(MOD-SP2): 19 ML
BH CV ECHO MEAS - ESV(MOD-SP4): 16 ML
BH CV ECHO MEAS - ESV(TEICH): 25.8 ML
BH CV ECHO MEAS - FS: 36.8 %
BH CV ECHO MEAS - IVS/LVPW: 0.93
BH CV ECHO MEAS - IVSD: 0.93 CM
BH CV ECHO MEAS - LAT PEAK E' VEL: 6 CM/SEC
BH CV ECHO MEAS - LV DIASTOLIC VOL/BSA (35-75): 35.2 ML/M^2
BH CV ECHO MEAS - LV MASS(C)D: 130.3 GRAMS
BH CV ECHO MEAS - LV MASS(C)DI: 89.8 GRAMS/M^2
BH CV ECHO MEAS - LV MAX PG: 5.3 MMHG
BH CV ECHO MEAS - LV MEAN PG: 2.7 MMHG
BH CV ECHO MEAS - LV SYSTOLIC VOL/BSA (12-30): 11 ML/M^2
BH CV ECHO MEAS - LV V1 MAX: 115.1 CM/SEC
BH CV ECHO MEAS - LV V1 MEAN: 76.4 CM/SEC
BH CV ECHO MEAS - LV V1 VTI: 21.5 CM
BH CV ECHO MEAS - LVIDD: 4.2 CM
BH CV ECHO MEAS - LVIDS: 2.6 CM
BH CV ECHO MEAS - LVLD AP2: 6.7 CM
BH CV ECHO MEAS - LVLD AP4: 6 CM
BH CV ECHO MEAS - LVLS AP2: 5.8 CM
BH CV ECHO MEAS - LVLS AP4: 5.1 CM
BH CV ECHO MEAS - LVOT AREA (M): 2.5 CM^2
BH CV ECHO MEAS - LVOT AREA: 2.4 CM^2
BH CV ECHO MEAS - LVOT DIAM: 1.8 CM
BH CV ECHO MEAS - LVPWD: 1 CM
BH CV ECHO MEAS - MED PEAK E' VEL: 6 CM/SEC
BH CV ECHO MEAS - MV A DUR: 0.09 SEC
BH CV ECHO MEAS - MV A MAX VEL: 78.6 CM/SEC
BH CV ECHO MEAS - MV DEC SLOPE: 477.3 CM/SEC^2
BH CV ECHO MEAS - MV DEC TIME: 0.15 SEC
BH CV ECHO MEAS - MV E MAX VEL: 61.1 CM/SEC
BH CV ECHO MEAS - MV E/A: 0.78
BH CV ECHO MEAS - MV MAX PG: 2.7 MMHG
BH CV ECHO MEAS - MV MEAN PG: 1.3 MMHG
BH CV ECHO MEAS - MV P1/2T MAX VEL: 72.1 CM/SEC
BH CV ECHO MEAS - MV P1/2T: 44.3 MSEC
BH CV ECHO MEAS - MV V2 MAX: 82.2 CM/SEC
BH CV ECHO MEAS - MV V2 MEAN: 54.1 CM/SEC
BH CV ECHO MEAS - MV V2 VTI: 18.1 CM
BH CV ECHO MEAS - MVA P1/2T LCG: 3 CM^2
BH CV ECHO MEAS - MVA(P1/2T): 5 CM^2
BH CV ECHO MEAS - MVA(VTI): 2.9 CM^2
BH CV ECHO MEAS - PA ACC TIME: 0.08 SEC
BH CV ECHO MEAS - PA MAX PG (FULL): 0.71 MMHG
BH CV ECHO MEAS - PA MAX PG: 4 MMHG
BH CV ECHO MEAS - PA PR(ACCEL): 42.6 MMHG
BH CV ECHO MEAS - PA V2 MAX: 100.4 CM/SEC
BH CV ECHO MEAS - PULM A REVS DUR: 0.1 SEC
BH CV ECHO MEAS - PULM A REVS VEL: 27.6 CM/SEC
BH CV ECHO MEAS - PULM DIAS VEL: 38.3 CM/SEC
BH CV ECHO MEAS - PULM S/D: 1.4
BH CV ECHO MEAS - PULM SYS VEL: 55.3 CM/SEC
BH CV ECHO MEAS - PVA(V,A): 2.3 CM^2
BH CV ECHO MEAS - PVA(V,D): 2.3 CM^2
BH CV ECHO MEAS - QP/QS: 0.93
BH CV ECHO MEAS - RAP SYSTOLE: 3 MMHG
BH CV ECHO MEAS - RV MAX PG: 3.3 MMHG
BH CV ECHO MEAS - RV MEAN PG: 1.7 MMHG
BH CV ECHO MEAS - RV V1 MAX: 91.2 CM/SEC
BH CV ECHO MEAS - RV V1 MEAN: 62.6 CM/SEC
BH CV ECHO MEAS - RV V1 VTI: 19.2 CM
BH CV ECHO MEAS - RVOT AREA: 2.5 CM^2
BH CV ECHO MEAS - RVOT DIAM: 1.8 CM
BH CV ECHO MEAS - RVSP: 26 MMHG
BH CV ECHO MEAS - SI(AO): 86.2 ML/M^2
BH CV ECHO MEAS - SI(CUBED): 37.8 ML/M^2
BH CV ECHO MEAS - SI(LVOT): 36.2 ML/M^2
BH CV ECHO MEAS - SI(MOD-SP2): 17.9 ML/M^2
BH CV ECHO MEAS - SI(MOD-SP4): 24.1 ML/M^2
BH CV ECHO MEAS - SI(TEICH): 36 ML/M^2
BH CV ECHO MEAS - SUP REN AO DIAM: 1.8 CM
BH CV ECHO MEAS - SV(AO): 125 ML
BH CV ECHO MEAS - SV(CUBED): 54.9 ML
BH CV ECHO MEAS - SV(LVOT): 52.6 ML
BH CV ECHO MEAS - SV(MOD-SP2): 26 ML
BH CV ECHO MEAS - SV(MOD-SP4): 35 ML
BH CV ECHO MEAS - SV(RVOT): 48.7 ML
BH CV ECHO MEAS - SV(TEICH): 52.3 ML
BH CV ECHO MEAS - TAPSE (>1.6): 1.8 CM2
BH CV ECHO MEAS - TR MAX VEL: 241.6 CM/SEC
BH CV ECHO MEASUREMENTS AVERAGE E/E' RATIO: 10.18
BH CV VAS BP RIGHT ARM: NORMAL MMHG
BH CV XLRA - RV BASE: 2.5 CM
BH CV XLRA - TDI S': 14 CM/SEC
LEFT ATRIUM VOLUME INDEX: 18 ML/M2
LV EF 2D ECHO EST: 65 %
SINUS: 2.3 CM
STJ: 2.6 CM

## 2019-10-24 PROCEDURE — 93306 TTE W/DOPPLER COMPLETE: CPT

## 2019-10-24 PROCEDURE — 93306 TTE W/DOPPLER COMPLETE: CPT | Performed by: INTERNAL MEDICINE

## 2019-10-24 NOTE — TELEPHONE ENCOUNTER
Spoke with patient. EF has returned to normal. Keep follow up appt. All medications stay the same.      · Left ventricular systolic function is normal. Calculated EF = 65%. Estimated EF was in agreement with the calculated EF. Estimated EF = 65%. Normal left ventricular cavity size noted. All left ventricular wall segments contract normally. Left ventricular wall thickness is consistent with moderate concentric hypertrophy. Left ventricular diastolic dysfunction is noted (grade I) consistent with impaired relaxation.  · Mild mitral valve regurgitation is present.  · Mild tricuspid valve regurgitation is present. Estimated right ventricular systolic pressure from tricuspid regurgitation is normal (<35 mmHg). Calculated right ventricular systolic pressure from tricuspid regurgitation is 26 mmHg.

## 2019-10-30 ENCOUNTER — READMISSION MANAGEMENT (OUTPATIENT)
Dept: CALL CENTER | Facility: HOSPITAL | Age: 59
End: 2019-10-30

## 2019-10-30 NOTE — OUTREACH NOTE
COPD/PN Week 4 Survey      Responses   Facility patient discharged from?  [facility]   Does the patient have one of the following disease processes/diagnoses(primary or secondary)?  COPD/Pneumonia   Was the primary reason for admission:  Pneumonia   Week 4 attempt successful?  Yes   Call start time  1045   Call end time  1051   Discharge diagnosis  PNA, recurrent cardiomyopathy   Meds reviewed with patient/caregiver?  Yes   Is the patient taking all medications as directed (includes completed medication regime)?  Yes   Has the patient kept scheduled appointments due by today?  Yes   Comments  has seen pulmonary but is having a hard time getting in to see a PCP--has a call in   Is the patient still receiving Home Health Services?  No   Psychosocial issues?  No   What is the patient's perception of their health status since discharge?  Improving   Is the patient/caregiver able to teach back the hierarchy of who to call/visit for symptoms/problems? PCP, Specialist, Home health nurse, Urgent Care, ED, 911  Yes   Is the patient/caregiver able to teach back signs and symptoms of worsening condition:  Fever/chills, Shortness of breath, Chest pain   Is the patient/caregiver able to teach back importance of completing antibiotic course of treatment?  Yes   Week 4 call completed?  Yes   Would the patient like one additional call?  No   Graduated  Yes   Did the patient feel the follow up calls were helpful during their recovery period?  Yes   Was the number of calls appropriate?  Yes   Wrap up additional comments  Pt doing much better and will call the pharmacy to have them tx her spriva RX to her local pharmacy          Rocio Fitzpatrick RN

## 2019-11-20 ENCOUNTER — TRANSCRIBE ORDERS (OUTPATIENT)
Dept: ADMINISTRATIVE | Facility: HOSPITAL | Age: 59
End: 2019-11-20

## 2019-11-20 DIAGNOSIS — J18.9 PNEUMONIA DUE TO INFECTIOUS ORGANISM, UNSPECIFIED LATERALITY, UNSPECIFIED PART OF LUNG: Primary | ICD-10-CM

## 2019-11-24 ENCOUNTER — CLINICAL SUPPORT NO REQUIREMENTS (OUTPATIENT)
Dept: CARDIOLOGY | Facility: CLINIC | Age: 59
End: 2019-11-24

## 2019-11-24 DIAGNOSIS — I63.9 CRYPTOGENIC STROKE (HCC): Primary | ICD-10-CM

## 2019-11-24 PROCEDURE — 93299 PR REM INTERROG ICPMS/SCRMS <30 D TECH REVIEW: CPT | Performed by: INTERNAL MEDICINE

## 2019-11-24 PROCEDURE — 93298 REM INTERROG DEV EVAL SCRMS: CPT | Performed by: INTERNAL MEDICINE

## 2019-12-12 ENCOUNTER — OFFICE VISIT (OUTPATIENT)
Dept: CARDIOLOGY | Facility: CLINIC | Age: 59
End: 2019-12-12

## 2019-12-12 VITALS
SYSTOLIC BLOOD PRESSURE: 120 MMHG | BODY MASS INDEX: 18.16 KG/M2 | HEART RATE: 73 BPM | WEIGHT: 106.4 LBS | DIASTOLIC BLOOD PRESSURE: 80 MMHG | HEIGHT: 64 IN

## 2019-12-12 DIAGNOSIS — Z72.0 TOBACCO ABUSE: ICD-10-CM

## 2019-12-12 DIAGNOSIS — I51.81 STRESS-INDUCED CARDIOMYOPATHY: ICD-10-CM

## 2019-12-12 DIAGNOSIS — I42.8 NON-ISCHEMIC CARDIOMYOPATHY (HCC): Primary | ICD-10-CM

## 2019-12-12 PROCEDURE — 93000 ELECTROCARDIOGRAM COMPLETE: CPT | Performed by: INTERNAL MEDICINE

## 2019-12-12 PROCEDURE — 99213 OFFICE O/P EST LOW 20 MIN: CPT | Performed by: INTERNAL MEDICINE

## 2019-12-12 NOTE — PROGRESS NOTES
Date of Office Visit: 2019  Encounter Provider: Emi Boston MD  Place of Service: [facility] CARDIOLOGY  Patient Name: Scarlet Chakraborty  :1960    Chief complaint  Takotsubo cardiomyopathy, mild coronary artery disease, nicotine use, prior stroke    History of Present Illness  The patient is a 59-year-old female with history of modest alcohol use, nicotine use, COPD who presented to [facility] on  with acute respiratory failure, congestive heart failure and ST elevation myocardial infarction.  However cardiac catheterization showed an ejection fraction of 20% with normal coronary arteries.  Her echocardiogram showed an ejection fraction of 35% and she was felt to have probable Takotsubo cardiomyopathy.  Her blood pressure was too low for adequate goal-directed medical therapy and only lisinopril 2.5 mg a day was able to be utilized.  She also had severe bronchospasm from COPD exacerbation.  She was subsequently dismissed home but then presented on  with left-sided weakness, slurred speech and a fall.  She was felt to have had a embolic stroke for which she received TPA with good response.  She had minimal residual symptoms.  A transthoracic echocardiogram revealed normal systolic function with grade 1 diastolic dysfunction.  A transesophageal echocardiogram was unremarkable including a negative saline study.  She subsequently underwent Linq placement.  In 2019 she was admitted with pneumonia and respiratory failure with recurrent cardiomyopathy with an ejection fraction of 20 to 25% with elevated troponin and heart failure.  Once again this resolved with treatment of pneumonic process.  Echocardiogram in 2019 showed an ejection fraction of 60%, mild mitral vegetation, mild tricuspid regurgitation with normal right-sided pressures    Since the last visit she is not exercising but moderately active she denies any chest pain,  shortness of breath, palpitations, syncope near syncope.  She is smoking 2 cigarettes a day.  It is much less than she had been before.  She is planning on using Chantix again after she talks to Dr. Fernandez.    Past Medical History:   Diagnosis Date   • Asthma    • Cerebrovascular accident (CVA) due to thrombosis of right middle cerebral artery (CMS/HCC)    • COPD (chronic obstructive pulmonary disease) (CMS/formerly [facility])    • Nonischemic cardiomyopathy (CMS/formerly [facility])    • NSTEMI (non-ST elevated myocardial infarction) (CMS/formerly [facility])    • Stress-induced cardiomyopathy    • Stroke (CMS/formerly [facility])    • Takotsubo cardiomyopathy    • Tobacco abuse      Past Surgical History:   Procedure Laterality Date   • CARDIAC CATHETERIZATION N/A 9/13/2018    Procedure: Left Heart Cath and cors;  Surgeon: Gabino Dozier MD;  Location: [facility] CATH INVASIVE LOCATION;  Service: Cardiology   • CARDIAC CATHETERIZATION N/A 9/13/2018    Procedure: Left ventriculography;  Surgeon: Gabino Dozier MD;  Location: [facility] CATH INVASIVE LOCATION;  Service: Cardiology   • CARDIAC ELECTROPHYSIOLOGY PROCEDURE N/A 9/27/2018    Procedure: Loop insertion  LINQ;  Surgeon: Jose R Barker MD;  Location: [facility] CATH INVASIVE LOCATION;  Service: Cardiovascular     Outpatient Medications Prior to Visit   Medication Sig Dispense Refill   • albuterol sulfate  (90 Base) MCG/ACT inhaler Inhale 2 puffs Every 4 (Four) Hours As Needed for Wheezing.     • apixaban (ELIQUIS) 5 MG tablet tablet Take 1 tablet by mouth Every 12 (Twelve) Hours. 180 tablet 3   • budesonide-formoterol (SYMBICORT) 160-4.5 MCG/ACT inhaler Inhale 2 puffs 2 (Two) Times a Day. 10.2 g 12   • CHANTIX STARTING MONTH FEDERICA 0.5 MG X 11 & 1 MG X 42 tablet Take As Directed.     • lisinopril (PRINIVIL,ZESTRIL) 2.5 MG tablet Take 1 tablet by mouth Daily. 90 tablet 3   • Tiotropium Bromide Monohydrate (SPIRIVA RESPIMAT) 1.25 MCG/ACT aerosol solution inhaler Inhale 2 puffs Daily. 4 g 2   • ipratropium (ATROVENT) 0.02 %  nebulizer solution Take 2.5 mL by nebulization 4 (Four) Times a Day As Needed for Wheezing or Shortness of Air. 37.5 mL 3   • nicotine (NICODERM CQ) 7 MG/24HR patch Place 1 patch on the skin as directed by provider Daily. 14 each 3     No facility-administered medications prior to visit.        Allergies as of 12/12/2019   • (No Known Allergies)     Social History     Socioeconomic History   • Marital status: Single     Spouse name: Not on file   • Number of children: Not on file   • Years of education: Not on file   • Highest education level: Not on file   Tobacco Use   • Smoking status: Light Tobacco Smoker     Packs/day: 0.50     Years: 15.00     Pack years: 7.50     Types: Cigarettes   • Smokeless tobacco: Never Used   • Tobacco comment: currently 2 cigs daily   Substance and Sexual Activity   • Alcohol use: Yes     Comment: occassional/ Daily caffeine use   • Drug use: No   • Sexual activity: Defer     Family History   Problem Relation Age of Onset   • Cancer Sister      Review of Systems   Constitution: Negative for fever, malaise/fatigue, weight gain and weight loss.   HENT: Negative for ear pain, hearing loss, nosebleeds and sore throat.    Eyes: Negative for double vision, pain, vision loss in left eye and vision loss in right eye.   Cardiovascular:        See history of present illness.   Respiratory: Negative for cough, shortness of breath, sleep disturbances due to breathing, snoring and wheezing.    Endocrine: Negative for cold intolerance, heat intolerance and polyuria.   Skin: Negative for itching, poor wound healing and rash.   Musculoskeletal: Negative for joint pain, joint swelling and myalgias.   Gastrointestinal: Negative for abdominal pain, diarrhea, hematochezia, nausea and vomiting.   Genitourinary: Negative for hematuria and hesitancy.   Neurological: Negative for numbness, paresthesias and seizures.   Psychiatric/Behavioral: Negative for depression. The patient is not nervous/anxious.      "    Objective:     Vitals:    12/12/19 1339   BP: 120/80   Pulse: 73   Weight: 48.3 kg (106 lb 6.4 oz)   Height: 162.6 cm (64.02\")     Body mass index is 18.25 kg/m².    Physical Exam   Constitutional: She is oriented to person, place, and time. She appears well-developed and well-nourished.   HENT:   Head: Normocephalic.   Nose: Nose normal.   Mouth/Throat: Oropharynx is clear and moist.   Eyes: Pupils are equal, round, and reactive to light. Conjunctivae and EOM are normal. Right eye exhibits no discharge. No scleral icterus.   Neck: Normal range of motion. Neck supple. No JVD present. No thyromegaly present.   Cardiovascular: Normal rate, regular rhythm, normal heart sounds and intact distal pulses. Exam reveals no gallop and no friction rub.   No murmur heard.  Pulses:       Carotid pulses are 2+ on the right side, and 2+ on the left side.       Radial pulses are 2+ on the right side, and 2+ on the left side.        Femoral pulses are 2+ on the right side, and 2+ on the left side.       Popliteal pulses are 2+ on the right side, and 2+ on the left side.        Dorsalis pedis pulses are 2+ on the right side, and 2+ on the left side.        Posterior tibial pulses are 2+ on the right side, and 2+ on the left side.   Pulmonary/Chest: Effort normal and breath sounds normal. No respiratory distress. She has no wheezes. She has no rales.   Abdominal: Soft. Bowel sounds are normal. She exhibits no distension. There is no hepatosplenomegaly. There is no tenderness. There is no rebound.   Musculoskeletal: Normal range of motion. She exhibits no edema or tenderness.   Neurological: She is alert and oriented to person, place, and time.   Skin: Skin is warm and dry. No rash noted. No erythema.   Psychiatric: She has a normal mood and affect. Her behavior is normal. Judgment and thought content normal.   Vitals reviewed.    Lab Review:     ECG 12 Lead  Date/Time: 12/12/2019 1:39 PM  Performed by: Emi Boston MD  Authorized " by: Emi Boston MD   Comparison: compared with previous ECG   Similar to previous ECG  Rhythm: sinus rhythm  Other findings: non-specific ST-T wave changes    Clinical impression: abnormal EKG          Assessment:       Diagnosis Plan   1. Non-ischemic cardiomyopathy (CMS/HCC)  ECG 12 Lead   2. Stress-induced cardiomyopathy     3. Tobacco abuse       Plan:       1.  Recurrent Takotsubo cardiomyopathy.  Clinically doing quite well.  Return for follow-up in 6 months with APRN and me in 1 year  2.  Recent stroke felt to be embolic.  Linq device in place and she is on Eliquis.  Stable over the next.  Can at some later date consider discontinuing of Eliquis.  However given the recurrent nature of her tach Leticia would prefer to continue with Eliquis for now in the setting of prior ambolic stroke.  No arrhythmia thus far.  3.  Severe COPD   4.  Nicotine use she is only smoking 2 cigarettes a day and plans to stop in the very near future.  She just started back on Chantix.       Your medication list           Accurate as of December 12, 2019 11:59 PM. If you have any questions, ask your nurse or doctor.               CONTINUE taking these medications      Instructions Last Dose Given Next Dose Due   albuterol sulfate  (90 Base) MCG/ACT inhaler  Commonly known as:  PROVENTIL HFA;VENTOLIN HFA;PROAIR HFA      Inhale 2 puffs Every 4 (Four) Hours As Needed for Wheezing.       apixaban 5 MG tablet tablet  Commonly known as:  ELIQUIS      Take 1 tablet by mouth Every 12 (Twelve) Hours.       budesonide-formoterol 160-4.5 MCG/ACT inhaler  Commonly known as:  SYMBICORT      Inhale 2 puffs 2 (Two) Times a Day.       CHANTIX STARTING MONTH FEDERICA 0.5 MG X 11 & 1 MG X 42 tablet  Generic drug:  varenicline      Take As Directed.       ipratropium 0.02 % nebulizer solution  Commonly known as:  ATROVENT      Take 2.5 mL by nebulization 4 (Four) Times a Day As Needed for Wheezing or Shortness of Air.       lisinopril 2.5 MG  tablet  Commonly known as:  PRINIVIL,ZESTRIL      Take 1 tablet by mouth Daily.       SPIRIVA RESPIMAT 1.25 MCG/ACT aerosol solution inhaler  Generic drug:  Tiotropium Bromide Monohydrate      Inhale 2 puffs Daily.          STOP taking these medications    nicotine 7 MG/24HR patch  Commonly known as:  NICODERM CQ  Stopped by:  Emi Boston MD               Patient is no longer taking -.  I corrected the med list to reflect this.  I did not stop these medications.    Dictated utilizing Dragon dictation

## 2019-12-14 PROBLEM — I42.8 NON-ISCHEMIC CARDIOMYOPATHY: Status: ACTIVE | Noted: 2019-09-19

## 2020-01-06 ENCOUNTER — HOSPITAL ENCOUNTER (OUTPATIENT)
Dept: CT IMAGING | Facility: HOSPITAL | Age: 60
Discharge: HOME | End: 2020-01-06
Admitting: INTERNAL MEDICINE

## 2020-01-06 DIAGNOSIS — J18.9 PNEUMONIA DUE TO INFECTIOUS ORGANISM, UNSPECIFIED LATERALITY, UNSPECIFIED PART OF LUNG: ICD-10-CM

## 2020-01-06 PROCEDURE — 71250 CT THORAX DX C-: CPT

## 2020-01-08 ENCOUNTER — CLINICAL SUPPORT NO REQUIREMENTS (OUTPATIENT)
Dept: CARDIOLOGY | Facility: CLINIC | Age: 60
End: 2020-01-08

## 2020-01-08 DIAGNOSIS — I63.9 CRYPTOGENIC STROKE (HCC): Primary | ICD-10-CM

## 2020-01-08 PROCEDURE — 93298 REM INTERROG DEV EVAL SCRMS: CPT | Performed by: INTERNAL MEDICINE

## 2020-02-22 ENCOUNTER — CLINICAL SUPPORT NO REQUIREMENTS (OUTPATIENT)
Dept: CARDIOLOGY | Facility: CLINIC | Age: 60
End: 2020-02-22

## 2020-02-22 DIAGNOSIS — I63.9 CRYPTOGENIC STROKE (HCC): Primary | ICD-10-CM

## 2020-02-22 PROCEDURE — 93298 REM INTERROG DEV EVAL SCRMS: CPT | Performed by: INTERNAL MEDICINE

## 2020-04-07 ENCOUNTER — CLINICAL SUPPORT NO REQUIREMENTS (OUTPATIENT)
Dept: CARDIOLOGY | Facility: CLINIC | Age: 60
End: 2020-04-07

## 2020-04-07 DIAGNOSIS — I63.9 CRYPTOGENIC STROKE (HCC): Primary | ICD-10-CM

## 2020-04-07 PROCEDURE — 93298 REM INTERROG DEV EVAL SCRMS: CPT | Performed by: INTERNAL MEDICINE

## 2020-06-15 ENCOUNTER — OFFICE VISIT (OUTPATIENT)
Dept: CARDIOLOGY | Facility: CLINIC | Age: 60
End: 2020-06-15

## 2020-06-15 VITALS — HEIGHT: 64 IN | WEIGHT: 98 LBS | BODY MASS INDEX: 16.73 KG/M2

## 2020-06-15 DIAGNOSIS — F17.200 TOBACCO DEPENDENCE: ICD-10-CM

## 2020-06-15 DIAGNOSIS — I25.2 HX OF NON-ST ELEVATION MYOCARDIAL INFARCTION (NSTEMI): ICD-10-CM

## 2020-06-15 DIAGNOSIS — Z95.818 STATUS POST PLACEMENT OF IMPLANTABLE LOOP RECORDER: ICD-10-CM

## 2020-06-15 DIAGNOSIS — I42.8 NON-ISCHEMIC CARDIOMYOPATHY (HCC): Primary | ICD-10-CM

## 2020-06-15 DIAGNOSIS — Z86.73 HISTORY OF STROKE: ICD-10-CM

## 2020-06-15 PROCEDURE — 99442 PR PHYS/QHP TELEPHONE EVALUATION 11-20 MIN: CPT | Performed by: NURSE PRACTITIONER

## 2020-06-15 NOTE — PROGRESS NOTES
Date of Office Visit: 06/15/2020  Encounter Provider: Isa Beck, KEVIN, APRN  Place of Service: [facility] CARDIOLOGY  Patient Name: Scarlet Chakraborty  :1960        Subjective:     Chief Complaint:  Follow-up, takotsubo cardiomyopathy, nonobstructive CAD, prior stroke      History of Present Illness:  Scarlet Chakraborty is a 60 y.o. female patient of Dr. Boston.  I am doing a telehealth visit with patient today and I have reviewed her records.     Patient has a history of takotsubo cardiomyopathy, nonobstructive CAD, NSTEMI, prior stroke, nicotine use, alcohol use, COPD.    PER PREVIOUS OFFICE NOTE: Patient presented to [facility] on  with acute respiratory failure, congestive heart failure and non-ST elevation myocardial infarction.  However cardiac catheterization showed an ejection fraction of 20% with normal coronary arteries.  Her echocardiogram showed an ejection fraction of 35% and she was felt to have probable Takotsubo cardiomyopathy.  Her blood pressure was too low for adequate goal-directed medical therapy and only lisinopril 2.5 mg a day was able to be utilized.  She also had severe bronchospasm from COPD exacerbation.  She was subsequently dismissed home but then presented on  with left-sided weakness, slurred speech and a fall.  She was felt to have had a embolic stroke for which she received TPA with good response.  She had minimal residual symptoms.  A transthoracic echocardiogram revealed normal systolic function with grade 1 diastolic dysfunction.  A transesophageal echocardiogram was unremarkable including a negative saline study.  She subsequently underwent Linq placement.  In 2019 she was admitted with pneumonia and respiratory failure with recurrent cardiomyopathy with an ejection fraction of 20 to 25% with elevated troponin and heart failure.  Once again this resolved with treatment of pneumonic process.   Echocardiogram in October 2019 showed an ejection fraction of 60%, mild mitral vegetation, mild tricuspid regurgitation with normal right-sided pressures.  THE FOLLOWING IS MY CONTRIBUTION TO NOTE:      Patient has called into the office today for a telehealth phone follow-up appointment.  Patient reports she is feeling fine since last visit.  No formal exercise but stays active around the house. Was walking on treadmill 20 minutes and exercise bike 15 minutes at the gym before epidemic without any exertional symptoms or concerns. Does get some occasional shortness of breath with activities that she attributes to hx COPD, not new. It improves after using her inhalers. Follows with pulmonology.  Denies QUINTANILLA when she was doing the above exercise routine at the gym.  Denies chest pain/ discomfort, palpitations, edema, dizziness, syncope, falls, abnormal bleeding, fatigue. Unfortunately she is still smoking 1-2 cigarettes a day in the AM and smoking cessation recommendations and methods were discussed.           Past Medical History:   Diagnosis Date   • Asthma    • Cerebrovascular accident (CVA) due to thrombosis of right middle cerebral artery (CMS/HCC)    • COPD (chronic obstructive pulmonary disease) (CMS/HCC)    • Nonischemic cardiomyopathy (CMS/HCC)    • NSTEMI (non-ST elevated myocardial infarction) (CMS/HCC)    • Stress-induced cardiomyopathy    • Stroke (CMS/HCC)    • Takotsubo cardiomyopathy    • Tobacco abuse      Past Surgical History:   Procedure Laterality Date   • CARDIAC CATHETERIZATION N/A 9/13/2018    Procedure: Left Heart Cath and cors;  Surgeon: Gabino Dozier MD;  Location: [facility] CATH INVASIVE LOCATION;  Service: Cardiology   • CARDIAC CATHETERIZATION N/A 9/13/2018    Procedure: Left ventriculography;  Surgeon: Gabino Dozier MD;  Location: [facility] CATH INVASIVE LOCATION;  Service: Cardiology   • CARDIAC ELECTROPHYSIOLOGY PROCEDURE N/A 9/27/2018    Procedure: Loop insertion  LINQ;  Surgeon:  Jose R Barker MD;  Location: [facility] LOCATION;  Service: Cardiovascular     Outpatient Medications Prior to Visit   Medication Sig Dispense Refill   • albuterol sulfate  (90 Base) MCG/ACT inhaler Inhale 2 puffs Every 4 (Four) Hours As Needed for Wheezing.     • apixaban (ELIQUIS) 5 MG tablet tablet Take 1 tablet by mouth Every 12 (Twelve) Hours. 180 tablet 2   • budesonide-formoterol (SYMBICORT) 160-4.5 MCG/ACT inhaler Inhale 2 puffs 2 (Two) Times a Day. 10.2 g 12   • lisinopril (PRINIVIL,ZESTRIL) 2.5 MG tablet Take 1 tablet by mouth Daily. 90 tablet 3   • Tiotropium Bromide Monohydrate (SPIRIVA RESPIMAT) 1.25 MCG/ACT aerosol solution inhaler Inhale 2 puffs Daily. 4 g 2   • CHANTIX STARTING MONTH FEDERICA 0.5 MG X 11 & 1 MG X 42 tablet Take As Directed.     • ipratropium (ATROVENT) 0.02 % nebulizer solution Take 2.5 mL by nebulization 4 (Four) Times a Day As Needed for Wheezing or Shortness of Air. 37.5 mL 3     No facility-administered medications prior to visit.        Allergies as of 06/15/2020   • (No Known Allergies)     Social History     Socioeconomic History   • Marital status: Single     Spouse name: Not on file   • Number of children: Not on file   • Years of education: Not on file   • Highest education level: Not on file   Tobacco Use   • Smoking status: Light Tobacco Smoker     Packs/day: 0.50     Years: 15.00     Pack years: 7.50     Types: Cigarettes   • Smokeless tobacco: Never Used   • Tobacco comment: currently 2 cigs daily   Substance and Sexual Activity   • Alcohol use: Yes     Comment: occassional/ Daily caffeine use   • Drug use: No   • Sexual activity: Defer     Family History   Problem Relation Age of Onset   • Cancer Sister        Review of Systems   Constitution: Negative for malaise/fatigue.   Cardiovascular: Positive for dyspnea on exertion (Chronic, hx COPD, no significant changes from last visit.  Improves after inhalers.  Followed by pulmonology Dr. MCWILLIAMS ). Negative for  "chest pain, leg swelling, palpitations and syncope.   Hematologic/Lymphatic: Negative for bleeding problem.   Musculoskeletal: Negative for falls.   Gastrointestinal: Negative for melena.   Genitourinary: Negative for hematuria.   Neurological: Negative for dizziness.   Psychiatric/Behavioral: Negative for altered mental status.          Objective:     Vitals:    06/15/20 1122   Weight: 44.5 kg (98 lb)   Height: 162.6 cm (64\")     Body mass index is 16.82 kg/m².          Assessment:       Diagnosis Plan   1. Non-ischemic cardiomyopathy (CMS/HCC)     2. Tobacco dependence     3. Hx of non-ST elevation myocardial infarction (NSTEMI)     4. History of stroke     5. Status post placement of implantable loop recorder           Plan:     1. Recurrent Takotsubo Cardiomyopathy: EF 65% on 10/2019 echo. On low dose lisinopril.  Was working out at the gym prior to epidemic without exertional symptoms. Recommended slowly easing back into light exercise routine and gradually increasing as tolerated.    2. History of stroke: [place] to be embolic.  Linq in place.  On Eliquis.  Followed by neurology. No arrhythmias seen on linq thus far.   3. Severe COPD: Followed by pulmonology. Chronic QUINTANILLA improves with use of inhaler.  Still smoking unfortunately.  4. Nicotine dependence: smoking 1-2 cigarettes a day in the morning. Chantix did not work.   Recommended aiming for complete smoking cessation.  Discussed approximately 3 minutes. Discussed altering AM routine and getting rid of cigarettes to help stop these last couple cigarettes.     Patient to keep December follow-up with Dr. Boston as scheduled or follow-up sooner if needed for any new, recurrent, or worsening symptoms or other issues/ concerns.      This patient has consented to a telehealth visit via phone. The visit was scheduled as a telehealth phone visit to comply with patient safety concerns in accordance with CDC recommendations.  All vitals recorded within this visit are " reported by the patient.  I spent 17 minutes in total including but not limited to the 12 minutes spent in direct conversation with this patient.            Your medication list           Accurate as of Mira 15, 2020 11:51 AM. If you have any questions, ask your nurse or doctor.               CONTINUE taking these medications      Instructions Last Dose Given Next Dose Due   albuterol sulfate  (90 Base) MCG/ACT inhaler  Commonly known as:  PROVENTIL HFA;VENTOLIN HFA;PROAIR HFA      Inhale 2 puffs Every 4 (Four) Hours As Needed for Wheezing.       apixaban 5 MG tablet tablet  Commonly known as:  ELIQUIS      Take 1 tablet by mouth Every 12 (Twelve) Hours.       budesonide-formoterol 160-4.5 MCG/ACT inhaler  Commonly known as:  SYMBICORT      Inhale 2 puffs 2 (Two) Times a Day.       lisinopril 2.5 MG tablet  Commonly known as:  PRINIVIL,ZESTRIL      Take 1 tablet by mouth Daily.       Spiriva Respimat 1.25 MCG/ACT aerosol solution inhaler  Generic drug:  Tiotropium Bromide Monohydrate      Inhale 2 puffs Daily.          STOP taking these medications    Chantix Starting Month Ritesh 0.5 MG X 11 & 1 MG X 42 tablet  Generic drug:  varenicline  Stopped by:  Isa Beck DNP, APRN        ipratropium 0.02 % nebulizer solution  Commonly known as:  ATROVENT  Stopped by:  Isa Beck DNP, SHANKAR               I did not stop or change the above medications.  Patient's medication list was updated to reflect medications they are currently taking including medication changes made by other providers.        Thanks,    Isa Beck DNP, APRN  06/15/2020         Dictated utilizing Dragon dictation

## 2020-11-02 RX ORDER — LISINOPRIL 2.5 MG/1
2.5 TABLET ORAL DAILY
Qty: 30 TABLET | Refills: 0 | Status: SHIPPED | OUTPATIENT
Start: 2020-11-02 | End: 2020-11-03 | Stop reason: SDUPTHER

## 2020-11-03 DIAGNOSIS — I42.8 OTHER CARDIOMYOPATHY (HCC): Primary | ICD-10-CM

## 2020-11-03 RX ORDER — LISINOPRIL 2.5 MG/1
2.5 TABLET ORAL DAILY
Qty: 30 TABLET | Refills: 1 | Status: SHIPPED | OUTPATIENT
Start: 2020-11-03 | End: 2021-01-22 | Stop reason: SDUPTHER

## 2020-11-03 NOTE — TELEPHONE ENCOUNTER
Limited supply given until receive updated labs.  Please see if she has had labs within the last year and if so please request a copy.  If not then I can place BMP for her to get drawn at outpatient lab.

## 2020-11-03 NOTE — TELEPHONE ENCOUNTER
I spoke with pt.  She is unable to come to [facility] and get this done.  But, she was an appt with Dr. Boston on December 15 and said she will get the labs done then, if that's ok.  Can you please put the lab order in.  Thanks!  Fina

## 2020-12-15 ENCOUNTER — HOSPITAL ENCOUNTER (OUTPATIENT)
Dept: CARDIOLOGY | Facility: HOSPITAL | Age: 60
Discharge: HOME | End: 2020-12-15
Admitting: INTERNAL MEDICINE

## 2020-12-15 ENCOUNTER — TELEPHONE (OUTPATIENT)
Dept: CARDIOLOGY | Facility: CLINIC | Age: 60
End: 2020-12-15

## 2020-12-15 ENCOUNTER — OFFICE VISIT (OUTPATIENT)
Dept: CARDIOLOGY | Facility: CLINIC | Age: 60
End: 2020-12-15

## 2020-12-15 VITALS
WEIGHT: 92 LBS | BODY MASS INDEX: 16.3 KG/M2 | SYSTOLIC BLOOD PRESSURE: 118 MMHG | DIASTOLIC BLOOD PRESSURE: 84 MMHG | HEART RATE: 92 BPM | HEIGHT: 63 IN

## 2020-12-15 DIAGNOSIS — I42.8 NON-ISCHEMIC CARDIOMYOPATHY (HCC): Primary | ICD-10-CM

## 2020-12-15 DIAGNOSIS — I42.8 NON-ISCHEMIC CARDIOMYOPATHY (HCC): ICD-10-CM

## 2020-12-15 DIAGNOSIS — J44.1 CHRONIC OBSTRUCTIVE PULMONARY DISEASE WITH ACUTE EXACERBATION (HCC): ICD-10-CM

## 2020-12-15 DIAGNOSIS — R06.02 SOB (SHORTNESS OF BREATH): ICD-10-CM

## 2020-12-15 LAB
ANION GAP SERPL CALCULATED.3IONS-SCNC: 11.1 MMOL/L (ref 5–15)
BUN SERPL-MCNC: 14 MG/DL (ref 6–20)
BUN/CREAT SERPL: 20.6 (ref 7–25)
CALCIUM SPEC-SCNC: 9.8 MG/DL (ref 8.6–10.5)
CHLORIDE SERPL-SCNC: 104 MMOL/L (ref 98–107)
CO2 SERPL-SCNC: 24.9 MMOL/L (ref 22–29)
CREAT SERPL-MCNC: 0.68 MG/DL (ref 0.57–1)
GFR SERPL CREATININE-BSD FRML MDRD: 89 ML/MIN/1.73
GLUCOSE SERPL-MCNC: 93 MG/DL (ref 65–99)
NT-PROBNP SERPL-MCNC: 53 PG/ML (ref 0–900)
POTASSIUM SERPL-SCNC: 4.9 MMOL/L (ref 3.5–5.2)
SODIUM SERPL-SCNC: 140 MMOL/L (ref 136–145)

## 2020-12-15 PROCEDURE — 83880 ASSAY OF NATRIURETIC PEPTIDE: CPT | Performed by: INTERNAL MEDICINE

## 2020-12-15 PROCEDURE — 93000 ELECTROCARDIOGRAM COMPLETE: CPT | Performed by: INTERNAL MEDICINE

## 2020-12-15 PROCEDURE — 99213 OFFICE O/P EST LOW 20 MIN: CPT | Performed by: INTERNAL MEDICINE

## 2020-12-15 PROCEDURE — 36415 COLL VENOUS BLD VENIPUNCTURE: CPT | Performed by: NURSE PRACTITIONER

## 2020-12-15 PROCEDURE — 80048 BASIC METABOLIC PNL TOTAL CA: CPT | Performed by: INTERNAL MEDICINE

## 2020-12-15 NOTE — PROGRESS NOTES
Date of Office Visit: 12/15/2020  Encounter Provider: Emi Boston MD  Place of Service: [facility] CARDIOLOGY  Patient Name: Scarlet Chakraborty  :1960    Chief complaint  Coronary artery disease, cardiomyopathy    History of Present Illness   The patient is a 59-year-old female with history of modest alcohol use, nicotine use, COPD who presented to [facility] on  with acute respiratory failure, congestive heart failure and ST elevation myocardial infarction.  However cardiac catheterization showed an ejection fraction of 20% with normal coronary arteries.  Her echocardiogram showed an ejection fraction of 35% and she was felt to have probable Takotsubo cardiomyopathy.  Her blood pressure was too low for adequate goal-directed medical therapy and only lisinopril 2.5 mg a day was able to be utilized.  She also had severe bronchospasm from COPD exacerbation.  She was subsequently dismissed home but then presented on  with left-sided weakness, slurred speech and a fall.  She was felt to have had a embolic stroke for which she received TPA with good response.  She had minimal residual symptoms.  A transthoracic echocardiogram revealed normal systolic function with grade 1 diastolic dysfunction.  A transesophageal echocardiogram was unremarkable including a negative saline study.  She subsequently underwent Linq placement.  In 2019 she was admitted with pneumonia and respiratory failure with recurrent cardiomyopathy with an ejection fraction of 20 to 25% with elevated troponin and heart failure.  Once again this resolved with treatment of pneumonic process.  Echocardiogram in 2019 showed an ejection fraction of 60%, mild mitral regurgitation, mild tricuspid regurgitation with normal right-sided pressures    Since last visit she has had worsening dyspnea on exertion she denies any palpitation chest pain syncope near syncope.  She has not had  any recent renal labs in over a year.  Last device check November 2020 was normal.  Since the weather has been cold she has been less active and has noticed more shortness of breath especially in the morning hours she believes that his her lungs and has an appointment to see her pulmonologist next month    Past Medical History:   Diagnosis Date   • Asthma    • Cerebrovascular accident (CVA) due to thrombosis of right middle cerebral artery (CMS/HCC)    • COPD (chronic obstructive pulmonary disease) (CMS/[facility])    • Nonischemic cardiomyopathy (CMS/[facility])    • NSTEMI (non-ST elevated myocardial infarction) (CMS/[facility])    • Stress-induced cardiomyopathy    • Stroke (CMS/[facility])    • Takotsubo cardiomyopathy    • Tobacco abuse      Past Surgical History:   Procedure Laterality Date   • CARDIAC CATHETERIZATION N/A 9/13/2018    Procedure: Left Heart Cath and cors;  Surgeon: Gabino Dozier MD;  Location: [facility] CATH INVASIVE LOCATION;  Service: Cardiology   • CARDIAC CATHETERIZATION N/A 9/13/2018    Procedure: Left ventriculography;  Surgeon: Gabino Dozier MD;  Location: [facility] CATH INVASIVE LOCATION;  Service: Cardiology   • CARDIAC ELECTROPHYSIOLOGY PROCEDURE N/A 9/27/2018    Procedure: Loop insertion  LINQ;  Surgeon: Jose R Barker MD;  Location: [facility] CATH INVASIVE LOCATION;  Service: Cardiovascular     Outpatient Medications Prior to Visit   Medication Sig Dispense Refill   • albuterol sulfate  (90 Base) MCG/ACT inhaler Inhale 2 puffs Every 4 (Four) Hours As Needed for Wheezing.     • apixaban (ELIQUIS) 5 MG tablet tablet Take 1 tablet by mouth Every 12 (Twelve) Hours. 180 tablet 2   • budesonide-formoterol (SYMBICORT) 160-4.5 MCG/ACT inhaler Inhale 2 puffs 2 (Two) Times a Day. 10.2 g 12   • lisinopril (PRINIVIL,ZESTRIL) 2.5 MG tablet Take 1 tablet by mouth Daily. 30 tablet 1   • Tiotropium Bromide Monohydrate (SPIRIVA RESPIMAT) 1.25 MCG/ACT aerosol solution inhaler Inhale 2 puffs Daily. 4 g 2     No  "facility-administered medications prior to visit.        Allergies as of 12/15/2020   • (No Known Allergies)     Social History     Socioeconomic History   • Marital status: Single     Spouse name: Not on file   • Number of children: Not on file   • Years of education: Not on file   • Highest education level: Not on file   Tobacco Use   • Smoking status: Light Tobacco Smoker     Packs/day: 0.50     Years: 15.00     Pack years: 7.50     Types: Cigarettes   • Smokeless tobacco: Never Used   • Tobacco comment: currently 2 cigs daily   Substance and Sexual Activity   • Alcohol use: Yes     Comment: occassional/ Daily caffeine use   • Drug use: No   • Sexual activity: Defer     Family History   Problem Relation Age of Onset   • Cancer Sister      Review of Systems   Constitution: Negative for fever, malaise/fatigue, weight gain and weight loss.   HENT: Negative for ear pain, hearing loss, nosebleeds and sore throat.    Eyes: Negative for double vision, pain, vision loss in left eye and vision loss in right eye.   Cardiovascular: Positive for dyspnea on exertion.        See history of present illness.   Respiratory: Positive for shortness of breath. Negative for cough, sleep disturbances due to breathing, snoring and wheezing.    Endocrine: Negative for cold intolerance, heat intolerance and polyuria.   Skin: Negative for itching, poor wound healing and rash.   Musculoskeletal: Negative for joint pain, joint swelling and myalgias.   Gastrointestinal: Negative for abdominal pain, diarrhea, hematochezia, nausea and vomiting.   Genitourinary: Negative for hematuria and hesitancy.   Neurological: Negative for numbness, paresthesias and seizures.   Psychiatric/Behavioral: Negative for depression. The patient is not nervous/anxious.         Objective:     Vitals:    12/15/20 1202   BP: 118/84   BP Location: Left arm   Patient Position: Sitting   Cuff Size: Adult   Pulse: 92   Weight: 41.7 kg (92 lb)   Height: 160 cm (63\") "     Body mass index is 16.3 kg/m².    Vitals signs reviewed.   Constitutional:       Appearance: Well-developed.   Eyes:      General: No scleral icterus.        Right eye: No discharge.      Conjunctiva/sclera: Conjunctivae normal.      Pupils: Pupils are equal, round, and reactive to light.   HENT:      Head: Normocephalic.      Nose: Nose normal.   Neck:      Musculoskeletal: Normal range of motion and neck supple.      Thyroid: No thyromegaly.      Vascular: No JVD.   Pulmonary:      Effort: Pulmonary effort is normal. No respiratory distress.      Breath sounds: Normal breath sounds. No wheezing. No rales.   Cardiovascular:      Normal rate. Regular rhythm. Normal S1. Normal S2.      Murmurs: There is no murmur.      No gallop.   Pulses:     Intact distal pulses.   Edema:     Peripheral edema absent.   Abdominal:      General: Bowel sounds are normal. There is no distension.      Palpations: Abdomen is soft.      Tenderness: There is no abdominal tenderness. There is no rebound.   Musculoskeletal: Normal range of motion.         General: No tenderness.   Skin:     General: Skin is warm and dry.      Findings: No erythema or rash.   Neurological:      Mental Status: Alert and oriented to person, place, and time.   Psychiatric:         Behavior: Behavior normal.         Thought Content: Thought content normal.         Judgment: Judgment normal.       Lab Review:     ECG 12 Lead    Date/Time: 12/15/2020 1:11 PM  Performed by: Emi Boston MD  Authorized by: Emi Boston MD   Comparison: compared with previous ECG   Similar to previous ECG  Rhythm: sinus rhythm  QRS axis: right  Other findings: non-specific ST-T wave changes    Clinical impression: abnormal EKG          Assessment:       Diagnosis Plan   1. Non-ischemic cardiomyopathy (CMS/HCC)  Basic Metabolic Panel    CBC & Differential    proBNP   2. SOB (shortness of breath)  ECG 12 Lead   3. Chronic obstructive pulmonary disease with acute exacerbation  (CMS/[facility])       Plan:       1.  Recurrent Takotsubo cardiomyopathy.  Ejection fraction had normalized last year.  With dyspnea on exertion that is worsening, we will check a proBNP however and if it is elevated we will repeat an echocardiogram.  If proBNP is normal we will have her follow-up with pulmonology.  2.  Recent stroke felt to be embolic.  Linq device in place.  She wishes to continue with observation would like device and anticoagulant therapy  3.  Severe COPD   4.  Nicotine use.  Unfortunately continues to smoke 2 cigarettes a day and not sure she will can stop at this point           Your medication list          Accurate as of December 15, 2020 11:59 PM. If you have any questions, ask your nurse or doctor.            CONTINUE taking these medications      Instructions Last Dose Given Next Dose Due   albuterol sulfate  (90 Base) MCG/ACT inhaler  Commonly known as: PROVENTIL HFA;VENTOLIN HFA;PROAIR HFA      Inhale 2 puffs Every 4 (Four) Hours As Needed for Wheezing.       apixaban 5 MG tablet tablet  Commonly known as: ELIQUIS      Take 1 tablet by mouth Every 12 (Twelve) Hours.       budesonide-formoterol 160-4.5 MCG/ACT inhaler  Commonly known as: SYMBICORT      Inhale 2 puffs 2 (Two) Times a Day.       lisinopril 2.5 MG tablet  Commonly known as: PRINIVIL,ZESTRIL      Take 1 tablet by mouth Daily.       Spiriva Respimat 1.25 MCG/ACT aerosol solution inhaler  Generic drug: Tiotropium Bromide Monohydrate      Inhale 2 puffs Daily.              Patient is no longer taking -.  I corrected the med list to reflect this.  I did not stop these medications.    Dictated utilizing Dragon dictation

## 2020-12-15 NOTE — TELEPHONE ENCOUNTER
Patient notified of results and recomendations and verbalized understanding  Beulah Marin RN  Triage nurse

## 2020-12-15 NOTE — TELEPHONE ENCOUNTER
Triage--  Please let patient know that kidney function and electrolytes look stable.  Fluid level test was WNL.  Per plan of care outlined in Dr. Boston's office note, would have her follow-up with pulmonology for further evaluation of shortness of breath symptoms.      Dr. Boston-- unfortunately it does not look like CBC was drawn.  Do you want patient to stop by outpatient lab one day this week to have this checked?

## 2020-12-22 PROBLEM — R06.02 SOB (SHORTNESS OF BREATH): Status: ACTIVE | Noted: 2020-12-22

## 2021-01-20 ENCOUNTER — OFFICE VISIT (OUTPATIENT)
Dept: NEUROLOGY | Facility: CLINIC | Age: 61
End: 2021-01-20

## 2021-01-20 VITALS
DIASTOLIC BLOOD PRESSURE: 68 MMHG | HEART RATE: 91 BPM | BODY MASS INDEX: 16.12 KG/M2 | HEIGHT: 63 IN | WEIGHT: 91 LBS | OXYGEN SATURATION: 95 % | SYSTOLIC BLOOD PRESSURE: 110 MMHG

## 2021-01-20 DIAGNOSIS — J44.9 CHRONIC OBSTRUCTIVE PULMONARY DISEASE, UNSPECIFIED COPD TYPE (HCC): ICD-10-CM

## 2021-01-20 DIAGNOSIS — I67.9 CEREBROVASCULAR DISEASE: ICD-10-CM

## 2021-01-20 DIAGNOSIS — Z72.0 TOBACCO ABUSE: ICD-10-CM

## 2021-01-20 DIAGNOSIS — E78.5 HYPERLIPIDEMIA LDL GOAL <70: ICD-10-CM

## 2021-01-20 PROCEDURE — 99215 OFFICE O/P EST HI 40 MIN: CPT | Performed by: NURSE PRACTITIONER

## 2021-01-20 RX ORDER — ATORVASTATIN CALCIUM 40 MG/1
40 TABLET, FILM COATED ORAL DAILY
Qty: 30 TABLET | Refills: 11 | Status: SHIPPED | OUTPATIENT
Start: 2021-01-20 | End: 2022-01-12

## 2021-01-20 NOTE — PROGRESS NOTES
DOS: 2021  NAME: Scarlet Chakraborty   : 1960  PCP: Margaret Ca PA    Chief Complaint   Patient presents with   • Stroke      SUBJECTIVE  Neurological Problem:  61 y.o. RHW female with COPD, tobacco abuse and cardiomyopathy who presents today for stroke follow-up. She is unaccompanied.     Interval History:   **For previous detailed history, please see progress note dated 19.    Ms. Chakraborty has a h/o b/l infarcts concerning for a cardioembolic source she suffered in 2018.  She was last seen in the office in 2019 with a normal neurologic exam.      Review of her ILR report from Dec. 2020 shows no evidence of afib thus far. She continues to f/u with cardiology. She continues on Eliquis but she is not taking a statin, uncertain why. Review of her lab work from her PCP shows that in 2019 her lipid panel with a total of 224, HDL 70, , triglycerides 98; LFTs normal: CBC CMP unremarkable.    She presents today and continues on Elqiuis; althought she states she sometimes forgets to take the nighttime dose. She denies any new stroke/TIA symptoms. She is working from home, denies any changes in her health. She is followed by pulmonology for her COPD. She does continue to smoke. States her BP runs on the low side. No changes in gait, no falls.     Review of Systems:Review of Systems   Constitutional: Negative for activity change, appetite change and fatigue.   HENT: Positive for tinnitus. Negative for ear pain and trouble swallowing.    Eyes: Negative for photophobia, pain and visual disturbance.   Musculoskeletal: Positive for back pain. Negative for gait problem and neck pain.   Neurological: Negative for dizziness, tremors, seizures, syncope, facial asymmetry, speech difficulty, weakness, light-headedness, numbness and headaches.   Psychiatric/Behavioral: Negative for agitation, behavioral problems, confusion, decreased concentration, dysphoric mood,  hallucinations, self-injury, sleep disturbance and suicidal ideas. The patient is not nervous/anxious and is not hyperactive.     Above ROS reviewed    The following portions of the patient's history were reviewed and updated as appropriate: allergies, current medications, past family history, past medical history, past social history, past surgical history and problem list.    Current Medications:   Current Outpatient Medications:   •  albuterol sulfate  (90 Base) MCG/ACT inhaler, Inhale 2 puffs Every 4 (Four) Hours As Needed for Wheezing., Disp: , Rfl:   •  apixaban (ELIQUIS) 5 MG tablet tablet, Take 1 tablet by mouth Every 12 (Twelve) Hours., Disp: 180 tablet, Rfl: 2  •  budesonide-formoterol (SYMBICORT) 160-4.5 MCG/ACT inhaler, Inhale 2 puffs 2 (Two) Times a Day., Disp: 10.2 g, Rfl: 12  •  lisinopril (PRINIVIL,ZESTRIL) 2.5 MG tablet, Take 1 tablet by mouth Daily., Disp: 30 tablet, Rfl: 1  •  Tiotropium Bromide Monohydrate (SPIRIVA RESPIMAT) 1.25 MCG/ACT aerosol solution inhaler, Inhale 2 puffs Daily., Disp: 4 g, Rfl: 2  •  atorvastatin (Lipitor) 40 MG tablet, Take 1 tablet by mouth Daily., Disp: 30 tablet, Rfl: 11  **I did not stop or change the above medications.  Patient's medication list was updated to reflect medications they have reported as currently taking, including medication changes made by other providers.    OBJECTIVE  Vitals:    01/20/21 1431   BP: 110/68   Pulse: 91   SpO2: 95%     Body mass index is 16.12 kg/m².    Diagnostics:  Dec ILR report:       Laboratory Results:         Lab Results   Component Value Date    WBC 12.46 (H) 09/30/2019    HGB 14.7 09/30/2019    HCT 46.2 09/30/2019    MCV 95.9 09/30/2019     09/30/2019     Lab Results   Component Value Date    GLUCOSE 93 12/15/2020    BUN 14 12/15/2020    CREATININE 0.68 12/15/2020    EGFRIFNONA 89 12/15/2020    BCR 20.6 12/15/2020    K 4.9 12/15/2020    CO2 24.9 12/15/2020    CALCIUM 9.8 12/15/2020    ALBUMIN 4.50 09/30/2019     AST 27 09/30/2019    ALT 22 09/30/2019     Lab Results   Component Value Date    HGBA1C 5.20 09/30/2019     Lab Results   Component Value Date    CHOL 143 12/18/2018    CHOL 168 09/25/2018     Lab Results   Component Value Date    HDL 73 (H) 12/18/2018    HDL 53 09/25/2018     Lab Results   Component Value Date    LDL 50 12/18/2018    LDL 90 09/25/2018     Lab Results   Component Value Date    TRIG 99 12/18/2018    TRIG 125 09/25/2018     No results found for: RPR  Lab Results   Component Value Date    TSH 1.010 09/24/2018     Lab Results   Component Value Date    GFGTXGER99 327 09/25/2018     Physical Exam:  GENERAL: NAD, thin  HEENT: Normocephalic, atraumatic   COR: RRR  Resp: Even and unlabored  Extremities: No signs of distal embolization.   Skin: No rashes, lesions or ulcers.  Psychiatric: Normal mood and affect.    Neurological:   MS: AO. Language normal. No neglect. Recall (3/3).  Follows all commands.  CN: II-XII grossly normal  Motor: Normal strength and tone throughout.  Sensory: Intact to light touch in arms and legs  Station and Gait: Normal gait and station.    Coordination: Normal finger to nose bilaterally    Impression:  Ms. Chakraborty presents for follow-up of bilateral infarcts she suffered in September 2018 concerning for cardioembolic source.  She has since been maintained on Eliquis with no recurrent or new stroke/TIA symptoms and a normal neurologic exam today.  Her loop recorder thus far has not shown any evidence of A. fib.  She is not currently on statin, PCP labs from November 2019 show an LDL of 134, recommend she start Lipitor and have a repeat lipid panel in 6 to 8 weeks.  We again discussed the importance of risk factor control for stroke prevention, especially smoking cessation.  We reviewed the signs and symptoms of stroke importance of calling 911 if should develop any of these.  She will follow-up here in 1 year, sooner symptoms warrant.  She voiced understanding agrees with  plan.    Plan:     Continue Eliquis -- encouraged her to take BID as directed  Start Lipitor 40 mg, will need repeat lipid panel in 6-8 weeks, order placed  Strongly encouraged smoking cessation  Monitor BP regularly, keep well-hydrated  Secondary stroke prevention: Ideal targets for stroke prevention would be Blood pressure < 130/80; B12 > 500 TSH in normal range and LDL < 70; HbA1c < 6.5 and smoking cessation if applicable.  Call 911 for stroke symptoms  Follow-up in one year      I spent a total of 45 minutes today in reviewing records, prior diagnostics, examination of patient including, counseling and educating patient regarding signs/symptoms of stroke and reviewing diagnostics, stroke prevention recommendations and discussion and documenting plan of care.      Diagnoses and all orders for this visit:    1. Cerebrovascular disease    2. Hyperlipidemia LDL goal <70  -     Cancel: Lipid Panel; Future  -     Lipid Panel; Future    3. Tobacco abuse    4. Chronic obstructive pulmonary disease, unspecified COPD type (CMS/[facility])    Other orders  -     atorvastatin (Lipitor) 40 MG tablet; Take 1 tablet by mouth Daily.  Dispense: 30 tablet; Refill: 11        Coding      Dictated using Dragon

## 2021-01-20 NOTE — PATIENT INSTRUCTIONS
Stroke Prevention  Some medical conditions and behaviors are associated with a higher chance of having a stroke. You can help prevent a stroke by making nutrition, lifestyle, and other changes, including managing any medical conditions you may have.  What nutrition changes can be made?    · Eat healthy foods. You can do this by:  ? Choosing foods high in fiber, such as fresh fruits and vegetables and whole grains.  ? Eating at least 5 or more servings of fruits and vegetables a day. Try to fill half of your plate at each meal with fruits and vegetables.  ? Choosing lean protein foods, such as lean cuts of meat, poultry without skin, fish, tofu, beans, and nuts.  ? Eating low-fat dairy products.  ? Avoiding foods that are high in salt (sodium). This can help lower blood pressure.  ? Avoiding foods that have saturated fat, trans fat, and cholesterol. This can help prevent high cholesterol.  ? Avoiding processed and premade foods.  · Follow your health care provider's specific guidelines for losing weight, controlling high blood pressure (hypertension), lowering high cholesterol, and managing diabetes. These may include:  ? Reducing your daily calorie intake.  ? Limiting your daily sodium intake to 1,500 milligrams (mg).  ? Using only healthy fats for cooking, such as olive oil, canola oil, or sunflower oil.  ? Counting your daily carbohydrate intake.  What lifestyle changes can be made?  · Maintain a healthy weight. Talk to your health care provider about your ideal weight.  · Get at least 30 minutes of moderate physical activity at least 5 days a week. Moderate activity includes brisk walking, biking, and swimming.  · Do not use any products that contain nicotine or tobacco, such as cigarettes and e-cigarettes. If you need help quitting, ask your health care provider. It may also be helpful to avoid exposure to secondhand smoke.  · Limit alcohol intake to no more than 1 drink a day for nonpregnant women and 2 drinks  a day for men. One drink equals 12 oz of beer, 5 oz of wine, or 1½ oz of hard liquor.  · Stop any illegal drug use.  · Avoid taking birth control pills. Talk to your health care provider about the risks of taking birth control pills if:  ? You are over 35 years old.  ? You smoke.  ? You get migraines.  ? You have ever had a blood clot.  What other changes can be made?  · Manage your cholesterol levels.  ? Eating a healthy diet is important for preventing high cholesterol. If cholesterol cannot be managed through diet alone, you may also need to take medicines.  ? Take any prescribed medicines to control your cholesterol as told by your health care provider.  · Manage your diabetes.  ? Eating a healthy diet and exercising regularly are important parts of managing your blood sugar. If your blood sugar cannot be managed through diet and exercise, you may need to take medicines.  ? Take any prescribed medicines to control your diabetes as told by your health care provider.  · Control your hypertension.  ? To reduce your risk of stroke, try to keep your blood pressure below 130/80.  ? Eating a healthy diet and exercising regularly are an important part of controlling your blood pressure. If your blood pressure cannot be managed through diet and exercise, you may need to take medicines.  ? Take any prescribed medicines to control hypertension as told by your health care provider.  ? Ask your health care provider if you should monitor your blood pressure at home.  ? Have your blood pressure checked every year, even if your blood pressure is normal. Blood pressure increases with age and some medical conditions.  · Get evaluated for sleep disorders (sleep apnea). Talk to your health care provider about getting a sleep evaluation if you snore a lot or have excessive sleepiness.  · Take over-the-counter and prescription medicines only as told by your health care provider. Aspirin or blood thinners (antiplatelets or  anticoagulants) may be recommended to reduce your risk of forming blood clots that can lead to stroke.  · Make sure that any other medical conditions you have, such as atrial fibrillation or atherosclerosis, are managed.  What are the warning signs of a stroke?  The warning signs of a stroke can be easily remembered as BEFAST.  · B is for balance. Signs include:  ? Dizziness.  ? Loss of balance or coordination.  ? Sudden trouble walking.  · E is for eyes. Signs include:  ? A sudden change in vision.  ? Trouble seeing.  · F is for face. Signs include:  ? Sudden weakness or numbness of the face.  ? The face or eyelid drooping to one side.  · A is for arms. Signs include:  ? Sudden weakness or numbness of the arm, usually on one side of the body.  · S is for speech. Signs include:  ? Trouble speaking (aphasia).  ? Trouble understanding.  · T is for time.  ? These symptoms may represent a serious problem that is an emergency. Do not wait to see if the symptoms will go away. Get medical help right away. Call your local emergency services (911 in the [place] Do not drive yourself to the hospital.  · Other signs of stroke may include:  ? A sudden, severe headache with no known cause.  ? Nausea or vomiting.  ? Seizure.  Where to find more information  For more information, visit:  · American Stroke Association: www.strokeassociation.org  · National Stroke Association: www.stroke.org  Summary  · You can prevent a stroke by eating healthy, exercising, not smoking, limiting alcohol intake, and managing any medical conditions you may have.  · Do not use any products that contain nicotine or tobacco, such as cigarettes and e-cigarettes. If you need help quitting, ask your health care provider. It may also be helpful to avoid exposure to secondhand smoke.  · Remember BEFAST for warning signs of stroke. Get help right away if you or a loved one has any of these signs.  This information is not intended to replace advice given to you  by your health care provider. Make sure you discuss any questions you have with your health care provider.  Document Revised: 11/30/2018 Document Reviewed: 01/23/2018  Elsevier Patient Education © 2020 [employer]

## 2021-01-22 ENCOUNTER — TRANSCRIBE ORDERS (OUTPATIENT)
Dept: ADMINISTRATIVE | Facility: HOSPITAL | Age: 61
End: 2021-01-22

## 2021-01-22 DIAGNOSIS — R91.8 LUNG NODULES: Primary | ICD-10-CM

## 2021-01-22 RX ORDER — LISINOPRIL 2.5 MG/1
2.5 TABLET ORAL DAILY
Qty: 90 TABLET | Refills: 1 | Status: SHIPPED | OUTPATIENT
Start: 2021-01-22 | End: 2022-08-03 | Stop reason: SDUPTHER

## 2021-03-12 ENCOUNTER — APPOINTMENT (OUTPATIENT)
Dept: GENERAL RADIOLOGY | Facility: HOSPITAL | Age: 61
End: 2021-03-12

## 2021-03-12 ENCOUNTER — HOSPITAL ENCOUNTER (INPATIENT)
Facility: HOSPITAL | Age: 61
LOS: 10 days | Discharge: HOME | End: 2021-03-22
Attending: EMERGENCY MEDICINE | Admitting: INTERNAL MEDICINE

## 2021-03-12 DIAGNOSIS — J18.9 PNEUMONIA OF BOTH LOWER LOBES DUE TO INFECTIOUS ORGANISM: ICD-10-CM

## 2021-03-12 DIAGNOSIS — R93.89 ABNORMAL CT OF THE CHEST: ICD-10-CM

## 2021-03-12 DIAGNOSIS — J44.1 COPD EXACERBATION (HCC): ICD-10-CM

## 2021-03-12 DIAGNOSIS — J96.01 ACUTE RESPIRATORY FAILURE WITH HYPOXIA AND HYPERCAPNIA (HCC): Primary | ICD-10-CM

## 2021-03-12 DIAGNOSIS — J44.1 COPD WITH ACUTE EXACERBATION (HCC): ICD-10-CM

## 2021-03-12 DIAGNOSIS — J96.02 ACUTE RESPIRATORY FAILURE WITH HYPOXIA AND HYPERCAPNIA (HCC): Primary | ICD-10-CM

## 2021-03-12 LAB
ALBUMIN SERPL-MCNC: 4.4 G/DL (ref 3.5–5.2)
ALBUMIN/GLOB SERPL: 1.5 G/DL
ALP SERPL-CCNC: 71 U/L (ref 39–117)
ALT SERPL W P-5'-P-CCNC: 30 U/L (ref 1–33)
ANION GAP SERPL CALCULATED.3IONS-SCNC: 8.2 MMOL/L (ref 5–15)
ARTERIAL PATENCY WRIST A: ABNORMAL
AST SERPL-CCNC: 30 U/L (ref 1–32)
ATMOSPHERIC PRESS: 759.7 MMHG
B PARAPERT DNA SPEC QL NAA+PROBE: NOT DETECTED
B PERT DNA SPEC QL NAA+PROBE: NOT DETECTED
BASE EXCESS BLDA CALC-SCNC: -3.4 MMOL/L (ref 0–2)
BASOPHILS # BLD AUTO: 0.11 10*3/MM3 (ref 0–0.2)
BASOPHILS NFR BLD AUTO: 0.6 % (ref 0–1.5)
BDY SITE: ABNORMAL
BILIRUB SERPL-MCNC: 0.2 MG/DL (ref 0–1.2)
BUN SERPL-MCNC: 14 MG/DL (ref 8–23)
BUN/CREAT SERPL: 21.9 (ref 7–25)
C PNEUM DNA NPH QL NAA+NON-PROBE: NOT DETECTED
CALCIUM SPEC-SCNC: 9.1 MG/DL (ref 8.6–10.5)
CHLORIDE SERPL-SCNC: 105 MMOL/L (ref 98–107)
CO2 SERPL-SCNC: 26.8 MMOL/L (ref 22–29)
CREAT SERPL-MCNC: 0.64 MG/DL (ref 0.57–1)
D-LACTATE SERPL-SCNC: 1.5 MMOL/L (ref 0.5–2)
DEPRECATED RDW RBC AUTO: 40.8 FL (ref 37–54)
EOSINOPHIL # BLD AUTO: 0.98 10*3/MM3 (ref 0–0.4)
EOSINOPHIL NFR BLD AUTO: 5.7 % (ref 0.3–6.2)
ERYTHROCYTE [DISTWIDTH] IN BLOOD BY AUTOMATED COUNT: 12.1 % (ref 12.3–15.4)
FLUAV SUBTYP SPEC NAA+PROBE: NOT DETECTED
FLUBV RNA ISLT QL NAA+PROBE: NOT DETECTED
GFR SERPL CREATININE-BSD FRML MDRD: 95 ML/MIN/1.73
GLOBULIN UR ELPH-MCNC: 3 GM/DL
GLUCOSE SERPL-MCNC: 201 MG/DL (ref 65–99)
HADV DNA SPEC NAA+PROBE: NOT DETECTED
HCO3 BLDA-SCNC: 27.2 MMOL/L (ref 22–28)
HCOV 229E RNA SPEC QL NAA+PROBE: NOT DETECTED
HCOV HKU1 RNA SPEC QL NAA+PROBE: NOT DETECTED
HCOV NL63 RNA SPEC QL NAA+PROBE: NOT DETECTED
HCOV OC43 RNA SPEC QL NAA+PROBE: NOT DETECTED
HCT VFR BLD AUTO: 44.9 % (ref 34–46.6)
HGB BLD-MCNC: 14.7 G/DL (ref 12–15.9)
HMPV RNA NPH QL NAA+NON-PROBE: NOT DETECTED
HPIV1 RNA SPEC QL NAA+PROBE: NOT DETECTED
HPIV2 RNA SPEC QL NAA+PROBE: NOT DETECTED
HPIV3 RNA NPH QL NAA+PROBE: NOT DETECTED
HPIV4 P GENE NPH QL NAA+PROBE: NOT DETECTED
IMM GRANULOCYTES # BLD AUTO: 0.04 10*3/MM3 (ref 0–0.05)
IMM GRANULOCYTES NFR BLD AUTO: 0.2 % (ref 0–0.5)
INHALED O2 CONCENTRATION: 40 %
LYMPHOCYTES # BLD AUTO: 4.63 10*3/MM3 (ref 0.7–3.1)
LYMPHOCYTES NFR BLD AUTO: 27.2 % (ref 19.6–45.3)
M PNEUMO IGG SER IA-ACNC: NOT DETECTED
MCH RBC QN AUTO: 30.4 PG (ref 26.6–33)
MCHC RBC AUTO-ENTMCNC: 32.7 G/DL (ref 31.5–35.7)
MCV RBC AUTO: 93 FL (ref 79–97)
MODALITY: ABNORMAL
MONOCYTES # BLD AUTO: 1.28 10*3/MM3 (ref 0.1–0.9)
MONOCYTES NFR BLD AUTO: 7.5 % (ref 5–12)
NEUTROPHILS NFR BLD AUTO: 10.01 10*3/MM3 (ref 1.7–7)
NEUTROPHILS NFR BLD AUTO: 58.8 % (ref 42.7–76)
NRBC BLD AUTO-RTO: 0 /100 WBC (ref 0–0.2)
NT-PROBNP SERPL-MCNC: 60.2 PG/ML (ref 0–900)
O2 A-A PPRESDIFF RESPIRATORY: 0.5 MMHG
PCO2 BLDA: 73.4 MM HG (ref 35–45)
PH BLDA: 7.18 PH UNITS (ref 7.35–7.45)
PLATELET # BLD AUTO: 341 10*3/MM3 (ref 140–450)
PMV BLD AUTO: 11.3 FL (ref 6–12)
PO2 BLDA: 113.2 MM HG (ref 80–100)
POTASSIUM SERPL-SCNC: 4.5 MMOL/L (ref 3.5–5.2)
PROCALCITONIN SERPL-MCNC: 0.07 NG/ML (ref 0–0.25)
PROT SERPL-MCNC: 7.4 G/DL (ref 6–8.5)
RBC # BLD AUTO: 4.83 10*6/MM3 (ref 3.77–5.28)
RHINOVIRUS RNA SPEC NAA+PROBE: NOT DETECTED
RSV RNA NPH QL NAA+NON-PROBE: NOT DETECTED
SAO2 % BLDCOA: 96.7 % (ref 92–99)
SARS-COV-2 RNA NPH QL NAA+NON-PROBE: NOT DETECTED
SODIUM SERPL-SCNC: 140 MMOL/L (ref 136–145)
TROPONIN T SERPL-MCNC: 0.06 NG/ML (ref 0–0.03)
VENTILATOR MODE: ABNORMAL
WBC # BLD AUTO: 17.05 10*3/MM3 (ref 3.4–10.8)

## 2021-03-12 PROCEDURE — 94799 UNLISTED PULMONARY SVC/PX: CPT

## 2021-03-12 PROCEDURE — 85025 COMPLETE CBC W/AUTO DIFF WBC: CPT | Performed by: EMERGENCY MEDICINE

## 2021-03-12 PROCEDURE — 71045 X-RAY EXAM CHEST 1 VIEW: CPT

## 2021-03-12 PROCEDURE — 25010000002 METHYLPREDNISOLONE PER 125 MG: Performed by: EMERGENCY MEDICINE

## 2021-03-12 PROCEDURE — 87040 BLOOD CULTURE FOR BACTERIA: CPT | Performed by: EMERGENCY MEDICINE

## 2021-03-12 PROCEDURE — 25010000002 PROPOFOL 10 MG/ML EMULSION: Performed by: EMERGENCY MEDICINE

## 2021-03-12 PROCEDURE — 94660 CPAP INITIATION&MGMT: CPT

## 2021-03-12 PROCEDURE — 93005 ELECTROCARDIOGRAM TRACING: CPT

## 2021-03-12 PROCEDURE — 84484 ASSAY OF TROPONIN QUANT: CPT | Performed by: EMERGENCY MEDICINE

## 2021-03-12 PROCEDURE — 94002 VENT MGMT INPAT INIT DAY: CPT

## 2021-03-12 PROCEDURE — 99285 EMERGENCY DEPT VISIT HI MDM: CPT

## 2021-03-12 PROCEDURE — 84145 PROCALCITONIN (PCT): CPT | Performed by: EMERGENCY MEDICINE

## 2021-03-12 PROCEDURE — 82803 BLOOD GASES ANY COMBINATION: CPT

## 2021-03-12 PROCEDURE — 83605 ASSAY OF LACTIC ACID: CPT | Performed by: EMERGENCY MEDICINE

## 2021-03-12 PROCEDURE — 0202U NFCT DS 22 TRGT SARS-COV-2: CPT | Performed by: EMERGENCY MEDICINE

## 2021-03-12 PROCEDURE — 5A1945Z RESPIRATORY VENTILATION, 24-96 CONSECUTIVE HOURS: ICD-10-PCS | Performed by: EMERGENCY MEDICINE

## 2021-03-12 PROCEDURE — 25010000002 MIDAZOLAM PER 1 MG: Performed by: EMERGENCY MEDICINE

## 2021-03-12 PROCEDURE — 93010 ELECTROCARDIOGRAM REPORT: CPT | Performed by: INTERNAL MEDICINE

## 2021-03-12 PROCEDURE — 36415 COLL VENOUS BLD VENIPUNCTURE: CPT

## 2021-03-12 PROCEDURE — 0BH17EZ INSERTION OF ENDOTRACHEAL AIRWAY INTO TRACHEA, VIA NATURAL OR ARTIFICIAL OPENING: ICD-10-PCS | Performed by: EMERGENCY MEDICINE

## 2021-03-12 PROCEDURE — 80053 COMPREHEN METABOLIC PANEL: CPT | Performed by: EMERGENCY MEDICINE

## 2021-03-12 PROCEDURE — 25010000002 SUCCINYLCHOLINE PER 20 MG: Performed by: EMERGENCY MEDICINE

## 2021-03-12 PROCEDURE — 83880 ASSAY OF NATRIURETIC PEPTIDE: CPT | Performed by: EMERGENCY MEDICINE

## 2021-03-12 PROCEDURE — 93005 ELECTROCARDIOGRAM TRACING: CPT | Performed by: EMERGENCY MEDICINE

## 2021-03-12 PROCEDURE — 31500 INSERT EMERGENCY AIRWAY: CPT

## 2021-03-12 PROCEDURE — 36600 WITHDRAWAL OF ARTERIAL BLOOD: CPT

## 2021-03-12 RX ORDER — ALBUTEROL SULFATE 2.5 MG/3ML
2.5 SOLUTION RESPIRATORY (INHALATION) ONCE
Status: DISCONTINUED | OUTPATIENT
Start: 2021-03-12 | End: 2021-03-14

## 2021-03-12 RX ORDER — IPRATROPIUM BROMIDE AND ALBUTEROL SULFATE 2.5; .5 MG/3ML; MG/3ML
3 SOLUTION RESPIRATORY (INHALATION) ONCE
Status: COMPLETED | OUTPATIENT
Start: 2021-03-12 | End: 2021-03-12

## 2021-03-12 RX ORDER — MIDAZOLAM HYDROCHLORIDE 1 MG/ML
2 INJECTION INTRAMUSCULAR; INTRAVENOUS ONCE
Status: COMPLETED | OUTPATIENT
Start: 2021-03-12 | End: 2021-03-12

## 2021-03-12 RX ORDER — SUCCINYLCHOLINE CHLORIDE 20 MG/ML
INJECTION INTRAMUSCULAR; INTRAVENOUS
Status: COMPLETED | OUTPATIENT
Start: 2021-03-12 | End: 2021-03-12

## 2021-03-12 RX ORDER — SODIUM CHLORIDE 9 MG/ML
INJECTION, SOLUTION INTRAVENOUS
Status: COMPLETED | OUTPATIENT
Start: 2021-03-12 | End: 2021-03-12

## 2021-03-12 RX ORDER — METHYLPREDNISOLONE SODIUM SUCCINATE 125 MG/2ML
125 INJECTION, POWDER, LYOPHILIZED, FOR SOLUTION INTRAMUSCULAR; INTRAVENOUS ONCE
Status: COMPLETED | OUTPATIENT
Start: 2021-03-12 | End: 2021-03-12

## 2021-03-12 RX ORDER — ETOMIDATE 2 MG/ML
INJECTION INTRAVENOUS
Status: COMPLETED | OUTPATIENT
Start: 2021-03-12 | End: 2021-03-12

## 2021-03-12 RX ORDER — CEFTRIAXONE SODIUM 1 G/50ML
1 INJECTION, SOLUTION INTRAVENOUS ONCE
Status: COMPLETED | OUTPATIENT
Start: 2021-03-12 | End: 2021-03-13

## 2021-03-12 RX ORDER — ALBUTEROL SULFATE 2.5 MG/3ML
2.5 SOLUTION RESPIRATORY (INHALATION)
Status: COMPLETED | OUTPATIENT
Start: 2021-03-12 | End: 2021-03-12

## 2021-03-12 RX ADMIN — SUCCINYLCHOLINE CHLORIDE 150 MG: 20 INJECTION, SOLUTION INTRAMUSCULAR; INTRAVENOUS; PARENTERAL at 23:15

## 2021-03-12 RX ADMIN — METHYLPREDNISOLONE SODIUM SUCCINATE 125 MG: 125 INJECTION, POWDER, FOR SOLUTION INTRAMUSCULAR; INTRAVENOUS at 22:38

## 2021-03-12 RX ADMIN — ALBUTEROL SULFATE 2.5 MG: 2.5 SOLUTION RESPIRATORY (INHALATION) at 22:47

## 2021-03-12 RX ADMIN — MIDAZOLAM 2 MG: 1 INJECTION INTRAMUSCULAR; INTRAVENOUS at 23:51

## 2021-03-12 RX ADMIN — SODIUM CHLORIDE 1000 ML: 900 INJECTION, SOLUTION INTRAVENOUS at 23:16

## 2021-03-12 RX ADMIN — ALBUTEROL SULFATE 2.5 MG: 2.5 SOLUTION RESPIRATORY (INHALATION) at 22:40

## 2021-03-12 RX ADMIN — ETOMIDATE 20 MG: 2 INJECTION, SOLUTION INTRAVENOUS at 23:15

## 2021-03-12 RX ADMIN — PROPOFOL 5 MCG/KG/MIN: 10 INJECTION, EMULSION INTRAVENOUS at 23:24

## 2021-03-12 RX ADMIN — IPRATROPIUM BROMIDE AND ALBUTEROL SULFATE 3 ML: 2.5; .5 SOLUTION RESPIRATORY (INHALATION) at 22:40

## 2021-03-13 ENCOUNTER — APPOINTMENT (OUTPATIENT)
Dept: GENERAL RADIOLOGY | Facility: HOSPITAL | Age: 61
End: 2021-03-13

## 2021-03-13 LAB
ALBUMIN SERPL-MCNC: 3.5 G/DL (ref 3.5–5.2)
ANION GAP SERPL CALCULATED.3IONS-SCNC: 7.3 MMOL/L (ref 5–15)
ANION GAP SERPL CALCULATED.3IONS-SCNC: 8.2 MMOL/L (ref 5–15)
ARTERIAL PATENCY WRIST A: ABNORMAL
ARTERIAL PATENCY WRIST A: ABNORMAL
ATMOSPHERIC PRESS: 758.7 MMHG
ATMOSPHERIC PRESS: 759.5 MMHG
BASE EXCESS BLDA CALC-SCNC: -5.6 MMOL/L (ref 0–2)
BASE EXCESS BLDA CALC-SCNC: -9.8 MMOL/L (ref 0–2)
BASOPHILS # BLD AUTO: 0.01 10*3/MM3 (ref 0–0.2)
BASOPHILS NFR BLD AUTO: 0.1 % (ref 0–1.5)
BDY SITE: ABNORMAL
BDY SITE: ABNORMAL
BUN SERPL-MCNC: 15 MG/DL (ref 8–23)
BUN SERPL-MCNC: 17 MG/DL (ref 8–23)
BUN/CREAT SERPL: 29.4 (ref 7–25)
BUN/CREAT SERPL: 35.4 (ref 7–25)
CALCIUM SPEC-SCNC: 7.8 MG/DL (ref 8.6–10.5)
CALCIUM SPEC-SCNC: 8.2 MG/DL (ref 8.6–10.5)
CHLORIDE SERPL-SCNC: 112 MMOL/L (ref 98–107)
CHLORIDE SERPL-SCNC: 114 MMOL/L (ref 98–107)
CO2 SERPL-SCNC: 20.7 MMOL/L (ref 22–29)
CO2 SERPL-SCNC: 21.8 MMOL/L (ref 22–29)
CREAT SERPL-MCNC: 0.48 MG/DL (ref 0.57–1)
CREAT SERPL-MCNC: 0.51 MG/DL (ref 0.57–1)
DEPRECATED RDW RBC AUTO: 40.3 FL (ref 37–54)
EOSINOPHIL # BLD AUTO: 0.01 10*3/MM3 (ref 0–0.4)
EOSINOPHIL NFR BLD AUTO: 0.1 % (ref 0.3–6.2)
ERYTHROCYTE [DISTWIDTH] IN BLOOD BY AUTOMATED COUNT: 12.1 % (ref 12.3–15.4)
GFR SERPL CREATININE-BSD FRML MDRD: 123 ML/MIN/1.73
GFR SERPL CREATININE-BSD FRML MDRD: 132 ML/MIN/1.73
GLUCOSE BLDC GLUCOMTR-MCNC: 139 MG/DL (ref 70–130)
GLUCOSE BLDC GLUCOMTR-MCNC: 139 MG/DL (ref 70–130)
GLUCOSE BLDC GLUCOMTR-MCNC: 156 MG/DL (ref 70–130)
GLUCOSE BLDC GLUCOMTR-MCNC: 169 MG/DL (ref 70–130)
GLUCOSE SERPL-MCNC: 149 MG/DL (ref 65–99)
GLUCOSE SERPL-MCNC: 150 MG/DL (ref 65–99)
HCO3 BLDA-SCNC: 20.4 MMOL/L (ref 22–28)
HCO3 BLDA-SCNC: 21.7 MMOL/L (ref 22–28)
HCT VFR BLD AUTO: 36.1 % (ref 34–46.6)
HGB BLD-MCNC: 11.5 G/DL (ref 12–15.9)
IMM GRANULOCYTES # BLD AUTO: 0.05 10*3/MM3 (ref 0–0.05)
IMM GRANULOCYTES NFR BLD AUTO: 0.5 % (ref 0–0.5)
INHALED O2 CONCENTRATION: 100 %
INHALED O2 CONCENTRATION: 30 %
LYMPHOCYTES # BLD AUTO: 0.49 10*3/MM3 (ref 0.7–3.1)
LYMPHOCYTES NFR BLD AUTO: 4.4 % (ref 19.6–45.3)
MCH RBC QN AUTO: 29.4 PG (ref 26.6–33)
MCHC RBC AUTO-ENTMCNC: 31.9 G/DL (ref 31.5–35.7)
MCV RBC AUTO: 92.3 FL (ref 79–97)
MODALITY: ABNORMAL
MODALITY: ABNORMAL
MONOCYTES # BLD AUTO: 0.39 10*3/MM3 (ref 0.1–0.9)
MONOCYTES NFR BLD AUTO: 3.5 % (ref 5–12)
NEUTROPHILS NFR BLD AUTO: 10.14 10*3/MM3 (ref 1.7–7)
NEUTROPHILS NFR BLD AUTO: 91.4 % (ref 42.7–76)
NRBC BLD AUTO-RTO: 0 /100 WBC (ref 0–0.2)
O2 A-A PPRESDIFF RESPIRATORY: 0.4 MMHG
O2 A-A PPRESDIFF RESPIRATORY: 0.6 MMHG
PCO2 BLDA: 49 MM HG (ref 35–45)
PCO2 BLDA: 63.1 MM HG (ref 35–45)
PEEP RESPIRATORY: 5 CM[H2O]
PEEP RESPIRATORY: 8 CM[H2O]
PH BLDA: 7.12 PH UNITS (ref 7.35–7.45)
PH BLDA: 7.25 PH UNITS (ref 7.35–7.45)
PHOSPHATE SERPL-MCNC: 4 MG/DL (ref 2.5–4.5)
PLATELET # BLD AUTO: 231 10*3/MM3 (ref 140–450)
PMV BLD AUTO: 10.9 FL (ref 6–12)
PO2 BLDA: 102.6 MM HG (ref 80–100)
PO2 BLDA: 262.3 MM HG (ref 80–100)
POTASSIUM SERPL-SCNC: 4.7 MMOL/L (ref 3.5–5.2)
POTASSIUM SERPL-SCNC: 5.3 MMOL/L (ref 3.5–5.2)
PROCALCITONIN SERPL-MCNC: 0.12 NG/ML (ref 0–0.25)
QT INTERVAL: 309 MS
RBC # BLD AUTO: 3.91 10*6/MM3 (ref 3.77–5.28)
SAO2 % BLDCOA: 96.8 % (ref 92–99)
SAO2 % BLDCOA: 99.7 % (ref 92–99)
SET MECH RESP RATE: 18
SET MECH RESP RATE: 20
SODIUM SERPL-SCNC: 142 MMOL/L (ref 136–145)
SODIUM SERPL-SCNC: 142 MMOL/L (ref 136–145)
TOTAL RATE: 25 BREATHS/MINUTE
TOTAL RATE: 27 BREATHS/MINUTE
TROPONIN T SERPL-MCNC: 0.26 NG/ML (ref 0–0.03)
TROPONIN T SERPL-MCNC: 0.3 NG/ML (ref 0–0.03)
VENTILATOR MODE: ABNORMAL
VENTILATOR MODE: AC
VT ON VENT VENT: 275 ML
VT ON VENT VENT: 390 ML
WBC # BLD AUTO: 11.09 10*3/MM3 (ref 3.4–10.8)

## 2021-03-13 PROCEDURE — 94799 UNLISTED PULMONARY SVC/PX: CPT

## 2021-03-13 PROCEDURE — 80069 RENAL FUNCTION PANEL: CPT | Performed by: INTERNAL MEDICINE

## 2021-03-13 PROCEDURE — 36600 WITHDRAWAL OF ARTERIAL BLOOD: CPT

## 2021-03-13 PROCEDURE — 25010000002 CEFTRIAXONE PER 250 MG: Performed by: INTERNAL MEDICINE

## 2021-03-13 PROCEDURE — 94003 VENT MGMT INPAT SUBQ DAY: CPT

## 2021-03-13 PROCEDURE — 87070 CULTURE OTHR SPECIMN AEROBIC: CPT | Performed by: INTERNAL MEDICINE

## 2021-03-13 PROCEDURE — 82803 BLOOD GASES ANY COMBINATION: CPT

## 2021-03-13 PROCEDURE — 84145 PROCALCITONIN (PCT): CPT | Performed by: INTERNAL MEDICINE

## 2021-03-13 PROCEDURE — 85025 COMPLETE CBC W/AUTO DIFF WBC: CPT | Performed by: INTERNAL MEDICINE

## 2021-03-13 PROCEDURE — 87205 SMEAR GRAM STAIN: CPT | Performed by: INTERNAL MEDICINE

## 2021-03-13 PROCEDURE — 74018 RADEX ABDOMEN 1 VIEW: CPT

## 2021-03-13 PROCEDURE — 25010000002 AZITHROMYCIN PER 500 MG: Performed by: EMERGENCY MEDICINE

## 2021-03-13 PROCEDURE — 80048 BASIC METABOLIC PNL TOTAL CA: CPT | Performed by: INTERNAL MEDICINE

## 2021-03-13 PROCEDURE — 25010000002 AZITHROMYCIN PER 500 MG: Performed by: INTERNAL MEDICINE

## 2021-03-13 PROCEDURE — 25010000002 FENTANYL CITRATE (PF) 100 MCG/2ML SOLUTION: Performed by: INTERNAL MEDICINE

## 2021-03-13 PROCEDURE — 25010000002 FENTANYL CITRATE (PF) 100 MCG/2ML SOLUTION: Performed by: EMERGENCY MEDICINE

## 2021-03-13 PROCEDURE — 25010000002 CEFTRIAXONE PER 250 MG: Performed by: EMERGENCY MEDICINE

## 2021-03-13 PROCEDURE — 84484 ASSAY OF TROPONIN QUANT: CPT | Performed by: INTERNAL MEDICINE

## 2021-03-13 PROCEDURE — 25010000002 METHYLPREDNISOLONE PER 40 MG: Performed by: INTERNAL MEDICINE

## 2021-03-13 PROCEDURE — 82962 GLUCOSE BLOOD TEST: CPT

## 2021-03-13 RX ORDER — NALOXONE HYDROCHLORIDE 1 MG/ML
INJECTION INTRAMUSCULAR; INTRAVENOUS; SUBCUTANEOUS
Status: DISPENSED
Start: 2021-03-13 | End: 2021-03-13

## 2021-03-13 RX ORDER — NICOTINE 21 MG/24HR
1 PATCH, TRANSDERMAL 24 HOURS TRANSDERMAL
Status: DISCONTINUED | OUTPATIENT
Start: 2021-03-13 | End: 2021-03-22 | Stop reason: HOSPADM

## 2021-03-13 RX ORDER — SODIUM CHLORIDE 0.9 % (FLUSH) 0.9 %
10 SYRINGE (ML) INJECTION EVERY 12 HOURS SCHEDULED
Status: DISCONTINUED | OUTPATIENT
Start: 2021-03-13 | End: 2021-03-22 | Stop reason: HOSPADM

## 2021-03-13 RX ORDER — NICOTINE POLACRILEX 4 MG
15 LOZENGE BUCCAL
Status: DISCONTINUED | OUTPATIENT
Start: 2021-03-13 | End: 2021-03-22 | Stop reason: HOSPADM

## 2021-03-13 RX ORDER — SODIUM CHLORIDE 0.9 % (FLUSH) 0.9 %
10 SYRINGE (ML) INJECTION AS NEEDED
Status: DISCONTINUED | OUTPATIENT
Start: 2021-03-13 | End: 2021-03-22 | Stop reason: HOSPADM

## 2021-03-13 RX ORDER — IPRATROPIUM BROMIDE AND ALBUTEROL SULFATE 2.5; .5 MG/3ML; MG/3ML
3 SOLUTION RESPIRATORY (INHALATION)
Status: DISCONTINUED | OUTPATIENT
Start: 2021-03-13 | End: 2021-03-13

## 2021-03-13 RX ORDER — METHYLPREDNISOLONE SODIUM SUCCINATE 40 MG/ML
40 INJECTION, POWDER, LYOPHILIZED, FOR SOLUTION INTRAMUSCULAR; INTRAVENOUS EVERY 12 HOURS
Status: DISCONTINUED | OUTPATIENT
Start: 2021-03-13 | End: 2021-03-14

## 2021-03-13 RX ORDER — INSULIN LISPRO 100 [IU]/ML
0-24 INJECTION, SOLUTION INTRAVENOUS; SUBCUTANEOUS
Status: DISCONTINUED | OUTPATIENT
Start: 2021-03-13 | End: 2021-03-13

## 2021-03-13 RX ORDER — ONDANSETRON 4 MG/1
4 TABLET, FILM COATED ORAL EVERY 6 HOURS PRN
Status: DISCONTINUED | OUTPATIENT
Start: 2021-03-13 | End: 2021-03-13

## 2021-03-13 RX ORDER — INSULIN LISPRO 100 [IU]/ML
0-24 INJECTION, SOLUTION INTRAVENOUS; SUBCUTANEOUS EVERY 6 HOURS
Status: DISCONTINUED | OUTPATIENT
Start: 2021-03-13 | End: 2021-03-22 | Stop reason: HOSPADM

## 2021-03-13 RX ORDER — FENTANYL CITRATE 50 UG/ML
100 INJECTION, SOLUTION INTRAMUSCULAR; INTRAVENOUS
Status: DISCONTINUED | OUTPATIENT
Start: 2021-03-13 | End: 2021-03-14

## 2021-03-13 RX ORDER — CEFTRIAXONE SODIUM 1 G/50ML
1 INJECTION, SOLUTION INTRAVENOUS EVERY 24 HOURS
Status: COMPLETED | OUTPATIENT
Start: 2021-03-13 | End: 2021-03-16

## 2021-03-13 RX ORDER — ONDANSETRON 4 MG/1
4 TABLET, FILM COATED ORAL EVERY 6 HOURS PRN
Status: DISCONTINUED | OUTPATIENT
Start: 2021-03-13 | End: 2021-03-22 | Stop reason: HOSPADM

## 2021-03-13 RX ORDER — FENTANYL CITRATE 50 UG/ML
75 INJECTION, SOLUTION INTRAMUSCULAR; INTRAVENOUS
Status: DISCONTINUED | OUTPATIENT
Start: 2021-03-13 | End: 2021-03-14

## 2021-03-13 RX ORDER — NOREPINEPHRINE BIT/0.9 % NACL 8 MG/250ML
.02-.3 INFUSION BOTTLE (ML) INTRAVENOUS
Status: DISCONTINUED | OUTPATIENT
Start: 2021-03-13 | End: 2021-03-14

## 2021-03-13 RX ORDER — ONDANSETRON 2 MG/ML
4 INJECTION INTRAMUSCULAR; INTRAVENOUS EVERY 6 HOURS PRN
Status: DISCONTINUED | OUTPATIENT
Start: 2021-03-13 | End: 2021-03-13

## 2021-03-13 RX ORDER — ONDANSETRON 2 MG/ML
4 INJECTION INTRAMUSCULAR; INTRAVENOUS EVERY 6 HOURS PRN
Status: DISCONTINUED | OUTPATIENT
Start: 2021-03-13 | End: 2021-03-22 | Stop reason: HOSPADM

## 2021-03-13 RX ORDER — ALBUTEROL SULFATE 90 UG/1
6 AEROSOL, METERED RESPIRATORY (INHALATION)
Status: DISCONTINUED | OUTPATIENT
Start: 2021-03-13 | End: 2021-03-14

## 2021-03-13 RX ORDER — DEXTROSE MONOHYDRATE 25 G/50ML
25 INJECTION, SOLUTION INTRAVENOUS
Status: DISCONTINUED | OUTPATIENT
Start: 2021-03-13 | End: 2021-03-22 | Stop reason: HOSPADM

## 2021-03-13 RX ORDER — SODIUM CHLORIDE, SODIUM LACTATE, POTASSIUM CHLORIDE, CALCIUM CHLORIDE 600; 310; 30; 20 MG/100ML; MG/100ML; MG/100ML; MG/100ML
50 INJECTION, SOLUTION INTRAVENOUS CONTINUOUS
Status: DISCONTINUED | OUTPATIENT
Start: 2021-03-13 | End: 2021-03-15

## 2021-03-13 RX ADMIN — METHYLPREDNISOLONE SODIUM SUCCINATE 40 MG: 40 INJECTION, POWDER, FOR SOLUTION INTRAMUSCULAR; INTRAVENOUS at 20:36

## 2021-03-13 RX ADMIN — FENTANYL CITRATE 75 MCG: 50 INJECTION, SOLUTION INTRAMUSCULAR; INTRAVENOUS at 04:04

## 2021-03-13 RX ADMIN — CEFTRIAXONE SODIUM 1 G: 1 INJECTION, SOLUTION INTRAVENOUS at 00:19

## 2021-03-13 RX ADMIN — SODIUM CHLORIDE 1000 ML: 9 INJECTION, SOLUTION INTRAVENOUS at 02:19

## 2021-03-13 RX ADMIN — SODIUM CHLORIDE 500 ML: 9 INJECTION, SOLUTION INTRAVENOUS at 01:30

## 2021-03-13 RX ADMIN — IPRATROPIUM BROMIDE 6 PUFF: 17 AEROSOL, METERED RESPIRATORY (INHALATION) at 20:50

## 2021-03-13 RX ADMIN — SODIUM CHLORIDE, PRESERVATIVE FREE 10 ML: 5 INJECTION INTRAVENOUS at 01:31

## 2021-03-13 RX ADMIN — SODIUM CHLORIDE, POTASSIUM CHLORIDE, SODIUM LACTATE AND CALCIUM CHLORIDE 125 ML/HR: 600; 310; 30; 20 INJECTION, SOLUTION INTRAVENOUS at 12:29

## 2021-03-13 RX ADMIN — FENTANYL CITRATE 100 MCG: 50 INJECTION, SOLUTION INTRAMUSCULAR; INTRAVENOUS at 17:39

## 2021-03-13 RX ADMIN — ALBUTEROL SULFATE 6 PUFF: 90 AEROSOL, METERED RESPIRATORY (INHALATION) at 23:36

## 2021-03-13 RX ADMIN — DEXMEDETOMIDINE HYDROCHLORIDE 0.2 MCG/KG/HR: 100 INJECTION, SOLUTION, CONCENTRATE INTRAVENOUS at 02:23

## 2021-03-13 RX ADMIN — SODIUM CHLORIDE, PRESERVATIVE FREE 10 ML: 5 INJECTION INTRAVENOUS at 21:58

## 2021-03-13 RX ADMIN — CEFTRIAXONE SODIUM 1 G: 1 INJECTION, SOLUTION INTRAVENOUS at 11:53

## 2021-03-13 RX ADMIN — Medication 1 PATCH: at 12:47

## 2021-03-13 RX ADMIN — IPRATROPIUM BROMIDE 6 PUFF: 17 AEROSOL, METERED RESPIRATORY (INHALATION) at 16:10

## 2021-03-13 RX ADMIN — SODIUM CHLORIDE, POTASSIUM CHLORIDE, SODIUM LACTATE AND CALCIUM CHLORIDE 125 ML/HR: 600; 310; 30; 20 INJECTION, SOLUTION INTRAVENOUS at 16:27

## 2021-03-13 RX ADMIN — FENTANYL CITRATE 75 MCG: 50 INJECTION, SOLUTION INTRAMUSCULAR; INTRAVENOUS at 01:41

## 2021-03-13 RX ADMIN — FENTANYL CITRATE 100 MCG: 50 INJECTION, SOLUTION INTRAMUSCULAR; INTRAVENOUS at 10:44

## 2021-03-13 RX ADMIN — FENTANYL CITRATE 75 MCG: 50 INJECTION, SOLUTION INTRAMUSCULAR; INTRAVENOUS at 20:44

## 2021-03-13 RX ADMIN — ALBUTEROL SULFATE 6 PUFF: 90 AEROSOL, METERED RESPIRATORY (INHALATION) at 12:40

## 2021-03-13 RX ADMIN — DEXMEDETOMIDINE HYDROCHLORIDE 0.4 MCG/KG/HR: 100 INJECTION, SOLUTION, CONCENTRATE INTRAVENOUS at 19:39

## 2021-03-13 RX ADMIN — SODIUM CHLORIDE, PRESERVATIVE FREE 10 ML: 5 INJECTION INTRAVENOUS at 08:00

## 2021-03-13 RX ADMIN — FENTANYL CITRATE 100 MCG: 50 INJECTION, SOLUTION INTRAMUSCULAR; INTRAVENOUS at 14:24

## 2021-03-13 RX ADMIN — SODIUM CHLORIDE, POTASSIUM CHLORIDE, SODIUM LACTATE AND CALCIUM CHLORIDE 500 ML: 600; 310; 30; 20 INJECTION, SOLUTION INTRAVENOUS at 11:53

## 2021-03-13 RX ADMIN — AZITHROMYCIN 500 MG: 500 INJECTION, POWDER, LYOPHILIZED, FOR SOLUTION INTRAVENOUS at 01:30

## 2021-03-13 RX ADMIN — SODIUM CHLORIDE, POTASSIUM CHLORIDE, SODIUM LACTATE AND CALCIUM CHLORIDE 500 ML: 600; 310; 30; 20 INJECTION, SOLUTION INTRAVENOUS at 14:45

## 2021-03-13 RX ADMIN — AZITHROMYCIN 500 MG: 500 INJECTION, POWDER, LYOPHILIZED, FOR SOLUTION INTRAVENOUS at 12:47

## 2021-03-13 RX ADMIN — Medication 0.02 MCG/KG/MIN: at 05:14

## 2021-03-13 RX ADMIN — SODIUM CHLORIDE 500 ML: 9 INJECTION, SOLUTION INTRAVENOUS at 00:09

## 2021-03-13 RX ADMIN — ALBUTEROL SULFATE 6 PUFF: 90 AEROSOL, METERED RESPIRATORY (INHALATION) at 16:09

## 2021-03-13 RX ADMIN — METHYLPREDNISOLONE SODIUM SUCCINATE 40 MG: 40 INJECTION, POWDER, FOR SOLUTION INTRAMUSCULAR; INTRAVENOUS at 08:00

## 2021-03-13 RX ADMIN — IPRATROPIUM BROMIDE 6 PUFF: 17 AEROSOL, METERED RESPIRATORY (INHALATION) at 23:36

## 2021-03-13 RX ADMIN — FENTANYL CITRATE 100 MCG: 50 INJECTION, SOLUTION INTRAMUSCULAR; INTRAVENOUS at 00:40

## 2021-03-13 RX ADMIN — IPRATROPIUM BROMIDE 6 PUFF: 17 AEROSOL, METERED RESPIRATORY (INHALATION) at 12:40

## 2021-03-13 RX ADMIN — ALBUTEROL SULFATE 6 PUFF: 90 AEROSOL, METERED RESPIRATORY (INHALATION) at 20:50

## 2021-03-13 NOTE — ED NOTES
Pt wearing mask. This RN wearing full PPE, including CAPR and gown, during all pt care.           Buffy Ferrari, RN  03/12/21 0608

## 2021-03-13 NOTE — H&P
Group: [facility]         Critical care admission note    Patient Identification:  Scarlet Chakraborty  61 y.o.  female  1960  [id number]                CC: Shortness of breath    History of Present Illness:  61-year-old female who presents with shortness of breath.  She cannot provide her own history because she is intubated and mechanically ventilated.  I discussed the case with Dr. Mcbride, emergency room physician.  The patient presents with shortness of breath which has been going on for about 3 or 4 days.  Gradually worsening, became very severe earlier this evening.  She has been coughing and wheezing.  Has not obtained any relief with her nebulizer.  Upon arrival EMS found the patient with oxygen saturation of 81% on room air and tachycardic with heart rate of 115 and blood pressure 150/89.  They started her on CPAP en route.  The patient has a history of COPD.  Apparently she did not have any fever or much sputum.  I reviewed her medical records.  I reviewed the discharge summary dictated by my colleague, Dr. Clay Ryder on October 4, 2019 when she had pneumonia, Takotsubo cardiomyopathy, paroxysmal SVT, acute respiratory failure, smoker.      Review of Systems   Unable to perform ROS: Intubated       Past Medical History:  Past Medical History:   Diagnosis Date   • Asthma    • Cerebrovascular accident (CVA) due to thrombosis of right middle cerebral artery (CMS/HCC)    • COPD (chronic obstructive pulmonary disease) (CMS/HCC)    • Nonischemic cardiomyopathy (CMS/HCC)    • NSTEMI (non-ST elevated myocardial infarction) (CMS/HCC)    • Stress-induced cardiomyopathy    • Stroke (CMS/HCC)    • Takotsubo cardiomyopathy    • Tobacco abuse        Past Surgical History:  Past Surgical History:   Procedure Laterality Date   • CARDIAC CATHETERIZATION N/A 9/13/2018    Procedure: Left Heart Cath and cors;  Surgeon: Gabino Dozier MD;  Location: [facility] CATH INVASIVE LOCATION;  Service: Cardiology   •  "CARDIAC CATHETERIZATION N/A 9/13/2018    Procedure: Left ventriculography;  Surgeon: Gabino Dozier MD;  Location: [facility] CATH INVASIVE LOCATION;  Service: Cardiology   • CARDIAC ELECTROPHYSIOLOGY PROCEDURE N/A 9/27/2018    Procedure: Loop insertion  LINQ;  Surgeon: Jose R Barker MD;  Location:  [facility] C[facility];  Service: Cardiovascular        Home Meds:  Reviewed and reconciled    Allergies:  No Known Allergies    Social History:   Social History     Socioeconomic History   • Marital status: Single     Spouse name: Not on file   • Number of children: Not on file   • Years of education: Not on file   • Highest education level: Not on file   Tobacco Use   • Smoking status: Light Tobacco Smoker     Packs/day: 0.50     Years: 15.00     Pack years: 7.50     Types: Cigarettes   • Smokeless tobacco: Never Used   • Tobacco comment: currently 2 cigs daily   Substance and Sexual Activity   • Alcohol use: Yes     Comment: occassional/ Daily caffeine use   • Drug use: No   • Sexual activity: Defer       Family History:  Family History   Problem Relation Age of Onset   • Cancer Sister        Physical Exam:  /48   Pulse 116   Temp 97.8 °F (36.6 °C) (Tympanic)   Resp 26   Ht 157.5 cm (62\")   Wt 45.4 kg (100 lb)   SpO2 95%   BMI 18.29 kg/m²  Body mass index is 18.29 kg/m². 95% 45.4 kg (100 lb)  Physical Exam  Constitutional:       Appearance: She is ill-appearing.      Comments: Thin, emaciated female   HENT:      Right Ear: External ear normal.      Left Ear: External ear normal.      Nose: Nose normal.      Mouth/Throat:      Comments: Oral exam shows endotracheal tube in good position  Eyes:      Conjunctiva/sclera: Conjunctivae normal.   Neck:      Thyroid: No thyromegaly.      Vascular: No JVD.      Trachea: No tracheal deviation.   Cardiovascular:      Rate and Rhythm: Normal rate and regular rhythm.      Heart sounds: Normal heart sounds. No murmur.   Pulmonary:      Comments: Barrel chest, " bilateral wheezes and rhonchi, using accessory muscles  Abdominal:      Palpations: Abdomen is soft.      Tenderness: There is no abdominal tenderness. There is no rebound.      Comments: Cannot palpate liver or spleen enlargement   Musculoskeletal:         General: No deformity.      Cervical back: Neck supple. No rigidity.   Skin:     General: Skin is warm.      Findings: No rash.      Comments: No palpable nodules   Neurological:      Comments: Patient is sedated, intubated, still restless, does not open eyes   Psychiatric:      Comments: Cannot be obtained, patient is intubated and sedated         LABS:  COVID19   Date Value Ref Range Status   03/12/2021 Not Detected Not Detected - Ref. Range Final       Lab Results   Component Value Date    CALCIUM 9.1 03/12/2021     Results from last 7 days   Lab Units 03/12/21  2213   SODIUM mmol/L 140   POTASSIUM mmol/L 4.5   CHLORIDE mmol/L 105   CO2 mmol/L 26.8   BUN mg/dL 14   CREATININE mg/dL 0.64   GLUCOSE mg/dL 201*   CALCIUM mg/dL 9.1   WBC 10*3/mm3 17.05*   HEMOGLOBIN g/dL 14.7   PLATELETS 10*3/mm3 341   ALT (SGPT) U/L 30   AST (SGOT) U/L 30   PROBNP pg/mL 60.2   PROCALCITONIN ng/mL 0.07     Lab Results   Component Value Date    TROPONINT 0.057 (C) 03/12/2021     Results from last 7 days   Lab Units 03/12/21  2213   TROPONIN T ng/mL 0.057*         Results from last 7 days   Lab Units 03/12/21  2213   PROCALCITONIN ng/mL 0.07   LACTATE mmol/L 1.5     Results from last 7 days   Lab Units 03/12/21  2241   PH, ARTERIAL pH units 7.178*   PCO2, ARTERIAL mm Hg 73.4*   PO2 ART mm Hg 113.2*   MODALITY  BiPap   O2 SATURATION CALC % 96.7     Results from last 7 days   Lab Units 03/12/21  2213   ADENOVIRUS DETECTION BY PCR  Not Detected   CORONAVIRUS 229E  Not Detected   CORONAVIRUS HKU1  Not Detected   CORONAVIRUS NL63  Not Detected   CORONAVIRUS OC43  Not Detected   HUMAN METAPNEUMOVIRUS  Not Detected   HUMAN RHINOVIRUS/ENTEROVIRUS  Not Detected   INFLUENZA B PCR  Not Detected    PARAINFLUENZA 1  Not Detected   PARAINFLUENZA VIRUS 2  Not Detected   PARAINFLUENZA VIRUS 3  Not Detected   PARAINFLUENZA VIRUS 4  Not Detected   BORDETELLA PERTUSSIS PCR  Not Detected   BORDETELLA PARAPERTUSSIS PCR  Not Detected   CHLAMYDOPHILA PNEUMONIAE PCR  Not Detected   MYCOPLAMA PNEUMO PCR  Not Detected   RSV, PCR  Not Detected             Lab Results   Component Value Date    TSH 1.010 09/24/2018     Estimated Creatinine Clearance: 67 mL/min (by C-G formula based on SCr of 0.64 mg/dL).         Imaging: I personally visualized the images of chest x-ray showing significant hyperinflated lungs.  There is patchy infiltrate in the right base.      Assessment:  Sepsis  Community-acquired pneumonia  COPD exacerbation  Acute hypoxic respiratory failure  Smoker  Malnutrition        Recommendations:  Patient is critically ill.  She will be admitted to the ICU.  We will continue to manage mechanical ventilation and adjust settings accordingly.  Give IV Rocephin and azithromycin for community-acquired pneumonia.  Send sputum for culture.  Patient has sepsis.  If the patient develops hypotension we will start IV fluid bolus.  Give IV corticosteroids and nebulized DuoNeb for COPD exacerbation.  Lovenox for DVT prophylaxis.  Pepcid for stress ulcer prophylaxis.    Total critical care time 35 minutes, excluding any separately billable procedure time      Sourav Bales MD  3/13/2021  00:16 EST      Much of this encounter note is an electronic transcription/translation of spoken language to printed text using Dragon Software.

## 2021-03-13 NOTE — CONSULTS
"Adult Nutrition  Assessment/PES    Patient Name:  Scarlet Chakraborty  YOB: 1960  MRN: [id number]  Admit Date:  3/12/2021    Assessment Date:  3/13/2021    Comments:  Pt with Pneumonia, acute exacerbation COPD, respiratory failure on the vent, and malnutrition. BMI 16.48.  Nutrition assessment completed for TF's. Recommend Diabetisource AC with goal at 50ml/hr and water 30cc q 4hr.  Will continue to follow.    Reason for Assessment     Row Name 03/13/21 1411          Reason for Assessment    Reason For Assessment  physician consult;TF/PN     Diagnosis  -- sepsis, pnemonia, COPD, on vent, malnutrition           Anthropometrics     Row Name 03/13/21 1412          Anthropometrics    Height  157.5 cm (62.01\")     Weight  40.8 kg (89 lb 15.2 oz) not weighed by RD        Ideal Body Weight (IBW)    Ideal Body Weight (IBW) (kg)  50.45     % Ideal Body Weight  80.87        Body Mass Index (BMI)    BMI (kg/m2)  16.48     BMI Assessment  BMI 16-16.9: protein-energy malnutrition grade II         Labs/Tests/Procedures/Meds     Row Name 03/13/21 1413          Labs/Procedures/Meds    Lab Results Reviewed  reviewed     Lab Results Comments  k, glu,        Diagnostic Tests/Procedures    Diagnostic Test/Procedure Reviewed  reviewed        Medications    Pertinent Medications Reviewed  reviewed     Pertinent Medications Comments  abx, insulin, steroids, levo, precedex         Physical Findings     Row Name 03/13/21 1414          Physical Findings    Overall Physical Appearance  underweight;on ventilator support     Gastrointestinal  feeding tube     Tubes  nasogastric tube     Skin  -- manda 14         Estimated/Assessed Needs     Row Name 03/13/21 1414 03/13/21 1412       Calculation Measurements    Weight Used For Calculations  40.8 kg (89 lb 15.2 oz)  --    Height  --  157.5 cm (62.01\")       Estimated/Assessed Needs    Additional Documentation  Fluid Requirements (Group);KCAL/KG (Group);Protein Requirements (Group) "  --       KCAL/KG    KCAL/KG  35 Kcal/Kg (kcal)  --    35 Kcal/Kg (kcal)  1428  --       Protein Requirements    Weight Used For Protein Calculations  40.8 kg (89 lb 15.2 oz)  --    Est Protein Requirement Amount (gms/kg)  1.5 gm protein  --    Estimated Protein Requirements (gms/day)  61.2  --       Fluid Requirements    Fluid Requirements (mL/day)  1468  --    RDA Method (mL)  1468  --        Nutrition Prescription Ordered     Row Name 03/13/21 1415          Nutrition Prescription PO    Current PO Diet  NPO        Nutrition Prescription EN    Enteral Route  NG     Product  Diabetisource AC (Glucerna 1.2)     TF Delivery Method  Continuous     Continuous TF Goal Rate (mL/hr)  50 mL/hr     Water flush (mL)   30 mL     Water Flush Frequency  Every 4 hours                 Problem/Interventions:  Problem 1     Row Name 03/13/21 1416          Nutrition Diagnoses Problem 1    Problem 1  Needs Alternate Route     Etiology (related to)  Medical Diagnosis               Intervention Goal     Row Name 03/13/21 1416          Intervention Goal    General  Maintain nutrition;Nutrition support treatment;Improved nutrition related lab(s);Reduce/improve symptoms;Meet nutritional needs for age/condition;Disease management/therapy     TF/PN  Inititiate TF/PN;Tolerate TF at goal     Transition  TF to PO     Weight  Appropriate weight gain         Nutrition Intervention     Row Name 03/13/21 1416          Nutrition Intervention    RD/Tech Action  Care plan reviewd;Follow Tx progress           Education/Evaluation     Row Name 03/13/21 1417          Education    Education  Education not appropriate at this time     Please explain  Patient intubated        Monitor/Evaluation    Monitor  Per protocol           Electronically signed by:  Malia Castillo RD  03/13/21 14:18 EST

## 2021-03-13 NOTE — ED TRIAGE NOTES
To ER via EMS c/o SOA x 2-3 days worse x 2 hours.  O2 Sat initially 81% RA.  Pt on Cpap per EMS on arrival.     Triage staff in appropriate PPE.

## 2021-03-13 NOTE — PROGRESS NOTES
LOS: 1 day   Patient Care Team:  Margaret Ca PA as PCP - General (Family Medicine)  Beulah Joshi APRN as Nurse Practitioner (Neurology)    Subjective     Patient new to me this morning I have reviewed the records patient is intubated she is sedated and she is currently unresponsive, admitted with pneumonia and acute exacerbation COPD with acute hypoxemic and hypercapnic respiratory failure    Review of Systems:          Objective     Vital Signs  Vital Sign Min/Max for last 24 hours  Temp  Min: 97.3 °F (36.3 °C)  Max: 97.8 °F (36.6 °C)   BP  Min: 65/50  Max: 147/97   Pulse  Min: 67  Max: 129   Resp  Min: 19  Max: 40   SpO2  Min: 85 %  Max: 100 %   No data recorded   Weight  Min: 40.3 kg (88 lb 13.5 oz)  Max: 45.4 kg (100 lb)        Ventilator/Non-Invasive Ventilation Settings (From admission, onward) Comment     Start     Ordered    03/13/21 0045  Ventilator - AC/VC  Continuous     Question:  Vent Mode  Answer:  AC/VC    03/13/21 0044    03/12/21 2208  NIPPV (CPAP or BIPAP)  Until Discontinued,   Status:  Canceled     Question Answer Comment   Type: BIPAP    NIPPV Mask Interface: Per Patient Preference        03/12/21 2207                             Body mass index is 16.45 kg/m².  I/O last 3 completed shifts:  In: 2862 [I.V.:1862; IV Piggyback:1000]  Out: 0   No intake/output data recorded.        Physical Exam:  General Appearance: Thin white female looks at least 2 or 3 decades older than her stated age she is only intubated with a 7 endotracheal tube at about 23 cm at the lips she is on pressure control of 16 inspiratory pressure of 15 PEEP of 5 cm FiO2 30% SPO2 is 96% she is breathing about 23 times per minute looks like significant auto PEEP.  She is sedated with dexmedetomidine at 0.7 mics per kilogram per hour and she is getting as needed fentanyl.  She is also on Levophed at 0.04 mics per kilogram per minute  Eyes: Conjunctiva are clear and anicteric pupils are equal pupils  are about 2-1/2 mm and reactive to light  ENT: Mucous membranes are little dry she has a little endotracheal tube in place nasal septum is midline  Neck: No adenopathy no jugular venous tension trachea midline  Lungs: Does not have a tremendous amount of air movement the chest expansion is symmetric no wheezes rales or rhonchi  Cardiac: Regular rate rhythm no murmur no gallop  Abdomen: Soft nontender no palpable hepatosplenomegaly or masses  : Not examined  Musculoskeletal: She has minimal muscle mass she has little bitemporal wasting thenar, hyperthenar and interosseous muscle wasting  Skin: No jaundice no petechiae skin is warm and dry  Neuro: She just got some fentanyl per nursing and she is heavily sedated not responding to nailbed pressure prior to that nursing reports she was awake and responsive  Extremities/P Vascular: She has clubbing of all digits, no cyanosis palpable radial pulses and weak dorsalis pedis pulses bilaterally  MSE: Unable to assess       Labs:  Results from last 7 days   Lab Units 03/13/21  0528 03/12/21  2213   GLUCOSE mg/dL 149* 201*   SODIUM mmol/L 142 140   POTASSIUM mmol/L 4.7 4.5   CO2 mmol/L 20.7* 26.8   CHLORIDE mmol/L 114* 105   ANION GAP mmol/L 7.3 8.2   CREATININE mg/dL 0.51* 0.64   BUN mg/dL 15 14   BUN / CREAT RATIO  29.4* 21.9   CALCIUM mg/dL 7.8* 9.1   EGFR IF NONAFRICN AM mL/min/1.73 123 95   ALK PHOS U/L  --  71   TOTAL PROTEIN g/dL  --  7.4   ALT (SGPT) U/L  --  30   AST (SGOT) U/L  --  30   BILIRUBIN mg/dL  --  0.2   ALBUMIN g/dL 3.50 4.40   GLOBULIN gm/dL  --  3.0     Estimated Creatinine Clearance: 75.6 mL/min (A) (by C-G formula based on SCr of 0.51 mg/dL (L)).      Results from last 7 days   Lab Units 03/13/21  0409 03/12/21  2213   WBC 10*3/mm3 11.09* 17.05*   RBC 10*6/mm3 3.91 4.83   HEMOGLOBIN g/dL 11.5* 14.7   HEMATOCRIT % 36.1 44.9   MCV fL 92.3 93.0   MCH pg 29.4 30.4   MCHC g/dL 31.9 32.7   RDW % 12.1* 12.1*   RDW-SD fl 40.3 40.8   MPV fL 10.9 11.3    PLATELETS 10*3/mm3 231 341   NEUTROPHIL % % 91.4* 58.8   LYMPHOCYTE % % 4.4* 27.2   MONOCYTES % % 3.5* 7.5   EOSINOPHIL % % 0.1* 5.7   BASOPHIL % % 0.1 0.6   IMM GRAN % % 0.5 0.2   NEUTROS ABS 10*3/mm3 10.14* 10.01*   LYMPHS ABS 10*3/mm3 0.49* 4.63*   MONOS ABS 10*3/mm3 0.39 1.28*   EOS ABS 10*3/mm3 0.01 0.98*   BASOS ABS 10*3/mm3 0.01 0.11   IMMATURE GRANS (ABS) 10*3/mm3 0.05 0.04   NRBC /100 WBC 0.0 0.0     Results from last 7 days   Lab Units 03/13/21  0015   PH, ARTERIAL pH units 7.118*   PO2 ART mm Hg 262.3*   PCO2, ARTERIAL mm Hg 63.1*   HCO3 ART mmol/L 20.4*     Results from last 7 days   Lab Units 03/12/21  2213   TROPONIN T ng/mL 0.057*     Results from last 7 days   Lab Units 03/12/21  2213   PROBNP pg/mL 60.2         Results from last 7 days   Lab Units 03/13/21  0528 03/12/21 2213   LACTATE mmol/L  --  1.5   PROCALCITONIN ng/mL 0.12 0.07         Microbiology Results (last 10 days)     Procedure Component Value - Date/Time    Respiratory Panel PCR w/COVID-19(SARS-CoV-2) [facility] In-House, NP Swab in UTM/VTM, 3-4 HR TAT - Swab, Nasopharynx [[id number]]  (Normal) Collected: 03/12/21 2213    Lab Status: Final result Specimen: Swab from Nasopharynx Updated: 03/12/21 2321     ADENOVIRUS, PCR Not Detected     Coronavirus 229E Not Detected     Coronavirus HKU1 Not Detected     Coronavirus NL63 Not Detected     Coronavirus OC43 Not Detected     COVID19 Not Detected     Human Metapneumovirus Not Detected     Human Rhinovirus/Enterovirus Not Detected     Influenza A PCR Not Detected     Influenza B PCR Not Detected     Parainfluenza Virus 1 Not Detected     Parainfluenza Virus 2 Not Detected     Parainfluenza Virus 3 Not Detected     Parainfluenza Virus 4 Not Detected     RSV, PCR Not Detected     Bordetella pertussis pcr Not Detected     Bordetella parapertussis PCR Not Detected     Chlamydophila pneumoniae PCR Not Detected     Mycoplasma pneumo by PCR Not Detected    Narrative:      Fact  sheet for providers: https://docs.Standardized Safety/wp-content/uploads/CMC5282-5346-KN1.1-EUA-Provider-Fact-Sheet-3.pdf    Fact sheet for patients: https://docs.Standardized Safety/wp-content/uploads/ASC4577-1990-CQ9.1-EUA-Patient-Fact-Sheet-1.pdf    Test performed by PCR.              albuterol, 2.5 mg, Nebulization, Once  ipratropium-albuterol, 3 mL, Nebulization, 4x Daily - RT  methylPREDNISolone sodium succinate, 40 mg, Intravenous, Q12H  Naloxone HCl, , ,   sodium chloride, 10 mL, Intravenous, Q12H      dexmedetomidine, 0.2-1.5 mcg/kg/hr, Last Rate: 0.7 mcg/kg/hr (03/13/21 0529)  norepinephrine, 0.02-0.3 mcg/kg/min, Last Rate: 0.06 mcg/kg/min (03/13/21 0553)  propofol, 5-50 mcg/kg/min, Last Rate: Stopped (03/13/21 0416)        Diagnostics:  XR Chest AP    Result Date: 3/12/2021  Patient: JEAN-PIERRE CHAVEZ  Time Out: 23:01 Exam(s): FILM CXR 1 VIEW EXAM:   XR Chest, 1 View CLINICAL HISTORY:    Reason for exam: COVID Evaluation, Cough, Fever. TECHNIQUE:   Frontal view of the chest. COMPARISON:   9 30 19 FINDINGS:   Negative for significant cardiac enlargement.  Hyperinflation of both lungs consistent with COPD.  Patchy densities in both lung bases atelectasis or pneumonia.  Negative for pneumothorax or significant pleural fluid collections.     Electronically signed by Jake Nair DO on 03-12-21 at 2301    Results for orders placed during the hospital encounter of 10/24/19    Adult Transthoracic Echo Complete W/ Cont if Necessary Per Protocol    Interpretation Summary  · Left ventricular systolic function is normal. Calculated EF = 65%. Estimated EF was in agreement with the calculated EF. Estimated EF = 65%. Normal left ventricular cavity size noted. All left ventricular wall segments contract normally. Left ventricular wall thickness is consistent with moderate concentric hypertrophy. Left ventricular diastolic dysfunction is noted (grade I) consistent with impaired relaxation.  · Mild mitral valve regurgitation is  present.  · Mild tricuspid valve regurgitation is present. Estimated right ventricular systolic pressure from tricuspid regurgitation is normal (<35 mmHg). Calculated right ventricular systolic pressure from tricuspid regurgitation is 26 mmHg.      EKG is sinus there is a little bit of a right axis deviation artifact no definite acute ST-T changes    Active Hospital Problems    Diagnosis  POA   • Acute respiratory failure (CMS/[facility]) [J96.00]  Yes      Resolved Hospital Problems   No resolved problems to display.         Assessment/Plan     1. Pneumonia community-acquired I will continue azithromycin and ceftriaxone  2. Sepsis with septic shock nursing has been able to wean Levophed a little patient 30 mL/kg fluid bolus but is not on fluids she does not look to be in heart failure in fact she looks dry I am going to give her a little bolus and then maintenance fluids.  Hopefully we can wean off the Levophed if not she may need central access  3. Acute exacerbation COPD continue steroids add bronchodilators  4. Acute hypoxic and hypercapnic respiratory failure  5. Elevated troponin normal renal functions likely represents a non-ST elevation MI type II from demand ischemia we will have to trend troponin however  6. Malnutrition  protein calorie  7. Tobacco abuse needs to stop start NicoDerm  8. Hyperglycemia this may be related to stress and steroids check hemoglobin A1c  9. Fluids/electrolytes/nutrition we will start nutrition given how malnourished the patient is    Plan for disposition:    Donald Ryder MD  03/13/21  07:24 EST    Time: Critical care time 42 minutes

## 2021-03-13 NOTE — ED PROVIDER NOTES
EMERGENCY DEPARTMENT ENCOUNTER  Patient was placed in face mask in first look and the following protective measures were taken unless additional measures were taken and documented below in the ED course. Patient was wearing facemask when I entered the room and throughout our encounter. I wore full protective equipment throughout this patient encounter including a face mask, and gloves. Hand hygiene was performed before donning protective equipment and after removal when leaving the room.    Room Number:  18/18  Date of encounter:  3/12/2021  PCP: Margaret Ca PA   History is limited secondary to patient's respiratory distress.    HPI:  Context: Scarlet Chakraborty is a 61 y.o. female who presents to the ED c/o chief complaint of increasing shortness of air.  Patient told EMS that patient has had increasing shortness of air for the past 2 to 3 days with nonproductive cough.  She has been using her home nebulizers without relief.  She became acutely worse this evening.  EMS arrived and found patient tripoding with oxygen saturation of 81% on room air with a blood pressure 150/89 and a heart rate of 115.  Initially placed patient on oxygen via nonrebreather.  They then quickly switch her to a CPAP.  They did not hear wheezes so they did not give patient nebulizer treatments with Solu-Medrol on route.  Patient does have a history of COPD and stress-induced cardiomyopathy.  Patient denies pain but states she is short of air.    MEDICAL HISTORY REVIEW  Reviewed in EPIC    PAST MEDICAL HISTORY  Active Ambulatory Problems     Diagnosis Date Noted   • Acute respiratory failure with hypoxia (CMS/HCC) 09/13/2018   • Cerebrovascular accident (CVA) due to thrombosis (CMS/HCC) 09/24/2018   • Stress-induced cardiomyopathy 10/14/2018   • Chronic obstructive pulmonary disease with acute exacerbation (CMS/HCC) 10/14/2018   • Tobacco abuse 12/18/2018   • Mixed hyperlipidemia 12/18/2018   • Hx of non-ST elevation  myocardial infarction (NSTEMI) 02/17/2019   • History of stroke 02/17/2019   • Tobacco dependence 02/17/2019   • COPD exacerbation (CMS/HCC) 09/11/2019   • Breast mass 05/22/2012   • Cerebrovascular accident (CVA) due to occlusion of left middle cerebral artery (CMS/[facility]) 10/24/2018   • Congestive heart failure (CMS/[facility]) 11/02/2018   • Chronic obstructive lung disease (CMS/HCC) 11/02/2018   • Non-ischemic cardiomyopathy (CMS/HCC) 09/19/2019   • Acute respiratory failure (CMS/HCC) 09/30/2019   • Status post placement of implantable loop recorder 06/15/2020   • SOB (shortness of breath) 12/22/2020     Resolved Ambulatory Problems     Diagnosis Date Noted   • Acute respiratory failure (CMS/HCC) 09/13/2018     Past Medical History:   Diagnosis Date   • Asthma    • Cerebrovascular accident (CVA) due to thrombosis of right middle cerebral artery (CMS/HCC)    • COPD (chronic obstructive pulmonary disease) (CMS/HCC)    • Nonischemic cardiomyopathy (CMS/HCC)    • NSTEMI (non-ST elevated myocardial infarction) (CMS/HCC)    • Stroke (CMS/HCC)    • Takotsubo cardiomyopathy        PAST SURGICAL HISTORY  Past Surgical History:   Procedure Laterality Date   • CARDIAC CATHETERIZATION N/A 9/13/2018    Procedure: Left Heart Cath and cors;  Surgeon: Gabino Dozier MD;  Location: [facility] CATH INVASIVE LOCATION;  Service: Cardiology   • CARDIAC CATHETERIZATION N/A 9/13/2018    Procedure: Left ventriculography;  Surgeon: Gabino Dozier MD;  Location: [facility] CATH INVASIVE LOCATION;  Service: Cardiology   • CARDIAC ELECTROPHYSIOLOGY PROCEDURE N/A 9/27/2018    Procedure: Loop insertion  LINQ;  Surgeon: Jose R Barker MD;  Location: [facility] CATH INVASIVE LOCATION;  Service: Cardiovascular       FAMILY HISTORY  Family History   Problem Relation Age of Onset   • Cancer Sister        SOCIAL HISTORY  Social History     Socioeconomic History   • Marital status: Single     Spouse name: Not on file   • Number of children: Not on file   • Years of  education: Not on file   • Highest education level: Not on file   Tobacco Use   • Smoking status: Light Tobacco Smoker     Packs/day: 0.50     Years: 15.00     Pack years: 7.50     Types: Cigarettes   • Smokeless tobacco: Never Used   • Tobacco comment: currently 2 cigs daily   Substance and Sexual Activity   • Alcohol use: Yes     Comment: occassional/ Daily caffeine use   • Drug use: No   • Sexual activity: Defer       ALLERGIES  Patient has no known allergies.    The patient's allergies have been reviewed    REVIEW OF SYSTEMS  All systems reviewed and negative except for those discussed in HPI.     PHYSICAL EXAM  I have reviewed the triage vital signs and nursing notes.  ED Triage Vitals   Temp Heart Rate Resp BP SpO2   -- 03/12/21 2203 03/12/21 2203 03/12/21 2203 03/12/21 2205    (!) 127 (!) 40 147/97 94 %      Temp src Heart Rate Source Patient Position BP Location FiO2 (%)   -- -- -- -- --              General: Moderate distress  HENT: NCAT, PERRL, Nares patent  Eyes: no scleral icterus, extraocular meds are intact  Neck: trachea midline, no ROM limitations  CV: Tachycardic without murmur  Respiratory: No respiratory distress with tripoding and on a CPAP.  Breath sounds are diminished throughout and she is using accessory muscles  Abdomen: soft, nondistended, nontender to palpation, no rebound tenderness, no guarding or rigidity  : deferred  Musculoskeletal: no deformity.  No pedal edema noted  Neuro: alert, moves all extremities, follows commands  Skin: warm, dry    LAB RESULTS  Recent Results (from the past 24 hour(s))   ECG 12 Lead    Collection Time: 03/12/21 10:05 PM   Result Value Ref Range    QT Interval 309 ms   Comprehensive Metabolic Panel    Collection Time: 03/12/21 10:13 PM    Specimen: Blood   Result Value Ref Range    Glucose 201 (H) 65 - 99 mg/dL    BUN 14 8 - 23 mg/dL    Creatinine 0.64 0.57 - 1.00 mg/dL    Sodium 140 136 - 145 mmol/L    Potassium 4.5 3.5 - 5.2 mmol/L    Chloride 105 98 -  107 mmol/L    CO2 26.8 22.0 - 29.0 mmol/L    Calcium 9.1 8.6 - 10.5 mg/dL    Total Protein 7.4 6.0 - 8.5 g/dL    Albumin 4.40 3.50 - 5.20 g/dL    ALT (SGPT) 30 1 - 33 U/L    AST (SGOT) 30 1 - 32 U/L    Alkaline Phosphatase 71 39 - 117 U/L    Total Bilirubin 0.2 0.0 - 1.2 mg/dL    eGFR Non African Amer 95 >60 mL/min/1.73    Globulin 3.0 gm/dL    A/G Ratio 1.5 g/dL    BUN/Creatinine Ratio 21.9 7.0 - 25.0    Anion Gap 8.2 5.0 - 15.0 mmol/L   Procalcitonin    Collection Time: 03/12/21 10:13 PM    Specimen: Blood   Result Value Ref Range    Procalcitonin 0.07 0.00 - 0.25 ng/mL   Respiratory Panel PCR w/COVID-19(SARS-CoV-2) FRANCISCO JAVIER/JASPAL/ADELINE/PAD/COR/MAD/RAUL In-House, NP Swab in [facility], 3-4 HR TAT - Swab, Nasopharynx    Collection Time: 03/12/21 10:13 PM    Specimen: Nasopharynx; Swab   Result Value Ref Range    ADENOVIRUS, PCR Not Detected Not Detected    Coronavirus 229E Not Detected Not Detected    Coronavirus HKU1 Not Detected Not Detected    Coronavirus NL63 Not Detected Not Detected    Coronavirus OC43 Not Detected Not Detected    COVID19 Not Detected Not Detected - Ref. Range    Human Metapneumovirus Not Detected Not Detected    Human Rhinovirus/Enterovirus Not Detected Not Detected    Influenza A PCR Not Detected Not Detected    Influenza B PCR Not Detected Not Detected    Parainfluenza Virus 1 Not Detected Not Detected    Parainfluenza Virus 2 Not Detected Not Detected    Parainfluenza Virus 3 Not Detected Not Detected    Parainfluenza Virus 4 Not Detected Not Detected    RSV, PCR Not Detected Not Detected    Bordetella pertussis pcr Not Detected Not Detected    Bordetella parapertussis PCR Not Detected Not Detected    Chlamydophila pneumoniae PCR Not Detected Not Detected    Mycoplasma pneumo by PCR Not Detected Not Detected   Lactic Acid, Plasma    Collection Time: 03/12/21 10:13 PM    Specimen: Blood   Result Value Ref Range    Lactate 1.5 0.5 - 2.0 mmol/L   Troponin    Collection Time: 03/12/21 10:13 PM     Specimen: Blood   Result Value Ref Range    Troponin T 0.057 (C) 0.000 - 0.030 ng/mL   BNP    Collection Time: 03/12/21 10:13 PM    Specimen: Blood   Result Value Ref Range    proBNP 60.2 0.0 - 900.0 pg/mL   CBC Auto Differential    Collection Time: 03/12/21 10:13 PM    Specimen: Blood   Result Value Ref Range    WBC 17.05 (H) 3.40 - 10.80 10*3/mm3    RBC 4.83 3.77 - 5.28 10*6/mm3    Hemoglobin 14.7 12.0 - 15.9 g/dL    Hematocrit 44.9 34.0 - 46.6 %    MCV 93.0 79.0 - 97.0 fL    MCH 30.4 26.6 - 33.0 pg    MCHC 32.7 31.5 - 35.7 g/dL    RDW 12.1 (L) 12.3 - 15.4 %    RDW-SD 40.8 37.0 - 54.0 fl    MPV 11.3 6.0 - 12.0 fL    Platelets 341 140 - 450 10*3/mm3    Neutrophil % 58.8 42.7 - 76.0 %    Lymphocyte % 27.2 19.6 - 45.3 %    Monocyte % 7.5 5.0 - 12.0 %    Eosinophil % 5.7 0.3 - 6.2 %    Basophil % 0.6 0.0 - 1.5 %    Immature Grans % 0.2 0.0 - 0.5 %    Neutrophils, Absolute 10.01 (H) 1.70 - 7.00 10*3/mm3    Lymphocytes, Absolute 4.63 (H) 0.70 - 3.10 10*3/mm3    Monocytes, Absolute 1.28 (H) 0.10 - 0.90 10*3/mm3    Eosinophils, Absolute 0.98 (H) 0.00 - 0.40 10*3/mm3    Basophils, Absolute 0.11 0.00 - 0.20 10*3/mm3    Immature Grans, Absolute 0.04 0.00 - 0.05 10*3/mm3    nRBC 0.0 0.0 - 0.2 /100 WBC   Blood Gas, Arterial -    Collection Time: 03/12/21 10:41 PM    Specimen: Arterial Blood   Result Value Ref Range    Site Arterial: right radial     Troy's Test N/A     pH, Arterial 7.178 (C) 7.350 - 7.450 pH units    pCO2, Arterial 73.4 (C) 35.0 - 45.0 mm Hg    pO2, Arterial 113.2 (H) 80.0 - 100.0 mm Hg    HCO3, Arterial 27.2 22.0 - 28.0 mmol/L    Base Excess, Arterial -3.4 (L) 0.0 - 2.0 mmol/L    O2 Saturation Calculated 96.7 92.0 - 99.0 %    A-a Gradiant 0.5 mmHg    Barometric Pressure for Blood Gas 759.7 mmHg    Modality BiPap     FIO2 40 %    Ventilator Mode NIV        I ordered the above labs and reviewed the results.    RADIOLOGY  XR Chest AP    Result Date: 3/12/2021  Patient: JEAN-PIERRE CHAVEZ  Time Out: 23:01  Exam(s): FILM CXR 1 VIEW EXAM:   XR Chest, 1 View CLINICAL HISTORY:    Reason for exam: COVID Evaluation, Cough, Fever. TECHNIQUE:   Frontal view of the chest. COMPARISON:   9 30 19 FINDINGS:   Negative for significant cardiac enlargement.  Hyperinflation of both lungs consistent with COPD.  Patchy densities in both lung bases atelectasis or pneumonia.  Negative for pneumothorax or significant pleural fluid collections.     Electronically signed by Jake Nair DO on 03-12-21 at 2301      I ordered the above noted radiological studies. I reviewed the images and results. I agree with the radiologist interpretation.    PROCEDURES  Intubation    Date/Time: 3/12/2021 11:15 PM  Performed by: Darnell Mcbride MD  Authorized by: Darnell Mcbride MD     Consent:     Consent obtained:  Emergent situation    Consent given by:  Patient    Risks discussed:  Hypoxia and pneumothorax    Alternatives discussed:  Delayed treatment  Pre-procedure details:     Patient status:  Awake    Mallampati score:  II    Pretreatment meds: etomidate, 20mg.    Paralytics:  Succinylcholine  Procedure details:     Preoxygenation:  BiPAP    CPR in progress: no      Intubation method:  Oral    Oral intubation technique:  Video-assisted    Laryngoscope blade:  Mac 4    Tube size (mm):  7.0    Tube type:  Cuffed    Number of attempts:  1    Tube visualized through cords: yes    Placement assessment:     ETT to lip:  25cm    Tube secured with:  ETT johnson    Breath sounds:  Equal    Placement verification: chest rise, CXR verification, equal breath sounds and ETCO2 detector    Post-procedure details:     Patient tolerance of procedure:  Tolerated well, no immediate complications    Critical Care  Performed by: Darnell Mcbride MD  Authorized by: Darnell Mcbride MD     Critical care provider statement:     Critical care time (minutes):  60    Critical care time was exclusive of:  Separately billable procedures and treating other patients     Critical care was necessary to treat or prevent imminent or life-threatening deterioration of the following conditions:  Respiratory failure    Critical care was time spent personally by me on the following activities:  Development of treatment plan with patient or surrogate, discussions with consultants, evaluation of patient's response to treatment, examination of patient, interpretation of cardiac output measurements, obtaining history from patient or surrogate, ordering and review of laboratory studies, ordering and review of radiographic studies, re-evaluation of patient's condition and pulse oximetry      EKG          EKG time: 2205  Rhythm/Rate: Sinus tachycardia, rate 127  P waves and [place]: PVCs noted PACs noted  QRS, axis: Right axis deviation  ST and T waves: Nonspecific changes  Baseline artifact noted    Interpreted Contemporaneously by me, independently viewed  changed compared to prior 10/03/2019    MEDICATIONS GIVEN IN ER  Medications   albuterol (PROVENTIL) nebulizer solution 0.083% 2.5 mg/3mL (2.5 mg Nebulization Given by Other 3/12/21 2240)   propofol (DIPRIVAN) infusion 10 mg/mL 100 mL (50 mcg/kg/min × 45.4 kg Intravenous Rate/Dose Change 3/12/21 2344)   albuterol (PROVENTIL) nebulizer solution 0.083% 2.5 mg/3mL (has no administration in time range)   cefTRIAXone (ROCEPHIN) IVPB 1 g (has no administration in time range)   AZITHROMYCIN 500 MG/250 ML 0.9% NS IVPB (vial-mate) (has no administration in time range)   ipratropium-albuterol (DUO-NEB) nebulizer solution 3 mL (3 mL Nebulization Given by Other 3/12/21 2240)   methylPREDNISolone sodium succinate (SOLU-Medrol) injection 125 mg (125 mg Intravenous Given 3/12/21 2238)   etomidate (AMIDATE) injection (20 mg Intravenous Given 3/12/21 2315)   succinylcholine (ANECTINE) injection (150 mg Intravenous Given 3/12/21 2315)   sodium chloride 0.9 % infusion (1,000 mL Intravenous New Bag 3/12/21 2316)       PROGRESS, DATA ANALYSIS, CONSULTS, AND MEDICAL  DECISION MAKING  A complete history and physical exam have been performed.  All available laboratory and imaging results have been reviewed by myself prior to disposition.    MDM  After the initial H&P, I discussed pertinent information from history and physical exam with patient/family.  Discussed differential diagnosis.  Discussed plan for ED evaluation/work-up/treatment.  All questions answered.  Patient/family is agreeable with plan.  ED Course as of Mar 12 2345   Fri Mar 12, 2021   2306 Patient's blood gas shows a pH of 7.18 with a PCO2 of 73 whilst on BiPAP.  Patient is currently tachypneic, tripoding and respiratory distress.  I have updated patient that we need to intubate her.  She is understanding and agrees to the intubation.    [DE]   2322 COVID19: Not Detected [DE]   2337 Patient's boyfriend has arrived and I have updated him on her results.  He states that patient has had increasing shortness of air for approximately 5 to 7 days.  She has been using home breathing treatments with minimal relief.  She has been more short of breath when she walks and she cannot lay flat because of shortness of air.    [DE]      ED Course User Index  [DE] Darnell Mcbride MD       AS OF 23:45 EST VITALS:    BP - 135/61  HR - (!) 128  TEMP - 97.8 °F (36.6 °C) (Tympanic)  O2 SATS - 99%    DIAGNOSIS  Final diagnoses:   Acute respiratory failure with hypoxia and hypercapnia (CMS/HCC)   COPD with acute exacerbation (CMS/HCC)   Pneumonia of both lower lobes due to infectious organism         DISPOSITION  ADMISSION    Discussed treatment plan and reason for admission with pt/family and admitting physician.  Pt/family voiced understanding of the plan for admission for further testing/treatment as needed.          Darnell Mcbride MD  03/12/21 [id number]

## 2021-03-14 ENCOUNTER — APPOINTMENT (OUTPATIENT)
Dept: GENERAL RADIOLOGY | Facility: HOSPITAL | Age: 61
End: 2021-03-14

## 2021-03-14 LAB
ALBUMIN SERPL-MCNC: 3.1 G/DL (ref 3.5–5.2)
ANION GAP SERPL CALCULATED.3IONS-SCNC: 7.3 MMOL/L (ref 5–15)
ARTERIAL PATENCY WRIST A: ABNORMAL
ARTERIAL PATENCY WRIST A: ABNORMAL
ATMOSPHERIC PRESS: 756.9 MMHG
ATMOSPHERIC PRESS: 758.2 MMHG
BASE EXCESS BLDA CALC-SCNC: -1.4 MMOL/L (ref 0–2)
BASE EXCESS BLDA CALC-SCNC: 0 MMOL/L (ref 0–2)
BDY SITE: ABNORMAL
BDY SITE: ABNORMAL
BUN SERPL-MCNC: 19 MG/DL (ref 8–23)
BUN/CREAT SERPL: 41.3 (ref 7–25)
CALCIUM SPEC-SCNC: 8.3 MG/DL (ref 8.6–10.5)
CHLORIDE SERPL-SCNC: 109 MMOL/L (ref 98–107)
CO2 SERPL-SCNC: 21.7 MMOL/L (ref 22–29)
CREAT SERPL-MCNC: 0.46 MG/DL (ref 0.57–1)
DEPRECATED RDW RBC AUTO: 40.6 FL (ref 37–54)
ERYTHROCYTE [DISTWIDTH] IN BLOOD BY AUTOMATED COUNT: 12.2 % (ref 12.3–15.4)
GFR SERPL CREATININE-BSD FRML MDRD: 139 ML/MIN/1.73
GLUCOSE BLDC GLUCOMTR-MCNC: 116 MG/DL (ref 70–130)
GLUCOSE BLDC GLUCOMTR-MCNC: 120 MG/DL (ref 70–130)
GLUCOSE BLDC GLUCOMTR-MCNC: 186 MG/DL (ref 70–130)
GLUCOSE SERPL-MCNC: 160 MG/DL (ref 65–99)
HBA1C MFR BLD: 5.45 % (ref 4.8–5.6)
HCO3 BLDA-SCNC: 24 MMOL/L (ref 22–28)
HCO3 BLDA-SCNC: 24.1 MMOL/L (ref 22–28)
HCT VFR BLD AUTO: 34.8 % (ref 34–46.6)
HGB BLD-MCNC: 11.7 G/DL (ref 12–15.9)
INHALED O2 CONCENTRATION: 25 %
INHALED O2 CONCENTRATION: 25 %
MCH RBC QN AUTO: 31 PG (ref 26.6–33)
MCHC RBC AUTO-ENTMCNC: 33.6 G/DL (ref 31.5–35.7)
MCV RBC AUTO: 92.1 FL (ref 79–97)
MODALITY: ABNORMAL
MODALITY: ABNORMAL
O2 A-A PPRESDIFF RESPIRATORY: 0.3 MMHG
O2 A-A PPRESDIFF RESPIRATORY: 0.5 MMHG
PCO2 BLDA: 36.5 MM HG (ref 35–45)
PCO2 BLDA: 41.8 MM HG (ref 35–45)
PEEP RESPIRATORY: 5 CM[H2O]
PEEP RESPIRATORY: 5 CM[H2O]
PH BLDA: 7.37 PH UNITS (ref 7.35–7.45)
PH BLDA: 7.43 PH UNITS (ref 7.35–7.45)
PHOSPHATE SERPL-MCNC: 2.3 MG/DL (ref 2.5–4.5)
PLATELET # BLD AUTO: 212 10*3/MM3 (ref 140–450)
PMV BLD AUTO: 11.1 FL (ref 6–12)
PO2 BLDA: 38.8 MM HG (ref 80–100)
PO2 BLDA: 72.8 MM HG (ref 80–100)
POTASSIUM SERPL-SCNC: 4 MMOL/L (ref 3.5–5.2)
PSV: 10 CMH2O
PSV: 10 CMH2O
RBC # BLD AUTO: 3.78 10*6/MM3 (ref 3.77–5.28)
SAO2 % BLDCOA: 71.4 % (ref 92–99)
SAO2 % BLDCOA: 94.9 % (ref 92–99)
SODIUM SERPL-SCNC: 138 MMOL/L (ref 136–145)
TOTAL RATE: 17 BREATHS/MINUTE
TOTAL RATE: 17 BREATHS/MINUTE
VENTILATOR MODE: ABNORMAL
VENTILATOR MODE: ABNORMAL
VT ON VENT VENT: 714 ML
VT ON VENT VENT: 748 ML
WBC # BLD AUTO: 10.84 10*3/MM3 (ref 3.4–10.8)

## 2021-03-14 PROCEDURE — 25010000002 CEFTRIAXONE PER 250 MG: Performed by: INTERNAL MEDICINE

## 2021-03-14 PROCEDURE — 94640 AIRWAY INHALATION TREATMENT: CPT

## 2021-03-14 PROCEDURE — 85027 COMPLETE CBC AUTOMATED: CPT | Performed by: INTERNAL MEDICINE

## 2021-03-14 PROCEDURE — 82962 GLUCOSE BLOOD TEST: CPT

## 2021-03-14 PROCEDURE — 94799 UNLISTED PULMONARY SVC/PX: CPT

## 2021-03-14 PROCEDURE — 36600 WITHDRAWAL OF ARTERIAL BLOOD: CPT

## 2021-03-14 PROCEDURE — 25010000002 METHYLPREDNISOLONE PER 40 MG: Performed by: INTERNAL MEDICINE

## 2021-03-14 PROCEDURE — 63710000001 INSULIN LISPRO (HUMAN) PER 5 UNITS: Performed by: INTERNAL MEDICINE

## 2021-03-14 PROCEDURE — 25010000002 AZITHROMYCIN PER 500 MG: Performed by: INTERNAL MEDICINE

## 2021-03-14 PROCEDURE — 83036 HEMOGLOBIN GLYCOSYLATED A1C: CPT | Performed by: INTERNAL MEDICINE

## 2021-03-14 PROCEDURE — 80069 RENAL FUNCTION PANEL: CPT | Performed by: INTERNAL MEDICINE

## 2021-03-14 PROCEDURE — 94003 VENT MGMT INPAT SUBQ DAY: CPT

## 2021-03-14 PROCEDURE — 82803 BLOOD GASES ANY COMBINATION: CPT

## 2021-03-14 PROCEDURE — 71045 X-RAY EXAM CHEST 1 VIEW: CPT

## 2021-03-14 RX ORDER — CHOLECALCIFEROL (VITAMIN D3) 125 MCG
5 CAPSULE ORAL NIGHTLY PRN
Status: DISCONTINUED | OUTPATIENT
Start: 2021-03-14 | End: 2021-03-22 | Stop reason: HOSPADM

## 2021-03-14 RX ORDER — IPRATROPIUM BROMIDE AND ALBUTEROL SULFATE 2.5; .5 MG/3ML; MG/3ML
3 SOLUTION RESPIRATORY (INHALATION)
Status: DISCONTINUED | OUTPATIENT
Start: 2021-03-14 | End: 2021-03-22 | Stop reason: HOSPADM

## 2021-03-14 RX ORDER — METHYLPREDNISOLONE SODIUM SUCCINATE 40 MG/ML
20 INJECTION, POWDER, LYOPHILIZED, FOR SOLUTION INTRAMUSCULAR; INTRAVENOUS EVERY 12 HOURS
Status: DISCONTINUED | OUTPATIENT
Start: 2021-03-14 | End: 2021-03-15

## 2021-03-14 RX ORDER — LORAZEPAM 2 MG/ML
1 INJECTION INTRAMUSCULAR
Status: DISCONTINUED | OUTPATIENT
Start: 2021-03-14 | End: 2021-03-15

## 2021-03-14 RX ORDER — IPRATROPIUM BROMIDE AND ALBUTEROL SULFATE 2.5; .5 MG/3ML; MG/3ML
3 SOLUTION RESPIRATORY (INHALATION) EVERY 4 HOURS PRN
Status: DISCONTINUED | OUTPATIENT
Start: 2021-03-14 | End: 2021-03-22 | Stop reason: HOSPADM

## 2021-03-14 RX ADMIN — INSULIN LISPRO 4 UNITS: 100 INJECTION, SOLUTION INTRAVENOUS; SUBCUTANEOUS at 00:30

## 2021-03-14 RX ADMIN — ALBUTEROL SULFATE 6 PUFF: 90 AEROSOL, METERED RESPIRATORY (INHALATION) at 11:08

## 2021-03-14 RX ADMIN — METHYLPREDNISOLONE SODIUM SUCCINATE 40 MG: 40 INJECTION, POWDER, FOR SOLUTION INTRAMUSCULAR; INTRAVENOUS at 09:12

## 2021-03-14 RX ADMIN — SODIUM CHLORIDE, POTASSIUM CHLORIDE, SODIUM LACTATE AND CALCIUM CHLORIDE 125 ML/HR: 600; 310; 30; 20 INJECTION, SOLUTION INTRAVENOUS at 00:56

## 2021-03-14 RX ADMIN — IPRATROPIUM BROMIDE 6 PUFF: 17 AEROSOL, METERED RESPIRATORY (INHALATION) at 03:56

## 2021-03-14 RX ADMIN — IPRATROPIUM BROMIDE 6 PUFF: 17 AEROSOL, METERED RESPIRATORY (INHALATION) at 11:08

## 2021-03-14 RX ADMIN — INSULIN LISPRO 4 UNITS: 100 INJECTION, SOLUTION INTRAVENOUS; SUBCUTANEOUS at 06:19

## 2021-03-14 RX ADMIN — ALBUTEROL SULFATE 6 PUFF: 90 AEROSOL, METERED RESPIRATORY (INHALATION) at 07:25

## 2021-03-14 RX ADMIN — IPRATROPIUM BROMIDE 6 PUFF: 17 AEROSOL, METERED RESPIRATORY (INHALATION) at 07:25

## 2021-03-14 RX ADMIN — AZITHROMYCIN 500 MG: 500 INJECTION, POWDER, LYOPHILIZED, FOR SOLUTION INTRAVENOUS at 13:31

## 2021-03-14 RX ADMIN — Medication 5 MG: at 00:52

## 2021-03-14 RX ADMIN — IPRATROPIUM BROMIDE AND ALBUTEROL SULFATE 3 ML: 2.5; .5 SOLUTION RESPIRATORY (INHALATION) at 20:08

## 2021-03-14 RX ADMIN — ALBUTEROL SULFATE 6 PUFF: 90 AEROSOL, METERED RESPIRATORY (INHALATION) at 03:56

## 2021-03-14 RX ADMIN — IPRATROPIUM BROMIDE AND ALBUTEROL SULFATE 3 ML: 2.5; .5 SOLUTION RESPIRATORY (INHALATION) at 16:54

## 2021-03-14 RX ADMIN — METHYLPREDNISOLONE SODIUM SUCCINATE 20 MG: 40 INJECTION, POWDER, FOR SOLUTION INTRAMUSCULAR; INTRAVENOUS at 19:48

## 2021-03-14 RX ADMIN — CEFTRIAXONE SODIUM 1 G: 1 INJECTION, SOLUTION INTRAVENOUS at 13:29

## 2021-03-14 RX ADMIN — Medication 1 PATCH: at 09:12

## 2021-03-14 RX ADMIN — Medication 5 MG: at 21:40

## 2021-03-14 NOTE — PROGRESS NOTES
LOS: 2 days   Patient Care Team:  Margaret Ca PA as PCP - General (Family Medicine)  Beulah Joshi APRN as Nurse Practitioner (Neurology)    Subjective   Patient is awake and alert on the ventilator writing messages she does not like the endotracheal tube does not appear to have any other pain.  No nausea.    Review of Systems:          Objective     Vital Signs  Vital Sign Min/Max for last 24 hours  Temp  Min: 98.5 °F (36.9 °C)  Max: 98.7 °F (37.1 °C)   BP  Min: 82/48  Max: 123/55   Pulse  Min: 51  Max: 101   Resp  Min: 20  Max: 27   SpO2  Min: 81 %  Max: 100 %   No data recorded   Weight  Min: 40.8 kg (89 lb 15.2 oz)  Max: 42.5 kg (93 lb 11.1 oz)        Ventilator/Non-Invasive Ventilation Settings (From admission, onward) Comment     Start     Ordered    03/13/21 0045  Ventilator - AC/VC  Continuous     Question:  Vent Mode  Answer:  AC/VC    03/13/21 0044    03/12/21 2208  NIPPV (CPAP or BIPAP)  Until Discontinued,   Status:  Canceled     Question Answer Comment   Type: BIPAP    NIPPV Mask Interface: Per Patient Preference        03/12/21 2207                             Body mass index is 17.13 kg/m².  I/O last 3 completed shifts:  In: 7557.5 [I.V.:5423.5; Other:211; NG/GT:123; IV Piggyback:1800]  Out: 350 [Urine:350]  No intake/output data recorded.        Physical Exam:  General Appearance: Thin white female looks at least 2 or 3 decades older than her stated age she is only intubated with a 7 endotracheal tube at about 23 cm at the lips she is on pressure control of 16 inspiratory pressure of 15 PEEP of 8 cm FiO2 25% SPO2 is 98% she is breathing about 18 times per minute.  She is sedated with dexmedetomidine at 0.75mics per kilogram per hour and she is getting as needed fentanyl.  She is off Levophed.  She does not appear in any acute distress  Eyes: Conjunctiva are clear and anicteric pupils are equal pupils are about 3 mm and reactive to light  ENT: Mucous membranes are little  dry she has a little endotracheal tube in place nasal septum is midline  Neck: No adenopathy no jugular venous distension, trachea midline  Lungs: Does not have a tremendous amount of air movement the chest expansion is symmetric no wheezes, rales or rhonchi  Cardiac: Regular rate rhythm no murmur no gallop  Abdomen: Soft nontender no palpable hepatosplenomegaly or masses  : Not examined  Musculoskeletal: She has minimal muscle mass she has little bitemporal wasting thenar, hyperthenar and interosseous muscle wasting  Skin: No jaundice no petechiae skin is warm and dry  Neuro: Is awake and alert she is writing coherent messages following commands briskly with all extremities  Extremities/P Vascular: She has clubbing of all digits, no cyanosis palpable radial and dorsalis pedis pulses bilaterally  MSE: She seems to be in pretty good spirits       Labs:  Results from last 7 days   Lab Units 03/14/21  0454 03/13/21  1202 03/13/21  0528 03/12/21  2213   GLUCOSE mg/dL 160* 150* 149* 201*   SODIUM mmol/L 138 142 142 140   POTASSIUM mmol/L 4.0 5.3* 4.7 4.5   CO2 mmol/L 21.7* 21.8* 20.7* 26.8   CHLORIDE mmol/L 109* 112* 114* 105   ANION GAP mmol/L 7.3 8.2 7.3 8.2   CREATININE mg/dL 0.46* 0.48* 0.51* 0.64   BUN mg/dL 19 17 15 14   BUN / CREAT RATIO  41.3* 35.4* 29.4* 21.9   CALCIUM mg/dL 8.3* 8.2* 7.8* 9.1   EGFR IF NONAFRICN AM mL/min/1.73 139 132 123 95   ALK PHOS U/L  --   --   --  71   TOTAL PROTEIN g/dL  --   --   --  7.4   ALT (SGPT) U/L  --   --   --  30   AST (SGOT) U/L  --   --   --  30   BILIRUBIN mg/dL  --   --   --  0.2   ALBUMIN g/dL 3.10*  --  3.50 4.40   GLOBULIN gm/dL  --   --   --  3.0     Estimated Creatinine Clearance: 87.3 mL/min (A) (by C-G formula based on SCr of 0.46 mg/dL (L)).      Results from last 7 days   Lab Units 03/14/21  0454 03/13/21  0409 03/12/21  2213   WBC 10*3/mm3 10.84* 11.09* 17.05*   RBC 10*6/mm3 3.78 3.91 4.83   HEMOGLOBIN g/dL 11.7* 11.5* 14.7   HEMATOCRIT % 34.8 36.1 44.9   MCV  fL 92.1 92.3 93.0   MCH pg 31.0 29.4 30.4   MCHC g/dL 33.6 31.9 32.7   RDW % 12.2* 12.1* 12.1*   RDW-SD fl 40.6 40.3 40.8   MPV fL 11.1 10.9 11.3   PLATELETS 10*3/mm3 212 231 341   NEUTROPHIL % %  --  91.4* 58.8   LYMPHOCYTE % %  --  4.4* 27.2   MONOCYTES % %  --  3.5* 7.5   EOSINOPHIL % %  --  0.1* 5.7   BASOPHIL % %  --  0.1 0.6   IMM GRAN % %  --  0.5 0.2   NEUTROS ABS 10*3/mm3  --  10.14* 10.01*   LYMPHS ABS 10*3/mm3  --  0.49* 4.63*   MONOS ABS 10*3/mm3  --  0.39 1.28*   EOS ABS 10*3/mm3  --  0.01 0.98*   BASOS ABS 10*3/mm3  --  0.01 0.11   IMMATURE GRANS (ABS) 10*3/mm3  --  0.05 0.04   NRBC /100 WBC  --  0.0 0.0     Results from last 7 days   Lab Units 03/13/21  1230   PH, ARTERIAL pH units 7.255*   PO2 ART mm Hg 102.6*   PCO2, ARTERIAL mm Hg 49.0*   HCO3 ART mmol/L 21.7*     Results from last 7 days   Lab Units 03/13/21  1512 03/13/21  1202 03/12/21  2213   TROPONIN T ng/mL 0.258* 0.302* 0.057*     Results from last 7 days   Lab Units 03/12/21  2213   PROBNP pg/mL 60.2         Results from last 7 days   Lab Units 03/13/21  0528 03/12/21  2213   LACTATE mmol/L  --  1.5   PROCALCITONIN ng/mL 0.12 0.07         Microbiology Results (last 10 days)     Procedure Component Value - Date/Time    Respiratory Culture - Sputum, Cough [[id number]] Collected: 03/13/21 0850    Lab Status: Preliminary result Specimen: Sputum from Cough Updated: 03/13/21 6000     Gram Stain Rare (1+) WBCs seen      No organisms seen    Blood Culture - Blood, Arm, Right [[id number]] Collected: 03/12/21 2255    Lab Status: Preliminary result Specimen: Blood from Arm, Right Updated: 03/13/21 2315     Blood Culture No growth at 24 hours    Respiratory Panel PCR w/COVID-19(SARS-CoV-2) FRANCISCO JAVIER/JASPAL/ADELINE/PAD/[facility]/MAD/RAUL In-House, NP Swab in UTM/VTM, 3-4 HR TAT - Swab, Nasopharynx [[id number]]  (Normal) Collected: 03/12/21 2213    Lab Status: Final result Specimen: Swab from Nasopharynx Updated: 03/12/21 2321     ADENOVIRUS, PCR Not Detected     Coronavirus  229E Not Detected     Coronavirus HKU1 Not Detected     Coronavirus NL63 Not Detected     Coronavirus OC43 Not Detected     COVID19 Not Detected     Human Metapneumovirus Not Detected     Human Rhinovirus/Enterovirus Not Detected     Influenza A PCR Not Detected     Influenza B PCR Not Detected     Parainfluenza Virus 1 Not Detected     Parainfluenza Virus 2 Not Detected     Parainfluenza Virus 3 Not Detected     Parainfluenza Virus 4 Not Detected     RSV, PCR Not Detected     Bordetella pertussis pcr Not Detected     Bordetella parapertussis PCR Not Detected     Chlamydophila pneumoniae PCR Not Detected     Mycoplasma pneumo by PCR Not Detected    Narrative:      Fact sheet for providers: https://docs.[employer]/wp-content/uploads/VVK7622-1485-WT5.1-EUA-Provider-Fact-Sheet-3.pdf    Fact sheet for patients: https://docs.[employer]/wp-content/uploads/CID6144-7166-QG0.1-EUA-Patient-Fact-Sheet-1.pdf    Test performed by PCR.    Blood Culture - Blood, Arm, Right [[id number]] Collected: 03/12/21 2213    Lab Status: Preliminary result Specimen: Blood from Arm, Right Updated: 03/13/21 2230     Blood Culture No growth at 24 hours              albuterol, 2.5 mg, Nebulization, Once  albuterol sulfate HFA, 6 puff, Inhalation, Q4H - RT  azithromycin, 500 mg, Intravenous, Q24H  cefTRIAXone, 1 g, Intravenous, Q24H  insulin lispro, 0-24 Units, Subcutaneous, Q6H  ipratropium, 6 puff, Inhalation, Q4H - RT  methylPREDNISolone sodium succinate, 40 mg, Intravenous, Q12H  nicotine, 1 patch, Transdermal, Q24H  sodium chloride, 10 mL, Intravenous, Q12H      dexmedetomidine, 0.2-1.5 mcg/kg/hr, Last Rate: 0.5 mcg/kg/hr (03/13/21 2041)  lactated ringers, 125 mL/hr, Last Rate: 125 mL/hr (03/14/21 0056)  norepinephrine, 0.02-0.3 mcg/kg/min, Last Rate: Stopped (03/13/21 1546)  propofol, 5-50 mcg/kg/min, Last Rate: Stopped (03/13/21 0416)        Diagnostics:  XR Chest AP    Result Date: 3/12/2021  Patient: JEAN-PIERRE CHAVEZ  Time Out: 23:01  Exam(s): FILM CXR 1 VIEW EXAM:   XR Chest, 1 View CLINICAL HISTORY:    Reason for exam: COVID Evaluation, Cough, Fever. TECHNIQUE:   Frontal view of the chest. COMPARISON:   9 30 19 FINDINGS:   Negative for significant cardiac enlargement.  Hyperinflation of both lungs consistent with COPD.  Patchy densities in both lung bases atelectasis or pneumonia.  Negative for pneumothorax or significant pleural fluid collections.     Electronically signed by Jake Nair DO on 03-12-21 at 2301    Results for orders placed during the hospital encounter of 10/24/19    Adult Transthoracic Echo Complete W/ Cont if Necessary Per Protocol    Interpretation Summary  · Left ventricular systolic function is normal. Calculated EF = 65%. Estimated EF was in agreement with the calculated EF. Estimated EF = 65%. Normal left ventricular cavity size noted. All left ventricular wall segments contract normally. Left ventricular wall thickness is consistent with moderate concentric hypertrophy. Left ventricular diastolic dysfunction is noted (grade I) consistent with impaired relaxation.  · Mild mitral valve regurgitation is present.  · Mild tricuspid valve regurgitation is present. Estimated right ventricular systolic pressure from tricuspid regurgitation is normal (<35 mmHg). Calculated right ventricular systolic pressure from tricuspid regurgitation is 26 mmHg.    Chest x-ray reviewed endotracheal tube in good position, lungs remain hyperinflated the left base looks little better there is still a little haziness in the right base that probably represents an infiltrate.  The costophrenic angles are not sharp but I believe this is just reflection of the diaphragm because of the severe hyperinflation.      Active Hospital Problems    Diagnosis  POA   • Acute respiratory failure (CMS/[facility]) [J96.00]  Yes      Resolved Hospital Problems   No resolved problems to display.         Assessment/Plan     1. Pneumonia community-acquired I will  continue azithromycin and ceftriaxone cultures have been negative.  2. Sepsis with septic shock she is doing well off vasopressors now  3. Acute exacerbation COPD continue steroids add bronchodilators  4. Acute hypoxic and hypercapnic respiratory failure seems to be doing very well have put her on a spontaneous breathing trial and thus far looks pretty good we will give her 30 to 60 minutes and reassess I hope to be able to extubate  5. Elevated troponin normal renal functions likely represents a non-ST elevation MI type II from demand ischemia   6. Malnutrition  protein calorie  7. Tobacco abuse needs to stop start NicoDerm  8. Hyperglycemia likely stress and steroid as etiology hemoglobin A1c was normal.  9. Anemia will evaluate  10. Fluids/electrolytes/nutrition tolerating tube feeds    Plan for disposition:    Donald Ryder MD  03/14/21  08:30 EDT    Time: Critical care time 39 minutes

## 2021-03-15 ENCOUNTER — APPOINTMENT (OUTPATIENT)
Dept: GENERAL RADIOLOGY | Facility: HOSPITAL | Age: 61
End: 2021-03-15

## 2021-03-15 LAB
ALBUMIN SERPL-MCNC: 2.8 G/DL (ref 3.5–5.2)
ANION GAP SERPL CALCULATED.3IONS-SCNC: 8.9 MMOL/L (ref 5–15)
ARTERIAL PATENCY WRIST A: ABNORMAL
ARTERIAL PATENCY WRIST A: POSITIVE
ATMOSPHERIC PRESS: 753.9 MMHG
ATMOSPHERIC PRESS: 754.7 MMHG
BACTERIA SPEC RESP CULT: NORMAL
BASE EXCESS BLDA CALC-SCNC: 1.2 MMOL/L (ref 0–2)
BASE EXCESS BLDA CALC-SCNC: 4.4 MMOL/L (ref 0–2)
BDY SITE: ABNORMAL
BDY SITE: ABNORMAL
BUN SERPL-MCNC: 11 MG/DL (ref 8–23)
BUN/CREAT SERPL: 32.4 (ref 7–25)
CALCIUM SPEC-SCNC: 6.9 MG/DL (ref 8.6–10.5)
CHLORIDE SERPL-SCNC: 112 MMOL/L (ref 98–107)
CO2 SERPL-SCNC: 24.1 MMOL/L (ref 22–29)
CREAT SERPL-MCNC: 0.34 MG/DL (ref 0.57–1)
FOLATE SERPL-MCNC: 5.69 NG/ML (ref 4.78–24.2)
GAS FLOW AIRWAY: 3 LPM
GFR SERPL CREATININE-BSD FRML MDRD: >150 ML/MIN/1.73
GLUCOSE BLDC GLUCOMTR-MCNC: 101 MG/DL (ref 70–130)
GLUCOSE BLDC GLUCOMTR-MCNC: 127 MG/DL (ref 70–130)
GLUCOSE BLDC GLUCOMTR-MCNC: 162 MG/DL (ref 70–130)
GLUCOSE SERPL-MCNC: 119 MG/DL (ref 65–99)
GRAM STN SPEC: NORMAL
GRAM STN SPEC: NORMAL
HCO3 BLDA-SCNC: 29.2 MMOL/L (ref 22–28)
HCO3 BLDA-SCNC: 29.8 MMOL/L (ref 22–28)
INHALED O2 CONCENTRATION: 40 %
IRON 24H UR-MRATE: 42 MCG/DL (ref 37–145)
IRON SATN MFR SERPL: 21 % (ref 20–50)
MAGNESIUM SERPL-MCNC: 1.7 MG/DL (ref 1.6–2.4)
MODALITY: ABNORMAL
MODALITY: ABNORMAL
O2 A-A PPRESDIFF RESPIRATORY: 0.5 MMHG
PCO2 BLDA: 46.7 MM HG (ref 35–45)
PCO2 BLDA: 59.9 MM HG (ref 35–45)
PEEP RESPIRATORY: 8 CM[H2O]
PH BLDA: 7.3 PH UNITS (ref 7.35–7.45)
PH BLDA: 7.41 PH UNITS (ref 7.35–7.45)
PHOSPHATE SERPL-MCNC: 2.7 MG/DL (ref 2.5–4.5)
PO2 BLDA: 115.7 MM HG (ref 80–100)
PO2 BLDA: 53.4 MM HG (ref 80–100)
POTASSIUM SERPL-SCNC: 3.3 MMOL/L (ref 3.5–5.2)
SAO2 % BLDCOA: 82.6 % (ref 92–99)
SAO2 % BLDCOA: 98.5 % (ref 92–99)
SET MECH RESP RATE: 16
SODIUM SERPL-SCNC: 145 MMOL/L (ref 136–145)
TIBC SERPL-MCNC: 204 MCG/DL (ref 298–536)
TOTAL RATE: 17 BREATHS/MINUTE
TOTAL RATE: 24 BREATHS/MINUTE
TRANSFERRIN SERPL-MCNC: 137 MG/DL (ref 200–360)
VENTILATOR MODE: ABNORMAL
VIT B12 BLD-MCNC: 622 PG/ML (ref 211–946)
VT ON VENT VENT: 425 ML

## 2021-03-15 PROCEDURE — 80069 RENAL FUNCTION PANEL: CPT | Performed by: INTERNAL MEDICINE

## 2021-03-15 PROCEDURE — 25010000002 CEFTRIAXONE PER 250 MG: Performed by: INTERNAL MEDICINE

## 2021-03-15 PROCEDURE — 82962 GLUCOSE BLOOD TEST: CPT

## 2021-03-15 PROCEDURE — 84466 ASSAY OF TRANSFERRIN: CPT | Performed by: INTERNAL MEDICINE

## 2021-03-15 PROCEDURE — 83540 ASSAY OF IRON: CPT | Performed by: INTERNAL MEDICINE

## 2021-03-15 PROCEDURE — 82803 BLOOD GASES ANY COMBINATION: CPT

## 2021-03-15 PROCEDURE — 25010000002 FUROSEMIDE PER 20 MG: Performed by: INTERNAL MEDICINE

## 2021-03-15 PROCEDURE — 25010000002 LORAZEPAM PER 2 MG: Performed by: INTERNAL MEDICINE

## 2021-03-15 PROCEDURE — 63710000001 PREDNISONE PER 1 MG: Performed by: INTERNAL MEDICINE

## 2021-03-15 PROCEDURE — 71045 X-RAY EXAM CHEST 1 VIEW: CPT

## 2021-03-15 PROCEDURE — 63710000001 INSULIN LISPRO (HUMAN) PER 5 UNITS: Performed by: INTERNAL MEDICINE

## 2021-03-15 PROCEDURE — 94799 UNLISTED PULMONARY SVC/PX: CPT

## 2021-03-15 PROCEDURE — 83735 ASSAY OF MAGNESIUM: CPT | Performed by: INTERNAL MEDICINE

## 2021-03-15 PROCEDURE — 94660 CPAP INITIATION&MGMT: CPT

## 2021-03-15 PROCEDURE — 25010000002 AZITHROMYCIN PER 500 MG: Performed by: INTERNAL MEDICINE

## 2021-03-15 PROCEDURE — 25010000002 METHYLPREDNISOLONE PER 40 MG: Performed by: INTERNAL MEDICINE

## 2021-03-15 PROCEDURE — 82746 ASSAY OF FOLIC ACID SERUM: CPT | Performed by: INTERNAL MEDICINE

## 2021-03-15 PROCEDURE — 82607 VITAMIN B-12: CPT | Performed by: INTERNAL MEDICINE

## 2021-03-15 PROCEDURE — 25010000003 MAGNESIUM SULFATE 4 GM/100ML SOLUTION: Performed by: INTERNAL MEDICINE

## 2021-03-15 PROCEDURE — 36600 WITHDRAWAL OF ARTERIAL BLOOD: CPT

## 2021-03-15 RX ORDER — POTASSIUM CHLORIDE 1.5 G/1.77G
40 POWDER, FOR SOLUTION ORAL AS NEEDED
Status: DISCONTINUED | OUTPATIENT
Start: 2021-03-15 | End: 2021-03-22 | Stop reason: HOSPADM

## 2021-03-15 RX ORDER — PREDNISONE 20 MG/1
40 TABLET ORAL
Status: DISCONTINUED | OUTPATIENT
Start: 2021-03-15 | End: 2021-03-16

## 2021-03-15 RX ORDER — POTASSIUM CHLORIDE 7.45 MG/ML
10 INJECTION INTRAVENOUS
Status: DISCONTINUED | OUTPATIENT
Start: 2021-03-15 | End: 2021-03-22 | Stop reason: HOSPADM

## 2021-03-15 RX ORDER — MAGNESIUM SULFATE HEPTAHYDRATE 40 MG/ML
2 INJECTION, SOLUTION INTRAVENOUS AS NEEDED
Status: DISCONTINUED | OUTPATIENT
Start: 2021-03-15 | End: 2021-03-22 | Stop reason: HOSPADM

## 2021-03-15 RX ORDER — MAGNESIUM SULFATE HEPTAHYDRATE 40 MG/ML
4 INJECTION, SOLUTION INTRAVENOUS AS NEEDED
Status: DISCONTINUED | OUTPATIENT
Start: 2021-03-15 | End: 2021-03-22 | Stop reason: HOSPADM

## 2021-03-15 RX ORDER — POTASSIUM CHLORIDE 750 MG/1
40 TABLET, FILM COATED, EXTENDED RELEASE ORAL AS NEEDED
Status: DISCONTINUED | OUTPATIENT
Start: 2021-03-15 | End: 2021-03-22 | Stop reason: HOSPADM

## 2021-03-15 RX ORDER — METHYLPREDNISOLONE SODIUM SUCCINATE 40 MG/ML
40 INJECTION, POWDER, LYOPHILIZED, FOR SOLUTION INTRAMUSCULAR; INTRAVENOUS EVERY 12 HOURS
Status: DISCONTINUED | OUTPATIENT
Start: 2021-03-15 | End: 2021-03-15

## 2021-03-15 RX ORDER — METHYLPREDNISOLONE SODIUM SUCCINATE 40 MG/ML
40 INJECTION, POWDER, LYOPHILIZED, FOR SOLUTION INTRAMUSCULAR; INTRAVENOUS ONCE
Status: COMPLETED | OUTPATIENT
Start: 2021-03-15 | End: 2021-03-15

## 2021-03-15 RX ORDER — FUROSEMIDE 10 MG/ML
20 INJECTION INTRAMUSCULAR; INTRAVENOUS ONCE
Status: COMPLETED | OUTPATIENT
Start: 2021-03-15 | End: 2021-03-15

## 2021-03-15 RX ORDER — AZITHROMYCIN 250 MG/1
500 TABLET, FILM COATED ORAL ONCE
Status: COMPLETED | OUTPATIENT
Start: 2021-03-16 | End: 2021-03-16

## 2021-03-15 RX ADMIN — METHYLPREDNISOLONE SODIUM SUCCINATE 40 MG: 40 INJECTION, POWDER, FOR SOLUTION INTRAMUSCULAR; INTRAVENOUS at 03:10

## 2021-03-15 RX ADMIN — DEXMEDETOMIDINE HYDROCHLORIDE 0.5 MCG/KG/HR: 100 INJECTION, SOLUTION, CONCENTRATE INTRAVENOUS at 02:35

## 2021-03-15 RX ADMIN — CEFTRIAXONE SODIUM 1 G: 1 INJECTION, SOLUTION INTRAVENOUS at 12:25

## 2021-03-15 RX ADMIN — AZITHROMYCIN 500 MG: 500 INJECTION, POWDER, LYOPHILIZED, FOR SOLUTION INTRAVENOUS at 13:09

## 2021-03-15 RX ADMIN — POTASSIUM CHLORIDE 40 MEQ: 750 TABLET, EXTENDED RELEASE ORAL at 10:57

## 2021-03-15 RX ADMIN — IPRATROPIUM BROMIDE AND ALBUTEROL SULFATE 3 ML: 2.5; .5 SOLUTION RESPIRATORY (INHALATION) at 20:33

## 2021-03-15 RX ADMIN — POTASSIUM CHLORIDE 40 MEQ: 750 TABLET, EXTENDED RELEASE ORAL at 19:27

## 2021-03-15 RX ADMIN — APIXABAN 5 MG: 5 TABLET, FILM COATED ORAL at 20:23

## 2021-03-15 RX ADMIN — LORAZEPAM 1 MG: 2 INJECTION INTRAMUSCULAR; INTRAVENOUS at 03:21

## 2021-03-15 RX ADMIN — METHYLPREDNISOLONE SODIUM SUCCINATE 40 MG: 40 INJECTION, POWDER, FOR SOLUTION INTRAMUSCULAR; INTRAVENOUS at 08:40

## 2021-03-15 RX ADMIN — IPRATROPIUM BROMIDE AND ALBUTEROL SULFATE 3 ML: 2.5; .5 SOLUTION RESPIRATORY (INHALATION) at 02:14

## 2021-03-15 RX ADMIN — IPRATROPIUM BROMIDE AND ALBUTEROL SULFATE 3 ML: 2.5; .5 SOLUTION RESPIRATORY (INHALATION) at 11:54

## 2021-03-15 RX ADMIN — MAGNESIUM SULFATE HEPTAHYDRATE 4 G: 4 INJECTION, SOLUTION INTRAVENOUS at 08:40

## 2021-03-15 RX ADMIN — SODIUM CHLORIDE, PRESERVATIVE FREE 10 ML: 5 INJECTION INTRAVENOUS at 08:52

## 2021-03-15 RX ADMIN — SODIUM CHLORIDE, PRESERVATIVE FREE 10 ML: 5 INJECTION INTRAVENOUS at 20:23

## 2021-03-15 RX ADMIN — PREDNISONE 40 MG: 20 TABLET ORAL at 15:08

## 2021-03-15 RX ADMIN — APIXABAN 5 MG: 5 TABLET, FILM COATED ORAL at 15:01

## 2021-03-15 RX ADMIN — DEXMEDETOMIDINE HYDROCHLORIDE 0.6 MCG/KG/HR: 100 INJECTION, SOLUTION, CONCENTRATE INTRAVENOUS at 23:19

## 2021-03-15 RX ADMIN — INSULIN LISPRO 4 UNITS: 100 INJECTION, SOLUTION INTRAVENOUS; SUBCUTANEOUS at 17:13

## 2021-03-15 RX ADMIN — Medication 1 PATCH: at 08:46

## 2021-03-15 RX ADMIN — IPRATROPIUM BROMIDE AND ALBUTEROL SULFATE 3 ML: 2.5; .5 SOLUTION RESPIRATORY (INHALATION) at 15:30

## 2021-03-15 RX ADMIN — FUROSEMIDE 20 MG: 10 INJECTION, SOLUTION INTRAMUSCULAR; INTRAVENOUS at 03:11

## 2021-03-15 RX ADMIN — IPRATROPIUM BROMIDE AND ALBUTEROL SULFATE 3 ML: 2.5; .5 SOLUTION RESPIRATORY (INHALATION) at 08:24

## 2021-03-15 NOTE — PROGRESS NOTES
[facility] finds pt reclining in bed watching TV. Minimally receptive; no requests for assistance other than [facility] help with finding phone  and nurse pager - thes left within reach.  Thanks [facility] for contact.

## 2021-03-15 NOTE — PROGRESS NOTES
LOS: 3 days   Patient Care Team:  Margaret Ca PA as PCP - General (Family Medicine)  Beulah Joshi APRN as Nurse Practitioner (Neurology)    Subjective   Patient is resting in bed now on room air oxygen saturations about 90%.  She got into some trouble last night had some shortness of breath and sounds like she got real anxious and hyperventilating they turn her oxygen up CO2 went up and eventually she got put on some noninvasive ventilation which she was on this morning and doing very well again we have taken her off and she is doing okay.  Patient says she thinks the problem was her breathing treatments were a couple hours late last night she did not have any chest pain or pressure I spoke with her she has been losing some weight recently not having much appetite really pretty dyspneic unfortunately she has not cut back her smoking she says about 16 months ago or so when she had cut her smoking way back was just doing a little her appetite to come back she gained weight she felt better she was able  to exercise a little.  I told her that CO2 retention is really stage IV end-stage lung disease and if she ever wanted to have lung volume reduction or lung transplant she need to stop smoking and she does not have many more opportunities probably to do that.  She verbalized understanding.  We also talked about nutrition how she should probably eat frequent small meals and not a big meal and how she should probably use nutritional supplements.        Review of Systems:          Objective     Vital Signs  Vital Sign Min/Max for last 24 hours  Temp  Min: 97.4 °F (36.3 °C)  Max: 98.8 °F (37.1 °C)   BP  Min: 96/59  Max: 153/78   Pulse  Min: 76  Max: 152   Resp  Min: 15  Max: 25   SpO2  Min: 89 %  Max: 100 %   Flow (L/min)  Min: 2  Max: 4   Weight  Min: 42.7 kg (94 lb 2.2 oz)  Max: 42.7 kg (94 lb 2.2 oz)        Ventilator/Non-Invasive Ventilation Settings (From admission, onward) Comment      Start     Ordered    03/13/21 0045  Ventilator - AC/VC  Continuous     Question:  Vent Mode  Answer:  AC/VC    03/13/21 0044    03/12/21 2208  NIPPV (CPAP or BIPAP)  Until Discontinued,   Status:  Canceled     Question Answer Comment   Type: BIPAP    NIPPV Mask Interface: Per Patient Preference        03/12/21 2207                             Body mass index is 17.21 kg/m².  I/O last 3 completed shifts:  In: 4050.5 [P.O.:340; I.V.:3409.5; Other:211; NG/GT:90]  Out: 2575 [Urine:2575]  No intake/output data recorded.        Physical Exam:  General Appearance: Thin white female looks at least 2 or 3 decades older than her stated age she sitting in bed on room air oxygen saturation about 90% she does labored to breathe when she talks.  She is sort of a classic pink puffer she gets anxious and gets dyspneic.  Eyes: Conjunctiva are clear and anicteric pupils are equal   ENT: Mucous membranes moist no erythema no exudates  Neck: No adenopathy no jugular venous distension, trachea midline  Lungs: Does not have a tremendous amount of air movement the chest expansion is symmetric no wheezes, rales or rhonchi, no dullness on percussion she has a very prolonged expiratory phase  Cardiac: Regular rate rhythm no murmur no gallop  Abdomen: Soft nontender no palpable hepatosplenomegaly or masses  : Not examined  Musculoskeletal: She has minimal muscle mass she has little bitemporal wasting thenar, hyperthenar and interosseous muscle wasting  Skin: No jaundice no petechiae skin is warm and dry  Neuro: Is awake and alert she is following commands she sat up so I could listen to her.  Extremities/P Vascular: She has clubbing of all digits, no cyanosis, palpable radial and dorsalis pedis pulses bilaterally  MSE: She is a little anxious       Labs:  Results from last 7 days   Lab Units 03/15/21  0447 03/14/21  0454 03/13/21  1202 03/13/21  0528 03/12/21  2213   GLUCOSE mg/dL 119* 160* 150* 149* 201*   SODIUM mmol/L 145 138 142 142 140    POTASSIUM mmol/L 3.3* 4.0 5.3* 4.7 4.5   MAGNESIUM mg/dL 1.7  --   --   --   --    CO2 mmol/L 24.1 21.7* 21.8* 20.7* 26.8   CHLORIDE mmol/L 112* 109* 112* 114* 105   ANION GAP mmol/L 8.9 7.3 8.2 7.3 8.2   CREATININE mg/dL 0.34* 0.46* 0.48* 0.51* 0.64   BUN mg/dL 11 19 17 15 14   BUN / CREAT RATIO  32.4* 41.3* 35.4* 29.4* 21.9   CALCIUM mg/dL 6.9* 8.3* 8.2* 7.8* 9.1   EGFR IF NONAFRICN AM mL/min/1.73 >150 139 132 123 95   ALK PHOS U/L  --   --   --   --  71   TOTAL PROTEIN g/dL  --   --   --   --  7.4   ALT (SGPT) U/L  --   --   --   --  30   AST (SGOT) U/L  --   --   --   --  30   BILIRUBIN mg/dL  --   --   --   --  0.2   ALBUMIN g/dL 2.80* 3.10*  --  3.50 4.40   GLOBULIN gm/dL  --   --   --   --  3.0     Estimated Creatinine Clearance: 118.6 mL/min (A) (by C-G formula based on SCr of 0.34 mg/dL (L)).      Results from last 7 days   Lab Units 03/14/21  0454 03/13/21  0409 03/12/21  2213   WBC 10*3/mm3 10.84* 11.09* 17.05*   RBC 10*6/mm3 3.78 3.91 4.83   HEMOGLOBIN g/dL 11.7* 11.5* 14.7   HEMATOCRIT % 34.8 36.1 44.9   MCV fL 92.1 92.3 93.0   MCH pg 31.0 29.4 30.4   MCHC g/dL 33.6 31.9 32.7   RDW % 12.2* 12.1* 12.1*   RDW-SD fl 40.6 40.3 40.8   MPV fL 11.1 10.9 11.3   PLATELETS 10*3/mm3 212 231 341   NEUTROPHIL % %  --  91.4* 58.8   LYMPHOCYTE % %  --  4.4* 27.2   MONOCYTES % %  --  3.5* 7.5   EOSINOPHIL % %  --  0.1* 5.7   BASOPHIL % %  --  0.1 0.6   IMM GRAN % %  --  0.5 0.2   NEUTROS ABS 10*3/mm3  --  10.14* 10.01*   LYMPHS ABS 10*3/mm3  --  0.49* 4.63*   MONOS ABS 10*3/mm3  --  0.39 1.28*   EOS ABS 10*3/mm3  --  0.01 0.98*   BASOS ABS 10*3/mm3  --  0.01 0.11   IMMATURE GRANS (ABS) 10*3/mm3  --  0.05 0.04   NRBC /100 WBC  --  0.0 0.0     Results from last 7 days   Lab Units 03/15/21  0752   PH, ARTERIAL pH units 7.412   PO2 ART mm Hg 115.7*   PCO2, ARTERIAL mm Hg 46.7*   HCO3 ART mmol/L 29.8*     Results from last 7 days   Lab Units 03/13/21  1512 03/13/21  1202 03/12/21  2213   TROPONIN T ng/mL 0.258* 0.302*  0.057*     Results from last 7 days   Lab Units 03/12/21 2213   PROBNP pg/mL 60.2         Results from last 7 days   Lab Units 03/13/21  0528 03/12/21 2213   LACTATE mmol/L  --  1.5   PROCALCITONIN ng/mL 0.12 0.07         Microbiology Results (last 10 days)     Procedure Component Value - Date/Time    Respiratory Culture - Sputum, Cough [[id number]] Collected: 03/13/21 0850    Lab Status: Final result Specimen: Sputum from Cough Updated: 03/15/21 0811     Respiratory Culture Scant growth (1+) Normal Respiratory Jess: NO S.aureus/MRSA or Pseudomonas aeruginosa     Gram Stain Rare (1+) WBCs seen      No organisms seen    Blood Culture - Blood, Arm, Right [[id number]] Collected: 03/12/21 2255    Lab Status: Preliminary result Specimen: Blood from Arm, Right Updated: 03/15/21 0015     Blood Culture No growth at 2 days    Respiratory Panel PCR w/COVID-19(SARS-CoV-2) FRANCISCO JAVIER/JASPAL/ADELINE/PAD/COR/MAD/RAUL In-House, NP Swab in UTM/VTM, 3-4 HR TAT - Swab, Nasopharynx [[id number]]  (Normal) Collected: 03/12/21 2213    Lab Status: Final result Specimen: Swab from Nasopharynx Updated: 03/12/21 2321     ADENOVIRUS, PCR Not Detected     Coronavirus 229E Not Detected     Coronavirus HKU1 Not Detected     Coronavirus NL63 Not Detected     Coronavirus OC43 Not Detected     COVID19 Not Detected     Human Metapneumovirus Not Detected     Human Rhinovirus/Enterovirus Not Detected     Influenza A PCR Not Detected     Influenza B PCR Not Detected     Parainfluenza Virus 1 Not Detected     Parainfluenza Virus 2 Not Detected     Parainfluenza Virus 3 Not Detected     Parainfluenza Virus 4 Not Detected     RSV, PCR Not Detected     Bordetella pertussis pcr Not Detected     Bordetella parapertussis PCR Not Detected     Chlamydophila pneumoniae PCR Not Detected     Mycoplasma pneumo by PCR Not Detected    Narrative:      Fact sheet for providers: https://docs.[employer]/wp-content/uploads/VAO5499-7649-SK5.1-EUA-Provider-Fact-Sheet-3.pdf    Fact  sheet for patients: https://docs.[employer]/wp-content/uploads/UPU2746-6203-HH9.1-EUA-Patient-Fact-Sheet-1.pdf    Test performed by PCR.    Blood Culture - Blood, Arm, Right [[id number]] Collected: 03/12/21 2213    Lab Status: Preliminary result Specimen: Blood from Arm, Right Updated: 03/14/21 2330     Blood Culture No growth at 2 days              apixaban, 5 mg, Oral, Q12H  azithromycin, 500 mg, Intravenous, Q24H  cefTRIAXone, 1 g, Intravenous, Q24H  insulin lispro, 0-24 Units, Subcutaneous, Q6H  ipratropium-albuterol, 3 mL, Nebulization, 4x Daily - RT  methylPREDNISolone sodium succinate, 40 mg, Intravenous, Q12H  nicotine, 1 patch, Transdermal, Q24H  sodium chloride, 10 mL, Intravenous, Q12H      dexmedetomidine, 0.2-1.5 mcg/kg/hr, Last Rate: Stopped (03/15/21 1221)        Diagnostics:  XR Chest AP    Result Date: 3/12/2021  Patient: JEAN-PIERRE CHAVEZ  Time Out: 23:01 Exam(s): FILM CXR 1 VIEW EXAM:   XR Chest, 1 View CLINICAL HISTORY:    Reason for exam: COVID Evaluation, Cough, Fever. TECHNIQUE:   Frontal view of the chest. COMPARISON:   9 30 19 FINDINGS:   Negative for significant cardiac enlargement.  Hyperinflation of both lungs consistent with COPD.  Patchy densities in both lung bases atelectasis or pneumonia.  Negative for pneumothorax or significant pleural fluid collections.     Electronically signed by Jake Nair DO on 03-12-21 at 2301    Results for orders placed during the hospital encounter of 10/24/19    Adult Transthoracic Echo Complete W/ Cont if Necessary Per Protocol    Interpretation Summary  · Left ventricular systolic function is normal. Calculated EF = 65%. Estimated EF was in agreement with the calculated EF. Estimated EF = 65%. Normal left ventricular cavity size noted. All left ventricular wall segments contract normally. Left ventricular wall thickness is consistent with moderate concentric hypertrophy. Left ventricular diastolic dysfunction is noted (grade I) consistent with  impaired relaxation.  · Mild mitral valve regurgitation is present.  · Mild tricuspid valve regurgitation is present. Estimated right ventricular systolic pressure from tricuspid regurgitation is normal (<35 mmHg). Calculated right ventricular systolic pressure from tricuspid regurgitation is 26 mmHg.    Chest x-ray reviewed endotracheal tube in good position, lungs remain hyperinflated the left base looks little better there is still a little haziness in the right base that probably represents an infiltrate.  The costophrenic angles are not sharp but I believe this is just reflection of the diaphragm because of the severe hyperinflation.      Active Hospital Problems    Diagnosis  POA   • Acute respiratory failure (CMS/HCC) [J96.00]  Yes      Resolved Hospital Problems   No resolved problems to display.         Assessment/Plan     1. Pneumonia community-acquired I will continue azithromycin and ceftriaxone cultures have been negative.  2. Sepsis with septic shock she is doing well off vasopressors now  3. Acute exacerbation COPD continue steroids add bronchodilators.  I am not sure how much of a true exacerbation she had sounds like as much anxiety talking with nursing.  But she definitely has severe disease.  4. Acute hypoxic and hypercapnic respiratory failure see discussion above I will have noninvasive ventilator available should she need it  5. Elevated troponin normal renal functions likely represents a non-ST elevation MI type II from demand ischemia   6. Malnutrition  protein calorie we discussed dietary changes  7. Tobacco abuse needs to stop start NicoDerm  8. Hyperglycemia likely stress and steroid as etiology hemoglobin A1c was normal.  Pain scale insulin  9. Anemia will evaluate  10. Fluids/electrolytes/nutrition tolerating tube feeds    Plan for disposition:    Donald Ryder MD  03/15/21  12:50 EDT    Time:  spent about 35 minutes on patient's care

## 2021-03-15 NOTE — PAYOR COMM NOTE
"Scarlet Chavez (61 y.o. Female)                      ATTENTION;    ASSIGNED NURSE - PENDING REF [id number]                      REPLY TO U R DEPT, ERICA JETT LPN  [phone] OR CALL            Date of Birth Social Security Number Address Home Phone MRN    1960  [id number] [id number] [phone] [id number]    Yarsani Marital Status          Yarsani Single       Admission Date Admission Type Admitting Provider Attending Provider Department, Room/Bed    3/12/21 Emergency Sourav Bales MD Haller, [place], MD [facility], N340/1    Discharge Date Discharge Disposition Discharge Destination                       Attending Provider: Donald Ryder MD    Allergies: No Known Allergies    Isolation: None   Infection: None   Code Status: CPR    Ht: 157.5 cm (62.01\")   Wt: 42.7 kg (94 lb 2.2 oz)    Admission Cmt: None   Principal Problem: None                Active Insurance as of 3/12/2021     Primary Coverage     Payor Plan Insurance Group Employer/Plan Group    R [facility] [id number]     Payor Plan Address Payor Plan Phone Number Payor Plan Fax Number Effective Dates    [address] [address] [phone]  9/1/2019 - None Entered    [facility] [id number]       Subscriber Name Subscriber Birth Date Member ID       SCARLET CHAVEZ 1960 [id number]                 Emergency Contacts      (Rel.) Home Phone Work Phone Mobile Phone    Yvonne Mccollum (Sister) [phone] -- --    Don Taylor (Significant Other) -- -- [phone]               History & Physical      Sourav Bales MD at 03/12/21 5976          Group: [facility]         Critical care admission note    Patient Identification:  Scarlet Chavez  61 y.o.  female  1960  [id number]                CC: Shortness of breath    History of Present Illness:  61-year-old female who presents with shortness of breath.  She cannot provide her own " history because she is intubated and mechanically ventilated.  I discussed the case with Dr. Mcbride, emergency room physician.  The patient presents with shortness of breath which has been going on for about 3 or 4 days.  Gradually worsening, became very severe earlier this evening.  She has been coughing and wheezing.  Has not obtained any relief with her nebulizer.  Upon arrival EMS found the patient with oxygen saturation of 81% on room air and tachycardic with heart rate of 115 and blood pressure 150/89.  They started her on CPAP en route.  The patient has a history of COPD.  Apparently she did not have any fever or much sputum.  I reviewed her medical records.  I reviewed the discharge summary dictated by my colleague, Dr. Clay Ryder on October 4, 2019 when she had pneumonia, Takotsubo cardiomyopathy, paroxysmal SVT, acute respiratory failure, smoker.      Review of Systems   Unable to perform ROS: Intubated       Past Medical History:  Past Medical History:   Diagnosis Date   • Asthma    • Cerebrovascular accident (CVA) due to thrombosis of right middle cerebral artery (CMS/HCC)    • COPD (chronic obstructive pulmonary disease) (CMS/HCC)    • Nonischemic cardiomyopathy (CMS/HCC)    • NSTEMI (non-ST elevated myocardial infarction) (CMS/HCC)    • Stress-induced cardiomyopathy    • Stroke (CMS/HCC)    • Takotsubo cardiomyopathy    • Tobacco abuse        Past Surgical History:  Past Surgical History:   Procedure Laterality Date   • CARDIAC CATHETERIZATION N/A 9/13/2018    Procedure: Left Heart Cath and cors;  Surgeon: Gabino Dozier MD;  Location: [facility] INVASIVE LOCATION;  Service: Cardiology   • CARDIAC CATHETERIZATION N/A 9/13/2018    Procedure: Left ventriculography;  Surgeon: Gabino Dozier MD;  Location: [facility] INVASIVE LOCATION;  Service: Cardiology   • CARDIAC ELECTROPHYSIOLOGY PROCEDURE N/A 9/27/2018    Procedure: Loop insertion  LINQ;  Surgeon: Jose R Barker MD;  Location: [facility]  "INVASIVE LOCATION;  Service: Cardiovascular        Home Meds:  Reviewed and reconciled    Allergies:  No Known Allergies    Social History:   Social History     Socioeconomic History   • Marital status: Single     Spouse name: Not on file   • Number of children: Not on file   • Years of education: Not on file   • Highest education level: Not on file   Tobacco Use   • Smoking status: Light Tobacco Smoker     Packs/day: 0.50     Years: 15.00     Pack years: 7.50     Types: Cigarettes   • Smokeless tobacco: Never Used   • Tobacco comment: currently 2 cigs daily   Substance and Sexual Activity   • Alcohol use: Yes     Comment: occassional/ Daily caffeine use   • Drug use: No   • Sexual activity: Defer       Family History:  Family History   Problem Relation Age of Onset   • Cancer Sister        Physical Exam:  /48   Pulse 116   Temp 97.8 °F (36.6 °C) (Tympanic)   Resp 26   Ht 157.5 cm (62\")   Wt 45.4 kg (100 lb)   SpO2 95%   BMI 18.29 kg/m²  Body mass index is 18.29 kg/m². 95% 45.4 kg (100 lb)  Physical Exam  Constitutional:       Appearance: She is ill-appearing.      Comments: Thin, emaciated female   HENT:      Right Ear: External ear normal.      Left Ear: External ear normal.      Nose: Nose normal.      Mouth/Throat:      Comments: Oral exam shows endotracheal tube in good position  Eyes:      Conjunctiva/sclera: Conjunctivae normal.   Neck:      Thyroid: No thyromegaly.      Vascular: No JVD.      Trachea: No tracheal deviation.   Cardiovascular:      Rate and Rhythm: Normal rate and regular rhythm.      Heart sounds: Normal heart sounds. No murmur.   Pulmonary:      Comments: Barrel chest, bilateral wheezes and rhonchi, using accessory muscles  Abdominal:      Palpations: Abdomen is soft.      Tenderness: There is no abdominal tenderness. There is no rebound.      Comments: Cannot palpate liver or spleen enlargement   Musculoskeletal:         General: No deformity.      Cervical back: Neck supple. " No rigidity.   Skin:     General: Skin is warm.      Findings: No rash.      Comments: No palpable nodules   Neurological:      Comments: Patient is sedated, intubated, still restless, does not open eyes   Psychiatric:      Comments: Cannot be obtained, patient is intubated and sedated         LABS:  COVID19   Date Value Ref Range Status   03/12/2021 Not Detected Not Detected - Ref. Range Final       Lab Results   Component Value Date    CALCIUM 9.1 03/12/2021     Results from last 7 days   Lab Units 03/12/21  2213   SODIUM mmol/L 140   POTASSIUM mmol/L 4.5   CHLORIDE mmol/L 105   CO2 mmol/L 26.8   BUN mg/dL 14   CREATININE mg/dL 0.64   GLUCOSE mg/dL 201*   CALCIUM mg/dL 9.1   WBC 10*3/mm3 17.05*   HEMOGLOBIN g/dL 14.7   PLATELETS 10*3/mm3 341   ALT (SGPT) U/L 30   AST (SGOT) U/L 30   PROBNP pg/mL 60.2   PROCALCITONIN ng/mL 0.07     Lab Results   Component Value Date    TROPONINT 0.057 (C) 03/12/2021     Results from last 7 days   Lab Units 03/12/21  2213   TROPONIN T ng/mL 0.057*         Results from last 7 days   Lab Units 03/12/21  2213   PROCALCITONIN ng/mL 0.07   LACTATE mmol/L 1.5     Results from last 7 days   Lab Units 03/12/21  2241   PH, ARTERIAL pH units 7.178*   PCO2, ARTERIAL mm Hg 73.4*   PO2 ART mm Hg 113.2*   MODALITY  BiPap   O2 SATURATION CALC % 96.7     Results from last 7 days   Lab Units 03/12/21  2213   ADENOVIRUS DETECTION BY PCR  Not Detected   CORONAVIRUS 229E  Not Detected   CORONAVIRUS HKU1  Not Detected   CORONAVIRUS NL63  Not Detected   CORONAVIRUS OC43  Not Detected   HUMAN METAPNEUMOVIRUS  Not Detected   HUMAN RHINOVIRUS/ENTEROVIRUS  Not Detected   INFLUENZA B PCR  Not Detected   PARAINFLUENZA 1  Not Detected   PARAINFLUENZA VIRUS 2  Not Detected   PARAINFLUENZA VIRUS 3  Not Detected   PARAINFLUENZA VIRUS 4  Not Detected   BORDETELLA PERTUSSIS PCR  Not Detected   BORDETELLA PARAPERTUSSIS PCR  Not Detected   CHLAMYDOPHILA PNEUMONIAE PCR  Not Detected   MYCOPLAMA PNEUMO PCR  Not Detected    RSV, PCR  Not Detected             Lab Results   Component Value Date    TSH 1.010 09/24/2018     Estimated Creatinine Clearance: 67 mL/min (by C-G formula based on SCr of 0.64 mg/dL).         Imaging: I personally visualized the images of chest x-ray showing significant hyperinflated lungs.  There is patchy infiltrate in the right base.      Assessment:  Sepsis  Community-acquired pneumonia  COPD exacerbation  Acute hypoxic respiratory failure  Smoker  Malnutrition        Recommendations:  Patient is critically ill.  She will be admitted to the ICU.  We will continue to manage mechanical ventilation and adjust settings accordingly.  Give IV Rocephin and azithromycin for community-acquired pneumonia.  Send sputum for culture.  Patient has sepsis.  If the patient develops hypotension we will start IV fluid bolus.  Give IV corticosteroids and nebulized DuoNeb for COPD exacerbation.  Lovenox for DVT prophylaxis.  Pepcid for stress ulcer prophylaxis.    Total critical care time 35 minutes, excluding any separately billable procedure time      Sourav Bales MD  3/13/2021  00:16 EST      Much of this encounter note is an electronic transcription/translation of spoken language to printed text using Dragon Software.    Electronically signed by Sourav Bales MD at 03/13/21 0043          Emergency Department Notes      Darnell Mcbride MD at 03/12/21 2158      Procedure Orders    1. Intubation [[id number]] ordered by Darnell Mcbride MD    2. Critical Care [[id number]] ordered by Darnell Mcbride MD                EMERGENCY DEPARTMENT ENCOUNTER  Patient was placed in face mask in first look and the following protective measures were taken unless additional measures were taken and documented below in the ED course. Patient was wearing facemask when I entered the room and throughout our encounter. I wore full protective equipment throughout this patient encounter including a face mask, and gloves. Hand hygiene was  performed before donning protective equipment and after removal when leaving the room.    Room Number:  18/18  Date of encounter:  3/12/2021  PCP: Margaret Ca PA   History is limited secondary to patient's respiratory distress.    HPI:  Context: Scarlet Chakraborty is a 61 y.o. female who presents to the ED c/o chief complaint of increasing shortness of air.  Patient told EMS that patient has had increasing shortness of air for the past 2 to 3 days with nonproductive cough.  She has been using her home nebulizers without relief.  She became acutely worse this evening.  EMS arrived and found patient tripoding with oxygen saturation of 81% on room air with a blood pressure 150/89 and a heart rate of 115.  Initially placed patient on oxygen via nonrebreather.  They then quickly switch her to a CPAP.  They did not hear wheezes so they did not give patient nebulizer treatments with Solu-Medrol on route.  Patient does have a history of COPD and stress-induced cardiomyopathy.  Patient denies pain but states she is short of air.    MEDICAL HISTORY REVIEW  Reviewed in [facility]        FAMILY HISTORY  Family History   Problem Relation Age of Onset   • Cancer Sister        SOCIAL HISTORY  Social History     Socioeconomic History   • Marital status: Single     Spouse name: Not on file   • Number of children: Not on file   • Years of education: Not on file   • Highest education level: Not on file   Tobacco Use   • Smoking status: Light Tobacco Smoker     Packs/day: 0.50     Years: 15.00     Pack years: 7.50     Types: Cigarettes   • Smokeless tobacco: Never Used   • Tobacco comment: currently 2 cigs daily   Substance and Sexual Activity   • Alcohol use: Yes     Comment: occassional/ Daily caffeine use   • Drug use: No   • Sexual activity: Defer       ALLERGIES  Patient has no known allergies.    The patient's allergies have been reviewed    REVIEW OF SYSTEMS  All systems reviewed and negative except for those discussed  in HPI.     PHYSICAL EXAM  I have reviewed the triage vital signs and nursing notes.  ED Triage Vitals   Temp Heart Rate Resp BP SpO2   -- 03/12/21 2203 03/12/21 2203 03/12/21 2203 03/12/21 2205    (!) 127 (!) 40 147/97 94 %      Temp src Heart Rate Source Patient Position BP Location FiO2 (%)   -- -- -- -- --              General: Moderate distress  HENT: NCAT, PERRL, Nares patent  Eyes: no scleral icterus, extraocular meds are intact  Neck: trachea midline, no ROM limitations  CV: Tachycardic without murmur  Respiratory: No respiratory distress with tripoding and on a CPAP.  Breath sounds are diminished throughout and she is using accessory muscles  Abdomen: soft, nondistended, nontender to palpation, no rebound tenderness, no guarding or rigidity  : deferred  Musculoskeletal: no deformity.  No pedal edema noted  Neuro: alert, moves all extremities, follows commands  Skin: warm, dry    LAB RESULTS  Recent Results (from the past 24 hour(s))   ECG 12 Lead    Collection Time: 03/12/21 10:05 PM   Result Value Ref Range    QT Interval 309 ms   Comprehensive Metabolic Panel    Collection Time: 03/12/21 10:13 PM    Specimen: Blood   Result Value Ref Range    Glucose 201 (H) 65 - 99 mg/dL    BUN 14 8 - 23 mg/dL    Creatinine 0.64 0.57 - 1.00 mg/dL    Sodium 140 136 - 145 mmol/L    Potassium 4.5 3.5 - 5.2 mmol/L    Chloride 105 98 - 107 mmol/L    CO2 26.8 22.0 - 29.0 mmol/L    Calcium 9.1 8.6 - 10.5 mg/dL    Total Protein 7.4 6.0 - 8.5 g/dL    Albumin 4.40 3.50 - 5.20 g/dL    ALT (SGPT) 30 1 - 33 U/L    AST (SGOT) 30 1 - 32 U/L    Alkaline Phosphatase 71 39 - 117 U/L    Total Bilirubin 0.2 0.0 - 1.2 mg/dL    eGFR Non African Amer 95 >60 mL/min/1.73    Globulin 3.0 gm/dL    A/G Ratio 1.5 g/dL    BUN/Creatinine Ratio 21.9 7.0 - 25.0    Anion Gap 8.2 5.0 - 15.0 mmol/L   Procalcitonin    Collection Time: 03/12/21 10:13 PM    Specimen: Blood   Result Value Ref Range    Procalcitonin 0.07 0.00 - 0.25 ng/mL   Respiratory Panel  PCR w/COVID-19(SARS-CoV-2) FRANCISCO JAVIER/JASPAL/ADELINE/PAD/COR/MAD/RAUL In-House, NP Swab in UTM/VTM, 3-4 HR TAT - Swab, Nasopharynx    Collection Time: 03/12/21 10:13 PM    Specimen: Nasopharynx; Swab   Result Value Ref Range    ADENOVIRUS, PCR Not Detected Not Detected    Coronavirus 229E Not Detected Not Detected    Coronavirus HKU1 Not Detected Not Detected    Coronavirus NL63 Not Detected Not Detected    Coronavirus OC43 Not Detected Not Detected    COVID19 Not Detected Not Detected - Ref. Range    Human Metapneumovirus Not Detected Not Detected    Human Rhinovirus/Enterovirus Not Detected Not Detected    Influenza A PCR Not Detected Not Detected    Influenza B PCR Not Detected Not Detected    Parainfluenza Virus 1 Not Detected Not Detected    Parainfluenza Virus 2 Not Detected Not Detected    Parainfluenza Virus 3 Not Detected Not Detected    Parainfluenza Virus 4 Not Detected Not Detected    RSV, PCR Not Detected Not Detected    Bordetella pertussis pcr Not Detected Not Detected    Bordetella parapertussis PCR Not Detected Not Detected    Chlamydophila pneumoniae PCR Not Detected Not Detected    Mycoplasma pneumo by PCR Not Detected Not Detected   Lactic Acid, Plasma    Collection Time: 03/12/21 10:13 PM    Specimen: Blood   Result Value Ref Range    Lactate 1.5 0.5 - 2.0 mmol/L   Troponin    Collection Time: 03/12/21 10:13 PM    Specimen: Blood   Result Value Ref Range    Troponin T 0.057 (C) 0.000 - 0.030 ng/mL   BNP    Collection Time: 03/12/21 10:13 PM    Specimen: Blood   Result Value Ref Range    proBNP 60.2 0.0 - 900.0 pg/mL   CBC Auto Differential    Collection Time: 03/12/21 10:13 PM    Specimen: Blood   Result Value Ref Range    WBC 17.05 (H) 3.40 - 10.80 10*3/mm3    RBC 4.83 3.77 - 5.28 10*6/mm3    Hemoglobin 14.7 12.0 - 15.9 g/dL    Hematocrit 44.9 34.0 - 46.6 %    MCV 93.0 79.0 - 97.0 fL    MCH 30.4 26.6 - 33.0 pg    MCHC 32.7 31.5 - 35.7 g/dL    RDW 12.1 (L) 12.3 - 15.4 %    RDW-SD 40.8 37.0 - 54.0 fl    MPV  11.3 6.0 - 12.0 fL    Platelets 341 140 - 450 10*3/mm3    Neutrophil % 58.8 42.7 - 76.0 %    Lymphocyte % 27.2 19.6 - 45.3 %    Monocyte % 7.5 5.0 - 12.0 %    Eosinophil % 5.7 0.3 - 6.2 %    Basophil % 0.6 0.0 - 1.5 %    Immature Grans % 0.2 0.0 - 0.5 %    Neutrophils, Absolute 10.01 (H) 1.70 - 7.00 10*3/mm3    Lymphocytes, Absolute 4.63 (H) 0.70 - 3.10 10*3/mm3    Monocytes, Absolute 1.28 (H) 0.10 - 0.90 10*3/mm3    Eosinophils, Absolute 0.98 (H) 0.00 - 0.40 10*3/mm3    Basophils, Absolute 0.11 0.00 - 0.20 10*3/mm3    Immature Grans, Absolute 0.04 0.00 - 0.05 10*3/mm3    nRBC 0.0 0.0 - 0.2 /100 WBC   Blood Gas, Arterial -    Collection Time: 03/12/21 10:41 PM    Specimen: Arterial Blood   Result Value Ref Range    Site Arterial: right radial     Troy's Test N/A     pH, Arterial 7.178 (C) 7.350 - 7.450 pH units    pCO2, Arterial 73.4 (C) 35.0 - 45.0 mm Hg    pO2, Arterial 113.2 (H) 80.0 - 100.0 mm Hg    HCO3, Arterial 27.2 22.0 - 28.0 mmol/L    Base Excess, Arterial -3.4 (L) 0.0 - 2.0 mmol/L    O2 Saturation Calculated 96.7 92.0 - 99.0 %    A-a Gradiant 0.5 mmHg    Barometric Pressure for Blood Gas 759.7 mmHg    Modality BiPap     FIO2 40 %    Ventilator Mode NIV        I ordered the above labs and reviewed the results.    RADIOLOGY  XR Chest AP    Result Date: 3/12/2021  Patient: JEAN-PIERRE CHAVEZ  Time Out: 23:01 Exam(s): FILM CXR 1 VIEW EXAM:   XR Chest, 1 View CLINICAL HISTORY:    Reason for exam: COVID Evaluation, Cough, Fever. TECHNIQUE:   Frontal view of the chest. COMPARISON:   9 30 19 FINDINGS:   Negative for significant cardiac enlargement.  Hyperinflation of both lungs consistent with COPD.  Patchy densities in both lung bases atelectasis or pneumonia.  Negative for pneumothorax or significant pleural fluid collections.     Electronically signed by Jake Nair DO on 03-12-21 at 2301      I ordered the above noted radiological studies. I reviewed the images and results. I agree with the radiologist  interpretation.    PROCEDURES  Intubation    Date/Time: 3/12/2021 11:15 PM  Performed by: Darnell Mcbride MD  Authorized by: Darnell Mcbride MD     Consent:     Consent obtained:  Emergent situation    Consent given by:  Patient    Risks discussed:  Hypoxia and pneumothorax    Alternatives discussed:  Delayed treatment  Pre-procedure details:     Patient status:  Awake    Mallampati score:  II    Pretreatment meds: etomidate, 20mg.    Paralytics:  Succinylcholine  Procedure details:     Preoxygenation:  BiPAP    CPR in progress: no      Intubation method:  Oral    Oral intubation technique:  Video-assisted    Laryngoscope blade:  Mac 4    Tube size (mm):  7.0    Tube type:  Cuffed    Number of attempts:  1    Tube visualized through cords: yes    Placement assessment:     ETT to lip:  25cm    Tube secured with:  ETT johnson    Breath sounds:  Equal    Placement verification: chest rise, CXR verification, equal breath sounds and ETCO2 detector    Post-procedure details:     Patient tolerance of procedure:  Tolerated well, no immediate complications    Critical Care  Performed by: Darnell Mcbride MD  Authorized by: Darnell Mcbride MD         EKG          EKG time: 2205  Rhythm/Rate: Sinus tachycardia, rate 127  P waves and RI: PVCs noted PACs noted  QRS, axis: Right axis deviation  ST and T waves: Nonspecific changes  Baseline artifact noted    Interpreted Contemporaneously by me, independently viewed  changed compared to prior 10/03/2019    MEDICATIONS GIVEN IN ER  Medications   albuterol (PROVENTIL) nebulizer solution 0.083% 2.5 mg/3mL (2.5 mg Nebulization Given by Other 3/12/21 2240)   propofol (DIPRIVAN) infusion 10 mg/mL 100 mL (50 mcg/kg/min × 45.4 kg Intravenous Rate/Dose Change 3/12/21 3904)   albuterol (PROVENTIL) nebulizer solution 0.083% 2.5 mg/3mL (has no administration in time range)   cefTRIAXone (ROCEPHIN) IVPB 1 g (has no administration in time range)   AZITHROMYCIN 500 MG/250 ML 0.9% NS IVPB  (vial-mate) (has no administration in time range)   ipratropium-albuterol (DUO-NEB) nebulizer solution 3 mL (3 mL Nebulization Given by Other 3/12/21 2240)   methylPREDNISolone sodium succinate (SOLU-Medrol) injection 125 mg (125 mg Intravenous Given 3/12/21 2238)   etomidate (AMIDATE) injection (20 mg Intravenous Given 3/12/21 2315)   succinylcholine (ANECTINE) injection (150 mg Intravenous Given 3/12/21 2315)   sodium chloride 0.9 % infusion (1,000 mL Intravenous New Bag 3/12/21 2316)       PROGRESS, DATA ANALYSIS, CONSULTS, AND MEDICAL DECISION MAKING  A complete history and physical exam have been performed.  All available laboratory and imaging results have been reviewed by myself prior to disposition.    MDM  After the initial H&P, I discussed pertinent information from history and physical exam with patient/family.  Discussed differential diagnosis.  Discussed plan for ED evaluation/work-up/treatment.  All questions answered.  Patient/family is agreeable with plan.  ED Course as of Mar 12 2345   Fri Mar 12, 2021   2306 Patient's blood gas shows a pH of 7.18 with a PCO2 of 73 whilst on BiPAP.  Patient is currently tachypneic, tripoding and respiratory distress.  I have updated patient that we need to intubate her.  She is understanding and agrees to the intubation.    [DE]   2322 COVID19: Not Detected [DE]   2337 Patient's boyfriend has arrived and I have updated him on her results.  He states that patient has had increasing shortness of air for approximately 5 to 7 days.  She has been using home breathing treatments with minimal relief.  She has been more short of breath when she walks and she cannot lay flat because of shortness of air.    [DE]      ED Course User Index  [DE] Darnell Mcbride MD       AS OF 23:45 EST VITALS:    BP - 135/61  HR - (!) 128  TEMP - 97.8 °F (36.6 °C) (Tympanic)  O2 SATS - 99%    DIAGNOSIS  Final diagnoses:   Acute respiratory failure with hypoxia and hypercapnia (CMS/HCC)   COPD  with acute exacerbation (CMS/HCC)   Pneumonia of both lower lobes due to infectious organism         DISPOSITION  ADMISSION    Discussed treatment plan and reason for admission with pt/family and admitting physician.  Pt/family voiced understanding of the plan for admission for further testing/treatment as needed.          Darnell Mcbride MD  03/12/21 2346      Electronically signed by Darnell Mcbride MD at 03/12/21 2346           Oxygen Therapy (last 3 days)     Date/Time   SpO2   Device (Oxygen Therapy)   Flow (L/min)   Oxygen Concentration (%)   ETCO2 (mmHg)    03/15/21 1215   (!) 89   --   --   --   --    03/15/21 1200   95   --   --   --   --    03/15/21 1159   94   (S) room air   --   --   --    03/15/21 1154   99   nasal cannula   2   --   --    03/15/21 1145   97   --   --   --   --    03/15/21 1130   98   --   --   --   --    03/15/21 1115   97   --   --   --   --    03/15/21 1100   96   --   --   --   --    03/15/21 1045   97   --   --   --   --    03/15/21 1030   100   --   --   --   --    03/15/21 1015   100   --   --   --   --    03/15/21 1000   97   --   --   --   --    03/15/21 0945   97   --   --   --   --    03/15/21 0930   97   --   --   --   --    03/15/21 0915   98   --   --   --   --    03/15/21 0901   99   --   --   --   --    03/15/21 0845   98   --   --   --   --    03/15/21 0830   98   NPPV/NIV   --   40   --    03/15/21 0824   98   NPPV/NIV   --   40   --    03/15/21 0815   99   --   --   --   --    03/15/21 0800   99   --   --   --   --    03/15/21 0745   98   --   --   --   --    03/15/21 0600   97   --   --   --   --    03/15/21 0500   100   --   --   --   --    03/15/21 0400   98   NPPV/NIV   --   40   --    03/15/21 0300   91   --   --   --   --    03/15/21 0235   100   NPPV/NIV   --   40   --    03/15/21 0214   (!) 89   nasal cannula   2   --   --    03/15/21 0200   99   --   --   --   --    03/15/21 0100   98   --   --   --   --    03/15/21 0000   99   nasal cannula   2   --   --     03/14/21 2300   99   --   --   --   --    03/14/21 2200   96   --   --   --   --    03/14/21 2100   98   --   --   --   --    03/14/21 2020   100   --   --   --   --    03/14/21 2008   99   nasal cannula   4   --   --    03/14/21 1952   --   nasal cannula   4   --   --    03/14/21 1900   98   --   --   --   --    03/14/21 1800   96   nasal cannula   4   --   --    03/14/21 1700   100   --   3   --   --    03/14/21 1655   100   nasal cannula   3   --   --    03/14/21 1206   --   nasal cannula   3   --   --    03/14/21 1200   97   nasal cannula   2   --   --    03/14/21 1124   94   nasal cannula   2   --   --    03/14/21 1110   94   --   --   --   --    03/14/21 1109   94   ventilator   --   25   --    03/14/21 1100   97   --   --   --   --    03/14/21 1000   97   ventilator   --   25   --    03/14/21 0900   100   ventilator   --   25   --    03/14/21 0800   93   ventilator   --   25   --    03/14/21 0728   94   --   --   --   --    03/14/21 0726   96   ventilator   --   25   --    03/14/21 0700   97   --   --   --   --    03/14/21 0600   99   --   --   --   --    03/14/21 0500   99   --   --   --   --    03/14/21 0405   --   ventilator   --   30   --    03/14/21 0356   99   ventilator   --   30   --    03/14/21 0100   99   --   --   --   --    03/14/21 0030   99   --   --   --   --    03/14/21 0000   98   ventilator   --   30   --    03/13/21 2336   99   ventilator   --   30   --    03/13/21 2330   95   --   --   --   --    03/13/21 2300   100   --   --   --   --    03/13/21 2230   100   --   --   --   --    03/13/21 2215   (!) 81   --   --   --   --    03/13/21 2200   (!) 85   --   --   --   --    03/13/21 2145   100   --   --   --   --    03/13/21 2130   100   --   --   --   --    03/13/21 2115   100   --   --   --   --    03/13/21 2100   100   --   --   --   --    03/13/21 2050   100   ventilator   --   --   --    03/13/21 2045   100   --   --   --   --    03/13/21 2030   97   --   --   --   --    03/13/21 2015    99   --   --   --   --    03/13/21 2005   --   ventilator   --   30   --    03/13/21 2000   97   --   --   --   --    03/13/21 1945   100   --   --   --   --    03/13/21 1930   93   --   --   --   --    03/13/21 1915   98   --   --   --   --    03/13/21 1900   97   --   --   --   --    03/13/21 1830   98   --   --   --   --    03/13/21 1700   100   --   --   --   --    03/13/21 1645   100   --   --   --   --    03/13/21 1630   98   --   --   --   --    03/13/21 1615   92   --   --   --   --    03/13/21 1612   92   --   --   --   --    03/13/21 1610   91   ventilator   --   30   --    03/13/21 1609   --   ventilator   --   30   --    03/13/21 1600   92   --   --   --   --    03/13/21 1545   98   --   --   --   --    03/13/21 1530   90   --   --   --   --    03/13/21 1500   94   --   --   --   --    03/13/21 1445   93   --   --   --   --    03/13/21 1430   (!) 88   --   --   --   --    03/13/21 1415   100   --   --   --   --    03/13/21 1400   98   --   --   --   --    03/13/21 1345   97   --   --   --   --    03/13/21 1330   94   --   --   --   --    03/13/21 1316   93   --   --   --   --    03/13/21 1300   93   --   --   --   --    03/13/21 1246   99   --   --   --   --    03/13/21 1245   99   --   --   --   --    03/13/21 1243   96   ventilator   --   30   --    03/13/21 1231   97   --   --   --   --    03/13/21 1215   98   --   --   --   --    03/13/21 1210   --   ventilator   --   30   --    03/13/21 1200   98   --   --   --   --    03/13/21 1115   97   --   --   --   --    03/13/21 1100   96   --   --   --   --    03/13/21 1045   96   --   --   --   --    03/13/21 1030   98   --   --   --   --    03/13/21 1015   98   --   --   --   --    03/13/21 1000   99   --   --   --   --    03/13/21 0945   99   --   --   --   --    03/13/21 0930   100   --   --   --   --    03/13/21 0915   99   --   --   --   --    03/13/21 0900   100   --   --   --   --    03/13/21 0830   96   --   --   --   --    03/13/21 0815   99   --    --   --   --    03/13/21 0800   96   --   --   --   --    03/13/21 0753   --   ventilator   --   30   --    03/13/21 0745   96   --   --   --   --    03/13/21 0737   100   ventilator   --   --   --    03/13/21 0736   98   --   --   --   --    03/13/21 0730   100   --   --   --   --    03/13/21 0700   100   --   --   --   --    03/13/21 0645   100   --   --   --   --    03/13/21 0630   100   --   --   --   --    03/13/21 0615   100   --   --   --   --    03/13/21 0600   100   --   --   --   --    03/13/21 0545   100   --   --   --   --    03/13/21 0530   99   --   --   --   --    03/13/21 0515   100   --   --   --   --    03/13/21 0500   100   ventilator   --   50   --    03/13/21 0445   100   --   --   --   --    03/13/21 0430   100   --   --   --   --    03/13/21 0415   98   --   --   --   --    03/13/21 0404   --   ventilator   --   50   --    03/13/21 0400   96   --   --   --   --    03/13/21 0345   97   --   --   --   --    03/13/21 0330   96   --   --   --   --    03/13/21 0315   (!) 85   --   --   --   --    03/13/21 0308   --   ventilator   --   50   --    03/13/21 0300   99   --   --   --   --    03/13/21 0245   97   --   --   --   --    03/13/21 0230   98   --   --   --   --    03/13/21 0215   100   --   --   --   --    03/13/21 0200   100   --   --   --   --    03/13/21 0145   100   --   --   --   --    03/13/21 0141   100   --   --   --   --    03/13/21 0130   97   --   --   --   --    03/13/21 0115   98   --   --   --   --    03/13/21 0108   96   ventilator   --   50   --    03/13/21 0100   96   --   --   --   --    03/13/21 0036   95   --   --   --   --    03/13/21 0017   96   --   --   --   --    03/13/21 0015   96   --   --   --   --    03/13/21 0005   96   --   --   --   --    03/12/21 2350   94   --   --   --   --    03/12/21 2349   95   --   --   --   --    03/12/21 2341   95   --   --   --   --    03/12/21 2325   96   --   --   --   --    03/12/21 2323 96   --   --   --   --    03/12/21 2320    98   --   --   --   --    03/12/21 2250   99   --   --   --   --    03/12/21 2240   98   --   --   --   --    03/12/21 2217   99   --   --   --   --    03/12/21 2210   100   ventilator   --   --   --    03/12/21 2205   94   CPAP   --   --   --                     Facility-Administered Medications as of 3/15/2021   Medication Dose Route Frequency Provider Last Rate Last Admin   • [COMPLETED] albuterol (PROVENTIL) nebulizer solution 0.083% 2.5 mg/3mL  2.5 mg Nebulization Q15 Min Darnell Mcbride MD   2.5 mg at 03/12/21 2247   • apixaban (ELIQUIS) tablet 5 mg  5 mg Oral Q12H Donald Ryder MD       • [COMPLETED] AZITHROMYCIN 500 MG/250 ML 0.9% NS IVPB (vial-mate)  500 mg Intravenous Once Darnell Mcbride MD   500 mg at 03/13/21 0130   • AZITHROMYCIN 500 MG/250 ML 0.9% NS IVPB (vial-mate)  500 mg Intravenous Q24H Donald Ryder MD   500 mg at 03/15/21 1309   • [COMPLETED] cefTRIAXone (ROCEPHIN) IVPB 1 g  1 g Intravenous Once Darnell Mcbride  mL/hr at 03/13/21 0019 1 g at 03/13/21 0019   • cefTRIAXone (ROCEPHIN) IVPB 1 g  1 g Intravenous Q24H Donald Ryder  mL/hr at 03/15/21 1225 1 g at 03/15/21 1225   • dexmedetomidine (PRECEDEX) 400 mcg in 100 mL NS infusion kit  0.2-1.5 mcg/kg/hr Intravenous Titrated Sourav Bales MD   Stopped at 03/15/21 1221   • dextrose (D50W) 25 g/ 50mL Intravenous Solution 25 g  25 g Intravenous Q15 Min PRN Donald Ryder MD       • dextrose (GLUTOSE) oral gel 15 g  15 g Oral Q15 Min PRN Donald Ryder MD       • [COMPLETED] etomidate (AMIDATE) injection   Intravenous Code / Trauma / Sedation Medication Darnell Mcbride MD   20 mg at 03/12/21 2315   • [COMPLETED] furosemide (LASIX) injection 20 mg  20 mg Intravenous Once Sourav Bales MD   20 mg at 03/15/21 0311   • glucagon (human recombinant) (GLUCAGEN DIAGNOSTIC) injection 1 mg  1 mg Subcutaneous Q15 Min PRN Donald Ryder MD       • insulin lispro (ADMELOG) injection 0-24 Units  0-24  Units Subcutaneous Q6H Donald Ryder MD   4 Units at 21 0619   • [COMPLETED] ipratropium-albuterol (DUO-NEB) nebulizer solution 3 mL  3 mL Nebulization Once Darnell Mcbride MD   3 mL at 21 2240   • ipratropium-albuterol (DUO-NEB) nebulizer solution 3 mL  3 mL Nebulization 4x Daily - RT Donald Ryder MD   3 mL at 03/15/21 1154   • ipratropium-albuterol (DUO-NEB) nebulizer solution 3 mL  3 mL Nebulization Q4H PRN Donald Ryder MD   3 mL at 03/15/21 0214   • [COMPLETED] lactated ringers bolus 500 mL  500 mL Intravenous Once Donald Ryder  mL/hr at 21 1153 500 mL at 21 1153   • [COMPLETED] lactated ringers bolus 500 mL  500 mL Intravenous Once Donald Ryder MD 1,000 mL/hr at 21 1445 500 mL at 21 1445   • Magnesium Sulfate 2 gram Bolus, followed by 8 gram infusion (total Mg dose 10 grams)- Mg less than or equal to 1mg/dL  2 g Intravenous PRN Donald Ryder MD        Or   • Magnesium Sulfate 2 gram / 50mL Infusion (GIVE X 3 BAGS TO EQUAL 6GM TOTAL DOSE) - Mg 1.1 - 1.5 mg/dl  2 g Intravenous PRN Donald Ryder MD        Or   • Magnesium Sulfate 4 gram infusion- Mg 1.6-1.9 mg/dL  4 g Intravenous PRN Donald Ryder MD 25 mL/hr at 03/15/21 0840 4 g at 03/15/21 0840   • melatonin tablet 5 mg  5 mg Oral Nightly PRN Sourav Bales MD   5 mg at 21 2140   • [COMPLETED] methylPREDNISolone sodium succinate (SOLU-Medrol) injection 125 mg  125 mg Intravenous Once Darnell Mcbride MD   125 mg at 21 2238   • [COMPLETED] methylPREDNISolone sodium succinate (SOLU-Medrol) injection 40 mg  40 mg Intravenous Once Sourav Bales MD   40 mg at 03/15/21 0310   • [COMPLETED] midazolam (VERSED) injection 2 mg  2 mg Intravenous Once Darnell Mcbride MD   2 mg at 21 5371   • [] Naloxone HCl (NARCAN) 2 MG/2ML injection  - ADS Override Pull            • nicotine (NICODERM CQ) 21 MG/24HR patch 1 patch  1 patch Transdermal  Q24H Donald Ryder MD   1 patch at 03/15/21 0846   • ondansetron (ZOFRAN) tablet 4 mg  4 mg Nasogastric Q6H PRN Sourav Bales MD        Or   • ondansetron (ZOFRAN) injection 4 mg  4 mg Intravenous Q6H PRN Sourav Bales MD       • potassium chloride (K-DUR,KLOR-CON) ER tablet 40 mEq  40 mEq Oral PRN Donald Ryder MD   40 mEq at 03/15/21 1057    Or   • potassium chloride (KLOR-CON) packet 40 mEq  40 mEq Oral PRN Donald Ryder MD        Or   • potassium chloride 10 mEq in 100 mL IVPB  10 mEq Intravenous Q1H PRN Donald Ryder MD       • predniSONE (DELTASONE) tablet 40 mg  40 mg Oral Daily With Breakfast Donald Ryder MD       • [COMPLETED] sodium chloride 0.9 % bolus 1,000 mL  1,000 mL Intravenous Once Sourav Bales  mL/hr at 03/13/21 0219 1,000 mL at 03/13/21 0219   • [COMPLETED] sodium chloride 0.9 % bolus 500 mL  500 mL Intravenous Once Darnell Mcbride MD 1,000 mL/hr at 03/13/21 0009 500 mL at 03/13/21 0009   • [COMPLETED] sodium chloride 0.9 % bolus 500 mL  500 mL Intravenous Once Sourav Bales MD 1,000 mL/hr at 03/13/21 0130 500 mL at 03/13/21 0130   • sodium chloride 0.9 % flush 10 mL  10 mL Intravenous Q12H Sourav Bales MD   10 mL at 03/15/21 0852   • sodium chloride 0.9 % flush 10 mL  10 mL Intravenous PRN Sourav Bales MD       • [COMPLETED] sodium chloride 0.9 % infusion   Intravenous Code / Trauma / Sedation Continuous Med Darnell Mcbride MD   Stopped at 03/13/21 0005   • [COMPLETED] succinylcholine (ANECTINE) injection   Intravenous Code / Trauma / Sedation Medication Darnell Mcbride MD   150 mg at 03/12/21 2315     Orders (last 24 hrs)      Start     Ordered    03/15/21 1800  Dietary Nutrition Supplements Boost Plus (Ensure Enlive, Ensure Plus); chocolate  Daily With Breakfast, Lunch & Dinner      03/15/21 1239    03/15/21 1400  predniSONE (DELTASONE) tablet 40 mg  Daily With Breakfast      03/15/21 1304    03/15/21 1315  apixaban (ELIQUIS)  tablet 5 mg  Every 12 Hours Scheduled      03/15/21 1221    03/15/21 1253  Oxygen Therapy- Nasal Cannula; Titrate for SPO2: 88% - 92%  Continuous     Comments: For unresponsiveness, acute dyspnea, cyanosis or suspected hypoxemia    03/15/21 1304    03/15/21 1253  NIPPV (CPAP or BIPAP)  As Needed-RT     Comments: 40%  He is if patient gets into respiratory distress or hypercapnic.    03/15/21 1304    03/15/21 1218  POC Glucose Once  Once      03/15/21 1215    03/15/21 0800  methylPREDNISolone sodium succinate (SOLU-Medrol) injection 40 mg  Every 12 Hours,   Status:  Discontinued      03/15/21 0305    03/15/21 0759  Blood Gas, Arterial -  Once      03/15/21 0752    03/15/21 0731  Magnesium  Daily      03/15/21 0730    03/15/21 0730  Magnesium Sulfate 2 gram Bolus, followed by 8 gram infusion (total Mg dose 10 grams)- Mg less than or equal to 1mg/dL  As Needed      03/15/21 0730    03/15/21 0730  Magnesium Sulfate 2 gram / 50mL Infusion (GIVE X 3 BAGS TO EQUAL 6GM TOTAL DOSE) - Mg 1.1 - 1.5 mg/dl  As Needed      03/15/21 0730    03/15/21 0730  Magnesium Sulfate 4 gram infusion- Mg 1.6-1.9 mg/dL  As Needed      03/15/21 0730    03/15/21 0729  potassium chloride (K-DUR,KLOR-CON) ER tablet 40 mEq  As Needed      03/15/21 0730    03/15/21 0729  potassium chloride (KLOR-CON) packet 40 mEq  As Needed      03/15/21 0730    03/15/21 0729  potassium chloride 10 mEq in 100 mL IVPB  Every 1 Hour PRN      03/15/21 0730    03/15/21 0729  Blood Gas, Arterial -  STAT      03/15/21 0730    03/15/21 0600  Vitamin B12  Morning Draw      03/14/21 0953    03/15/21 0600  Folate  Morning Draw      03/14/21 0953    03/15/21 0600  Iron Profile  Morning Draw      03/14/21 0953    03/15/21 0502  NIPPV (CPAP or BIPAP)  Until Discontinued,   Status:  Canceled     Comments: 40%    03/15/21 0503    03/15/21 0400  methylPREDNISolone sodium succinate (SOLU-Medrol) injection 40 mg  Once      03/15/21 0305    03/15/21 0400  furosemide (LASIX) injection  20 mg  Once      03/15/21 0306    03/15/21 0315  dexmedetomidine (PRECEDEX) 400 mcg in 100 mL NS infusion kit  Titrated      03/15/21 0229    03/15/21 0238  XR Chest 1 View  1 Time Imaging      03/15/21 0238    03/15/21 0237  Blood Gas, Arterial -  Once      03/15/21 0232    03/15/21 0224  Blood Gas, Arterial -  STAT      03/15/21 0226    03/15/21 0224  NIPPV (CPAP or BIPAP)  Until Discontinued,   Status:  Canceled      03/15/21 0226    03/14/21 2030  ipratropium-albuterol (DUO-NEB) nebulizer solution 3 mL  4 Times Daily - RT      03/14/21 1646    03/14/21 2006  POC Glucose Once  Once      03/14/21 2004 03/14/21 2000  methylPREDNISolone sodium succinate (SOLU-Medrol) injection 20 mg  Every 12 Hours,   Status:  Discontinued      03/14/21 1013    03/14/21 1907  LORazepam (ATIVAN) injection 1 mg  Every 2 Hours PRN,   Status:  Discontinued      03/14/21 1908    03/14/21 1646  ipratropium-albuterol (DUO-NEB) nebulizer solution 3 mL  Every 4 Hours PRN      03/14/21 1647    03/14/21 1644  Diet Regular  Diet Effective Now      03/14/21 1643    03/14/21 1006  Weaning Trial per protocol  Every Morning,   Status:  Canceled      03/14/21 1005    03/14/21 0014  melatonin tablet 5 mg  Nightly PRN      03/14/21 0014    03/13/21 1432  ondansetron (ZOFRAN) tablet 4 mg  Every 6 Hours PRN      03/13/21 1433    03/13/21 1432  ondansetron (ZOFRAN) injection 4 mg  Every 6 Hours PRN      03/13/21 1433    03/13/21 1300  AZITHROMYCIN 500 MG/250 ML 0.9% NS IVPB (vial-mate)  Every 24 Hours      03/13/21 1100    03/13/21 1300  nicotine (NICODERM CQ) 21 MG/24HR patch 1 patch  Every 24 Hours Scheduled      03/13/21 1117    03/13/21 1230  insulin lispro (ADMELOG) injection 0-24 Units  Every 6 Hours      03/13/21 1142    03/13/21 1200  cefTRIAXone (ROCEPHIN) IVPB 1 g  Every 24 Hours      03/13/21 1100    03/13/21 1200  lactated ringers infusion  Continuous,   Status:  Discontinued      03/13/21 1112    03/13/21 1200  albuterol sulfate HFA  (PROVENTIL HFA;VENTOLIN HFA;PROAIR HFA) inhaler 6 puff  Every 4 Hours - RT,   Status:  Discontinued      03/13/21 1112    03/13/21 1200  ipratropium (ATROVENT HFA) inhaler 6 puff  Every 4 Hours - RT,   Status:  Discontinued      03/13/21 1112    03/13/21 1200  POC Glucose Q6H  Every 6 Hours      03/13/21 1113    03/13/21 1119  Daily Weights  Daily      03/13/21 1118    03/13/21 1119  Strict Intake & Output  Every Shift      03/13/21 1118    03/13/21 1113  dextrose (GLUTOSE) oral gel 15 g  Every 15 Minutes PRN      03/13/21 1113    03/13/21 1113  dextrose (D50W) 25 g/ 50mL Intravenous Solution 25 g  Every 15 Minutes PRN      03/13/21 1113    03/13/21 1113  glucagon (human recombinant) (GLUCAGEN DIAGNOSTIC) injection 1 mg  Every 15 Minutes PRN      03/13/21 1113    03/13/21 0315  dexmedetomidine (PRECEDEX) 400 mcg in 100 mL NS infusion kit  Titrated,   Status:  Discontinued      03/13/21 0217    03/13/21 0017  sodium chloride 0.9 % flush 10 mL  Every 12 Hours Scheduled      03/13/21 0015    03/13/21 0016  Daily Weights  Daily      03/13/21 0015    03/13/21 0016  Renal Function Panel  Daily      03/13/21 0015    03/13/21 0015  Intake and Output  Every Shift      03/13/21 0015    03/13/21 0014  sodium chloride 0.9 % flush 10 mL  As Needed      03/13/21 0015    03/12/21 2342  albuterol (PROVENTIL) nebulizer solution 0.083% 2.5 mg/3mL  Once,   Status:  Discontinued      03/12/21 2340    Unscheduled  ECG 12 Lead  As Needed     Comments: For ICU/CCU Protocol Orders.  Nurse to Release if Patient Experiences Acute Chest Pain or Dysrhythmias    03/13/21 0015    Unscheduled  Potassium  As Needed     Comments: Sudden Ventricular Dysrhythmias. Notify Physician.      03/13/21 0015    Unscheduled  Magnesium  As Needed     Comments: For ICU/CCU Protocol      03/13/21 0015    Unscheduled  Holden and Document Tube Depth (in cm)  As Needed      03/13/21 1118    Unscheduled  Oral Care  As Needed      03/13/21 1118    Unscheduled  Verify  Tube Placement Initially & As Needed  As Needed      03/13/21 1118    Unscheduled  Flush Feeding Tube With 30-50mL Water As Needed  As Needed      03/13/21 1118    Unscheduled  Holden and Document Tube Depth (in cm)  As Needed      03/13/21 1418    Unscheduled  Magnesium  As Needed      03/15/21 0730                   Physician Progress Notes       Donald Ryder MD at 03/15/21 1250                   LOS: 3 days   Patient Care Team:  Margaret Ca PA as PCP - General (Family Medicine)  Beulah Joshi APRN as Nurse Practitioner (Neurology)    Subjective   Patient is resting in bed now on room air oxygen saturations about 90%.  She got into some trouble last night had some shortness of breath and sounds like she got real anxious and hyperventilating they turn her oxygen up CO2 went up and eventually she got put on some noninvasive ventilation which she was on this morning and doing very well again we have taken her off and she is doing okay.  Patient says she thinks the problem was her breathing treatments were a couple hours late last night she did not have any chest pain or pressure I spoke with her she has been losing some weight recently not having much appetite really pretty dyspneic unfortunately she has not cut back her smoking she says about 16 months ago or so when she had cut her smoking way back was just doing a little her appetite to come back she gained weight she felt better she was able  to exercise a little.  I told her that CO2 retention is really stage IV end-stage lung disease and if she ever wanted to have lung volume reduction or lung transplant she need to stop smoking and she does not have many more opportunities probably to do that.  She verbalized understanding.  We also talked about nutrition how she should probably eat frequent small meals and not a big meal and how she should probably use nutritional supplements.        Review of Systems:          Objective     Vital  Signs  Vital Sign Min/Max for last 24 hours  Temp  Min: 97.4 °F (36.3 °C)  Max: 98.8 °F (37.1 °C)   BP  Min: 96/59  Max: 153/78   Pulse  Min: 76  Max: 152   Resp  Min: 15  Max: 25   SpO2  Min: 89 %  Max: 100 %   Flow (L/min)  Min: 2  Max: 4   Weight  Min: 42.7 kg (94 lb 2.2 oz)  Max: 42.7 kg (94 lb 2.2 oz)        Ventilator/Non-Invasive Ventilation Settings (From admission, onward) Comment     Start     Ordered    03/13/21 0045  Ventilator - AC/VC  Continuous     Question:  Vent Mode  Answer:  AC/VC    03/13/21 0044 03/12/21 2208  NIPPV (CPAP or BIPAP)  Until Discontinued,   Status:  Canceled     Question Answer Comment   Type: BIPAP    NIPPV Mask Interface: Per Patient Preference        03/12/21 2207                             Body mass index is 17.21 kg/m².  I/O last 3 completed shifts:  In: 4050.5 [P.O.:340; I.V.:3409.5; Other:211; NG/GT:90]  Out: 2575 [Urine:2575]  No intake/output data recorded.        Physical Exam:  General Appearance: Thin white female looks at least 2 or 3 decades older than her stated age she sitting in bed on room air oxygen saturation about 90% she does labored to breathe when she talks.  She is sort of a classic pink puffer she gets anxious and gets dyspneic.  Eyes: Conjunctiva are clear and anicteric pupils are equal   ENT: Mucous membranes moist no erythema no exudates  Neck: No adenopathy no jugular venous distension, trachea midline  Lungs: Does not have a tremendous amount of air movement the chest expansion is symmetric no wheezes, rales or rhonchi, no dullness on percussion she has a very prolonged expiratory phase  Cardiac: Regular rate rhythm no murmur no gallop  Abdomen: Soft nontender no palpable hepatosplenomegaly or masses  : Not examined  Musculoskeletal: She has minimal muscle mass she has little bitemporal wasting thenar, hyperthenar and interosseous muscle wasting  Skin: No jaundice no petechiae skin is warm and dry  Neuro: Is awake and alert she is following  commands she sat up so I could listen to her.  Extremities/P Vascular: She has clubbing of all digits, no cyanosis, palpable radial and dorsalis pedis pulses bilaterally  MSE: She is a little anxious       Labs:  Results from last 7 days   Lab Units 03/15/21  0447 03/14/21  0454 03/13/21  1202 03/13/21  0528 03/12/21  2213   GLUCOSE mg/dL 119* 160* 150* 149* 201*   SODIUM mmol/L 145 138 142 142 140   POTASSIUM mmol/L 3.3* 4.0 5.3* 4.7 4.5   MAGNESIUM mg/dL 1.7  --   --   --   --    CO2 mmol/L 24.1 21.7* 21.8* 20.7* 26.8   CHLORIDE mmol/L 112* 109* 112* 114* 105   ANION GAP mmol/L 8.9 7.3 8.2 7.3 8.2   CREATININE mg/dL 0.34* 0.46* 0.48* 0.51* 0.64   BUN mg/dL 11 19 17 15 14   BUN / CREAT RATIO  32.4* 41.3* 35.4* 29.4* 21.9   CALCIUM mg/dL 6.9* 8.3* 8.2* 7.8* 9.1   EGFR IF NONAFRICN AM mL/min/1.73 >150 139 132 123 95   ALK PHOS U/L  --   --   --   --  71   TOTAL PROTEIN g/dL  --   --   --   --  7.4   ALT (SGPT) U/L  --   --   --   --  30   AST (SGOT) U/L  --   --   --   --  30   BILIRUBIN mg/dL  --   --   --   --  0.2   ALBUMIN g/dL 2.80* 3.10*  --  3.50 4.40   GLOBULIN gm/dL  --   --   --   --  3.0     Estimated Creatinine Clearance: 118.6 mL/min (A) (by C-G formula based on SCr of 0.34 mg/dL (L)).      Results from last 7 days   Lab Units 03/14/21 0454 03/13/21  0409 03/12/21  2213   WBC 10*3/mm3 10.84* 11.09* 17.05*   RBC 10*6/mm3 3.78 3.91 4.83   HEMOGLOBIN g/dL 11.7* 11.5* 14.7   HEMATOCRIT % 34.8 36.1 44.9   MCV fL 92.1 92.3 93.0   MCH pg 31.0 29.4 30.4   MCHC g/dL 33.6 31.9 32.7   RDW % 12.2* 12.1* 12.1*   RDW-SD fl 40.6 40.3 40.8   MPV fL 11.1 10.9 11.3   PLATELETS 10*3/mm3 212 231 341   NEUTROPHIL % %  --  91.4* 58.8   LYMPHOCYTE % %  --  4.4* 27.2   MONOCYTES % %  --  3.5* 7.5   EOSINOPHIL % %  --  0.1* 5.7   BASOPHIL % %  --  0.1 0.6   IMM GRAN % %  --  0.5 0.2   NEUTROS ABS 10*3/mm3  --  10.14* 10.01*   LYMPHS ABS 10*3/mm3  --  0.49* 4.63*   MONOS ABS 10*3/mm3  --  0.39 1.28*   EOS ABS 10*3/mm3  --  0.01  0.98*   BASOS ABS 10*3/mm3  --  0.01 0.11   IMMATURE GRANS (ABS) 10*3/mm3  --  0.05 0.04   NRBC /100 WBC  --  0.0 0.0     Results from last 7 days   Lab Units 03/15/21  0752   PH, ARTERIAL pH units 7.412   PO2 ART mm Hg 115.7*   PCO2, ARTERIAL mm Hg 46.7*   HCO3 ART mmol/L 29.8*     Results from last 7 days   Lab Units 03/13/21  1512 03/13/21  1202 03/12/21  2213   TROPONIN T ng/mL 0.258* 0.302* 0.057*     Results from last 7 days   Lab Units 03/12/21  2213   PROBNP pg/mL 60.2         Results from last 7 days   Lab Units 03/13/21  0528 03/12/21 2213   LACTATE mmol/L  --  1.5   PROCALCITONIN ng/mL 0.12 0.07         Microbiology Results (last 10 days)     Procedure Component Value - Date/Time    Respiratory Culture - Sputum, Cough [[id number]] Collected: 03/13/21 0850    Lab Status: Final result Specimen: Sputum from Cough Updated: 03/15/21 0811     Respiratory Culture Scant growth (1+) Normal Respiratory Jess: NO S.aureus/MRSA or Pseudomonas aeruginosa     Gram Stain Rare (1+) WBCs seen      No organisms seen    Blood Culture - Blood, Arm, Right [[id number]] Collected: 03/12/21 2255    Lab Status: Preliminary result Specimen: Blood from Arm, Right Updated: 03/15/21 0015     Blood Culture No growth at 2 days    Respiratory Panel PCR w/COVID-19(SARS-CoV-2) FRANCISCO JAVIER/JASPAL/ADELINE/PAD/COR/MAD/RAUL In-House, NP Swab in UTM/VTM, 3-4 HR TAT - Swab, Nasopharynx [[id number]]  (Normal) Collected: 03/12/21 2213    Lab Status: Final result Specimen: Swab from Nasopharynx Updated: 03/12/21 2321     ADENOVIRUS, PCR Not Detected     Coronavirus 229E Not Detected     Coronavirus HKU1 Not Detected     Coronavirus NL63 Not Detected     Coronavirus OC43 Not Detected     COVID19 Not Detected     Human Metapneumovirus Not Detected     Human Rhinovirus/Enterovirus Not Detected     Influenza A PCR Not Detected     Influenza B PCR Not Detected     Parainfluenza Virus 1 Not Detected     Parainfluenza Virus 2 Not Detected     Parainfluenza Virus 3 Not  Detected     Parainfluenza Virus 4 Not Detected     RSV, PCR Not Detected     Bordetella pertussis pcr Not Detected     Bordetella parapertussis PCR Not Detected     Chlamydophila pneumoniae PCR Not Detected     Mycoplasma pneumo by PCR Not Detected    Narrative:      Fact sheet for providers: https://docs.[employer]/wp-content/uploads/EUQ9578-0777-NB6.1-EUA-Provider-Fact-Sheet-3.pdf    Fact sheet for patients: https://docs.[employer]/wp-content/uploads/RGM7996-6715-EF0.1-EUA-Patient-Fact-Sheet-1.pdf    Test performed by PCR.    Blood Culture - Blood, Arm, Right [[id number]] Collected: 03/12/21 2213    Lab Status: Preliminary result Specimen: Blood from Arm, Right Updated: 03/14/21 2330     Blood Culture No growth at 2 days              apixaban, 5 mg, Oral, Q12H  azithromycin, 500 mg, Intravenous, Q24H  cefTRIAXone, 1 g, Intravenous, Q24H  insulin lispro, 0-24 Units, Subcutaneous, Q6H  ipratropium-albuterol, 3 mL, Nebulization, 4x Daily - RT  methylPREDNISolone sodium succinate, 40 mg, Intravenous, Q12H  nicotine, 1 patch, Transdermal, Q24H  sodium chloride, 10 mL, Intravenous, Q12H      dexmedetomidine, 0.2-1.5 mcg/kg/hr, Last Rate: Stopped (03/15/21 1221)        Diagnostics:  XR Chest AP    Result Date: 3/12/2021  Patient: JEAN-PIERRE CHAVEZ  Time Out: 23:01 Exam(s): FILM CXR 1 VIEW EXAM:   XR Chest, 1 View CLINICAL HISTORY:    Reason for exam: COVID Evaluation, Cough, Fever. TECHNIQUE:   Frontal view of the chest. COMPARISON:   9 30 19 FINDINGS:   Negative for significant cardiac enlargement.  Hyperinflation of both lungs consistent with COPD.  Patchy densities in both lung bases atelectasis or pneumonia.  Negative for pneumothorax or significant pleural fluid collections.     Electronically signed by Jake Nair DO on 03-12-21 at 2301    Results for orders placed during the hospital encounter of 10/24/19    Adult Transthoracic Echo Complete W/ Cont if Necessary Per Protocol    Interpretation Summary  ·  Left ventricular systolic function is normal. Calculated EF = 65%. Estimated EF was in agreement with the calculated EF. Estimated EF = 65%. Normal left ventricular cavity size noted. All left ventricular wall segments contract normally. Left ventricular wall thickness is consistent with moderate concentric hypertrophy. Left ventricular diastolic dysfunction is noted (grade I) consistent with impaired relaxation.  · Mild mitral valve regurgitation is present.  · Mild tricuspid valve regurgitation is present. Estimated right ventricular systolic pressure from tricuspid regurgitation is normal (<35 mmHg). Calculated right ventricular systolic pressure from tricuspid regurgitation is 26 mmHg.    Chest x-ray reviewed endotracheal tube in good position, lungs remain hyperinflated the left base looks little better there is still a little haziness in the right base that probably represents an infiltrate.  The costophrenic angles are not sharp but I believe this is just reflection of the diaphragm because of the severe hyperinflation.      Active Hospital Problems    Diagnosis  POA   • Acute respiratory failure (CMS/[facility]) [J96.00]  Yes      Resolved Hospital Problems   No resolved problems to display.         Assessment/Plan     1. Pneumonia community-acquired I will continue azithromycin and ceftriaxone cultures have been negative.  2. Sepsis with septic shock she is doing well off vasopressors now  3. Acute exacerbation COPD continue steroids add bronchodilators.  I am not sure how much of a true exacerbation she had sounds like as much anxiety talking with nursing.  But she definitely has severe disease.  4. Acute hypoxic and hypercapnic respiratory failure see discussion above I will have noninvasive ventilator available should she need it  5. Elevated troponin normal renal functions likely represents a non-ST elevation MI type II from demand ischemia   6. Malnutrition  protein calorie we discussed dietary  changes  7. Tobacco abuse needs to stop start NicoDerm  8. Hyperglycemia likely stress and steroid as etiology hemoglobin A1c was normal.  Pain scale insulin  9. Anemia will evaluate  10. Fluids/electrolytes/nutrition tolerating tube feeds    Plan for disposition:    Donald Ryder MD  03/15/21  12:50 EDT    Time:  spent about 35 minutes on patient's care      Electronically signed by Donald Ryder MD at 03/15/21 1314     Donald Ryder MD at 03/14/21 0830                   LOS: 2 days   Patient Care Team:  Margaret Ca PA as PCP - General (Family Medicine)  Beulah Joshi APRN as Nurse Practitioner (Neurology)    Subjective   Patient is awake and alert on the ventilator writing messages she does not like the endotracheal tube does not appear to have any other pain.  No nausea.    Review of Systems:          Objective     Vital Signs  Vital Sign Min/Max for last 24 hours  Temp  Min: 98.5 °F (36.9 °C)  Max: 98.7 °F (37.1 °C)   BP  Min: 82/48  Max: 123/55   Pulse  Min: 51  Max: 101   Resp  Min: 20  Max: 27   SpO2  Min: 81 %  Max: 100 %   No data recorded   Weight  Min: 40.8 kg (89 lb 15.2 oz)  Max: 42.5 kg (93 lb 11.1 oz)        Ventilator/Non-Invasive Ventilation Settings (From admission, onward) Comment     Start     Ordered    03/13/21 0045  Ventilator - AC/VC  Continuous     Question:  Vent Mode  Answer:  AC/VC    03/13/21 0044 03/12/21 2208  NIPPV (CPAP or BIPAP)  Until Discontinued,   Status:  Canceled     Question Answer Comment   Type: BIPAP    NIPPV Mask Interface: Per Patient Preference        03/12/21 2207                             Body mass index is 17.13 kg/m².  I/O last 3 completed shifts:  In: 7557.5 [I.V.:5423.5; Other:211; NG/GT:123; IV Piggyback:1800]  Out: 350 [Urine:350]  No intake/output data recorded.        Physical Exam:  General Appearance: Thin white female looks at least 2 or 3 decades older than her stated age she is only intubated with a 7  endotracheal tube at about 23 cm at the lips she is on pressure control of 16 inspiratory pressure of 15 PEEP of 8 cm FiO2 25% SPO2 is 98% she is breathing about 18 times per minute.  She is sedated with dexmedetomidine at 0.75mics per kilogram per hour and she is getting as needed fentanyl.  She is off Levophed.  She does not appear in any acute distress  Eyes: Conjunctiva are clear and anicteric pupils are equal pupils are about 3 mm and reactive to light  ENT: Mucous membranes are little dry she has a little endotracheal tube in place nasal septum is midline  Neck: No adenopathy no jugular venous distension, trachea midline  Lungs: Does not have a tremendous amount of air movement the chest expansion is symmetric no wheezes, rales or rhonchi  Cardiac: Regular rate rhythm no murmur no gallop  Abdomen: Soft nontender no palpable hepatosplenomegaly or masses  : Not examined  Musculoskeletal: She has minimal muscle mass she has little bitemporal wasting thenar, hyperthenar and interosseous muscle wasting  Skin: No jaundice no petechiae skin is warm and dry  Neuro: Is awake and alert she is writing coherent messages following commands briskly with all extremities  Extremities/P Vascular: She has clubbing of all digits, no cyanosis palpable radial and dorsalis pedis pulses bilaterally  MSE: She seems to be in pretty good spirits       Labs:  Results from last 7 days   Lab Units 03/14/21  0454 03/13/21  1202 03/13/21  0528 03/12/21  2213   GLUCOSE mg/dL 160* 150* 149* 201*   SODIUM mmol/L 138 142 142 140   POTASSIUM mmol/L 4.0 5.3* 4.7 4.5   CO2 mmol/L 21.7* 21.8* 20.7* 26.8   CHLORIDE mmol/L 109* 112* 114* 105   ANION GAP mmol/L 7.3 8.2 7.3 8.2   CREATININE mg/dL 0.46* 0.48* 0.51* 0.64   BUN mg/dL 19 17 15 14   BUN / CREAT RATIO  41.3* 35.4* 29.4* 21.9   CALCIUM mg/dL 8.3* 8.2* 7.8* 9.1   EGFR IF NONAFRICN AM mL/min/1.73 139 132 123 95   ALK PHOS U/L  --   --   --  71   TOTAL PROTEIN g/dL  --   --   --  7.4   ALT  (SGPT) U/L  --   --   --  30   AST (SGOT) U/L  --   --   --  30   BILIRUBIN mg/dL  --   --   --  0.2   ALBUMIN g/dL 3.10*  --  3.50 4.40   GLOBULIN gm/dL  --   --   --  3.0     Estimated Creatinine Clearance: 87.3 mL/min (A) (by C-G formula based on SCr of 0.46 mg/dL (L)).      Results from last 7 days   Lab Units 03/14/21  0454 03/13/21  0409 03/12/21  2213   WBC 10*3/mm3 10.84* 11.09* 17.05*   RBC 10*6/mm3 3.78 3.91 4.83   HEMOGLOBIN g/dL 11.7* 11.5* 14.7   HEMATOCRIT % 34.8 36.1 44.9   MCV fL 92.1 92.3 93.0   MCH pg 31.0 29.4 30.4   MCHC g/dL 33.6 31.9 32.7   RDW % 12.2* 12.1* 12.1*   RDW-SD fl 40.6 40.3 40.8   MPV fL 11.1 10.9 11.3   PLATELETS 10*3/mm3 212 231 341   NEUTROPHIL % %  --  91.4* 58.8   LYMPHOCYTE % %  --  4.4* 27.2   MONOCYTES % %  --  3.5* 7.5   EOSINOPHIL % %  --  0.1* 5.7   BASOPHIL % %  --  0.1 0.6   IMM GRAN % %  --  0.5 0.2   NEUTROS ABS 10*3/mm3  --  10.14* 10.01*   LYMPHS ABS 10*3/mm3  --  0.49* 4.63*   MONOS ABS 10*3/mm3  --  0.39 1.28*   EOS ABS 10*3/mm3  --  0.01 0.98*   BASOS ABS 10*3/mm3  --  0.01 0.11   IMMATURE GRANS (ABS) 10*3/mm3  --  0.05 0.04   NRBC /100 WBC  --  0.0 0.0     Results from last 7 days   Lab Units 03/13/21  1230   PH, ARTERIAL pH units 7.255*   PO2 ART mm Hg 102.6*   PCO2, ARTERIAL mm Hg 49.0*   HCO3 ART mmol/L 21.7*     Results from last 7 days   Lab Units 03/13/21  1512 03/13/21  1202 03/12/21  2213   TROPONIN T ng/mL 0.258* 0.302* 0.057*     Results from last 7 days   Lab Units 03/12/21  2213   PROBNP pg/mL 60.2         Results from last 7 days   Lab Units 03/13/21  0528 03/12/21  2213   LACTATE mmol/L  --  1.5   PROCALCITONIN ng/mL 0.12 0.07         Microbiology Results (last 10 days)     Procedure Component Value - Date/Time    Respiratory Culture - Sputum, Cough [[id number]] Collected: 03/13/21 0850    Lab Status: Preliminary result Specimen: Sputum from Cough Updated: 03/13/21 1717     Gram Stain Rare (1+) WBCs seen      No organisms seen    Blood Culture -  Blood, Arm, Right [[id number]] Collected: 03/12/21 2255    Lab Status: Preliminary result Specimen: Blood from Arm, Right Updated: 03/13/21 2315     Blood Culture No growth at 24 hours    Respiratory Panel PCR w/COVID-19(SARS-CoV-2) FRANCISCO JAVIER/JASPAL/ADELINE/PAD/COR/MAD/RAUL In-House, NP Swab in UTM/VTM, 3-4 HR TAT - Swab, Nasopharynx [[id number]]  (Normal) Collected: 03/12/21 2213    Lab Status: Final result Specimen: Swab from Nasopharynx Updated: 03/12/21 2321     ADENOVIRUS, PCR Not Detected     Coronavirus 229E Not Detected     Coronavirus HKU1 Not Detected     Coronavirus NL63 Not Detected     Coronavirus OC43 Not Detected     COVID19 Not Detected     Human Metapneumovirus Not Detected     Human Rhinovirus/Enterovirus Not Detected     Influenza A PCR Not Detected     Influenza B PCR Not Detected     Parainfluenza Virus 1 Not Detected     Parainfluenza Virus 2 Not Detected     Parainfluenza Virus 3 Not Detected     Parainfluenza Virus 4 Not Detected     RSV, PCR Not Detected     Bordetella pertussis pcr Not Detected     Bordetella parapertussis PCR Not Detected     Chlamydophila pneumoniae PCR Not Detected     Mycoplasma pneumo by PCR Not Detected    Narrative:      Fact sheet for providers: https://docs.[employer]/wp-content/uploads/JJN3799-9716-MT4.1-EUA-Provider-Fact-Sheet-3.pdf    Fact sheet for patients: https://docs.[employer]/wp-content/uploads/EGR7632-2801-TP5.1-EUA-Patient-Fact-Sheet-1.pdf    Test performed by PCR.    Blood Culture - Blood, Arm, Right [[id number]] Collected: 03/12/21 2213    Lab Status: Preliminary result Specimen: Blood from Arm, Right Updated: 03/13/21 2230     Blood Culture No growth at 24 hours              albuterol, 2.5 mg, Nebulization, Once  albuterol sulfate HFA, 6 puff, Inhalation, Q4H - RT  azithromycin, 500 mg, Intravenous, Q24H  cefTRIAXone, 1 g, Intravenous, Q24H  insulin lispro, 0-24 Units, Subcutaneous, Q6H  ipratropium, 6 puff, Inhalation, Q4H - RT  methylPREDNISolone sodium  succinate, 40 mg, Intravenous, Q12H  nicotine, 1 patch, Transdermal, Q24H  sodium chloride, 10 mL, Intravenous, Q12H      dexmedetomidine, 0.2-1.5 mcg/kg/hr, Last Rate: 0.5 mcg/kg/hr (03/13/21 2041)  lactated ringers, 125 mL/hr, Last Rate: 125 mL/hr (03/14/21 0056)  norepinephrine, 0.02-0.3 mcg/kg/min, Last Rate: Stopped (03/13/21 1546)  propofol, 5-50 mcg/kg/min, Last Rate: Stopped (03/13/21 0416)        Diagnostics:  XR Chest AP    Result Date: 3/12/2021  Patient: JEAN-PIERRE CHAVEZ  Time Out: 23:01 Exam(s): FILM CXR 1 VIEW EXAM:   XR Chest, 1 View CLINICAL HISTORY:    Reason for exam: COVID Evaluation, Cough, Fever. TECHNIQUE:   Frontal view of the chest. COMPARISON:   9 30 19 FINDINGS:   Negative for significant cardiac enlargement.  Hyperinflation of both lungs consistent with COPD.  Patchy densities in both lung bases atelectasis or pneumonia.  Negative for pneumothorax or significant pleural fluid collections.     Electronically signed by Jake Nair DO on 03-12-21 at 2301    Results for orders placed during the hospital encounter of 10/24/19    Adult Transthoracic Echo Complete W/ Cont if Necessary Per Protocol    Interpretation Summary  · Left ventricular systolic function is normal. Calculated EF = 65%. Estimated EF was in agreement with the calculated EF. Estimated EF = 65%. Normal left ventricular cavity size noted. All left ventricular wall segments contract normally. Left ventricular wall thickness is consistent with moderate concentric hypertrophy. Left ventricular diastolic dysfunction is noted (grade I) consistent with impaired relaxation.  · Mild mitral valve regurgitation is present.  · Mild tricuspid valve regurgitation is present. Estimated right ventricular systolic pressure from tricuspid regurgitation is normal (<35 mmHg). Calculated right ventricular systolic pressure from tricuspid regurgitation is 26 mmHg.    Chest x-ray reviewed endotracheal tube in good position, lungs remain  hyperinflated the left base looks little better there is still a little haziness in the right base that probably represents an infiltrate.  The costophrenic angles are not sharp but I believe this is just reflection of the diaphragm because of the severe hyperinflation.      Active Hospital Problems    Diagnosis  POA   • Acute respiratory failure (CMS/HCC) [J96.00]  Yes      Resolved Hospital Problems   No resolved problems to display.         Assessment/Plan     11. Pneumonia community-acquired I will continue azithromycin and ceftriaxone cultures have been negative.  12. Sepsis with septic shock she is doing well off vasopressors now  13. Acute exacerbation COPD continue steroids add bronchodilators  14. Acute hypoxic and hypercapnic respiratory failure seems to be doing very well have put her on a spontaneous breathing trial and thus far looks pretty good we will give her 30 to 60 minutes and reassess I hope to be able to extubate  15. Elevated troponin normal renal functions likely represents a non-ST elevation MI type II from demand ischemia   16. Malnutrition  protein calorie  17. Tobacco abuse needs to stop start NicoDerm  18. Hyperglycemia likely stress and steroid as etiology hemoglobin A1c was normal.  19. Anemia will evaluate  20. Fluids/electrolytes/nutrition tolerating tube feeds    Plan for disposition:    Donald Ryder MD  03/14/21  08:30 EDT    Time: Critical care time 39 minutes      Electronically signed by Donald Ryder MD at 03/14/21 1011     Donald Ryder MD at 03/13/21 0724                   LOS: 1 day   Patient Care Team:  Margaret aC PA as PCP - General (Family Medicine)  Beulah Joshi APRN as Nurse Practitioner (Neurology)    Subjective     Patient new to me this morning I have reviewed the records patient is intubated she is sedated and she is currently unresponsive, admitted with pneumonia and acute exacerbation COPD with acute hypoxemic and  hypercapnic respiratory failure    Review of Systems:          Objective     Vital Signs  Vital Sign Min/Max for last 24 hours  Temp  Min: 97.3 °F (36.3 °C)  Max: 97.8 °F (36.6 °C)   BP  Min: 65/50  Max: 147/97   Pulse  Min: 67  Max: 129   Resp  Min: 19  Max: 40   SpO2  Min: 85 %  Max: 100 %   No data recorded   Weight  Min: 40.3 kg (88 lb 13.5 oz)  Max: 45.4 kg (100 lb)        Ventilator/Non-Invasive Ventilation Settings (From admission, onward) Comment     Start     Ordered    03/13/21 0045  Ventilator - AC/VC  Continuous     Question:  Vent Mode  Answer:  AC/VC    03/13/21 0044 03/12/21 2208  NIPPV (CPAP or BIPAP)  Until Discontinued,   Status:  Canceled     Question Answer Comment   Type: BIPAP    NIPPV Mask Interface: Per Patient Preference        03/12/21 2207                             Body mass index is 16.45 kg/m².  I/O last 3 completed shifts:  In: 2862 [I.V.:1862; IV Piggyback:1000]  Out: 0   No intake/output data recorded.        Physical Exam:  General Appearance: Thin white female looks at least 2 or 3 decades older than her stated age she is only intubated with a 7 endotracheal tube at about 23 cm at the lips she is on pressure control of 16 inspiratory pressure of 15 PEEP of 5 cm FiO2 30% SPO2 is 96% she is breathing about 23 times per minute looks like significant auto PEEP.  She is sedated with dexmedetomidine at 0.7 mics per kilogram per hour and she is getting as needed fentanyl.  She is also on Levophed at 0.04 mics per kilogram per minute  Eyes: Conjunctiva are clear and anicteric pupils are equal pupils are about 2-1/2 mm and reactive to light  ENT: Mucous membranes are little dry she has a little endotracheal tube in place nasal septum is midline  Neck: No adenopathy no jugular venous tension trachea midline  Lungs: Does not have a tremendous amount of air movement the chest expansion is symmetric no wheezes rales or rhonchi  Cardiac: Regular rate rhythm no murmur no gallop  Abdomen: Soft  nontender no palpable hepatosplenomegaly or masses  : Not examined  Musculoskeletal: She has minimal muscle mass she has little bitemporal wasting thenar, hyperthenar and interosseous muscle wasting  Skin: No jaundice no petechiae skin is warm and dry  Neuro: She just got some fentanyl per nursing and she is heavily sedated not responding to nailbed pressure prior to that nursing reports she was awake and responsive  Extremities/P Vascular: She has clubbing of all digits, no cyanosis palpable radial pulses and weak dorsalis pedis pulses bilaterally  MSE: Unable to assess       Labs:  Results from last 7 days   Lab Units 03/13/21  0528 03/12/21  2213   GLUCOSE mg/dL 149* 201*   SODIUM mmol/L 142 140   POTASSIUM mmol/L 4.7 4.5   CO2 mmol/L 20.7* 26.8   CHLORIDE mmol/L 114* 105   ANION GAP mmol/L 7.3 8.2   CREATININE mg/dL 0.51* 0.64   BUN mg/dL 15 14   BUN / CREAT RATIO  29.4* 21.9   CALCIUM mg/dL 7.8* 9.1   EGFR IF NONAFRICN AM mL/min/1.73 123 95   ALK PHOS U/L  --  71   TOTAL PROTEIN g/dL  --  7.4   ALT (SGPT) U/L  --  30   AST (SGOT) U/L  --  30   BILIRUBIN mg/dL  --  0.2   ALBUMIN g/dL 3.50 4.40   GLOBULIN gm/dL  --  3.0     Estimated Creatinine Clearance: 75.6 mL/min (A) (by C-G formula based on SCr of 0.51 mg/dL (L)).      Results from last 7 days   Lab Units 03/13/21  0409 03/12/21  2213   WBC 10*3/mm3 11.09* 17.05*   RBC 10*6/mm3 3.91 4.83   HEMOGLOBIN g/dL 11.5* 14.7   HEMATOCRIT % 36.1 44.9   MCV fL 92.3 93.0   MCH pg 29.4 30.4   MCHC g/dL 31.9 32.7   RDW % 12.1* 12.1*   RDW-SD fl 40.3 40.8   MPV fL 10.9 11.3   PLATELETS 10*3/mm3 231 341   NEUTROPHIL % % 91.4* 58.8   LYMPHOCYTE % % 4.4* 27.2   MONOCYTES % % 3.5* 7.5   EOSINOPHIL % % 0.1* 5.7   BASOPHIL % % 0.1 0.6   IMM GRAN % % 0.5 0.2   NEUTROS ABS 10*3/mm3 10.14* 10.01*   LYMPHS ABS 10*3/mm3 0.49* 4.63*   MONOS ABS 10*3/mm3 0.39 1.28*   EOS ABS 10*3/mm3 0.01 0.98*   BASOS ABS 10*3/mm3 0.01 0.11   IMMATURE GRANS (ABS) 10*3/mm3 0.05 0.04   NRBC /100 WBC  0.0 0.0     Results from last 7 days   Lab Units 03/13/21  0015   PH, ARTERIAL pH units 7.118*   PO2 ART mm Hg 262.3*   PCO2, ARTERIAL mm Hg 63.1*   HCO3 ART mmol/L 20.4*     Results from last 7 days   Lab Units 03/12/21 2213   TROPONIN T ng/mL 0.057*     Results from last 7 days   Lab Units 03/12/21 2213   PROBNP pg/mL 60.2         Results from last 7 days   Lab Units 03/13/21  0528 03/12/21 2213   LACTATE mmol/L  --  1.5   PROCALCITONIN ng/mL 0.12 0.07         Microbiology Results (last 10 days)     Procedure Component Value - Date/Time    Respiratory Panel PCR w/COVID-19(SARS-CoV-2) FRANCISCO JAVIER/JASPAL/ADELINE/PAD/COR/MAD/RAUL In-House, NP Swab in UTM/VTM, 3-4 HR TAT - Swab, Nasopharynx [[id number]]  (Normal) Collected: 03/12/21 2213    Lab Status: Final result Specimen: Swab from Nasopharynx Updated: 03/12/21 2321     ADENOVIRUS, PCR Not Detected     Coronavirus 229E Not Detected     Coronavirus HKU1 Not Detected     Coronavirus NL63 Not Detected     Coronavirus OC43 Not Detected     COVID19 Not Detected     Human Metapneumovirus Not Detected     Human Rhinovirus/Enterovirus Not Detected     Influenza A PCR Not Detected     Influenza B PCR Not Detected     Parainfluenza Virus 1 Not Detected     Parainfluenza Virus 2 Not Detected     Parainfluenza Virus 3 Not Detected     Parainfluenza Virus 4 Not Detected     RSV, PCR Not Detected     Bordetella pertussis pcr Not Detected     Bordetella parapertussis PCR Not Detected     Chlamydophila pneumoniae PCR Not Detected     Mycoplasma pneumo by PCR Not Detected    Narrative:      Fact sheet for providers: https://docs.[employer]/wp-content/uploads/CTG5673-6769-FN1.1-EUA-Provider-Fact-Sheet-3.pdf    Fact sheet for patients: https://docs.[employer]/wp-content/uploads/KOI5980-6532-TW7.1-EUA-Patient-Fact-Sheet-1.pdf    Test performed by PCR.              albuterol, 2.5 mg, Nebulization, Once  ipratropium-albuterol, 3 mL, Nebulization, 4x Daily - RT  methylPREDNISolone sodium  succinate, 40 mg, Intravenous, Q12H  Naloxone HCl, , ,   sodium chloride, 10 mL, Intravenous, Q12H      dexmedetomidine, 0.2-1.5 mcg/kg/hr, Last Rate: 0.7 mcg/kg/hr (03/13/21 0529)  norepinephrine, 0.02-0.3 mcg/kg/min, Last Rate: 0.06 mcg/kg/min (03/13/21 1353)  propofol, 5-50 mcg/kg/min, Last Rate: Stopped (03/13/21 6076)        Diagnostics:  XR Chest AP    Result Date: 3/12/2021  Patient: JEAN-PIERRE CHAVEZ  Time Out: 23:01 Exam(s): FILM CXR 1 VIEW EXAM:   XR Chest, 1 View CLINICAL HISTORY:    Reason for exam: COVID Evaluation, Cough, Fever. TECHNIQUE:   Frontal view of the chest. COMPARISON:   9 30 19 FINDINGS:   Negative for significant cardiac enlargement.  Hyperinflation of both lungs consistent with COPD.  Patchy densities in both lung bases atelectasis or pneumonia.  Negative for pneumothorax or significant pleural fluid collections.     Electronically signed by Jake Nair DO on 03-12-21 at 2301    Results for orders placed during the hospital encounter of 10/24/19    Adult Transthoracic Echo Complete W/ Cont if Necessary Per Protocol    Interpretation Summary  · Left ventricular systolic function is normal. Calculated EF = 65%. Estimated EF was in agreement with the calculated EF. Estimated EF = 65%. Normal left ventricular cavity size noted. All left ventricular wall segments contract normally. Left ventricular wall thickness is consistent with moderate concentric hypertrophy. Left ventricular diastolic dysfunction is noted (grade I) consistent with impaired relaxation.  · Mild mitral valve regurgitation is present.  · Mild tricuspid valve regurgitation is present. Estimated right ventricular systolic pressure from tricuspid regurgitation is normal (<35 mmHg). Calculated right ventricular systolic pressure from tricuspid regurgitation is 26 mmHg.      EKG is sinus there is a little bit of a right axis deviation artifact no definite acute ST-T changes    Active Hospital Problems    Diagnosis  POA   •  Acute respiratory failure (CMS/[facility]) [J96.00]  Yes      Resolved Hospital Problems   No resolved problems to display.         Assessment/Plan     21. Pneumonia community-acquired I will continue azithromycin and ceftriaxone  22. Sepsis with septic shock nursing has been able to wean Levophed a little patient 30 mL/kg fluid bolus but is not on fluids she does not look to be in heart failure in fact she looks dry I am going to give her a little bolus and then maintenance fluids.  Hopefully we can wean off the Levophed if not she may need central access  23. Acute exacerbation COPD continue steroids add bronchodilators  24. Acute hypoxic and hypercapnic respiratory failure  25. Elevated troponin normal renal functions likely represents a non-ST elevation MI type II from demand ischemia we will have to trend troponin however  26. Malnutrition  protein calorie  27. Tobacco abuse needs to stop start NicoDerm  28. Hyperglycemia this may be related to stress and steroids check hemoglobin A1c  29. Fluids/electrolytes/nutrition we will start nutrition given how malnourished the patient is    Plan for disposition:    Donald Ryder MD  03/13/21  07:24 EST    Time: Critical care time 42 minutes      Electronically signed by Donald Ryder MD at 03/13/21 9567

## 2021-03-16 LAB
ALBUMIN SERPL-MCNC: 3.5 G/DL (ref 3.5–5.2)
ANION GAP SERPL CALCULATED.3IONS-SCNC: 5.8 MMOL/L (ref 5–15)
BUN SERPL-MCNC: 16 MG/DL (ref 8–23)
BUN/CREAT SERPL: 34.8 (ref 7–25)
CALCIUM SPEC-SCNC: 8.4 MG/DL (ref 8.6–10.5)
CHLORIDE SERPL-SCNC: 108 MMOL/L (ref 98–107)
CO2 SERPL-SCNC: 27.2 MMOL/L (ref 22–29)
CREAT SERPL-MCNC: 0.46 MG/DL (ref 0.57–1)
GFR SERPL CREATININE-BSD FRML MDRD: 139 ML/MIN/1.73
GLUCOSE BLDC GLUCOMTR-MCNC: 100 MG/DL (ref 70–130)
GLUCOSE BLDC GLUCOMTR-MCNC: 120 MG/DL (ref 70–130)
GLUCOSE BLDC GLUCOMTR-MCNC: 124 MG/DL (ref 70–130)
GLUCOSE BLDC GLUCOMTR-MCNC: 131 MG/DL (ref 70–130)
GLUCOSE SERPL-MCNC: 124 MG/DL (ref 65–99)
MAGNESIUM SERPL-MCNC: 2.6 MG/DL (ref 1.6–2.4)
PHOSPHATE SERPL-MCNC: 3 MG/DL (ref 2.5–4.5)
POTASSIUM SERPL-SCNC: 4.9 MMOL/L (ref 3.5–5.2)
SODIUM SERPL-SCNC: 141 MMOL/L (ref 136–145)

## 2021-03-16 PROCEDURE — 83735 ASSAY OF MAGNESIUM: CPT | Performed by: INTERNAL MEDICINE

## 2021-03-16 PROCEDURE — 94799 UNLISTED PULMONARY SVC/PX: CPT

## 2021-03-16 PROCEDURE — 82962 GLUCOSE BLOOD TEST: CPT

## 2021-03-16 PROCEDURE — 94660 CPAP INITIATION&MGMT: CPT

## 2021-03-16 PROCEDURE — 80069 RENAL FUNCTION PANEL: CPT | Performed by: INTERNAL MEDICINE

## 2021-03-16 PROCEDURE — 25010000002 CEFTRIAXONE PER 250 MG: Performed by: INTERNAL MEDICINE

## 2021-03-16 PROCEDURE — 63710000001 PREDNISONE PER 1 MG: Performed by: INTERNAL MEDICINE

## 2021-03-16 RX ORDER — DOCUSATE SODIUM 100 MG/1
100 CAPSULE, LIQUID FILLED ORAL 2 TIMES DAILY
Status: DISCONTINUED | OUTPATIENT
Start: 2021-03-16 | End: 2021-03-22 | Stop reason: HOSPADM

## 2021-03-16 RX ORDER — ESCITALOPRAM OXALATE 5 MG/1
5 TABLET ORAL DAILY
Status: DISCONTINUED | OUTPATIENT
Start: 2021-03-16 | End: 2021-03-22 | Stop reason: HOSPADM

## 2021-03-16 RX ORDER — PREDNISONE 20 MG/1
20 TABLET ORAL
Status: DISCONTINUED | OUTPATIENT
Start: 2021-03-17 | End: 2021-03-19

## 2021-03-16 RX ADMIN — PREDNISONE 40 MG: 20 TABLET ORAL at 09:05

## 2021-03-16 RX ADMIN — SODIUM CHLORIDE, PRESERVATIVE FREE 10 ML: 5 INJECTION INTRAVENOUS at 09:05

## 2021-03-16 RX ADMIN — AZITHROMYCIN DIHYDRATE 500 MG: 250 TABLET, FILM COATED ORAL at 13:04

## 2021-03-16 RX ADMIN — IPRATROPIUM BROMIDE AND ALBUTEROL SULFATE 3 ML: 2.5; .5 SOLUTION RESPIRATORY (INHALATION) at 15:39

## 2021-03-16 RX ADMIN — IPRATROPIUM BROMIDE AND ALBUTEROL SULFATE 3 ML: 2.5; .5 SOLUTION RESPIRATORY (INHALATION) at 07:02

## 2021-03-16 RX ADMIN — CEFTRIAXONE SODIUM 1 G: 1 INJECTION, SOLUTION INTRAVENOUS at 11:33

## 2021-03-16 RX ADMIN — IPRATROPIUM BROMIDE AND ALBUTEROL SULFATE 3 ML: 2.5; .5 SOLUTION RESPIRATORY (INHALATION) at 12:33

## 2021-03-16 RX ADMIN — Medication 5 MG: at 04:08

## 2021-03-16 RX ADMIN — DOCUSATE SODIUM 100 MG: 100 CAPSULE, LIQUID FILLED ORAL at 20:10

## 2021-03-16 RX ADMIN — ESCITALOPRAM 5 MG: 5 TABLET, FILM COATED ORAL at 19:10

## 2021-03-16 RX ADMIN — IPRATROPIUM BROMIDE AND ALBUTEROL SULFATE 3 ML: 2.5; .5 SOLUTION RESPIRATORY (INHALATION) at 20:36

## 2021-03-16 RX ADMIN — Medication 5 MG: at 22:33

## 2021-03-16 RX ADMIN — APIXABAN 5 MG: 5 TABLET, FILM COATED ORAL at 20:10

## 2021-03-16 RX ADMIN — SODIUM CHLORIDE, PRESERVATIVE FREE 10 ML: 5 INJECTION INTRAVENOUS at 20:10

## 2021-03-16 RX ADMIN — APIXABAN 5 MG: 5 TABLET, FILM COATED ORAL at 09:04

## 2021-03-16 NOTE — PROGRESS NOTES
Discharge Planning Assessment  [facility]     Patient Name: Scarlet Chakraborty  MRN: [id number]  Today's Date: 3/16/2021    Admit Date: 3/12/2021    Discharge Needs Assessment     Row Name 03/16/21 0903       Living Environment    Lives With  significant other    Current Living Arrangements  home/apartment/condo    Potentially Unsafe Housing Conditions  unable to assess    Primary Care Provided by  self    Provides Primary Care For  no one    Family Caregiver if Needed  sibling(s);significant other    Quality of Family Relationships  unable to assess    Able to Return to Prior Arrangements  yes       Resource/Environmental Concerns    Resource/Environmental Concerns  none    Transportation Concerns  car, none       Transition Planning    Patient/Family Anticipates Transition to  home with family    Patient/Family Anticipated Services at Transition  none    Transportation Anticipated  family or friend will provide       Discharge Needs Assessment    Equipment Currently Used at Home  none    Concerns to be Addressed  discharge planning    Anticipated Changes Related to Illness  none    Equipment Needed After Discharge  none    Provided Post Acute Provider List?  N/A    Provided Post Acute Provider Quality & Resource List?  N/A        Discharge Plan     Row Name 03/16/21 0904       Plan    Plan  Plan is home  Pt denies needs    Plan Comments  CCP role explained and discharge planning discussed. Face sheet verified. PCP is SHANKAR Brody. Pt emergency contact is her S.O. Dno, [phone].  Patient lives in a single story house with Don.  She is independent with her ADLS.  She uses no DME to ambulate.  Patient has no history of Home Health.   She has no past rehab stays. Patient does not anticipate any discharge needs and plans to return home. CCP will follow for any needs.        Continued Care and Services - Admitted Since 3/12/2021    Coordination has not been started for this encounter.          Demographic Summary    No documentation.       Functional Status    No documentation.       Psychosocial    No documentation.       Abuse/Neglect    No documentation.       Legal    No documentation.       Substance Abuse    No documentation.       Patient Forms    No documentation.           Sheila Ponce RN

## 2021-03-16 NOTE — PROGRESS NOTES
LOS: 4 days   Patient Care Team:  Margaret Ca PA as PCP - General (Family Medicine)  Beulah Joshi APRN as Nurse Practitioner (Neurology)    Subjective   Patient has had several panic attacks minutes whichnursing feels are most consistent with ,she got started on some Precedex last night that helped some putting on the noninvasive ventilator does relax her some.  I had respiratory try and teach her some relaxed breathing techniques.  She will just get feeling short of breath her oxygen saturations will be great and she just gets herself worked up and breathing faster and faster.  We had to put her on noninvasive ventilator this morning for a little while she is better now we got her off and off got her up in a chair she is able to talk in full sentences.        Review of Systems:          Objective     Vital Signs  Vital Sign Min/Max for last 24 hours  Temp  Min: 97.2 °F (36.2 °C)  Max: 99.1 °F (37.3 °C)   BP  Min: 100/74  Max: 145/89   Pulse  Min: 66  Max: 130   Resp  Min: 18  Max: 22   SpO2  Min: 90 %  Max: 100 %   Flow (L/min)  Min: 1  Max: 2   Weight  Min: 43.3 kg (95 lb 7.4 oz)  Max: 43.3 kg (95 lb 7.4 oz)        Ventilator/Non-Invasive Ventilation Settings (From admission, onward) Comment     Start     Ordered    03/13/21 0045  Ventilator - AC/VC  Continuous     Question:  Vent Mode  Answer:  AC/VC    03/13/21 0044 03/12/21 2208  NIPPV (CPAP or BIPAP)  Until Discontinued,   Status:  Canceled     Question Answer Comment   Type: BIPAP    NIPPV Mask Interface: Per Patient Preference        03/12/21 2207                             Body mass index is 17.46 kg/m².  I/O last 3 completed shifts:  In: 3554 [P.O.:840; I.V.:2714]  Out: 3075 [Urine:3075]  I/O this shift:  In: 440.4 [P.O.:240; I.V.:50.4; IV Piggyback:150]  Out: -         Physical Exam:  General Appearance: Thin white female looks at least 2 or 3 decades older than her stated age she sitting in up in a chair she is on  about a liter and a half of oxygen her oxygen saturations are 95% currently she does not look in distress  Eyes: Conjunctiva are clear and anicteric pupils are equal   ENT: Mucous membranes moist no erythema no exudates  Neck: No adenopathy no jugular venous distension, trachea midline  Lungs: Actually moving a little bit more air than yesterday she still has prolonged expiratory phase no wheezes no rales no dullness on percussion symmetric chest expansion  Cardiac: Regular rate rhythm no murmur no gallop  Abdomen: Soft nontender no palpable hepatosplenomegaly or masses  : Not examined  Musculoskeletal: She has minimal muscle mass she has little bitemporal wasting thenar, hyperthenar and interosseous muscle wasting  Skin: No jaundice no petechiae skin is warm and dry  Neuro: Is awake and alert she is following commands again she sitting up in a chair grossly intact.  Extremities/P Vascular: She has clubbing of all digits, no cyanosis, palpable radial and dorsalis pedis pulses bilaterally  MSE: Still quite anxious.       Labs:  Results from last 7 days   Lab Units 03/16/21  0401 03/15/21  0447 03/14/21  0454 03/13/21  1202 03/13/21  0528 03/12/21  2213   GLUCOSE mg/dL 124* 119* 160* 150* 149* 201*   SODIUM mmol/L 141 145 138 142 142 140   POTASSIUM mmol/L 4.9 3.3* 4.0 5.3* 4.7 4.5   MAGNESIUM mg/dL 2.6* 1.7  --   --   --   --    CO2 mmol/L 27.2 24.1 21.7* 21.8* 20.7* 26.8   CHLORIDE mmol/L 108* 112* 109* 112* 114* 105   ANION GAP mmol/L 5.8 8.9 7.3 8.2 7.3 8.2   CREATININE mg/dL 0.46* 0.34* 0.46* 0.48* 0.51* 0.64   BUN mg/dL 16 11 19 17 15 14   BUN / CREAT RATIO  34.8* 32.4* 41.3* 35.4* 29.4* 21.9   CALCIUM mg/dL 8.4* 6.9* 8.3* 8.2* 7.8* 9.1   EGFR IF NONAFRICN AM mL/min/1.73 139 >150 139 132 123 95   ALK PHOS U/L  --   --   --   --   --  71   TOTAL PROTEIN g/dL  --   --   --   --   --  7.4   ALT (SGPT) U/L  --   --   --   --   --  30   AST (SGOT) U/L  --   --   --   --   --  30   BILIRUBIN mg/dL  --   --   --   --    --  0.2   ALBUMIN g/dL 3.50 2.80* 3.10*  --  3.50 4.40   GLOBULIN gm/dL  --   --   --   --   --  3.0     Estimated Creatinine Clearance: 88.9 mL/min (A) (by C-G formula based on SCr of 0.46 mg/dL (L)).      Results from last 7 days   Lab Units 03/14/21  0454 03/13/21  0409 03/12/21  2213   WBC 10*3/mm3 10.84* 11.09* 17.05*   RBC 10*6/mm3 3.78 3.91 4.83   HEMOGLOBIN g/dL 11.7* 11.5* 14.7   HEMATOCRIT % 34.8 36.1 44.9   MCV fL 92.1 92.3 93.0   MCH pg 31.0 29.4 30.4   MCHC g/dL 33.6 31.9 32.7   RDW % 12.2* 12.1* 12.1*   RDW-SD fl 40.6 40.3 40.8   MPV fL 11.1 10.9 11.3   PLATELETS 10*3/mm3 212 231 341   NEUTROPHIL % %  --  91.4* 58.8   LYMPHOCYTE % %  --  4.4* 27.2   MONOCYTES % %  --  3.5* 7.5   EOSINOPHIL % %  --  0.1* 5.7   BASOPHIL % %  --  0.1 0.6   IMM GRAN % %  --  0.5 0.2   NEUTROS ABS 10*3/mm3  --  10.14* 10.01*   LYMPHS ABS 10*3/mm3  --  0.49* 4.63*   MONOS ABS 10*3/mm3  --  0.39 1.28*   EOS ABS 10*3/mm3  --  0.01 0.98*   BASOS ABS 10*3/mm3  --  0.01 0.11   IMMATURE GRANS (ABS) 10*3/mm3  --  0.05 0.04   NRBC /100 WBC  --  0.0 0.0     Results from last 7 days   Lab Units 03/15/21  0752   PH, ARTERIAL pH units 7.412   PO2 ART mm Hg 115.7*   PCO2, ARTERIAL mm Hg 46.7*   HCO3 ART mmol/L 29.8*     Results from last 7 days   Lab Units 03/13/21  1512 03/13/21  1202 03/12/21  2213   TROPONIN T ng/mL 0.258* 0.302* 0.057*     Results from last 7 days   Lab Units 03/12/21  2213   PROBNP pg/mL 60.2         Results from last 7 days   Lab Units 03/13/21  0528 03/12/21 2213   LACTATE mmol/L  --  1.5   PROCALCITONIN ng/mL 0.12 0.07         Microbiology Results (last 10 days)     Procedure Component Value - Date/Time    Respiratory Culture - Sputum, Cough [[id number]] Collected: 03/13/21 0850    Lab Status: Final result Specimen: Sputum from Cough Updated: 03/15/21 0811     Respiratory Culture Scant growth (1+) Normal Respiratory Jess: NO S.aureus/MRSA or Pseudomonas aeruginosa     Gram Stain Rare (1+) WBCs seen      No  organisms seen    Blood Culture - Blood, Arm, Right [[id number]] Collected: 03/12/21 2255    Lab Status: Preliminary result Specimen: Blood from Arm, Right Updated: 03/16/21 0015     Blood Culture No growth at 3 days    Respiratory Panel PCR w/COVID-19(SARS-CoV-2) FRANCISCO JAVIER/JASPAL/ADELINE/PAD/COR/MAD/RAUL In-House, NP Swab in UTM/VTM, 3-4 HR TAT - Swab, Nasopharynx [[id number]]  (Normal) Collected: 03/12/21 2213    Lab Status: Final result Specimen: Swab from Nasopharynx Updated: 03/12/21 2321     ADENOVIRUS, PCR Not Detected     Coronavirus 229E Not Detected     Coronavirus HKU1 Not Detected     Coronavirus NL63 Not Detected     Coronavirus OC43 Not Detected     COVID19 Not Detected     Human Metapneumovirus Not Detected     Human Rhinovirus/Enterovirus Not Detected     Influenza A PCR Not Detected     Influenza B PCR Not Detected     Parainfluenza Virus 1 Not Detected     Parainfluenza Virus 2 Not Detected     Parainfluenza Virus 3 Not Detected     Parainfluenza Virus 4 Not Detected     RSV, PCR Not Detected     Bordetella pertussis pcr Not Detected     Bordetella parapertussis PCR Not Detected     Chlamydophila pneumoniae PCR Not Detected     Mycoplasma pneumo by PCR Not Detected    Narrative:      Fact sheet for providers: https://docs.[employer]/wp-content/uploads/QFO7232-8613-VG5.1-EUA-Provider-Fact-Sheet-3.pdf    Fact sheet for patients: https://docs.[employer]/wp-content/uploads/EOG0220-6139-GI1.1-EUA-Patient-Fact-Sheet-1.pdf    Test performed by PCR.    Blood Culture - Blood, Arm, Right [[id number]] Collected: 03/12/21 2213    Lab Status: Preliminary result Specimen: Blood from Arm, Right Updated: 03/15/21 2330     Blood Culture No growth at 3 days              apixaban, 5 mg, Oral, Q12H  insulin lispro, 0-24 Units, Subcutaneous, Q6H  ipratropium-albuterol, 3 mL, Nebulization, 4x Daily - RT  nicotine, 1 patch, Transdermal, Q24H  predniSONE, 40 mg, Oral, Daily With Breakfast  sodium chloride, 10 mL, Intravenous,  Q12H      dexmedetomidine, 0.2-1.5 mcg/kg/hr, Last Rate: 0.8 mcg/kg/hr (03/16/21 1200)        Diagnostics:  XR Chest AP    Result Date: 3/12/2021  Patient: JEAN-PIERRE CHAVEZ  Time Out: 23:01 Exam(s): FILM CXR 1 VIEW EXAM:   XR Chest, 1 View CLINICAL HISTORY:    Reason for exam: COVID Evaluation, Cough, Fever. TECHNIQUE:   Frontal view of the chest. COMPARISON:   9 30 19 FINDINGS:   Negative for significant cardiac enlargement.  Hyperinflation of both lungs consistent with COPD.  Patchy densities in both lung bases atelectasis or pneumonia.  Negative for pneumothorax or significant pleural fluid collections.     Electronically signed by Jake Nair DO on 03-12-21 at 2301    Results for orders placed during the hospital encounter of 10/24/19    Adult Transthoracic Echo Complete W/ Cont if Necessary Per Protocol    Interpretation Summary  · Left ventricular systolic function is normal. Calculated EF = 65%. Estimated EF was in agreement with the calculated EF. Estimated EF = 65%. Normal left ventricular cavity size noted. All left ventricular wall segments contract normally. Left ventricular wall thickness is consistent with moderate concentric hypertrophy. Left ventricular diastolic dysfunction is noted (grade I) consistent with impaired relaxation.  · Mild mitral valve regurgitation is present.  · Mild tricuspid valve regurgitation is present. Estimated right ventricular systolic pressure from tricuspid regurgitation is normal (<35 mmHg). Calculated right ventricular systolic pressure from tricuspid regurgitation is 26 mmHg.          Active Hospital Problems    Diagnosis  POA   • Acute respiratory failure (CMS/HCC) [J96.00]  Yes      Resolved Hospital Problems   No resolved problems to display.         Assessment/Plan     1. Pneumonia community-acquired I completed azithromycin and Rocephin.  2. Sepsis with septic shock she is doing well off vasopressors now  3. Acute exacerbation COPD continue steroids add  bronchodilators.  I am not sure how much of a true exacerbation she had sounds like as much anxiety talking with nursing.  But she definitely has severe disease..  The steroids may be contributing to her anxiety along with the breathing treatments she really is quite addicted think probably the word to her breathing treatments I am going to try and start by decreasing her steroids further today  4. Acute hypoxic and hypercapnic respiratory failure see discussion above I will have noninvasive ventilator available should she need it.  We will have her sleep with it.  Before she is ready to go home we will check a blood gas and see if she would benefit from nocturnal ventilation at home  5. Elevated troponin normal renal functions likely represents a non-ST elevation MI type II from demand ischemia   6. Malnutrition  protein calorie we discussed dietary changes  7. Tobacco abuse needs to stop start NicoDerm discussed with her again today  8. Hyperglycemia likely stress and steroid as etiology hemoglobin A1c was normal.  Sliding scale insulin  9. Anemia will evaluate  10. Fluids/electrolytes/nutrition taking p.o. encourage nutritional supplements  11. Anxiety she clearly has a lot of anxiety she does not really admit a lot of long-term anxiety over this is all just situational and drug related again I will try and drop the steroids first and see how she does    Plan for disposition: I do not think they can handle the patient on the floor and we still do not have her off dexmedetomidine she looks terrible when she has these been panic attacks painfully just a short time on the noninvasive ventilator has proven to help alleviate.  I think that in lots of people around her talking to her help    Donald Ryder MD  03/16/21  13:43 EDT    Time: I spent over 40 minutes on patient's care today and critical care time

## 2021-03-16 NOTE — PROGRESS NOTES
Adult Nutrition  Assessment/PES    Patient Name:  Scarlet Chakraborty  YOB: 1960  MRN: [id number]  Admit Date:  3/12/2021    Assessment Date:  3/16/2021    Comments:  Follow up note. Pt off the vent and diet advanced to Regular. Po 0-50%. Boost Plus supplements ordered and po intake encouraged. Will continue to monitor.    Reason for Assessment     Row Name 03/16/21 1333          Reason for Assessment    Reason For Assessment  follow-up protocol     Diagnosis  -- extubated         Nutrition/Diet History     Row Name 03/16/21 1333          Nutrition/Diet History    Typical Food/Fluid Intake  po 0-50%           Labs/Tests/Procedures/Meds     Row Name 03/16/21 1333          Labs/Procedures/Meds    Lab Results Reviewed  reviewed     Lab Results Comments  mg,        Diagnostic Tests/Procedures    Diagnostic Test/Procedure Reviewed  reviewed        Medications    Pertinent Medications Reviewed  reviewed         Physical Findings     Row Name 03/16/21 1334          Physical Findings    Overall Physical Appearance  on oxygen therapy     Skin  -- B-18           Nutrition Prescription Ordered     Row Name 03/16/21 1334          Nutrition Prescription PO    Current PO Diet  Regular     Supplement  Boost Plus (Ensure Enlive, Ensure Plus)                 Problem/Interventions:  Problem 1     Row Name 03/16/21 1335          Nutrition Diagnoses Problem 1    Resolved?  Yes         Problem 2     Row Name 03/16/21 1335          Nutrition Diagnoses Problem 2    Problem 2  Inadequate Nutrient Intake     Etiology (related to)  Medical Diagnosis             Intervention Goal     Row Name 03/16/21 1331          Intervention Goal    General  Maintain nutrition;Reduce/improve symptoms;Disease management/therapy;Meet nutritional needs for age/condition     PO  PO intake (%);Increase intake     PO Intake %  75 %     Weight  Appropriate weight gain         Nutrition Intervention     Row Name 03/16/21 0992          Nutrition  Intervention    RD/Tech Action  Follow Tx progress;Care plan reviewd;Encourage intake;Supplement provided           Education/Evaluation     Row Name 03/16/21 3476          Monitor/Evaluation    Monitor  Per protocol;PO intake           Electronically signed by:  Malia Castillo RD  03/16/21 13:35 EDT

## 2021-03-17 ENCOUNTER — APPOINTMENT (OUTPATIENT)
Dept: CT IMAGING | Facility: HOSPITAL | Age: 61
End: 2021-03-17

## 2021-03-17 ENCOUNTER — APPOINTMENT (OUTPATIENT)
Dept: CARDIOLOGY | Facility: HOSPITAL | Age: 61
End: 2021-03-17

## 2021-03-17 LAB
AORTIC DIMENSIONLESS INDEX: 0.8 (DI)
ARTERIAL PATENCY WRIST A: POSITIVE
ATMOSPHERIC PRESS: 747.3 MMHG
BACTERIA SPEC AEROBE CULT: NORMAL
BASE EXCESS BLDA CALC-SCNC: 2.5 MMOL/L (ref 0–2)
BDY SITE: ABNORMAL
BH CV ECHO MEAS - ACS: 0.6 CM
BH CV ECHO MEAS - AO MAX PG (FULL): 3.3 MMHG
BH CV ECHO MEAS - AO MAX PG: 6.7 MMHG
BH CV ECHO MEAS - AO MEAN PG (FULL): 1 MMHG
BH CV ECHO MEAS - AO MEAN PG: 3 MMHG
BH CV ECHO MEAS - AO V2 MAX: 129 CM/SEC
BH CV ECHO MEAS - AO V2 MEAN: 82.6 CM/SEC
BH CV ECHO MEAS - AO V2 VTI: 19.7 CM
BH CV ECHO MEAS - AVA(I,A): 2.4 CM^2
BH CV ECHO MEAS - AVA(I,D): 2.4 CM^2
BH CV ECHO MEAS - AVA(V,A): 2.2 CM^2
BH CV ECHO MEAS - AVA(V,D): 2.2 CM^2
BH CV ECHO MEAS - BSA(HAYCOCK): 1.3 M^2
BH CV ECHO MEAS - BSA: 1.4 M^2
BH CV ECHO MEAS - BZI_BMI: 17 KILOGRAMS/M^2
BH CV ECHO MEAS - BZI_METRIC_HEIGHT: 157.5 CM
BH CV ECHO MEAS - BZI_METRIC_WEIGHT: 42.2 KG
BH CV ECHO MEAS - EDV(CUBED): 64 ML
BH CV ECHO MEAS - EDV(MOD-SP2): 75 ML
BH CV ECHO MEAS - EDV(MOD-SP4): 80 ML
BH CV ECHO MEAS - EDV(TEICH): 70 ML
BH CV ECHO MEAS - EF(CUBED): 53.5 %
BH CV ECHO MEAS - EF(MOD-BP): 61.7 %
BH CV ECHO MEAS - EF(MOD-SP2): 58.7 %
BH CV ECHO MEAS - EF(MOD-SP4): 65 %
BH CV ECHO MEAS - EF(TEICH): 45.8 %
BH CV ECHO MEAS - ESV(CUBED): 29.8 ML
BH CV ECHO MEAS - ESV(MOD-SP2): 31 ML
BH CV ECHO MEAS - ESV(MOD-SP4): 28 ML
BH CV ECHO MEAS - ESV(TEICH): 37.9 ML
BH CV ECHO MEAS - FS: 22.5 %
BH CV ECHO MEAS - IVS/LVPW: 0.88
BH CV ECHO MEAS - IVSD: 0.7 CM
BH CV ECHO MEAS - LAT PEAK E' VEL: 11 CM/SEC
BH CV ECHO MEAS - LV DIASTOLIC VOL/BSA (35-75): 57.9 ML/M^2
BH CV ECHO MEAS - LV MASS(C)D: 85.8 GRAMS
BH CV ECHO MEAS - LV MASS(C)DI: 62.1 GRAMS/M^2
BH CV ECHO MEAS - LV MAX PG: 3.4 MMHG
BH CV ECHO MEAS - LV MEAN PG: 2 MMHG
BH CV ECHO MEAS - LV SYSTOLIC VOL/BSA (12-30): 20.3 ML/M^2
BH CV ECHO MEAS - LV V1 MAX: 91.7 CM/SEC
BH CV ECHO MEAS - LV V1 MEAN: 59.6 CM/SEC
BH CV ECHO MEAS - LV V1 VTI: 15.2 CM
BH CV ECHO MEAS - LVIDD: 4 CM
BH CV ECHO MEAS - LVIDS: 3.1 CM
BH CV ECHO MEAS - LVLD AP2: 6.5 CM
BH CV ECHO MEAS - LVLD AP4: 7.1 CM
BH CV ECHO MEAS - LVLS AP2: 5.3 CM
BH CV ECHO MEAS - LVLS AP4: 5.7 CM
BH CV ECHO MEAS - LVOT AREA (M): 3.1 CM^2
BH CV ECHO MEAS - LVOT AREA: 3.1 CM^2
BH CV ECHO MEAS - LVOT DIAM: 2 CM
BH CV ECHO MEAS - LVPWD: 0.8 CM
BH CV ECHO MEAS - MED PEAK E' VEL: 7.5 CM/SEC
BH CV ECHO MEAS - MV A DUR: 0.1 SEC
BH CV ECHO MEAS - MV A MAX VEL: 83.9 CM/SEC
BH CV ECHO MEAS - MV DEC SLOPE: 494 CM/SEC^2
BH CV ECHO MEAS - MV DEC TIME: 150 SEC
BH CV ECHO MEAS - MV E MAX VEL: 87.3 CM/SEC
BH CV ECHO MEAS - MV E/A: 1
BH CV ECHO MEAS - MV MAX PG: 3.5 MMHG
BH CV ECHO MEAS - MV MEAN PG: 2 MMHG
BH CV ECHO MEAS - MV P1/2T MAX VEL: 97.7 CM/SEC
BH CV ECHO MEAS - MV P1/2T: 57.9 MSEC
BH CV ECHO MEAS - MV V2 MAX: 93.3 CM/SEC
BH CV ECHO MEAS - MV V2 MEAN: 69.3 CM/SEC
BH CV ECHO MEAS - MV V2 VTI: 19.2 CM
BH CV ECHO MEAS - MVA P1/2T LCG: 2.3 CM^2
BH CV ECHO MEAS - MVA(P1/2T): 3.8 CM^2
BH CV ECHO MEAS - MVA(VTI): 2.5 CM^2
BH CV ECHO MEAS - PA ACC TIME: 0.1 SEC
BH CV ECHO MEAS - PA MAX PG (FULL): 3.9 MMHG
BH CV ECHO MEAS - PA MAX PG: 9.2 MMHG
BH CV ECHO MEAS - PA PR(ACCEL): 34.9 MMHG
BH CV ECHO MEAS - PA V2 MAX: 152 CM/SEC
BH CV ECHO MEAS - PULM A REVS DUR: 0.13 SEC
BH CV ECHO MEAS - PULM A REVS VEL: 43 CM/SEC
BH CV ECHO MEAS - PULM DIAS VEL: 41.3 CM/SEC
BH CV ECHO MEAS - PULM S/D: 1
BH CV ECHO MEAS - PULM SYS VEL: 43 CM/SEC
BH CV ECHO MEAS - PVA(V,A): 1.7 CM^2
BH CV ECHO MEAS - PVA(V,D): 1.7 CM^2
BH CV ECHO MEAS - QP/QS: 0.75
BH CV ECHO MEAS - RAP SYSTOLE: 3 MMHG
BH CV ECHO MEAS - RV MAX PG: 5.4 MMHG
BH CV ECHO MEAS - RV MEAN PG: 2 MMHG
BH CV ECHO MEAS - RV V1 MAX: 116 CM/SEC
BH CV ECHO MEAS - RV V1 MEAN: 67.3 CM/SEC
BH CV ECHO MEAS - RV V1 VTI: 15.8 CM
BH CV ECHO MEAS - RVOT AREA: 2.3 CM^2
BH CV ECHO MEAS - RVOT DIAM: 1.7 CM
BH CV ECHO MEAS - SI(CUBED): 24.8 ML/M^2
BH CV ECHO MEAS - SI(LVOT): 34.6 ML/M^2
BH CV ECHO MEAS - SI(MOD-SP2): 31.9 ML/M^2
BH CV ECHO MEAS - SI(MOD-SP4): 37.7 ML/M^2
BH CV ECHO MEAS - SI(TEICH): 23.2 ML/M^2
BH CV ECHO MEAS - SV(CUBED): 34.2 ML
BH CV ECHO MEAS - SV(LVOT): 47.8 ML
BH CV ECHO MEAS - SV(MOD-SP2): 44 ML
BH CV ECHO MEAS - SV(MOD-SP4): 52 ML
BH CV ECHO MEAS - SV(RVOT): 35.9 ML
BH CV ECHO MEAS - SV(TEICH): 32.1 ML
BH CV ECHO MEAS - TAPSE (>1.6): 2 CM
BH CV ECHO MEASUREMENTS AVERAGE E/E' RATIO: 9.44
BH CV VAS BP RIGHT ARM: NORMAL MMHG
BH CV XLRA - RV BASE: 2.3 CM
BH CV XLRA - RV LENGTH: 5.7 CM
BH CV XLRA - RV MID: 1 CM
BH CV XLRA - TDI S': 18 CM/SEC
GAS FLOW AIRWAY: 1 LPM
GLUCOSE BLDC GLUCOMTR-MCNC: 167 MG/DL (ref 70–130)
GLUCOSE BLDC GLUCOMTR-MCNC: 76 MG/DL (ref 70–130)
GLUCOSE BLDC GLUCOMTR-MCNC: 80 MG/DL (ref 70–130)
GLUCOSE BLDC GLUCOMTR-MCNC: 99 MG/DL (ref 70–130)
HCO3 BLDA-SCNC: 26.4 MMOL/L (ref 22–28)
LEFT ATRIUM VOLUME INDEX: 16 ML/M2
MAXIMAL PREDICTED HEART RATE: 160 BPM
MODALITY: ABNORMAL
PCO2 BLDA: 37.3 MM HG (ref 35–45)
PH BLDA: 7.46 PH UNITS (ref 7.35–7.45)
PO2 BLDA: 58.6 MM HG (ref 80–100)
QT INTERVAL: 366 MS
SAO2 % BLDCOA: 91.4 % (ref 92–99)
SET MECH RESP RATE: 16
STRESS TARGET HR: 136 BPM
TROPONIN T SERPL-MCNC: 0.03 NG/ML (ref 0–0.03)

## 2021-03-17 PROCEDURE — 94799 UNLISTED PULMONARY SVC/PX: CPT

## 2021-03-17 PROCEDURE — 82803 BLOOD GASES ANY COMBINATION: CPT

## 2021-03-17 PROCEDURE — 63710000001 PREDNISONE PER 1 MG: Performed by: INTERNAL MEDICINE

## 2021-03-17 PROCEDURE — 71275 CT ANGIOGRAPHY CHEST: CPT

## 2021-03-17 PROCEDURE — 93306 TTE W/DOPPLER COMPLETE: CPT

## 2021-03-17 PROCEDURE — 93005 ELECTROCARDIOGRAM TRACING: CPT | Performed by: INTERNAL MEDICINE

## 2021-03-17 PROCEDURE — 25010000002 PERFLUTREN (DEFINITY) 8.476 MG IN SODIUM CHLORIDE (PF) 0.9 % 10 ML INJECTION: Performed by: INTERNAL MEDICINE

## 2021-03-17 PROCEDURE — 94660 CPAP INITIATION&MGMT: CPT

## 2021-03-17 PROCEDURE — 84484 ASSAY OF TROPONIN QUANT: CPT | Performed by: INTERNAL MEDICINE

## 2021-03-17 PROCEDURE — 93010 ELECTROCARDIOGRAM REPORT: CPT | Performed by: INTERNAL MEDICINE

## 2021-03-17 PROCEDURE — 82962 GLUCOSE BLOOD TEST: CPT

## 2021-03-17 PROCEDURE — 36600 WITHDRAWAL OF ARTERIAL BLOOD: CPT

## 2021-03-17 PROCEDURE — 0 IOPAMIDOL PER 1 ML: Performed by: INTERNAL MEDICINE

## 2021-03-17 PROCEDURE — 63710000001 INSULIN LISPRO (HUMAN) PER 5 UNITS: Performed by: INTERNAL MEDICINE

## 2021-03-17 PROCEDURE — 93306 TTE W/DOPPLER COMPLETE: CPT | Performed by: INTERNAL MEDICINE

## 2021-03-17 RX ORDER — ACETAMINOPHEN 325 MG/1
650 TABLET ORAL EVERY 6 HOURS PRN
Status: DISCONTINUED | OUTPATIENT
Start: 2021-03-17 | End: 2021-03-22 | Stop reason: HOSPADM

## 2021-03-17 RX ADMIN — PREDNISONE 20 MG: 20 TABLET ORAL at 08:46

## 2021-03-17 RX ADMIN — PERFLUTREN 2 ML: 6.52 INJECTION, SUSPENSION INTRAVENOUS at 18:56

## 2021-03-17 RX ADMIN — Medication 5 MG: at 20:01

## 2021-03-17 RX ADMIN — IPRATROPIUM BROMIDE AND ALBUTEROL SULFATE 3 ML: 2.5; .5 SOLUTION RESPIRATORY (INHALATION) at 11:26

## 2021-03-17 RX ADMIN — ACETAMINOPHEN 650 MG: 325 TABLET ORAL at 12:27

## 2021-03-17 RX ADMIN — IPRATROPIUM BROMIDE AND ALBUTEROL SULFATE 3 ML: 2.5; .5 SOLUTION RESPIRATORY (INHALATION) at 20:00

## 2021-03-17 RX ADMIN — ACETAMINOPHEN 650 MG: 325 TABLET ORAL at 18:51

## 2021-03-17 RX ADMIN — APIXABAN 5 MG: 5 TABLET, FILM COATED ORAL at 08:46

## 2021-03-17 RX ADMIN — DOCUSATE SODIUM 100 MG: 100 CAPSULE, LIQUID FILLED ORAL at 08:47

## 2021-03-17 RX ADMIN — IPRATROPIUM BROMIDE AND ALBUTEROL SULFATE 3 ML: 2.5; .5 SOLUTION RESPIRATORY (INHALATION) at 15:56

## 2021-03-17 RX ADMIN — SODIUM CHLORIDE, PRESERVATIVE FREE 10 ML: 5 INJECTION INTRAVENOUS at 20:01

## 2021-03-17 RX ADMIN — SODIUM CHLORIDE, PRESERVATIVE FREE 10 ML: 5 INJECTION INTRAVENOUS at 08:47

## 2021-03-17 RX ADMIN — ESCITALOPRAM 5 MG: 5 TABLET, FILM COATED ORAL at 08:46

## 2021-03-17 RX ADMIN — APIXABAN 5 MG: 5 TABLET, FILM COATED ORAL at 20:01

## 2021-03-17 RX ADMIN — IOPAMIDOL 95 ML: 755 INJECTION, SOLUTION INTRAVENOUS at 13:07

## 2021-03-17 RX ADMIN — INSULIN LISPRO 4 UNITS: 100 INJECTION, SOLUTION INTRAVENOUS; SUBCUTANEOUS at 18:06

## 2021-03-17 RX ADMIN — IPRATROPIUM BROMIDE AND ALBUTEROL SULFATE 3 ML: 2.5; .5 SOLUTION RESPIRATORY (INHALATION) at 07:39

## 2021-03-17 RX ADMIN — DOCUSATE SODIUM 100 MG: 100 CAPSULE, LIQUID FILLED ORAL at 20:01

## 2021-03-17 NOTE — PAYOR COMM NOTE
"Scarlet Chavez (61 y.o. Female)                    ATTENTION; CONTINUED STAY CLINICALS CASE REF #  [id number]                 REPLY TO [facility] DEPT ,  ERICA JETT LPN  [phone] OR CALL        Date of Birth Social Security Number Address Home Phone MRN    1960  [id number] [address] [phone] [id number]    Orthodoxy Marital Status          Gnosticist Single       Admission Date Admission Type Admitting Provider Attending Provider Department, Room/Bed    3/12/21 Emergency Sourav Bales MD Haller, Harold Dale, MD [facility], N340/1    Discharge Date Discharge Disposition Discharge Destination                       Attending Provider: Donald Ryder MD    Allergies: No Known Allergies    Isolation: None   Infection: None   Code Status: CPR    Ht: 157.5 cm (62.01\")   Wt: 42.4 kg (93 lb 7.6 oz)    Admission Cmt: None   Principal Problem: None                Active Insurance as of 3/12/2021     Primary Coverage     Payor Plan Insurance Group Employer/Plan Group    [facility] [id number]     Payor Plan Address Payor Plan Phone Number Payor Plan Fax Number Effective Dates    [address] [address] [phone]  9/1/2019 - None Entered    [facility] [id number]       Subscriber Name Subscriber Birth Date Member ID       SCARLET CHAVEZ 1960 [id number]                 Emergency Contacts      (Rel.) Home Phone Work Phone Mobile Phone    Yvonne Mccollum (Sister) [phone] -- --    Don Taylor (Significant Other) -- -- [phone]            Vital Signs (last day)     Date/Time   Temp   Temp src   Pulse   Resp   BP   Patient Position   SpO2    03/17/21 1400   --   --   91   --   121/64   --   97    03/17/21 1200   --   --   100   --   116/63   --   91    03/17/21 1136   98 (36.7)   Oral   --   --   --   --   --    03/17/21 1126   --   --   96   16   --   --   96    03/17/21 1100   --   --   99   --   126/55   --   96    " 03/17/21 1000   --   --   110   --   118/81   --   91    03/17/21 0900   --   --   81   --   112/56   --   96    03/17/21 0800   --   --   82   18   104/71   --   96    03/17/21 0739   --   --   --   16   --   --   --    03/17/21 0730   97.8 (36.6)   Axillary   --   --   --   --   --    03/17/21 0600   --   --   107   --   126/79   --   (!) 89    03/17/21 0500   98.2 (36.8)   Oral   81   --   112/65   --   97    03/17/21 0413   --   --   96   18   --   --   99    03/17/21 0200   --   --   96   --   141/74   --   100    03/17/21 0100   --   --   93   --   128/85   --   100    03/17/21 0000   --   --   79   --   123/76   --   100    03/16/21 2350   --   --   88   19   --   --   100    03/16/21 2300   --   --   94   --   125/82   --   100    03/16/21 2200   --   --   100   --   124/86   --   94    03/16/21 2100   --   --   97   --   136/80   --   95    03/16/21 2043   --   --   94   --   --   --   95    03/16/21 2036   --   --   96   13   --   --   95    03/16/21 2012   --   --   88   --   120/62   --   97    03/16/21 1900   98.4 (36.9)   Oral   102   --   127/64   --   99    03/16/21 1800   --   --   99   --   124/71   --   93    03/16/21 1700   --   --   90   --   103/76   --   90    03/16/21 1600   98.3 (36.8)   Oral   87   16   112/68   --   92    03/16/21 1555   --   --   86   18   --   --   93    03/16/21 1539   --   --   92   18   --   --   95    03/16/21 1530   --   --   86   --   --   --   97    03/16/21 1500   --   --   77   --   104/61   --   99    03/16/21 1400   --   --   88   --   101/61   --   96    03/16/21 1347   --   --   89   --   --   --   95    03/16/21 1300   --   --   82   --   110/63   --   97    03/16/21 1233   --   --   105   18   --   --   99    03/16/21 1200   --   --   109   22   145/89   --   100    03/16/21 1100   --   --   112   --   --   --   96    03/16/21 1042   97.9 (36.6)   Oral   --   --   --   --   --    03/16/21 0900   --   --   71   --   122/68   --   99    03/16/21 0830   --    --   71   --   --   --   98    03/16/21 0800   --   --   66   18   126/78   --   98    03/16/21 0725   97.2 (36.2)   Oral   75   --   127/81   --   99    03/16/21 0702   --   --   100   20   --   --   97    03/16/21 0700   --   --   90   --   --   --   99    03/16/21 0600   --   --   110   --   141/98   --   99    03/16/21 0400   --   --   86   --   122/69   --   97    03/16/21 0355   --   --   86   --   --   --   97    03/16/21 0200   --   --   87   --   111/74   --   99    03/16/21 0000   99.1 (37.3)   Axillary   97   --   101/81   --   100              Oxygen Therapy (last day)     Date/Time   SpO2   Device (Oxygen Therapy)   Flow (L/min)   Oxygen Concentration (%)   ETCO2 (mmHg)    03/17/21 1400   97   --   --   --   --    03/17/21 1200   91   --   --   --   --    03/17/21 1126   96   nasal cannula   3   --   --    03/17/21 1100   96   --   --   --   --    03/17/21 1000   91   --   --   --   --    03/17/21 0900   96   --   --   --   --    03/17/21 0800   96   NPPV/NIV   --   30   --    03/17/21 0739   --   NPPV/NIV   --   30   --    03/17/21 0600   (!) 89   --   --   --   --    03/17/21 0500   97   --   --   --   --    03/17/21 0415   --   NPPV/NIV   --   --   --    03/17/21 0413   99   NPPV/NIV   --   40   --    03/17/21 0200   100   --   --   --   --    03/17/21 0100   100   --   --   --   --    03/17/21 0000   100   --   --   --   --    03/16/21 2350   100   NPPV/NIV   --   40   --    03/16/21 2304   --   NPPV/NIV   --   --   --    03/16/21 2300   100   --   --   --   --    03/16/21 2200   94   --   --   --   --    03/16/21 2100   95   --   --   --   --    03/16/21 2043   95   --   --   --   --    03/16/21 2036   95   nasal cannula   0.5   --   --    03/16/21 2012   97   --   --   --   --    03/16/21 1951   --   nasal cannula   1   --   --    03/16/21 1900   99   --   --   --   --    03/16/21 1800   93   --   --   --   --    03/16/21 1700   90   --   --   --   --    03/16/21 1600   92   room air   --   --    --    03/16/21 1555   93   --   --   --   --    03/16/21 1539   95   room air   --   --   --    03/16/21 1530   97   room air   --   --   --    03/16/21 1500   99   --   --   --   --    03/16/21 1400   96   --   --   --   --    03/16/21 1347   95   nasal cannula   2   --   --    03/16/21 1300   97   --   --   --   --    03/16/21 1233   99   NPPV/NIV   --   40   --    03/16/21 1200   100   NPPV/NIV   --   40   --    03/16/21 1100   96   nasal cannula   2   --   --    03/16/21 0900   99   room air   --   --   --    03/16/21 0830   98   nasal cannula   2   --   --    03/16/21 0800   98   NPPV/NIV   --   28   --    03/16/21 0725   99   --   --   --   --    03/16/21 0702   97   nasal cannula   1   --   --    03/16/21 0700   99   --   --   --   --    03/16/21 0600   99   --   --   --   --    03/16/21 0543   --   NPPV/NIV   --   --   --    03/16/21 0400   97   nasal cannula   2   --   --    03/16/21 0355   97   nasal cannula   2   28   --    03/16/21 0200   99   --   --   --   --    03/16/21 0000   100   NPPV/NIV   --   40   --              Current Facility-Administered Medications   Medication Dose Route Frequency Provider Last Rate Last Admin   • acetaminophen (TYLENOL) tablet 650 mg  650 mg Oral Q6H PRN Donald Ryder MD   650 mg at 03/17/21 1227   • apixaban (ELIQUIS) tablet 5 mg  5 mg Oral Q12H Donald Ryder MD   5 mg at 03/17/21 0846   • dexmedetomidine (PRECEDEX) 400 mcg in 100 mL NS infusion kit  0.2-1.5 mcg/kg/hr Intravenous Titrated Sourav Bales MD   Stopped at 03/16/21 1700   • dextrose (D50W) 25 g/ 50mL Intravenous Solution 25 g  25 g Intravenous Q15 Min PRN Donald Ryder MD       • dextrose (GLUTOSE) oral gel 15 g  15 g Oral Q15 Min PRN Donald Ryder MD       • docusate sodium (COLACE) capsule 100 mg  100 mg Oral BID Donald Ryder MD   100 mg at 03/17/21 0847   • escitalopram (LEXAPRO) tablet 5 mg  5 mg Oral Daily Donald Ryder MD   5 mg at 03/17/21 0846   •  glucagon (human recombinant) (GLUCAGEN DIAGNOSTIC) injection 1 mg  1 mg Subcutaneous Q15 Min PRN Donald Ryder MD       • insulin lispro (ADMELOG) injection 0-24 Units  0-24 Units Subcutaneous Q6H Donald Ryder MD   4 Units at 03/15/21 1713   • ipratropium-albuterol (DUO-NEB) nebulizer solution 3 mL  3 mL Nebulization 4x Daily - RT Donald Ryder MD   3 mL at 03/17/21 1126   • ipratropium-albuterol (DUO-NEB) nebulizer solution 3 mL  3 mL Nebulization Q4H PRN Donald Ryder MD   3 mL at 03/15/21 0214   • Magnesium Sulfate 2 gram Bolus, followed by 8 gram infusion (total Mg dose 10 grams)- Mg less than or equal to 1mg/dL  2 g Intravenous PRN Donald Ryder MD        Or   • Magnesium Sulfate 2 gram / 50mL Infusion (GIVE X 3 BAGS TO EQUAL 6GM TOTAL DOSE) - Mg 1.1 - 1.5 mg/dl  2 g Intravenous PRN Donald Ryder MD        Or   • Magnesium Sulfate 4 gram infusion- Mg 1.6-1.9 mg/dL  4 g Intravenous PRN Donald Ryder MD 25 mL/hr at 03/15/21 0840 4 g at 03/15/21 0840   • melatonin tablet 5 mg  5 mg Oral Nightly PRN Sourav Bales MD   5 mg at 03/16/21 2233   • nicotine (NICODERM CQ) 21 MG/24HR patch 1 patch  1 patch Transdermal Q24H Donald Ryder MD   1 patch at 03/15/21 0846   • ondansetron (ZOFRAN) tablet 4 mg  4 mg Nasogastric Q6H PRN Sourav Bales MD        Or   • ondansetron (ZOFRAN) injection 4 mg  4 mg Intravenous Q6H PRN Sourav Bales MD       • potassium chloride (K-DUR,KLOR-CON) ER tablet 40 mEq  40 mEq Oral PRN Donald Ryder MD   40 mEq at 03/15/21 1927    Or   • potassium chloride (KLOR-CON) packet 40 mEq  40 mEq Oral PRN Donald Ryder MD        Or   • potassium chloride 10 mEq in 100 mL IVPB  10 mEq Intravenous Q1H PRN Donald Ryder MD       • predniSONE (DELTASONE) tablet 20 mg  20 mg Oral Daily With Breakfast Donald Ryder MD   20 mg at 03/17/21 0846   • sodium chloride 0.9 % flush 10 mL  10 mL Intravenous Q12H Sourav Bales  MD HEATHER   10 mL at 03/17/21 0847   • sodium chloride 0.9 % flush 10 mL  10 mL Intravenous PRN Sourav Bales MD         Orders (last 24 hrs)      Start     Ordered    03/17/21 1411  Adult Transthoracic Echo Complete W/ Cont if Necessary Per Protocol  Once      03/17/21 1411    03/17/21 1400  iopamidol (ISOVUE-370) 76 % injection 100 mL  Once in Imaging      03/17/21 1306    03/17/21 1240  Blood Gas, Arterial -  Once      03/17/21 1231    03/17/21 1200  CT Angiogram Chest With & Without Contrast  1 Time Imaging      03/17/21 1159    03/17/21 1154  acetaminophen (TYLENOL) tablet 650 mg  Every 6 Hours PRN      03/17/21 1154    03/17/21 1154  ECG 12 Lead  Once      03/17/21 1154    03/17/21 1154  Troponin  STAT      03/17/21 1154    03/17/21 1153  Blood Gas, Arterial -  Once      03/17/21 1154    03/17/21 1153  CT Angiogram Chest With & Without Contrast  1 Time Imaging,   Status:  Canceled      03/17/21 1154    03/17/21 1148  POC Glucose Once  Once      03/17/21 1134    03/17/21 0800  predniSONE (DELTASONE) tablet 20 mg  Daily With Breakfast      03/16/21 1350    03/17/21 0556  POC Glucose Once  Once      03/17/21 0553    03/16/21 2304  POC Glucose Once  Once      03/16/21 2301    03/16/21 2100  docusate sodium (COLACE) capsule 100 mg  2 Times Daily      03/16/21 1820    03/16/21 1845  escitalopram (LEXAPRO) tablet 5 mg  Daily      03/16/21 1749    03/16/21 1836  POC Glucose Once  Once      03/16/21 1833    03/16/21 1717  POC Glucose Once  Once      03/16/21 1713    03/15/21 1800  Dietary Nutrition Supplements Boost Plus (Ensure Enlive, Ensure Plus); chocolate  Daily With Breakfast, Lunch & Dinner      03/15/21 1239    03/15/21 1315  apixaban (ELIQUIS) tablet 5 mg  Every 12 Hours Scheduled      03/15/21 1221    03/15/21 0731  Magnesium  Daily,   Status:  Canceled      03/15/21 0730    03/15/21 0730  Magnesium Sulfate 2 gram Bolus, followed by 8 gram infusion (total Mg dose 10 grams)- Mg less than or equal to 1mg/dL  As  Needed      03/15/21 0730    03/15/21 0730  Magnesium Sulfate 2 gram / 50mL Infusion (GIVE X 3 BAGS TO EQUAL 6GM TOTAL DOSE) - Mg 1.1 - 1.5 mg/dl  As Needed      03/15/21 0730    03/15/21 0730  Magnesium Sulfate 4 gram infusion- Mg 1.6-1.9 mg/dL  As Needed      03/15/21 0730    03/15/21 0729  potassium chloride (K-DUR,KLOR-CON) ER tablet 40 mEq  As Needed      03/15/21 0730    03/15/21 0729  potassium chloride (KLOR-CON) packet 40 mEq  As Needed      03/15/21 0730    03/15/21 0729  potassium chloride 10 mEq in 100 mL IVPB  Every 1 Hour PRN      03/15/21 0730    03/15/21 0315  dexmedetomidine (PRECEDEX) 400 mcg in 100 mL NS infusion kit  Titrated      03/15/21 0229    03/14/21 2030  ipratropium-albuterol (DUO-NEB) nebulizer solution 3 mL  4 Times Daily - RT      03/14/21 1646    03/14/21 1646  ipratropium-albuterol (DUO-NEB) nebulizer solution 3 mL  Every 4 Hours PRN      03/14/21 1647    03/14/21 0014  melatonin tablet 5 mg  Nightly PRN      03/14/21 0014    03/13/21 1432  ondansetron (ZOFRAN) tablet 4 mg  Every 6 Hours PRN      03/13/21 1433    03/13/21 1432  ondansetron (ZOFRAN) injection 4 mg  Every 6 Hours PRN      03/13/21 1433    03/13/21 1300  nicotine (NICODERM CQ) 21 MG/24HR patch 1 patch  Every 24 Hours Scheduled      03/13/21 1117    03/13/21 1230  insulin lispro (ADMELOG) injection 0-24 Units  Every 6 Hours      03/13/21 1142    03/13/21 1200  POC Glucose Q6H  Every 6 Hours      03/13/21 1113    03/13/21 1119  Daily Weights  Daily      03/13/21 1118    03/13/21 1119  Strict Intake & Output  Every Shift      03/13/21 1118    03/13/21 1113  dextrose (GLUTOSE) oral gel 15 g  Every 15 Minutes PRN      03/13/21 1113    03/13/21 1113  dextrose (D50W) 25 g/ 50mL Intravenous Solution 25 g  Every 15 Minutes PRN      03/13/21 1113    03/13/21 1113  glucagon (human recombinant) (GLUCAGEN DIAGNOSTIC) injection 1 mg  Every 15 Minutes PRN      03/13/21 1113    03/13/21 0017  sodium chloride 0.9 % flush 10 mL  Every 12  Hours Scheduled      03/13/21 0015    03/13/21 0016  Daily Weights  Daily      03/13/21 0015    03/13/21 0015  Intake and Output  Every Shift      03/13/21 0015    03/13/21 0014  sodium chloride 0.9 % flush 10 mL  As Needed      03/13/21 0015    Unscheduled  ECG 12 Lead  As Needed     Comments: For ICU/CCU Protocol Orders.  Nurse to Release if Patient Experiences Acute Chest Pain or Dysrhythmias    03/13/21 0015    Unscheduled  Potassium  As Needed     Comments: Sudden Ventricular Dysrhythmias. Notify Physician.      03/13/21 0015    Unscheduled  Magnesium  As Needed     Comments: For ICU/CCU Protocol      03/13/21 0015    Unscheduled  Holden and Document Tube Depth (in cm)  As Needed      03/13/21 1118    Unscheduled  Oral Care  As Needed      03/13/21 1118    Unscheduled  Verify Tube Placement Initially & As Needed  As Needed      03/13/21 1118    Unscheduled  Flush Feeding Tube With 30-50mL Water As Needed  As Needed      03/13/21 1118    Unscheduled  Holden and Document Tube Depth (in cm)  As Needed      03/13/21 1418    Unscheduled  Magnesium  As Needed      03/15/21 0730                   Physician Progress Notes       Donald Ryder MD at 03/17/21 0814                   LOS: 5 days   Patient Care Team:  Margaret Ca PA as PCP - General (Family Medicine)  Beulah Joshi APRN as Nurse Practitioner (Neurology)    Subjective     Nursing reports that patient used her NIPPV most of the night and did very well she came off was on cannula and got real anxious they put her on the BiPAP for just a short while and she calmed down they have had her back off again.  They say when she gets anxious she just starts breathing faster and faster and gets into more distress.  Patient reports that she did not have a good night did not sleep well at all she has a headache and she is having some chest pain in the middle of her chest, it hurts worse to breathe is sort of a sharp pain sometimes.    Review of  Systems:          Objective     Vital Signs  Vital Sign Min/Max for last 24 hours  Temp  Min: 97.8 °F (36.6 °C)  Max: 98.4 °F (36.9 °C)   BP  Min: 101/61  Max: 145/89   Pulse  Min: 71  Max: 112   Resp  Min: 13  Max: 22   SpO2  Min: 89 %  Max: 100 %   Flow (L/min)  Min: 0.5  Max: 2   Weight  Min: 42.4 kg (93 lb 7.6 oz)  Max: 42.4 kg (93 lb 7.6 oz)        Ventilator/Non-Invasive Ventilation Settings (From admission, onward) Comment     Start     Ordered    03/13/21 0045  Ventilator - AC/VC  Continuous     Question:  Vent Mode  Answer:  AC/VC    03/13/21 0044 03/12/21 2208  NIPPV (CPAP or BIPAP)  Until Discontinued,   Status:  Canceled     Question Answer Comment   Type: BIPAP    NIPPV Mask Interface: Per Patient Preference        03/12/21 2207                             Body mass index is 17.09 kg/m².  I/O last 3 completed shifts:  In: 2138 [P.O.:800; I.V.:1188; IV Piggyback:150]  Out: 1925 [Urine:1925]  No intake/output data recorded.        Physical Exam:  General Appearance: Thin white female looks at least 2 or 3 decades older than her stated age she sitting in up in a chair she is on about 2 liter of oxygen her oxygen saturations are 93% currently she does not look in distress  Eyes: Conjunctiva are clear and anicteric pupils are equal   ENT: Mucous membranes moist no erythema no exudates  Neck: No adenopathy no jugular venous distension, trachea midline  Lungs: She does not move a whole lot of air.  It is very interesting to watch her if you talk to her she starts getting anxious she starts breathing really fast and getting more labored classic pink puffer type of situation.  I do not hear wheezes or rales.  Cardiac: Regular rate rhythm no murmur no gallop  Abdomen: Soft nontender no palpable hepatosplenomegaly or masses  : Not examined  Musculoskeletal: She has minimal muscle mass she has little bitemporal wasting thenar, hyperthenar and interosseous muscle wasting and she has no real chest wall  tenderness.  Skin: No jaundice no petechiae skin is warm and dry  Neuro: I awake and alert she is following commands again she sitting up in a chair grossly intact.  Extremities/P Vascular: She has clubbing of all digits, no cyanosis, palpable radial and dorsalis pedis pulses bilaterally  MSE: Still quite anxious.       Labs:  Results from last 7 days   Lab Units 03/16/21  0401 03/15/21  0447 03/14/21  0454 03/13/21  1202 03/13/21  0528 03/12/21  2213   GLUCOSE mg/dL 124* 119* 160* 150* 149* 201*   SODIUM mmol/L 141 145 138 142 142 140   POTASSIUM mmol/L 4.9 3.3* 4.0 5.3* 4.7 4.5   MAGNESIUM mg/dL 2.6* 1.7  --   --   --   --    CO2 mmol/L 27.2 24.1 21.7* 21.8* 20.7* 26.8   CHLORIDE mmol/L 108* 112* 109* 112* 114* 105   ANION GAP mmol/L 5.8 8.9 7.3 8.2 7.3 8.2   CREATININE mg/dL 0.46* 0.34* 0.46* 0.48* 0.51* 0.64   BUN mg/dL 16 11 19 17 15 14   BUN / CREAT RATIO  34.8* 32.4* 41.3* 35.4* 29.4* 21.9   CALCIUM mg/dL 8.4* 6.9* 8.3* 8.2* 7.8* 9.1   EGFR IF NONAFRICN AM mL/min/1.73 139 >150 139 132 123 95   ALK PHOS U/L  --   --   --   --   --  71   TOTAL PROTEIN g/dL  --   --   --   --   --  7.4   ALT (SGPT) U/L  --   --   --   --   --  30   AST (SGOT) U/L  --   --   --   --   --  30   BILIRUBIN mg/dL  --   --   --   --   --  0.2   ALBUMIN g/dL 3.50 2.80* 3.10*  --  3.50 4.40   GLOBULIN gm/dL  --   --   --   --   --  3.0     Estimated Creatinine Clearance: 87.1 mL/min (A) (by C-G formula based on SCr of 0.46 mg/dL (L)).      Results from last 7 days   Lab Units 03/14/21  0454 03/13/21  0409 03/12/21  2213   WBC 10*3/mm3 10.84* 11.09* 17.05*   RBC 10*6/mm3 3.78 3.91 4.83   HEMOGLOBIN g/dL 11.7* 11.5* 14.7   HEMATOCRIT % 34.8 36.1 44.9   MCV fL 92.1 92.3 93.0   MCH pg 31.0 29.4 30.4   MCHC g/dL 33.6 31.9 32.7   RDW % 12.2* 12.1* 12.1*   RDW-SD fl 40.6 40.3 40.8   MPV fL 11.1 10.9 11.3   PLATELETS 10*3/mm3 212 231 341   NEUTROPHIL % %  --  91.4* 58.8   LYMPHOCYTE % %  --  4.4* 27.2   MONOCYTES % %  --  3.5* 7.5   EOSINOPHIL  % %  --  0.1* 5.7   BASOPHIL % %  --  0.1 0.6   IMM GRAN % %  --  0.5 0.2   NEUTROS ABS 10*3/mm3  --  10.14* 10.01*   LYMPHS ABS 10*3/mm3  --  0.49* 4.63*   MONOS ABS 10*3/mm3  --  0.39 1.28*   EOS ABS 10*3/mm3  --  0.01 0.98*   BASOS ABS 10*3/mm3  --  0.01 0.11   IMMATURE GRANS (ABS) 10*3/mm3  --  0.05 0.04   NRBC /100 WBC  --  0.0 0.0     Results from last 7 days   Lab Units 03/15/21  0752   PH, ARTERIAL pH units 7.412   PO2 ART mm Hg 115.7*   PCO2, ARTERIAL mm Hg 46.7*   HCO3 ART mmol/L 29.8*     Results from last 7 days   Lab Units 03/13/21  1512 03/13/21  1202 03/12/21  2213   TROPONIN T ng/mL 0.258* 0.302* 0.057*     Results from last 7 days   Lab Units 03/12/21  2213   PROBNP pg/mL 60.2         Results from last 7 days   Lab Units 03/13/21  0528 03/12/21  2213   LACTATE mmol/L  --  1.5   PROCALCITONIN ng/mL 0.12 0.07         Microbiology Results (last 10 days)     Procedure Component Value - Date/Time    Respiratory Culture - Sputum, Cough [[id number]] Collected: 03/13/21 0850    Lab Status: Final result Specimen: Sputum from Cough Updated: 03/15/21 0811     Respiratory Culture Scant growth (1+) Normal Respiratory Jess: NO S.aureus/MRSA or Pseudomonas aeruginosa     Gram Stain Rare (1+) WBCs seen      No organisms seen    Blood Culture - Blood, Arm, Right [[id number]] Collected: 03/12/21 2255    Lab Status: Preliminary result Specimen: Blood from Arm, Right Updated: 03/17/21 0015     Blood Culture No growth at 4 days    Respiratory Panel PCR w/COVID-19(SARS-CoV-2) FRANCISCO JAVIER/JASPAL/ADELINE/PAD/COR/MAD/RAUL In-House, NP Swab in UTM/VTM, 3-4 HR TAT - Swab, Nasopharynx [[id number]]  (Normal) Collected: 03/12/21 2213    Lab Status: Final result Specimen: Swab from Nasopharynx Updated: 03/12/21 2321     ADENOVIRUS, PCR Not Detected     Coronavirus 229E Not Detected     Coronavirus HKU1 Not Detected     Coronavirus NL63 Not Detected     Coronavirus OC43 Not Detected     COVID19 Not Detected     Human Metapneumovirus Not Detected      Human Rhinovirus/Enterovirus Not Detected     Influenza A PCR Not Detected     Influenza B PCR Not Detected     Parainfluenza Virus 1 Not Detected     Parainfluenza Virus 2 Not Detected     Parainfluenza Virus 3 Not Detected     Parainfluenza Virus 4 Not Detected     RSV, PCR Not Detected     Bordetella pertussis pcr Not Detected     Bordetella parapertussis PCR Not Detected     Chlamydophila pneumoniae PCR Not Detected     Mycoplasma pneumo by PCR Not Detected    Narrative:      Fact sheet for providers: https://docs.[employer]/wp-content/uploads/IZT7507-3858-RK0.1-EUA-Provider-Fact-Sheet-3.pdf    Fact sheet for patients: https://docs.[employer]/wp-content/uploads/YKE9738-8708-BW1.1-EUA-Patient-Fact-Sheet-1.pdf    Test performed by PCR.    Blood Culture - Blood, Arm, Right [[id number]] Collected: 03/12/21 2213    Lab Status: Preliminary result Specimen: Blood from Arm, Right Updated: 03/16/21 2330     Blood Culture No growth at 4 days              apixaban, 5 mg, Oral, Q12H  docusate sodium, 100 mg, Oral, BID  escitalopram, 5 mg, Oral, Daily  insulin lispro, 0-24 Units, Subcutaneous, Q6H  ipratropium-albuterol, 3 mL, Nebulization, 4x Daily - RT  nicotine, 1 patch, Transdermal, Q24H  predniSONE, 20 mg, Oral, Daily With Breakfast  sodium chloride, 10 mL, Intravenous, Q12H      dexmedetomidine, 0.2-1.5 mcg/kg/hr, Last Rate: Stopped (03/16/21 1700)        Diagnostics:  XR Chest AP    Result Date: 3/12/2021  Patient: JEAN-PIERRE CHAVEZ  Time Out: 23:01 Exam(s): FILM CXR 1 VIEW EXAM:   XR Chest, 1 View CLINICAL HISTORY:    Reason for exam: COVID Evaluation, Cough, Fever. TECHNIQUE:   Frontal view of the chest. COMPARISON:   9 30 19 FINDINGS:   Negative for significant cardiac enlargement.  Hyperinflation of both lungs consistent with COPD.  Patchy densities in both lung bases atelectasis or pneumonia.  Negative for pneumothorax or significant pleural fluid collections.     Electronically signed by Jake Nair DO  on 03-12-21 at 2301    Results for orders placed during the hospital encounter of 10/24/19    Adult Transthoracic Echo Complete W/ Cont if Necessary Per Protocol    Interpretation Summary  · Left ventricular systolic function is normal. Calculated EF = 65%. Estimated EF was in agreement with the calculated EF. Estimated EF = 65%. Normal left ventricular cavity size noted. All left ventricular wall segments contract normally. Left ventricular wall thickness is consistent with moderate concentric hypertrophy. Left ventricular diastolic dysfunction is noted (grade I) consistent with impaired relaxation.  · Mild mitral valve regurgitation is present.  · Mild tricuspid valve regurgitation is present. Estimated right ventricular systolic pressure from tricuspid regurgitation is normal (<35 mmHg). Calculated right ventricular systolic pressure from tricuspid regurgitation is 26 mmHg.          Active Hospital Problems    Diagnosis  POA   • Acute respiratory failure (CMS/[facility]) [J96.00]  Yes      Resolved Hospital Problems   No resolved problems to display.         Assessment/Plan     1. Pneumonia community-acquired  completed azithromycin and Rocephin.  2. Sepsis with septic shock she is doing well off vasopressors   3. Acute exacerbation COPD continue steroids add bronchodilators.  I am not sure how much of a true exacerbation she had sounds like as much anxiety talking with nursing.  But she definitely has severe disease..  The steroids may be contributing to her anxiety along with the breathing treatments she really is quite addicted, to the breathing treatments which may only increase her anxiety.  This can be hard to get her down on the breathing treatments.  4. Acute hypoxic and hypercapnic respiratory failure she has had several of these episodes short of breath now she is complaining of some pleuritic type chest pain do not really think this is pulmonary emboli but probably need to rule out and she needs a CT scan  anyway with her weight loss to make sure there is not anything else going on in there that we do not see on chest x-ray  5. Chest pain she did have a mildly elevated troponin when she came in I am get a repeat troponin and EKG rule out pulmonary embolus  6. Elevated troponin normal renal functions likely represents a non-ST elevation MI type II from demand ischemia   7. Malnutrition  protein calorie we discussed dietary changes.  We will check a CT scan of her chest to rule out a malignancy causing her cachexia but this may all be pulmonary cachexia  8. Tobacco abuse needs to stop start NicoDerm discussed with her again today!  9. Hyperglycemia likely stress and steroid as etiology hemoglobin A1c was normal.  Sliding scale insulin  10. Anemia B12 and folate normal iron stores are okay TIBC and transferrin are low consistent with anemia of chronic disease  11. Fluids/electrolytes/nutrition taking p.o. encourage nutritional supplements  12. Anxiety she clearly has a lot of anxiety she does not really admit a lot of long-term anxiety I think a lot of today is situational and drugs.  Again she is sort of the classic pink puffer.  With her CO2 retention benzodiazepines are not great options.  I started some Lexapro yesterday and will take some time for this to work and we will have to uptitrate her dose probably    Plan for disposition:     Donald Ryder MD  03/17/21  08:14 EDT    Time: I spent over 41 minutes on patient's care today  critical care time      Electronically signed by Donald Ryder MD at 03/17/21 1203     Donald Ryder MD at 03/16/21 1343                   LOS: 4 days   Patient Care Team:  Margaret Ca PA as PCP - General (Family Medicine)  Beulah Joshi APRN as Nurse Practitioner (Neurology)    Subjective   Patient has had several panic attacks minutes whichnursing feels are most consistent with ,she got started on some Precedex last night that helped some putting  on the noninvasive ventilator does relax her some.  I had respiratory try and teach her some relaxed breathing techniques.  She will just get feeling short of breath her oxygen saturations will be great and she just gets herself worked up and breathing faster and faster.  We had to put her on noninvasive ventilator this morning for a little while she is better now we got her off and off got her up in a chair she is able to talk in full sentences.        Review of Systems:          Objective     Vital Signs  Vital Sign Min/Max for last 24 hours  Temp  Min: 97.2 °F (36.2 °C)  Max: 99.1 °F (37.3 °C)   BP  Min: 100/74  Max: 145/89   Pulse  Min: 66  Max: 130   Resp  Min: 18  Max: 22   SpO2  Min: 90 %  Max: 100 %   Flow (L/min)  Min: 1  Max: 2   Weight  Min: 43.3 kg (95 lb 7.4 oz)  Max: 43.3 kg (95 lb 7.4 oz)        Ventilator/Non-Invasive Ventilation Settings (From admission, onward) Comment     Start     Ordered    03/13/21 0045  Ventilator - AC/VC  Continuous     Question:  Vent Mode  Answer:  AC/VC    03/13/21 0044 03/12/21 2208  NIPPV (CPAP or BIPAP)  Until Discontinued,   Status:  Canceled     Question Answer Comment   Type: BIPAP    NIPPV Mask Interface: Per Patient Preference        03/12/21 2207                             Body mass index is 17.46 kg/m².  I/O last 3 completed shifts:  In: 3554 [P.O.:840; I.V.:2714]  Out: 3075 [Urine:3075]  I/O this shift:  In: 440.4 [P.O.:240; I.V.:50.4; IV Piggyback:150]  Out: -         Physical Exam:  General Appearance: Thin white female looks at least 2 or 3 decades older than her stated age she sitting in up in a chair she is on about a liter and a half of oxygen her oxygen saturations are 95% currently she does not look in distress  Eyes: Conjunctiva are clear and anicteric pupils are equal   ENT: Mucous membranes moist no erythema no exudates  Neck: No adenopathy no jugular venous distension, trachea midline  Lungs: Actually moving a little bit more air than yesterday  she still has prolonged expiratory phase no wheezes no rales no dullness on percussion symmetric chest expansion  Cardiac: Regular rate rhythm no murmur no gallop  Abdomen: Soft nontender no palpable hepatosplenomegaly or masses  : Not examined  Musculoskeletal: She has minimal muscle mass she has little bitemporal wasting thenar, hyperthenar and interosseous muscle wasting  Skin: No jaundice no petechiae skin is warm and dry  Neuro: Is awake and alert she is following commands again she sitting up in a chair grossly intact.  Extremities/P Vascular: She has clubbing of all digits, no cyanosis, palpable radial and dorsalis pedis pulses bilaterally  MSE: Still quite anxious.       Labs:  Results from last 7 days   Lab Units 03/16/21  0401 03/15/21  0447 03/14/21  0454 03/13/21  1202 03/13/21  0528 03/12/21  2213   GLUCOSE mg/dL 124* 119* 160* 150* 149* 201*   SODIUM mmol/L 141 145 138 142 142 140   POTASSIUM mmol/L 4.9 3.3* 4.0 5.3* 4.7 4.5   MAGNESIUM mg/dL 2.6* 1.7  --   --   --   --    CO2 mmol/L 27.2 24.1 21.7* 21.8* 20.7* 26.8   CHLORIDE mmol/L 108* 112* 109* 112* 114* 105   ANION GAP mmol/L 5.8 8.9 7.3 8.2 7.3 8.2   CREATININE mg/dL 0.46* 0.34* 0.46* 0.48* 0.51* 0.64   BUN mg/dL 16 11 19 17 15 14   BUN / CREAT RATIO  34.8* 32.4* 41.3* 35.4* 29.4* 21.9   CALCIUM mg/dL 8.4* 6.9* 8.3* 8.2* 7.8* 9.1   EGFR IF NONAFRICN AM mL/min/1.73 139 >150 139 132 123 95   ALK PHOS U/L  --   --   --   --   --  71   TOTAL PROTEIN g/dL  --   --   --   --   --  7.4   ALT (SGPT) U/L  --   --   --   --   --  30   AST (SGOT) U/L  --   --   --   --   --  30   BILIRUBIN mg/dL  --   --   --   --   --  0.2   ALBUMIN g/dL 3.50 2.80* 3.10*  --  3.50 4.40   GLOBULIN gm/dL  --   --   --   --   --  3.0     Estimated Creatinine Clearance: 88.9 mL/min (A) (by C-G formula based on SCr of 0.46 mg/dL (L)).      Results from last 7 days   Lab Units 03/14/21  0454 03/13/21  0409 03/12/21  2213   WBC 10*3/mm3 10.84* 11.09* 17.05*   RBC 10*6/mm3 3.78  3.91 4.83   HEMOGLOBIN g/dL 11.7* 11.5* 14.7   HEMATOCRIT % 34.8 36.1 44.9   MCV fL 92.1 92.3 93.0   MCH pg 31.0 29.4 30.4   MCHC g/dL 33.6 31.9 32.7   RDW % 12.2* 12.1* 12.1*   RDW-SD fl 40.6 40.3 40.8   MPV fL 11.1 10.9 11.3   PLATELETS 10*3/mm3 212 231 341   NEUTROPHIL % %  --  91.4* 58.8   LYMPHOCYTE % %  --  4.4* 27.2   MONOCYTES % %  --  3.5* 7.5   EOSINOPHIL % %  --  0.1* 5.7   BASOPHIL % %  --  0.1 0.6   IMM GRAN % %  --  0.5 0.2   NEUTROS ABS 10*3/mm3  --  10.14* 10.01*   LYMPHS ABS 10*3/mm3  --  0.49* 4.63*   MONOS ABS 10*3/mm3  --  0.39 1.28*   EOS ABS 10*3/mm3  --  0.01 0.98*   BASOS ABS 10*3/mm3  --  0.01 0.11   IMMATURE GRANS (ABS) 10*3/mm3  --  0.05 0.04   NRBC /100 WBC  --  0.0 0.0     Results from last 7 days   Lab Units 03/15/21  0752   PH, ARTERIAL pH units 7.412   PO2 ART mm Hg 115.7*   PCO2, ARTERIAL mm Hg 46.7*   HCO3 ART mmol/L 29.8*     Results from last 7 days   Lab Units 03/13/21  1512 03/13/21  1202 03/12/21  2213   TROPONIN T ng/mL 0.258* 0.302* 0.057*     Results from last 7 days   Lab Units 03/12/21  2213   PROBNP pg/mL 60.2         Results from last 7 days   Lab Units 03/13/21  0528 03/12/21  2213   LACTATE mmol/L  --  1.5   PROCALCITONIN ng/mL 0.12 0.07         Microbiology Results (last 10 days)     Procedure Component Value - Date/Time    Respiratory Culture - Sputum, Cough [[id number]] Collected: 03/13/21 0850    Lab Status: Final result Specimen: Sputum from Cough Updated: 03/15/21 0811     Respiratory Culture Scant growth (1+) Normal Respiratory Jess: NO S.aureus/MRSA or Pseudomonas aeruginosa     Gram Stain Rare (1+) WBCs seen      No organisms seen    Blood Culture - Blood, Arm, Right [[id number]] Collected: 03/12/21 0558    Lab Status: Preliminary result Specimen: Blood from Arm, Right Updated: 03/16/21 0015     Blood Culture No growth at 3 days    Respiratory Panel PCR w/COVID-19(SARS-CoV-2) FRANCISCO JAVIER/JASPAL/ADELINE/PAD/COR/MAD/RAUL In-House, NP Swab in UTM/VTM, 3-4 HR TAT - Swab,  Nasopharynx [[id number]]  (Normal) Collected: 03/12/21 2213    Lab Status: Final result Specimen: Swab from Nasopharynx Updated: 03/12/21 2321     ADENOVIRUS, PCR Not Detected     Coronavirus 229E Not Detected     Coronavirus HKU1 Not Detected     Coronavirus NL63 Not Detected     Coronavirus OC43 Not Detected     COVID19 Not Detected     Human Metapneumovirus Not Detected     Human Rhinovirus/Enterovirus Not Detected     Influenza A PCR Not Detected     Influenza B PCR Not Detected     Parainfluenza Virus 1 Not Detected     Parainfluenza Virus 2 Not Detected     Parainfluenza Virus 3 Not Detected     Parainfluenza Virus 4 Not Detected     RSV, PCR Not Detected     Bordetella pertussis pcr Not Detected     Bordetella parapertussis PCR Not Detected     Chlamydophila pneumoniae PCR Not Detected     Mycoplasma pneumo by PCR Not Detected    Narrative:      Fact sheet for providers: https://[employer]wp-content/uploads/PNS4279-8657-NA9.1-EUA-Provider-Fact-Sheet-3.pdf    Fact sheet for patients: https://[employer]wp-content/uploads/SCA3926-5201-EX5.1-EUA-Patient-Fact-Sheet-1.pdf    Test performed by PCR.    Blood Culture - Blood, Arm, Right [[id number]] Collected: 03/12/21 2213    Lab Status: Preliminary result Specimen: Blood from Arm, Right Updated: 03/15/21 2330     Blood Culture No growth at 3 days              apixaban, 5 mg, Oral, Q12H  insulin lispro, 0-24 Units, Subcutaneous, Q6H  ipratropium-albuterol, 3 mL, Nebulization, 4x Daily - RT  nicotine, 1 patch, Transdermal, Q24H  predniSONE, 40 mg, Oral, Daily With Breakfast  sodium chloride, 10 mL, Intravenous, Q12H      dexmedetomidine, 0.2-1.5 mcg/kg/hr, Last Rate: 0.8 mcg/kg/hr (03/16/21 1200)        Diagnostics:  XR Chest AP    Result Date: 3/12/2021  Patient: JEAN-PIERRE CHAVEZ  Time Out: 23:01 Exam(s): FILM CXR 1 VIEW EXAM:   XR Chest, 1 View CLINICAL HISTORY:    Reason for exam: COVID Evaluation, Cough, Fever. TECHNIQUE:   Frontal view of the  chest. COMPARISON:   9 30 19 FINDINGS:   Negative for significant cardiac enlargement.  Hyperinflation of both lungs consistent with COPD.  Patchy densities in both lung bases atelectasis or pneumonia.  Negative for pneumothorax or significant pleural fluid collections.     Electronically signed by Jake Nair DO on 03-12-21 at 2301    Results for orders placed during the hospital encounter of 10/24/19    Adult Transthoracic Echo Complete W/ Cont if Necessary Per Protocol    Interpretation Summary  · Left ventricular systolic function is normal. Calculated EF = 65%. Estimated EF was in agreement with the calculated EF. Estimated EF = 65%. Normal left ventricular cavity size noted. All left ventricular wall segments contract normally. Left ventricular wall thickness is consistent with moderate concentric hypertrophy. Left ventricular diastolic dysfunction is noted (grade I) consistent with impaired relaxation.  · Mild mitral valve regurgitation is present.  · Mild tricuspid valve regurgitation is present. Estimated right ventricular systolic pressure from tricuspid regurgitation is normal (<35 mmHg). Calculated right ventricular systolic pressure from tricuspid regurgitation is 26 mmHg.          Active Hospital Problems    Diagnosis  POA   • Acute respiratory failure (CMS/HCC) [J96.00]  Yes      Resolved Hospital Problems   No resolved problems to display.         Assessment/Plan     13. Pneumonia community-acquired I completed azithromycin and Rocephin.  14. Sepsis with septic shock she is doing well off vasopressors now  15. Acute exacerbation COPD continue steroids add bronchodilators.  I am not sure how much of a true exacerbation she had sounds like as much anxiety talking with nursing.  But she definitely has severe disease..  The steroids may be contributing to her anxiety along with the breathing treatments she really is quite addicted think probably the word to her breathing treatments I am going to try  and start by decreasing her steroids further today  16. Acute hypoxic and hypercapnic respiratory failure see discussion above I will have noninvasive ventilator available should she need it.  We will have her sleep with it.  Before she is ready to go home we will check a blood gas and see if she would benefit from nocturnal ventilation at home  17. Elevated troponin normal renal functions likely represents a non-ST elevation MI type II from demand ischemia   18. Malnutrition  protein calorie we discussed dietary changes  19. Tobacco abuse needs to stop start NicoDerm discussed with her again today  20. Hyperglycemia likely stress and steroid as etiology hemoglobin A1c was normal.  Sliding scale insulin  21. Anemia will evaluate  22. Fluids/electrolytes/nutrition taking p.o. encourage nutritional supplements  23. Anxiety she clearly has a lot of anxiety she does not really admit a lot of long-term anxiety over this is all just situational and drug related again I will try and drop the steroids first and see how she does    Plan for disposition: I do not think they can handle the patient on the floor and we still do not have her off dexmedetomidine she looks terrible when she has these been panic attacks painfully just a short time on the noninvasive ventilator has proven to help alleviate.  I think that in lots of people around her talking to her help    Donald Ryder MD  03/16/21  13:43 EDT    Time: I spent over 40 minutes on patient's care today and critical care time      Electronically signed by Donald Ryder MD at 03/16/21 2683       Shyanne Lee, RN   Registered Nurse      Plan of Care   Signed   Date of Service:  03/16/21 0428   Creation Time:  03/16/21 0428            Signed             Show:Clear all  []Manual[x]Template[]Copied    Added by:  [x]Shyanne Lee, RN    []Gordy for details  Goal Outcome Evaluation:  Plan of Care Reviewed With: patient  Progress: improving  Outcome Summary: pt  wore BIPAP from [id number], c/o not being able to sleep all night, sleep promoted. VSS, NSR-ST on tele. pt still anxious about BIPAP and current progression.

## 2021-03-17 NOTE — PROGRESS NOTES
LOS: 5 days   Patient Care Team:  Margaret Ca PA as PCP - General (Family Medicine)  Beulah Joshi APRN as Nurse Practitioner (Neurology)    Subjective     Nursing reports that patient used her NIPPV most of the night and did very well she came off was on cannula and got real anxious they put her on the BiPAP for just a short while and she calmed down they have had her back off again.  They say when she gets anxious she just starts breathing faster and faster and gets into more distress.  Patient reports that she did not have a good night did not sleep well at all she has a headache and she is having some chest pain in the middle of her chest, it hurts worse to breathe is sort of a sharp pain sometimes.    Review of Systems:          Objective     Vital Signs  Vital Sign Min/Max for last 24 hours  Temp  Min: 97.8 °F (36.6 °C)  Max: 98.4 °F (36.9 °C)   BP  Min: 101/61  Max: 145/89   Pulse  Min: 71  Max: 112   Resp  Min: 13  Max: 22   SpO2  Min: 89 %  Max: 100 %   Flow (L/min)  Min: 0.5  Max: 2   Weight  Min: 42.4 kg (93 lb 7.6 oz)  Max: 42.4 kg (93 lb 7.6 oz)        Ventilator/Non-Invasive Ventilation Settings (From admission, onward) Comment     Start     Ordered    03/13/21 0045  Ventilator - AC/VC  Continuous     Question:  Vent Mode  Answer:  AC/VC    03/13/21 0044 03/12/21 2208  NIPPV (CPAP or BIPAP)  Until Discontinued,   Status:  Canceled     Question Answer Comment   Type: BIPAP    NIPPV Mask Interface: Per Patient Preference        03/12/21 2207                             Body mass index is 17.09 kg/m².  I/O last 3 completed shifts:  In: 2138 [P.O.:800; I.V.:1188; IV Piggyback:150]  Out: 1925 [Urine:1925]  No intake/output data recorded.        Physical Exam:  General Appearance: Thin white female looks at least 2 or 3 decades older than her stated age she sitting in up in a chair she is on about 2 liter of oxygen her oxygen saturations are 93% currently she does not look  in distress  Eyes: Conjunctiva are clear and anicteric pupils are equal   ENT: Mucous membranes moist no erythema no exudates  Neck: No adenopathy no jugular venous distension, trachea midline  Lungs: She does not move a whole lot of air.  It is very interesting to watch her if you talk to her she starts getting anxious she starts breathing really fast and getting more labored classic pink puffer type of situation.  I do not hear wheezes or rales.  Cardiac: Regular rate rhythm no murmur no gallop  Abdomen: Soft nontender no palpable hepatosplenomegaly or masses  : Not examined  Musculoskeletal: She has minimal muscle mass she has little bitemporal wasting thenar, hyperthenar and interosseous muscle wasting and she has no real chest wall tenderness.  Skin: No jaundice no petechiae skin is warm and dry  Neuro: I awake and alert she is following commands again she sitting up in a chair grossly intact.  Extremities/P Vascular: She has clubbing of all digits, no cyanosis, palpable radial and dorsalis pedis pulses bilaterally  MSE: Still quite anxious.       Labs:  Results from last 7 days   Lab Units 03/16/21  0401 03/15/21  0447 03/14/21  0454 03/13/21  1202 03/13/21  0528 03/12/21  2213   GLUCOSE mg/dL 124* 119* 160* 150* 149* 201*   SODIUM mmol/L 141 145 138 142 142 140   POTASSIUM mmol/L 4.9 3.3* 4.0 5.3* 4.7 4.5   MAGNESIUM mg/dL 2.6* 1.7  --   --   --   --    CO2 mmol/L 27.2 24.1 21.7* 21.8* 20.7* 26.8   CHLORIDE mmol/L 108* 112* 109* 112* 114* 105   ANION GAP mmol/L 5.8 8.9 7.3 8.2 7.3 8.2   CREATININE mg/dL 0.46* 0.34* 0.46* 0.48* 0.51* 0.64   BUN mg/dL 16 11 19 17 15 14   BUN / CREAT RATIO  34.8* 32.4* 41.3* 35.4* 29.4* 21.9   CALCIUM mg/dL 8.4* 6.9* 8.3* 8.2* 7.8* 9.1   EGFR IF NONAFRICN AM mL/min/1.73 139 >150 139 132 123 95   ALK PHOS U/L  --   --   --   --   --  71   TOTAL PROTEIN g/dL  --   --   --   --   --  7.4   ALT (SGPT) U/L  --   --   --   --   --  30   AST (SGOT) U/L  --   --   --   --   --  30    BILIRUBIN mg/dL  --   --   --   --   --  0.2   ALBUMIN g/dL 3.50 2.80* 3.10*  --  3.50 4.40   GLOBULIN gm/dL  --   --   --   --   --  3.0     Estimated Creatinine Clearance: 87.1 mL/min (A) (by C-G formula based on SCr of 0.46 mg/dL (L)).      Results from last 7 days   Lab Units 03/14/21  0454 03/13/21  0409 03/12/21  2213   WBC 10*3/mm3 10.84* 11.09* 17.05*   RBC 10*6/mm3 3.78 3.91 4.83   HEMOGLOBIN g/dL 11.7* 11.5* 14.7   HEMATOCRIT % 34.8 36.1 44.9   MCV fL 92.1 92.3 93.0   MCH pg 31.0 29.4 30.4   MCHC g/dL 33.6 31.9 32.7   RDW % 12.2* 12.1* 12.1*   RDW-SD fl 40.6 40.3 40.8   MPV fL 11.1 10.9 11.3   PLATELETS 10*3/mm3 212 231 341   NEUTROPHIL % %  --  91.4* 58.8   LYMPHOCYTE % %  --  4.4* 27.2   MONOCYTES % %  --  3.5* 7.5   EOSINOPHIL % %  --  0.1* 5.7   BASOPHIL % %  --  0.1 0.6   IMM GRAN % %  --  0.5 0.2   NEUTROS ABS 10*3/mm3  --  10.14* 10.01*   LYMPHS ABS 10*3/mm3  --  0.49* 4.63*   MONOS ABS 10*3/mm3  --  0.39 1.28*   EOS ABS 10*3/mm3  --  0.01 0.98*   BASOS ABS 10*3/mm3  --  0.01 0.11   IMMATURE GRANS (ABS) 10*3/mm3  --  0.05 0.04   NRBC /100 WBC  --  0.0 0.0     Results from last 7 days   Lab Units 03/15/21  0752   PH, ARTERIAL pH units 7.412   PO2 ART mm Hg 115.7*   PCO2, ARTERIAL mm Hg 46.7*   HCO3 ART mmol/L 29.8*     Results from last 7 days   Lab Units 03/13/21  1512 03/13/21  1202 03/12/21  2213   TROPONIN T ng/mL 0.258* 0.302* 0.057*     Results from last 7 days   Lab Units 03/12/21  2213   PROBNP pg/mL 60.2         Results from last 7 days   Lab Units 03/13/21  0528 03/12/21  2213   LACTATE mmol/L  --  1.5   PROCALCITONIN ng/mL 0.12 0.07         Microbiology Results (last 10 days)     Procedure Component Value - Date/Time    Respiratory Culture - Sputum, Cough [[id number]] Collected: 03/13/21 0850    Lab Status: Final result Specimen: Sputum from Cough Updated: 03/15/21 0811     Respiratory Culture Scant growth (1+) Normal Respiratory Jess: NO S.aureus/MRSA or Pseudomonas aeruginosa     Gram  Stain Rare (1+) WBCs seen      No organisms seen    Blood Culture - Blood, Arm, Right [[id number]] Collected: 03/12/21 2255    Lab Status: Preliminary result Specimen: Blood from Arm, Right Updated: 03/17/21 0015     Blood Culture No growth at 4 days    Respiratory Panel PCR w/COVID-19(SARS-CoV-2) FRANCISCO JAVIER/JASPAL/ADELINE/PAD/COR/MAD/RAUL In-House, NP Swab in UTM/VTM, 3-4 HR TAT - Swab, Nasopharynx [[id number]]  (Normal) Collected: 03/12/21 2213    Lab Status: Final result Specimen: Swab from Nasopharynx Updated: 03/12/21 2321     ADENOVIRUS, PCR Not Detected     Coronavirus 229E Not Detected     Coronavirus HKU1 Not Detected     Coronavirus NL63 Not Detected     Coronavirus OC43 Not Detected     COVID19 Not Detected     Human Metapneumovirus Not Detected     Human Rhinovirus/Enterovirus Not Detected     Influenza A PCR Not Detected     Influenza B PCR Not Detected     Parainfluenza Virus 1 Not Detected     Parainfluenza Virus 2 Not Detected     Parainfluenza Virus 3 Not Detected     Parainfluenza Virus 4 Not Detected     RSV, PCR Not Detected     Bordetella pertussis pcr Not Detected     Bordetella parapertussis PCR Not Detected     Chlamydophila pneumoniae PCR Not Detected     Mycoplasma pneumo by PCR Not Detected    Narrative:      Fact sheet for providers: https://docs.Help[employer]/wp-content/uploads/CTD4713-6367-VN8.1-EUA-Provider-Fact-Sheet-3.pdf    Fact sheet for patients: https://docs.HelpAround/wp-content/uploads/SHZ4038-1267-FI5.1-EUA-Patient-Fact-Sheet-1.pdf    Test performed by PCR.    Blood Culture - Blood, Arm, Right [[id number]] Collected: 03/12/21 2213    Lab Status: Preliminary result Specimen: Blood from Arm, Right Updated: 03/16/21 2330     Blood Culture No growth at 4 days              apixaban, 5 mg, Oral, Q12H  docusate sodium, 100 mg, Oral, BID  escitalopram, 5 mg, Oral, Daily  insulin lispro, 0-24 Units, Subcutaneous, Q6H  ipratropium-albuterol, 3 mL, Nebulization, 4x Daily - RT  nicotine, 1 patch,  Transdermal, Q24H  predniSONE, 20 mg, Oral, Daily With Breakfast  sodium chloride, 10 mL, Intravenous, Q12H      dexmedetomidine, 0.2-1.5 mcg/kg/hr, Last Rate: Stopped (03/16/21 1700)        Diagnostics:  XR Chest AP    Result Date: 3/12/2021  Patient: JEAN-PIERRE CHAVEZ  Time Out: 23:01 Exam(s): FILM CXR 1 VIEW EXAM:   XR Chest, 1 View CLINICAL HISTORY:    Reason for exam: COVID Evaluation, Cough, Fever. TECHNIQUE:   Frontal view of the chest. COMPARISON:   9 30 19 FINDINGS:   Negative for significant cardiac enlargement.  Hyperinflation of both lungs consistent with COPD.  Patchy densities in both lung bases atelectasis or pneumonia.  Negative for pneumothorax or significant pleural fluid collections.     Electronically signed by Jake Nari DO on 03-12-21 at 2301    Results for orders placed during the hospital encounter of 10/24/19    Adult Transthoracic Echo Complete W/ Cont if Necessary Per Protocol    Interpretation Summary  · Left ventricular systolic function is normal. Calculated EF = 65%. Estimated EF was in agreement with the calculated EF. Estimated EF = 65%. Normal left ventricular cavity size noted. All left ventricular wall segments contract normally. Left ventricular wall thickness is consistent with moderate concentric hypertrophy. Left ventricular diastolic dysfunction is noted (grade I) consistent with impaired relaxation.  · Mild mitral valve regurgitation is present.  · Mild tricuspid valve regurgitation is present. Estimated right ventricular systolic pressure from tricuspid regurgitation is normal (<35 mmHg). Calculated right ventricular systolic pressure from tricuspid regurgitation is 26 mmHg.          Active Hospital Problems    Diagnosis  POA   • Acute respiratory failure (CMS/HCC) [J96.00]  Yes      Resolved Hospital Problems   No resolved problems to display.         Assessment/Plan     1. Pneumonia community-acquired  completed azithromycin and Rocephin.  2. Sepsis with septic shock  she is doing well off vasopressors   3. Acute exacerbation COPD continue steroids add bronchodilators.  I am not sure how much of a true exacerbation she had sounds like as much anxiety talking with nursing.  But she definitely has severe disease..  The steroids may be contributing to her anxiety along with the breathing treatments she really is quite addicted, to the breathing treatments which may only increase her anxiety.  This can be hard to get her down on the breathing treatments.  4. Acute hypoxic and hypercapnic respiratory failure she has had several of these episodes short of breath now she is complaining of some pleuritic type chest pain do not really think this is pulmonary emboli but probably need to rule out and she needs a CT scan anyway with her weight loss to make sure there is not anything else going on in there that we do not see on chest x-ray  5. Chest pain she did have a mildly elevated troponin when she came in I am get a repeat troponin and EKG rule out pulmonary embolus  6. Elevated troponin normal renal functions likely represents a non-ST elevation MI type II from demand ischemia   7. Malnutrition  protein calorie we discussed dietary changes.  We will check a CT scan of her chest to rule out a malignancy causing her cachexia but this may all be pulmonary cachexia  8. Tobacco abuse needs to stop start NicoDerm discussed with her again today!  9. Hyperglycemia likely stress and steroid as etiology hemoglobin A1c was normal.  Sliding scale insulin  10. Anemia B12 and folate normal iron stores are okay TIBC and transferrin are low consistent with anemia of chronic disease  11. Fluids/electrolytes/nutrition taking p.o. encourage nutritional supplements  12. Anxiety she clearly has a lot of anxiety she does not really admit a lot of long-term anxiety I think a lot of today is situational and drugs.  Again she is sort of the classic pink puffer.  With her CO2 retention benzodiazepines are not  great options.  I started some Lexapro yesterday and will take some time for this to work and we will have to uptitrate her dose probably    Plan for disposition:     Donald Ryder MD  03/17/21  08:14 EDT    Time: I spent over 41 minutes on patient's care today  critical care time

## 2021-03-18 LAB
BACTERIA SPEC AEROBE CULT: NORMAL
GLUCOSE BLDC GLUCOMTR-MCNC: 152 MG/DL (ref 70–130)
GLUCOSE BLDC GLUCOMTR-MCNC: 161 MG/DL (ref 70–130)
GLUCOSE BLDC GLUCOMTR-MCNC: 76 MG/DL (ref 70–130)

## 2021-03-18 PROCEDURE — 94799 UNLISTED PULMONARY SVC/PX: CPT

## 2021-03-18 PROCEDURE — 82962 GLUCOSE BLOOD TEST: CPT

## 2021-03-18 PROCEDURE — 63710000001 PREDNISONE PER 1 MG: Performed by: INTERNAL MEDICINE

## 2021-03-18 PROCEDURE — 94669 MECHANICAL CHEST WALL OSCILL: CPT

## 2021-03-18 PROCEDURE — 99222 1ST HOSP IP/OBS MODERATE 55: CPT | Performed by: INTERNAL MEDICINE

## 2021-03-18 PROCEDURE — 94660 CPAP INITIATION&MGMT: CPT

## 2021-03-18 PROCEDURE — 94667 MNPJ CHEST WALL 1ST: CPT

## 2021-03-18 RX ORDER — SODIUM CHLORIDE FOR INHALATION 3 %
4 VIAL, NEBULIZER (ML) INHALATION
Status: DISCONTINUED | OUTPATIENT
Start: 2021-03-18 | End: 2021-03-22 | Stop reason: HOSPADM

## 2021-03-18 RX ORDER — GUAIFENESIN 600 MG/1
1200 TABLET, EXTENDED RELEASE ORAL EVERY 12 HOURS SCHEDULED
Status: DISCONTINUED | OUTPATIENT
Start: 2021-03-18 | End: 2021-03-22 | Stop reason: HOSPADM

## 2021-03-18 RX ADMIN — ESCITALOPRAM 5 MG: 5 TABLET, FILM COATED ORAL at 08:09

## 2021-03-18 RX ADMIN — IPRATROPIUM BROMIDE AND ALBUTEROL SULFATE 3 ML: 2.5; .5 SOLUTION RESPIRATORY (INHALATION) at 15:21

## 2021-03-18 RX ADMIN — SODIUM CHLORIDE 30 MG/ML INHALATION SOLUTION 4 ML: 30 SOLUTION INHALANT at 20:32

## 2021-03-18 RX ADMIN — GUAIFENESIN 1200 MG: 600 TABLET, EXTENDED RELEASE ORAL at 21:19

## 2021-03-18 RX ADMIN — SODIUM CHLORIDE, PRESERVATIVE FREE 10 ML: 5 INJECTION INTRAVENOUS at 08:13

## 2021-03-18 RX ADMIN — IPRATROPIUM BROMIDE AND ALBUTEROL SULFATE 3 ML: 2.5; .5 SOLUTION RESPIRATORY (INHALATION) at 20:28

## 2021-03-18 RX ADMIN — SODIUM CHLORIDE, PRESERVATIVE FREE 10 ML: 5 INJECTION INTRAVENOUS at 21:19

## 2021-03-18 RX ADMIN — DOCUSATE SODIUM 100 MG: 100 CAPSULE, LIQUID FILLED ORAL at 08:11

## 2021-03-18 RX ADMIN — GUAIFENESIN 1200 MG: 600 TABLET, EXTENDED RELEASE ORAL at 08:09

## 2021-03-18 RX ADMIN — PREDNISONE 20 MG: 20 TABLET ORAL at 08:09

## 2021-03-18 RX ADMIN — APIXABAN 5 MG: 5 TABLET, FILM COATED ORAL at 21:19

## 2021-03-18 RX ADMIN — APIXABAN 5 MG: 5 TABLET, FILM COATED ORAL at 08:09

## 2021-03-18 RX ADMIN — ACETAMINOPHEN 650 MG: 325 TABLET ORAL at 08:22

## 2021-03-18 RX ADMIN — IPRATROPIUM BROMIDE AND ALBUTEROL SULFATE 3 ML: 2.5; .5 SOLUTION RESPIRATORY (INHALATION) at 10:50

## 2021-03-18 NOTE — CONSULTS
Date of Hospital Visit: 3/18/2021  Encounter Provider: Shyanne Lynn RN  Place of Service: [facility] CARDIOLOGY  Patient Name: Scarlet Chakraborty  :1960  Referral Provider: Sourav Bales MD    Chief complaint: shortness of breath    Reason for Consult: patient had some atypical chest pain has severe COPD has had some dyspnea elevated troponin and some regional wall motion abnormalities on echocardiogram please evaluate    History of Present Illness: Ms Chakraborty is a 61 year old female patient of Dr. Mcnamara with history of COPD and tobacco/ETOH abuse.    She suffered acute CVA in 2018 after represnting with left-sided weakness, slurred speech and mechanical fall. She received TPA with good response, and a ZARINA was completed and unremarkable. A LINQ was subsequently placed by Dr. Barker.     She was later diagnosed with NICM (likely Takotsubo) during an admission in 2018, after presenting with acute respiratory failure. She as noted to have normal coronaries at that time with reduced LV function and EF of 20%. A repeated echocardiogram performed in 2019 demonstrated recovered LV function with EF 65%, as well as mild MR/TR and a grade I diastolic dysfunction.     She presented back to [facility] ED 3/12/21 with recurrent progressive shortness of breath requiring mechanical intubation/ventilation. She was started on IV antibiotics for likely pneumonia and successfully extubated 3/14/21 and transitioned to BiPAP.  She is currently on low-dose supplemental oxygen.    Troponins noted to be elevated with subsequent rise and fall (0.057/0.302/0.258/0.031) with creatinine 0.46 yesterday. An echocardiogram was obtained yesterday noting normal LVSF with EF 46-50%, as well as akinesis of the basal inferoseptum, basal inferior wall, and the basal inferolateral wall.  She had a few episodes of pleuritic chest discomfort.  She denies angina.  She notes  increased dyspnea on exertion at home prior to hospitalization.  No orthopnea, PND or edema.  No palpitations, dizziness or syncope.    Echocardiogram 3/17/21  · Left ventricular diastolic function was normal.  · The study is technically difficult for diagnosis.  · There is akinesis of the basal inferoseptum, basal inferior wall, and the basal inferolateral wall. The remainder of the segments appear to be hyperdynamic  · Left ventricular ejection fraction appears to be 46 - 50%.  · Left ventricular diastolic function was normal.  · Normal right ventricular cavity size and systolic function noted.  · There is no evidence of pericardial effusion    Previous Cardiac Testing:    Echocardiogram 10/24/19  · Left ventricular systolic function is normal. Calculated EF = 65%. Estimated EF was in agreement with the calculated EF. Estimated EF = 65%. Normal left ventricular cavity size noted. All left ventricular wall segments contract normally. Left ventricular wall thickness is consistent with moderate concentric hypertrophy. Left ventricular diastolic dysfunction is noted (grade I) consistent with impaired relaxation.  · Mild mitral valve regurgitation is present.  · Mild tricuspid valve regurgitation is present. Estimated right ventricular systolic pressure from tricuspid regurgitation is normal (<35 mmHg). Calculated right ventricular systolic pressure from tricuspid regurgitation is 26 mmHg.    Cardiac Catheterization 9/13/18  FINDINGS:  LEFT VENTRICULOGRAPHY: The LV pressure was 92/38 .  There was severely reduced global LV systolic function basically the base of the heart is hyperdynamic the mid ventricle to apex are severely akinetic and the ejection fraction is about 20% .  There was no mitral insufficiency or gradient across the aortic valve on pullback.  CORONARY ANGIOGRAPHY:  Left main: Normal  Left anterior descending: Normal in the proximal portion long myocardial bridge in the midportion and normal distally,  diagonals are normal  Ramus intermedius:Not present  Circumflex: Normal  RCA: Is a dominant vessel.  Normal throughout the vessel  SUMMARY: Essentially normal coronary arteries severely reduced LV function severely elevated left ventricular end-diastolic pressure  RECOMMENDATIONS: Likely this is a variant of a Takasabo cardiomyopathy      Past Medical History:   Diagnosis Date   • Asthma    • Cerebrovascular accident (CVA) due to thrombosis of right middle cerebral artery (CMS/HCC)    • COPD (chronic obstructive pulmonary disease) (CMS/[facility])    • Nonischemic cardiomyopathy (CMS/[facility])    • NSTEMI (non-ST elevated myocardial infarction) (CMS/[facility])    • Stress-induced cardiomyopathy    • Stroke (CMS/[facility])    • Takotsubo cardiomyopathy    • Tobacco abuse        Past Surgical History:   Procedure Laterality Date   • CARDIAC CATHETERIZATION N/A 9/13/2018    Procedure: Left Heart Cath and cors;  Surgeon: Gabino Dozier MD;  Location: [facility] CATH INVASIVE LOCATION;  Service: Cardiology   • CARDIAC CATHETERIZATION N/A 9/13/2018    Procedure: Left ventriculography;  Surgeon: Gabino Dozier MD;  Location: [facility] CATH INVASIVE LOCATION;  Service: Cardiology   • CARDIAC ELECTROPHYSIOLOGY PROCEDURE N/A 9/27/2018    Procedure: Loop insertion  LINQ;  Surgeon: Jose R Barker MD;  Location: [facility] CATH INVASIVE LOCATION;  Service: Cardiovascular       Medications Prior to Admission   Medication Sig Dispense Refill Last Dose   • albuterol sulfate  (90 Base) MCG/ACT inhaler Inhale 2 puffs Every 4 (Four) Hours As Needed for Wheezing.      • apixaban (ELIQUIS) 5 MG tablet tablet Take 1 tablet by mouth Every 12 (Twelve) Hours. 180 tablet 2    • atorvastatin (Lipitor) 40 MG tablet Take 1 tablet by mouth Daily. 30 tablet 11    • budesonide-formoterol (SYMBICORT) 160-4.5 MCG/ACT inhaler Inhale 2 puffs 2 (Two) Times a Day. 10.2 g 12    • lisinopril (PRINIVIL,ZESTRIL) 2.5 MG tablet Take 1 tablet by mouth Daily. 90 tablet 1    •  Tiotropium Bromide Monohydrate (SPIRIVA RESPIMAT) 1.25 MCG/ACT aerosol solution inhaler Inhale 2 puffs Daily. 4 g 2        Current Meds  Scheduled Meds:apixaban, 5 mg, Oral, Q12H  docusate sodium, 100 mg, Oral, BID  escitalopram, 5 mg, Oral, Daily  guaiFENesin, 1,200 mg, Oral, Q12H  insulin lispro, 0-24 Units, Subcutaneous, Q6H  ipratropium-albuterol, 3 mL, Nebulization, 4x Daily - RT  nicotine, 1 patch, Transdermal, Q24H  predniSONE, 20 mg, Oral, Daily With Breakfast  sodium chloride, 10 mL, Intravenous, Q12H  sodium chloride, 4 mL, Nebulization, BID - RT      Continuous Infusions:dexmedetomidine, 0.2-1.5 mcg/kg/hr, Last Rate: Stopped (03/16/21 1700)      PRN Meds:.•  acetaminophen  •  dextrose  •  dextrose  •  glucagon (human recombinant)  •  ipratropium-albuterol  •  magnesium sulfate **OR** magnesium sulfate **OR** magnesium sulfate  •  melatonin  •  ondansetron **OR** ondansetron  •  potassium chloride **OR** potassium chloride **OR** potassium chloride  •  sodium chloride    Allergies as of 03/12/2021   • (No Known Allergies)       Social History     Socioeconomic History   • Marital status: Single     Spouse name: Not on file   • Number of children: Not on file   • Years of education: Not on file   • Highest education level: Not on file   Tobacco Use   • Smoking status: Light Tobacco Smoker     Packs/day: 0.50     Years: 15.00     Pack years: 7.50     Types: Cigarettes   • Smokeless tobacco: Never Used   • Tobacco comment: currently 2 cigs daily   Substance and Sexual Activity   • Alcohol use: Yes     Comment: occassional/ Daily caffeine use   • Drug use: No   • Sexual activity: Defer       Family History   Problem Relation Age of Onset   • Cancer Sister        Review of Systems   Constitutional: Negative for chills and fever.   HENT: Negative for hoarse voice and sore throat.    Eyes: Negative for double vision and photophobia.   Cardiovascular: Positive for chest pain and dyspnea on exertion. Negative for  "leg swelling, near-syncope, orthopnea, palpitations, paroxysmal nocturnal dyspnea and syncope.   Respiratory: Positive for cough, shortness of breath and wheezing.    Skin: Negative for poor wound healing and rash.   Musculoskeletal: Negative for arthritis and joint swelling.   Gastrointestinal: Negative for bloating, abdominal pain, hematemesis and hematochezia.   Neurological: Negative for dizziness and focal weakness.   Psychiatric/Behavioral: Negative for depression and suicidal ideas.            Objective:   Temp:  [98 °F (36.7 °C)-98.1 °F (36.7 °C)] 98.1 °F (36.7 °C)  Heart Rate:  [] 89  Resp:  [16] 16  BP: (106-143)/(47-95) 110/95  Body mass index is 16.49 kg/m².  Flowsheet Rows      First Filed Value   Admission Height  157.5 cm (62\") Documented at 03/12/2021 2210   Admission Weight  45.4 kg (100 lb) Documented at 03/12/2021 2210        Vitals:    03/18/21 0800   BP: 110/95   Pulse: 89   Resp:    Temp:    SpO2: 95%       Vitals reviewed.   Constitutional:       Appearance: Not in distress. Frail.   Neck:      Vascular: No JVR. JVD normal.   Pulmonary:      Breath sounds: Wheezing present. No rhonchi. No rales.   Chest:      Chest wall: Not tender to palpatation.   Cardiovascular:      PMI at left midclavicular line. Normal rate. Regular rhythm. Normal S1. Normal S2.      Murmurs: There is no murmur.      No gallop. No click. No rub.   Pulses:     Intact distal pulses.   Edema:     Peripheral edema absent.   Abdominal:      General: Bowel sounds are normal.      Palpations: Abdomen is soft.      Tenderness: There is no abdominal tenderness.   Musculoskeletal: Normal range of motion.         General: No tenderness. Skin:     General: Skin is warm and dry.   Neurological:      General: No focal deficit present.      Mental Status: Alert and oriented to person, place and time.                 Lab Review:      Results from last 7 days   Lab Units 03/16/21  0401 03/12/21  2213   SODIUM mmol/L 141 140 "   POTASSIUM mmol/L 4.9 4.5   CHLORIDE mmol/L 108* 105   CO2 mmol/L 27.2 26.8   BUN mg/dL 16 14   CREATININE mg/dL 0.46* 0.64   CALCIUM mg/dL 8.4* 9.1   BILIRUBIN mg/dL  --  0.2   ALK PHOS U/L  --  71   ALT (SGPT) U/L  --  30   AST (SGOT) U/L  --  30   GLUCOSE mg/dL 124* 201*     Results from last 7 days   Lab Units 03/17/21  1358 03/13/21  1512 03/13/21  1202   TROPONIN T ng/mL 0.031* 0.258* 0.302*     @LABRCNTbnp@  Results from last 7 days   Lab Units 03/14/21  0454 03/13/21  0409 03/12/21  2213   WBC 10*3/mm3 10.84* 11.09* 17.05*   HEMOGLOBIN g/dL 11.7* 11.5* 14.7   HEMATOCRIT % 34.8 36.1 44.9   PLATELETS 10*3/mm3 212 231 341         Results from last 7 days   Lab Units 03/16/21  0401   MAGNESIUM mg/dL 2.6*     @LABRCNTIP(chol,trig,hdl,ldl)    EKG 3/17/21    Admit EKG 3/12/21    Previous EKG 12/15/20          I personally viewed and interpreted the patient's EKG/Telemetry data  )  Patient Active Problem List   Diagnosis   • Acute respiratory failure with hypoxia (CMS/HCC)   • Cerebrovascular accident (CVA) due to thrombosis (CMS/HCC)   • Stress-induced cardiomyopathy   • Chronic obstructive pulmonary disease with acute exacerbation (CMS/HCC)   • Tobacco abuse   • Mixed hyperlipidemia   • Hx of non-ST elevation myocardial infarction (NSTEMI)   • History of stroke   • Tobacco dependence   • COPD exacerbation (CMS/HCC)   • Breast mass   • Cerebrovascular accident (CVA) due to occlusion of left middle cerebral artery (CMS/HCC)   • Congestive heart failure (CMS/HCC)   • Chronic obstructive lung disease (CMS/HCC)   • Non-ischemic cardiomyopathy (CMS/HCC)   • Acute respiratory failure (CMS/HCC)   • Status post placement of implantable loop recorder   • SOB (shortness of breath)     Assessment and Plan:    1. Chest pain -atypical and pleuritic however patient has rise and fall of troponin with regional wall motion abnormalities on echocardiogram.  I reviewed images and do not feel that her EF is quite as low as 45% but  acknowledges the regional wall motion abnormalities.  Normal coronaries in 2018 however multiple cardiac risk factors.  EKG also shows borderline ST-T abnormalities in inferior leads.  Given her cardiac risk factors I feel that she would best be served with stress study prior to leaving the hospital as her pulmonary status improves.  2. History of stress-induced cardiomyopathy -patient has borderline low resting blood pressure and could not tolerate goal-directed medical therapy for heart failure.  She was tolerating 2.5 mg of lisinopril and we will restart this medication after checking BMP tomorrow.  Last potassium on 3/16 was 4.9.  3. COPD -chronic hypoxic respiratory failure.  Pulmonary issues seem to be driving reason for this hospital stay.  Work-up and management per pulmonary.  4. History of CVA -continue apixaban    Jose R Barros MD  03/18/21  08:40 EDT.  Time spent in reviewing chart, discussion and examination:

## 2021-03-18 NOTE — PROGRESS NOTES
LOS: 6 days   Patient Care Team:  Margaret Ca PA as PCP - General (Family Medicine)  Beulah Joshi APRN as Nurse Practitioner (Neurology)    Subjective   Much improved from yesterday she has not had any of her panic attacks she did not have to use the noninvasive ventilator overnight.  She is eating and drinking a little more she says she still can eat a whole lot she fills up easily.  Not having any pain today.    Review of Systems:          Objective     Vital Signs  Vital Sign Min/Max for last 24 hours  Temp  Min: 97.8 °F (36.6 °C)  Max: 98.1 °F (36.7 °C)   BP  Min: 104/71  Max: 143/73   Pulse  Min: 79  Max: 110   Resp  Min: 16  Max: 18   SpO2  Min: 89 %  Max: 99 %   Flow (L/min)  Min: 1  Max: 3   Weight  Min: 40.9 kg (90 lb 2.7 oz)  Max: 42.2 kg (93 lb)        Ventilator/Non-Invasive Ventilation Settings (From admission, onward) Comment     Start     Ordered    03/13/21 0045  Ventilator - AC/VC  Continuous     Question:  Vent Mode  Answer:  AC/VC    03/13/21 0044 03/12/21 2208  NIPPV (CPAP or BIPAP)  Until Discontinued,   Status:  Canceled     Question Answer Comment   Type: BIPAP    NIPPV Mask Interface: Per Patient Preference        03/12/21 2207                             Body mass index is 16.49 kg/m².  I/O last 3 completed shifts:  In: 1392 [P.O.:1040; I.V.:352]  Out: 1625 [Urine:1625]  No intake/output data recorded.        Physical Exam:  General Appearance: Thin white female looks at least 2 or 3 decades older than her stated age she sitting in up in a chair she is on  Eyes: Conjunctiva are clear and anicteric pupils are equal   ENT: Mucous membranes moist no erythema no exudates  Neck: No adenopathy no jugular venous distension, trachea midline  Lungs: She does not move a whole lot of air.  It is very interesting to watch her if you talk to her she starts getting anxious she starts breathing really fast and getting more labored classic pink puffer type of situation.  I  do not hear wheezes or rales.  Cardiac: Regular rate rhythm no murmur no gallop  Abdomen: Soft nontender no palpable hepatosplenomegaly or masses  : Not examined  Musculoskeletal: She has minimal muscle mass she has little bitemporal wasting thenar, hyperthenar and interosseous muscle wasting and she has no real chest wall tenderness.  Skin: No jaundice no petechiae skin is warm and dry  Neuro: I awake and alert she is following commands again she sitting up in a chair grossly intact.  Extremities/P Vascular: She has clubbing of all digits, no cyanosis, palpable radial and dorsalis pedis pulses bilaterally  MSE: Still quite anxious.       Labs:  Results from last 7 days   Lab Units 03/16/21  0401 03/15/21  0447 03/14/21  0454 03/13/21  1202 03/13/21  0528 03/12/21  2213   GLUCOSE mg/dL 124* 119* 160* 150* 149* 201*   SODIUM mmol/L 141 145 138 142 142 140   POTASSIUM mmol/L 4.9 3.3* 4.0 5.3* 4.7 4.5   MAGNESIUM mg/dL 2.6* 1.7  --   --   --   --    CO2 mmol/L 27.2 24.1 21.7* 21.8* 20.7* 26.8   CHLORIDE mmol/L 108* 112* 109* 112* 114* 105   ANION GAP mmol/L 5.8 8.9 7.3 8.2 7.3 8.2   CREATININE mg/dL 0.46* 0.34* 0.46* 0.48* 0.51* 0.64   BUN mg/dL 16 11 19 17 15 14   BUN / CREAT RATIO  34.8* 32.4* 41.3* 35.4* 29.4* 21.9   CALCIUM mg/dL 8.4* 6.9* 8.3* 8.2* 7.8* 9.1   EGFR IF NONAFRICN AM mL/min/1.73 139 >150 139 132 123 95   ALK PHOS U/L  --   --   --   --   --  71   TOTAL PROTEIN g/dL  --   --   --   --   --  7.4   ALT (SGPT) U/L  --   --   --   --   --  30   AST (SGOT) U/L  --   --   --   --   --  30   BILIRUBIN mg/dL  --   --   --   --   --  0.2   ALBUMIN g/dL 3.50 2.80* 3.10*  --  3.50 4.40   GLOBULIN gm/dL  --   --   --   --   --  3.0     Estimated Creatinine Clearance: 84 mL/min (A) (by C-G formula based on SCr of 0.46 mg/dL (L)).      Results from last 7 days   Lab Units 03/14/21  0454 03/13/21  0409 03/12/21  2213   WBC 10*3/mm3 10.84* 11.09* 17.05*   RBC 10*6/mm3 3.78 3.91 4.83   HEMOGLOBIN g/dL 11.7* 11.5*  14.7   HEMATOCRIT % 34.8 36.1 44.9   MCV fL 92.1 92.3 93.0   MCH pg 31.0 29.4 30.4   MCHC g/dL 33.6 31.9 32.7   RDW % 12.2* 12.1* 12.1*   RDW-SD fl 40.6 40.3 40.8   MPV fL 11.1 10.9 11.3   PLATELETS 10*3/mm3 212 231 341   NEUTROPHIL % %  --  91.4* 58.8   LYMPHOCYTE % %  --  4.4* 27.2   MONOCYTES % %  --  3.5* 7.5   EOSINOPHIL % %  --  0.1* 5.7   BASOPHIL % %  --  0.1 0.6   IMM GRAN % %  --  0.5 0.2   NEUTROS ABS 10*3/mm3  --  10.14* 10.01*   LYMPHS ABS 10*3/mm3  --  0.49* 4.63*   MONOS ABS 10*3/mm3  --  0.39 1.28*   EOS ABS 10*3/mm3  --  0.01 0.98*   BASOS ABS 10*3/mm3  --  0.01 0.11   IMMATURE GRANS (ABS) 10*3/mm3  --  0.05 0.04   NRBC /100 WBC  --  0.0 0.0     Results from last 7 days   Lab Units 03/17/21  1231   PH, ARTERIAL pH units 7.457*   PO2 ART mm Hg 58.6*   PCO2, ARTERIAL mm Hg 37.3   HCO3 ART mmol/L 26.4     Results from last 7 days   Lab Units 03/17/21  1358 03/13/21  1512 03/13/21  1202   TROPONIN T ng/mL 0.031* 0.258* 0.302*     Results from last 7 days   Lab Units 03/12/21  2213   PROBNP pg/mL 60.2         Results from last 7 days   Lab Units 03/13/21  0528 03/12/21  2213   LACTATE mmol/L  --  1.5   PROCALCITONIN ng/mL 0.12 0.07         Microbiology Results (last 10 days)     Procedure Component Value - Date/Time    Respiratory Culture - Sputum, Cough [[id number]] Collected: 03/13/21 0850    Lab Status: Final result Specimen: Sputum from Cough Updated: 03/15/21 0811     Respiratory Culture Scant growth (1+) Normal Respiratory Jess: NO S.aureus/MRSA or Pseudomonas aeruginosa     Gram Stain Rare (1+) WBCs seen      No organisms seen    Blood Culture - Blood, Arm, Right [[id number]] Collected: 03/12/21 8764    Lab Status: Final result Specimen: Blood from Arm, Right Updated: 03/18/21 0015     Blood Culture No growth at 5 days    Respiratory Panel PCR w/COVID-19(SARS-CoV-2) FRANCISCO JAVIER/JASPAL/ADELINE/PAD/COR/MAD/RAUL In-House, NP Swab in UTM/VTM, 3-4 HR TAT - Swab, Nasopharynx [[id number]]  (Normal) Collected: 03/12/21  2213    Lab Status: Final result Specimen: Swab from Nasopharynx Updated: 03/12/21 2321     ADENOVIRUS, PCR Not Detected     Coronavirus 229E Not Detected     Coronavirus HKU1 Not Detected     Coronavirus NL63 Not Detected     Coronavirus OC43 Not Detected     COVID19 Not Detected     Human Metapneumovirus Not Detected     Human Rhinovirus/Enterovirus Not Detected     Influenza A PCR Not Detected     Influenza B PCR Not Detected     Parainfluenza Virus 1 Not Detected     Parainfluenza Virus 2 Not Detected     Parainfluenza Virus 3 Not Detected     Parainfluenza Virus 4 Not Detected     RSV, PCR Not Detected     Bordetella pertussis pcr Not Detected     Bordetella parapertussis PCR Not Detected     Chlamydophila pneumoniae PCR Not Detected     Mycoplasma pneumo by PCR Not Detected    Narrative:      Fact sheet for providers: https://[employer]/wp-content/uploads/NNH1101-7273-QS6.1-EUA-Provider-Fact-Sheet-3.pdf    Fact sheet for patients: https://docs.[employer]/wp-content/uploads/QBX5003-1693-KC9.1-EUA-Patient-Fact-Sheet-1.pdf    Test performed by PCR.    Blood Culture - Blood, Arm, Right [[id number]] Collected: 03/12/21 2213    Lab Status: Final result Specimen: Blood from Arm, Right Updated: 03/17/21 2330     Blood Culture No growth at 5 days              apixaban, 5 mg, Oral, Q12H  docusate sodium, 100 mg, Oral, BID  escitalopram, 5 mg, Oral, Daily  insulin lispro, 0-24 Units, Subcutaneous, Q6H  ipratropium-albuterol, 3 mL, Nebulization, 4x Daily - RT  nicotine, 1 patch, Transdermal, Q24H  predniSONE, 20 mg, Oral, Daily With Breakfast  sodium chloride, 10 mL, Intravenous, Q12H      dexmedetomidine, 0.2-1.5 mcg/kg/hr, Last Rate: Stopped (03/16/21 1700)        Diagnostics:  XR Chest AP    Result Date: 3/12/2021  Patient: JEAN-PIERRE CHAVEZ  Time Out: 23:01 Exam(s): FILM CXR 1 VIEW EXAM:   XR Chest, 1 View CLINICAL HISTORY:    Reason for exam: COVID Evaluation, Cough, Fever. TECHNIQUE:   Frontal view of the  chest. COMPARISON:   9 30 19 FINDINGS:   Negative for significant cardiac enlargement.  Hyperinflation of both lungs consistent with COPD.  Patchy densities in both lung bases atelectasis or pneumonia.  Negative for pneumothorax or significant pleural fluid collections.     Electronically signed by Jake Nair DO on 03-12-21 at 2301    Results for orders placed during the hospital encounter of 03/12/21    Adult Transthoracic Echo Complete W/ Cont if Necessary Per Protocol    Interpretation Summary  · Left ventricular diastolic function was normal.  · The study is technically difficult for diagnosis.  · There is akinesis of the basal inferoseptum, basal inferior wall, and the basal inferolateral wall. The remainder of the segments appear to be hyperdynamic  · Left ventricular ejection fraction appears to be 46 - 50%.  · Left ventricular diastolic function was normal.  · Normal right ventricular cavity size and systolic function noted.  · There is no evidence of pericardial effusion          Active Hospital Problems    Diagnosis  POA   • Acute respiratory failure (CMS/[facility]) [J96.00]  Yes      Resolved Hospital Problems   No resolved problems to display.         Assessment/Plan     1. Pneumonia community-acquired  completed azithromycin and Rocephin.  2. Sepsis with septic shock she is doing well off vasopressors   3. Acute exacerbation COPD continue steroids add bronchodilators.  Steroids exacerbate her anxiety we have been weaning the down the dose she is doing much better  4. Acute hypoxic and hypercapnic respiratory failure improving.  5. Chest pain sounded somewhat pleuritic no pulmonary embolus  6. Elevated troponin normal renal functions likely represents a non-ST elevation MI type II from demand ischemia but with wall motion abnormalities on echocardiogram I am going to consult cardiology  7. Malnutrition  protein calorie we discussed dietary changes.  I think this is all pulmonary cachexia she needs  frequent small meals and EGD just such as nutritional supplement shakes  8. Tobacco abuse needs to stop start Gypsym discussed with her again today!  9. Hyperglycemia likely stress and steroid as etiology hemoglobin A1c was normal.  Sliding scale insulin  10. Anemia B12 and folate normal iron stores are okay TIBC and transferrin are low consistent with anemia of chronic disease  11. Fluids/electrolytes/nutrition taking p.o. encourage nutritional supplements  12. Anxiety she clearly has a lot of anxiety doing better continue Lexapro    Plan for disposition:     Donald Ryder MD  03/18/21  07:28 EDT    Time: I spent 35 minutes on patient's care today

## 2021-03-19 LAB
ANION GAP SERPL CALCULATED.3IONS-SCNC: 9.5 MMOL/L (ref 5–15)
BUN SERPL-MCNC: 13 MG/DL (ref 8–23)
BUN/CREAT SERPL: 24.1 (ref 7–25)
CALCIUM SPEC-SCNC: 8.3 MG/DL (ref 8.6–10.5)
CHLORIDE SERPL-SCNC: 105 MMOL/L (ref 98–107)
CO2 SERPL-SCNC: 28.5 MMOL/L (ref 22–29)
CREAT SERPL-MCNC: 0.54 MG/DL (ref 0.57–1)
GFR SERPL CREATININE-BSD FRML MDRD: 115 ML/MIN/1.73
GLUCOSE BLDC GLUCOMTR-MCNC: 191 MG/DL (ref 70–130)
GLUCOSE BLDC GLUCOMTR-MCNC: 62 MG/DL (ref 70–130)
GLUCOSE BLDC GLUCOMTR-MCNC: 78 MG/DL (ref 70–130)
GLUCOSE BLDC GLUCOMTR-MCNC: 80 MG/DL (ref 70–130)
GLUCOSE BLDC GLUCOMTR-MCNC: 81 MG/DL (ref 70–130)
GLUCOSE BLDC GLUCOMTR-MCNC: 82 MG/DL (ref 70–130)
GLUCOSE SERPL-MCNC: 78 MG/DL (ref 65–99)
POTASSIUM SERPL-SCNC: 3.4 MMOL/L (ref 3.5–5.2)
SODIUM SERPL-SCNC: 143 MMOL/L (ref 136–145)

## 2021-03-19 PROCEDURE — 94669 MECHANICAL CHEST WALL OSCILL: CPT

## 2021-03-19 PROCEDURE — 63710000001 PREDNISONE PER 5 MG: Performed by: INTERNAL MEDICINE

## 2021-03-19 PROCEDURE — 63710000001 INSULIN LISPRO (HUMAN) PER 5 UNITS: Performed by: INTERNAL MEDICINE

## 2021-03-19 PROCEDURE — 94799 UNLISTED PULMONARY SVC/PX: CPT

## 2021-03-19 PROCEDURE — 99232 SBSQ HOSP IP/OBS MODERATE 35: CPT | Performed by: NURSE PRACTITIONER

## 2021-03-19 PROCEDURE — 82962 GLUCOSE BLOOD TEST: CPT

## 2021-03-19 PROCEDURE — 94668 MNPJ CHEST WALL SBSQ: CPT

## 2021-03-19 PROCEDURE — 94760 N-INVAS EAR/PLS OXIMETRY 1: CPT

## 2021-03-19 PROCEDURE — 80048 BASIC METABOLIC PNL TOTAL CA: CPT | Performed by: INTERNAL MEDICINE

## 2021-03-19 RX ORDER — PREDNISONE 10 MG/1
10 TABLET ORAL
Status: DISCONTINUED | OUTPATIENT
Start: 2021-03-19 | End: 2021-03-22 | Stop reason: HOSPADM

## 2021-03-19 RX ADMIN — IPRATROPIUM BROMIDE AND ALBUTEROL SULFATE 3 ML: 2.5; .5 SOLUTION RESPIRATORY (INHALATION) at 19:58

## 2021-03-19 RX ADMIN — DOCUSATE SODIUM 100 MG: 100 CAPSULE, LIQUID FILLED ORAL at 21:16

## 2021-03-19 RX ADMIN — SODIUM CHLORIDE, PRESERVATIVE FREE 10 ML: 5 INJECTION INTRAVENOUS at 21:16

## 2021-03-19 RX ADMIN — SODIUM CHLORIDE, PRESERVATIVE FREE 10 ML: 5 INJECTION INTRAVENOUS at 09:27

## 2021-03-19 RX ADMIN — SODIUM CHLORIDE 30 MG/ML INHALATION SOLUTION 4 ML: 30 SOLUTION INHALANT at 19:58

## 2021-03-19 RX ADMIN — DEXTROSE 15 G: 15 GEL ORAL at 21:15

## 2021-03-19 RX ADMIN — ESCITALOPRAM 5 MG: 5 TABLET, FILM COATED ORAL at 09:27

## 2021-03-19 RX ADMIN — APIXABAN 5 MG: 5 TABLET, FILM COATED ORAL at 21:15

## 2021-03-19 RX ADMIN — SODIUM CHLORIDE 30 MG/ML INHALATION SOLUTION 4 ML: 30 SOLUTION INHALANT at 07:32

## 2021-03-19 RX ADMIN — INSULIN LISPRO 4 UNITS: 100 INJECTION, SOLUTION INTRAVENOUS; SUBCUTANEOUS at 19:48

## 2021-03-19 RX ADMIN — GUAIFENESIN 1200 MG: 600 TABLET, EXTENDED RELEASE ORAL at 21:15

## 2021-03-19 RX ADMIN — IPRATROPIUM BROMIDE AND ALBUTEROL SULFATE 3 ML: 2.5; .5 SOLUTION RESPIRATORY (INHALATION) at 15:58

## 2021-03-19 RX ADMIN — DOCUSATE SODIUM 100 MG: 100 CAPSULE, LIQUID FILLED ORAL at 09:26

## 2021-03-19 RX ADMIN — IPRATROPIUM BROMIDE AND ALBUTEROL SULFATE 3 ML: 2.5; .5 SOLUTION RESPIRATORY (INHALATION) at 07:32

## 2021-03-19 RX ADMIN — PREDNISONE 10 MG: 10 TABLET ORAL at 09:26

## 2021-03-19 RX ADMIN — GUAIFENESIN 1200 MG: 600 TABLET, EXTENDED RELEASE ORAL at 09:26

## 2021-03-19 RX ADMIN — IPRATROPIUM BROMIDE AND ALBUTEROL SULFATE 3 ML: 2.5; .5 SOLUTION RESPIRATORY (INHALATION) at 11:27

## 2021-03-19 RX ADMIN — APIXABAN 5 MG: 5 TABLET, FILM COATED ORAL at 09:27

## 2021-03-19 NOTE — PROGRESS NOTES
"    Patient Name: Scarlet Chakraborty  :1960  61 y.o.      Patient Care Team:  Margaret Ca PA as PCP - General (Family Medicine)  Beulah Joshi APRN as Nurse Practitioner (Neurology)    Chief Complaint: follow up respiratory failure, elevated troponin    Interval History: had some shortness of breath this morning when walking to the bathroom. Still with some expiratory wheezes on exam. Room air during the day. No chest pain or pressure.        Objective   Vital Signs  Temp:  [98.9 °F (37.2 °C)-99.2 °F (37.3 °C)] 99.2 °F (37.3 °C)  Heart Rate:  [] 87  Resp:  [15-20] 20  BP: (109-133)/(54-74) 111/56    Intake/Output Summary (Last 24 hours) at 3/19/2021 1203  Last data filed at 3/18/2021 2000  Gross per 24 hour   Intake 720 ml   Output 1000 ml   Net -280 ml     Flowsheet Rows      First Filed Value   Admission Height  157.5 cm (62\") Documented at 2021   Admission Weight  45.4 kg (100 lb) Documented at 2021          Physical Exam:   General Appearance:    Alert, cooperative, in no acute distress   Lungs:    expiratory wheezes to auscultation.  Normal respiratory effort and rate.      Heart:    Regular rhythm and normal rate, normal S1 and S2, no murmurs, gallops or rubs.     Chest Wall:    No abnormalities observed   Abdomen:     Soft, nontender, positive bowel sounds.     Extremities:   no cyanosis, clubbing or edema.  No marked joint deformities.  Adequate musculoskeletal strength.       Results Review:    Results from last 7 days   Lab Units 21  0539   SODIUM mmol/L 143   POTASSIUM mmol/L 3.4*   CHLORIDE mmol/L 105   CO2 mmol/L 28.5   BUN mg/dL 13   CREATININE mg/dL 0.54*   GLUCOSE mg/dL 78   CALCIUM mg/dL 8.3*     Results from last 7 days   Lab Units 21  1358 21  1512 21  1202   TROPONIN T ng/mL 0.031* 0.258* 0.302*     Results from last 7 days   Lab Units 21  0454   WBC 10*3/mm3 10.84*   HEMOGLOBIN g/dL 11.7*   HEMATOCRIT % 34.8 "   PLATELETS 10*3/mm3 212         Results from last 7 days   Lab Units 03/16/21  0401   MAGNESIUM mg/dL 2.6*                   Medication Review:   apixaban, 5 mg, Oral, Q12H  docusate sodium, 100 mg, Oral, BID  escitalopram, 5 mg, Oral, Daily  guaiFENesin, 1,200 mg, Oral, Q12H  insulin lispro, 0-24 Units, Subcutaneous, Q6H  ipratropium-albuterol, 3 mL, Nebulization, 4x Daily - RT  nicotine, 1 patch, Transdermal, Q24H  predniSONE, 10 mg, Oral, Daily With Breakfast  sodium chloride, 10 mL, Intravenous, Q12H  sodium chloride, 4 mL, Nebulization, BID - RT         dexmedetomidine, 0.2-1.5 mcg/kg/hr, Last Rate: Stopped (03/16/21 1700)        Assessment/Plan   1. Acute on chronic hypoxic respiratory failure 2/2 COPD with acute exacerbation, community acquired pneumonia - anxiety contributing as well per pulm. Extubated 3/14. Transferred out of critical care 3/18 .     2. Atypical chest pain, elevated troponin likely due to hypoxic respiratory failure however echocardiogram with reduced LV systolic function and regional wall motion abnormalities. Still with some wheezing on exam today. Will tentatively plan for lexiscan stress Sunday if wheezing improved. She had normal coronary arteries in 2018. EKG with borderline twave abnormalities inferior leads.    3. History of stress induced cardiomyopathy - required vasopressors earlier in the admission. BP improved. BMP stable. Will resume low dose lisinopril. Not on beta blocker at home presumably due to severe lung disease.     4. History of stroke - on apixaban. LINQ in place.     5. Tobacco abuse - cessation strongly encouraged.     - resume lisinopril.  - tentatively plan for lexiscan nuclear stress on Sunday if wheezing/lung status stable.    SHANKAR Brown  [facility]  03/19/21  12:03 EDT

## 2021-03-19 NOTE — PROGRESS NOTES
Continued Stay Note  [facility]     Patient Name: Scarlet Chakraborty  MRN: [id number]  Today's Date: 3/19/2021    Admit Date: 3/12/2021    Discharge Plan     Row Name 03/19/21 1419       Plan    Plan  Return home with S/O    Plan Comments  Spoke with patient at bedside.  Plan remains for [place] to return home at [place].  She does not feel HH is needed.  CCP will continue to follow.  BHumeniuk RN Becky S. Humeniuk, RN

## 2021-03-19 NOTE — PAYOR COMM NOTE
"Scarlet Chavez (61 y.o. Female)                              ATTENTION;   CONTINUED STAY CLINICALS CASE REF #  [id number]                            REPLY TO [facility] DEPT, ERICA JETT LPN  [phone] OR CALL            Date of Birth Social Security Number Address Home Phone MRN    1960  [id number] [id number] [phone] [id number]    [employer] Status          [employer] Single       Admission Date Admission Type Admitting Provider Attending Provider Department, Room/Bed    3/12/21 Emergency Sourav Bales MD Haller, Harold Dale, MD [address], [address]    Discharge Date Discharge Disposition Discharge Destination                       Attending Provider: Donald Ryder MD    Allergies: No Known Allergies    Isolation: None   Infection: None   Code Status: CPR    Ht: 157.5 cm (62.01\")   Wt: 46 kg (101 lb 6.4 oz)    Admission Cmt: None   Principal Problem: None                Active Insurance as of 3/12/2021     Primary Coverage     Payor Plan Insurance Group Employer/Plan Group    [facility] [id number]     Payor Plan Address Payor Plan Phone Number Payor Plan Fax Number Effective Dates    [address] [address] [phone]  9/1/2019 - None Entered    [facility] [id number]       Subscriber Name Subscriber Birth Date Member ID       SCARLET CHAVEZ 1960 [id number]                 Emergency Contacts      (Rel.) Home Phone Work Phone Mobile Phone    Yvonne Mccollum (Sister) [phone] -- --    Don Taylor (Significant Other) -- -- [phone]            Vital Signs (last day)     Date/Time   Temp   Temp src   Pulse   Resp   BP   Patient Position   SpO2    03/19/21 0732   --   --   93   18   --   --   95    03/19/21 0700   99.2 (37.3)   Oral   --   16   111/56   Lying   --    03/19/21 0500   --   --   90   --   --   --   93    03/19/21 0450   --   --   109   --   --   --   (!) 82    03/19/21 0112   98.9 (37.2)   Oral   80  "  18   124/68   Lying   91    03/19/21 0000   --   --   83   --   127/67   --   90    03/18/21 2300   --   --   86   --   121/66   --   90    03/18/21 2200   --   --   89   --   125/73   --   92    03/18/21 2100   --   --   79   --   121/70   --   93    03/18/21 2036   --   --   85   15   --   --   93    03/18/21 2032   --   --   89   16   --   --   94    03/18/21 2028   --   --   86   16   --   --   91    03/18/21 2022   99.1 (37.3)   Oral   --   --   --   --   --    03/18/21 2000   --   --   88   --   121/70   --   91    03/18/21 1900   --   --   87   --   116/62   --   93    03/18/21 1800   --   --   98   --   109/74   --   92    03/18/21 1700   --   --   93   --   118/65   --   92    03/18/21 1600   --   --   92   --   124/60   --   92    03/18/21 1545   --   --   101   16   --   --   92    03/18/21 1521   --   --   91   16   --   --   94    03/18/21 1500   --   --   90   --   133/54   --   93    03/18/21 1400   --   --   93   --   --   --   93    03/18/21 1300   --   --   99   --   111/68   --   90    03/18/21 1200   --   --   101   --   117/60   --   91    03/18/21 1102   --   --   93   --   125/70   --   90    03/18/21 1049   --   --   92   16   --   --   91    03/18/21 1000   --   --   93   --   118/64   --   90    03/18/21 0900   --   --   93   --   120/68   --   94    03/18/21 0800   98.5 (36.9)   Oral   89   15   110/95   --   95    03/18/21 0700   --   --   101   --   133/67   --   95    03/18/21 0600   --   --   83   --   119/67   --   98    03/18/21 0500   --   --   90   --   --   --   99    03/18/21 0400   --   --   105   --   128/63   --   (!) 89    03/18/21 0307   --   --   87   --   --   --   98    03/18/21 0300   --   --   86   --   130/76   --   98    03/18/21 0200   --   --   85   --   127/67   --   97    03/18/21 0100   --   --   85   --   121/70   --   98    03/18/21 0001   --   --   --   --   --   --   95    03/18/21 0000   --   --   89   --   114/65   --   98              Oxygen Therapy (last  day)     Date/Time   SpO2   Device (Oxygen Therapy)   Flow (L/min)   Oxygen Concentration (%)   ETCO2 (mmHg)    03/19/21 0732   95   nasal cannula   2   --   --    03/19/21 0700   --   nasal cannula   --   --   --    03/19/21 0500   93   --   --   --   --    03/19/21 0450   (!) 82   nasal cannula   2.5   --   --    03/19/21 0112   91   room air   --   --   --    03/19/21 0000   90   --   --   --   --    03/18/21 2300   90   --   --   --   --    03/18/21 2200   92   --   --   --   --    03/18/21 2100   93   --   --   --   --    03/18/21 2036   93   --   --   --   --    03/18/21 2032   94   --   --   --   --    03/18/21 2028   91   room air   --   --   --    03/18/21 2000   91   room air   --   --   --    03/18/21 1900   93   --   --   --   --    03/18/21 1800   92   --   --   --   --    03/18/21 1700   92   --   --   --   --    03/18/21 1600   92   --   --   --   --    03/18/21 1545   92   --   --   --   --    03/18/21 1521   94   room air   --   --   --    03/18/21 1500   93   --   --   --   --    03/18/21 1400   93   --   --   --   --    03/18/21 1300   90   --   --   --   --    03/18/21 1200   91   --   --   --   --    03/18/21 1102   90   --   --   --   --    03/18/21 1049   91   room air   --   --   --    03/18/21 1000   90   --   --   --   --    03/18/21 0900   94   nasal cannula   1   --   --    03/18/21 0800   95   nasal cannula   2   --   --    03/18/21 0700   95   --   --   --   --    03/18/21 0600   98   --   --   --   --    03/18/21 0500   99   --   --   --   --    03/18/21 0400   (!) 89   nasal cannula   2   --   --    03/18/21 0307   98   nasal cannula   2   28   --    03/18/21 0300   98   --   --   --   --    03/18/21 0200   97   --   --   --   --    03/18/21 0100   98   --   --   --   --    03/18/21 0001   95   nasal cannula   2   28   --    03/18/21 0000   98   nasal cannula   2   --   --              Current Facility-Administered Medications   Medication Dose Route Frequency Provider Last Rate Last  Admin   • acetaminophen (TYLENOL) tablet 650 mg  650 mg Oral Q6H PRN Donald Ryder MD   650 mg at 03/18/21 0822   • apixaban (ELIQUIS) tablet 5 mg  5 mg Oral Q12H Donald Ryder MD   5 mg at 03/18/21 2119   • dexmedetomidine (PRECEDEX) 400 mcg in 100 mL NS infusion kit  0.2-1.5 mcg/kg/hr Intravenous Titrated Donald Ryder MD   Stopped at 03/16/21 1700   • dextrose (D50W) 25 g/ 50mL Intravenous Solution 25 g  25 g Intravenous Q15 Min PRN Donald Ryder MD       • dextrose (GLUTOSE) oral gel 15 g  15 g Oral Q15 Min PRN Donald Ryder MD       • docusate sodium (COLACE) capsule 100 mg  100 mg Oral BID Donald Ryder MD   100 mg at 03/18/21 0811   • escitalopram (LEXAPRO) tablet 5 mg  5 mg Oral Daily Donald Ryder MD   5 mg at 03/18/21 0809   • glucagon (human recombinant) (GLUCAGEN DIAGNOSTIC) injection 1 mg  1 mg Subcutaneous Q15 Min PRN Donald Ryder MD       • guaiFENesin (MUCINEX) 12 hr tablet 1,200 mg  1,200 mg Oral Q12H Donald Ryder MD   1,200 mg at 03/18/21 2119   • insulin lispro (ADMELOG) injection 0-24 Units  0-24 Units Subcutaneous Q6H Donald Ryder MD   4 Units at 03/17/21 1806   • ipratropium-albuterol (DUO-NEB) nebulizer solution 3 mL  3 mL Nebulization 4x Daily - RT Donald Ryder MD   3 mL at 03/19/21 0732   • ipratropium-albuterol (DUO-NEB) nebulizer solution 3 mL  3 mL Nebulization Q4H PRN Donald Ryder MD   3 mL at 03/15/21 0214   • Magnesium Sulfate 2 gram Bolus, followed by 8 gram infusion (total Mg dose 10 grams)- Mg less than or equal to 1mg/dL  2 g Intravenous PRN Donald Ryder MD        Or   • Magnesium Sulfate 2 gram / 50mL Infusion (GIVE X 3 BAGS TO EQUAL 6GM TOTAL DOSE) - Mg 1.1 - 1.5 mg/dl  2 g Intravenous PRN Donald Ryder MD        Or   • Magnesium Sulfate 4 gram infusion- Mg 1.6-1.9 mg/dL  4 g Intravenous PRN Donald Ryder MD 25 mL/hr at 03/15/21 0840 4 g at 03/15/21 0840   • melatonin  tablet 5 mg  5 mg Oral Nightly PRN Donald Ryder MD   5 mg at 03/17/21 2001   • nicotine (NICODERM CQ) 21 MG/24HR patch 1 patch  1 patch Transdermal Q24H Donald Ryder MD   1 patch at 03/15/21 0846   • ondansetron (ZOFRAN) tablet 4 mg  4 mg Nasogastric Q6H PRN Donald Ryder MD        Or   • ondansetron (ZOFRAN) injection 4 mg  4 mg Intravenous Q6H PRN Donald Ryder MD       • potassium chloride (K-DUR,KLOR-CON) ER tablet 40 mEq  40 mEq Oral PRN Donald Ryder MD   40 mEq at 03/15/21 1927    Or   • potassium chloride (KLOR-CON) packet 40 mEq  40 mEq Oral PRN Donald Ryder MD        Or   • potassium chloride 10 mEq in 100 mL IVPB  10 mEq Intravenous Q1H PRN Donald Ryder MD       • predniSONE (DELTASONE) tablet 10 mg  10 mg Oral Daily With Breakfast Donald Ryder MD       • sodium chloride 0.9 % flush 10 mL  10 mL Intravenous Q12H Donald Ryder MD   10 mL at 03/18/21 2119   • sodium chloride 0.9 % flush 10 mL  10 mL Intravenous PRN Donald Ryder MD       • sodium chloride 3 % nebulizer solution 4 mL  4 mL Nebulization BID - RT Donald Ryder MD   4 mL at 03/19/21 0732     Orders (last 24 hrs)      Start     Ordered    03/19/21 0800  predniSONE (DELTASONE) tablet 10 mg  Daily With Breakfast      03/19/21 0123    03/19/21 0623  POC Glucose Once  Once      03/19/21 0620    03/19/21 0600  Basic Metabolic Panel  Morning Draw      03/18/21 1743    03/19/21 0123  POC Glucose Once  Once      03/19/21 0119    03/18/21 1720  POC Glucose Once  Once      03/18/21 1718    03/18/21 1513  Transfer Patient  Once      03/18/21 1513    03/18/21 1502  Transfer Patient  Once      03/18/21 1508    03/18/21 1237  POC Glucose Once  Once      03/18/21 1220    03/18/21 0930  sodium chloride 3 % nebulizer solution 4 mL  2 Times Daily - RT      03/18/21 0730    03/18/21 0900  guaiFENesin (MUCINEX) 12 hr tablet 1,200 mg  Every 12 Hours Scheduled      03/18/21 0730     03/18/21 0830  Chest Physiotherapy (PD & P)  4 Times Daily - RT      03/18/21 0730    03/17/21 1154  acetaminophen (TYLENOL) tablet 650 mg  Every 6 Hours PRN      03/17/21 1154    03/17/21 0800  predniSONE (DELTASONE) tablet 20 mg  Daily With Breakfast,   Status:  Discontinued      03/16/21 1350    03/16/21 2100  docusate sodium (COLACE) capsule 100 mg  2 Times Daily      03/16/21 1820    03/16/21 1845  escitalopram (LEXAPRO) tablet 5 mg  Daily      03/16/21 1749    03/15/21 1800  Dietary Nutrition Supplements Boost Plus (Ensure Enlive, Ensure Plus); chocolate  Daily With Breakfast, Lunch & Dinner      03/15/21 1239    03/15/21 1315  apixaban (ELIQUIS) tablet 5 mg  Every 12 Hours Scheduled      03/15/21 1221    03/15/21 0730  Magnesium Sulfate 2 gram Bolus, followed by 8 gram infusion (total Mg dose 10 grams)- Mg less than or equal to 1mg/dL  As Needed      03/15/21 0730    03/15/21 0730  Magnesium Sulfate 2 gram / 50mL Infusion (GIVE X 3 BAGS TO EQUAL 6GM TOTAL DOSE) - Mg 1.1 - 1.5 mg/dl  As Needed      03/15/21 0730    03/15/21 0730  Magnesium Sulfate 4 gram infusion- Mg 1.6-1.9 mg/dL  As Needed      03/15/21 0730    03/15/21 0729  potassium chloride (K-DUR,KLOR-CON) ER tablet 40 mEq  As Needed      03/15/21 0730    03/15/21 0729  potassium chloride (KLOR-CON) packet 40 mEq  As Needed      03/15/21 0730    03/15/21 0729  potassium chloride 10 mEq in 100 mL IVPB  Every 1 Hour PRN      03/15/21 0730    03/15/21 0315  dexmedetomidine (PRECEDEX) 400 mcg in 100 mL NS infusion kit  Titrated      03/15/21 0229 03/14/21 2030  ipratropium-albuterol (DUO-NEB) nebulizer solution 3 mL  4 Times Daily - RT      03/14/21 1646    03/14/21 1646  ipratropium-albuterol (DUO-NEB) nebulizer solution 3 mL  Every 4 Hours PRN      03/14/21 1647    03/14/21 0014  melatonin tablet 5 mg  Nightly PRN      03/14/21 0014    03/13/21 1432  ondansetron (ZOFRAN) tablet 4 mg  Every 6 Hours PRN      03/13/21 1433    03/13/21 1432  ondansetron  (ZOFRAN) injection 4 mg  Every 6 Hours PRN      03/13/21 1433    03/13/21 1417  Holden and Document Tube Depth (in cm)  As Needed,   Status:  Canceled      03/13/21 1418    03/13/21 1300  nicotine (NICODERM CQ) 21 MG/24HR patch 1 patch  Every 24 Hours Scheduled      03/13/21 1117    03/13/21 1230  insulin lispro (ADMELOG) injection 0-24 Units  Every 6 Hours      03/13/21 1142    03/13/21 1200  POC Glucose Q6H  Every 6 Hours      03/13/21 1113    03/13/21 1119  Daily Weights  Daily,   Status:  Canceled      03/13/21 1118    03/13/21 1119  Strict Intake & Output  Every Shift      03/13/21 1118    03/13/21 1118  Flush Feeding Tube With 30-50mL Water As Needed  As Needed,   Status:  Canceled      03/13/21 1118    03/13/21 1118  Holden and Document Tube Depth (in cm)  As Needed,   Status:  Canceled      03/13/21 1118    03/13/21 1113  dextrose (GLUTOSE) oral gel 15 g  Every 15 Minutes PRN      03/13/21 1113    03/13/21 1113  dextrose (D50W) 25 g/ 50mL Intravenous Solution 25 g  Every 15 Minutes PRN      03/13/21 1113    03/13/21 1113  glucagon (human recombinant) (GLUCAGEN DIAGNOSTIC) injection 1 mg  Every 15 Minutes PRN      03/13/21 1113    03/13/21 0017  sodium chloride 0.9 % flush 10 mL  Every 12 Hours Scheduled      03/13/21 0015    03/13/21 0016  Daily Weights  Daily      03/13/21 0015    03/13/21 0015  Intake and Output  Every Shift      03/13/21 0015    03/13/21 0014  sodium chloride 0.9 % flush 10 mL  As Needed      03/13/21 0015    Unscheduled  ECG 12 Lead  As Needed     Comments: For ICU/CCU Protocol Orders.  Nurse to Release if Patient Experiences Acute Chest Pain or Dysrhythmias    03/13/21 0015    Unscheduled  Potassium  As Needed     Comments: Sudden Ventricular Dysrhythmias. Notify Physician.      03/13/21 0015    Unscheduled  Magnesium  As Needed     Comments: For ICU/CCU Protocol      03/13/21 0015    Unscheduled  Oral Care  As Needed      03/13/21 1118    Unscheduled  Verify Tube Placement Initially & As  Needed  As Needed      03/13/21 1118    Unscheduled  Magnesium  As Needed      03/15/21 0730                   Physician Progress Notes      Donald Ryder MD at 03/18/21 0728                   LOS: 6 days   Patient Care Team:  Mragaret Ca PA as PCP - General (Family Medicine)  Beulah Joshi APRN as Nurse Practitioner (Neurology)    Subjective   Much improved from yesterday she has not had any of her panic attacks she did not have to use the noninvasive ventilator overnight.  She is eating and drinking a little more she says she still can eat a whole lot she fills up easily.  Not having any pain today.    Review of Systems:          Objective     Vital Signs  Vital Sign Min/Max for last 24 hours  Temp  Min: 97.8 °F (36.6 °C)  Max: 98.1 °F (36.7 °C)   BP  Min: 104/71  Max: 143/73   Pulse  Min: 79  Max: 110   Resp  Min: 16  Max: 18   SpO2  Min: 89 %  Max: 99 %   Flow (L/min)  Min: 1  Max: 3   Weight  Min: 40.9 kg (90 lb 2.7 oz)  Max: 42.2 kg (93 lb)        Ventilator/Non-Invasive Ventilation Settings (From admission, onward) Comment     Start     Ordered    03/13/21 0045  Ventilator - AC/VC  Continuous     Question:  Vent Mode  Answer:  AC/VC    03/13/21 0044    03/12/21 2208  NIPPV (CPAP or BIPAP)  Until Discontinued,   Status:  Canceled     Question Answer Comment   Type: BIPAP    NIPPV Mask Interface: Per Patient Preference        03/12/21 2207                             Body mass index is 16.49 kg/m².  I/O last 3 completed shifts:  In: 1392 [P.O.:1040; I.V.:352]  Out: 1625 [Urine:1625]  No intake/output data recorded.        Physical Exam:  General Appearance: Thin white female looks at least 2 or 3 decades older than her stated age she sitting in up in a chair she is on  Eyes: Conjunctiva are clear and anicteric pupils are equal   ENT: Mucous membranes moist no erythema no exudates  Neck: No adenopathy no jugular venous distension, trachea midline  Lungs: She does not move a whole lot  of air.  It is very interesting to watch her if you talk to her she starts getting anxious she starts breathing really fast and getting more labored classic pink puffer type of situation.  I do not hear wheezes or rales.  Cardiac: Regular rate rhythm no murmur no gallop  Abdomen: Soft nontender no palpable hepatosplenomegaly or masses  : Not examined  Musculoskeletal: She has minimal muscle mass she has little bitemporal wasting thenar, hyperthenar and interosseous muscle wasting and she has no real chest wall tenderness.  Skin: No jaundice no petechiae skin is warm and dry  Neuro: I awake and alert she is following commands again she sitting up in a chair grossly intact.  Extremities/P Vascular: She has clubbing of all digits, no cyanosis, palpable radial and dorsalis pedis pulses bilaterally  MSE: Still quite anxious.       Labs:  Results from last 7 days   Lab Units 03/16/21  0401 03/15/21  0447 03/14/21  0454 03/13/21  1202 03/13/21  0528 03/12/21  2213   GLUCOSE mg/dL 124* 119* 160* 150* 149* 201*   SODIUM mmol/L 141 145 138 142 142 140   POTASSIUM mmol/L 4.9 3.3* 4.0 5.3* 4.7 4.5   MAGNESIUM mg/dL 2.6* 1.7  --   --   --   --    CO2 mmol/L 27.2 24.1 21.7* 21.8* 20.7* 26.8   CHLORIDE mmol/L 108* 112* 109* 112* 114* 105   ANION GAP mmol/L 5.8 8.9 7.3 8.2 7.3 8.2   CREATININE mg/dL 0.46* 0.34* 0.46* 0.48* 0.51* 0.64   BUN mg/dL 16 11 19 17 15 14   BUN / CREAT RATIO  34.8* 32.4* 41.3* 35.4* 29.4* 21.9   CALCIUM mg/dL 8.4* 6.9* 8.3* 8.2* 7.8* 9.1   EGFR IF NONAFRICN AM mL/min/1.73 139 >150 139 132 123 95   ALK PHOS U/L  --   --   --   --   --  71   TOTAL PROTEIN g/dL  --   --   --   --   --  7.4   ALT (SGPT) U/L  --   --   --   --   --  30   AST (SGOT) U/L  --   --   --   --   --  30   BILIRUBIN mg/dL  --   --   --   --   --  0.2   ALBUMIN g/dL 3.50 2.80* 3.10*  --  3.50 4.40   GLOBULIN gm/dL  --   --   --   --   --  3.0     Estimated Creatinine Clearance: 84 mL/min (A) (by C-G formula based on SCr of 0.46 mg/dL  (L)).      Results from last 7 days   Lab Units 03/14/21  0454 03/13/21  0409 03/12/21  2213   WBC 10*3/mm3 10.84* 11.09* 17.05*   RBC 10*6/mm3 3.78 3.91 4.83   HEMOGLOBIN g/dL 11.7* 11.5* 14.7   HEMATOCRIT % 34.8 36.1 44.9   MCV fL 92.1 92.3 93.0   MCH pg 31.0 29.4 30.4   MCHC g/dL 33.6 31.9 32.7   RDW % 12.2* 12.1* 12.1*   RDW-SD fl 40.6 40.3 40.8   MPV fL 11.1 10.9 11.3   PLATELETS 10*3/mm3 212 231 341   NEUTROPHIL % %  --  91.4* 58.8   LYMPHOCYTE % %  --  4.4* 27.2   MONOCYTES % %  --  3.5* 7.5   EOSINOPHIL % %  --  0.1* 5.7   BASOPHIL % %  --  0.1 0.6   IMM GRAN % %  --  0.5 0.2   NEUTROS ABS 10*3/mm3  --  10.14* 10.01*   LYMPHS ABS 10*3/mm3  --  0.49* 4.63*   MONOS ABS 10*3/mm3  --  0.39 1.28*   EOS ABS 10*3/mm3  --  0.01 0.98*   BASOS ABS 10*3/mm3  --  0.01 0.11   IMMATURE GRANS (ABS) 10*3/mm3  --  0.05 0.04   NRBC /100 WBC  --  0.0 0.0     Results from last 7 days   Lab Units 03/17/21  1231   PH, ARTERIAL pH units 7.457*   PO2 ART mm Hg 58.6*   PCO2, ARTERIAL mm Hg 37.3   HCO3 ART mmol/L 26.4     Results from last 7 days   Lab Units 03/17/21  1358 03/13/21  1512 03/13/21  1202   TROPONIN T ng/mL 0.031* 0.258* 0.302*     Results from last 7 days   Lab Units 03/12/21  2213   PROBNP pg/mL 60.2         Results from last 7 days   Lab Units 03/13/21  0528 03/12/21  2213   LACTATE mmol/L  --  1.5   PROCALCITONIN ng/mL 0.12 0.07         Microbiology Results (last 10 days)     Procedure Component Value - Date/Time    Respiratory Culture - Sputum, Cough [[id number]] Collected: 03/13/21 0850    Lab Status: Final result Specimen: Sputum from Cough Updated: 03/15/21 0811     Respiratory Culture Scant growth (1+) Normal Respiratory Jess: NO S.aureus/MRSA or Pseudomonas aeruginosa     Gram Stain Rare (1+) WBCs seen      No organisms seen    Blood Culture - Blood, Arm, Right [[id number]] Collected: 03/12/21 2255    Lab Status: Final result Specimen: Blood from Arm, Right Updated: 03/18/21 0015     Blood Culture No growth at  5 days    Respiratory Panel PCR w/COVID-19(SARS-CoV-2) FRANCISCO JAVIER/JASPAL/ADELINE/PAD/COR/MAD/RAUL In-House, NP Swab in UTM/VTM, 3-4 HR TAT - Swab, Nasopharynx [[id number]]  (Normal) Collected: 03/12/21 2213    Lab Status: Final result Specimen: Swab from Nasopharynx Updated: 03/12/21 2321     ADENOVIRUS, PCR Not Detected     Coronavirus 229E Not Detected     Coronavirus HKU1 Not Detected     Coronavirus NL63 Not Detected     Coronavirus OC43 Not Detected     COVID19 Not Detected     Human Metapneumovirus Not Detected     Human Rhinovirus/Enterovirus Not Detected     Influenza A PCR Not Detected     Influenza B PCR Not Detected     Parainfluenza Virus 1 Not Detected     Parainfluenza Virus 2 Not Detected     Parainfluenza Virus 3 Not Detected     Parainfluenza Virus 4 Not Detected     RSV, PCR Not Detected     Bordetella pertussis pcr Not Detected     Bordetella parapertussis PCR Not Detected     Chlamydophila pneumoniae PCR Not Detected     Mycoplasma pneumo by PCR Not Detected    Narrative:      Fact sheet for providers: https://docs.[employer]/wp-content/uploads/EGI7303-7178-DM6.1-EUA-Provider-Fact-Sheet-3.pdf    Fact sheet for patients: https://docs.[employer]/wp-content/uploads/AJY4060-7049-PY7.1-EUA-Patient-Fact-Sheet-1.pdf    Test performed by PCR.    Blood Culture - Blood, Arm, Right [[id number]] Collected: 03/12/21 2213    Lab Status: Final result Specimen: Blood from Arm, Right Updated: 03/17/21 2330     Blood Culture No growth at 5 days              apixaban, 5 mg, Oral, Q12H  docusate sodium, 100 mg, Oral, BID  escitalopram, 5 mg, Oral, Daily  insulin lispro, 0-24 Units, Subcutaneous, Q6H  ipratropium-albuterol, 3 mL, Nebulization, 4x Daily - RT  nicotine, 1 patch, Transdermal, Q24H  predniSONE, 20 mg, Oral, Daily With Breakfast  sodium chloride, 10 mL, Intravenous, Q12H      dexmedetomidine, 0.2-1.5 mcg/kg/hr, Last Rate: Stopped (03/16/21 1700)        Diagnostics:  XR Chest AP    Result Date: 3/12/2021  Patient:  JEAN-PIERRE CHAVEZ  Time Out: 23:01 Exam(s): FILM CXR 1 VIEW EXAM:   XR Chest, 1 View CLINICAL HISTORY:    Reason for exam: COVID Evaluation, Cough, Fever. TECHNIQUE:   Frontal view of the chest. COMPARISON:   9 30 19 FINDINGS:   Negative for significant cardiac enlargement.  Hyperinflation of both lungs consistent with COPD.  Patchy densities in both lung bases atelectasis or pneumonia.  Negative for pneumothorax or significant pleural fluid collections.     Electronically signed by Jake Nair DO on 03-12-21 at 2301    Results for orders placed during the hospital encounter of 03/12/21    Adult Transthoracic Echo Complete W/ Cont if Necessary Per Protocol    Interpretation Summary  · Left ventricular diastolic function was normal.  · The study is technically difficult for diagnosis.  · There is akinesis of the basal inferoseptum, basal inferior wall, and the basal inferolateral wall. The remainder of the segments appear to be hyperdynamic  · Left ventricular ejection fraction appears to be 46 - 50%.  · Left ventricular diastolic function was normal.  · Normal right ventricular cavity size and systolic function noted.  · There is no evidence of pericardial effusion          Active Hospital Problems    Diagnosis  POA   • Acute respiratory failure (CMS/[facility]) [J96.00]  Yes      Resolved Hospital Problems   No resolved problems to display.         Assessment/Plan     1. Pneumonia community-acquired  completed azithromycin and Rocephin.  2. Sepsis with septic shock she is doing well off vasopressors   3. Acute exacerbation COPD continue steroids add bronchodilators.  Steroids exacerbate her anxiety we have been weaning the down the dose she is doing much better  4. Acute hypoxic and hypercapnic respiratory failure improving.  5. Chest pain sounded somewhat pleuritic no pulmonary embolus  6. Elevated troponin normal renal functions likely represents a non-ST elevation MI type II from demand ischemia but with wall  motion abnormalities on echocardiogram I am going to consult cardiology  7. Malnutrition  protein calorie we discussed dietary changes.  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 Tobacco abuse needs to stop start NicoDerm discussed with her again today!  9. Hyperglycemia likely stress and steroid as etiology hemoglobin A1c was normal.  Sliding scale insulin  10. Anemia B12 and folate normal iron stores are okay TIBC and transferrin are low consistent with anemia of chronic disease  11. Fluids/electrolytes/nutrition taking p.o. encourage nutritional supplements  12. Anxiety she clearly has a lot of anxiety doing better continue Lexapro    Plan for disposition:     Donald Ryder MD  21  07:28 EDT    Time: I spent 35 minutes on patient's care today      Electronically signed by Donald Ryder MD at 21 1600               Consult Notes       Jose R Barros Jr., MD at 21 0840      Consult Orders    1. Inpatient Cardiology Consult [[id number]] ordered by Donald Ryder MD at 21 0733               Date of Hospital Visit: 3/18/2021  Encounter Provider: Shyanne Lynn RN  Place of Service: [facility] CARDIOLOGY  Patient Name: Scarlet Chakraborty  :1960  Referral Provider: Sourav Bales MD    Chief complaint: shortness of breath    Reason for Consult: patient had some atypical chest pain has severe COPD has had some dyspnea elevated troponin and some regional wall motion abnormalities on echocardiogram please evaluate    History of Present Illness: Ms Chakraborty is a 61 year old female patient of Dr. Mcnamara with history of COPD and tobacco/ETOH abuse.    She suffered acute CVA in 2018 after represnting with left-sided weakness, slurred speech and mechanical fall. She received TPA with good response, and a ZARINA was completed and unremarkable. A LINQ was  subsequently placed by Dr. Barker.     She was later diagnosed with NICM (likely Takotsubo) during an admission in August 2018, after presenting with acute respiratory failure. She as noted to have normal coronaries at that time with reduced LV function and EF of 20%. A repeated echocardiogram performed in October 2019 demonstrated recovered LV function with EF 65%, as well as mild MR/TR and a grade I diastolic dysfunction.     She presented back to [facility] ED 3/12/21 with recurrent progressive shortness of breath requiring mechanical intubation/ventilation. She was started on IV antibiotics for likely pneumonia and successfully extubated 3/14/21 and transitioned to BiPAP.  She is currently on low-dose supplemental oxygen.    Troponins noted to be elevated with subsequent rise and fall (0.057/0.302/0.258/0.031) with creatinine 0.46 yesterday. An echocardiogram was obtained yesterday noting normal LVSF with EF 46-50%, as well as akinesis of the basal inferoseptum, basal inferior wall, and the basal inferolateral wall.  She had a few episodes of pleuritic chest discomfort.  She denies angina.  She notes increased dyspnea on exertion at home prior to hospitalization.  No orthopnea, PND or edema.  No palpitations, dizziness or syncope.    Echocardiogram 3/17/21  · Left ventricular diastolic function was normal.  · The study is technically difficult for diagnosis.  · There is akinesis of the basal inferoseptum, basal inferior wall, and the basal inferolateral wall. The remainder of the segments appear to be hyperdynamic  · Left ventricular ejection fraction appears to be 46 - 50%.  · Left ventricular diastolic function was normal.  · Normal right ventricular cavity size and systolic function noted.  · There is no evidence of pericardial effusion    Previous Cardiac Testing:    Echocardiogram 10/24/19  · Left ventricular systolic function is normal. Calculated EF = 65%. Estimated EF was in agreement with the  calculated EF. Estimated EF = 65%. Normal left ventricular cavity size noted. All left ventricular wall segments contract normally. Left ventricular wall thickness is consistent with moderate concentric hypertrophy. Left ventricular diastolic dysfunction is noted (grade I) consistent with impaired relaxation.  · Mild mitral valve regurgitation is present.  · Mild tricuspid valve regurgitation is present. Estimated right ventricular systolic pressure from tricuspid regurgitation is normal (<35 mmHg). Calculated right ventricular systolic pressure from tricuspid regurgitation is 26 mmHg.    Cardiac Catheterization 9/13/18  FINDINGS:  LEFT VENTRICULOGRAPHY: The LV pressure was 92/38 .  There was severely reduced global LV systolic function basically the base of the heart is hyperdynamic the mid ventricle to apex are severely akinetic and the ejection fraction is about 20% .  There was no mitral insufficiency or gradient across the aortic valve on pullback.  CORONARY ANGIOGRAPHY:  Left main: Normal  Left anterior descending: Normal in the proximal portion long myocardial bridge in the midportion and normal distally, diagonals are normal  Ramus intermedius:Not present  Circumflex: Normal  RCA: Is a dominant vessel.  Normal throughout the vessel  SUMMARY: Essentially normal coronary arteries severely reduced LV function severely elevated left ventricular end-diastolic pressure  RECOMMENDATIONS: Likely this is a variant of a Takasabo cardiomyopathy      Past Medical History:   Diagnosis Date   • Asthma    • Cerebrovascular accident (CVA) due to thrombosis of right middle cerebral artery (CMS/HCC)    • COPD (chronic obstructive pulmonary disease) (CMS/HCC)    • Nonischemic cardiomyopathy (CMS/HCC)    • NSTEMI (non-ST elevated myocardial infarction) (CMS/HCC)    • Stress-induced cardiomyopathy    • Stroke (CMS/HCC)    • Takotsubo cardiomyopathy    • Tobacco abuse        Past Surgical History:   Procedure Laterality Date   •  CARDIAC CATHETERIZATION N/A 9/13/2018    Procedure: Left Heart Cath and cors;  Surgeon: Gabino Dozier MD;  Location:  [facility] C[facility];  Service: Cardiology   • CARDIAC CATHETERIZATION N/A 9/13/2018    Procedure: Left ventriculography;  Surgeon: Gabino Dozier MD;  Location: [facility] CATH INVASIVE LOCATION;  Service: Cardiology   • CARDIAC ELECTROPHYSIOLOGY PROCEDURE N/A 9/27/2018    Procedure: Loop insertion  LINQ;  Surgeon: Jose R Barker MD;  Location: [facility] CATH INVASIVE LOCATION;  Service: Cardiovascular       Medications Prior to Admission   Medication Sig Dispense Refill Last Dose   • albuterol sulfate  (90 Base) MCG/ACT inhaler Inhale 2 puffs Every 4 (Four) Hours As Needed for Wheezing.      • apixaban (ELIQUIS) 5 MG tablet tablet Take 1 tablet by mouth Every 12 (Twelve) Hours. 180 tablet 2    • atorvastatin (Lipitor) 40 MG tablet Take 1 tablet by mouth Daily. 30 tablet 11    • budesonide-formoterol (SYMBICORT) 160-4.5 MCG/ACT inhaler Inhale 2 puffs 2 (Two) Times a Day. 10.2 g 12    • lisinopril (PRINIVIL,ZESTRIL) 2.5 MG tablet Take 1 tablet by mouth Daily. 90 tablet 1    • Tiotropium Bromide Monohydrate (SPIRIVA RESPIMAT) 1.25 MCG/ACT aerosol solution inhaler Inhale 2 puffs Daily. 4 g 2        Current Meds  Scheduled Meds:apixaban, 5 mg, Oral, Q12H  docusate sodium, 100 mg, Oral, BID  escitalopram, 5 mg, Oral, Daily  guaiFENesin, 1,200 mg, Oral, Q12H  insulin lispro, 0-24 Units, Subcutaneous, Q6H  ipratropium-albuterol, 3 mL, Nebulization, 4x Daily - RT  nicotine, 1 patch, Transdermal, Q24H  predniSONE, 20 mg, Oral, Daily With Breakfast  sodium chloride, 10 mL, Intravenous, Q12H  sodium chloride, 4 mL, Nebulization, BID - RT      Continuous Infusions:dexmedetomidine, 0.2-1.5 mcg/kg/hr, Last Rate: Stopped (03/16/21 1700)      PRN Meds:.•  acetaminophen  •  dextrose  •  dextrose  •  glucagon (human recombinant)  •  ipratropium-albuterol  •  magnesium sulfate **OR** magnesium sulfate  "**OR** magnesium sulfate  •  melatonin  •  ondansetron **OR** ondansetron  •  potassium chloride **OR** potassium chloride **OR** potassium chloride  •  sodium chloride    Allergies as of 03/12/2021   • (No Known Allergies)       Social History     Socioeconomic History   • Marital status: Single     Spouse name: Not on file   • Number of children: Not on file   • Years of education: Not on file   • Highest education level: Not on file   Tobacco Use   • Smoking status: Light Tobacco Smoker     Packs/day: 0.50     Years: 15.00     Pack years: 7.50     Types: Cigarettes   • Smokeless tobacco: Never Used   • Tobacco comment: currently 2 cigs daily   Substance and Sexual Activity   • Alcohol use: Yes     Comment: occassional/ Daily caffeine use   • Drug use: No   • Sexual activity: Defer       Family History   Problem Relation Age of Onset   • Cancer Sister        Review of Systems   Constitutional: Negative for chills and fever.   HENT: Negative for hoarse voice and sore throat.    Eyes: Negative for double vision and photophobia.   Cardiovascular: Positive for chest pain and dyspnea on exertion. Negative for leg swelling, near-syncope, orthopnea, palpitations, paroxysmal nocturnal dyspnea and syncope.   Respiratory: Positive for cough, shortness of breath and wheezing.    Skin: Negative for poor wound healing and rash.   Musculoskeletal: Negative for arthritis and joint swelling.   Gastrointestinal: Negative for bloating, abdominal pain, hematemesis and hematochezia.   Neurological: Negative for dizziness and focal weakness.   Psychiatric/Behavioral: Negative for depression and suicidal ideas.            Objective:   Temp:  [98 °F (36.7 °C)-98.1 °F (36.7 °C)] 98.1 °F (36.7 °C)  Heart Rate:  [] 89  Resp:  [16] 16  BP: (106-143)/(47-95) 110/95  Body mass index is 16.49 kg/m².  Flowsheet Rows      First Filed Value   Admission Height  157.5 cm (62\") Documented at 03/12/2021 2210   Admission Weight  45.4 kg (100 lb) " Documented at 03/12/2021 2210        Vitals:    03/18/21 0800   BP: 110/95   Pulse: 89   Resp:    Temp:    SpO2: 95%       Vitals reviewed.   Constitutional:       Appearance: Not in distress. Frail.   Neck:      Vascular: No JVR. JVD normal.   Pulmonary:      Breath sounds: Wheezing present. No rhonchi. No rales.   Chest:      Chest wall: Not tender to palpatation.   Cardiovascular:      PMI at left midclavicular line. Normal rate. Regular rhythm. Normal S1. Normal S2.      Murmurs: There is no murmur.      No gallop. No click. No rub.   Pulses:     Intact distal pulses.   Edema:     Peripheral edema absent.   Abdominal:      General: Bowel sounds are normal.      Palpations: Abdomen is soft.      Tenderness: There is no abdominal tenderness.   Musculoskeletal: Normal range of motion.         General: No tenderness. Skin:     General: Skin is warm and dry.   Neurological:      General: No focal deficit present.      Mental Status: Alert and oriented to person, place and time.                 Lab Review:      Results from last 7 days   Lab Units 03/16/21  0401 03/12/21  2213   SODIUM mmol/L 141 140   POTASSIUM mmol/L 4.9 4.5   CHLORIDE mmol/L 108* 105   CO2 mmol/L 27.2 26.8   BUN mg/dL 16 14   CREATININE mg/dL 0.46* 0.64   CALCIUM mg/dL 8.4* 9.1   BILIRUBIN mg/dL  --  0.2   ALK PHOS U/L  --  71   ALT (SGPT) U/L  --  30   AST (SGOT) U/L  --  30   GLUCOSE mg/dL 124* 201*     Results from last 7 days   Lab Units 03/17/21  1358 03/13/21  1512 03/13/21  1202   TROPONIN T ng/mL 0.031* 0.258* 0.302*     @LABRCNTbnp@  Results from last 7 days   Lab Units 03/14/21  0454 03/13/21  0409 03/12/21  2213   WBC 10*3/mm3 10.84* 11.09* 17.05*   HEMOGLOBIN g/dL 11.7* 11.5* 14.7   HEMATOCRIT % 34.8 36.1 44.9   PLATELETS 10*3/mm3 212 231 341         Results from last 7 days   Lab Units 03/16/21  0401   MAGNESIUM mg/dL 2.6*     @LABRCNTIP(chol,trig,hdl,ldl)    EKG 3/17/21    Admit EKG 3/12/21    Previous EKG 12/15/20          I  personally viewed and interpreted the patient's EKG/Telemetry data  )  Patient Active Problem List   Diagnosis   • Acute respiratory failure with hypoxia (CMS/HCC)   • Cerebrovascular accident (CVA) due to thrombosis (CMS/HCC)   • Stress-induced cardiomyopathy   • Chronic obstructive pulmonary disease with acute exacerbation (CMS/HCC)   • Tobacco abuse   • Mixed hyperlipidemia   • Hx of non-ST elevation myocardial infarction (NSTEMI)   • History of stroke   • Tobacco dependence   • COPD exacerbation (CMS/HCC)   • Breast mass   • Cerebrovascular accident (CVA) due to occlusion of left middle cerebral artery (CMS/HCC)   • Congestive heart failure (CMS/HCC)   • Chronic obstructive lung disease (CMS/HCC)   • Non-ischemic cardiomyopathy (CMS/HCC)   • Acute respiratory failure (CMS/HCC)   • Status post placement of implantable loop recorder   • SOB (shortness of breath)     Assessment and Plan:    1. Chest pain -atypical and pleuritic however patient has rise and fall of troponin with regional wall motion abnormalities on echocardiogram.  I reviewed images and do not feel that her EF is quite as low as 45% but acknowledges the regional wall motion abnormalities.  Normal coronaries in 2018 however multiple cardiac risk factors.  EKG also shows borderline ST-T abnormalities in inferior leads.  Given her cardiac risk factors I feel that she would best be served with stress study prior to leaving the hospital as her pulmonary status improves.  2. History of stress-induced cardiomyopathy -patient has borderline low resting blood pressure and could not tolerate goal-directed medical therapy for heart failure.  She was tolerating 2.5 mg of lisinopril and we will restart this medication after checking BMP tomorrow.  Last potassium on 3/16 was 4.9.  3. COPD -chronic hypoxic respiratory failure.  Pulmonary issues seem to be driving reason for this hospital stay.  Work-up and management per pulmonary.  4. History of CVA -continue  apixaban    Jose R Barros MD  03/18/21  08:40 EDT.  Time spent in reviewing chart, discussion and examination:                  Electronically signed by Jose R Barros Jr., MD at 03/18/21 5859

## 2021-03-19 NOTE — PROGRESS NOTES
LOS: 7 days   Patient Care Team:  Margaret Ca PA as PCP - General (Family Medicine)  Beulah Joshi APRN as Nurse Practitioner (Neurology)    Subjective   Doing better today she would like to get up and move around a little bit more she had a little episode this morning where she got a little panicked and short of breath they put her on oxygen for a little while says she calmed down she is coughing getting some mucus up now with the hypertonic saline    Review of Systems:          Objective     Vital Signs  Vital Sign Min/Max for last 24 hours  Temp  Min: 98.9 °F (37.2 °C)  Max: 99.2 °F (37.3 °C)   BP  Min: 94/31  Max: 133/54   Pulse  Min: 79  Max: 109   Resp  Min: 15  Max: 20   SpO2  Min: 82 %  Max: 95 %   Flow (L/min)  Min: 2  Max: 2.5   Weight  Min: 46 kg (101 lb 6.4 oz)  Max: 46 kg (101 lb 6.4 oz)        Ventilator/Non-Invasive Ventilation Settings (From admission, onward) Comment     Start     Ordered    03/13/21 0045  Ventilator - AC/VC  Continuous     Question:  Vent Mode  Answer:  AC/VC    03/13/21 0044    03/12/21 2208  NIPPV (CPAP or BIPAP)  Until Discontinued,   Status:  Canceled     Question Answer Comment   Type: BIPAP    NIPPV Mask Interface: Per Patient Preference        03/12/21 2207                             Body mass index is 18.54 kg/m².  I/O last 3 completed shifts:  In: 1060 [P.O.:1060]  Out: 1750 [Urine:1750]  I/O this shift:  In: 360 [P.O.:360]  Out: -         Physical Exam:  General Appearance: Thin white female looks at least 2 or 3 decades older than her stated age she sitting in up in a chair she is on room air and in no acute distress  Eyes: Conjunctiva are clear and anicteric pupils are equal   ENT: Mucous membranes moist no erythema no exudates  Neck: No adenopathy no jugular venous distension, trachea midline  Lungs: She does not move a whole lot of air.  She is clear no wheezes no rales very prolonged expiration still.  Cardiac: Regular rate rhythm no  murmur no gallop  Abdomen: Soft nontender no palpable hepatosplenomegaly or masses  : Not examined  Musculoskeletal: She has minimal muscle mass she has little bitemporal wasting thenar, hyperthenar and interosseous muscle wasting   Skin: No jaundice no petechiae skin is warm and dry  Neuro:  awake and alert she is following commands again she sitting up in a chair grossly intact.  Extremities/P Vascular: She has clubbing of all digits, no cyanosis, palpable radial and dorsalis pedis pulses bilaterally  MSE: Still quite anxious.       Labs:  Results from last 7 days   Lab Units 03/19/21  0539 03/16/21  0401 03/15/21  0447 03/14/21  0454 03/13/21  1202 03/13/21  0528 03/12/21  2213   GLUCOSE mg/dL 78 124* 119* 160* 150* 149* 201*   SODIUM mmol/L 143 141 145 138 142 142 140   POTASSIUM mmol/L 3.4* 4.9 3.3* 4.0 5.3* 4.7 4.5   MAGNESIUM mg/dL  --  2.6* 1.7  --   --   --   --    CO2 mmol/L 28.5 27.2 24.1 21.7* 21.8* 20.7* 26.8   CHLORIDE mmol/L 105 108* 112* 109* 112* 114* 105   ANION GAP mmol/L 9.5 5.8 8.9 7.3 8.2 7.3 8.2   CREATININE mg/dL 0.54* 0.46* 0.34* 0.46* 0.48* 0.51* 0.64   BUN mg/dL 13 16 11 19 17 15 14   BUN / CREAT RATIO  24.1 34.8* 32.4* 41.3* 35.4* 29.4* 21.9   CALCIUM mg/dL 8.3* 8.4* 6.9* 8.3* 8.2* 7.8* 9.1   EGFR IF NONAFRICN AM mL/min/1.73 115 139 >150 139 132 123 95   ALK PHOS U/L  --   --   --   --   --   --  71   TOTAL PROTEIN g/dL  --   --   --   --   --   --  7.4   ALT (SGPT) U/L  --   --   --   --   --   --  30   AST (SGOT) U/L  --   --   --   --   --   --  30   BILIRUBIN mg/dL  --   --   --   --   --   --  0.2   ALBUMIN g/dL  --  3.50 2.80* 3.10*  --  3.50 4.40   GLOBULIN gm/dL  --   --   --   --   --   --  3.0     Estimated Creatinine Clearance: 80.5 mL/min (A) (by C-G formula based on SCr of 0.54 mg/dL (L)).      Results from last 7 days   Lab Units 03/14/21  0454 03/13/21  0409 03/12/21  2213   WBC 10*3/mm3 10.84* 11.09* 17.05*   RBC 10*6/mm3 3.78 3.91 4.83   HEMOGLOBIN g/dL 11.7* 11.5* 14.7    HEMATOCRIT % 34.8 36.1 44.9   MCV fL 92.1 92.3 93.0   MCH pg 31.0 29.4 30.4   MCHC g/dL 33.6 31.9 32.7   RDW % 12.2* 12.1* 12.1*   RDW-SD fl 40.6 40.3 40.8   MPV fL 11.1 10.9 11.3   PLATELETS 10*3/mm3 212 231 341   NEUTROPHIL % %  --  91.4* 58.8   LYMPHOCYTE % %  --  4.4* 27.2   MONOCYTES % %  --  3.5* 7.5   EOSINOPHIL % %  --  0.1* 5.7   BASOPHIL % %  --  0.1 0.6   IMM GRAN % %  --  0.5 0.2   NEUTROS ABS 10*3/mm3  --  10.14* 10.01*   LYMPHS ABS 10*3/mm3  --  0.49* 4.63*   MONOS ABS 10*3/mm3  --  0.39 1.28*   EOS ABS 10*3/mm3  --  0.01 0.98*   BASOS ABS 10*3/mm3  --  0.01 0.11   IMMATURE GRANS (ABS) 10*3/mm3  --  0.05 0.04   NRBC /100 WBC  --  0.0 0.0     Results from last 7 days   Lab Units 03/17/21  1231   PH, ARTERIAL pH units 7.457*   PO2 ART mm Hg 58.6*   PCO2, ARTERIAL mm Hg 37.3   HCO3 ART mmol/L 26.4     Results from last 7 days   Lab Units 03/17/21  1358 03/13/21  1512 03/13/21  1202   TROPONIN T ng/mL 0.031* 0.258* 0.302*     Results from last 7 days   Lab Units 03/12/21  2213   PROBNP pg/mL 60.2         Results from last 7 days   Lab Units 03/13/21  0528 03/12/21  2213   LACTATE mmol/L  --  1.5   PROCALCITONIN ng/mL 0.12 0.07         Microbiology Results (last 10 days)     Procedure Component Value - Date/Time    Respiratory Culture - Sputum, Cough [[id number]] Collected: 03/13/21 0850    Lab Status: Final result Specimen: Sputum from Cough Updated: 03/15/21 0811     Respiratory Culture Scant growth (1+) Normal Respiratory Jess: NO S.aureus/MRSA or Pseudomonas aeruginosa     Gram Stain Rare (1+) WBCs seen      No organisms seen    Blood Culture - Blood, Arm, Right [[id number]] Collected: 03/12/21 4223    Lab Status: Final result Specimen: Blood from Arm, Right Updated: 03/18/21 0015     Blood Culture No growth at 5 days    Respiratory Panel PCR w/COVID-19(SARS-CoV-2) [facility] In-House, NP Swab in UTM/VTM, 3-4 HR TAT - Swab, Nasopharynx [[id number]]  (Normal) Collected: 03/12/21 0643     Lab Status: Final result Specimen: Swab from Nasopharynx Updated: 03/12/21 2321     ADENOVIRUS, PCR Not Detected     Coronavirus 229E Not Detected     Coronavirus HKU1 Not Detected     Coronavirus NL63 Not Detected     Coronavirus OC43 Not Detected     COVID19 Not Detected     Human Metapneumovirus Not Detected     Human Rhinovirus/Enterovirus Not Detected     Influenza A PCR Not Detected     Influenza B PCR Not Detected     Parainfluenza Virus 1 Not Detected     Parainfluenza Virus 2 Not Detected     Parainfluenza Virus 3 Not Detected     Parainfluenza Virus 4 Not Detected     RSV, PCR Not Detected     Bordetella pertussis pcr Not Detected     Bordetella parapertussis PCR Not Detected     Chlamydophila pneumoniae PCR Not Detected     Mycoplasma pneumo by PCR Not Detected    Narrative:      Fact sheet for providers: https://docs."[employer]/wp-content/uploads/EVH7200-8164-DX0.1-EUA-Provider-Fact-Sheet-3.pdf    Fact sheet for patients: https://docs."[employer]/wp-content/uploads/GPG8981-2899-FI6.1-EUA-Patient-Fact-Sheet-1.pdf    Test performed by PCR.    Blood Culture - Blood, Arm, Right [[id number]] Collected: 03/12/21 2213    Lab Status: Final result Specimen: Blood from Arm, Right Updated: 03/17/21 2330     Blood Culture No growth at 5 days              apixaban, 5 mg, Oral, Q12H  docusate sodium, 100 mg, Oral, BID  escitalopram, 5 mg, Oral, Daily  guaiFENesin, 1,200 mg, Oral, Q12H  insulin lispro, 0-24 Units, Subcutaneous, Q6H  ipratropium-albuterol, 3 mL, Nebulization, 4x Daily - RT  nicotine, 1 patch, Transdermal, Q24H  predniSONE, 10 mg, Oral, Daily With Breakfast  sodium chloride, 10 mL, Intravenous, Q12H  sodium chloride, 4 mL, Nebulization, BID - RT      dexmedetomidine, 0.2-1.5 mcg/kg/hr, Last Rate: Stopped (03/16/21 1700)        Diagnostics:  XR Chest AP    Result Date: 3/12/2021  Patient: JEAN-PIERRE CHAVEZ  Time Out: 23:01 Exam(s): FILM CXR 1 VIEW EXAM:   XR Chest, 1 View CLINICAL HISTORY:    Reason  for exam: COVID Evaluation, Cough, Fever. TECHNIQUE:   Frontal view of the chest. COMPARISON:   9 30 19 FINDINGS:   Negative for significant cardiac enlargement.  Hyperinflation of both lungs consistent with COPD.  Patchy densities in both lung bases atelectasis or pneumonia.  Negative for pneumothorax or significant pleural fluid collections.     Electronically signed by Jake Nair DO on 03-12-21 at 2301    Results for orders placed during the hospital encounter of 03/12/21    Adult Transthoracic Echo Complete W/ Cont if Necessary Per Protocol    Interpretation Summary  · Left ventricular diastolic function was normal.  · The study is technically difficult for diagnosis.  · There is akinesis of the basal inferoseptum, basal inferior wall, and the basal inferolateral wall. The remainder of the segments appear to be hyperdynamic  · Left ventricular ejection fraction appears to be 46 - 50%.  · Left ventricular diastolic function was normal.  · Normal right ventricular cavity size and systolic function noted.  · There is no evidence of pericardial effusion          Active Hospital Problems    Diagnosis  POA   • Acute respiratory failure (CMS/[facility]) [J96.00]  Yes      Resolved Hospital Problems   No resolved problems to display.         Assessment/Plan     1. Pneumonia community-acquired  completed azithromycin and Rocephin.  2. Sepsis with septic shock she is doing well off vasopressors   3. Acute exacerbation COPD continue steroids add bronchodilators.  Steroids exacerbate her anxiety we have been weaning  down the dose she is doing much better  4. Acute hypoxic and hypercapnic respiratory failure improving.  5. Chest pain sounded somewhat pleuritic no pulmonary embolus resolved.  6. Elevated troponin normal renal functions likely represents a non-ST elevation MI type II from demand ischemia but with wall motion abnormalities on echocardiogram I ask cardiology to see and they would like to get a stress test she has  tentatively a Lexiscan stress scheduled for Sunday  7. Malnutrition  protein calorie we discussed dietary changes.  I think this is all pulmonary cachexia she needs frequent small meals and nutritional supplements.  She is doing better.  8. Tobacco abuse needs to stop start BlayneDerm discussed with her again today!  9. Hyperglycemia likely stress and steroid as etiology hemoglobin A1c was normal.  Sliding scale insulin  10. Anemia B12 and folate normal iron stores are okay TIBC and transferrin are low consistent with anemia of chronic disease  11. Fluids/electrolytes/nutrition taking p.o. encourage nutritional supplements  12. Anxiety she clearly has a lot of anxiety doing better think the steroids were a major factor.  Continue Lexapro    Plan for disposition:     Donald Ryder MD  03/19/21  14:58 EDT    Time:

## 2021-03-20 LAB
GLUCOSE BLDC GLUCOMTR-MCNC: 106 MG/DL (ref 70–130)
GLUCOSE BLDC GLUCOMTR-MCNC: 136 MG/DL (ref 70–130)
GLUCOSE BLDC GLUCOMTR-MCNC: 81 MG/DL (ref 70–130)
GLUCOSE BLDC GLUCOMTR-MCNC: 92 MG/DL (ref 70–130)

## 2021-03-20 PROCEDURE — 94799 UNLISTED PULMONARY SVC/PX: CPT

## 2021-03-20 PROCEDURE — 82962 GLUCOSE BLOOD TEST: CPT

## 2021-03-20 PROCEDURE — 94668 MNPJ CHEST WALL SBSQ: CPT

## 2021-03-20 PROCEDURE — 63710000001 PREDNISONE PER 5 MG: Performed by: INTERNAL MEDICINE

## 2021-03-20 PROCEDURE — 99232 SBSQ HOSP IP/OBS MODERATE 35: CPT | Performed by: NURSE PRACTITIONER

## 2021-03-20 PROCEDURE — 94669 MECHANICAL CHEST WALL OSCILL: CPT

## 2021-03-20 RX ORDER — LISINOPRIL 2.5 MG/1
2.5 TABLET ORAL
Status: DISCONTINUED | OUTPATIENT
Start: 2021-03-20 | End: 2021-03-22 | Stop reason: HOSPADM

## 2021-03-20 RX ADMIN — IPRATROPIUM BROMIDE AND ALBUTEROL SULFATE 3 ML: 2.5; .5 SOLUTION RESPIRATORY (INHALATION) at 07:26

## 2021-03-20 RX ADMIN — DOCUSATE SODIUM 100 MG: 100 CAPSULE, LIQUID FILLED ORAL at 09:37

## 2021-03-20 RX ADMIN — IPRATROPIUM BROMIDE AND ALBUTEROL SULFATE 3 ML: 2.5; .5 SOLUTION RESPIRATORY (INHALATION) at 10:43

## 2021-03-20 RX ADMIN — DOCUSATE SODIUM 100 MG: 100 CAPSULE, LIQUID FILLED ORAL at 21:12

## 2021-03-20 RX ADMIN — SODIUM CHLORIDE, PRESERVATIVE FREE 10 ML: 5 INJECTION INTRAVENOUS at 09:38

## 2021-03-20 RX ADMIN — SODIUM CHLORIDE, PRESERVATIVE FREE 10 ML: 5 INJECTION INTRAVENOUS at 21:12

## 2021-03-20 RX ADMIN — SODIUM CHLORIDE 30 MG/ML INHALATION SOLUTION 4 ML: 30 SOLUTION INHALANT at 07:30

## 2021-03-20 RX ADMIN — GUAIFENESIN 1200 MG: 600 TABLET, EXTENDED RELEASE ORAL at 09:37

## 2021-03-20 RX ADMIN — PREDNISONE 10 MG: 10 TABLET ORAL at 09:37

## 2021-03-20 RX ADMIN — Medication 1 PATCH: at 09:38

## 2021-03-20 RX ADMIN — ESCITALOPRAM 5 MG: 5 TABLET, FILM COATED ORAL at 09:37

## 2021-03-20 RX ADMIN — SODIUM CHLORIDE 30 MG/ML INHALATION SOLUTION 4 ML: 30 SOLUTION INHALANT at 19:21

## 2021-03-20 RX ADMIN — IPRATROPIUM BROMIDE AND ALBUTEROL SULFATE 3 ML: 2.5; .5 SOLUTION RESPIRATORY (INHALATION) at 15:50

## 2021-03-20 RX ADMIN — GUAIFENESIN 1200 MG: 600 TABLET, EXTENDED RELEASE ORAL at 21:12

## 2021-03-20 RX ADMIN — IPRATROPIUM BROMIDE AND ALBUTEROL SULFATE 3 ML: 2.5; .5 SOLUTION RESPIRATORY (INHALATION) at 19:21

## 2021-03-20 RX ADMIN — LISINOPRIL 2.5 MG: 2.5 TABLET ORAL at 18:39

## 2021-03-20 RX ADMIN — APIXABAN 5 MG: 5 TABLET, FILM COATED ORAL at 09:37

## 2021-03-20 RX ADMIN — APIXABAN 5 MG: 5 TABLET, FILM COATED ORAL at 21:12

## 2021-03-20 NOTE — PROGRESS NOTES
[facility] INPATIENT PROGRESS NOTE         [address]    3/20/2021      PATIENT IDENTIFICATION:  Name: Scarlet Chakraborty ADMIT: 3/12/2021   : 1960  PCP: Margaret Ca PA    MRN: [id number] LOS: 8 days   AGE/SEX: 61 y.o. female  ROOM: [facility]                     LOS 8    Reason for visit: Follow-up sepsis with pneumonia      SUBJECTIVE:      Sitting in bedside chair.  Says that she feels that her breathing is a little better today compared to yesterday.  Is hoping that she can get up and ambulate some today with assistance.  Denies productive cough of significance or chest pain.  Diminished breath sounds on auscultation.  No significant wheezing.  I am seeing the patient for the first time today.  All patient problems are new to me.      Objective   OBJECTIVE:    Vital Sign Min/Max for last 24 hours  Temp  Min: 98.1 °F (36.7 °C)  Max: 98.5 °F (36.9 °C)   BP  Min: 107/59  Max: 128/63   Pulse  Min: 84  Max: 98   Resp  Min: 16  Max: 18   SpO2  Min: 92 %  Max: 97 %   No data recorded   Weight  Min: 39.7 kg (87 lb 9.6 oz)  Max: 39.7 kg (87 lb 9.6 oz)                   FiO2 (%): 25 %     Body mass index is 16.02 kg/m².    Intake/Output Summary (Last 24 hours) at 3/20/2021 1404  Last data filed at 3/20/2021 1239  Gross per 24 hour   Intake 1320 ml   Output --   Net 1320 ml         Exam:  GEN:  No distress, appears stated age  EYES:   PERRL, anicteric sclera  ENT:    External ears/nose normal, OP clear  NECK:  No adenopathy, midline trachea  LUNGS: Normal chest on inspection, palpation and diminished breath sounds bilaterally on auscultation  CV:  Normal S1S2, without murmur  ABD:  Non tender, non distended, no hepatosplenomegaly, +BS  EXT:  No edema, cyanosis or clubbing    Assessment     Scheduled meds:  apixaban, 5 mg, Oral, Q12H  docusate sodium, 100 mg, Oral, BID  escitalopram, 5 mg, Oral, Daily  guaiFENesin, 1,200 mg, Oral, Q12H  insulin lispro, 0-24 Units, Subcutaneous,  Q6H  ipratropium-albuterol, 3 mL, Nebulization, 4x Daily - RT  nicotine, 1 patch, Transdermal, Q24H  predniSONE, 10 mg, Oral, Daily With Breakfast  sodium chloride, 10 mL, Intravenous, Q12H  sodium chloride, 4 mL, Nebulization, BID - RT      IV meds:                      dexmedetomidine, 0.2-1.5 mcg/kg/hr, Last Rate: Stopped (03/16/21 1700)      Data Review:  Results from last 7 days   Lab Units 03/19/21  0539 03/16/21  0401 03/15/21  0447 03/14/21  0454   SODIUM mmol/L 143 141 145 138   POTASSIUM mmol/L 3.4* 4.9 3.3* 4.0   CHLORIDE mmol/L 105 108* 112* 109*   CO2 mmol/L 28.5 27.2 24.1 21.7*   BUN mg/dL 13 16 11 19   CREATININE mg/dL 0.54* 0.46* 0.34* 0.46*   GLUCOSE mg/dL 78 124* 119* 160*   CALCIUM mg/dL 8.3* 8.4* 6.9* 8.3*         Estimated Creatinine Clearance: 69.4 mL/min (A) (by C-G formula based on SCr of 0.54 mg/dL (L)).  Results from last 7 days   Lab Units 03/14/21  0454   WBC 10*3/mm3 10.84*   HEMOGLOBIN g/dL 11.7*   PLATELETS 10*3/mm3 212             Results from last 7 days   Lab Units 03/17/21  1231 03/15/21  0752 03/15/21  0232 03/14/21  1058 03/14/21  1043   PH, ARTERIAL pH units 7.457* 7.412 7.296* 7.427 7.367   PO2 ART mm Hg 58.6* 115.7* 53.4* 72.8* 38.8*   PCO2, ARTERIAL mm Hg 37.3 46.7* 59.9* 36.5 41.8   HCO3 ART mmol/L 26.4 29.8* 29.2* 24.1 24.0               2D echo 3/17/2021 reviewed: EF 46%. Akinesis of the basal inferoseptal inferolateral wall.      CT chest 3/17/2021 reviewed: Severe emphysema bronchial wall thickening with mucus plugging.        Microbiology reviewed  )        Active Hospital Problems    Diagnosis  POA   • Acute respiratory failure (CMS/HCC) [J96.00]  Yes      Resolved Hospital Problems   No resolved problems to display.         ASSESSMENT:  Community-acquired pneumonia  Sepsis with septic shock: Improved  COPD acute exacerbation  Acute hypoxic and hypercapnic respiratory failure  Atypical chest pain  Elevated troponin/type II NSTEMI  Cachexia with protein calorie  malnutrition  Tobacco abuse: Encouraged to quit  Hyperglycemia  Anemia  Anxiety disorder  Hypokalemia  Mucus plugging on CT chest with right middle lobe atelectasis      PLAN:  Continue bronchodilators and steroid. Weaning oxygen as able.  Encourage pulmonary toilet. Plan to repeat CT scan in 2 to 3 months to assure resolution of atelectasis with mucous plugging.  Increase activity.  Completed antibiotic course.  Suspect troponin leak due to demand ischemia. Cardiology consulted. Plan for Lexiscan stress test tomorrow.  Encourage her to quit smoking.        Holden Tracy MD  Pulmonary and Critical Care Medicine  [facility], [facility]  3/20/2021    14:04 EDT

## 2021-03-20 NOTE — PROGRESS NOTES
HOSPITAL PROGRESS NOTE    Date of Service: 21  LOS:  LOS: 8 days   Patient Name: Scarlet Chakraborty  Age/Sex: 61 y.o. female  : 1960  MRN: [id number]  Primary Cardiologist: Dr. Emi Boston     Subjective:     Chief Complaint/Follow up:   Elevated Troponin, Dyspnea, Respiratory Failure    Interval History:   Sitting up in bed.  Denies any further chest pain.  She is no longer on oxygen and was able to ambulate in the hallway today without shortness of breath.  Overall she is feeling much better.    Objective:     Objective:  Temp:  [98.1 °F (36.7 °C)-98.5 °F (36.9 °C)] 98.1 °F (36.7 °C)  Heart Rate:  [84-96] 96  Resp:  [16-18] 18  BP: (107-128)/(56-63) 119/57  Body mass index is 16.02 kg/m².    Intake/Output Summary (Last 24 hours) at 3/20/2021 1649  Last data filed at 3/20/2021 1239  Gross per 24 hour   Intake 1320 ml   Output --   Net 1320 ml         21  0546 21  0500 21  0537   Weight: 40.9 kg (90 lb 2.7 oz) 46 kg (101 lb 6.4 oz) 39.7 kg (87 lb 9.6 oz)     Weight change: -6.26 kg (-13 lb 12.8 oz)    Physical Exam:   General Appearance: Alert, cooperative, in no acute distress. AAOx4.  Very thin.  HEENT: Normocephalic.  Neck: Supple. No JVD. No carotid bruit. No thyromegaly  Lungs: Diminished.  No wheezing noted.  Normal respiratory effort and rate.  Heart: Regular rate and rhythm, normal S1 and S2, no murmurs, gallops or rubs.  Abdomen: Soft, nontender, nondistended. Positive bowel sounds  Extremities: Warm, no cyanosis, or clubbing. No edema.     Lab Review:   Results from last 7 days   Lab Units 21  0539 21  0401   SODIUM mmol/L 143 141   POTASSIUM mmol/L 3.4* 4.9   CHLORIDE mmol/L 105 108*   CO2 mmol/L 28.5 27.2   BUN mg/dL 13 16   CREATININE mg/dL 0.54* 0.46*   GLUCOSE mg/dL 78 124*   CALCIUM mg/dL 8.3* 8.4*     Results from last 7 days   Lab Units 21  1358   TROPONIN T ng/mL 0.031*     Results from last 7 days   Lab Units 21  0454   WBC 10*3/mm3  10.84*   HEMOGLOBIN g/dL 11.7*   HEMATOCRIT % 34.8   PLATELETS 10*3/mm3 212         Results from last 7 days   Lab Units 03/16/21  0401 03/15/21  0447   MAGNESIUM mg/dL 2.6* 1.7                   Results for orders placed during the hospital encounter of 03/12/21    Adult Transthoracic Echo Complete W/ Cont if Necessary Per Protocol    Interpretation Summary  · Left ventricular diastolic function was normal.  · The study is technically difficult for diagnosis.  · There is akinesis of the basal inferoseptum, basal inferior wall, and the basal inferolateral wall. The remainder of the segments appear to be hyperdynamic  · Left ventricular ejection fraction appears to be 46 - 50%.  · Left ventricular diastolic function was normal.  · Normal right ventricular cavity size and systolic function noted.  · There is no evidence of pericardial effusion    I reviewed the patient's new clinical results.  I personally viewed and interpreted the patient's EKG/Telemetry data/Labs/Test Results.     Current Medications:   Scheduled Meds:apixaban, 5 mg, Oral, Q12H  docusate sodium, 100 mg, Oral, BID  escitalopram, 5 mg, Oral, Daily  guaiFENesin, 1,200 mg, Oral, Q12H  insulin lispro, 0-24 Units, Subcutaneous, Q6H  ipratropium-albuterol, 3 mL, Nebulization, 4x Daily - RT  nicotine, 1 patch, Transdermal, Q24H  predniSONE, 10 mg, Oral, Daily With Breakfast  sodium chloride, 10 mL, Intravenous, Q12H  sodium chloride, 4 mL, Nebulization, BID - RT      Continuous Infusions:dexmedetomidine, 0.2-1.5 mcg/kg/hr, Last Rate: Stopped (03/16/21 1700)        Allergies:  No Known Allergies    Assessment:       Acute respiratory failure (CMS/[facility])        Plan:   Assessment/Plan       1.  Acute on chronic hypoxic respiratory failure-secondary to COPD with acute exacerbation, community-acquired pneumonia and anxiety per pulmonary.  Extubated 3/14.  Transferred out of critical care on 3/18.    2.  Atypical chest pain-elevated troponin likely due to hypoxic  respiratory failure.  Echo showed reduced LV systolic function and wall motion abnormalities.  No wheezing noted today.  I ordered Lexiscan Myoview stress test tomorrow.  Normal coronary arteries in 2018.  Patient denies further chest pain.    3.  History of stress-induced cardiomyopathy-not on beta-blocker due to severe lung disease.  I resumed her lisinopril 2.5 mg daily.  Monitor blood pressure.    4.  History of stroke-on apixaban.  Linq monitor in place.    5.  Tobacco abuse-smoking cessation highly recommended.    6.  Hypokalemia-K+ 3.4 today.  I have ordered the potassium replacement protocol.    7.  She would like to go home soon.  Possibly if her stress test is normal tomorrow, she may be able to go home if okay with pulmonology.  She will need a follow-up visit with our SHANKAR Luu in 1 week.    SHANKAR Engle  [facility]   03/20/21  16:49 EDT

## 2021-03-21 ENCOUNTER — APPOINTMENT (OUTPATIENT)
Dept: NUCLEAR MEDICINE | Facility: HOSPITAL | Age: 61
End: 2021-03-21

## 2021-03-21 LAB
ANION GAP SERPL CALCULATED.3IONS-SCNC: 7.7 MMOL/L (ref 5–15)
BH CV NUCLEAR PRIOR STUDY: 3
BH CV REST NUCLEAR ISOTOPE DOSE: 10.8 MCI
BH CV STRESS COMMENTS STAGE 1: NORMAL
BH CV STRESS DOSE REGADENOSON STAGE 1: 0.4
BH CV STRESS DURATION MIN STAGE 1: 0
BH CV STRESS DURATION SEC STAGE 1: 10
BH CV STRESS NUCLEAR ISOTOPE DOSE: 34.6 MCI
BH CV STRESS PROTOCOL 1: NORMAL
BH CV STRESS RECOVERY BP: NORMAL MMHG
BH CV STRESS RECOVERY HR: 119 BPM
BH CV STRESS STAGE 1: 1
BUN SERPL-MCNC: 12 MG/DL (ref 8–23)
BUN/CREAT SERPL: 30 (ref 7–25)
CALCIUM SPEC-SCNC: 8.5 MG/DL (ref 8.6–10.5)
CHLORIDE SERPL-SCNC: 107 MMOL/L (ref 98–107)
CO2 SERPL-SCNC: 24.3 MMOL/L (ref 22–29)
CREAT SERPL-MCNC: 0.4 MG/DL (ref 0.57–1)
GFR SERPL CREATININE-BSD FRML MDRD: >150 ML/MIN/1.73
GLUCOSE BLDC GLUCOMTR-MCNC: 108 MG/DL (ref 70–130)
GLUCOSE BLDC GLUCOMTR-MCNC: 119 MG/DL (ref 70–130)
GLUCOSE BLDC GLUCOMTR-MCNC: 131 MG/DL (ref 70–130)
GLUCOSE BLDC GLUCOMTR-MCNC: 76 MG/DL (ref 70–130)
GLUCOSE BLDC GLUCOMTR-MCNC: 97 MG/DL (ref 70–130)
GLUCOSE SERPL-MCNC: 80 MG/DL (ref 65–99)
LV EF NUC BP: 73 %
MAXIMAL PREDICTED HEART RATE: 160 BPM
PERCENT MAX PREDICTED HR: 125 %
POTASSIUM SERPL-SCNC: 3.6 MMOL/L (ref 3.5–5.2)
SODIUM SERPL-SCNC: 139 MMOL/L (ref 136–145)
STRESS BASELINE BP: NORMAL MMHG
STRESS BASELINE HR: 98 BPM
STRESS PERCENT HR: 147 %
STRESS POST PEAK BP: NORMAL MMHG
STRESS POST PEAK HR: 200 BPM
STRESS TARGET HR: 136 BPM

## 2021-03-21 PROCEDURE — 25010000002 REGADENOSON 0.4 MG/5ML SOLUTION: Performed by: INTERNAL MEDICINE

## 2021-03-21 PROCEDURE — 82962 GLUCOSE BLOOD TEST: CPT

## 2021-03-21 PROCEDURE — 78452 HT MUSCLE IMAGE SPECT MULT: CPT

## 2021-03-21 PROCEDURE — 78452 HT MUSCLE IMAGE SPECT MULT: CPT | Performed by: INTERNAL MEDICINE

## 2021-03-21 PROCEDURE — A9500 TC99M SESTAMIBI: HCPCS | Performed by: INTERNAL MEDICINE

## 2021-03-21 PROCEDURE — 93016 CV STRESS TEST SUPVJ ONLY: CPT | Performed by: INTERNAL MEDICINE

## 2021-03-21 PROCEDURE — 94799 UNLISTED PULMONARY SVC/PX: CPT

## 2021-03-21 PROCEDURE — 0 TECHNETIUM SESTAMIBI: Performed by: INTERNAL MEDICINE

## 2021-03-21 PROCEDURE — 93017 CV STRESS TEST TRACING ONLY: CPT

## 2021-03-21 PROCEDURE — 99231 SBSQ HOSP IP/OBS SF/LOW 25: CPT | Performed by: INTERNAL MEDICINE

## 2021-03-21 PROCEDURE — 63710000001 PREDNISONE PER 5 MG: Performed by: INTERNAL MEDICINE

## 2021-03-21 PROCEDURE — 93018 CV STRESS TEST I&R ONLY: CPT | Performed by: INTERNAL MEDICINE

## 2021-03-21 PROCEDURE — 80048 BASIC METABOLIC PNL TOTAL CA: CPT | Performed by: NURSE PRACTITIONER

## 2021-03-21 PROCEDURE — 94669 MECHANICAL CHEST WALL OSCILL: CPT

## 2021-03-21 RX ADMIN — SODIUM CHLORIDE 30 MG/ML INHALATION SOLUTION 4 ML: 30 SOLUTION INHALANT at 07:30

## 2021-03-21 RX ADMIN — PREDNISONE 10 MG: 10 TABLET ORAL at 13:01

## 2021-03-21 RX ADMIN — LISINOPRIL 2.5 MG: 2.5 TABLET ORAL at 13:01

## 2021-03-21 RX ADMIN — SODIUM CHLORIDE, PRESERVATIVE FREE 10 ML: 5 INJECTION INTRAVENOUS at 13:04

## 2021-03-21 RX ADMIN — REGADENOSON 0.4 MG: 0.08 INJECTION, SOLUTION INTRAVENOUS at 09:47

## 2021-03-21 RX ADMIN — APIXABAN 5 MG: 5 TABLET, FILM COATED ORAL at 13:01

## 2021-03-21 RX ADMIN — APIXABAN 5 MG: 5 TABLET, FILM COATED ORAL at 20:33

## 2021-03-21 RX ADMIN — IPRATROPIUM BROMIDE AND ALBUTEROL SULFATE 3 ML: 2.5; .5 SOLUTION RESPIRATORY (INHALATION) at 15:36

## 2021-03-21 RX ADMIN — SODIUM CHLORIDE, PRESERVATIVE FREE 10 ML: 5 INJECTION INTRAVENOUS at 20:34

## 2021-03-21 RX ADMIN — IPRATROPIUM BROMIDE AND ALBUTEROL SULFATE 3 ML: 2.5; .5 SOLUTION RESPIRATORY (INHALATION) at 07:25

## 2021-03-21 RX ADMIN — GUAIFENESIN 1200 MG: 600 TABLET, EXTENDED RELEASE ORAL at 20:33

## 2021-03-21 RX ADMIN — SODIUM CHLORIDE 30 MG/ML INHALATION SOLUTION 4 ML: 30 SOLUTION INHALANT at 20:13

## 2021-03-21 RX ADMIN — ESCITALOPRAM 5 MG: 5 TABLET, FILM COATED ORAL at 13:01

## 2021-03-21 RX ADMIN — IPRATROPIUM BROMIDE AND ALBUTEROL SULFATE 3 ML: 2.5; .5 SOLUTION RESPIRATORY (INHALATION) at 20:13

## 2021-03-21 RX ADMIN — TECHNETIUM TC 99M SESTAMIBI 1 DOSE: 1 INJECTION INTRAVENOUS at 07:29

## 2021-03-21 RX ADMIN — GUAIFENESIN 1200 MG: 600 TABLET, EXTENDED RELEASE ORAL at 13:01

## 2021-03-21 RX ADMIN — TECHNETIUM TC 99M SESTAMIBI 1 DOSE: 1 INJECTION INTRAVENOUS at 09:46

## 2021-03-21 NOTE — PROGRESS NOTES
Hospital Follow Up    LOS:  LOS: 9 days   Patient Name: Scarlet Chakraborty  Age/Sex: 61 y.o. female  : 1960  MRN: [id number]    Day of Service: 21   Length of Stay: 9  Encounter Provider: Farhan Jcakson MD  Place of Service: [facility]  Patient Care Team:  Margaret Ca PA as PCP - General (Family Medicine)  Beulah Joshi APRN as Nurse Practitioner (Neurology)    Subjective:     Chief Complaint: Shortness of breath.    Interval History: Patient says she is feeling better today.    Objective:     Objective:  Temp:  [98.1 °F (36.7 °C)-98.7 °F (37.1 °C)] 98.7 °F (37.1 °C)  Heart Rate:  [] 88  Resp:  [18] 18  BP: (114-132)/(57-80) 130/62     Intake/Output Summary (Last 24 hours) at 3/21/2021 0918  Last data filed at 3/20/2021 1736  Gross per 24 hour   Intake 720 ml   Output --   Net 720 ml     Body mass index is 15.69 kg/m².      21  0500 21  0537 21  0518   Weight: 46 kg (101 lb 6.4 oz) 39.7 kg (87 lb 9.6 oz) 38.9 kg (85 lb 12.8 oz)     Weight change: -0.816 kg (-1 lb 12.8 oz)      Physical Exam:   General : Alert, cooperative, in no acute distress.  Neuro: Alert,cooperative and oriented.  Lungs: CTAB. Normal respiratory effort and rate.  CV: Regular rate and rhythm, normal S1 and S2, no murmurs, gallops or rubs.  ABD: Soft, nontender, nondistended. Positive bowel sounds.  Extr: No edema or cyanosis, moves all extremities.    Lab Review:   Results from last 7 days   Lab Units 21  0450 21  0539   SODIUM mmol/L 139 143   POTASSIUM mmol/L 3.6 3.4*   CHLORIDE mmol/L 107 105   CO2 mmol/L 24.3 28.5   BUN mg/dL 12 13   CREATININE mg/dL 0.40* 0.54*   GLUCOSE mg/dL 80 78   CALCIUM mg/dL 8.5* 8.3*     Results from last 7 days   Lab Units 21  1358   TROPONIN T ng/mL 0.031*             Results from last 7 days   Lab Units 21  0401 03/15/21  0447   MAGNESIUM mg/dL 2.6* 1.7           Invalid input(s):  LDLCALC            Current Medications:   Scheduled Meds:apixaban, 5 mg, Oral, Q12H  docusate sodium, 100 mg, Oral, BID  escitalopram, 5 mg, Oral, Daily  guaiFENesin, 1,200 mg, Oral, Q12H  insulin lispro, 0-24 Units, Subcutaneous, Q6H  ipratropium-albuterol, 3 mL, Nebulization, 4x Daily - RT  lisinopril, 2.5 mg, Oral, Q24H  nicotine, 1 patch, Transdermal, Q24H  predniSONE, 10 mg, Oral, Daily With Breakfast  sodium chloride, 10 mL, Intravenous, Q12H  sodium chloride, 4 mL, Nebulization, BID - RT      Continuous Infusions:dexmedetomidine, 0.2-1.5 mcg/kg/hr, Last Rate: Stopped (03/16/21 1700)        Allergies:  No Known Allergies    Assessment:       Acute respiratory failure (CMS/[facility])        Plan:   1.  COPD exacerbation.  2.  Atypical chest discomfort troponin minimally elevated.  Plan is to do a stress test today.  3.  Stress-induced cardiomyopathy  4.  Possible discharge today if stress test is unremarkable.        Farhan Jackson MD  03/21/21  09:18 EDT

## 2021-03-21 NOTE — PROGRESS NOTES
[facility] INPATIENT PROGRESS NOTE         [address]    3/21/2021      PATIENT IDENTIFICATION:  Name: Scarlet Chakraborty ADMIT: 3/12/2021   : 1960  PCP: Margaret Ca PA    MRN: [id number] LOS: 9 days   AGE/SEX: 61 y.o. female  ROOM: [facility]                     LOS 9    Reason for visit: Follow-up sepsis with pneumonia      SUBJECTIVE:      Resting comfortably.  No new complaints.  Plan for stress test today.      Objective   OBJECTIVE:    Vital Sign Min/Max for last 24 hours  Temp  Min: 98.1 °F (36.7 °C)  Max: 98.7 °F (37.1 °C)   BP  Min: 114/80  Max: 132/62   Pulse  Min: 88  Max: 110   Resp  Min: 18  Max: 18   SpO2  Min: 90 %  Max: 97 %   No data recorded   Weight  Min: 38.9 kg (85 lb 12.8 oz)  Max: 38.9 kg (85 lb 12.8 oz)                   FiO2 (%): 25 %     Body mass index is 15.69 kg/m².    Intake/Output Summary (Last 24 hours) at 3/21/2021 0902  Last data filed at 3/20/2021 1736  Gross per 24 hour   Intake 720 ml   Output --   Net 720 ml         Exam:  GEN:  No distress, appears stated age  EYES:   PERRL, anicteric sclera  ENT:    External ears/nose normal, OP clear  NECK:  No adenopathy, midline trachea  LUNGS: Normal chest on inspection, palpation and diminished breath sounds bilaterally on auscultation  CV:  Normal S1S2, without murmur  ABD:  Non tender, non distended, no hepatosplenomegaly, +BS  EXT:  No edema, cyanosis or clubbing    Assessment     Scheduled meds:  apixaban, 5 mg, Oral, Q12H  docusate sodium, 100 mg, Oral, BID  escitalopram, 5 mg, Oral, Daily  guaiFENesin, 1,200 mg, Oral, Q12H  insulin lispro, 0-24 Units, Subcutaneous, Q6H  ipratropium-albuterol, 3 mL, Nebulization, 4x Daily - RT  lisinopril, 2.5 mg, Oral, Q24H  nicotine, 1 patch, Transdermal, Q24H  predniSONE, 10 mg, Oral, Daily With Breakfast  sodium chloride, 10 mL, Intravenous, Q12H  sodium chloride, 4 mL, Nebulization, BID - RT      IV meds:                      dexmedetomidine, 0.2-1.5  mcg/kg/hr, Last Rate: Stopped (03/16/21 1700)      Data Review:  Results from last 7 days   Lab Units 03/21/21  0450 03/19/21  0539 03/16/21  0401 03/15/21  0447   SODIUM mmol/L 139 143 141 145   POTASSIUM mmol/L 3.6 3.4* 4.9 3.3*   CHLORIDE mmol/L 107 105 108* 112*   CO2 mmol/L 24.3 28.5 27.2 24.1   BUN mg/dL 12 13 16 11   CREATININE mg/dL 0.40* 0.54* 0.46* 0.34*   GLUCOSE mg/dL 80 78 124* 119*   CALCIUM mg/dL 8.5* 8.3* 8.4* 6.9*         Estimated Creatinine Clearance: 91.8 mL/min (A) (by C-G formula based on SCr of 0.4 mg/dL (L)).              Results from last 7 days   Lab Units 03/17/21  1231 03/15/21  0752 03/15/21  0232 03/14/21  1058 03/14/21  1043   PH, ARTERIAL pH units 7.457* 7.412 7.296* 7.427 7.367   PO2 ART mm Hg 58.6* 115.7* 53.4* 72.8* 38.8*   PCO2, ARTERIAL mm Hg 37.3 46.7* 59.9* 36.5 41.8   HCO3 ART mmol/L 26.4 29.8* 29.2* 24.1 24.0               2D echo 3/17/2021 reviewed: EF 46%. Akinesis of the basal inferoseptal inferolateral wall.      CT chest 3/17/2021 reviewed: Severe emphysema bronchial wall thickening with mucus plugging.        Microbiology reviewed  )        Active Hospital Problems    Diagnosis  POA   • Acute respiratory failure (CMS/[facility]) [J96.00]  Yes      Resolved Hospital Problems   No resolved problems to display.         ASSESSMENT:  Community-acquired pneumonia  Sepsis with septic shock: Improved  COPD acute exacerbation  Acute hypoxic and hypercapnic respiratory failure  Atypical chest pain  Elevated troponin/type II NSTEMI  Cachexia with protein calorie malnutrition  Tobacco abuse: Encouraged to quit  Hyperglycemia  Anemia  Anxiety disorder  Hypokalemia  Mucus plugging on CT chest with right middle lobe atelectasis      PLAN:  Continue bronchodilators and steroid. Weaning oxygen as able.  Encourage pulmonary toilet. Plan to repeat CT scan in 2 to 3 months to assure resolution of atelectasis with mucous plugging.  Increase activity.  Completed antibiotic course.  Suspect troponin  leak due to demand ischemia. Cardiology consulted. Plan for Lexiscan stress test today.  Encourage her to quit smoking.        Holden Tracy MD  Pulmonary and Critical Care Medicine  [facility], [facility]  3/21/2021    09:02 EDT

## 2021-03-22 VITALS
HEART RATE: 82 BPM | BODY MASS INDEX: 15.35 KG/M2 | HEIGHT: 62 IN | SYSTOLIC BLOOD PRESSURE: 132 MMHG | RESPIRATION RATE: 18 BRPM | TEMPERATURE: 97.9 F | DIASTOLIC BLOOD PRESSURE: 59 MMHG | WEIGHT: 83.4 LBS | OXYGEN SATURATION: 97 %

## 2021-03-22 LAB
ANION GAP SERPL CALCULATED.3IONS-SCNC: 6.8 MMOL/L (ref 5–15)
BUN SERPL-MCNC: 11 MG/DL (ref 8–23)
BUN/CREAT SERPL: 24.4 (ref 7–25)
CALCIUM SPEC-SCNC: 8.9 MG/DL (ref 8.6–10.5)
CHLORIDE SERPL-SCNC: 106 MMOL/L (ref 98–107)
CO2 SERPL-SCNC: 26.2 MMOL/L (ref 22–29)
CREAT SERPL-MCNC: 0.45 MG/DL (ref 0.57–1)
GFR SERPL CREATININE-BSD FRML MDRD: 142 ML/MIN/1.73
GLUCOSE BLDC GLUCOMTR-MCNC: 84 MG/DL (ref 70–130)
GLUCOSE SERPL-MCNC: 82 MG/DL (ref 65–99)
POTASSIUM SERPL-SCNC: 4 MMOL/L (ref 3.5–5.2)
SODIUM SERPL-SCNC: 139 MMOL/L (ref 136–145)

## 2021-03-22 PROCEDURE — 94799 UNLISTED PULMONARY SVC/PX: CPT

## 2021-03-22 PROCEDURE — 63710000001 PREDNISONE PER 5 MG: Performed by: INTERNAL MEDICINE

## 2021-03-22 PROCEDURE — 82962 GLUCOSE BLOOD TEST: CPT

## 2021-03-22 PROCEDURE — 80048 BASIC METABOLIC PNL TOTAL CA: CPT | Performed by: NURSE PRACTITIONER

## 2021-03-22 RX ORDER — IPRATROPIUM BROMIDE AND ALBUTEROL SULFATE 2.5; .5 MG/3ML; MG/3ML
3 SOLUTION RESPIRATORY (INHALATION) EVERY 4 HOURS PRN
Qty: 360 ML | Refills: 0 | Status: SHIPPED | OUTPATIENT
Start: 2021-03-22

## 2021-03-22 RX ADMIN — IPRATROPIUM BROMIDE AND ALBUTEROL SULFATE 3 ML: 2.5; .5 SOLUTION RESPIRATORY (INHALATION) at 07:51

## 2021-03-22 RX ADMIN — PREDNISONE 10 MG: 10 TABLET ORAL at 09:32

## 2021-03-22 RX ADMIN — GUAIFENESIN 1200 MG: 600 TABLET, EXTENDED RELEASE ORAL at 09:32

## 2021-03-22 RX ADMIN — Medication 1 PATCH: at 09:34

## 2021-03-22 RX ADMIN — SODIUM CHLORIDE 30 MG/ML INHALATION SOLUTION 4 ML: 30 SOLUTION INHALANT at 07:41

## 2021-03-22 RX ADMIN — LISINOPRIL 2.5 MG: 2.5 TABLET ORAL at 09:33

## 2021-03-22 RX ADMIN — APIXABAN 5 MG: 5 TABLET, FILM COATED ORAL at 09:32

## 2021-03-22 RX ADMIN — ESCITALOPRAM 5 MG: 5 TABLET, FILM COATED ORAL at 09:32

## 2021-03-22 NOTE — PROGRESS NOTES
Case Management Discharge Note      Final Note: DC home. Natalia Lucero RN    Provided Post Acute Provider List?: N/A  Provided Post Acute Provider Quality & Resource List?: N/A    Selected Continued Care - Discharged on 3/22/2021 Admission date: 3/12/2021 - Discharge disposition: Home or Self Care    Destination    No services have been selected for the patient.              Durable Medical Equipment    No services have been selected for the patient.              Dialysis/Infusion    No services have been selected for the patient.              Home Medical Care    No services have been selected for the patient.              Therapy    No services have been selected for the patient.              Community Resources    No services have been selected for the patient.                  Transportation Services  Private: Car    Final Discharge Disposition Code: 01 - home or self-care

## 2021-03-22 NOTE — PROGRESS NOTES
Continued Stay Note  [facility]     Patient Name: Scarlet Chakraborty  MRN: [id number]  Today's Date: 3/22/2021    Admit Date: 3/12/2021    Discharge Plan     Row Name 03/22/21 0942       Plan    Plan Comments  DC orders in EPIC.  Order noted for nebulizer @ dc.  Spoke with patient and she states that she already has one at home.  She denies any additional needs and states that she will find someone to transport her home. Natalia Lucero RN        Discharge Codes    No documentation.       Expected Discharge Date and Time     Expected Discharge Date Expected Discharge Time    Mar 22, 2021             Natalia Lucero RN

## 2021-03-22 NOTE — DISCHARGE SUMMARY
PHYSICIAN DISCHARGE SUMMARY                                                                        [facility]    Date of admit: 3/12/2021  Date of Discharge:  3/22/2021    PCP: Margaret Ca PA    Discharge Diagnosis:     Acute respiratory failure (CMS/HCC)  Community-acquired pneumonia  Sepsis with septic shock: Improved  COPD acute exacerbation  Acute hypoxic and hypercapnic respiratory failure  Atypical chest pain  Elevated troponin/type II NSTEMI  Cachexia with protein calorie malnutrition  Tobacco abuse: Encouraged to quit  Hyperglycemia  Anemia  Anxiety disorder  Hypokalemia  Mucus plugging on CT chest with right middle lobe atelectasis    Secondary Diagnoses:  Past Medical History:   Diagnosis Date   • Asthma    • Cerebrovascular accident (CVA) due to thrombosis of right middle cerebral artery (CMS/HCC)    • COPD (chronic obstructive pulmonary disease) (CMS/HCC)    • Nonischemic cardiomyopathy (CMS/HCC)    • NSTEMI (non-ST elevated myocardial infarction) (CMS/HCC)    • Stress-induced cardiomyopathy    • Stroke (CMS/HCC)    • Takotsubo cardiomyopathy    • Tobacco abuse        Procedures Performed           Consults:       Hospital Course  Patient is a 61 y.o. female presented with per H&P:       CC: Shortness of breath     History of Present Illness:  61-year-old female who presents with shortness of breath.  She cannot provide her own history because she is intubated and mechanically ventilated.  I discussed the case with Dr. Mcbride, emergency room physician.  The patient presents with shortness of breath which has been going on for about 3 or 4 days.  Gradually worsening, became very severe earlier this evening.  She has been coughing and wheezing.  Has not obtained any relief with her nebulizer.  Upon arrival EMS found the patient with oxygen saturation of 81% on room air and tachycardic with heart rate of 115 and  blood pressure 150/89.  They started her on CPAP en route.  The patient has a history of COPD.  Apparently she did not have any fever or much sputum.  I reviewed her medical records.  I reviewed the discharge summary dictated by my colleague, Dr. Clay Ryder on October 4, 2019 when she had pneumonia, Takotsubo cardiomyopathy, paroxysmal SVT, acute respiratory failure, smoker.        Patient admitted with respiratory failure with COPD exacerbation and pneumonia.  Had evidence of sepsis with septic shock.  Treated with antibiotics, bronchodilators and steroids.  Elevated troponin with type II non-ST elevation MI.  She ended up having a stress test yesterday which was not significantly abnormal.  She has improved significantly in relation to her infectious process as well as her COPD.  She is completed antibiotics.  CT scan showed mucous plugging right middle lobe with atelectasis and will require repeat CT scan in 2 to 3 months to assure resolution.  I encourage her to quit smoking.      Condition on Discharge:  Stable.      Vital Signs  Temp:  [97.9 °F (36.6 °C)-98.3 °F (36.8 °C)] 98.1 °F (36.7 °C)  Heart Rate:  [74-99] 74  Resp:  [16-20] 16  BP: (110-121)/(46-74) 119/63    Physical Exam:  General Appearance: no acute distress, appears comfortable  Heart: regular rate and rhythm  Lungs: clear to auscultation bilaterally, respirations unlabored  Abdomen: soft, nontender, nondistended, no guarding/rebound, nl BS  Extremeties: no edema, no cyanosis  Neurology: speech normal, mental status normal, moving all four extremities  Mental Status: alert, oriented        --  Consults:   IP CONSULT TO PULMONOLOGY  IP CONSULT TO CASE MANAGEMENT   IP CONSULT TO NUTRITION SERVICES  IP CONSULT TO NUTRITION SERVICES  IP CONSULT TO CARDIOLOGY    Significant Discharge Diagnostics   Procedures Performed:         Pertinent Lab Results:  Results from last 7 days   Lab Units 03/22/21  0518 03/21/21  0450 03/19/21  0539  03/16/21  0401   SODIUM mmol/L 139 139 143 141   POTASSIUM mmol/L 4.0 3.6 3.4* 4.9   CHLORIDE mmol/L 106 107 105 108*   CO2 mmol/L 26.2 24.3 28.5 27.2   BUN mg/dL 11 12 13 16   CREATININE mg/dL 0.45* 0.40* 0.54* 0.46*   GLUCOSE mg/dL 82 80 78 124*   CALCIUM mg/dL 8.9 8.5* 8.3* 8.4*     Results from last 7 days   Lab Units 03/17/21  1358   TROPONIN T ng/mL 0.031*             Results from last 7 days   Lab Units 03/16/21  0401   MAGNESIUM mg/dL 2.6*           Invalid input(s): LDLCALC          Results from last 7 days   Lab Units 03/17/21  1231   PH, ARTERIAL pH units 7.457*   PO2 ART mm Hg 58.6*   PCO2, ARTERIAL mm Hg 37.3   HCO3 ART mmol/L 26.4                                   Imaging Results:  Imaging Results (All)     Procedure Component Value Units Date/Time    CT Angiogram Chest With & Without Contrast [[id number]] Collected: 03/17/21 1339     Updated: 03/17/21 1355    Narrative:      CT ANGIOGRAM OF THE CHEST     HISTORY: Acute respiratory failure with hypoxia, COPD, pneumonia,  concern for pulmonary embolism     TECHNIQUE: Radiation dose reduction techniques were utilized, including  automated exposure control and exposure modulation based on body size.   CT angiogram of the chest was performed following the administration of  IV contrast. Multiple coronal, sagittal, and 3-D reconstruction images  were obtained. PE protocol.     COMPARISON: 01/06/2020     FINDINGS: There is no evidence for pulmonary embolism. Severe emphysema.  There is bilateral bronchial wall thickening with mucous plugging. This  may reflect bronchitis. There is mild atelectasis within the lower  lobes, right middle lobe and lingula. Stable left lower lobe nodular  density on image 117. There is some new minimal atelectasis within the  medial right middle lobe, which is presumably due to mucous plugging  within the origin of the right middle lobe bronchus. No suspicious  lymphadenopathy. Calcified mediastinal lymph nodes. No pleural  effusion.  Limited imaging the upper abdomen is unremarkable. No acute bony  abnormality.       Impression:      1. There is no evidence for pulmonary embolism  2. Stable severe emphysema  3. There is bilateral bronchial wall thickening with mucous plugging.  There is a presumed mucous plugging within the origin of the right  middle lobe bronchus causing minimal amount of atelectasis. Would  consider a follow-up study in 2-3 months to ensure clearing     Radiation dose reduction techniques were utilized, including automated  exposure control and exposure modulation based on body size.     This report was finalized on 3/17/2021 1:52 PM by Dr. Epi Gerard M.D.       XR Chest 1 View [[id number]] Collected: 03/15/21 0327     Updated: 03/15/21 0327    Narrative:        Patient: JEAN-PIERRE CHAVEZ  Time Out: 03:25  Exam(s): FILM CXR 1 VIEW     EXAM:    XR Chest, 1 View    CLINICAL HISTORY:     Reason for exam: sudden SOB.    TECHNIQUE:    Frontal view of the chest.    COMPARISON:    03 14 2021.    FINDINGS:    Lungs:  Bilateral lower lung mild pulmonary edema infiltrates.    Pleural space:  Unremarkable.  No pneumothorax.    Heart:  Unremarkable.  No cardiomegaly.    Mediastinum:  Unremarkable.    Bones joints:  Unremarkable.    IMPRESSION:         Bilateral lower lung mild pulmonary edema infiltrates.      Impression:          Electronically signed by Jude Maddox M.D. on 03-15-21 at 0325    XR Chest 1 View [[id number]] Collected: 03/14/21 0420     Updated: 03/14/21 0420    Narrative:        Patient: JEAN-PIERRE CHAVEZ  Time Out: 04:19  Exam(s): FILM CXR 1 VIEW     EXAM:    XR Chest, 1 View    CLINICAL HISTORY:     Reason for exam: Follow-up pneumonia.    TECHNIQUE:    Frontal view of the chest.    COMPARISON:    No relevant prior studies available.    FINDINGS:    Lungs:  Hyperinflated lungs.  Question opacity within the right lower   lung which may be inflammatory or infectious.    Pleural space:  Unremarkable.     Heart:  Unremarkable.    Mediastinum:  Unremarkable.    Bones joints:  Unremarkable.    Tubes, lines and devices:  Endotracheal tube terminates at the level   the brianna.  Recommend retraction by 2 cm.  Enteric tube is seen coursing   into the stomach.  Implantable loop recorder device is noted.    IMPRESSION:       1.  Endotracheal tube terminates at the level the brianna.  Recommend   retraction by 2 cm.  2.  Enteric tube is seen coursing into the stomach.  3.  Hyperinflated lungs.  Question opacity within the right lower lung   which may be inflammatory or infectious.      Impression:          Electronically signed by Wojciech Hernandez MD on 03-14-21 at 0419    XR Abdomen KUB [[id number]] Collected: 03/13/21 1220     Updated: 03/13/21 1226    Narrative:      ABDOMEN KUB     CLINICAL HISTORY: Evaluate feeding tube placement.     2 supine views of the chest and abdomen demonstrate a contracted feeding  tube with its tip along the midline projecting over the L5 vertebral  body likely within the body of stomach. There is no significant bowel  distention. Severe emphysema is noted.     This report was finalized on 3/13/2021 12:23 PM by Dr. Darshan Toribio M.D.       XR Chest AP [[id number]] Collected: 03/12/21 2302     Updated: 03/12/21 2302    Narrative:        Patient: JEAN-PIERRE CHAVEZ  Time Out: 23:01  Exam(s): FILM CXR 1 VIEW     EXAM:    XR Chest, 1 View    CLINICAL HISTORY:     Reason for exam: COVID Evaluation, Cough, Fever.    TECHNIQUE:    Frontal view of the chest.    COMPARISON:    9 30 19    FINDINGS:      Negative for significant cardiac enlargement.  Hyperinflation of both   lungs consistent with COPD.  Patchy densities in both lung bases   atelectasis or pneumonia.  Negative for pneumothorax or significant   pleural fluid collections.      Impression:          Electronically signed by Jake Nair DO on 03-12-21 at 2301        --    Discharge Disposition  Home or Self Care    Discharge Medications      Discharge Medications      New Medications      Instructions Start Date   ipratropium-albuterol 0.5-2.5 mg/3 ml nebulizer  Commonly known as: DUO-NEB   3 mL, Nebulization, Every 4 Hours PRN         Continue These Medications      Instructions Start Date   albuterol sulfate  (90 Base) MCG/ACT inhaler  Commonly known as: PROVENTIL HFA;VENTOLIN HFA;PROAIR HFA   2 puffs, Inhalation, Every 4 Hours PRN      apixaban 5 MG tablet tablet  Commonly known as: ELIQUIS   5 mg, Oral, Every 12 Hours Scheduled      atorvastatin 40 MG tablet  Commonly known as: Lipitor   40 mg, Oral, Daily      budesonide-formoterol 160-4.5 MCG/ACT inhaler  Commonly known as: SYMBICORT   2 puffs, Inhalation, 2 Times Daily - RT      lisinopril 2.5 MG tablet  Commonly known as: PRINIVIL,ZESTRIL   2.5 mg, Oral, Daily      Spiriva Respimat 1.25 MCG/ACT aerosol solution inhaler  Generic drug: Tiotropium Bromide Monohydrate   2 puffs, Inhalation, Daily - RT             Discharge Diet: regular diet    Activity at Discharge:     Follow-up Information     Margaret Ca PA .    Specialty: Family Medicine  Contact information:  [address] [facility] [address]  [facility] [id number] [phone]             Holden Tracy MD Follow up in 2 month(s).    Specialty: Pulmonary Disease  Why: With CT chest without contrast prior to appointment for follow-up of mucus plugging/atelectasis.  Contact information:  [address] [address] [phone]                 See primary care provider, Margaret Ca PA, in 1-2 weeks        Test Results Pending at Discharge       Holden Tracy MD  [facility], [facility]  Pulmonary and Critical Care Medicine  03/22/21  07:37 EDT    Time: greater than 30 minutes.        Total time spent discharging patient including evaluation, post hospitalization follow up, medication and post hospitalization instructions and education total time exceeds 30 minutes.

## 2021-03-22 NOTE — PAYOR COMM NOTE
"Scarlet Chavez (61 y.o. Female)     PLEASE SEE ATTACHED CLINICAL REVIEW.     REF#[id number]    PLEASE CALL   OR  [phone]    THANK YOU    GEMINI HARRIS LPN CCP    Date of Birth Social Security Number Address Home Phone MRN    1960  [id number] [id number] [phone] [id number]    Jain Marital Status          Jainism Single       Admission Date Admission Type Admitting Provider Attending Provider Department, Room/Bed    3/12/21 Emergency Suorav Bales MD  [address], S606/1    Discharge Date Discharge Disposition Discharge Destination        3/22/2021 Home or Self Care              Attending Provider: (none)   Allergies: No Known Allergies    Isolation: None   Infection: None   Code Status: Prior    Ht: 157.5 cm (62.01\")   Wt: 37.8 kg (83 lb 6.4 oz)    Admission Cmt: None   Principal Problem: None                Active Insurance as of 3/12/2021     Primary Coverage     Payor Plan Insurance Group Employer/Plan Group    R [id number]     Payor Plan Address Payor Plan Phone Number Payor Plan Fax Number Effective Dates    [address] [address] [phone]  9/1/2019 - None Entered    [facility] [id number]       Subscriber Name Subscriber Birth Date Member ID       SCARLET CHAVEZ 1960 [id number]                 Emergency Contacts      (Rel.) Home Phone Work Phone Mobile Phone    Yvonne Mccollum (Sister) [phone] -- --    Don Taylor (Significant Other) -- -- [phone]            Oxygen Therapy (last 3 days) before discharge     Date/Time   SpO2   Device (Oxygen Therapy)   Flow (L/min)   Oxygen Concentration (%)   ETCO2 (mmHg)    03/22/21 0932   --   room air   --   --   --    03/22/21 0759   97   --   --   --   --    03/22/21 0755   97   --   --   --   --    03/22/21 0751   97   --   --   --   --    03/22/21 0741   90   room air   --   --   --    03/21/21 2350   91   room air   --   --   --    03/21/21 " 2035   94   room air   --   --   --    03/21/21 2016   98   room air   --   --   --    03/21/21 1536   94   room air   --   --   --    03/21/21 1410   --   room air   --   --   --    03/21/21 1323   97   --   --   --   --    03/21/21 1305   96   room air   --   --   --    03/21/21 0800   --   room air   --   --   --    03/21/21 0725   92   room air   --   --   --    03/21/21 0013   --   room air   --   --   --    03/20/21 2357   94   room air   --   --   --    03/20/21 2107   --   room air   --   --   --    03/20/21 2030   95   room air   --   --   --    03/20/21 1921   90   room air   --   --   --    03/20/21 1550   93   room air   --   --   --    03/20/21 1239   96   room air   --   --   --    03/20/21 1043   97   room air   --   --   --    03/20/21 0756   94   room air   --   --   --    03/20/21 0726   92   room air   --   --   --    03/20/21 0045   --   room air   --   --   --    03/20/21 0006   92   room air   --   --   --    03/19/21 2017 93   room air   --   --   --    03/19/21 2000   --   room air   --   --   --    03/19/21 1958   93   room air   --   --   --    03/19/21 1558   93   room air   --   --   --    03/19/21 1415   --   room air   --   --   --    03/19/21 1300   --   room air   --   --   --    03/19/21 1127   91   room air   --   --   --    03/19/21 0928   --   nasal cannula   2   --   --    03/19/21 0732   95   nasal cannula   2   --   --    03/19/21 0700   --   nasal cannula   --   --   --    03/19/21 0500   93   --   --   --   --    03/19/21 0450   (!) 82   nasal cannula   2.5   --   --    03/19/21 0112   91   room air   --   --   --    03/19/21 0000   90   --   --   --   --            Intake & Output (last 3 days)       03/19 0701 - 03/20 0700 03/20 0701 - 03/21 0700 03/21 0701 - 03/22 0700 03/22 0701 - 03/23 0700    P.O. 1080 960 480 450    Total Intake(mL/kg) 1080 (27.2) 960 (24.7) 480 (12.7) 450 (11.9)    Urine (mL/kg/hr)        Total Output        Net +1080 +960 +480 +450            Urine  Unmeasured Occurrence 3 x 1 x 6 x     Stool Unmeasured Occurrence 1 x            Lines, Drains & Airways    Active LDAs     None         Inactive LDAs     Name:   Placement date:   Placement time:   Removal date:   Removal time:   Site:   Days:    [REMOVED] Peripheral IV 21 Left Forearm   21    --    21    220    Forearm   8    [REMOVED] Peripheral IV 21 220 Right Antecubital   21    2202    03/15/21    0523    Antecubital   2    [REMOVED] Peripheral IV 03/15/21 0524 Anterior;Left;Proximal Forearm   03/15/21    0524    21    1010    Forearm   7    [REMOVED] NG/OG Tube Nasogastric Right nostril   21    1115    21    1513    Right nostril   1    [REMOVED] External Urinary Catheter   21    0145    21    0800    --   8    [REMOVED] ETT    21    2317    21    1120     1                CIWA (last 3 days)     None               Physician Progress Notes (last 72 hours) (Notes from 21 1503 through 21 1503)      Farhan Jackson MD at 21 0918              Hospital Follow Up    LOS:  LOS: 9 days   Patient Name: Scarlet Chakraborty  Age/Sex: 61 y.o. female  : 1960  MRN: [id number]    Day of Service: 21   Length of Stay: 9  Encounter Provider: Farhan Jackson MD  Place of Service: [facility] CARDIOLOGY  Patient Care Team:  Margaret Ca PA as PCP - General (Family Medicine)  Beulah Joshi APRN as Nurse Practitioner (Neurology)    Subjective:     Chief Complaint: Shortness of breath.    Interval History: Patient says she is feeling better today.    Objective:     Objective:  Temp:  [98.1 °F (36.7 °C)-98.7 °F (37.1 °C)] 98.7 °F (37.1 °C)  Heart Rate:  [] 88  Resp:  [18] 18  BP: (114-132)/(57-80) 130/62     Intake/Output Summary (Last 24 hours) at 3/21/2021 0918  Last data filed at 3/20/2021 1736  Gross per 24 hour   Intake 720 ml   Output --   Net 720 ml     Body  mass index is 15.69 kg/m².      03/19/21  0500 03/20/21  0537 03/21/21  0518   Weight: 46 kg (101 lb 6.4 oz) 39.7 kg (87 lb 9.6 oz) 38.9 kg (85 lb 12.8 oz)     Weight change: -0.816 kg (-1 lb 12.8 oz)      Physical Exam:   General : Alert, cooperative, in no acute distress.  Neuro: Alert,cooperative and oriented.  Lungs: CTAB. Normal respiratory effort and rate.  CV: Regular rate and rhythm, normal S1 and S2, no murmurs, gallops or rubs.  ABD: Soft, nontender, nondistended. Positive bowel sounds.  Extr: No edema or cyanosis, moves all extremities.    Lab Review:   Results from last 7 days   Lab Units 03/21/21  0450 03/19/21  0539   SODIUM mmol/L 139 143   POTASSIUM mmol/L 3.6 3.4*   CHLORIDE mmol/L 107 105   CO2 mmol/L 24.3 28.5   BUN mg/dL 12 13   CREATININE mg/dL 0.40* 0.54*   GLUCOSE mg/dL 80 78   CALCIUM mg/dL 8.5* 8.3*     Results from last 7 days   Lab Units 03/17/21  1358   TROPONIN T ng/mL 0.031*             Results from last 7 days   Lab Units 03/16/21  0401 03/15/21  0447   MAGNESIUM mg/dL 2.6* 1.7           Invalid input(s): LDLCALC            Current Medications:   Scheduled Meds:apixaban, 5 mg, Oral, Q12H  docusate sodium, 100 mg, Oral, BID  escitalopram, 5 mg, Oral, Daily  guaiFENesin, 1,200 mg, Oral, Q12H  insulin lispro, 0-24 Units, Subcutaneous, Q6H  ipratropium-albuterol, 3 mL, Nebulization, 4x Daily - RT  lisinopril, 2.5 mg, Oral, Q24H  nicotine, 1 patch, Transdermal, Q24H  predniSONE, 10 mg, Oral, Daily With Breakfast  sodium chloride, 10 mL, Intravenous, Q12H  sodium chloride, 4 mL, Nebulization, BID - RT      Continuous Infusions:dexmedetomidine, 0.2-1.5 mcg/kg/hr, Last Rate: Stopped (03/16/21 1700)        Allergies:  No Known Allergies    Assessment:       Acute respiratory failure (CMS/[facility])        Plan:   1.  COPD exacerbation.  2.  Atypical chest discomfort troponin minimally elevated.  Plan is to do a stress test today.  3.  Stress-induced cardiomyopathy  4.  Possible discharge today if  stress test is unremarkable.        Farhan Jackson MD  21  09:18 EDT      Electronically signed by Farhan Jackson MD at 21 0920     Holden Tracy MD at 21 0743              [facility] INPATIENT PROGRESS NOTE         [address]    3/21/2021      PATIENT IDENTIFICATION:  Name: Scarlet Chakraborty ADMIT: 3/12/2021   : 1960  PCP: Margaret Ca PA    MRN: [id number] LOS: 9 days   AGE/SEX: 61 y.o. female  ROOM: [facility]                     LOS 9    Reason for visit: Follow-up sepsis with pneumonia      SUBJECTIVE:      Resting comfortably.  No new complaints.  Plan for stress test today.      Objective   OBJECTIVE:    Vital Sign Min/Max for last 24 hours  Temp  Min: 98.1 °F (36.7 °C)  Max: 98.7 °F (37.1 °C)   BP  Min: 114/80  Max: 132/62   Pulse  Min: 88  Max: 110   Resp  Min: 18  Max: 18   SpO2  Min: 90 %  Max: 97 %   No data recorded   Weight  Min: 38.9 kg (85 lb 12.8 oz)  Max: 38.9 kg (85 lb 12.8 oz)                   FiO2 (%): 25 %     Body mass index is 15.69 kg/m².    Intake/Output Summary (Last 24 hours) at 3/21/2021 0902  Last data filed at 3/20/2021 1736  Gross per 24 hour   Intake 720 ml   Output --   Net 720 ml         Exam:  GEN:  No distress, appears stated age  EYES:   PERRL, anicteric sclera  ENT:    External ears/nose normal, OP clear  NECK:  No adenopathy, midline trachea  LUNGS: Normal chest on inspection, palpation and diminished breath sounds bilaterally on auscultation  CV:  Normal S1S2, without murmur  ABD:  Non tender, non distended, no hepatosplenomegaly, +BS  EXT:  No edema, cyanosis or clubbing    Assessment     Scheduled meds:  apixaban, 5 mg, Oral, Q12H  docusate sodium, 100 mg, Oral, BID  escitalopram, 5 mg, Oral, Daily  guaiFENesin, 1,200 mg, Oral, Q12H  insulin lispro, 0-24 Units, Subcutaneous, Q6H  ipratropium-albuterol, 3 mL, Nebulization, 4x Daily - RT  lisinopril, 2.5 mg, Oral, Q24H  nicotine, 1 patch, Transdermal,  Q24H  predniSONE, 10 mg, Oral, Daily With Breakfast  sodium chloride, 10 mL, Intravenous, Q12H  sodium chloride, 4 mL, Nebulization, BID - RT      IV meds:                      dexmedetomidine, 0.2-1.5 mcg/kg/hr, Last Rate: Stopped (03/16/21 1700)      Data Review:  Results from last 7 days   Lab Units 03/21/21  0450 03/19/21  0539 03/16/21  0401 03/15/21  0447   SODIUM mmol/L 139 143 141 145   POTASSIUM mmol/L 3.6 3.4* 4.9 3.3*   CHLORIDE mmol/L 107 105 108* 112*   CO2 mmol/L 24.3 28.5 27.2 24.1   BUN mg/dL 12 13 16 11   CREATININE mg/dL 0.40* 0.54* 0.46* 0.34*   GLUCOSE mg/dL 80 78 124* 119*   CALCIUM mg/dL 8.5* 8.3* 8.4* 6.9*         Estimated Creatinine Clearance: 91.8 mL/min (A) (by C-G formula based on SCr of 0.4 mg/dL (L)).              Results from last 7 days   Lab Units 03/17/21  1231 03/15/21  0752 03/15/21  0232 03/14/21  1058 03/14/21  1043   PH, ARTERIAL pH units 7.457* 7.412 7.296* 7.427 7.367   PO2 ART mm Hg 58.6* 115.7* 53.4* 72.8* 38.8*   PCO2, ARTERIAL mm Hg 37.3 46.7* 59.9* 36.5 41.8   HCO3 ART mmol/L 26.4 29.8* 29.2* 24.1 24.0               2D echo 3/17/2021 reviewed: EF 46%. Akinesis of the basal inferoseptal inferolateral wall.      CT chest 3/17/2021 reviewed: Severe emphysema bronchial wall thickening with mucus plugging.        Microbiology reviewed  )       Active Hospital Problems    Diagnosis  POA   • Acute respiratory failure (CMS/HCC) [J96.00]  Yes      Resolved Hospital Problems   No resolved problems to display.         ASSESSMENT:  Community-acquired pneumonia  Sepsis with septic shock: Improved  COPD acute exacerbation  Acute hypoxic and hypercapnic respiratory failure  Atypical chest pain  Elevated troponin/type II NSTEMI  Cachexia with protein calorie malnutrition  Tobacco abuse: Encouraged to quit  Hyperglycemia  Anemia  Anxiety disorder  Hypokalemia  Mucus plugging on CT chest with right middle lobe atelectasis      PLAN:  Continue bronchodilators and steroid. Weaning oxygen as  able.  Encourage pulmonary toilet. Plan to repeat CT scan in 2 to 3 months to assure resolution of atelectasis with mucous plugging.  Increase activity.  Completed antibiotic course.  Suspect troponin leak due to demand ischemia. Cardiology consulted. Plan for Lexiscan stress test today.  Encourage her to quit smoking.        Holden Tracy MD  Pulmonary and Critical Care Medicine  [facility], [facility]  3/21/2021    09:02 EDT       Electronically signed by Holden Tracy MD at 21 0903     Marisabel Kirk APRN at 21 1648                      HOSPITAL PROGRESS NOTE    Date of Service: 21  LOS:  LOS: 8 days   Patient Name: Scarlet Chakraborty  Age/Sex: 61 y.o. female  : 1960  MRN: [id number]  Primary Cardiologist: Dr. Emi Boston     Subjective:     Chief Complaint/Follow up:   Elevated Troponin, Dyspnea, Respiratory Failure    Interval History:   Sitting up in bed.  Denies any further chest pain.  She is no longer on oxygen and was able to ambulate in the hallway today without shortness of breath.  Overall she is feeling much better.    Objective:     Objective:  Temp:  [98.1 °F (36.7 °C)-98.5 °F (36.9 °C)] 98.1 °F (36.7 °C)  Heart Rate:  [84-96] 96  Resp:  [16-18] 18  BP: (107-128)/(56-63) 119/57  Body mass index is 16.02 kg/m².    Intake/Output Summary (Last 24 hours) at 3/20/2021 1649  Last data filed at 3/20/2021 1239  Gross per 24 hour   Intake 1320 ml   Output --   Net 1320 ml         21  0546 21  0500 21  0537   Weight: 40.9 kg (90 lb 2.7 oz) 46 kg (101 lb 6.4 oz) 39.7 kg (87 lb 9.6 oz)     Weight change: -6.26 kg (-13 lb 12.8 oz)    Physical Exam:   General Appearance: Alert, cooperative, in no acute distress. AAOx4.  Very thin.  HEENT: Normocephalic.  Neck: Supple. No JVD. No carotid bruit. No thyromegaly  Lungs: Diminished.  No wheezing noted.  Normal respiratory effort and rate.  Heart: Regular rate and rhythm, normal S1 and S2, no murmurs, gallops  or rubs.  Abdomen: Soft, nontender, nondistended. Positive bowel sounds  Extremities: Warm, no cyanosis, or clubbing. No edema.     Lab Review:   Results from last 7 days   Lab Units 03/19/21  0539 03/16/21  0401   SODIUM mmol/L 143 141   POTASSIUM mmol/L 3.4* 4.9   CHLORIDE mmol/L 105 108*   CO2 mmol/L 28.5 27.2   BUN mg/dL 13 16   CREATININE mg/dL 0.54* 0.46*   GLUCOSE mg/dL 78 124*   CALCIUM mg/dL 8.3* 8.4*     Results from last 7 days   Lab Units 03/17/21  1358   TROPONIN T ng/mL 0.031*     Results from last 7 days   Lab Units 03/14/21  0454   WBC 10*3/mm3 10.84*   HEMOGLOBIN g/dL 11.7*   HEMATOCRIT % 34.8   PLATELETS 10*3/mm3 212         Results from last 7 days   Lab Units 03/16/21  0401 03/15/21  0447   MAGNESIUM mg/dL 2.6* 1.7                   Results for orders placed during the hospital encounter of 03/12/21    Adult Transthoracic Echo Complete W/ Cont if Necessary Per Protocol    Interpretation Summary  · Left ventricular diastolic function was normal.  · The study is technically difficult for diagnosis.  · There is akinesis of the basal inferoseptum, basal inferior wall, and the basal inferolateral wall. The remainder of the segments appear to be hyperdynamic  · Left ventricular ejection fraction appears to be 46 - 50%.  · Left ventricular diastolic function was normal.  · Normal right ventricular cavity size and systolic function noted.  · There is no evidence of pericardial effusion    I reviewed the patient's new clinical results.  I personally viewed and interpreted the patient's EKG/Telemetry data/Labs/Test Results.     Current Medications:   Scheduled Meds:apixaban, 5 mg, Oral, Q12H  docusate sodium, 100 mg, Oral, BID  escitalopram, 5 mg, Oral, Daily  guaiFENesin, 1,200 mg, Oral, Q12H  insulin lispro, 0-24 Units, Subcutaneous, Q6H  ipratropium-albuterol, 3 mL, Nebulization, 4x Daily - RT  nicotine, 1 patch, Transdermal, Q24H  predniSONE, 10 mg, Oral, Daily With Breakfast  sodium chloride, 10 mL,  Intravenous, Q12H  sodium chloride, 4 mL, Nebulization, BID - RT      Continuous Infusions:dexmedetomidine, 0.2-1.5 mcg/kg/hr, Last Rate: Stopped (21 1700)        Allergies:  No Known Allergies    Assessment:       Acute respiratory failure (CMS/[facility])        Plan:   Assessment/Plan       1.  Acute on chronic hypoxic respiratory failure-secondary to COPD with acute exacerbation, community-acquired pneumonia and anxiety per pulmonary.  Extubated 3/14.  Transferred out of critical care on 3/18.    2.  Atypical chest pain-elevated troponin likely due to hypoxic respiratory failure.  Echo showed reduced LV systolic function and wall motion abnormalities.  No wheezing noted today.  I ordered Lexiscan Myoview stress test tomorrow.  Normal coronary arteries in 2018.  Patient denies further chest pain.    3.  History of stress-induced cardiomyopathy-not on beta-blocker due to severe lung disease.  I resumed her lisinopril 2.5 mg daily.  Monitor blood pressure.    4.  History of stroke-on apixaban.  Linq monitor in place.    5.  Tobacco abuse-smoking cessation highly recommended.    6.  Hypokalemia-K+ 3.4 today.  I have ordered the potassium replacement protocol.    7.  She would like to go home soon.  Possibly if her stress test is normal tomorrow, she may be able to go home if okay with pulmonology.  She will need a follow-up visit with our SHANKAR Luu in 1 week.    SHANKAR Engle  [facility]   21  16:49 EDT        Electronically signed by Marisabel Kirk APRN at 21 1700     Holden Tracy MD at 21 1200              [facility] INPATIENT PROGRESS NOTE         [address]    3/20/2021      PATIENT IDENTIFICATION:  Name: Scarlet Chakraborty ADMIT: 3/12/2021   : 1960  PCP: Margaret Ca PA    MRN: [id number] LOS: 8 days   AGE/SEX: 61 y.o. female  ROOM: [facility]                     LOS 8    Reason for visit: Follow-up sepsis with  pneumonia      SUBJECTIVE:      Sitting in bedside chair.  Says that she feels that her breathing is a little better today compared to yesterday.  Is hoping that she can get up and ambulate some today with assistance.  Denies productive cough of significance or chest pain.  Diminished breath sounds on auscultation.  No significant wheezing.  I am seeing the patient for the first time today.  All patient problems are new to me.      Objective   OBJECTIVE:    Vital Sign Min/Max for last 24 hours  Temp  Min: 98.1 °F (36.7 °C)  Max: 98.5 °F (36.9 °C)   BP  Min: 107/59  Max: 128/63   Pulse  Min: 84  Max: 98   Resp  Min: 16  Max: 18   SpO2  Min: 92 %  Max: 97 %   No data recorded   Weight  Min: 39.7 kg (87 lb 9.6 oz)  Max: 39.7 kg (87 lb 9.6 oz)                   FiO2 (%): 25 %     Body mass index is 16.02 kg/m².    Intake/Output Summary (Last 24 hours) at 3/20/2021 1404  Last data filed at 3/20/2021 1239  Gross per 24 hour   Intake 1320 ml   Output --   Net 1320 ml         Exam:  GEN:  No distress, appears stated age  EYES:   PERRL, anicteric sclera  ENT:    External ears/nose normal, OP clear  NECK:  No adenopathy, midline trachea  LUNGS: Normal chest on inspection, palpation and diminished breath sounds bilaterally on auscultation  CV:  Normal S1S2, without murmur  ABD:  Non tender, non distended, no hepatosplenomegaly, +BS  EXT:  No edema, cyanosis or clubbing    Assessment     Scheduled meds:  apixaban, 5 mg, Oral, Q12H  docusate sodium, 100 mg, Oral, BID  escitalopram, 5 mg, Oral, Daily  guaiFENesin, 1,200 mg, Oral, Q12H  insulin lispro, 0-24 Units, Subcutaneous, Q6H  ipratropium-albuterol, 3 mL, Nebulization, 4x Daily - RT  nicotine, 1 patch, Transdermal, Q24H  predniSONE, 10 mg, Oral, Daily With Breakfast  sodium chloride, 10 mL, Intravenous, Q12H  sodium chloride, 4 mL, Nebulization, BID - RT      IV meds:                      dexmedetomidine, 0.2-1.5 mcg/kg/hr, Last Rate: Stopped (03/16/21 4644)      Data  Review:  Results from last 7 days   Lab Units 03/19/21  0539 03/16/21  0401 03/15/21  0447 03/14/21  0454   SODIUM mmol/L 143 141 145 138   POTASSIUM mmol/L 3.4* 4.9 3.3* 4.0   CHLORIDE mmol/L 105 108* 112* 109*   CO2 mmol/L 28.5 27.2 24.1 21.7*   BUN mg/dL 13 16 11 19   CREATININE mg/dL 0.54* 0.46* 0.34* 0.46*   GLUCOSE mg/dL 78 124* 119* 160*   CALCIUM mg/dL 8.3* 8.4* 6.9* 8.3*         Estimated Creatinine Clearance: 69.4 mL/min (A) (by C-G formula based on SCr of 0.54 mg/dL (L)).  Results from last 7 days   Lab Units 03/14/21  0454   WBC 10*3/mm3 10.84*   HEMOGLOBIN g/dL 11.7*   PLATELETS 10*3/mm3 212             Results from last 7 days   Lab Units 03/17/21  1231 03/15/21  0752 03/15/21  0232 03/14/21  1058 03/14/21  1043   PH, ARTERIAL pH units 7.457* 7.412 7.296* 7.427 7.367   PO2 ART mm Hg 58.6* 115.7* 53.4* 72.8* 38.8*   PCO2, ARTERIAL mm Hg 37.3 46.7* 59.9* 36.5 41.8   HCO3 ART mmol/L 26.4 29.8* 29.2* 24.1 24.0               2D echo 3/17/2021 reviewed: EF 46%. Akinesis of the basal inferoseptal inferolateral wall.      CT chest 3/17/2021 reviewed: Severe emphysema bronchial wall thickening with mucus plugging.        Microbiology reviewed  )       Active Hospital Problems    Diagnosis  POA   • Acute respiratory failure (CMS/[facility]) [J96.00]  Yes      Resolved Hospital Problems   No resolved problems to display.         ASSESSMENT:  Community-acquired pneumonia  Sepsis with septic shock: Improved  COPD acute exacerbation  Acute hypoxic and hypercapnic respiratory failure  Atypical chest pain  Elevated troponin/type II NSTEMI  Cachexia with protein calorie malnutrition  Tobacco abuse: Encouraged to quit  Hyperglycemia  Anemia  Anxiety disorder  Hypokalemia  Mucus plugging on CT chest with right middle lobe atelectasis      PLAN:  Continue bronchodilators and steroid. Weaning oxygen as able.  Encourage pulmonary toilet. Plan to repeat CT scan in 2 to 3 months to assure resolution of atelectasis with mucous  plugging.  Increase activity.  Completed antibiotic course.  Suspect troponin leak due to demand ischemia. Cardiology consulted. Plan for Lexiscan stress test tomorrow.  Encourage her to quit smoking.        Holden Tracy MD  Pulmonary and Critical Care Medicine  [facility], [facility]  3/20/2021    14:04 EDT       Electronically signed by Holden Tracy MD at 03/20/21 1409       Consult Notes (last 72 hours) (Notes from 03/19/21 1503 through 03/22/21 1503)    No notes of this type exist for this encounter.            Discharge Summary      Holden Tracy MD at 03/22/21 0761                                                                             PHYSICIAN DISCHARGE SUMMARY                                                                        [facility]    Date of admit: 3/12/2021  Date of Discharge:  3/22/2021    PCP: Margaret Ca PA    Discharge Diagnosis:     Acute respiratory failure (CMS/HCC)  Community-acquired pneumonia  Sepsis with septic shock: Improved  COPD acute exacerbation  Acute hypoxic and hypercapnic respiratory failure  Atypical chest pain  Elevated troponin/type II NSTEMI  Cachexia with protein calorie malnutrition  Tobacco abuse: Encouraged to quit  Hyperglycemia  Anemia  Anxiety disorder  Hypokalemia  Mucus plugging on CT chest with right middle lobe atelectasis    Secondary Diagnoses:  Past Medical History:   Diagnosis Date   • Asthma    • Cerebrovascular accident (CVA) due to thrombosis of right middle cerebral artery (CMS/HCC)    • COPD (chronic obstructive pulmonary disease) (CMS/HCC)    • Nonischemic cardiomyopathy (CMS/HCC)    • NSTEMI (non-ST elevated myocardial infarction) (CMS/HCC)    • Stress-induced cardiomyopathy    • Stroke (CMS/HCC)    • Takotsubo cardiomyopathy    • Tobacco abuse        Procedures Performed           Consults:       Hospital Course  Patient is a 61 y.o. female presented with per H&P:       CC: Shortness of  breath     History of Present Illness:  61-year-old female who presents with shortness of breath.  She cannot provide her own history because she is intubated and mechanically ventilated.  I discussed the case with Dr. Mcbride, emergency room physician.  The patient presents with shortness of breath which has been going on for about 3 or 4 days.  Gradually worsening, became very severe earlier this evening.  She has been coughing and wheezing.  Has not obtained any relief with her nebulizer.  Upon arrival EMS found the patient with oxygen saturation of 81% on room air and tachycardic with heart rate of 115 and blood pressure 150/89.  They started her on CPAP en route.  The patient has a history of COPD.  Apparently she did not have any fever or much sputum.  I reviewed her medical records.  I reviewed the discharge summary dictated by my colleague, Dr. Clay Ryder on October 4, 2019 when she had pneumonia, Takotsubo cardiomyopathy, paroxysmal SVT, acute respiratory failure, smoker.        Patient admitted with respiratory failure with COPD exacerbation and pneumonia.  Had evidence of sepsis with septic shock.  Treated with antibiotics, bronchodilators and steroids.  Elevated troponin with type II non-ST elevation MI.  She ended up having a stress test yesterday which was not significantly abnormal.  She has improved significantly in relation to her infectious process as well as her COPD.  She is completed antibiotics.  CT scan showed mucous plugging right middle lobe with atelectasis and will require repeat CT scan in 2 to 3 months to assure resolution.  I encourage her to quit smoking.      Condition on Discharge:  Stable.      Vital Signs  Temp:  [97.9 °F (36.6 °C)-98.3 °F (36.8 °C)] 98.1 °F (36.7 °C)  Heart Rate:  [74-99] 74  Resp:  [16-20] 16  BP: (110-121)/(46-74) 119/63    Physical Exam:  General Appearance: no acute distress, appears comfortable  Heart: regular rate and rhythm  Lungs: clear to auscultation  bilaterally, respirations unlabored  Abdomen: soft, nontender, nondistended, no guarding/rebound, nl BS  Extremeties: no edema, no cyanosis  Neurology: speech normal, mental status normal, moving all four extremities  Mental Status: alert, oriented        --  Consults:   IP CONSULT TO PULMONOLOGY  IP CONSULT TO CASE MANAGEMENT   IP CONSULT TO NUTRITION SERVICES  IP CONSULT TO NUTRITION SERVICES  IP CONSULT TO CARDIOLOGY    Significant Discharge Diagnostics   Procedures Performed:         Pertinent Lab Results:  Results from last 7 days   Lab Units 03/22/21  0518 03/21/21  0450 03/19/21  0539 03/16/21  0401   SODIUM mmol/L 139 139 143 141   POTASSIUM mmol/L 4.0 3.6 3.4* 4.9   CHLORIDE mmol/L 106 107 105 108*   CO2 mmol/L 26.2 24.3 28.5 27.2   BUN mg/dL 11 12 13 16   CREATININE mg/dL 0.45* 0.40* 0.54* 0.46*   GLUCOSE mg/dL 82 80 78 124*   CALCIUM mg/dL 8.9 8.5* 8.3* 8.4*     Results from last 7 days   Lab Units 03/17/21  1358   TROPONIN T ng/mL 0.031*             Results from last 7 days   Lab Units 03/16/21  0401   MAGNESIUM mg/dL 2.6*           Invalid input(s): LDLCALC          Results from last 7 days   Lab Units 03/17/21  1231   PH, ARTERIAL pH units 7.457*   PO2 ART mm Hg 58.6*   PCO2, ARTERIAL mm Hg 37.3   HCO3 ART mmol/L 26.4                                   Imaging Results:  Imaging Results (All)     Procedure Component Value Units Date/Time    CT Angiogram Chest With & Without Contrast [[id number]] Collected: 03/17/21 1339     Updated: 03/17/21 1355    Narrative:      CT ANGIOGRAM OF THE CHEST     HISTORY: Acute respiratory failure with hypoxia, COPD, pneumonia,  concern for pulmonary embolism     TECHNIQUE: Radiation dose reduction techniques were utilized, including  automated exposure control and exposure modulation based on body size.   CT angiogram of the chest was performed following the administration of  IV contrast. Multiple coronal, sagittal, and 3-D reconstruction images  were  obtained. PE protocol.     COMPARISON: 01/06/2020     FINDINGS: There is no evidence for pulmonary embolism. Severe emphysema.  There is bilateral bronchial wall thickening with mucous plugging. This  may reflect bronchitis. There is mild atelectasis within the lower  lobes, right middle lobe and lingula. Stable left lower lobe nodular  density on image 117. There is some new minimal atelectasis within the  medial right middle lobe, which is presumably due to mucous plugging  within the origin of the right middle lobe bronchus. No suspicious  lymphadenopathy. Calcified mediastinal lymph nodes. No pleural effusion.  Limited imaging the upper abdomen is unremarkable. No acute bony  abnormality.       Impression:      1. There is no evidence for pulmonary embolism  2. Stable severe emphysema  3. There is bilateral bronchial wall thickening with mucous plugging.  There is a presumed mucous plugging within the origin of the right  middle lobe bronchus causing minimal amount of atelectasis. Would  consider a follow-up study in 2-3 months to ensure clearing     Radiation dose reduction techniques were utilized, including automated  exposure control and exposure modulation based on body size.     This report was finalized on 3/17/2021 1:52 PM by Dr. Epi Gerard M.D.       XR Chest 1 View [[id number]] Collected: 03/15/21 0327     Updated: 03/15/21 0327    Narrative:        Patient: JEAN-PIERRE CHAVEZ  Time Out: 03:25  Exam(s): FILM CXR 1 VIEW     EXAM:    XR Chest, 1 View    CLINICAL HISTORY:     Reason for exam: sudden SOB.    TECHNIQUE:    Frontal view of the chest.    COMPARISON:    03 14 2021.    FINDINGS:    Lungs:  Bilateral lower lung mild pulmonary edema infiltrates.    Pleural space:  Unremarkable.  No pneumothorax.    Heart:  Unremarkable.  No cardiomegaly.    Mediastinum:  Unremarkable.    Bones joints:  Unremarkable.    IMPRESSION:         Bilateral lower lung mild pulmonary edema infiltrates.      Impression:           Electronically signed by Jude Maddox M.D. on 03-15-21 at 0325    XR Chest 1 View [[id number]] Collected: 03/14/21 0420     Updated: 03/14/21 0420    Narrative:        Patient: JEAN-PIERRE CHAVEZ  Time Out: 04:19  Exam(s): FILM CXR 1 VIEW     EXAM:    XR Chest, 1 View    CLINICAL HISTORY:     Reason for exam: Follow-up pneumonia.    TECHNIQUE:    Frontal view of the chest.    COMPARISON:    No relevant prior studies available.    FINDINGS:    Lungs:  Hyperinflated lungs.  Question opacity within the right lower   lung which may be inflammatory or infectious.    Pleural space:  Unremarkable.    Heart:  Unremarkable.    Mediastinum:  Unremarkable.    Bones joints:  Unremarkable.    Tubes, lines and devices:  Endotracheal tube terminates at the level   the brianna.  Recommend retraction by 2 cm.  Enteric tube is seen coursing   into the stomach.  Implantable loop recorder device is noted.    IMPRESSION:       1.  Endotracheal tube terminates at the level the brianna.  Recommend   retraction by 2 cm.  2.  Enteric tube is seen coursing into the stomach.  3.  Hyperinflated lungs.  Question opacity within the right lower lung   which may be inflammatory or infectious.      Impression:          Electronically signed by Wojciech Hernandez MD on 03-14-21 at 0419    XR Abdomen KUB [[id number]] Collected: 03/13/21 1220     Updated: 03/13/21 1226    Narrative:      ABDOMEN KUB     CLINICAL HISTORY: Evaluate feeding tube placement.     2 supine views of the chest and abdomen demonstrate a contracted feeding  tube with its tip along the midline projecting over the L5 vertebral  body likely within the body of stomach. There is no significant bowel  distention. Severe emphysema is noted.     This report was finalized on 3/13/2021 12:23 PM by Dr. Darshan Toribio M.D.       XR Chest AP [[id number]] Collected: 03/12/21 2302     Updated: 03/12/21 2302    Narrative:        Patient: JEAN-PIERRE CHAVEZ  Time Out: 23:01  Exam(s): FILM CXR  1 VIEW     EXAM:    XR Chest, 1 View    CLINICAL HISTORY:     Reason for exam: COVID Evaluation, Cough, Fever.    TECHNIQUE:    Frontal view of the chest.    COMPARISON:    9 30 19    FINDINGS:      Negative for significant cardiac enlargement.  Hyperinflation of both   lungs consistent with COPD.  Patchy densities in both lung bases   atelectasis or pneumonia.  Negative for pneumothorax or significant   pleural fluid collections.      Impression:          Electronically signed by Jake Nair DO on 03-12-21 at 2301        --    Discharge Disposition  Home or Self Care    Discharge Medications     Discharge Medications      New Medications      Instructions Start Date   ipratropium-albuterol 0.5-2.5 mg/3 ml nebulizer  Commonly known as: DUO-NEB   3 mL, Nebulization, Every 4 Hours PRN         Continue These Medications      Instructions Start Date   albuterol sulfate  (90 Base) MCG/ACT inhaler  Commonly known as: PROVENTIL HFA;VENTOLIN HFA;PROAIR HFA   2 puffs, Inhalation, Every 4 Hours PRN      apixaban 5 MG tablet tablet  Commonly known as: ELIQUIS   5 mg, Oral, Every 12 Hours Scheduled      atorvastatin 40 MG tablet  Commonly known as: Lipitor   40 mg, Oral, Daily      budesonide-formoterol 160-4.5 MCG/ACT inhaler  Commonly known as: SYMBICORT   2 puffs, Inhalation, 2 Times Daily - RT      lisinopril 2.5 MG tablet  Commonly known as: PRINIVIL,ZESTRIL   2.5 mg, Oral, Daily      Spiriva Respimat 1.25 MCG/ACT aerosol solution inhaler  Generic drug: Tiotropium Bromide Monohydrate   2 puffs, Inhalation, Daily - RT             Discharge Diet: regular diet    Activity at Discharge:     Follow-up Information     Margaret Ca PA .    Specialty: Family Medicine  Contact information:  [facility] [id number] [phone]             Holden Tracy MD Follow up in 2 month(s).    Specialty: Pulmonary Disease  Why: With CT chest without contrast prior to appointment  for follow-up of mucus plugging/atelectasis.  Contact information:  [address]  [place] [address]  [phone]                 See primary care provider, Margaret Ca PA, in 1-2 weeks        Test Results Pending at Discharge       Holden Tracy MD  [facility], [facility]  Pulmonary and Critical Care Medicine  03/22/21  07:37 EDT    Time: greater than 30 minutes.        Total time spent discharging patient including evaluation, post hospitalization follow up, medication and post hospitalization instructions and education total time exceeds 30 minutes.      Electronically signed by Holden Tracy MD at 03/22/21 6318

## 2021-03-22 NOTE — PLAN OF CARE
Problem: Fall Injury Risk  Goal: Absence of Fall and Fall-Related Injury  Intervention: Promote Injury-Free Environment     Problem: Adult Inpatient Plan of Care  Goal: Absence of Hospital-Acquired Illness or Injury  Outcome: Ongoing, Progressing  Intervention: Identify and Manage Fall Risk  Intervention: Prevent Skin Injury  Intervention: Prevent and Manage VTE (venous thromboembolism) Risk  Intervention: Prevent Infection     Problem: Adult Inpatient Plan of Care  Goal: Absence of Hospital-Acquired Illness or Injury  Intervention: Identify and Manage Fall Risk     Problem: Adult Inpatient Plan of Care  Goal: Absence of Hospital-Acquired Illness or Injury  Intervention: Prevent Skin Injury   Goal Outcome Evaluation:  Plan of Care Reviewed With: patient     Outcome Summary: Pt VSS, BS was 62 at start of shift OJ, Dextrose po, peanut butter and crakers were given to get BS up. Pt BS now stable BS now at 81mg/dl. No s/s of distress noted. No c/o pain noted.  
Goal Outcome Evaluation:     Progress: declining  Outcome Summary: tachycardic on tele, BP stable, from 4 to 2Lnc overnight, SOA tx with breathing treatments. around 0200 pt became very SOA suddenly, labored breathing with grunting, severely anxious, paged RT and MD. continued distress after breathing tx, ABGs acidotic. BIPAP, precedex, lasix, and extra dose of solu-medrol ordered. maxed on precedex, ativan given per MD and order. pt finally calmed down and resting in bed. NSR after interventions and SOA w/ labored breathing decreased.  
Goal Outcome Evaluation:     Progress: improving  Outcome Summary: Pt remains in CCU. On and off 2L NC. Anxiety was well controlled. VSS. CTM.  
Goal Outcome Evaluation:     Progress: improving  Outcome Summary: VSS. Up to the toilet with stand by, some c/o sob, but recovered quickly. Pt transferred to [address] [place] [address].  
Goal Outcome Evaluation:     Progress: no change  Outcome Summary: Patient transferred to the floor around 0100. Doing well. One RMA while awake, did have to place 2.5 L NC while sleeping. No complaints at this time. Vitals stable a not anxiety issues.  
Goal Outcome Evaluation:  Plan of Care Reviewed With: patient  Progress: improving  Outcome Summary: Patient discharged to home  
Goal Outcome Evaluation:  Plan of Care Reviewed With: patient  Progress: improving  Outcome Summary: Pt on RA, no complaints this shift. NPO after midnight for stress test today. Possible D/C today. VSS. Will continue to monitor.  
Goal Outcome Evaluation:  Plan of Care Reviewed With: patient  Progress: improving  Outcome Summary: VSS. Currently on room air with O2 saturation of 92-96%. CT chest done today. Echocardiogram done. Continues to have issues with anxiety that is relieved by placing pt on BiPAP. Up to chair for most of the day.  
Goal Outcome Evaluation:  Plan of Care Reviewed With: patient  Progress: improving  Outcome Summary: VSS. RA to 2Lnc, BIPAP HS. breathing techniques promoted and demonstrated with patient for anxiety, introduced IS and pt gets up to 500, tolerates well. oxygen sats are WDL when pt says she feels like she cant breathe, resolves with breathing exercises.  
Goal Outcome Evaluation:  Plan of Care Reviewed With: patient  Progress: improving  Outcome Summary: pt VSS. Currently on room air with O2 saturation of 95-97%. Continues to have periods of increased anxiety. Precedex gtt currently off. Starting on Lexapro, daily. Up out of bed to chair for most of the day and ambulated in room with 1 assist.  
Goal Outcome Evaluation:  Plan of Care Reviewed With: patient  Progress: improving  Outcome Summary: pt extubated today to 2LNC, pt does cont to have SOA with exertion relieved with rest, DHT removed post swallow study after extubation and tolerating well with diet. Purwick in place for voiding, pt denies pain, prn breathing tx ordered as needed. Family was at bs today with status updated, will cont to monitor  
Goal Outcome Evaluation:  Plan of Care Reviewed With: patient  Progress: improving  Outcome Summary: pt had a much better day today. Currently on room air with O2 saturation 90-92%. Ambulated in hallway of unit, tolerated well. Transfer orders placed for telemetry floor, awaiting bed.  
Goal Outcome Evaluation:  Plan of Care Reviewed With: patient  Progress: improving  Outcome Summary: pt wore BIPAP from 7220-8466, c/o not being able to sleep all night, sleep promoted. VSS, NSR-ST on tele. pt still anxious about BIPAP and current progression.  
Goal Outcome Evaluation:  Plan of Care Reviewed With: patient  Progress: no change  Outcome Summary: Patient moved from propofol to precedex for low blood pressure. Now calm and cooperative with RASS of -1 and is more syncronous with the ventilator off propofol. Requires fent admin for pain about every 2 hours. Her blood pressure did not respond to a second fluid bolus of 1L awaiting MD return page. No urine output yet. Significant other answered admission questions for the patient  
Goal Outcome Evaluation:  Plan of Care Reviewed With: patient, sibling  Progress: improving    Patient remains in CCU, off levophed since 3/13 around 1530, VSS, no significant changes overnight.   
Goal Outcome Evaluation:  Plan of Care Reviewed With: patient, significant other, sibling  Progress: improving  Outcome Summary: Precedex at 0.4. Pt calm, writing appropriate notes, but sleeps in between care. Fent x3. Voided per meghana schreiber some leakage. Cortrak placed and TF begun. Can increase at 0400. Off Levo since about 1530.  
Pt. VS WNL.  No c/o pain.  Pt. A&O x4.  Pt. Up with standby assist, gait steady.  Pt. Ambulating around nurses unit with walker, no SOA noted.  Pt. To have stress test in a.m. then possibly home per MD note.  Pt. Up to chair majority of the day.  Pt. Resting comfortably at present, will continue to monitor closely.      Problem: Adult Inpatient Plan of Care  Goal: Plan of Care Review  Outcome: Ongoing, Progressing  Flowsheets (Taken 3/20/2021 1851)  Progress: improving  Plan of Care Reviewed With: patient     
No

## 2021-03-23 ENCOUNTER — READMISSION MANAGEMENT (OUTPATIENT)
Dept: CALL CENTER | Facility: HOSPITAL | Age: 61
End: 2021-03-23

## 2021-03-23 NOTE — OUTREACH NOTE
Prep Survey      Responses   [facility] patient discharged from?  [place]   Is LACE score < 7 ?  No   Emergency Room discharge w/ pulse ox?  No   Eligibility  Readm Mgmt   Discharge diagnosis  Acute respiratory failure,  CAP,  Sepsis   Does the patient have one of the following disease processes/diagnoses(primary or secondary)?  COPD/Pneumonia   Does the patient have Home health ordered?  No   Is there a DME ordered?  No   Prep survey completed?  Yes          Carlotta Cano RN

## 2021-03-26 ENCOUNTER — READMISSION MANAGEMENT (OUTPATIENT)
Dept: CALL CENTER | Facility: HOSPITAL | Age: 61
End: 2021-03-26

## 2021-03-26 NOTE — OUTREACH NOTE
COPD/PN Week 1 Survey      Responses   [facility] patient discharged from?  [place]   Does the patient have one of the following disease processes/diagnoses(primary or secondary)?  COPD/Pneumonia   Was the primary reason for admission:  Pneumonia   Week 1 attempt successful?  No   Unsuccessful attempts  Attempt 1          Gi Reyes RN

## 2021-03-30 ENCOUNTER — READMISSION MANAGEMENT (OUTPATIENT)
Dept: CALL CENTER | Facility: HOSPITAL | Age: 61
End: 2021-03-30

## 2021-03-30 NOTE — OUTREACH NOTE
COPD/PN Week 1 Survey      Responses   [facility] patient discharged from?  [place]   Does the patient have one of the following disease processes/diagnoses(primary or secondary)?  COPD/Pneumonia   Was the primary reason for admission:  Pneumonia   Week 1 attempt successful?  No   Unsuccessful attempts  Attempt 2          Mikki Olvera LPN

## 2021-03-31 ENCOUNTER — READMISSION MANAGEMENT (OUTPATIENT)
Dept: CALL CENTER | Facility: HOSPITAL | Age: 61
End: 2021-03-31

## 2021-03-31 NOTE — OUTREACH NOTE
COPD/PN Week 1 Survey      Responses   [facility] patient discharged from?  [place]   Does the patient have one of the following disease processes/diagnoses(primary or secondary)?  COPD/Pneumonia   Was the primary reason for admission:  Pneumonia   Week 1 attempt successful?  Yes   Call start time  1257   Call end time  1301   Discharge diagnosis  Acute respiratory failure,  CAP,  Sepsis   Meds reviewed with patient/caregiver?  Yes   Is the patient having any side effects they believe may be caused by any medication additions or changes?  No   Does the patient have all medications ordered at discharge?  Yes   Is the patient taking all medications as directed (includes completed medication regime)?  Yes   Does the patient have a primary care provider?   Yes   Does the patient have an appointment with their PCP or specialist within 7 days of discharge?  No   What is preventing the patient from scheduling follow up appointments within 7 days of discharge?  Haven't had time   Nursing Interventions  Educated patient on importance of making appointment, Advised patient to make appointment   Has the patient kept scheduled appointments due by today?  N/A   Has home health visited the patient within 72 hours of discharge?  N/A   Has all DME been delivered?  Yes   Psychosocial issues?  No   Did the patient receive a copy of their discharge instructions?  Yes   Nursing interventions  Reviewed instructions with patient   What is the patient's perception of their health status since discharge?  Improving   Nursing Interventions  Nurse provided patient education   Is the patient able to teach back COPD zones?  Yes   Nursing interventions  Education provided on various zones   Patient reports what zone on this call?  Yellow Zone   Yellow Zone  Increased shortness of air, Increased or thicker phlegm/mucus, Using quick relief inhaler/nebulizer more often   Yellow interventions  Continue to use daily medications, Get plenty  of rest, Call provider immediatly if symptoms do not improve   Is the patient/caregiver able to teach back signs and symptoms of worsening condition:  Fever/chills, Shortness of breath, Chest pain   Is the patient/caregiver able to teach back importance of completing antibiotic course of treatment?  -- [n/a]   Week 1 call completed?  Yes   Wrap up additional comments  Breathing is improving. She is having some constipation issues. She has started on Mirilax today.           Bertrand Ellis RN

## 2021-04-07 ENCOUNTER — READMISSION MANAGEMENT (OUTPATIENT)
Dept: CALL CENTER | Facility: HOSPITAL | Age: 61
End: 2021-04-07

## 2021-04-07 NOTE — OUTREACH NOTE
COPD/PN Week 2 Survey      Responses   [facility] [place] patient discharged from?  [place]   Does the patient have one of the following disease processes/diagnoses(primary or secondary)?  COPD/Pneumonia   Was the primary reason for admission:  Pneumonia   Week 2 attempt successful?  No   Unsuccessful attempts  Attempt 1          Odette Jay RN

## 2021-04-08 ENCOUNTER — READMISSION MANAGEMENT (OUTPATIENT)
Dept: CALL CENTER | Facility: HOSPITAL | Age: 61
End: 2021-04-08

## 2021-04-08 NOTE — OUTREACH NOTE
COPD/PN Week 2 Survey      Responses   [facility] [place] [facility] from?  [place]   Does the patient have one of the following disease processes/diagnoses(primary or secondary)?  COPD/Pneumonia   Was the primary reason for admission:  Pneumonia   Week 2 attempt successful?  Yes   Call start time  1149   Call end time  1204   Discharge diagnosis  Acute respiratory failure,  CAP,  Sepsis   Meds reviewed with patient/caregiver?  Yes   Is the patient having any side effects they believe may be caused by any medication additions or changes?  No   Does the patient have all medications ordered at discharge?  Yes   Is the patient taking all medications as directed (includes completed medication regime)?  Yes   Does the patient have a primary care provider?   Yes   Does the patient have an appointment with their PCP or specialist within 7 days of discharge?  Greater than 7 days   What is preventing the patient from scheduling follow up appointments within 7 days of discharge?  Earlier appointment not available   Nursing Interventions  Verified appointment date/time/provider   Has the patient kept scheduled appointments due by today?  N/A   Has home health visited the patient within 72 hours of discharge?  N/A   Pulse Ox monitoring  None   Psychosocial issues?  No   Did the patient receive a copy of their discharge instructions?  Yes   Nursing interventions  Reviewed instructions with patient   What is the patient's perception of their health status since discharge?  Improving   Nursing Interventions  Nurse provided patient education   Is the patient/caregiver able to teach back the hierarchy of who to call/visit for symptoms/problems? PCP, Specialist, Home health nurse, Urgent Care, ED, 911  Yes   Additional teach back comments  states has had some SOB in last 2 days, relieved by neb treatments, states today is better, plans to see PCP on 4/9/21 and will discuss causes for recent breathing difficulties, states may  be allergies to spring pollens   Is the patient able to teach back COPD zones?  Yes   Nursing interventions  Education provided on various zones   Patient reports what zone on this call?  Yellow Zone   Yellow Zone  Increased shortness of air, Using quick relief inhaler/nebulizer more often, Poor sleep and symptoms work patient up   Yellow interventions  Continue to use daily medications, Use quick relief inhaler as ordered, Use oxygen as ordered, Get plenty of rest, Call provider immediatly if symptoms do not improve   Is the patient/caregiver able to teach back signs and symptoms of worsening condition:  Fever/chills, Shortness of breath, Chest pain   Is the patient/caregiver able to teach back importance of completing antibiotic course of treatment?  Yes   Week 2 call completed?  Yes          Moon Hickman RN

## 2021-04-16 ENCOUNTER — READMISSION MANAGEMENT (OUTPATIENT)
Dept: CALL CENTER | Facility: HOSPITAL | Age: 61
End: 2021-04-16

## 2021-04-19 NOTE — OUTREACH NOTE
Case Management Call Center Follow-up      Responses   [facility] Call Center Tracking Reason?  Other (specify in comments)   Other Tracking Comments  Question about oxygen   Has the Call Center Case Management Follow-up issue been resolved?  No   Follow-up Comments  Question forwarded to CCP at [facility].  Pt was discharged on 3/22/21.  Call placed to pt to discuss.  Left HIPAA compliant  for call back.  The question references her PCP appointment on 4/9/21.  Call placed to CLARISSA Brody's office to discuss.  Spoke with Ms. Ca's NICOLE Dillard.  She states that they did order it.  Alerted her that Ms. Chakraborty has not yet received it.  She states that she will follow up with pt as it was ordered on 4/9/21 through [facility].  Msg sent back to call center for follow up.  BISI Chu RN    4/19/2021, 14:02 EDT

## 2021-04-19 NOTE — OUTREACH NOTE
Case Management Call Center Follow-up      Responses   [facility] Call Center Tracking Reason?  Other (specify in comments)   Other Tracking Comments  Question about oxygen   Has the Call Center Case Management Follow-up issue been resolved?  Yes   Follow-up Comments  Pt called back.  Alerted her to expect a call from her PCP's office.  She was appreciative. BISI Chu RN    4/19/2021, 14:11 EDT

## 2021-04-26 ENCOUNTER — READMISSION MANAGEMENT (OUTPATIENT)
Dept: CALL CENTER | Facility: HOSPITAL | Age: 61
End: 2021-04-26

## 2021-04-26 NOTE — OUTREACH NOTE
COPD/PN Week 4 Survey      Responses   [facility] patient discharged from?  [place]   Does the patient have one of the following disease processes/diagnoses(primary or secondary)?  COPD/Pneumonia   Was the primary reason for admission:  Pneumonia   Week 4 attempt successful?  Yes   Call start time  1703   Call end time  1706   Discharge diagnosis  Acute respiratory failure,  CAP,  Sepsis   DME comments  Uses home nebs. MD ordered O2 but pt never received it. Advised her to call the ordering MD to inquire.    Pulse Ox monitoring  None   Psychosocial issues?  No   Comments  Her SOA is improved slightly.    What is the patient's perception of their health status since discharge?  Improving   Nursing Interventions  Nurse provided patient education   Is the patient/caregiver able to teach back signs and symptoms of worsening condition:  Fever/chills, Shortness of breath, Chest pain   Is the patient/caregiver able to teach back importance of completing antibiotic course of treatment?  Yes   Week 4 call completed?  Yes   Would the patient like one additional call?  No   Graduated  Yes   Is the patient interested in additional calls from an ambulatory ?  NOTE:  applies to high risk patients requiring additional follow-up.  No   Did the patient feel the follow up calls were helpful during their recovery period?  Yes   Was the number of calls appropriate?  Yes          Mirella Baeza RN

## 2021-04-28 PROCEDURE — 93298 REM INTERROG DEV EVAL SCRMS: CPT | Performed by: INTERNAL MEDICINE

## 2021-04-28 PROCEDURE — G2066 INTER DEVC REMOTE 30D: HCPCS | Performed by: INTERNAL MEDICINE

## 2021-06-29 PROCEDURE — G2066 INTER DEVC REMOTE 30D: HCPCS | Performed by: INTERNAL MEDICINE

## 2021-06-29 PROCEDURE — 93298 REM INTERROG DEV EVAL SCRMS: CPT | Performed by: INTERNAL MEDICINE

## 2021-07-30 PROCEDURE — 93298 REM INTERROG DEV EVAL SCRMS: CPT | Performed by: INTERNAL MEDICINE

## 2021-07-30 PROCEDURE — G2066 INTER DEVC REMOTE 30D: HCPCS | Performed by: INTERNAL MEDICINE

## 2021-10-18 ENCOUNTER — HOSPITAL ENCOUNTER (OUTPATIENT)
Dept: CT IMAGING | Facility: HOSPITAL | Age: 61
Discharge: HOME | End: 2021-10-18
Admitting: INTERNAL MEDICINE

## 2021-10-18 DIAGNOSIS — R91.8 LUNG NODULES: ICD-10-CM

## 2021-10-18 PROCEDURE — 71250 CT THORAX DX C-: CPT

## 2021-10-31 PROCEDURE — G2066 INTER DEVC REMOTE 30D: HCPCS | Performed by: INTERNAL MEDICINE

## 2021-10-31 PROCEDURE — 93298 REM INTERROG DEV EVAL SCRMS: CPT | Performed by: INTERNAL MEDICINE

## 2021-12-01 PROCEDURE — G2066 INTER DEVC REMOTE 30D: HCPCS | Performed by: INTERNAL MEDICINE

## 2021-12-01 PROCEDURE — 93298 REM INTERROG DEV EVAL SCRMS: CPT | Performed by: INTERNAL MEDICINE

## 2022-01-12 ENCOUNTER — OFFICE VISIT (OUTPATIENT)
Dept: CARDIOLOGY | Facility: CLINIC | Age: 62
End: 2022-01-12

## 2022-01-12 VITALS
HEIGHT: 64 IN | DIASTOLIC BLOOD PRESSURE: 62 MMHG | WEIGHT: 80 LBS | SYSTOLIC BLOOD PRESSURE: 98 MMHG | BODY MASS INDEX: 13.66 KG/M2 | HEART RATE: 108 BPM

## 2022-01-12 DIAGNOSIS — I51.81 STRESS-INDUCED CARDIOMYOPATHY: Primary | ICD-10-CM

## 2022-01-12 DIAGNOSIS — Z86.73 HISTORY OF STROKE: ICD-10-CM

## 2022-01-12 PROCEDURE — 99213 OFFICE O/P EST LOW 20 MIN: CPT | Performed by: NURSE PRACTITIONER

## 2022-01-12 PROCEDURE — 93000 ELECTROCARDIOGRAM COMPLETE: CPT | Performed by: NURSE PRACTITIONER

## 2022-01-12 NOTE — PROGRESS NOTES
[facility]   [address], [address]  [place], [place], [address]    [phone]  WWW.[facility]           OUTPATIENT CLINIC FOLLOW UP NOTE    Patient Care Team:  Patient Care Team:  Margaret Ca PA as PCP - General (Family Medicine)  Beulah Joshi APRN as Nurse Practitioner (Neurology)    Subjective:      Chief Complaint   Patient presents with   • Cardiomyopathy   • Coronary Artery Disease       HPI:    Scarlet Chakraborty is a 61 y.o. female.  Problem list:  1. Takotsubo cardiomyopathy  a. 9/2018 presented with acute ST elevation MI.  Cardiac catheterization revealed normal coronary arteries and ejection fraction of 20%.  b. TTE 9/2018: EF 35%  c. 9/2019 admitted for respiratory failure with recurrent cardiomyopathy and noted to have an ejection fraction of 20 to 25% with an elevated troponin.  d. 10/2019 TTE: EF 60%, mild MR, mild TR with normal RVSP.  e. Nuclear stress test 3/2021: No evidence of ischemia, EF greater than 70%  f. TTE 3/2021: EF 46 to 50%, normal diastolic function, akinesis of the basal inferior septum, basal inferior wall and the basal inferior lateral wall the remainder of the segments appear to be hyperdynamic.  2. COPD  3. CVA  a. In the setting of cardiomyopathy.  That is post tPA.  Patient felt to have had an LV thrombus at the time and is now status post loop recorder implantation.  b. Loop recorder has been without arrhythmia.  4. Tobacco dependence  5. Alcohol use    She presents today for follow-up.  Her primary cardiologist is Dr. Boston, whom she last saw in 2020.    Since the patient was discharged from the hospital in 3/2021 she reports that she has been doing well from a cardiac standpoint.  She denies chest pain, palpitations, lower extremity edema, lightheadedness, syncope, orthopnea, or PND.  She does have intermittent shortness of breath, however this is chronic and unchanged.  She reports stopping taking her atorvastatin  approximately 4 to 5 months ago due to myalgias.  She is not interested in trying another statin at this time.  Has not had recent lab work with her PCP.    Review of Systems:  Positive for dyspnea with exertion  Negative for exertional chest pain, lower extremity edema, palpitations, syncope.     PFSH:  Patient Active Problem List   Diagnosis   • Acute respiratory failure with hypoxia (HCC)   • Cerebrovascular accident (CVA) due to thrombosis (HCC)   • Stress-induced cardiomyopathy   • Chronic obstructive pulmonary disease with acute exacerbation (HCC)   • Tobacco abuse   • Mixed hyperlipidemia   • Hx of non-ST elevation myocardial infarction (NSTEMI)   • History of stroke   • Tobacco dependence   • COPD exacerbation (HCC)   • Breast mass   • Cerebrovascular accident (CVA) due to occlusion of left middle cerebral artery (HCC)   • Congestive heart failure (HCC)   • Chronic obstructive lung disease (HCC)   • Non-ischemic cardiomyopathy (HCC)   • Acute respiratory failure (HCC)   • Status post placement of implantable loop recorder   • SOB (shortness of breath)         Current Outpatient Medications:   •  albuterol sulfate  (90 Base) MCG/ACT inhaler, Inhale 2 puffs Every 4 (Four) Hours As Needed for Wheezing., Disp: , Rfl:   •  apixaban (ELIQUIS) 5 MG tablet tablet, Take 1 tablet by mouth Every 12 (Twelve) Hours., Disp: 180 tablet, Rfl: 2  •  budesonide-formoterol (SYMBICORT) 160-4.5 MCG/ACT inhaler, Inhale 2 puffs 2 (Two) Times a Day., Disp: 10.2 g, Rfl: 12  •  ipratropium-albuterol (DUO-NEB) 0.5-2.5 mg/3 ml nebulizer, Take 3 mL by nebulization Every 4 (Four) Hours As Needed for Wheezing or Shortness of Air., Disp: 360 mL, Rfl: 0  •  lisinopril (PRINIVIL,ZESTRIL) 2.5 MG tablet, Take 1 tablet by mouth Daily., Disp: 90 tablet, Rfl: 1  •  Tiotropium Bromide Monohydrate (SPIRIVA RESPIMAT) 1.25 MCG/ACT aerosol solution inhaler, Inhale 2 puffs Daily., Disp: 4 g, Rfl: 2    No Known Allergies     reports that she has  "been smoking cigarettes. She has a 7.50 pack-year smoking history. She has never used smokeless tobacco.      Objective:   Physical exam:  BP 98/62   Pulse 108   Ht 162.6 cm (64\")   Wt 36.3 kg (80 lb)   BMI 13.73 kg/m²   CONSTITUTIONAL: No acute distress  RESPIRATORY: Normal effort. Clear to auscultation bilaterally without wheezing or rales  CARDIOVASCULAR: Carotids with normal upstrokes without bruits.  Regular rate and rhythm with normal S1 and S2. Without murmur, gallop or rub. Normal radial pulse. There is no lower extremity edema bilaterally.    Labs:    BUN   Date Value Ref Range Status   03/22/2021 11 8 - 23 mg/dL Final     Creatinine   Date Value Ref Range Status   03/22/2021 0.45 (L) 0.57 - 1.00 mg/dL Final   09/24/2018 0.70 0.60 - 1.30 mg/dL Final     Comment:     Serial Number: [id number]:  [id number]     Potassium   Date Value Ref Range Status   03/22/2021 4.0 3.5 - 5.2 mmol/L Final     ALT (SGPT)   Date Value Ref Range Status   03/12/2021 30 1 - 33 U/L Final     AST (SGOT)   Date Value Ref Range Status   03/12/2021 30 1 - 32 U/L Final       Lab Results   Component Value Date    CHOL 143 12/18/2018     Lab Results   Component Value Date    TRIG 99 12/18/2018     Lab Results   Component Value Date    HDL 73 (H) 12/18/2018     Lab Results   Component Value Date    LDL 50 12/18/2018     No components found for: LDLDIRECTC    Diagnostic Data:      ECG 12 Lead    Date/Time: 1/12/2022 2:08 PM  Performed by: Shelley Powers APRN  Authorized by: Shelley Powers APRN   Comparison: compared with previous ECG from 12/15/2020  Comparison to previous ECG: Now tachycardic  Rhythm: sinus tachycardia  Rate: tachycardic  BPM: 108  Comments: QRS 84 ms,  ms            Results for orders placed during the hospital encounter of 03/12/21    Adult Transthoracic Echo Complete W/ Cont if Necessary Per Protocol    Interpretation Summary  · Left ventricular diastolic function was normal.  · The study is technically " difficult for diagnosis.  · There is akinesis of the basal inferoseptum, basal inferior wall, and the basal inferolateral wall. The remainder of the segments appear to be hyperdynamic  · Left ventricular ejection fraction appears to be 46 - 50%.  · Left ventricular diastolic function was normal.  · Normal right ventricular cavity size and systolic function noted.  · There is no evidence of pericardial effusion    Nuclear stress test 3/2021  · Myocardial perfusion imaging indicates a normal myocardial perfusion study with no evidence of ischemia.  · Left ventricular ejection fraction is hyperdynamic (Calculated EF > 70%). .  · Impressions are consistent with a low risk study.  · Diaphragmatic attenuation artifact is present.    Assessment and Plan:   Diagnoses and all orders for this visit:    History of stress-induced cardiomyopathy (Primary)  -Initial occurrence 9/2018 with an EF of 20%.  Most recent EF 46 to 50% per TTE 3/2021.  -Currently asymptomatic.  -Continue low-dose lisinopril for now.  Patient hypotensive today.  She was instructed to keep her BP log and call if she remains hypotensive.    History of stroke  -No arrhythmia noted on remote device interrogation 12/2021.  -Patient has been maintained on Eliquis since time of stroke in 2018.  -We will have the patient obtain a CBC and BMP today since she has not completed lab work since her hospitalization in March.      - Return in about 1 year (around 1/12/2023) for Next scheduled follow up with Dr. Boston.    Electronically signed by SHANKAR Vail, 01/12/22, 2:13 PM EST.

## 2022-02-01 PROCEDURE — G2066 INTER DEVC REMOTE 30D: HCPCS | Performed by: INTERNAL MEDICINE

## 2022-02-01 PROCEDURE — 93298 REM INTERROG DEV EVAL SCRMS: CPT | Performed by: INTERNAL MEDICINE

## 2022-03-04 PROCEDURE — 93298 REM INTERROG DEV EVAL SCRMS: CPT | Performed by: INTERNAL MEDICINE

## 2022-03-04 PROCEDURE — G2066 INTER DEVC REMOTE 30D: HCPCS | Performed by: INTERNAL MEDICINE

## 2022-03-11 ENCOUNTER — TELEPHONE (OUTPATIENT)
Dept: CARDIOLOGY | Facility: CLINIC | Age: 62
End: 2022-03-11

## 2022-03-11 NOTE — TELEPHONE ENCOUNTER
Just wanted to send as an FYI, pt has a [employer] Linq and it has reached [employer] as of 3/10/22. I spoke with pt and she is electing not to have the Linq removed at this time.

## 2022-03-16 NOTE — TELEPHONE ENCOUNTER
I called listed number and got voicemail.  Left message to call back to verify she does not have any questions about the legs and termination.

## 2022-05-24 ENCOUNTER — TRANSCRIBE ORDERS (OUTPATIENT)
Dept: ADMINISTRATIVE | Facility: HOSPITAL | Age: 62
End: 2022-05-24

## 2022-05-24 DIAGNOSIS — J98.11 ATELECTASIS: ICD-10-CM

## 2022-05-24 DIAGNOSIS — T17.500A MUCUS PLUGGING OF BRONCHI: Primary | ICD-10-CM

## 2022-08-04 RX ORDER — LISINOPRIL 2.5 MG/1
2.5 TABLET ORAL DAILY
Qty: 30 TABLET | Refills: 0 | Status: SHIPPED | OUTPATIENT
Start: 2022-08-04 | End: 2023-01-16 | Stop reason: SDUPTHER

## 2022-08-05 NOTE — TELEPHONE ENCOUNTER
Refilling this prescription for 1 month but she will need to come in the next few weeks for BMP to verify it is okay to continue with this.

## 2022-08-23 ENCOUNTER — LAB (OUTPATIENT)
Dept: LAB | Facility: HOSPITAL | Age: 62
End: 2022-08-23

## 2022-08-23 DIAGNOSIS — I51.81 STRESS-INDUCED CARDIOMYOPATHY: ICD-10-CM

## 2022-08-23 DIAGNOSIS — Z86.73 HISTORY OF STROKE: ICD-10-CM

## 2022-08-23 LAB
ANION GAP SERPL CALCULATED.3IONS-SCNC: 8 MMOL/L (ref 5–15)
BUN SERPL-MCNC: 16 MG/DL (ref 8–23)
BUN/CREAT SERPL: 27.6 (ref 7–25)
CALCIUM SPEC-SCNC: 9.9 MG/DL (ref 8.6–10.5)
CHLORIDE SERPL-SCNC: 105 MMOL/L (ref 98–107)
CO2 SERPL-SCNC: 29 MMOL/L (ref 22–29)
CREAT SERPL-MCNC: 0.58 MG/DL (ref 0.57–1)
DEPRECATED RDW RBC AUTO: 40.9 FL (ref 37–54)
EGFRCR SERPLBLD CKD-EPI 2021: 103.1 ML/MIN/1.73
ERYTHROCYTE [DISTWIDTH] IN BLOOD BY AUTOMATED COUNT: 12.9 % (ref 12.3–15.4)
GLUCOSE SERPL-MCNC: 83 MG/DL (ref 65–99)
HCT VFR BLD AUTO: 43.7 % (ref 34–46.6)
HGB BLD-MCNC: 14.7 G/DL (ref 12–15.9)
MCH RBC QN AUTO: 29.7 PG (ref 26.6–33)
MCHC RBC AUTO-ENTMCNC: 33.6 G/DL (ref 31.5–35.7)
MCV RBC AUTO: 88.3 FL (ref 79–97)
PLATELET # BLD AUTO: 261 10*3/MM3 (ref 140–450)
PMV BLD AUTO: 10.7 FL (ref 6–12)
POTASSIUM SERPL-SCNC: 4.9 MMOL/L (ref 3.5–5.2)
RBC # BLD AUTO: 4.95 10*6/MM3 (ref 3.77–5.28)
SODIUM SERPL-SCNC: 142 MMOL/L (ref 136–145)
WBC NRBC COR # BLD: 5.16 10*3/MM3 (ref 3.4–10.8)

## 2022-08-23 PROCEDURE — 85027 COMPLETE CBC AUTOMATED: CPT

## 2022-08-23 PROCEDURE — 36415 COLL VENOUS BLD VENIPUNCTURE: CPT

## 2022-08-23 PROCEDURE — 80048 BASIC METABOLIC PNL TOTAL CA: CPT

## 2022-11-18 ENCOUNTER — HOSPITAL ENCOUNTER (OUTPATIENT)
Dept: CT IMAGING | Facility: HOSPITAL | Age: 62
Discharge: HOME | End: 2022-11-18
Admitting: INTERNAL MEDICINE

## 2022-11-18 DIAGNOSIS — J98.11 ATELECTASIS: ICD-10-CM

## 2022-11-18 DIAGNOSIS — T17.500A MUCUS PLUGGING OF BRONCHI: ICD-10-CM

## 2022-11-18 PROCEDURE — 71250 CT THORAX DX C-: CPT

## 2023-01-16 ENCOUNTER — OFFICE VISIT (OUTPATIENT)
Dept: CARDIOLOGY | Facility: CLINIC | Age: 63
End: 2023-01-16
Payer: COMMERCIAL

## 2023-01-16 VITALS
WEIGHT: 86 LBS | DIASTOLIC BLOOD PRESSURE: 70 MMHG | SYSTOLIC BLOOD PRESSURE: 104 MMHG | BODY MASS INDEX: 14.68 KG/M2 | HEIGHT: 64 IN | HEART RATE: 82 BPM

## 2023-01-16 DIAGNOSIS — I51.81 STRESS-INDUCED CARDIOMYOPATHY: Primary | ICD-10-CM

## 2023-01-16 PROCEDURE — 93000 ELECTROCARDIOGRAM COMPLETE: CPT | Performed by: INTERNAL MEDICINE

## 2023-01-16 PROCEDURE — 99214 OFFICE O/P EST MOD 30 MIN: CPT | Performed by: INTERNAL MEDICINE

## 2023-01-16 RX ORDER — LISINOPRIL 2.5 MG/1
2.5 TABLET ORAL DAILY
Qty: 90 TABLET | Refills: 3 | Status: SHIPPED | OUTPATIENT
Start: 2023-01-16

## 2023-06-20 ENCOUNTER — TELEPHONE (OUTPATIENT)
Dept: CARDIOLOGY | Facility: CLINIC | Age: 63
End: 2023-06-20

## 2023-06-20 NOTE — TELEPHONE ENCOUNTER
Hub staff attempted to follow warm transfer process and was unsuccessful     Caller: JEAN-PIERRE    Relationship to patient: SELF    Best call back number: [phone]     Patient is needing: PT WAS CALLED TO RESCHEDULE HER APT HOWEVER I DO NOT HAVE ANY MORE APTS UNTIL OCTOBER.  PLEASE CALL TO RESCHEDULE

## 2023-08-03 ENCOUNTER — OFFICE VISIT (OUTPATIENT)
Dept: CARDIOLOGY | Facility: CLINIC | Age: 63
End: 2023-08-03
Payer: MEDICAID

## 2023-08-03 VITALS
HEART RATE: 87 BPM | DIASTOLIC BLOOD PRESSURE: 60 MMHG | SYSTOLIC BLOOD PRESSURE: 94 MMHG | HEIGHT: 64 IN | WEIGHT: 78 LBS | BODY MASS INDEX: 13.32 KG/M2

## 2023-08-03 DIAGNOSIS — Z86.73 HISTORY OF STROKE: ICD-10-CM

## 2023-08-03 DIAGNOSIS — I51.81 STRESS-INDUCED CARDIOMYOPATHY: Primary | ICD-10-CM

## 2023-08-03 DIAGNOSIS — J44.9 COPD, SEVERE: ICD-10-CM

## 2023-08-03 PROCEDURE — 93000 ELECTROCARDIOGRAM COMPLETE: CPT | Performed by: NURSE PRACTITIONER

## 2023-08-03 PROCEDURE — 1160F RVW MEDS BY RX/DR IN RCRD: CPT | Performed by: NURSE PRACTITIONER

## 2023-08-03 PROCEDURE — 1159F MED LIST DOCD IN RCRD: CPT | Performed by: NURSE PRACTITIONER

## 2023-08-03 PROCEDURE — 99214 OFFICE O/P EST MOD 30 MIN: CPT | Performed by: NURSE PRACTITIONER

## 2023-11-10 ENCOUNTER — TRANSCRIBE ORDERS (OUTPATIENT)
Dept: ADMINISTRATIVE | Facility: HOSPITAL | Age: 63
End: 2023-11-10
Payer: MEDICAID

## 2023-11-10 DIAGNOSIS — Z87.891 PERSONAL HISTORY OF TOBACCO USE, PRESENTING HAZARDS TO HEALTH: Primary | ICD-10-CM

## 2024-01-11 RX ORDER — APIXABAN 5 MG/1
5 TABLET, FILM COATED ORAL EVERY 12 HOURS
Qty: 180 TABLET | Refills: 3 | Status: SHIPPED | OUTPATIENT
Start: 2024-01-11

## 2024-01-11 RX ORDER — LISINOPRIL 2.5 MG/1
2.5 TABLET ORAL DAILY
Qty: 90 TABLET | Refills: 3 | Status: SHIPPED | OUTPATIENT
Start: 2024-01-11

## 2024-02-16 NOTE — PROGRESS NOTES
Date of Office Visit: 2024  Encounter Provider: SHANKAR Mendez  Place of Service: [facility] CARDIOLOGY  Patient Name: Scarlet Chakraborty  :1960    Chief complaint  Follow-up transient cardiomyopathy, history of embolic stroke     History of Present Illness  Patient is a 64 y.o. year old female  patient of Dr. Boston. Past medical history includes  modest alcohol use, nicotine use, COPD who presented to [facility] on  with acute respiratory failure, congestive heart failure and ST elevation myocardial infarction.  However cardiac catheterization showed an ejection fraction of 20% with normal coronary arteries.  Her echocardiogram showed an ejection fraction of 35% and she was felt to have probable Takotsubo cardiomyopathy.  Her blood pressure was too low for adequate goal-directed medical therapy and only lisinopril 2.5 mg a day was able to be utilized.  She also had severe bronchospasm from COPD exacerbation.  She was subsequently dismissed home but then presented on  with left-sided weakness, slurred speech and a fall.  She was felt to have had a embolic stroke for which she received TPA with good response.  She had minimal residual symptoms.  A transthoracic echocardiogram revealed normal systolic function with grade 1 diastolic dysfunction.  A transesophageal echocardiogram was unremarkable including a negative saline study.  She subsequently underwent Linq placement.  In 2019 she was admitted with pneumonia and respiratory failure with recurrent cardiomyopathy with an ejection fraction of 20 to 25% with elevated troponin and heart failure.  Once again this resolved with treatment of pneumonic process.  Echocardiogram in 3/2021 showed ejection fraction 45 to 50% with inferior basal basal inferolateral wall hypokinesis.  Diastolic function was normal.  There is no significant valvular disease.  Stress perfusion study was negative  for ischemia with diaphragmatic attenuation noted.  Patient had CT scan of the chest 11/2022 that showed severe emphysema with consolidation right middle lobe.      Interval history  Patient presents today for routine follow-up.  I will visit with her today and have reviewed her medical record.  Since last visit she has been doing well.  She denies palpitations, edema, dizziness, chest pain or chest pressure, fatigue, syncope or presyncope.  She notes she recently had bronchitis and received steroid injection and azithromycin from PCP with significant improvement in symptoms.  She still has residual cough and some mild congestion that is improving slowly.  Blood pressure today is at goal off lisinopril.  She does not routinely exercise.  Unfortunately she continues to smoke.    Past Medical History:   Diagnosis Date    Asthma     Cerebrovascular accident (CVA) due to thrombosis of right middle cerebral artery     COPD (chronic obstructive pulmonary disease)     Nonischemic cardiomyopathy     NSTEMI (non-ST elevated myocardial infarction)     Stress-induced cardiomyopathy     Stroke     Takotsubo cardiomyopathy     Tobacco abuse      Past Surgical History:   Procedure Laterality Date    CARDIAC CATHETERIZATION N/A 9/13/2018    Procedure: Left Heart Cath and cors;  Surgeon: Gabino Dozier MD;  Location: [facility] CATH INVASIVE LOCATION;  Service: Cardiology    CARDIAC CATHETERIZATION N/A 9/13/2018    Procedure: Left ventriculography;  Surgeon: Gabino Dozier MD;  Location: [facility] CATH INVASIVE LOCATION;  Service: Cardiology    CARDIAC ELECTROPHYSIOLOGY PROCEDURE N/A 9/27/2018    Procedure: Loop insertion  LINQ;  Surgeon: Jose R Barker MD;  Location: [facility] CATH INVASIVE LOCATION;  Service: Cardiovascular     Outpatient Medications Prior to Visit   Medication Sig Dispense Refill    albuterol sulfate  (90 Base) MCG/ACT inhaler Inhale 2 puffs Every 4 (Four) Hours As Needed for Wheezing.      Eliquis 5 MG tablet  tablet TAKE ONE TABLET BY MOUTH EVERY 12 HOURS 180 tablet 3    Fluticasone-Umeclidin-Vilant (Trelegy Ellipta) 200-62.5-25 MCG/ACT aerosol powder  Inhale.      ipratropium-albuterol (DUO-NEB) 0.5-2.5 mg/3 ml nebulizer Take 3 mL by nebulization Every 4 (Four) Hours As Needed for Wheezing or Shortness of Air. 360 mL 0    Tiotropium Bromide Monohydrate (SPIRIVA RESPIMAT) 1.25 MCG/ACT aerosol solution inhaler Inhale 2 puffs Daily. 4 g 2    budesonide-formoterol (SYMBICORT) 160-4.5 MCG/ACT inhaler Inhale 2 puffs 2 (Two) Times a Day. (Patient not taking: Reported on 2/19/2024) 10.2 g 12    lisinopril (PRINIVIL,ZESTRIL) 2.5 MG tablet TAKE ONE TABLET BY MOUTH DAILY (Patient not taking: Reported on 2/19/2024) 90 tablet 3     No facility-administered medications prior to visit.       Allergies as of 02/19/2024    (No Known Allergies)     Social History     Socioeconomic History    Marital status: Single   Tobacco Use    Smoking status: Light Smoker     Packs/day: 0.50     Years: 15.00     Additional pack years: 0.00     Total pack years: 7.50     Types: Cigarettes    Smokeless tobacco: Never    Tobacco comments:     Currently smokes cigarettes on occasion   Vaping Use    Vaping Use: Never used   Substance and Sexual Activity    Alcohol use: Yes     Comment: occassional/ Daily caffeine use    Drug use: No    Sexual activity: Defer     Family History   Problem Relation Age of Onset    Cancer Sister      Review of Systems   Constitutional: Negative for malaise/fatigue.   Cardiovascular:  Negative for chest pain, claudication, dyspnea on exertion, leg swelling, near-syncope, orthopnea, palpitations, paroxysmal nocturnal dyspnea and syncope.   Respiratory:  Positive for shortness of breath.    Neurological:  Negative for brief paralysis, dizziness, headaches and light-headedness.   All other systems reviewed and are negative.       Objective:     Vitals:    02/19/24 1508   BP: 110/70   BP Location: Right arm   Patient Position:  "Sitting   Cuff Size: Small Adult   Pulse: 99   Weight: 34.3 kg (75 lb 9.6 oz)   Height: 162.6 cm (64\")     Body mass index is 12.98 kg/m².    Vitals reviewed.   Constitutional:       General: Not in acute distress.     Appearance: Well-developed and not in distress. Chronically ill-appearing. Not diaphoretic.   HENT:      Head: Normocephalic.   Pulmonary:      Effort: Pulmonary effort is normal. No respiratory distress.      Breath sounds: Normal breath sounds. No wheezing. No rhonchi. No rales.   Cardiovascular:      Normal rate. Regular rhythm.      Murmurs: There is no murmur.   Pulses:     Radial: 2+ bilaterally.  Edema:     Peripheral edema absent.   Skin:     General: Skin is warm and dry. There is no cyanosis.      Findings: No rash.   Neurological:      Mental Status: Alert and oriented to person, place, and time.   Psychiatric:         Behavior: Behavior normal. Behavior is cooperative.         Thought Content: Thought content normal.         Judgment: Judgment normal.       Lab Review:     Lab Results   Component Value Date     08/23/2022     03/22/2021    K 4.9 08/23/2022    K 4.0 03/22/2021     08/23/2022     03/22/2021    CO2 29.0 08/23/2022    CO2 26.2 03/22/2021    BUN 16 08/23/2022    BUN 11 03/22/2021    CREATININE 0.58 08/23/2022    CREATININE 0.45 (L) 03/22/2021    EGFRIFNONA 142 03/22/2021    EGFRIFNONA >150 03/21/2021    GLUCOSE 83 08/23/2022    GLUCOSE 82 03/22/2021    CALCIUM 9.9 08/23/2022    CALCIUM 8.9 03/22/2021    ALBUMIN 3.50 03/16/2021    ALBUMIN 2.80 (L) 03/15/2021    BILITOT 0.2 03/12/2021    BILITOT 0.3 09/30/2019    AST 30 03/12/2021    AST 27 09/30/2019    ALT 30 03/12/2021    ALT 22 09/30/2019     Lab Results   Component Value Date    WBC 5.12 11/01/2023    WBC 5.16 08/23/2022    HGB 15.6 11/01/2023    HGB 14.7 08/23/2022    HCT 49.9 (H) 11/01/2023    HCT 43.7 08/23/2022    MCV 98.2 11/01/2023    MCV 88.3 08/23/2022     11/01/2023     " 08/23/2022     Lab Results   Component Value Date    PROBNP 60.2 03/12/2021    PROBNP 53.0 12/15/2020     Lab Results   Component Value Date    TROPONINT 0.031 (C) 03/17/2021     Lab Results   Component Value Date    TSH 1.010 09/24/2018             ECG 12 Lead    Date/Time: 2/19/2024 4:18 PM  Performed by: Gaby Ruggiero APRN    Authorized by: Gaby Ruggiero APRN  Comparison: compared with previous ECG   Similar to previous ECG  Rhythm: sinus rhythm  Rate: normal  BPM: 99  QRS axis: right  Other findings: T wave abnormality  Comments: Similar to prior.  No new ischemic changes        Assessment:       Diagnosis Plan   1. Stress-induced cardiomyopathy        2. History of stroke        3. COPD, severe        4. Current nicotine use          Plan:       1.  Recurrent Takotsubo cardiomyopathy.  No ischemia stress test 3/2021.  Echo with ejection fraction estimated at 60% by echo.  Clinically stable and euvolemic on exam.  2.  History of embolic stroke.  Linq device in place.  She wishes to continue with observation with device and anticoagulant therapy.  She is tolerating Eliquis well with no falls or bleeding symptoms.  Most recent hemoglobin and renal function were normal.  3.  Severe COPD that is managed by pulmonology  4.  Nicotine use.  Unfortunately continues occasionally with no plans to stop completely.  Discussed this again with her today.      Time Spent: I spent 30 minutes caring for Scarlet on this date of service. This time includes time spent by me in the following activities: preparing for the visit, reviewing tests, performing a medically appropriate examination and/or evaluations, counseling and educating the patient/family/caregiver, ordering medications, tests, or procedures, documenting information in the medical record, and independently interpreting results and communicating that information with the patient/family/caregiver.   I spent 1 minutes on the separately reported service of ECG.  This time is not included in the time used to support the E/M service also reported today.        Your medication list            Accurate as of February 19, 2024  4:20 PM. If you have any questions, ask your nurse or doctor.                CONTINUE taking these medications        Instructions Last Dose Given Next Dose Due   albuterol sulfate  (90 Base) MCG/ACT inhaler  Commonly known as: PROVENTIL HFA;VENTOLIN HFA;PROAIR HFA      Inhale 2 puffs Every 4 (Four) Hours As Needed for Wheezing.       Eliquis 5 MG tablet tablet  Generic drug: apixaban      TAKE ONE TABLET BY MOUTH EVERY 12 HOURS       ipratropium-albuterol 0.5-2.5 mg/3 ml nebulizer  Commonly known as: DUO-NEB      Take 3 mL by nebulization Every 4 (Four) Hours As Needed for Wheezing or Shortness of Air.       Spiriva Respimat 1.25 MCG/ACT aerosol solution inhaler  Generic drug: Tiotropium Bromide Monohydrate      Inhale 2 puffs Daily.       Trelegy Ellipta 200-62.5-25 MCG/ACT aerosol powder   Generic drug: Fluticasone-Umeclidin-Vilant      Inhale.                Patient is no longer taking -.  I corrected the med list to reflect this.  I did not stop these medications.    Return for with Dr. Boston.      Dictated utilizing Dragon dictation

## 2024-02-19 ENCOUNTER — OFFICE VISIT (OUTPATIENT)
Dept: CARDIOLOGY | Facility: CLINIC | Age: 64
End: 2024-02-19
Payer: COMMERCIAL

## 2024-02-19 VITALS
DIASTOLIC BLOOD PRESSURE: 70 MMHG | HEART RATE: 99 BPM | SYSTOLIC BLOOD PRESSURE: 110 MMHG | BODY MASS INDEX: 12.91 KG/M2 | HEIGHT: 64 IN | WEIGHT: 75.6 LBS

## 2024-02-19 DIAGNOSIS — Z72.0 CURRENT NICOTINE USE: ICD-10-CM

## 2024-02-19 DIAGNOSIS — Z86.73 HISTORY OF STROKE: ICD-10-CM

## 2024-02-19 DIAGNOSIS — I51.81 STRESS-INDUCED CARDIOMYOPATHY: Primary | ICD-10-CM

## 2024-02-19 DIAGNOSIS — J44.9 COPD, SEVERE: ICD-10-CM

## 2024-02-19 PROCEDURE — 93000 ELECTROCARDIOGRAM COMPLETE: CPT | Performed by: NURSE PRACTITIONER

## 2024-02-19 PROCEDURE — 99214 OFFICE O/P EST MOD 30 MIN: CPT | Performed by: NURSE PRACTITIONER

## 2024-02-19 RX ORDER — FLUTICASONE FUROATE, UMECLIDINIUM BROMIDE AND VILANTEROL TRIFENATATE 200; 62.5; 25 UG/1; UG/1; UG/1
POWDER RESPIRATORY (INHALATION)
COMMUNITY

## 2024-03-16 ENCOUNTER — HOSPITAL ENCOUNTER (INPATIENT)
Facility: HOSPITAL | Age: 64
LOS: 4 days | Discharge: HOME | End: 2024-03-20
Attending: EMERGENCY MEDICINE | Admitting: INTERNAL MEDICINE
Payer: COMMERCIAL

## 2024-03-16 ENCOUNTER — APPOINTMENT (OUTPATIENT)
Dept: GENERAL RADIOLOGY | Facility: HOSPITAL | Age: 64
End: 2024-03-16
Payer: COMMERCIAL

## 2024-03-16 ENCOUNTER — APPOINTMENT (OUTPATIENT)
Dept: CARDIOLOGY | Facility: HOSPITAL | Age: 64
End: 2024-03-16
Payer: COMMERCIAL

## 2024-03-16 DIAGNOSIS — J44.1 ACUTE EXACERBATION OF CHRONIC OBSTRUCTIVE PULMONARY DISEASE (COPD): Primary | ICD-10-CM

## 2024-03-16 DIAGNOSIS — J96.02 ACUTE RESPIRATORY FAILURE WITH HYPERCAPNIA: ICD-10-CM

## 2024-03-16 PROBLEM — J96.22 ACUTE ON CHRONIC RESPIRATORY FAILURE WITH HYPERCAPNIA: Status: ACTIVE | Noted: 2024-03-16

## 2024-03-16 LAB
A-A DO2: 141.9 MMHG
ALBUMIN SERPL-MCNC: 4.3 G/DL (ref 3.5–5.2)
ALBUMIN/GLOB SERPL: 1.5 G/DL
ALP SERPL-CCNC: 69 U/L (ref 39–117)
ALT SERPL W P-5'-P-CCNC: 29 U/L (ref 1–33)
ANION GAP SERPL CALCULATED.3IONS-SCNC: 11 MMOL/L (ref 5–15)
ARTERIAL PATENCY WRIST A: POSITIVE
AST SERPL-CCNC: 27 U/L (ref 1–32)
ATMOSPHERIC PRESS: 750.3 MMHG
ATMOSPHERIC PRESS: 750.4 MMHG
B PARAPERT DNA SPEC QL NAA+PROBE: NOT DETECTED
B PERT DNA SPEC QL NAA+PROBE: NOT DETECTED
BASE EXCESS BLDA CALC-SCNC: -1.7 MMOL/L (ref 0–2)
BASE EXCESS BLDV CALC-SCNC: -7.4 MMOL/L (ref -2–2)
BASOPHILS # BLD AUTO: 0.03 10*3/MM3 (ref 0–0.2)
BASOPHILS NFR BLD AUTO: 0.3 % (ref 0–1.5)
BDY SITE: ABNORMAL
BILIRUB SERPL-MCNC: 0.4 MG/DL (ref 0–1.2)
BUN BLDA-MCNC: 20 MG/DL
BUN SERPL-MCNC: 20 MG/DL (ref 8–23)
BUN/CREAT SERPL: 36.4 (ref 7–25)
C PNEUM DNA NPH QL NAA+NON-PROBE: NOT DETECTED
CA-I BLDA-SCNC: 1.28 MMOL/L (ref 2.2–2.55)
CALCIUM SPEC-SCNC: 9.4 MG/DL (ref 8.6–10.5)
CHLORIDE BLDA-SCNC: 110 MMOL/L (ref 98–107)
CHLORIDE SERPL-SCNC: 106 MMOL/L (ref 98–107)
CO2 BLDA-SCNC: 20.2 MMOL/L (ref 23–27)
CO2 BLDA-SCNC: 27.2 MMOL/L (ref 23–27)
CO2 SERPL-SCNC: 25 MMOL/L (ref 22–29)
CREAT BLDA-MCNC: 0.42 MG/DL (ref 0.6–130)
CREAT SERPL-MCNC: 0.55 MG/DL (ref 0.57–1)
D-LACTATE SERPL-SCNC: 1.3 MMOL/L (ref 0.5–2)
DEPRECATED RDW RBC AUTO: 41.1 FL (ref 37–54)
DEVICE COMMENT: ABNORMAL
DEVICE COMMENT: NORMAL
EGFRCR SERPLBLD CKD-EPI 2021: 103.1 ML/MIN/1.73
EOSINOPHIL # BLD AUTO: 0.11 10*3/MM3 (ref 0–0.4)
EOSINOPHIL NFR BLD AUTO: 1.3 % (ref 0.3–6.2)
ERYTHROCYTE [DISTWIDTH] IN BLOOD BY AUTOMATED COUNT: 12.4 % (ref 12.3–15.4)
FLUAV SUBTYP SPEC NAA+PROBE: NOT DETECTED
FLUBV RNA ISLT QL NAA+PROBE: NOT DETECTED
GEN 5 2HR TROPONIN T REFLEX: 188 NG/L
GLOBULIN UR ELPH-MCNC: 2.9 GM/DL
GLUCOSE BLDC GLUCOMTR-MCNC: 113 MG/DL (ref 70–130)
GLUCOSE BLDC GLUCOMTR-MCNC: 145 MG/DL (ref 70–130)
GLUCOSE BLDC GLUCOMTR-MCNC: 170 MG/DL (ref 70–130)
GLUCOSE BLDC GLUCOMTR-MCNC: 196 MG/DL (ref 70–130)
GLUCOSE SERPL-MCNC: 163 MG/DL (ref 65–99)
HADV DNA SPEC NAA+PROBE: NOT DETECTED
HCO3 BLDA-SCNC: 27.3 MMOL/L (ref 22–28)
HCO3 BLDV-SCNC: 19 MMOL/L (ref 22–28)
HCOV 229E RNA SPEC QL NAA+PROBE: NOT DETECTED
HCOV HKU1 RNA SPEC QL NAA+PROBE: NOT DETECTED
HCOV NL63 RNA SPEC QL NAA+PROBE: NOT DETECTED
HCOV OC43 RNA SPEC QL NAA+PROBE: NOT DETECTED
HCT VFR BLD AUTO: 39.8 % (ref 34–46.6)
HCT VFR BLDA CALC: 47 % (ref 38–51)
HEMODILUTION: NO
HGB BLD-MCNC: 12.8 G/DL (ref 12–15.9)
HGB BLDA-MCNC: 16.1 G/DL (ref 12–17)
HMPV RNA NPH QL NAA+NON-PROBE: NOT DETECTED
HPIV1 RNA ISLT QL NAA+PROBE: NOT DETECTED
HPIV2 RNA SPEC QL NAA+PROBE: NOT DETECTED
HPIV3 RNA NPH QL NAA+PROBE: NOT DETECTED
HPIV4 P GENE NPH QL NAA+PROBE: NOT DETECTED
IMM GRANULOCYTES # BLD AUTO: 0.03 10*3/MM3 (ref 0–0.05)
IMM GRANULOCYTES NFR BLD AUTO: 0.3 % (ref 0–0.5)
INHALED O2 CONCENTRATION: 100 %
INHALED O2 CONCENTRATION: 30 %
INSPIRATORY TIME: 1
INSPIRATORY TIME: 1
LYMPHOCYTES # BLD AUTO: 0.77 10*3/MM3 (ref 0.7–3.1)
LYMPHOCYTES NFR BLD AUTO: 8.9 % (ref 19.6–45.3)
Lab: ABNORMAL
M PNEUMO IGG SER IA-ACNC: NOT DETECTED
MCH RBC QN AUTO: 29.4 PG (ref 26.6–33)
MCHC RBC AUTO-ENTMCNC: 32.2 G/DL (ref 31.5–35.7)
MCV RBC AUTO: 91.5 FL (ref 79–97)
MODALITY: ABNORMAL
MODALITY: ABNORMAL
MONOCYTES # BLD AUTO: 0.34 10*3/MM3 (ref 0.1–0.9)
MONOCYTES NFR BLD AUTO: 3.9 % (ref 5–12)
NEUTROPHILS NFR BLD AUTO: 7.35 10*3/MM3 (ref 1.7–7)
NEUTROPHILS NFR BLD AUTO: 85.3 % (ref 42.7–76)
NOTIFIED WHO: ABNORMAL
NRBC BLD AUTO-RTO: 0 /100 WBC (ref 0–0.2)
NT-PROBNP SERPL-MCNC: 88.9 PG/ML (ref 0–900)
O2 A-A PPRESDIFF RESPIRATORY: 0.7 MMHG
PAW @ PEAK INSP FLOW SETTING VENT: 17 CMH2O
PAW @ PEAK INSP FLOW SETTING VENT: 17 CMH2O
PCO2 BLDA: 62.2 MM HG (ref 35–45)
PCO2 BLDV: 40.7 MM HG (ref 41–51)
PH BLDA: 7.25 PH UNITS (ref 7.35–7.45)
PH BLDV: 7.28 PH UNITS (ref 7.31–7.41)
PLATELET # BLD AUTO: 248 10*3/MM3 (ref 140–450)
PMV BLD AUTO: 10.6 FL (ref 6–12)
PO2 BLDA: 499.3 MM HG (ref 80–100)
PO2 BLDV: 53.5 MM HG (ref 35–45)
POTASSIUM BLDA-SCNC: 4.7 MMOL/L (ref 3.5–5.2)
POTASSIUM SERPL-SCNC: 4.7 MMOL/L (ref 3.5–5.2)
PROT SERPL-MCNC: 7.2 G/DL (ref 6–8.5)
QT INTERVAL: 313 MS
QTC INTERVAL: 444 MS
RBC # BLD AUTO: 4.35 10*6/MM3 (ref 3.77–5.28)
READ BACK: YES
READ BACK: YES
RHINOVIRUS RNA SPEC NAA+PROBE: NOT DETECTED
RSV RNA NPH QL NAA+NON-PROBE: NOT DETECTED
SAO2 % BLDCOA: 100 % (ref 92–98.5)
SAO2 % BLDCOV: 83.1 % (ref 45–75)
SARS-COV-2 RNA NPH QL NAA+NON-PROBE: NOT DETECTED
SET MECH RESP RATE: 18
SET MECH RESP RATE: 18
SODIUM BLD-SCNC: 142 MMOL/L (ref 136–145)
SODIUM SERPL-SCNC: 142 MMOL/L (ref 136–145)
TOTAL RATE: 26 BREATHS/MINUTE
TOTAL RATE: 28 BREATHS/MINUTE
TROPONIN T DELTA: 123 NG/L
TROPONIN T SERPL HS-MCNC: 65 NG/L
VENTILATOR MODE: ABNORMAL
VENTILATOR MODE: ABNORMAL
VT ON VENT VENT: 450 ML
VT ON VENT VENT: 450 ML
WBC NRBC COR # BLD AUTO: 8.63 10*3/MM3 (ref 3.4–10.8)

## 2024-03-16 PROCEDURE — 83880 ASSAY OF NATRIURETIC PEPTIDE: CPT | Performed by: EMERGENCY MEDICINE

## 2024-03-16 PROCEDURE — 25010000002 ONDANSETRON PER 1 MG: Performed by: INTERNAL MEDICINE

## 2024-03-16 PROCEDURE — 94799 UNLISTED PULMONARY SVC/PX: CPT

## 2024-03-16 PROCEDURE — 94761 N-INVAS EAR/PLS OXIMETRY MLT: CPT

## 2024-03-16 PROCEDURE — 0202U NFCT DS 22 TRGT SARS-COV-2: CPT | Performed by: EMERGENCY MEDICINE

## 2024-03-16 PROCEDURE — 36415 COLL VENOUS BLD VENIPUNCTURE: CPT

## 2024-03-16 PROCEDURE — 82948 REAGENT STRIP/BLOOD GLUCOSE: CPT

## 2024-03-16 PROCEDURE — 99222 1ST HOSP IP/OBS MODERATE 55: CPT | Performed by: INTERNAL MEDICINE

## 2024-03-16 PROCEDURE — 36600 WITHDRAWAL OF ARTERIAL BLOOD: CPT

## 2024-03-16 PROCEDURE — 83605 ASSAY OF LACTIC ACID: CPT

## 2024-03-16 PROCEDURE — 85018 HEMOGLOBIN: CPT

## 2024-03-16 PROCEDURE — 93005 ELECTROCARDIOGRAM TRACING: CPT | Performed by: EMERGENCY MEDICINE

## 2024-03-16 PROCEDURE — 25810000003 SODIUM CHLORIDE 0.9 % SOLUTION: Performed by: INTERNAL MEDICINE

## 2024-03-16 PROCEDURE — 80047 BASIC METABLC PNL IONIZED CA: CPT

## 2024-03-16 PROCEDURE — 25810000003 SODIUM CHLORIDE 0.9 % SOLUTION: Performed by: EMERGENCY MEDICINE

## 2024-03-16 PROCEDURE — 94640 AIRWAY INHALATION TREATMENT: CPT

## 2024-03-16 PROCEDURE — 85025 COMPLETE CBC W/AUTO DIFF WBC: CPT | Performed by: EMERGENCY MEDICINE

## 2024-03-16 PROCEDURE — 80053 COMPREHEN METABOLIC PANEL: CPT | Performed by: EMERGENCY MEDICINE

## 2024-03-16 PROCEDURE — 93010 ELECTROCARDIOGRAM REPORT: CPT | Performed by: INTERNAL MEDICINE

## 2024-03-16 PROCEDURE — 99291 CRITICAL CARE FIRST HOUR: CPT

## 2024-03-16 PROCEDURE — 94660 CPAP INITIATION&MGMT: CPT

## 2024-03-16 PROCEDURE — 5A09357 ASSISTANCE WITH RESPIRATORY VENTILATION, LESS THAN 24 CONSECUTIVE HOURS, CONTINUOUS POSITIVE AIRWAY PRESSURE: ICD-10-PCS | Performed by: INTERNAL MEDICINE

## 2024-03-16 PROCEDURE — 84484 ASSAY OF TROPONIN QUANT: CPT | Performed by: EMERGENCY MEDICINE

## 2024-03-16 PROCEDURE — 94664 DEMO&/EVAL PT USE INHALER: CPT

## 2024-03-16 PROCEDURE — 82803 BLOOD GASES ANY COMBINATION: CPT

## 2024-03-16 PROCEDURE — 71045 X-RAY EXAM CHEST 1 VIEW: CPT

## 2024-03-16 RX ORDER — ACETAMINOPHEN 650 MG/1
650 SUPPOSITORY RECTAL EVERY 4 HOURS PRN
Status: DISCONTINUED | OUTPATIENT
Start: 2024-03-16 | End: 2024-03-20 | Stop reason: HOSPADM

## 2024-03-16 RX ORDER — GUAIFENESIN 600 MG/1
600 TABLET, EXTENDED RELEASE ORAL 2 TIMES DAILY PRN
Status: DISCONTINUED | OUTPATIENT
Start: 2024-03-16 | End: 2024-03-20 | Stop reason: HOSPADM

## 2024-03-16 RX ORDER — ONDANSETRON 4 MG/1
4 TABLET, ORALLY DISINTEGRATING ORAL EVERY 6 HOURS PRN
Status: DISCONTINUED | OUTPATIENT
Start: 2024-03-16 | End: 2024-03-20 | Stop reason: HOSPADM

## 2024-03-16 RX ORDER — BISACODYL 10 MG
10 SUPPOSITORY, RECTAL RECTAL DAILY PRN
Status: DISCONTINUED | OUTPATIENT
Start: 2024-03-16 | End: 2024-03-20 | Stop reason: HOSPADM

## 2024-03-16 RX ORDER — NOREPINEPHRINE BITARTRATE 0.03 MG/ML
.02-.3 INJECTION, SOLUTION INTRAVENOUS
Status: DISCONTINUED | OUTPATIENT
Start: 2024-03-16 | End: 2024-03-19

## 2024-03-16 RX ORDER — SODIUM CHLORIDE 0.9 % (FLUSH) 0.9 %
10 SYRINGE (ML) INJECTION EVERY 12 HOURS SCHEDULED
Status: DISCONTINUED | OUTPATIENT
Start: 2024-03-16 | End: 2024-03-20 | Stop reason: HOSPADM

## 2024-03-16 RX ORDER — INSULIN LISPRO 100 [IU]/ML
2-7 INJECTION, SOLUTION INTRAVENOUS; SUBCUTANEOUS
Status: DISCONTINUED | OUTPATIENT
Start: 2024-03-16 | End: 2024-03-18

## 2024-03-16 RX ORDER — ACETAMINOPHEN 325 MG/1
650 TABLET ORAL EVERY 4 HOURS PRN
Status: DISCONTINUED | OUTPATIENT
Start: 2024-03-16 | End: 2024-03-20 | Stop reason: HOSPADM

## 2024-03-16 RX ORDER — DEXTROSE MONOHYDRATE 25 G/50ML
25 INJECTION, SOLUTION INTRAVENOUS
Status: DISCONTINUED | OUTPATIENT
Start: 2024-03-16 | End: 2024-03-20 | Stop reason: HOSPADM

## 2024-03-16 RX ORDER — SODIUM CHLORIDE 9 MG/ML
40 INJECTION, SOLUTION INTRAVENOUS AS NEEDED
Status: DISCONTINUED | OUTPATIENT
Start: 2024-03-16 | End: 2024-03-20 | Stop reason: HOSPADM

## 2024-03-16 RX ORDER — BISACODYL 5 MG/1
5 TABLET, DELAYED RELEASE ORAL DAILY PRN
Status: DISCONTINUED | OUTPATIENT
Start: 2024-03-16 | End: 2024-03-20 | Stop reason: HOSPADM

## 2024-03-16 RX ORDER — NICOTINE POLACRILEX 4 MG
15 LOZENGE BUCCAL
Status: DISCONTINUED | OUTPATIENT
Start: 2024-03-16 | End: 2024-03-20 | Stop reason: HOSPADM

## 2024-03-16 RX ORDER — AMOXICILLIN 250 MG
2 CAPSULE ORAL 2 TIMES DAILY
Status: DISCONTINUED | OUTPATIENT
Start: 2024-03-16 | End: 2024-03-20 | Stop reason: HOSPADM

## 2024-03-16 RX ORDER — SODIUM CHLORIDE 0.9 % (FLUSH) 0.9 %
10 SYRINGE (ML) INJECTION AS NEEDED
Status: DISCONTINUED | OUTPATIENT
Start: 2024-03-16 | End: 2024-03-20 | Stop reason: HOSPADM

## 2024-03-16 RX ORDER — POLYETHYLENE GLYCOL 3350 17 G/17G
17 POWDER, FOR SOLUTION ORAL DAILY PRN
Status: DISCONTINUED | OUTPATIENT
Start: 2024-03-16 | End: 2024-03-20 | Stop reason: HOSPADM

## 2024-03-16 RX ORDER — NITROGLYCERIN 0.4 MG/1
0.4 TABLET SUBLINGUAL
Status: DISCONTINUED | OUTPATIENT
Start: 2024-03-16 | End: 2024-03-20 | Stop reason: HOSPADM

## 2024-03-16 RX ORDER — ONDANSETRON 2 MG/ML
4 INJECTION INTRAMUSCULAR; INTRAVENOUS EVERY 6 HOURS PRN
Status: DISCONTINUED | OUTPATIENT
Start: 2024-03-16 | End: 2024-03-20 | Stop reason: HOSPADM

## 2024-03-16 RX ORDER — IBUPROFEN 600 MG/1
1 TABLET ORAL
Status: DISCONTINUED | OUTPATIENT
Start: 2024-03-16 | End: 2024-03-20 | Stop reason: HOSPADM

## 2024-03-16 RX ORDER — IPRATROPIUM BROMIDE AND ALBUTEROL SULFATE 2.5; .5 MG/3ML; MG/3ML
6 SOLUTION RESPIRATORY (INHALATION) ONCE
Status: COMPLETED | OUTPATIENT
Start: 2024-03-16 | End: 2024-03-16

## 2024-03-16 RX ORDER — GUAIFENESIN/DEXTROMETHORPHAN 100-10MG/5
10 SYRUP ORAL EVERY 6 HOURS PRN
Status: DISCONTINUED | OUTPATIENT
Start: 2024-03-16 | End: 2024-03-20 | Stop reason: HOSPADM

## 2024-03-16 RX ORDER — IPRATROPIUM BROMIDE AND ALBUTEROL SULFATE 2.5; .5 MG/3ML; MG/3ML
3 SOLUTION RESPIRATORY (INHALATION) EVERY 4 HOURS PRN
Status: DISCONTINUED | OUTPATIENT
Start: 2024-03-16 | End: 2024-03-18

## 2024-03-16 RX ORDER — IPRATROPIUM BROMIDE AND ALBUTEROL SULFATE 2.5; .5 MG/3ML; MG/3ML
3 SOLUTION RESPIRATORY (INHALATION)
Status: DISCONTINUED | OUTPATIENT
Start: 2024-03-16 | End: 2024-03-17

## 2024-03-16 RX ADMIN — Medication 0.02 MCG/KG/MIN: at 19:13

## 2024-03-16 RX ADMIN — SODIUM CHLORIDE 1000 ML: 9 INJECTION, SOLUTION INTRAVENOUS at 14:14

## 2024-03-16 RX ADMIN — ONDANSETRON 4 MG: 2 INJECTION INTRAMUSCULAR; INTRAVENOUS at 10:35

## 2024-03-16 RX ADMIN — IPRATROPIUM BROMIDE AND ALBUTEROL SULFATE 6 ML: .5; 3 SOLUTION RESPIRATORY (INHALATION) at 09:27

## 2024-03-16 RX ADMIN — IPRATROPIUM BROMIDE AND ALBUTEROL SULFATE 3 ML: .5; 3 SOLUTION RESPIRATORY (INHALATION) at 17:37

## 2024-03-16 RX ADMIN — IPRATROPIUM BROMIDE AND ALBUTEROL SULFATE 3 ML: .5; 3 SOLUTION RESPIRATORY (INHALATION) at 19:58

## 2024-03-16 RX ADMIN — Medication 10 ML: at 21:58

## 2024-03-16 RX ADMIN — SODIUM CHLORIDE 1000 ML: 9 INJECTION, SOLUTION INTRAVENOUS at 11:31

## 2024-03-16 NOTE — ED NOTES
..Nursing report ED to floor  Scarlet Chakraborty  64 y.o.  female    HPI :  HPI (Adult)  Stated Reason for Visit: soa x 2 days.  hx copd.  received solumedrol 125mg IV and duoneb en route  History Obtained From: EMS    Chief Complaint  Chief Complaint   Patient presents with    Shortness of Breath       Admitting doctor:   Sourav Bales MD    Admitting diagnosis:   The primary encounter diagnosis was Acute exacerbation of chronic obstructive pulmonary disease (COPD). A diagnosis of Acute respiratory failure with hypercapnia was also pertinent to this visit.    Code status:   Current Code Status       Date Active Code Status Order ID Comments User Context       3/16/2024 1014 CPR (Attempt to Resuscitate) [id number]  Sourav Bales MD ED        Question Answer    Code Status (Patient has no pulse and is not breathing) CPR (Attempt to Resuscitate)    Medical Interventions (Patient has pulse or is breathing) Full Support                    Allergies:   Patient has no known allergies.    Isolation:   Enhanced Droplet/Contact     Intake and Output    Intake/Output Summary (Last 24 hours) at 3/16/2024 1427  Last data filed at 3/16/2024 1301  Gross per 24 hour   Intake 1250 ml   Output --   Net 1250 ml       Weight:       03/16/24  1212   Weight: 34 kg (75 lb)       Most recent vitals:   Vitals:    03/16/24 1321 03/16/24 1331 03/16/24 1411 03/16/24 1421   BP: (!) 88/55 (!) 86/56 94/50 98/55   Pulse: 98 96 96 96   Resp:       Temp:       SpO2: 94% 95% 98% 99%   Weight:       Height:           Active LDAs/IV Access:   Lines, Drains & Airways       Active LDAs       Name Placement date Placement time Site Days    Peripheral IV 03/16/24 Anterior;Left;Proximal Forearm 03/16/24  --  Forearm  less than 1    Peripheral IV 03/16/24 1131 Anterior;Right Forearm 03/16/24  1131  Forearm  less than 1                    Labs (abnormal labs have a star):   Labs Reviewed   COMPREHENSIVE METABOLIC PANEL - Abnormal; Notable for the  following components:       Result Value    Glucose 163 (*)     Creatinine 0.55 (*)     BUN/Creatinine Ratio 36.4 (*)     All other components within normal limits    Narrative:     GFR Normal >60  Chronic Kidney Disease <60  Kidney Failure <15     TROPONIN - Abnormal; Notable for the following components:    HS Troponin T 65 (*)     All other components within normal limits    Narrative:     High Sensitive Troponin T Reference Range:  <14.0 ng/L- Negative Female for AMI  <22.0 ng/L- Negative Male for AMI  >=14 - Abnormal Female indicating possible myocardial injury.  >=22 - Abnormal Male indicating possible myocardial injury.   Clinicians would have to utilize clinical acumen, EKG, Troponin, and serial changes to determine if it is an Acute Myocardial Infarction or myocardial injury due to an underlying chronic condition.        CBC WITH AUTO DIFFERENTIAL - Abnormal; Notable for the following components:    Neutrophil % 85.3 (*)     Lymphocyte % 8.9 (*)     Monocyte % 3.9 (*)     Neutrophils, Absolute 7.35 (*)     All other components within normal limits   BLOOD GAS, ARTERIAL - Abnormal; Notable for the following components:    pH, Arterial 7.250 (*)     pCO2, Arterial 62.2 (*)     pO2, Arterial 499.3 (*)     Base Excess, Arterial -1.7 (*)     O2 Saturation, Arterial 100.0 (*)     All other components within normal limits   HIGH SENSITIVITIY TROPONIN T 2HR - Abnormal; Notable for the following components:    HS Troponin T 188 (*)     Troponin T Delta 123 (*)     All other components within normal limits    Narrative:     High Sensitive Troponin T Reference Range:  <14.0 ng/L- Negative Female for AMI  <22.0 ng/L- Negative Male for AMI  >=14 - Abnormal Female indicating possible myocardial injury.  >=22 - Abnormal Male indicating possible myocardial injury.   Clinicians would have to utilize clinical acumen, EKG, Troponin, and serial changes to determine if it is an Acute Myocardial Infarction or myocardial injury  due to an underlying chronic condition.        BLOOD GAS, VENOUS - Abnormal; Notable for the following components:    pH, Venous 7.276 (*)     pCO2, Venous 40.7 (*)     pO2, Venous 53.5 (*)     HCO3, Venous 19.0 (*)     Base Excess, Venous -7.4 (*)     O2 Saturation, Venous 83.1 (*)     CO2 Content 20.2 (*)     All other components within normal limits   POC CHEM PANEL - Abnormal; Notable for the following components:    Glucose 170 (*)     Ionized Calcium 1.28 (*)     Chloride 110 (*)     Creatinine 0.42 (*)     CO2 Content 27.2 (*)     All other components within normal limits   RESPIRATORY PANEL PCR W/ COVID-19 (SARS-COV-2), NP SWAB IN UTM/VTP, 2 HR TAT - Normal    Narrative:     In the setting of a positive respiratory panel with a viral infection PLUS a negative procalcitonin without other underlying concern for bacterial infection, consider observing off antibiotics or discontinuation of antibiotics and continue supportive care. If the respiratory panel is positive for atypical bacterial infection (Bordetella pertussis, Chlamydophila pneumoniae, or Mycoplasma pneumoniae), consider antibiotic de-escalation to target atypical bacterial infection.   BNP (IN-HOUSE) - Normal    Narrative:     This assay is used as an aid in the diagnosis of individuals suspected of having heart failure. It can be used as an aid in the diagnosis of acute decompensated heart failure (ADHF) in patients presenting with signs and symptoms of ADHF to the emergency department (ED). In addition, NT-proBNP of <300 pg/mL indicates ADHF is not likely.    Age Range Result Interpretation  NT-proBNP Concentration (pg/mL:      <50             Positive            >450                   Gray                 300-450                    Negative             <300    50-75           Positive            >900                  Gray                300-900                  Negative            <300      >75             Positive            >1800                   [place]                300-1800                  Negative            <300   HGB & HCT POC - Normal   BLOOD GAS, ARTERIAL   POC ELECTROLYTE PANEL   POC LACTATE   POC LACTATE   POCT GLUCOSE FINGERSTICK   POCT GLUCOSE FINGERSTICK   POCT GLUCOSE FINGERSTICK   POCT GLUCOSE FINGERSTICK   POCT GLUCOSE FINGERSTICK   POCT GLUCOSE FINGERSTICK   CBC AND DIFFERENTIAL    Narrative:     The following orders were created for panel order CBC & Differential.  Procedure                               Abnormality         Status                     ---------                               -----------         ------                     CBC Auto Differential[[id number]]        Abnormal            Final result                 Please view results for these tests on the individual orders.       EKG:   ECG 12 Lead Dyspnea   Preliminary Result   HEART RATE= 121  bpm   RR Interval= 496  ms   WI Interval= 140  ms   P Horizontal Axis= 1  deg   P Front Axis= 99  deg   QRSD Interval= 93  ms   QT Interval= 313  ms   QTcB= 444  ms   QRS Axis= 97  deg   T Wave Axis= 93  deg   - ABNORMAL ECG -   Sinus tachycardia   Consider right atrial enlargement   Right axis deviation   Anteroseptal infarct, old   Nonspecific T abnormalities, lateral leads   Artifact in lead(s) I,III,aVL,V3,V5   Electronically Signed By:    Date and Time of Study: 2024-03-16 09:21:33          Meds given in ED:   Medications   nitroglycerin (NITROSTAT) SL tablet 0.4 mg (has no administration in time range)   sodium chloride 0.9 % flush 10 mL (has no administration in time range)   sodium chloride 0.9 % flush 10 mL (has no administration in time range)   sodium chloride 0.9 % infusion 40 mL (has no administration in time range)   sennosides-docusate (PERICOLACE) 8.6-50 MG per tablet 2 tablet (has no administration in time range)     And   polyethylene glycol (MIRALAX) packet 17 g (has no administration in time range)     And   bisacodyl (DULCOLAX) EC tablet 5 mg (has no administration in  Oriented - self; Oriented - place; Oriented - time time range)     And   bisacodyl (DULCOLAX) suppository 10 mg (has no administration in time range)   ondansetron ODT (ZOFRAN-ODT) disintegrating tablet 4 mg ( Oral Not Given:  See Alt 3/16/24 1035)     Or   ondansetron (ZOFRAN) injection 4 mg (4 mg Intravenous Given 3/16/24 1035)   Potassium Replacement - Follow Nurse / BPA Driven Protocol (has no administration in time range)   Magnesium Standard Dose Replacement - Follow Nurse / BPA Driven Protocol (has no administration in time range)   Phosphorus Replacement - Follow Nurse / BPA Driven Protocol (has no administration in time range)   Calcium Replacement - Follow Nurse / BPA Driven Protocol (has no administration in time range)   acetaminophen (TYLENOL) tablet 650 mg (has no administration in time range)     Or   acetaminophen (TYLENOL) suppository 650 mg (has no administration in time range)   sodium chloride 0.9 % bolus 1,000 mL (1,000 mL Intravenous New Bag 3/16/24 1414)   ipratropium-albuterol (DUO-NEB) nebulizer solution 6 mL (6 mL Nebulization Given 3/16/24 8983)   sodium chloride 0.9 % bolus 1,000 mL (0 mL Intravenous Stopped 3/16/24 1301)       Imaging results:  No radiology results for the last day    Ambulatory status:   - typically pt does not require walker or cane however pt should be a x 1 assist     Social issues:   Social History     Socioeconomic History    Marital status: Single   Tobacco Use    Smoking status: Light Smoker     Current packs/day: 0.50     Average packs/day: 0.5 packs/day for 15.0 years (7.5 ttl pk-yrs)     Types: Cigarettes    Smokeless tobacco: Never    Tobacco comments:     Currently smokes cigarettes on occasion   Vaping Use    Vaping status: Never Used   Substance and Sexual Activity    Alcohol use: Yes     Comment: occassional/ Daily caffeine use    Drug use: No    Sexual activity: Defer       Peripheral Neurovascular  Peripheral Neurovascular (Adult)  Peripheral Neurovascular WDL: WDL, pulse assessment  Pulse Assessment:  radial    Neuro Cognitive  Neuro Cognitive (Adult)  Cognitive/Neuro/Behavioral WDL: .WDL except, arousability, level of consciousness, mood/behavior, motor response  Level of Consciousness: Responds to Voice  Arousal Level: arouses to voice  Mood/Behavior: anxious    Learning  Learning Assessment (Adult)  Learning Readiness and Ability: no barriers identified  Education Provided  Person Taught: patient, spouse, family member/friend    Respiratory  Respiratory WDL  Respiratory WDL: .WDL except, rhythm/pattern, nailbeds, mucous membranes, cough, expansion/retractions, all  Rhythm/Pattern, Respiratory: grunting, tachypneic, shortness of breath  Mucous Membranes: dry, pink  Breath Sounds  Breath Sounds: All Fields  All Lung Fields Breath Sounds: wheezes, inspiratory    Abdominal Pain  Abdominal Pain CPG Interventions  Coping Interventions: anticipatory guidance provided, care explained to patient/family prior to performing, reassurance provided  Safety Interventions  Safety Precautions/Falls Reduction: assistive device/personal items within reach    Pain Assessments  Pain (Adult)  (0-10) Pain Rating: Rest: 0    NIH Stroke Scale       Nabila Gerard RN  03/16/24 14:27 EDT

## 2024-03-16 NOTE — ED PROVIDER NOTES
EMERGENCY DEPARTMENT ENCOUNTER    Room Number:  16/16  PCP: Flavia Jaquez APRN  Historian: EMS      HPI:  Chief Complaint: Shortness of breath  A complete HPI/ROS/PMH/PSH/SH/FH are unobtainable due to: Shortness of breath  Context: Scarlet Chakraborty is a 64 y.o. female who presents to the ED c/o shortness of breath.  Patient with history of COPD and congestive heart failure.  Patient has been increasingly short of breath starting on Thursday.  Patient wears oxygen as needed.  EMS arrived and patient was in respiratory distress.  Patient given Solu-Medrol and breathing treatment.  Patient speaking in one-word sentences.  Has had no swelling.  Is had no vomiting or diarrhea.  No chest pain            PAST MEDICAL HISTORY  Active Ambulatory Problems     Diagnosis Date Noted    Acute respiratory failure with hypoxia 09/13/2018    Cerebrovascular accident (CVA) due to thrombosis 09/24/2018    Stress-induced cardiomyopathy 10/14/2018    Chronic obstructive pulmonary disease with acute exacerbation 10/14/2018    Tobacco abuse 12/18/2018    Mixed hyperlipidemia 12/18/2018    Hx of non-ST elevation myocardial infarction (NSTEMI) 02/17/2019    History of stroke 02/17/2019    Tobacco dependence 02/17/2019    COPD exacerbation 09/11/2019    Breast mass 05/22/2012    Cerebrovascular accident (CVA) due to occlusion of left middle cerebral artery 10/24/2018    Congestive heart failure 11/02/2018    Chronic obstructive lung disease 11/02/2018    Non-ischemic cardiomyopathy 09/19/2019    Acute respiratory failure 09/30/2019    Status post placement of implantable loop recorder 06/15/2020    SOB (shortness of breath) 12/22/2020     Resolved Ambulatory Problems     Diagnosis Date Noted    Acute respiratory failure 09/13/2018     Past Medical History:   Diagnosis Date    Asthma     Cerebrovascular accident (CVA) due to thrombosis of right middle cerebral artery     COPD (chronic obstructive pulmonary disease)      Nonischemic cardiomyopathy     NSTEMI (non-ST elevated myocardial infarction)     Stroke     Takotsubo cardiomyopathy          PAST SURGICAL HISTORY  Past Surgical History:   Procedure Laterality Date    CARDIAC CATHETERIZATION N/A 9/13/2018    Procedure: Left Heart Cath and cors;  Surgeon: Gabino Dozier MD;  Location:  [facility];  Service: Cardiology    CARDIAC CATHETERIZATION N/A 9/13/2018    Procedure: Left ventriculography;  Surgeon: Gabino Dozier MD;  Location:  [facility];  Service: Cardiology    CARDIAC ELECTROPHYSIOLOGY PROCEDURE N/A 9/27/2018    Procedure: Loop insertion  LINQ;  Surgeon: Jose R Barker MD;  Location:  [facility];  Service: Cardiovascular         FAMILY HISTORY  Family History   Problem Relation Age of Onset    Cancer Sister          SOCIAL HISTORY  Social History     Socioeconomic History    Marital status: Single   Tobacco Use    Smoking status: Light Smoker     Current packs/day: 0.50     Average packs/day: 0.5 packs/day for 15.0 years (7.5 ttl pk-yrs)     Types: Cigarettes    Smokeless tobacco: Never    Tobacco comments:     Currently smokes cigarettes on occasion   Vaping Use    Vaping status: Never Used   Substance and Sexual Activity    Alcohol use: Yes     Comment: occassional/ Daily caffeine use    Drug use: No    Sexual activity: Defer         ALLERGIES  Patient has no known allergies.        REVIEW OF SYSTEMS  Review of Systems   None      PHYSICAL EXAM  ED Triage Vitals [03/16/24 0907]   Temp Heart Rate Resp BP SpO2   97.2 °F (36.2 °C) 112 18 169/62 94 %      Temp src Heart Rate Source Patient Position BP Location FiO2 (%)   -- -- -- -- --       Physical Exam      GENERAL: Patient in respiratory distress  HENT: nares patent  EYES: no scleral icterus  CV: Tachycardic  RESPIRATORY: Very diminished breath sounds bilaterally  ABDOMEN: soft  MUSCULOSKELETAL: no deformity  NEURO: alert, moves all extremities, follows  commands  PSYCH: Respiratory distress  SKIN: warm, dry    Vital signs and nursing notes reviewed.          LAB RESULTS  Recent Results (from the past 24 hour(s))   Respiratory Panel PCR w/COVID-19(SARS-CoV-2) [facility], NP Swab in UTM/VTM, 2 HR TAT - Swab, Nasopharynx    Collection Time: 03/16/24  9:18 AM    Specimen: Nasopharynx; Swab   Result Value Ref Range    ADENOVIRUS, PCR Not Detected Not Detected    Coronavirus 229E Not Detected Not Detected    Coronavirus HKU1 Not Detected Not Detected    Coronavirus NL63 Not Detected Not Detected    Coronavirus OC43 Not Detected Not Detected    COVID19 Not Detected Not Detected - Ref. Range    Human Metapneumovirus Not Detected Not Detected    Human Rhinovirus/Enterovirus Not Detected Not Detected    Influenza A PCR Not Detected Not Detected    Influenza B PCR Not Detected Not Detected    Parainfluenza Virus 1 Not Detected Not Detected    Parainfluenza Virus 2 Not Detected Not Detected    Parainfluenza Virus 3 Not Detected Not Detected    Parainfluenza Virus 4 Not Detected Not Detected    RSV, PCR Not Detected Not Detected    Bordetella pertussis pcr Not Detected Not Detected    Bordetella parapertussis PCR Not Detected Not Detected    Chlamydophila pneumoniae PCR Not Detected Not Detected    Mycoplasma pneumo by PCR Not Detected Not Detected   ECG 12 Lead Dyspnea    Collection Time: 03/16/24  9:21 AM   Result Value Ref Range    QT Interval 313 ms    QTC Interval 444 ms   Comprehensive Metabolic Panel    Collection Time: 03/16/24  9:23 AM    Specimen: Blood   Result Value Ref Range    Glucose 163 (H) 65 - 99 mg/dL    BUN 20 8 - 23 mg/dL    Creatinine 0.55 (L) 0.57 - 1.00 mg/dL    Sodium 142 136 - 145 mmol/L    Potassium 4.7 3.5 - 5.2 mmol/L    Chloride 106 98 - 107 mmol/L    CO2 25.0 22.0 - 29.0 mmol/L    Calcium 9.4 8.6 - 10.5 mg/dL    Total Protein 7.2 6.0 - 8.5 g/dL    Albumin 4.3 3.5 - 5.2 g/dL    ALT (SGPT) 29 1 - 33 U/L    AST (SGOT) 27 1 - 32  U/L    Alkaline Phosphatase 69 39 - 117 U/L    Total Bilirubin 0.4 0.0 - 1.2 mg/dL    Globulin 2.9 gm/dL    A/G Ratio 1.5 g/dL    BUN/Creatinine Ratio 36.4 (H) 7.0 - 25.0    Anion Gap 11.0 5.0 - 15.0 mmol/L    eGFR 103.1 >60.0 mL/min/1.73   BNP    Collection Time: 03/16/24  9:23 AM    Specimen: Blood   Result Value Ref Range    proBNP 88.9 0.0 - 900.0 pg/mL   High Sensitivity Troponin T    Collection Time: 03/16/24  9:23 AM    Specimen: Blood   Result Value Ref Range    HS Troponin T 65 (C) <14 ng/L   POC Lactate    Collection Time: 03/16/24  9:30 AM    Specimen: Arterial Blood   Result Value Ref Range    Lactate 1.3 0.5 - 2.0 mmol/L    Device Comment avaps 10-25/6 18 100%    POC Chem Panel    Collection Time: 03/16/24  9:30 AM    Specimen: Arterial Blood   Result Value Ref Range    Glucose 170 (H) 70 - 130 mg/dL    Sodium 142 136 - 145 mmol/L    POC Potassium 4.7 3.5 - 5.2 mmol/L    Ionized Calcium 1.28 (L) 2.20 - 2.55 mmol/L    Chloride 110 (H) 98 - 107 mmol/L    Creatinine 0.42 (L) 0.60 - 130.00 mg/dL    BUN 20 mg/dL    CO2 Content 27.2 (H) 23 - 27 mmol/L    Notified Who dr tse     Read Back Yes     Device Comment avaps 10-25/6 18 100%    POC H&H    Collection Time: 03/16/24  9:30 AM    Specimen: Arterial Blood   Result Value Ref Range    Hemoglobin 16.1 12.0 - 17.0 g/dL    Hematocrit 47 38 - 51 %   Blood Gas, Arterial -    Collection Time: 03/16/24  9:30 AM    Specimen: Arterial Blood   Result Value Ref Range    Site Right Radial     Troy's Test Positive     pH, Arterial 7.250 (C) 7.350 - 7.450 pH units    pCO2, Arterial 62.2 (C) 35.0 - 45.0 mm Hg    pO2, Arterial 499.3 (H) 80.0 - 100.0 mm Hg    HCO3, Arterial 27.3 22.0 - 28.0 mmol/L    Base Excess, Arterial -1.7 (L) 0.0 - 2.0 mmol/L    O2 Saturation, Arterial 100.0 (H) 92.0 - 98.5 %    A-a DO2 141.9 mmHg    A-a DO2 0.7 mmHg    Barometric Pressure for Blood Gas 750.4000 mmHg    Modality NRB     FIO2 100 %    Ventilator Mode NIV     Set Tidal Volume 450      [facility] 18     Rate 28 Breaths/minute    PIP 17 cmH2O    Notified Who dr tse     Read Back Yes     Notified Time      Hemodilution No     Inspiratory Time 1     Device Comment avaps 10-25/6 18 100%    Blood Gas, Venous -    Collection Time: 03/16/24 10:18 AM    Specimen: Venous Blood   Result Value Ref Range    pH, Venous 7.276 (C) 7.310 - 7.410 pH Units    pCO2, Venous 40.7 (L) 41.0 - 51.0 mm Hg    pO2, Venous 53.5 (H) 35.0 - 45.0 mm Hg    HCO3, Venous 19.0 (L) 22.0 - 28.0 mmol/L    Base Excess, Venous -7.4 (L) -2.0 - 2.0 mmol/L    O2 Saturation, Venous 83.1 (H) 45.0 - 75.0 %    CO2 Content 20.2 (L) 23 - 27 mmol/L    Barometric Pressure for Blood Gas 750.3000 mmHg    Modality BiPap     FIO2 30 %    Ventilator Mode NIV     Set Tidal Volume 450     Set Mech Resp Rate 18     Rate 26 Breaths/minute    PIP 17 cmH2O    Notified Who dr tse     Inspiratory Time 1     Device Comment [id number] avaps 10-25/6 18 30%        Ordered the above labs and reviewed the results.        RADIOLOGY  XR Chest 1 View    Result Date: 3/16/2024  ONE-VIEW PORTABLE CHEST AT 9:52 A.M.  HISTORY: Pulmonary emphysema. Shortness of breath.  FINDINGS: There is severe pulm emphysema with marked hyperinflation. There is no evidence of pneumothorax. There is improvement in some vague interstitial changes and atelectasis at the bases when compared to an exam dating back to 3/15/2021. The heart size remains normal.  This report was finalized on 3/16/2024 10:03 AM by Dr. Jh Wong M.D on Workstation: [id number]       Ordered the above noted radiological studies.  Chest x-ray independently interpreted by me and shows no evidence of pneumothorax          PROCEDURES  Critical Care    Performed by: Ronald Tse MD  Authorized by: Sourav Bales MD    Critical care provider statement:     Critical care time (minutes):  35    Critical care time was exclusive of:  Separately billable procedures and treating other  patients    Critical care was necessary to treat or prevent imminent or life-threatening deterioration of the following conditions:  Respiratory failure    Critical care was time spent personally by me on the following activities:  Ordering and performing treatments and interventions, ordering and review of laboratory studies, ordering and review of radiographic studies, discussions with consultants, pulse oximetry and re-evaluation of patient's condition          EKG          EKG time: 921  Rhythm/Rate: Sinus tachycardia 121  P waves and OH: Normal P waves  QRS, axis: Normal QRS  ST and T waves: Nonspecific ST-T wave.  Significant artifact    Interpreted Contemporaneously by me, independently viewed  No prior      MEDICATIONS GIVEN IN ER  Medications   nitroglycerin (NITROSTAT) SL tablet 0.4 mg (has no administration in time range)   sodium chloride 0.9 % flush 10 mL (has no administration in time range)   sodium chloride 0.9 % flush 10 mL (has no administration in time range)   sodium chloride 0.9 % infusion 40 mL (has no administration in time range)   sennosides-docusate (PERICOLACE) 8.6-50 MG per tablet 2 tablet (has no administration in time range)     And   polyethylene glycol (MIRALAX) packet 17 g (has no administration in time range)     And   bisacodyl (DULCOLAX) EC tablet 5 mg (has no administration in time range)     And   bisacodyl (DULCOLAX) suppository 10 mg (has no administration in time range)   ondansetron ODT (ZOFRAN-ODT) disintegrating tablet 4 mg ( Oral Not Given:  See Alt 3/16/24 1035)     Or   ondansetron (ZOFRAN) injection 4 mg (4 mg Intravenous Given 3/16/24 1035)   Potassium Replacement - Follow Nurse / BPA Driven Protocol (has no administration in time range)   Magnesium Standard Dose Replacement - Follow Nurse / BPA Driven Protocol (has no administration in time range)   Phosphorus Replacement - Follow Nurse / BPA Driven Protocol (has no administration in time range)   Calcium Replacement  - Follow Nurse / BPA Driven Protocol (has no administration in time range)   acetaminophen (TYLENOL) tablet 650 mg (has no administration in time range)     Or   acetaminophen (TYLENOL) suppository 650 mg (has no administration in time range)   ipratropium-albuterol (DUO-NEB) nebulizer solution 6 mL (6 mL Nebulization Given 3/16/24 0927)                   MEDICAL DECISION MAKING, PROGRESS, and CONSULTS    All labs have been independently reviewed by me.  All radiology studies have been reviewed by me and I have also reviewed the radiology report.   EKG's independently viewed and interpreted by me.  Discussion below represents my analysis of pertinent findings related to patient's condition, differential diagnosis, treatment plan and final disposition.      Additional sources:  - Discussed/ obtained information from independent historians: None    - External (non-ED) record review: Epic reviewed patient seen by cardiology 2/9/2024 for cardiomyopathy    - Chronic or social conditions impacting care: None    - Shared decision making: None      Orders placed during this visit:  Orders Placed This Encounter   Procedures    Respiratory Panel PCR w/COVID-19(SARS-CoV-2) [facility], NP Swab in UTM/VTM, 2 HR TAT - Swab, Nasopharynx    XR Chest 1 View    Comprehensive Metabolic Panel    BNP    High Sensitivity Troponin T    Blood Gas, Arterial -    CBC Auto Differential    Blood Gas, Arterial -    High Sensitivity Troponin T 2Hr    Basic Metabolic Panel    CBC Auto Differential    Magnesium    Phosphorus    Blood Gas, Venous -    NPO Diet NPO Type: Strict NPO    Vital Signs Per hospital policy    Telemetry - Place Orders & Notify Provider of Results When Patient Experiences Acute Chest Pain, Dysrhythmia or Respiratory Distress    Continuous Pulse Oximetry    Height and weight    Daily Weights    Intake and Output    Oral Care - Patient Not on NPPV & Not Intubated    Target Arousal Level RASS 0 to -1    If  Patient has BG of < 80 and is symptomatic but not on an IV insulin protocol then use the Adult Hypoglycemia Treatment Orders.    Place Order to Consult Intensivist For Critical Care Management (If Patient Not Admitted to Cardiology for Primary Cardiology Condition)    Notify All Physicians When Patient is Transferred    Use Mobility Guidelines for Advancement of Activity    Saline Lock & Maintain IV Access    Place Sequential Compression Device    Maintain Sequential Compression Device    Code Status and Medical Interventions:    Pulmonology (on-call MD unless specified)    NIPPV-IPPV / BIPAP / CPAP    POC Electrolyte Panel    POC Lactate    POC Lactate    POC Chem Panel    POC H&H    POC Glucose Once    POC Glucose Q6H    ECG 12 Lead Dyspnea    Insert Peripheral IV    Inpatient Admission    Inpatient Admission    CBC & Differential         Additional orders considered but not ordered:  None        Differential diagnosis includes but is not limited to:    COPD exacerbation versus CHF versus pneumonia      Independent interpretation of labs, radiology studies, and discussions with consultants:  ED Course as of 03/16/24 1042   Sat Mar 16, 2024   1019 10:19 EDT  Patient presents for acute exacerbation COPD with hypercarbic respiratory failure.  Patient quite short of breath on arrival.  Is on BiPAP and is able to speak sentences now.  Patient's chest x-ray shows no evidence of pneumonia or pneumothorax.  Patient has been discussed with Dr. Bales who will admit to ICU. [SL]      ED Course User Index  [SL] Ronald Rogel MD                 DIAGNOSIS  Final diagnoses:   Acute exacerbation of chronic obstructive pulmonary disease (COPD)   Acute respiratory failure with hypercapnia         DISPOSITION  admit            Latest Documented Vital Signs:  As of 10:42 EDT  BP- 106/80 HR- 120 Temp- 97.2 °F (36.2 °C) O2 sat- 99%              --    Please note that portions of this were completed with a voice recognition  program.       Note Disclaimer: At [facility], we believe that sharing information builds trust and better relationships. You are receiving this note because you are receiving care at [facility] or recently visited. It is possible you will see health information before a provider has talked with you about it. This kind of information can be easy to misunderstand. To help you fully understand what it means for your health, we urge you to discuss this note with your provider.            Ronald Rogel MD  03/16/24 1048

## 2024-03-16 NOTE — Clinical Note
Level of Care: Critical Care [6]   Diagnosis: COPD with acute exacerbation [690027]   Certification: I Certify That Inpatient Hospital Services Are Medically Necessary For Greater Than 2 Midnights

## 2024-03-16 NOTE — ED NOTES
Pt to ED from home due to SOB x 2 days.  called due to SOB and AMS. Pt has not been able to communicate to staff at this time due to SOB but  able to follow commands.

## 2024-03-16 NOTE — PLAN OF CARE
Goal Outcome Evaluation: pt arrived from ED off of BIPAP and 2L NC. BP soft. Dr. Aburto and shyam aware. No orders at this time for MAP.  says to keep SBP 90>. No more fluid until ECHO and holding off on pressors at this time. Pt now on !L NC. Duonebs ordered per Dr Mukherjee. Pt reports SOA and appears to have labored breathing. Pt reports mild anxiety.

## 2024-03-16 NOTE — H&P
Group: [facility]         Critical care history and physical admission note    Patient Identification:  Scarlet Chakraborty  64 y.o.  female  1960  [id number]              Shortness of breath  CC:     History of Present Illness:  64-year-old female patient who arrives in respiratory distress.  Is been going on for a few days since Thursday.  I discussed the case with Dr. Rogel from the emergency room.  She has severe COPD.  Her shortness of breath has rapidly progressed over the last 24 hours, constant, still present, only alleviated by noninvasive ventilation in the ER.  Associated with cough.  Associated with some wheezing.  Unable to produce sputum.  Patient presented barely able to speak, had telegraphic speech, working hard to breathe and using accessory muscles.  ABG on presentation shows respiratory acidosis  She quit smoking 5 months ago.  Sees my colleague, Dr. Mukherjee in the office    Review of Systems   Constitutional:  Positive for fatigue. Negative for diaphoresis and fever.   HENT:  Negative for ear discharge and sore throat.    Eyes:  Negative for pain and visual disturbance.   Respiratory:  Positive for cough, shortness of breath and wheezing.    Cardiovascular:  Negative for chest pain and leg swelling.   Gastrointestinal:  Negative for abdominal pain and diarrhea.   Endocrine: Negative for cold intolerance and polyuria.   Genitourinary:  Negative for dysuria and hematuria.   Musculoskeletal:  Negative for joint swelling and myalgias.   Skin:  Negative for rash and wound.   Neurological:  Negative for speech difficulty and numbness.   Hematological:  Negative for adenopathy. Does not bruise/bleed easily.   Psychiatric/Behavioral:  Negative for agitation and confusion.        Past Medical History:  Past Medical History:   Diagnosis Date    Asthma     Cerebrovascular accident (CVA) due to thrombosis of right middle cerebral artery     COPD (chronic obstructive pulmonary disease)      Nonischemic cardiomyopathy     NSTEMI (non-ST elevated myocardial infarction)     Stress-induced cardiomyopathy     Stroke     Takotsubo cardiomyopathy     Tobacco abuse        Past Surgical History:  Past Surgical History:   Procedure Laterality Date    CARDIAC CATHETERIZATION N/A 9/13/2018    Procedure: Left Heart Cath and cors;  Surgeon: Gabino Dozier MD;  Location: [facility] CATH INVASIVE LOCATION;  Service: Cardiology    CARDIAC CATHETERIZATION N/A 9/13/2018    Procedure: Left ventriculography;  Surgeon: Gabino Dozier MD;  Location: [facility] CATH INVASIVE LOCATION;  Service: Cardiology    CARDIAC ELECTROPHYSIOLOGY PROCEDURE N/A 9/27/2018    Procedure: Loop insertion  LINQ;  Surgeon: Jose R Barker MD;  Location: [facility] CATH INVASIVE LOCATION;  Service: Cardiovascular        Home Meds:  Medications Prior to Admission   Medication Sig Dispense Refill Last Dose    albuterol sulfate  (90 Base) MCG/ACT inhaler Inhale 2 puffs Every 4 (Four) Hours As Needed for Wheezing.       Eliquis 5 MG tablet tablet TAKE ONE TABLET BY MOUTH EVERY 12 HOURS 180 tablet 3 3/15/2024    Fluticasone-Umeclidin-Vilant (Trelegy Ellipta) 200-62.5-25 MCG/ACT aerosol powder  Inhale 1 puff Daily.       ipratropium-albuterol (DUO-NEB) 0.5-2.5 mg/3 ml nebulizer Take 3 mL by nebulization Every 4 (Four) Hours As Needed for Wheezing or Shortness of Air. 360 mL 0     Tiotropium Bromide Monohydrate (SPIRIVA RESPIMAT) 1.25 MCG/ACT aerosol solution inhaler Inhale 2 puffs Daily. 4 g 2        Allergies:  No Known Allergies    Social History:   Social History     Socioeconomic History    Marital status: Single   Tobacco Use    Smoking status: Light Smoker     Current packs/day: 0.50     Average packs/day: 0.5 packs/day for 15.0 years (7.5 ttl pk-yrs)     Types: Cigarettes    Smokeless tobacco: Never    Tobacco comments:     Currently smokes cigarettes on occasion   Vaping Use    Vaping status: Never Used   Substance and Sexual Activity     "Alcohol use: Yes     Comment: occassional/ Daily caffeine use    Drug use: No    Sexual activity: Defer       Family History:  Family History   Problem Relation Age of Onset    Cancer Sister        Physical Exam:  /50   Pulse 101   Temp 99.2 °F (37.3 °C) (Oral)   Resp 26   Ht 162.6 cm (64\")   Wt 34 kg (75 lb)   SpO2 93%   BMI 12.87 kg/m²  Body mass index is 12.87 kg/m². 93% 34 kg (75 lb)  Physical Exam  Constitutional:       General: She is in acute distress.      Comments: Cachectic, in respiratory distress   HENT:      Right Ear: External ear normal.      Left Ear: External ear normal.      Nose: Nose normal.   Eyes:      Conjunctiva/sclera: Conjunctivae normal.      Pupils: Pupils are equal, round, and reactive to light.   Neck:      Thyroid: No thyromegaly.      Vascular: No JVD.      Trachea: No tracheal deviation.   Cardiovascular:      Rate and Rhythm: Normal rate and regular rhythm.      Heart sounds: Normal heart sounds. No murmur heard.  Pulmonary:      Effort: Respiratory distress present.      Breath sounds: Wheezing present.      Comments: Barrel chest, hyperresonant bilaterally  Abdominal:      Palpations: Abdomen is soft.      Tenderness: There is no abdominal tenderness. There is no rebound.      Comments: Cannot palpate liver or spleen enlargement   Musculoskeletal:         General: No deformity. Normal range of motion.      Cervical back: Neck supple. No rigidity.   Skin:     General: Skin is warm.      Findings: No rash.      Comments: No palpable nodules   Neurological:      General: No focal deficit present.      Mental Status: She is oriented to person, place, and time.      Cranial Nerves: No cranial nerve deficit.      Motor: No weakness.      Comments: Very anxious   Psychiatric:      Comments: Patient is anxious, but seems to have good judgment and insight         LABS:  COVID19   Date Value Ref Range Status   03/16/2024 Not Detected Not Detected - Ref. Range Final       Lab " Results   Component Value Date    CALCIUM 9.4 03/16/2024    PHOS 3.0 03/16/2021     Results from last 7 days   Lab Units 03/16/24  1039 03/16/24  0930 03/16/24  0923   SODIUM mmol/L  --   --  142   POTASSIUM mmol/L  --   --  4.7   CHLORIDE mmol/L  --   --  106   CO2 mmol/L  --   --  25.0   BUN mg/dL  --   --  20   CREATININE mg/dL  --  0.42* 0.55*   GLUCOSE mg/dL  --   --  163*   CALCIUM mg/dL  --   --  9.4   WBC 10*3/mm3 8.63  --   --    HEMOGLOBIN g/dL 12.8  --   --    HEMOGLOBIN, POC g/dL  --  16.1  --    PLATELETS 10*3/mm3 248  --   --    ALT (SGPT) U/L  --   --  29   AST (SGOT) U/L  --   --  27   PROBNP pg/mL  --   --  88.9     Lab Results   Component Value Date    TROPONINT 188 (C) 03/16/2024     Results from last 7 days   Lab Units 03/16/24  1136 03/16/24  0923   HSTROP T ng/L 188* 65*         Results from last 7 days   Lab Units 03/16/24  0930   LACTATE mmol/L 1.3     Results from last 7 days   Lab Units 03/16/24  1018 03/16/24  0930   PH, ARTERIAL pH units  --  7.250*   PCO2, ARTERIAL mm Hg  --  62.2*   PO2 ART mm Hg  --  499.3*   O2 SATURATION ART %  --  100.0*   MODALITY  BiPap NRB     Results from last 7 days   Lab Units 03/16/24  0918   ADENOVIRUS DETECTION BY PCR  Not Detected   CORONAVIRUS 229E  Not Detected   CORONAVIRUS HKU1  Not Detected   CORONAVIRUS NL63  Not Detected   CORONAVIRUS OC43  Not Detected   HUMAN METAPNEUMOVIRUS  Not Detected   HUMAN RHINOVIRUS/ENTEROVIRUS  Not Detected   INFLUENZA B PCR  Not Detected   PARAINFLUENZA 1  Not Detected   PARAINFLUENZA VIRUS 2  Not Detected   PARAINFLUENZA VIRUS 3  Not Detected   PARAINFLUENZA VIRUS 4  Not Detected   BORDETELLA PERTUSSIS PCR  Not Detected   BORDETELLA PARAPERTUSSIS PCR  Not Detected   CHLAMYDOPHILA PNEUMONIAE PCR  Not Detected   MYCOPLAMA PNEUMO PCR  Not Detected   RSV, PCR  Not Detected             Lab Results   Component Value Date    TSH 1.010 09/24/2018     Estimated Creatinine Clearance: 73.6 mL/min (A) (by C-G formula based on SCr  of 0.42 mg/dL (L)).         Imaging: I personally visualized the images of chest x-ray showing very hyperinflated lungs but essentially clear bilaterally.  There is significant emphysema.    EKG: I have visualized twelve-lead EKG sinus rhythm with tachycardia.  Right axis deviation and right atrial enlargement with possible pulmonary hypertension.  Nonspecific T wave changes in lateral leads but no signs of ischemia      Assessment:  COPD with acute exacerbation  Non-ST elevation MI  Acute hypercapnic respiratory failure requiring noninvasive ventilation  Previous CVA, on Eliquis  History of nonischemic cardiomyopathy Takotsubo with ejection fraction 20% in 2018        Recommendations:  Admit to ICU.  Continue noninvasive positive pressure ventilation to treat her acute hypercapnic respiratory failure with respiratory acidosis.  Give IV corticosteroids for COPD exacerbation as well as nebulized beta agonist.  Patient has had a sudden increase in troponin level.  EKG is not showing acute ischemia but we will consult cardiology.  She does have a history of previous stress-induced cardiomyopathy, Takotsubo syndrome.  Nothing by mouth for now.  Unsure what might be the precipitating factor for COPD exacerbation.  Lovenox for DVT prophylaxis    Total critical care time 37 minutes    Patient was placed in face mask upon entering room and kept mask on throughout our encounter. I wore full protective equipment throughout this patient encounter including a face mask, gown and gloves. Hand hygiene was performed before donning protective equipment and after removal when leaving the room.    Sourav Bales MD  3/16/2024  16:33 EDT      Much of this encounter note is an electronic transcription/translation of spoken language to printed text using Dragon Software.

## 2024-03-17 ENCOUNTER — APPOINTMENT (OUTPATIENT)
Dept: CARDIOLOGY | Facility: HOSPITAL | Age: 64
End: 2024-03-17
Payer: COMMERCIAL

## 2024-03-17 PROBLEM — E43 SEVERE MALNUTRITION: Status: ACTIVE | Noted: 2024-03-17

## 2024-03-17 LAB
ANION GAP SERPL CALCULATED.3IONS-SCNC: 7 MMOL/L (ref 5–15)
BASOPHILS # BLD AUTO: 0.01 10*3/MM3 (ref 0–0.2)
BASOPHILS NFR BLD AUTO: 0.1 % (ref 0–1.5)
BH CV ECHO MEAS - AO MAX PG: 4.6 MMHG
BH CV ECHO MEAS - AO MEAN PG: 2.41 MMHG
BH CV ECHO MEAS - AO V2 MAX: 107.3 CM/SEC
BH CV ECHO MEAS - AO V2 VTI: 20.3 CM
BH CV ECHO MEAS - LAT PEAK E' VEL: 10.3 CM/SEC
BH CV ECHO MEAS - LV MAX PG: 3.1 MMHG
BH CV ECHO MEAS - LV MEAN PG: 1.31 MMHG
BH CV ECHO MEAS - LV V1 MAX: 88.6 CM/SEC
BH CV ECHO MEAS - LV V1 VTI: 17.2 CM
BH CV ECHO MEAS - MED PEAK E' VEL: 7.2 CM/SEC
BH CV ECHO MEAS - MV A MAX VEL: 83.8 CM/SEC
BH CV ECHO MEAS - MV DEC SLOPE: 576.8 CM/SEC2
BH CV ECHO MEAS - MV DEC TIME: 0.15 SEC
BH CV ECHO MEAS - MV E MAX VEL: 101 CM/SEC
BH CV ECHO MEAS - MV E/A: 1.2
BH CV ECHO MEAS - MV MAX PG: 4.6 MMHG
BH CV ECHO MEAS - MV MEAN PG: 2.27 MMHG
BH CV ECHO MEAS - MV P1/2T: 50.3 MSEC
BH CV ECHO MEAS - MV V2 VTI: 22.3 CM
BH CV ECHO MEAS - MVA(P1/2T): 4.4 CM2
BH CV ECHO MEASUREMENTS AVERAGE E/E' RATIO: 11.54
BUN SERPL-MCNC: 14 MG/DL (ref 8–23)
BUN/CREAT SERPL: 41.2 (ref 7–25)
CALCIUM SPEC-SCNC: 7 MG/DL (ref 8.6–10.5)
CHLORIDE SERPL-SCNC: 116 MMOL/L (ref 98–107)
CO2 SERPL-SCNC: 22 MMOL/L (ref 22–29)
CREAT SERPL-MCNC: 0.34 MG/DL (ref 0.57–1)
DEPRECATED RDW RBC AUTO: 40 FL (ref 37–54)
EGFRCR SERPLBLD CKD-EPI 2021: 115.8 ML/MIN/1.73
EOSINOPHIL # BLD AUTO: 0.02 10*3/MM3 (ref 0–0.4)
EOSINOPHIL NFR BLD AUTO: 0.3 % (ref 0.3–6.2)
ERYTHROCYTE [DISTWIDTH] IN BLOOD BY AUTOMATED COUNT: 12.3 % (ref 12.3–15.4)
GLUCOSE BLDC GLUCOMTR-MCNC: 90 MG/DL (ref 70–130)
GLUCOSE BLDC GLUCOMTR-MCNC: 94 MG/DL (ref 70–130)
GLUCOSE BLDC GLUCOMTR-MCNC: 94 MG/DL (ref 70–130)
GLUCOSE BLDC GLUCOMTR-MCNC: 95 MG/DL (ref 70–130)
GLUCOSE SERPL-MCNC: 74 MG/DL (ref 65–99)
HCT VFR BLD AUTO: 30.9 % (ref 34–46.6)
HGB BLD-MCNC: 9.8 G/DL (ref 12–15.9)
IMM GRANULOCYTES # BLD AUTO: 0.03 10*3/MM3 (ref 0–0.05)
IMM GRANULOCYTES NFR BLD AUTO: 0.4 % (ref 0–0.5)
LYMPHOCYTES # BLD AUTO: 1.73 10*3/MM3 (ref 0.7–3.1)
LYMPHOCYTES NFR BLD AUTO: 25.2 % (ref 19.6–45.3)
MAGNESIUM SERPL-MCNC: 1.7 MG/DL (ref 1.6–2.4)
MCH RBC QN AUTO: 28.8 PG (ref 26.6–33)
MCHC RBC AUTO-ENTMCNC: 31.7 G/DL (ref 31.5–35.7)
MCV RBC AUTO: 90.9 FL (ref 79–97)
MONOCYTES # BLD AUTO: 0.71 10*3/MM3 (ref 0.1–0.9)
MONOCYTES NFR BLD AUTO: 10.3 % (ref 5–12)
NEUTROPHILS NFR BLD AUTO: 4.37 10*3/MM3 (ref 1.7–7)
NEUTROPHILS NFR BLD AUTO: 63.7 % (ref 42.7–76)
NRBC BLD AUTO-RTO: 0 /100 WBC (ref 0–0.2)
PHOSPHATE SERPL-MCNC: 1.8 MG/DL (ref 2.5–4.5)
PLATELET # BLD AUTO: 169 10*3/MM3 (ref 140–450)
PMV BLD AUTO: 11.1 FL (ref 6–12)
POTASSIUM SERPL-SCNC: 3.3 MMOL/L (ref 3.5–5.2)
QT INTERVAL: 418 MS
QTC INTERVAL: 483 MS
RBC # BLD AUTO: 3.4 10*6/MM3 (ref 3.77–5.28)
SODIUM SERPL-SCNC: 145 MMOL/L (ref 136–145)
TROPONIN T SERPL HS-MCNC: 180 NG/L
TSH SERPL DL<=0.05 MIU/L-ACNC: 0.28 UIU/ML (ref 0.27–4.2)
WBC NRBC COR # BLD AUTO: 6.87 10*3/MM3 (ref 3.4–10.8)

## 2024-03-17 PROCEDURE — 84443 ASSAY THYROID STIM HORMONE: CPT | Performed by: INTERNAL MEDICINE

## 2024-03-17 PROCEDURE — 93325 DOPPLER ECHO COLOR FLOW MAPG: CPT | Performed by: INTERNAL MEDICINE

## 2024-03-17 PROCEDURE — 94761 N-INVAS EAR/PLS OXIMETRY MLT: CPT

## 2024-03-17 PROCEDURE — 94799 UNLISTED PULMONARY SVC/PX: CPT

## 2024-03-17 PROCEDURE — 83735 ASSAY OF MAGNESIUM: CPT | Performed by: INTERNAL MEDICINE

## 2024-03-17 PROCEDURE — 25010000002 CALCIUM GLUCONATE 2-0.675 GM/100ML-% SOLUTION: Performed by: FAMILY MEDICINE

## 2024-03-17 PROCEDURE — 84100 ASSAY OF PHOSPHORUS: CPT | Performed by: INTERNAL MEDICINE

## 2024-03-17 PROCEDURE — 80048 BASIC METABOLIC PNL TOTAL CA: CPT | Performed by: INTERNAL MEDICINE

## 2024-03-17 PROCEDURE — 93321 DOPPLER ECHO F-UP/LMTD STD: CPT

## 2024-03-17 PROCEDURE — 82948 REAGENT STRIP/BLOOD GLUCOSE: CPT

## 2024-03-17 PROCEDURE — 99232 SBSQ HOSP IP/OBS MODERATE 35: CPT | Performed by: INTERNAL MEDICINE

## 2024-03-17 PROCEDURE — 93308 TTE F-UP OR LMTD: CPT | Performed by: INTERNAL MEDICINE

## 2024-03-17 PROCEDURE — 93010 ELECTROCARDIOGRAM REPORT: CPT | Performed by: INTERNAL MEDICINE

## 2024-03-17 PROCEDURE — 25810000003 SODIUM CHLORIDE 0.9 % SOLUTION: Performed by: FAMILY MEDICINE

## 2024-03-17 PROCEDURE — 85025 COMPLETE CBC W/AUTO DIFF WBC: CPT | Performed by: INTERNAL MEDICINE

## 2024-03-17 PROCEDURE — 84484 ASSAY OF TROPONIN QUANT: CPT | Performed by: INTERNAL MEDICINE

## 2024-03-17 PROCEDURE — 93308 TTE F-UP OR LMTD: CPT

## 2024-03-17 PROCEDURE — 93325 DOPPLER ECHO COLOR FLOW MAPG: CPT

## 2024-03-17 PROCEDURE — 93005 ELECTROCARDIOGRAM TRACING: CPT | Performed by: INTERNAL MEDICINE

## 2024-03-17 PROCEDURE — 25010000002 CALCIUM GLUCONATE-NACL 1-0.675 GM/50ML-% SOLUTION: Performed by: FAMILY MEDICINE

## 2024-03-17 PROCEDURE — 93321 DOPPLER ECHO F-UP/LMTD STD: CPT | Performed by: INTERNAL MEDICINE

## 2024-03-17 PROCEDURE — 94664 DEMO&/EVAL PT USE INHALER: CPT

## 2024-03-17 PROCEDURE — 94660 CPAP INITIATION&MGMT: CPT

## 2024-03-17 RX ORDER — POTASSIUM CHLORIDE 1.5 G/1.58G
40 POWDER, FOR SOLUTION ORAL ONCE
Status: COMPLETED | OUTPATIENT
Start: 2024-03-17 | End: 2024-03-17

## 2024-03-17 RX ORDER — DEXMEDETOMIDINE HYDROCHLORIDE 4 UG/ML
.2-1.5 INJECTION, SOLUTION INTRAVENOUS
Status: DISCONTINUED | OUTPATIENT
Start: 2024-03-17 | End: 2024-03-19

## 2024-03-17 RX ORDER — CALCIUM GLUCONATE 20 MG/ML
1000 INJECTION, SOLUTION INTRAVENOUS
Status: COMPLETED | OUTPATIENT
Start: 2024-03-17 | End: 2024-03-17

## 2024-03-17 RX ORDER — ENOXAPARIN SODIUM 100 MG/ML
40 INJECTION SUBCUTANEOUS EVERY 24 HOURS
Status: DISCONTINUED | OUTPATIENT
Start: 2024-03-17 | End: 2024-03-17

## 2024-03-17 RX ORDER — FENTANYL/ROPIVACAINE/NS/PF 2-625MCG/1
15 PLASTIC BAG, INJECTION (ML) EPIDURAL ONCE
Status: COMPLETED | OUTPATIENT
Start: 2024-03-17 | End: 2024-03-17

## 2024-03-17 RX ORDER — ASPIRIN 81 MG/1
81 TABLET ORAL DAILY
Status: DISCONTINUED | OUTPATIENT
Start: 2024-03-17 | End: 2024-03-20 | Stop reason: HOSPADM

## 2024-03-17 RX ORDER — IPRATROPIUM BROMIDE AND ALBUTEROL SULFATE 2.5; .5 MG/3ML; MG/3ML
3 SOLUTION RESPIRATORY (INHALATION)
Status: DISCONTINUED | OUTPATIENT
Start: 2024-03-17 | End: 2024-03-18 | Stop reason: ALTCHOICE

## 2024-03-17 RX ORDER — CALCIUM GLUCONATE 20 MG/ML
2000 INJECTION, SOLUTION INTRAVENOUS
Status: COMPLETED | OUTPATIENT
Start: 2024-03-17 | End: 2024-03-17

## 2024-03-17 RX ORDER — POTASSIUM CHLORIDE 750 MG/1
40 TABLET, FILM COATED, EXTENDED RELEASE ORAL EVERY 4 HOURS
Status: ACTIVE | OUTPATIENT
Start: 2024-03-17 | End: 2024-03-17

## 2024-03-17 RX ADMIN — APIXABAN 5 MG: 5 TABLET, FILM COATED ORAL at 20:47

## 2024-03-17 RX ADMIN — POTASSIUM CHLORIDE 40 MEQ: 1.5 POWDER, FOR SOLUTION ORAL at 20:47

## 2024-03-17 RX ADMIN — Medication 10 ML: at 09:00

## 2024-03-17 RX ADMIN — Medication 10 ML: at 20:48

## 2024-03-17 RX ADMIN — DEXMEDETOMIDINE HYDROCHLORIDE IN SODIUM CHLORIDE 0.2 MCG/KG/HR: 4 INJECTION INTRAVENOUS at 14:40

## 2024-03-17 RX ADMIN — POTASSIUM CHLORIDE 40 MEQ: 750 TABLET, EXTENDED RELEASE ORAL at 12:30

## 2024-03-17 RX ADMIN — IPRATROPIUM BROMIDE AND ALBUTEROL SULFATE 3 ML: .5; 3 SOLUTION RESPIRATORY (INHALATION) at 12:14

## 2024-03-17 RX ADMIN — POTASSIUM PHOSPHATE, MONOBASIC POTASSIUM PHOSPHATE, DIBASIC 15 MMOL: 224; 236 INJECTION, SOLUTION, CONCENTRATE INTRAVENOUS at 09:00

## 2024-03-17 RX ADMIN — CALCIUM GLUCONATE 1000 MG: 20 INJECTION, SOLUTION INTRAVENOUS at 08:58

## 2024-03-17 RX ADMIN — ASPIRIN 81 MG: 81 TABLET, COATED ORAL at 14:47

## 2024-03-17 RX ADMIN — CALCIUM GLUCONATE 2000 MG: 20 INJECTION, SOLUTION INTRAVENOUS at 17:30

## 2024-03-17 RX ADMIN — IPRATROPIUM BROMIDE AND ALBUTEROL SULFATE 3 ML: .5; 3 SOLUTION RESPIRATORY (INHALATION) at 07:56

## 2024-03-17 RX ADMIN — CALCIUM GLUCONATE 2000 MG: 20 INJECTION, SOLUTION INTRAVENOUS at 11:46

## 2024-03-17 RX ADMIN — IPRATROPIUM BROMIDE AND ALBUTEROL SULFATE 3 ML: .5; 3 SOLUTION RESPIRATORY (INHALATION) at 20:25

## 2024-03-17 RX ADMIN — CALCIUM GLUCONATE 2000 MG: 20 INJECTION, SOLUTION INTRAVENOUS at 14:36

## 2024-03-17 NOTE — PLAN OF CARE
Problem: Malnutrition  Goal: Improved Nutritional Intake  Outcome: Ongoing, Progressing   Goal Outcome Evaluation:      RD initiated Boost BID and will follow

## 2024-03-17 NOTE — PROGRESS NOTES
LOS: 1 day   Patient Care Team:  Flavia Jaquez APRN as PCP - General (Nurse Practitioner)  Beulah Joshi APRN as Nurse Practitioner (Neurology)  Flaco Aguayo MD as Consulting Physician (Pulmonary Disease)    Chief Complaint: Follow-up COPD with acute exacerbation, nonischemic cardiomyopathy, type II NSTEMI.    Interval History: Feels better in general.  Looks better.  She is on minimal Tre-Synephrine.  No chest pain.  Shortness of breath is improved.    Vital Signs:  Temp:  [98 °F (36.7 °C)-99.8 °F (37.7 °C)] 98.4 °F (36.9 °C)  Heart Rate:  [] 97  Resp:  [14-26] 23  BP: ()/(40-81) 118/61    Intake/Output Summary (Last 24 hours) at 3/17/2024 1419  Last data filed at 3/17/2024 0400  Gross per 24 hour   Intake 1290 ml   Output 350 ml   Net 940 ml       Physical Exam:   General Appearance:    No acute distress, alert and oriented x4, thin and chronically ill-appearing.   Lungs:     Mild expiratory wheezing, faint rhonchi.    Heart:    Regular rhythm and normal rate.  No murmurs, gallops, or   rubs.   Abdomen:     Soft, nontender, nondistended.    Extremities:   No clubbing, cyanosis, or edema.     Results Review:    Results from last 7 days   Lab Units 03/17/24  0357   SODIUM mmol/L 145   POTASSIUM mmol/L 3.3*   CHLORIDE mmol/L 116*   CO2 mmol/L 22.0   BUN mg/dL 14   CREATININE mg/dL 0.34*   GLUCOSE mg/dL 74   CALCIUM mg/dL 7.0*     Results from last 7 days   Lab Units 03/17/24  0357 03/16/24  1136 03/16/24  0923   HSTROP T ng/L 180* 188* 65*     Results from last 7 days   Lab Units 03/17/24  0357   WBC 10*3/mm3 6.87   HEMOGLOBIN g/dL 9.8*   HEMATOCRIT % 30.9*   PLATELETS 10*3/mm3 169             Results from last 7 days   Lab Units 03/17/24  0357   MAGNESIUM mg/dL 1.7           I reviewed the patient's new clinical results.        Assessment:  1.  COPD with acute exacerbation  2.  Acute hypercapnic respiratory failure, briefly requiring NIPPV  3.  Elevated troponin, type II  NSTEMI secondary to #1 and #2  4.  History of Takotsubo cardiomyopathy in 2018 (EF 20%).  Coronary arteries normal by cath at that time  5.  Nonischemic cardiomyopathy, last EF 45 to 50% (wall motion abnormalities at the basal inferior wall, basal inferoseptum, basal inferolateral wall at that time) unchanged from echo from 2021.  6.  History of embolic CVA in 2018, on Eliquis  7.  Sinus tachycardia, reactive  8.  Cachexia with low body weight  9.  Hypotension    Plan:  -Echocardiogram has basal wall motion abnormalities in the inferior wall, inferoseptal, and inferolateral wall.  This is completely unchanged from her echo from 2021.  Her ejection fraction is 45 to 50%, which is stable.    -The troponin elevation represents a type II NSTEMI secondary to COPD and acute hypoxic respiratory failure.    -History of embolic stroke in 2018.  Resume Eliquis today.    -She is on Tre-Synephrine.  No beta-blocker or other medications for now.      -Sinus tachycardia is better.  I suspect this will continue to improve as her pulmonary status improves.    Barber Velazco MD  03/17/24  14:19 EDT

## 2024-03-17 NOTE — PROGRESS NOTES
"Dr. ELENA Bales    [facility] CARDIAC INTENSIVE CARE        Patient ID:  Name:  Scarlet Chakraborty  MRN:  [id number]  1960  64 y.o.  female            CC/Reason for visit: COPD exacerbation, hypotension    Interval hx: Patient developed some hypotension overnight.  Has been started on IV pressors, norepinephrine.  Now coughing but unable to produce the sputum.  She has developed a wet cough.  Shortness of breath is better now, on 1 L of oxygen saturating 99%    ROS: Positive for cough and congestion and shortness of breath.  No abdominal pain, no fever    Vitals:  Vitals:    03/17/24 0730 03/17/24 0756 03/17/24 0804 03/17/24 0955   BP: 93/50   105/49   Pulse: 83      Resp:  14 17    Temp:       TempSrc:       SpO2: 98% 94%     Weight:    34 kg (75 lb)   Height:    162.6 cm (64\")           Body mass index is 12.87 kg/m².    Intake/Output Summary (Last 24 hours) at 3/17/2024 1116  Last data filed at 3/17/2024 0400  Gross per 24 hour   Intake 2290 ml   Output 350 ml   Net 1940 ml       Exam:  GEN:  No distress  Alert, oriented x 4, no focal deficits throughout.   LUNGS: Barrel chest, diminished breath sounds bilaterally and some expiratory wheezes with some rhonchi bilat, no use of accessory muscles  CV:  Normal S1S2, without murmur, no edema  ABD:  Non tender, no enlarged liver or masses      Scheduled meds:  calcium gluconate, 2,000 mg, Intravenous, Q2H  insulin lispro, 2-7 Units, Subcutaneous, 4x Daily AC & at Bedtime  ipratropium-albuterol, 3 mL, Nebulization, Q6H - RT  potassium chloride ER, 40 mEq, Oral, Q4H  potassium phosphate, 15 mmol, Intravenous, Once  senna-docusate sodium, 2 tablet, Oral, BID  sodium chloride, 10 mL, Intravenous, Q12H      IV meds:                      norepinephrine, 0.02-0.3 mcg/kg/min, Last Rate: 0.1 mcg/kg/min (03/17/24 0750)        Data Review:   I reviewed the patient's medications and new clinical results.    COVID19   Date Value Ref Range Status   03/16/2024 Not " Detected Not Detected - Ref. Range Final         Lab Results   Component Value Date    CALCIUM 7.0 (L) 03/17/2024    PHOS 1.8 (C) 03/17/2024    MG 1.7 03/17/2024    MG 2.6 (H) 03/16/2021    MG 1.7 03/15/2021     Results from last 7 days   Lab Units 03/17/24  0357 03/16/24  1039 03/16/24  0930 03/16/24  0923   SODIUM mmol/L 145  --   --  142   POTASSIUM mmol/L 3.3*  --   --  4.7   CHLORIDE mmol/L 116*  --   --  106   CO2 mmol/L 22.0  --   --  25.0   BUN mg/dL 14  --   --  20   CREATININE mg/dL 0.34*  --  0.42* 0.55*   CALCIUM mg/dL 7.0*  --   --  9.4   BILIRUBIN mg/dL  --   --   --  0.4   ALK PHOS U/L  --   --   --  69   ALT (SGPT) U/L  --   --   --  29   AST (SGOT) U/L  --   --   --  27   GLUCOSE mg/dL 74  --   --  163*   WBC 10*3/mm3 6.87 8.63  --   --    HEMOGLOBIN g/dL 9.8* 12.8  --   --    HEMOGLOBIN, POC g/dL  --   --  16.1  --    PLATELETS 10*3/mm3 169 248  --   --    PROBNP pg/mL  --   --   --  88.9             Results from last 7 days   Lab Units 03/17/24  0357 03/16/24  1136 03/16/24  0923   HSTROP T ng/L 180* 188* 65*     Results from last 7 days   Lab Units 03/16/24  1018 03/16/24  0930   PH, ARTERIAL pH units  --  7.250*   PCO2, ARTERIAL mm Hg  --  62.2*   PO2 ART mm Hg  --  499.3*   O2 SATURATION ART %  --  100.0*   MODALITY  BiPap NRB       Estimated Creatinine Clearance: 90.9 mL/min (A) (by C-G formula based on SCr of 0.34 mg/dL (L)).      ASSESSMENT:     COPD with acute exacerbation    Acute respiratory failure    Acute on chronic respiratory failure with hypercapnia  Hypotension, possible nonischemic cardiomyopathy  Previous CVA on Eliquis  Non-ST elevation MI, likely type II  History of Takotsubo cardiomyopathy in 2018        PLAN:  Patient became hypotensive overnight.  Started on pressors.  Based on her history of Takotsubo cardiomyopathy, we have consulted cardiology.  Patient to get echocardiogram done today.  She has been weaned off noninvasive ventilation.  Avoid hyperoxia.  Maintain  saturations between 88 to 92% to prevent further hypercapnia.  Start decreasing scheduled beta agonist albuterol since she may have Takotsubo stress cardiomyopathy.  Discussed with Dr. Velazco.  Monitor troponin.  Continue Eliquis for history of stroke in 2018.  This patient has several chronic medical conditions, and now several acute medical illnesses.  Extensive amount of data was reviewed.  Images were directly visualized by me.  This patient warrants high complexity medical decision-making.          Sourav Bales MD  3/17/2024

## 2024-03-17 NOTE — PLAN OF CARE
Problem: Adult Inpatient Plan of Care  Goal Outcome Evaluation:      Resistant to use of biPap. Educated om benefits. States kept her awake all noc. Noticed pt slept several hours at a time most of noc.

## 2024-03-17 NOTE — CONSULTS
Date of Consultation: 24    Referral Provider: Ronald Rgoel MD     Reason for Consultation: Elevated troponin.    Encounter Provider: Barber Velazco MD    Group of Service: [facility]     Patient Name: Scarlet Chakraborty    :1960    Chief complaint: Shortness of breath.    History of Present Illness:      This is a very pleasant 64 year-old female with a history of prior Takotsubo cardiomyopathy, embolic stroke, and severe COPD.  She normally follows with Dr. Boston in our group.  She has a history of Takotsubo cardiomyopathy in 2018.  She underwent a heart catheterization in  which showed angiographically normal coronary arteries, although her ejection fraction was around 30%.  She eventually recovered some function to 45 to 50% with an echo in  which showed inferior and inferolateral wall motion abnormalities.  Her stress test at that time was negative.  She also had an embolic stroke in , and had a Linq loop recorder placed.  She eventually was placed on Eliquis, although I do not see documented history of atrial fibrillation.    The patient presented with rapidly progressive shortness of breath over the last 24 hours.  She has been also had a significant cough.  She was in significant respiratory distress, and was admitted to the ICU.  She did briefly require NIPPV.  She has been given IV steroids.  Her EKG did not show ischemic changes.  However, her troponin went from 65-1 88.  She is still not having chest discomfort, although she does feel shortness of breath still.    Past Medical History:   Diagnosis Date    Asthma     Cerebrovascular accident (CVA) due to thrombosis of right middle cerebral artery     COPD (chronic obstructive pulmonary disease)     Nonischemic cardiomyopathy     NSTEMI (non-ST elevated myocardial infarction)     Stress-induced cardiomyopathy     Stroke     Takotsubo cardiomyopathy     Tobacco abuse          Past Surgical History:    Procedure Laterality Date    CARDIAC CATHETERIZATION N/A 9/13/2018    Procedure: Left Heart Cath and cors;  Surgeon: Gabino Dozier MD;  Location:  [facility] CATH INVASIVE LOCATION;  Service: Cardiology    CARDIAC CATHETERIZATION N/A 9/13/2018    Procedure: Left ventriculography;  Surgeon: Gabino Dozier MD;  Location: [facility] CATH INVASIVE LOCATION;  Service: Cardiology    CARDIAC ELECTROPHYSIOLOGY PROCEDURE N/A 9/27/2018    Procedure: Loop insertion  LINQ;  Surgeon: Jose R Barker MD;  Location: [facility] CATH INVASIVE LOCATION;  Service: Cardiovascular         No Known Allergies      No current facility-administered medications on file prior to encounter.     Current Outpatient Medications on File Prior to Encounter   Medication Sig Dispense Refill    albuterol sulfate  (90 Base) MCG/ACT inhaler Inhale 2 puffs Every 4 (Four) Hours As Needed for Wheezing.      Eliquis 5 MG tablet tablet TAKE ONE TABLET BY MOUTH EVERY 12 HOURS 180 tablet 3    Fluticasone-Umeclidin-Vilant (Trelegy Ellipta) 200-62.5-25 MCG/ACT aerosol powder  Inhale 1 puff Daily.      ipratropium-albuterol (DUO-NEB) 0.5-2.5 mg/3 ml nebulizer Take 3 mL by nebulization Every 4 (Four) Hours As Needed for Wheezing or Shortness of Air. 360 mL 0    Tiotropium Bromide Monohydrate (SPIRIVA RESPIMAT) 1.25 MCG/ACT aerosol solution inhaler Inhale 2 puffs Daily. 4 g 2         Social History     Socioeconomic History    Marital status: Single   Tobacco Use    Smoking status: Light Smoker     Current packs/day: 0.50     Average packs/day: 0.5 packs/day for 15.0 years (7.5 ttl pk-yrs)     Types: Cigarettes    Smokeless tobacco: Never    Tobacco comments:     Currently smokes cigarettes on occasion   Vaping Use    Vaping status: Never Used   Substance and Sexual Activity    Alcohol use: Yes     Comment: occassional/ Daily caffeine use    Drug use: No    Sexual activity: Defer         Family History   Problem Relation Age of Onset    Cancer Sister        REVIEW  "OF SYSTEMS:   Pertinent positives are noted in the HPI above.  Otherwise, all other systems were reviewed, and are negative.     Objective:     Vitals:    03/16/24 1956 03/16/24 1958 03/16/24 2005 03/16/24 2100   BP:       Pulse: 105 104 109    Resp:       Temp:    99.8 °F (37.7 °C)   TempSrc:    Oral   SpO2: 95% 95% 91%    Weight:       Height:         Body mass index is 12.87 kg/m².  Flowsheet Rows      Flowsheet Row First Filed Value   Admission Height 162.6 cm (64\") Documented at 03/16/2024 1212   Admission Weight 34 kg (75 lb) Documented at 03/16/2024 1212             General:    Labored breathing.  Alert and oriented x 4.  Thin and chronically ill-appearing.                   Head:    Normocephalic, atraumatic.   Eyes:          Conjunctivae and sclerae normal, no icterus.   Throat:   No oral lesions, no thrush, oral mucosa moist.    Neck:   Supple, trachea midline.   Lungs:     Bilateral wheezing and decreased breath sounds.    Heart:    Regular rhythm and tachycardic rate.  No murmurs, gallops, or rubs noted.   Abdomen:     Soft, non-tender, non-distended, positive bowel sounds.    Extremities:   No clubbing, cyanosis, or edema.     Pulses:   Pulses palpable and equal bilaterally.    Skin:   No bleeding or rash.   Neuro:   Non-focal.     Psychiatric:   Anxious, likely secondary to shortness of breath.           Lab Review:                Results from last 7 days   Lab Units 03/16/24  0930 03/16/24  0923   SODIUM mmol/L  --  142   POTASSIUM mmol/L  --  4.7   CHLORIDE mmol/L  --  106   CO2 mmol/L  --  25.0   BUN mg/dL  --  20   CREATININE mg/dL 0.42* 0.55*   GLUCOSE mg/dL  --  163*   CALCIUM mg/dL  --  9.4     Results from last 7 days   Lab Units 03/16/24  1136 03/16/24  0923   HSTROP T ng/L 188* 65*     Results from last 7 days   Lab Units 03/16/24  1039   WBC 10*3/mm3 8.63   HEMOGLOBIN g/dL 12.8   HEMATOCRIT % 39.8   PLATELETS 10*3/mm3 248                       EKG (reviewed by me personally): Sinus " tachycardia, right atrial enlargement, anteroseptal infarct, nonspecific T wave changes.      Assessment:   1.  COPD with acute exacerbation  2.  Acute hypercapnic respiratory failure, requiring NIPPV  3.  Elevated troponin, suspect type II NSTEMI secondary to #1 and #2  4.  History of Takotsubo cardiomyopathy in 2018 (EF 20%).  Coronary arteries normal by cath at that time  5.  Nonischemic cardiomyopathy, last EF 45 to 50% in 2021 (wall motion abnormalities at the basal inferior wall, basal inferoseptum, basal inferolateral wall at that time)  6.  History of embolic CVA in 2018, on Eliquis  7.  Sinus tachycardia, reactive  8.  Cachexia with low body weight  9.  Hypotension    Plan:       Again, her EKG shows no ischemic changes.  She has not had chest discomfort.  I suspect that this is a type II NSTEMI secondary to the respiratory failure and COPD exacerbation.  She does have a history of Takotsubo cardiomyopathy with an EF as low as 20% in 2018.  Her coronary arteries were normal at that time.  Her last EF was 45 to 50% in 2021.    I would hold on further volume resuscitation, and I would use pressor support if needed until her ejection fraction is known.  I have ordered an echocardiogram for ejection fraction assessment, as well as for any other potential structural abnormalities.  She is in sinus tachycardia currently, which is being exacerbated by her pulmonary issues.  She is also receiving breathing treatments which may make this more prominent.    She does not appear to be in congestive heart failure currently.  She is normally on Eliquis for history of an embolic stroke in 2018.  When she can take oral medications again, this will be reinstated.    Cardiology will continue to follow.  Discussed with the patient and her family member at bedside today.    Thank you very much for this consult.    Ar Velazco MD

## 2024-03-17 NOTE — CONSULTS
Nutrition Services    Patient Name:  Scarlet Chakraborty  YOB: 1960  MRN: [id number]  Admit Date:  3/16/2024    Assessment Date:  03/17/24    Summary: Consult for RN admit screen  Pt is a 63 y/o female who presented with SOB. Pt has a hx of COPD, CVA, HLD, cardiomyopathy.  Pt was laying in bed awake when I visited. Pt reported she typically eats 75% of 2 small meals/day. Pt reported she has been weighing around 80 lbs for a few years now but her normal weight used to be 100 lbs. Per EMR, pt has not had any significant weight loss recently. Pt denied chew/swallow issues and n/v. Pt had an EKG which did not show acute ischemia. Cards ordered echocardiogram yesterday. Pt reported she drinks 1 ONS per day. RD performed NFPE which revealed severe muscle wasting and fat loss. RD offered ONS and pt was agreeable. RD to initiate Boost BID and continue following.    Patient meets ASPEN/AND criteria for nutrition diagnosis of severe malnutrition of chronic illness based on: poor PO intake, muscle wasting, and fat loss    NFPE results below  CLINICAL NUTRITION ASSESSMENT      Reason for Assessment Nurse Admission Screen, Physician Consult     Diagnosis/Problem   Pt is a 63 y/o female who presented with SOB. Pt has a hx of COPD, CVA, HLD, cardiomyopathy.   Medical/Surgical History Past Medical History:   Diagnosis Date    Asthma     Cerebrovascular accident (CVA) due to thrombosis of right middle cerebral artery     COPD (chronic obstructive pulmonary disease)     Nonischemic cardiomyopathy     NSTEMI (non-ST elevated myocardial infarction)     Stress-induced cardiomyopathy     Stroke     Takotsubo cardiomyopathy     Tobacco abuse        Past Surgical History:   Procedure Laterality Date    CARDIAC CATHETERIZATION N/A 9/13/2018    Procedure: Left Heart Cath and cors;  Surgeon: Gabino Dozier MD;  Location: [facility] CATH INVASIVE LOCATION;  Service: Cardiology    CARDIAC CATHETERIZATION N/A 9/13/2018    Procedure:  "Left ventriculography;  Surgeon: Gabino Dozier MD;  Location:  [facility] CATH INVASIVE LOCATION;  Service: Cardiology    CARDIAC ELECTROPHYSIOLOGY PROCEDURE N/A 9/27/2018    Procedure: Loop insertion  LINQ;  Surgeon: Jose R Barker MD;  Location:  [facility]ATH INVASIVE LOCATION;  Service: Cardiovascular        Anthropometrics        Current Height  Current Weight  BMI kg/m2 Height: 162.6 cm (64\")  Weight: 34 kg (75 lb) (03/16/24 1212)  Body mass index is 12.87 kg/m².   Adjusted BMI (if applicable)    BMI Category Underweight (18.4 or below)   Ideal Body Weight (IBW) 126 lbs   Usual Body Weight (UBW) 80 lbs   Weight Trend Loss   Weight History Wt Readings from Last 30 Encounters:   03/16/24 1212 34 kg (75 lb)   02/19/24 1508 34.3 kg (75 lb 9.6 oz)   08/03/23 1309 35.4 kg (78 lb)   01/16/23 1323 39 kg (86 lb)   01/12/22 1331 36.3 kg (80 lb)   03/22/21 0618 37.8 kg (83 lb 6.4 oz)   03/21/21 0518 38.9 kg (85 lb 12.8 oz)   03/20/21 0537 39.7 kg (87 lb 9.6 oz)   03/19/21 0500 46 kg (101 lb 6.4 oz)   03/18/21 0546 40.9 kg (90 lb 2.7 oz)   03/17/21 1855 42.2 kg (93 lb)   03/17/21 0500 42.4 kg (93 lb 7.6 oz)   03/16/21 0500 43.3 kg (95 lb 7.4 oz)   03/15/21 0500 42.7 kg (94 lb 2.2 oz)   03/14/21 0445 42.5 kg (93 lb 11.1 oz)   03/13/21 1412 40.8 kg (89 lb 15.2 oz)   03/13/21 0420 40.8 kg (89 lb 15.2 oz)   03/13/21 0108 40.3 kg (88 lb 13.5 oz)   03/12/21 2210 45.4 kg (100 lb)   01/20/21 1431 41.3 kg (91 lb)   12/15/20 1202 41.7 kg (92 lb)   06/15/20 1122 44.5 kg (98 lb)   12/12/19 1339 48.3 kg (106 lb 6.4 oz)   10/24/19 1001 44.9 kg (99 lb)   10/11/19 1422 45.3 kg (99 lb 12.8 oz)   10/03/19 0404 43.4 kg (95 lb 10.9 oz)   10/02/19 0500 45.3 kg (99 lb 13.9 oz)   10/01/19 0952 45.5 kg (100 lb 5 oz)   10/01/19 0600 45.5 kg (100 lb 5 oz)   09/30/19 1131 42.2 kg (93 lb)   09/30/19 0227 42.3 kg (93 lb 4.1 oz)   09/30/19 0022 49.2 kg (108 lb 8 oz)   09/19/19 1308 45.3 kg (99 lb 12.8 oz)   09/10/19 0512 46.3 kg (102 lb)   06/03/19 1248 " 46 kg (101 lb 6.4 oz)   02/11/19 1509 46.3 kg (102 lb)   12/18/18 0810 45.8 kg (101 lb)   11/07/18 1314 44.5 kg (98 lb)   10/10/18 1353 44.5 kg (98 lb)   09/29/18 0533 42.8 kg (94 lb 5.7 oz)   09/28/18 0524 42.8 kg (94 lb 5.7 oz)   09/26/18 0500 43.3 kg (95 lb 7.4 oz)   09/25/18 1400 43.5 kg (96 lb)   09/25/18 1056 43.6 kg (96 lb 1.9 oz)   09/25/18 0600 43.6 kg (96 lb 1.9 oz)   09/24/18 1055 44.7 kg (98 lb 8 oz)   09/17/18 0500 44.5 kg (98 lb)   09/16/18 0621 45.9 kg (101 lb 4.8 oz)   09/15/18 0600 44.4 kg (97 lb 15.9 oz)   09/13/18 1227 43.5 kg (96 lb)        Estimated/Assessed Needs        Energy Requirements    Weight for Calculation 34 kg   Method for Estimation  35-40 kcal/kg   EST Needs (kcal/day) 3216-1720       Protein Requirements    Weight for Calculation 34 kg   EST Protein Needs (g/kg) 1.2 - 1.5 gm/kg   EST Daily Needs (g/day) 41-51       Fluid Requirements     Method for Estimation 1 mL/kcal    Estimated Needs (mL/day)        Fluid Deficit    Current Na Level (mEq/L)    Desired Na Level (mEq/L)    Estimated Fluid Deficit (L)       Labs       Pertinent Labs    Results from last 7 days   Lab Units 03/17/24  0357 03/16/24  0930 03/16/24  0923   SODIUM mmol/L 145  --  142   POTASSIUM mmol/L 3.3*  --  4.7   CHLORIDE mmol/L 116*  --  106   CO2 mmol/L 22.0  --  25.0   BUN mg/dL 14  --  20   CREATININE mg/dL 0.34* 0.42* 0.55*   CALCIUM mg/dL 7.0*  --  9.4   BILIRUBIN mg/dL  --   --  0.4   ALK PHOS U/L  --   --  69   ALT (SGPT) U/L  --   --  29   AST (SGOT) U/L  --   --  27   GLUCOSE mg/dL 74  --  163*     Results from last 7 days   Lab Units 03/17/24  0357 03/16/24  0930 03/16/24  0923   MAGNESIUM mg/dL 1.7  --   --    PHOSPHORUS mg/dL 1.8*  --   --    HEMOGLOBIN g/dL 9.8*   < >  --    HEMOGLOBIN, POC   --    < >  --    HEMATOCRIT % 30.9*   < >  --    HEMATOCRIT POC   --    < >  --    WBC 10*3/mm3 6.87   < >  --    ALBUMIN g/dL  --   --  4.3    < > = values in this interval not displayed.     Results from last  7 days   Lab Units 03/17/24  0357 03/16/24  1039   PLATELETS 10*3/mm3 169 248     COVID19   Date Value Ref Range Status   03/16/2024 Not Detected Not Detected - Ref. Range Final     Lab Results   Component Value Date    HGBA1C 5.5 11/01/2023          Medications           Scheduled Medications calcium gluconate, 1,000 mg, Intravenous, Q1H   Followed by  calcium gluconate, 2,000 mg, Intravenous, Q2H  insulin lispro, 2-7 Units, Subcutaneous, 4x Daily AC & at Bedtime  ipratropium-albuterol, 3 mL, Nebulization, Q6H - RT  potassium chloride ER, 40 mEq, Oral, Q4H  potassium phosphate, 15 mmol, Intravenous, Once  senna-docusate sodium, 2 tablet, Oral, BID  sodium chloride, 10 mL, Intravenous, Q12H       Infusions norepinephrine, 0.02-0.3 mcg/kg/min, Last Rate: 0.1 mcg/kg/min (03/17/24 0750)       PRN Medications   acetaminophen **OR** acetaminophen    senna-docusate sodium **AND** polyethylene glycol **AND** bisacodyl **AND** bisacodyl    Calcium Replacement - Follow Nurse / BPA Driven Protocol    dextrose    dextrose    glucagon (human recombinant)    guaiFENesin    guaiFENesin-dextromethorphan    ipratropium-albuterol    Magnesium Standard Dose Replacement - Follow Nurse / BPA Driven Protocol    nitroglycerin    ondansetron ODT **OR** ondansetron    Phosphorus Replacement - Follow Nurse / BPA Driven Protocol    Potassium Replacement - Follow Nurse / BPA Driven Protocol    sodium chloride    sodium chloride     Physical Findings          General Findings alert, frail, generalized wasting, loss of muscle mass, loss of subcutaneous fat, oriented, underweight   Oral/Mouth Cavity WDL   Edema  no edema   Gastrointestinal nausea, last bowel movement: 3/15   Skin  skin intact   Tubes/Drains/Lines none   NFPE See Malnutrition Severity Assessment     Malnutrition Severity Assessment      Patient meets criteria for : (P) Severe Malnutrition  Malnutrition Type (Last 8 Hours)       Malnutrition Severity Assessment       Row Name  03/17/24 0959       Malnutrition Severity Assessment    Malnutrition Type Chronic Disease - Related Malnutrition (P)       Row Name 03/17/24 0959       Insufficient Energy Intake     Insufficient Energy Intake Findings Severe (P)     Insufficient Energy Intake  <75% of est. energy requirement for > or equal to 1 month (P)       Row Name 03/17/24 0959       Unintentional Weight Loss     Unintentional Weight Loss Findings None (P)       Row Name 03/17/24 0959       Muscle Loss    Loss of Muscle Mass Findings Severe (P)     [place] Region Severe - deep hollowing/scooping, lack of muscle to touch, facial bones well defined (P)     Clavicle Bone Region Severe - protruding prominent bone (P)     Acromion Bone Region Severe - squared shoulders, bones, and acromion process protrusion prominent (P)     Dorsal Hand Region Moderate - slight depression (P)       Row Name 03/17/24 0959       Fat Loss    Subcutaneous Fat Loss Findings Severe (P)     Orbital Region  Moderate -  somewhat hollowness, slightly dark circles (P)     Upper Arm Region Severe - mostly skin, very little space between folds, fingers touch (P)       Row Name 03/17/24 0959       Criteria Met (Must meet criteria for severity in at least 2 of these categories: M Wasting, Fat Loss, Fluid, Secondary Signs, Wt. Status, Intake)    Patient meets criteria for  Severe Malnutrition (P)                        Current Nutrition Orders & Evaluation of Intake       Oral Nutrition     Food Allergies NKFA   Current PO Diet Diet: Diabetic; Consistent Carbohydrate; Fluid Consistency: Thin (IDDSI 0)   Supplement n/a   PO Evaluation     % PO Intake 75% of 2 small meals/day    Factors Affecting Intake: altered respiratory status, decreased appetite     PES STATEMENT / NUTRITION DIAGNOSIS      Nutrition Dx Problem  Problem: Malnutrition (severe) and Inadequate Oral Intake  Etiology: Medical Diagnosis - Pt is a 65 y/o female who presented with SOB. Pt has a hx of COPD, CVA, HLD,  cardiomyopathy.    Signs/Symptoms: Report of Minimal PO Intake and NFPE Results   --  NUTRITION INTERVENTION / PLAN OF CARE      Intervention Goal(s) Maintain nutrition status, Establish goals of care, Meet estimated needs, Increase intake, Accepts oral nutrition supplement, Maintain weight, No significant weight loss, Appropriate weight gain, and PO intake goal %: 75%         RD Intervention/Action Encourage intake, Continue to monitor, Care plan reviewed, and Recommend/order: ONS         Prescription/Orders:       PO Diet       Supplements Boost BID      Enteral Nutrition       Parenteral Nutrition    New Prescription Ordered? Yes   --      Monitor/Evaluation Per protocol, PO intake, Supplement intake, Weight, Skin status, GI status, Symptoms, POC/GOC   Discharge Plan/Needs No discharge needs identified at this time   --    RD to follow per protocol.      Electronically signed by:  Brian Jaquez  03/17/24 09:49 EDT

## 2024-03-17 NOTE — PLAN OF CARE
Goal Outcome Evaluation:  Plan of Care Reviewed With: patient, sibling, significant other, other (see comments)        Progress: no change     Pt remains in CICU.  SOA waxes and wanes.  Pt wore bipap for a few hours this afternoon to build up reserve; Precedex used while on bipap.  Levophed titrated accordingly.  Encouraged diet, but overall poor intake.  UOP via external catheter.  No BM.  SCDs on.  Replaced phosphorus and calcium by IV and potassium with pills per pt request.  Pt and family updated at bedside and over the phone; education provided by pulmonologist and RN on oxygen saturation goal given COPD history.

## 2024-03-18 LAB
ALBUMIN SERPL-MCNC: 3.7 G/DL (ref 3.5–5.2)
ALBUMIN/GLOB SERPL: 1.3 G/DL
ALP SERPL-CCNC: 56 U/L (ref 39–117)
ALT SERPL W P-5'-P-CCNC: 40 U/L (ref 1–33)
ANION GAP SERPL CALCULATED.3IONS-SCNC: 15 MMOL/L (ref 5–15)
AST SERPL-CCNC: 42 U/L (ref 1–32)
BASOPHILS # BLD AUTO: 0.06 10*3/MM3 (ref 0–0.2)
BASOPHILS NFR BLD AUTO: 0.6 % (ref 0–1.5)
BILIRUB SERPL-MCNC: 0.4 MG/DL (ref 0–1.2)
BUN SERPL-MCNC: 12 MG/DL (ref 8–23)
BUN/CREAT SERPL: 25 (ref 7–25)
CALCIUM SPEC-SCNC: 10 MG/DL (ref 8.6–10.5)
CHLORIDE SERPL-SCNC: 110 MMOL/L (ref 98–107)
CO2 SERPL-SCNC: 18 MMOL/L (ref 22–29)
CREAT SERPL-MCNC: 0.48 MG/DL (ref 0.57–1)
DEPRECATED RDW RBC AUTO: 43.9 FL (ref 37–54)
EGFRCR SERPLBLD CKD-EPI 2021: 106.6 ML/MIN/1.73
EOSINOPHIL # BLD AUTO: 0.14 10*3/MM3 (ref 0–0.4)
EOSINOPHIL NFR BLD AUTO: 1.4 % (ref 0.3–6.2)
ERYTHROCYTE [DISTWIDTH] IN BLOOD BY AUTOMATED COUNT: 12.9 % (ref 12.3–15.4)
GEN 5 2HR TROPONIN T REFLEX: 117 NG/L
GLOBULIN UR ELPH-MCNC: 2.8 GM/DL
GLUCOSE BLDC GLUCOMTR-MCNC: 81 MG/DL (ref 70–130)
GLUCOSE SERPL-MCNC: 109 MG/DL (ref 65–99)
HCT VFR BLD AUTO: 40.3 % (ref 34–46.6)
HGB BLD-MCNC: 13.1 G/DL (ref 12–15.9)
IMM GRANULOCYTES # BLD AUTO: 0.02 10*3/MM3 (ref 0–0.05)
IMM GRANULOCYTES NFR BLD AUTO: 0.2 % (ref 0–0.5)
LYMPHOCYTES # BLD AUTO: 2.65 10*3/MM3 (ref 0.7–3.1)
LYMPHOCYTES NFR BLD AUTO: 26.1 % (ref 19.6–45.3)
MAGNESIUM SERPL-MCNC: 2.7 MG/DL (ref 1.6–2.4)
MCH RBC QN AUTO: 30.1 PG (ref 26.6–33)
MCHC RBC AUTO-ENTMCNC: 32.5 G/DL (ref 31.5–35.7)
MCV RBC AUTO: 92.6 FL (ref 79–97)
MONOCYTES # BLD AUTO: 0.81 10*3/MM3 (ref 0.1–0.9)
MONOCYTES NFR BLD AUTO: 8 % (ref 5–12)
NEUTROPHILS NFR BLD AUTO: 6.48 10*3/MM3 (ref 1.7–7)
NEUTROPHILS NFR BLD AUTO: 63.7 % (ref 42.7–76)
NRBC BLD AUTO-RTO: 0 /100 WBC (ref 0–0.2)
PHOSPHATE SERPL-MCNC: 3 MG/DL (ref 2.5–4.5)
PLATELET # BLD AUTO: 244 10*3/MM3 (ref 140–450)
PMV BLD AUTO: 10.9 FL (ref 6–12)
POTASSIUM SERPL-SCNC: 5.1 MMOL/L (ref 3.5–5.2)
POTASSIUM SERPL-SCNC: 5.4 MMOL/L (ref 3.5–5.2)
PROCALCITONIN SERPL-MCNC: 0.06 NG/ML (ref 0–0.25)
PROT SERPL-MCNC: 6.5 G/DL (ref 6–8.5)
QT INTERVAL: 413 MS
QTC INTERVAL: 471 MS
RBC # BLD AUTO: 4.35 10*6/MM3 (ref 3.77–5.28)
SODIUM SERPL-SCNC: 143 MMOL/L (ref 136–145)
TROPONIN T DELTA: -55 NG/L
TROPONIN T SERPL HS-MCNC: 172 NG/L
WBC NRBC COR # BLD AUTO: 10.16 10*3/MM3 (ref 3.4–10.8)

## 2024-03-18 PROCEDURE — 84132 ASSAY OF SERUM POTASSIUM: CPT | Performed by: FAMILY MEDICINE

## 2024-03-18 PROCEDURE — 93005 ELECTROCARDIOGRAM TRACING: CPT | Performed by: INTERNAL MEDICINE

## 2024-03-18 PROCEDURE — 99232 SBSQ HOSP IP/OBS MODERATE 35: CPT | Performed by: INTERNAL MEDICINE

## 2024-03-18 PROCEDURE — 84484 ASSAY OF TROPONIN QUANT: CPT | Performed by: INTERNAL MEDICINE

## 2024-03-18 PROCEDURE — 85025 COMPLETE CBC W/AUTO DIFF WBC: CPT | Performed by: INTERNAL MEDICINE

## 2024-03-18 PROCEDURE — 94761 N-INVAS EAR/PLS OXIMETRY MLT: CPT

## 2024-03-18 PROCEDURE — 93010 ELECTROCARDIOGRAM REPORT: CPT | Performed by: INTERNAL MEDICINE

## 2024-03-18 PROCEDURE — 84145 PROCALCITONIN (PCT): CPT | Performed by: INTERNAL MEDICINE

## 2024-03-18 PROCEDURE — 94799 UNLISTED PULMONARY SVC/PX: CPT

## 2024-03-18 PROCEDURE — 84100 ASSAY OF PHOSPHORUS: CPT

## 2024-03-18 PROCEDURE — 83735 ASSAY OF MAGNESIUM: CPT | Performed by: INTERNAL MEDICINE

## 2024-03-18 PROCEDURE — 82948 REAGENT STRIP/BLOOD GLUCOSE: CPT

## 2024-03-18 PROCEDURE — 80053 COMPREHEN METABOLIC PANEL: CPT | Performed by: INTERNAL MEDICINE

## 2024-03-18 RX ORDER — CHOLECALCIFEROL (VITAMIN D3) 125 MCG
5 CAPSULE ORAL NIGHTLY PRN
Status: DISCONTINUED | OUTPATIENT
Start: 2024-03-18 | End: 2024-03-20 | Stop reason: HOSPADM

## 2024-03-18 RX ORDER — BUDESONIDE AND FORMOTEROL FUMARATE DIHYDRATE 160; 4.5 UG/1; UG/1
2 AEROSOL RESPIRATORY (INHALATION)
Status: DISCONTINUED | OUTPATIENT
Start: 2024-03-18 | End: 2024-03-20 | Stop reason: HOSPADM

## 2024-03-18 RX ORDER — ALBUTEROL SULFATE 2.5 MG/3ML
2.5 SOLUTION RESPIRATORY (INHALATION) EVERY 6 HOURS PRN
Status: DISCONTINUED | OUTPATIENT
Start: 2024-03-18 | End: 2024-03-20 | Stop reason: HOSPADM

## 2024-03-18 RX ADMIN — APIXABAN 5 MG: 5 TABLET, FILM COATED ORAL at 21:55

## 2024-03-18 RX ADMIN — Medication 5 MG: at 21:55

## 2024-03-18 RX ADMIN — BUDESONIDE AND FORMOTEROL FUMARATE DIHYDRATE 2 PUFF: 160; 4.5 AEROSOL RESPIRATORY (INHALATION) at 13:02

## 2024-03-18 RX ADMIN — TIOTROPIUM BROMIDE INHALATION SPRAY 2 PUFF: 3.12 SPRAY, METERED RESPIRATORY (INHALATION) at 13:05

## 2024-03-18 RX ADMIN — IPRATROPIUM BROMIDE AND ALBUTEROL SULFATE 3 ML: .5; 3 SOLUTION RESPIRATORY (INHALATION) at 03:42

## 2024-03-18 RX ADMIN — Medication 10 ML: at 21:55

## 2024-03-18 RX ADMIN — Medication 10 ML: at 09:12

## 2024-03-18 RX ADMIN — BUDESONIDE AND FORMOTEROL FUMARATE DIHYDRATE 2 PUFF: 160; 4.5 AEROSOL RESPIRATORY (INHALATION) at 19:15

## 2024-03-18 RX ADMIN — ASPIRIN 81 MG: 81 TABLET, COATED ORAL at 09:07

## 2024-03-18 RX ADMIN — APIXABAN 5 MG: 5 TABLET, FILM COATED ORAL at 09:07

## 2024-03-18 RX ADMIN — DOCUSATE SODIUM 50MG AND SENNOSIDES 8.6MG 2 TABLET: 8.6; 5 TABLET, FILM COATED ORAL at 09:07

## 2024-03-18 NOTE — PLAN OF CARE
Goal Outcome Evaluation:           Progress: no change                                Pt wore bipap from about 2000 to 0400. On NC now, O2 sats in the 90s. Precedex off. Levo on to maintain SBP>90. VSS. Pt is very anxious.

## 2024-03-18 NOTE — PROGRESS NOTES
Dr. ELENA Bales    [facility] CARDIAC INTENSIVE CARE        Patient ID:  Name:  Scarlet Chakraborty  MRN:  [id number]  1960  64 y.o.  female            CC/Reason for visit: COPD exacerbation, hypotension    Interval hx: Patient required pressors again overnight.  Became anxious and developed increased work of breathing, tachypnea and hypoxemia.  She required noninvasive ventilation again due to shortness of breath.  Unclear if it was all anxiety related.      ROS: Positive for anxiety and shortness of breath.  Negative for chest pain or abdominal pain    Vitals:  Vitals:    03/18/24 1030 03/18/24 1100 03/18/24 1130 03/18/24 1200   BP: 102/62 108/57 104/78 105/53   Pulse: 80 89 97 87   Resp:       Temp:       TempSrc:       SpO2: 100% 100% 96% 93%   Weight:       Height:               Body mass index is 12.87 kg/m².    Intake/Output Summary (Last 24 hours) at 3/18/2024 1234  Last data filed at 3/18/2024 0900  Gross per 24 hour   Intake 1435.3 ml   Output 650 ml   Net 785.3 ml       Exam:  GEN:  No distress, appears cachectic  Alert, oriented x 3.   LUNGS: Barrel chest, distant and diminished breath sounds bilat, some wheezing, no use of accessory muscles  CV:  Normal S1S2, without murmur, no edema  ABD:  Non tender, no enlarged liver or masses      Scheduled meds:  apixaban, 5 mg, Oral, Q12H  aspirin, 81 mg, Oral, Daily  budesonide-formoterol, 2 puff, Inhalation, BID - RT  senna-docusate sodium, 2 tablet, Oral, BID  sodium chloride, 10 mL, Intravenous, Q12H  tiotropium bromide monohydrate, 2 puff, Inhalation, Daily - RT      IV meds:                      dexmedetomidine, 0.2-1.5 mcg/kg/hr, Last Rate: Stopped (03/18/24 0700)  norepinephrine, 0.02-0.3 mcg/kg/min, Last Rate: Stopped (03/18/24 1029)        Data Review:   I reviewed the patient's medications and new clinical results.    COVID19   Date Value Ref Range Status   03/16/2024 Not Detected Not Detected - Ref. Range Final         Lab Results    Component Value Date    CALCIUM 10.0 03/18/2024    PHOS 3.0 03/18/2024    MG 2.7 (H) 03/18/2024    MG 1.7 03/17/2024    MG 2.6 (H) 03/16/2021     Results from last 7 days   Lab Units 03/18/24  0338 03/18/24  0039 03/17/24  0357 03/16/24  1039 03/16/24  0930 03/16/24  0923   SODIUM mmol/L 143  --  145  --   --  142   POTASSIUM mmol/L 5.4* 5.1 3.3*  --   --  4.7   CHLORIDE mmol/L 110*  --  116*  --   --  106   CO2 mmol/L 18.0*  --  22.0  --   --  25.0   BUN mg/dL 12  --  14  --   --  20   CREATININE mg/dL 0.48*  --  0.34*  --  0.42* 0.55*   CALCIUM mg/dL 10.0  --  7.0*  --   --  9.4   BILIRUBIN mg/dL 0.4  --   --   --   --  0.4   ALK PHOS U/L 56  --   --   --   --  69   ALT (SGPT) U/L 40*  --   --   --   --  29   AST (SGOT) U/L 42*  --   --   --   --  27   GLUCOSE mg/dL 109*  --  74  --   --  163*   WBC 10*3/mm3 10.16  --  6.87 8.63  --   --    HEMOGLOBIN g/dL 13.1  --  9.8* 12.8  --   --    HEMOGLOBIN, POC g/dL  --   --   --   --  16.1  --    PLATELETS 10*3/mm3 244  --  169 248  --   --    PROBNP pg/mL  --   --   --   --   --  88.9   PROCALCITONIN ng/mL 0.06  --   --   --   --   --              Results from last 7 days   Lab Units 03/18/24  0918 03/18/24  0338 03/17/24  0357   HSTROP T ng/L 117* 172* 180*     Results from last 7 days   Lab Units 03/16/24  1018 03/16/24  0930   PH, ARTERIAL pH units  --  7.250*   PCO2, ARTERIAL mm Hg  --  62.2*   PO2 ART mm Hg  --  499.3*   O2 SATURATION ART %  --  100.0*   MODALITY  BiPap NRB       Estimated Creatinine Clearance: 64.4 mL/min (A) (by C-G formula based on SCr of 0.48 mg/dL (L)).      ASSESSMENT:   COPD with acute exacerbation    Acute respiratory failure    Acute on chronic respiratory failure with hypercapnia  Hypotension, possible nonischemic cardiomyopathy  Previous CVA on Eliquis  Non-ST elevation MI, likely type II  History of Takotsubo cardiomyopathy in 2018      PLAN:  Worsened last night.  Had episode of tachypnea, anxiety, increased work of breathing and  required noninvasive ventilation again.  The patient at times is very short of breath but then becomes quite anxious when noninvasive ventilation with BiPAP mask is placed on her face.  We will continue to try to avoid hyperoxia, keep saturations between 88 to 94% to try to minimize her CO2 retention.  Cardiology is following along and gave the patient diuretics.  She produced urine but became hypotensive and required IV pressors.  Will stop diuretics at this point.  Echocardiogram was obtained during this hospital admission and ejection fraction has improved and there is no evidence of Takotsubo cardiomyopathy.  The patient is adamantly requesting routine COPD maintenance inhalers rather than scheduled DuoNeb.  Will start her on Symbicort and Spiriva, and place albuterol nebulization every 6 hours as needed    This patient has several chronic medical conditions, and now several acute medical illnesses.  Extensive amount of data was reviewed.  Images were directly visualized by me.  This patient warrants high complexity medical decision-making.      Sourav Bales MD  3/18/2024

## 2024-03-18 NOTE — CASE MANAGEMENT/SOCIAL WORK
Discharge Planning Assessment  [facility]     Patient Name: Scarlet Chakraborty  MRN: [id number]  Today's Date: 3/18/2024    Admit Date: 3/16/2024    Plan: Home   Discharge Needs Assessment       Row Name 03/18/24 1158       Living Environment    People in Home significant other    Current Living Arrangements home    Potentially Unsafe Housing Conditions none    Primary Care Provided by self    Provides Primary Care For no one    Family Caregiver if Needed none    Quality of Family Relationships helpful;involved;supportive    Able to Return to Prior Arrangements yes       Resource/Environmental Concerns    Resource/Environmental Concerns none    Transportation Concerns none       Transition Planning    Patient/Family Anticipates Transition to home with family    Patient/Family Anticipated Services at Transition none    Transportation Anticipated family or friend will provide       Discharge Needs Assessment    Readmission Within the Last 30 Days no previous admission in last 30 days    Equipment Currently Used at Home oxygen;nebulizer;respiratory supplies    Anticipated Changes Related to Illness none    Equipment Needed After Discharge none    Provided Post Acute Provider List? N/A    Provided Post Acute Provider Quality & Resource List? N/A                   Discharge Plan       Row Name 03/18/24 1159       Plan    Plan --      Row Name 03/18/24 1158       Plan    Plan Home    Patient/Family in Agreement with Plan yes    Plan Comments CCP met with patient at bedside. Introduced self and explained role of CCP. Patient confirmed then information on her face sheet is accurate. Patients PCP is Flavia Jaquez. Patients pharmacy is [facility]. Patient lives at home with her significant other Don. Patient states she is independent at home. Patient does not have a history of HH or SNF. Patient does have home O2 through [place]. Patient plans home at discharge and her significant other Don can transport.                   Continued Care and Services - Admitted Since 3/16/2024    No active coordination exists for this encounter.       Expected Discharge Date and Time       Expected Discharge Date Expected Discharge Time    Mar 22, 2024            Demographic Summary       Row Name 03/18/24 1158       General Information    Admission Type inpatient    Arrived From emergency department    Required Notices Provided Observation Status Notice    Referral Source admission list    Reason for Consult discharge planning    Preferred Language English                   Functional Status       Row Name 03/18/24 1158       Functional Status    Usual Activity Tolerance good    Current Activity Tolerance good       Functional Status, IADL    Medications independent    Meal Preparation independent    Housekeeping independent    Laundry independent    Shopping independent       Mental Status    General Appearance WDL WDL       Mental Status Summary    Recent Changes in Mental Status/Cognitive Functioning no changes       Employment/    Employment Status retired                   Psychosocial    No documentation.                  Abuse/Neglect    No documentation.                  Legal    No documentation.                  Substance Abuse    No documentation.                  Patient Forms    No documentation.

## 2024-03-18 NOTE — PAYOR COMM NOTE
"Scarlet Chavez (64 y.o. Female)                             ATTENTION; INITIAL CLINICALS AUTH PENDING [id number]                       REPLY TO [facility] DEPT  [phone] OR CALL                Date of Birth   1960    Social Security Number       Address   [address] [place] [address]    Home Phone       MRN   [id number]       Gnosticist   Gnosticist    Marital Status   Single                            Admission Date   3/16/24    Admission Type   Emergency    Admitting Provider   Sourav Bales MD    Attending Provider   Sourav Bales MD    Department, Room/Bed   [facility], 3003/1       Discharge Date       Discharge Disposition       Discharge Destination                                 Attending Provider: Sourav Bales MD    Allergies: No Known Allergies    Isolation: Enh Drop/Con   Infection: None   Code Status: CPR    Ht: 162.6 cm (64\")   Wt: 34 kg (75 lb)    Admission Cmt: None   Principal Problem: COPD with acute exacerbation [J44.1]                   Active Insurance as of 3/16/2024       Primary Coverage       Payor Plan Insurance Group Employer/Plan Group    [employer] [id number]       Payor Plan Address Payor Plan Phone Number Payor Plan Fax Number Effective Dates    [address] [phone]  1/1/2024 - None Entered    [place] [address]         Subscriber Name Subscriber Birth Date Member ID       SCARLET CHAVEZ 1960 [id number]                     Emergency Contacts        (Rel.) Home Phone Work Phone Mobile Phone    Yvonne Mccollum (Sister) [phone] -- --    Don Taylor (Significant Other) -- -- [phone]                 History & Physical        Sourav Bales MD at 03/16/24 1100          Group: [facility]         Critical care history and physical admission note    Patient Identification:  Scarlet Chavez  64 y.o.  female  1960  [id number]   "            Shortness of breath  CC:     History of Present Illness:  64-year-old female patient who arrives in respiratory distress.  Is been going on for a few days since Thursday.  I discussed the case with Dr. Rogel from the emergency room.  She has severe COPD.  Her shortness of breath has rapidly progressed over the last 24 hours, constant, still present, only alleviated by noninvasive ventilation in the ER.  Associated with cough.  Associated with some wheezing.  Unable to produce sputum.  Patient presented barely able to speak, had telegraphic speech, working hard to breathe and using accessory muscles.  ABG on presentation shows respiratory acidosis  She quit smoking 5 months ago.  Sees my colleague, Dr. Mukherjee in the office    Review of Systems   Constitutional:  Positive for fatigue. Negative for diaphoresis and fever.   HENT:  Negative for ear discharge and sore throat.    Eyes:  Negative for pain and visual disturbance.   Respiratory:  Positive for cough, shortness of breath and wheezing.    Cardiovascular:  Negative for chest pain and leg swelling.   Gastrointestinal:  Negative for abdominal pain and diarrhea.   Endocrine: Negative for cold intolerance and polyuria.   Genitourinary:  Negative for dysuria and hematuria.   Musculoskeletal:  Negative for joint swelling and myalgias.   Skin:  Negative for rash and wound.   Neurological:  Negative for speech difficulty and numbness.   Hematological:  Negative for adenopathy. Does not bruise/bleed easily.   Psychiatric/Behavioral:  Negative for agitation and confusion.        Past Medical History:  Past Medical History:   Diagnosis Date    Asthma     Cerebrovascular accident (CVA) due to thrombosis of right middle cerebral artery     COPD (chronic obstructive pulmonary disease)     Nonischemic cardiomyopathy     NSTEMI (non-ST elevated myocardial infarction)     Stress-induced cardiomyopathy     Stroke     Takotsubo cardiomyopathy     Tobacco abuse         Past Surgical History:  Past Surgical History:   Procedure Laterality Date    CARDIAC CATHETERIZATION N/A 9/13/2018    Procedure: Left Heart Cath and cors;  Surgeon: Gabino Dozier MD;  Location: [facility] CATH INVASIVE LOCATION;  Service: Cardiology    CARDIAC CATHETERIZATION N/A 9/13/2018    Procedure: Left ventriculography;  Surgeon: Gabino Dozier MD;  Location: [facility] CATH INVASIVE LOCATION;  Service: Cardiology    CARDIAC ELECTROPHYSIOLOGY PROCEDURE N/A 9/27/2018    Procedure: Loop insertion  LINQ;  Surgeon: Jose R Barker MD;  Location: [facility] CATH INVASIVE LOCATION;  Service: Cardiovascular        Home Meds:  Medications Prior to Admission   Medication Sig Dispense Refill Last Dose    albuterol sulfate  (90 Base) MCG/ACT inhaler Inhale 2 puffs Every 4 (Four) Hours As Needed for Wheezing.       Eliquis 5 MG tablet tablet TAKE ONE TABLET BY MOUTH EVERY 12 HOURS 180 tablet 3 3/15/2024    Fluticasone-Umeclidin-Vilant (Trelegy Ellipta) 200-62.5-25 MCG/ACT aerosol powder  Inhale 1 puff Daily.       ipratropium-albuterol (DUO-NEB) 0.5-2.5 mg/3 ml nebulizer Take 3 mL by nebulization Every 4 (Four) Hours As Needed for Wheezing or Shortness of Air. 360 mL 0     Tiotropium Bromide Monohydrate (SPIRIVA RESPIMAT) 1.25 MCG/ACT aerosol solution inhaler Inhale 2 puffs Daily. 4 g 2        Allergies:  No Known Allergies    Social History:   Social History     Socioeconomic History    Marital status: Single   Tobacco Use    Smoking status: Light Smoker     Current packs/day: 0.50     Average packs/day: 0.5 packs/day for 15.0 years (7.5 ttl pk-yrs)     Types: Cigarettes    Smokeless tobacco: Never    Tobacco comments:     Currently smokes cigarettes on occasion   Vaping Use    Vaping status: Never Used   Substance and Sexual Activity    Alcohol use: Yes     Comment: occassional/ Daily caffeine use    Drug use: No    Sexual activity: Defer       Family History:  Family History   Problem Relation Age of Onset     "Cancer Sister        Physical Exam:  /50   Pulse 101   Temp 99.2 °F (37.3 °C) (Oral)   Resp 26   Ht 162.6 cm (64\")   Wt 34 kg (75 lb)   SpO2 93%   BMI 12.87 kg/m²  Body mass index is 12.87 kg/m². 93% 34 kg (75 lb)  Physical Exam  Constitutional:       General: She is in acute distress.      Comments: Cachectic, in respiratory distress   HENT:      Right Ear: External ear normal.      Left Ear: External ear normal.      Nose: Nose normal.   Eyes:      Conjunctiva/sclera: Conjunctivae normal.      Pupils: Pupils are equal, round, and reactive to light.   Neck:      Thyroid: No thyromegaly.      Vascular: No JVD.      Trachea: No tracheal deviation.   Cardiovascular:      Rate and Rhythm: Normal rate and regular rhythm.      Heart sounds: Normal heart sounds. No murmur heard.  Pulmonary:      Effort: Respiratory distress present.      Breath sounds: Wheezing present.      Comments: Barrel chest, hyperresonant bilaterally  Abdominal:      Palpations: Abdomen is soft.      Tenderness: There is no abdominal tenderness. There is no rebound.      Comments: Cannot palpate liver or spleen enlargement   Musculoskeletal:         General: No deformity. Normal range of motion.      Cervical back: Neck supple. No rigidity.   Skin:     General: Skin is warm.      Findings: No rash.      Comments: No palpable nodules   Neurological:      General: No focal deficit present.      Mental Status: She is oriented to person, place, and time.      Cranial Nerves: No cranial nerve deficit.      Motor: No weakness.      Comments: Very anxious   Psychiatric:      Comments: Patient is anxious, but seems to have good judgment and insight         LABS:  COVID19   Date Value Ref Range Status   03/16/2024 Not Detected Not Detected - Ref. Range Final       Lab Results   Component Value Date    CALCIUM 9.4 03/16/2024    PHOS 3.0 03/16/2021     Results from last 7 days   Lab Units 03/16/24  1039 03/16/24  0930 03/16/24  0923   SODIUM " mmol/L  --   --  142   POTASSIUM mmol/L  --   --  4.7   CHLORIDE mmol/L  --   --  106   CO2 mmol/L  --   --  25.0   BUN mg/dL  --   --  20   CREATININE mg/dL  --  0.42* 0.55*   GLUCOSE mg/dL  --   --  163*   CALCIUM mg/dL  --   --  9.4   WBC 10*3/mm3 8.63  --   --    HEMOGLOBIN g/dL 12.8  --   --    HEMOGLOBIN, POC g/dL  --  16.1  --    PLATELETS 10*3/mm3 248  --   --    ALT (SGPT) U/L  --   --  29   AST (SGOT) U/L  --   --  27   PROBNP pg/mL  --   --  88.9     Lab Results   Component Value Date    TROPONINT 188 (C) 03/16/2024     Results from last 7 days   Lab Units 03/16/24  1136 03/16/24  0923   HSTROP T ng/L 188* 65*         Results from last 7 days   Lab Units 03/16/24  0930   LACTATE mmol/L 1.3     Results from last 7 days   Lab Units 03/16/24  1018 03/16/24  0930   PH, ARTERIAL pH units  --  7.250*   PCO2, ARTERIAL mm Hg  --  62.2*   PO2 ART mm Hg  --  499.3*   O2 SATURATION ART %  --  100.0*   MODALITY  BiPap NRB     Results from last 7 days   Lab Units 03/16/24  0918   ADENOVIRUS DETECTION BY PCR  Not Detected   CORONAVIRUS 229E  Not Detected   CORONAVIRUS HKU1  Not Detected   CORONAVIRUS NL63  Not Detected   CORONAVIRUS OC43  Not Detected   HUMAN METAPNEUMOVIRUS  Not Detected   HUMAN RHINOVIRUS/ENTEROVIRUS  Not Detected   INFLUENZA B PCR  Not Detected   PARAINFLUENZA 1  Not Detected   PARAINFLUENZA VIRUS 2  Not Detected   PARAINFLUENZA VIRUS 3  Not Detected   PARAINFLUENZA VIRUS 4  Not Detected   BORDETELLA PERTUSSIS PCR  Not Detected   BORDETELLA PARAPERTUSSIS PCR  Not Detected   CHLAMYDOPHILA PNEUMONIAE PCR  Not Detected   MYCOPLAMA PNEUMO PCR  Not Detected   RSV, PCR  Not Detected             Lab Results   Component Value Date    TSH 1.010 09/24/2018     Estimated Creatinine Clearance: 73.6 mL/min (A) (by C-G formula based on SCr of 0.42 mg/dL (L)).         Imaging: I personally visualized the images of chest x-ray showing very hyperinflated lungs but essentially clear bilaterally.  There is significant  emphysema.    EKG: I have visualized twelve-lead EKG sinus rhythm with tachycardia.  Right axis deviation and right atrial enlargement with possible pulmonary hypertension.  Nonspecific T wave changes in lateral leads but no signs of ischemia      Assessment:  COPD with acute exacerbation  Non-ST elevation MI  Acute hypercapnic respiratory failure requiring noninvasive ventilation  Previous CVA, on Eliquis  History of nonischemic cardiomyopathy Takotsubo with ejection fraction 20% in 2018        Recommendations:  Admit to ICU.  Continue noninvasive positive pressure ventilation to treat her acute hypercapnic respiratory failure with respiratory acidosis.  Give IV corticosteroids for COPD exacerbation as well as nebulized beta agonist.  Patient has had a sudden increase in troponin level.  EKG is not showing acute ischemia but we will consult cardiology.  She does have a history of previous stress-induced cardiomyopathy, Takotsubo syndrome.  Nothing by mouth for now.  Unsure what might be the precipitating factor for COPD exacerbation.  Lovenox for DVT prophylaxis    Total critical care time 37 minutes    Patient was placed in face mask upon entering room and kept mask on throughout our encounter. I wore full protective equipment throughout this patient encounter including a face mask, gown and gloves. Hand hygiene was performed before donning protective equipment and after removal when leaving the room.    Sourav Bales MD  3/16/2024  16:33 EDT      Much of this encounter note is an electronic transcription/translation of spoken language to printed text using Dragon Software.    Electronically signed by Sourav Bales MD at 03/16/24 1640          Emergency Department Notes        Nabila Gerard, RN at 03/16/24 1427          ..Nursing report ED to floor  Scarlet Chakraborty  64 y.o.  female    HPI :  HPI (Adult)  Stated Reason for Visit: soa x 2 days.  hx copd.  received solumedrol 125mg IV and duoneb en  route  History Obtained From: EMS    Chief Complaint  Chief Complaint   Patient presents with    Shortness of Breath       Admitting doctor:   Sourav Bales MD    Admitting diagnosis:   The primary encounter diagnosis was Acute exacerbation of chronic obstructive pulmonary disease (COPD). A diagnosis of Acute respiratory failure with hypercapnia was also pertinent to this visit.    Code status:   Current Code Status       Date Active Code Status Order ID Comments User Context       3/16/2024 1014 CPR (Attempt to Resuscitate) [id number]  Sourav Bales MD ED        Question Answer    Code Status (Patient has no pulse and is not breathing) CPR (Attempt to Resuscitate)    Medical Interventions (Patient has pulse or is breathing) Full Support                    Allergies:   Patient has no known allergies.    Isolation:   Enhanced Droplet/Contact     Intake and Output    Intake/Output Summary (Last 24 hours) at 3/16/2024 1427  Last data filed at 3/16/2024 1301  Gross per 24 hour   Intake 1250 ml   Output --   Net 1250 ml       Weight:       03/16/24  1212   Weight: 34 kg (75 lb)       Most recent vitals:   Vitals:    03/16/24 1321 03/16/24 1331 03/16/24 1411 03/16/24 1421   BP: (!) 88/55 (!) 86/56 94/50 98/55   Pulse: 98 96 96 96   Resp:       Temp:       SpO2: 94% 95% 98% 99%   Weight:       Height:           Active LDAs/IV Access:   Lines, Drains & Airways       Active LDAs       Name Placement date Placement time Site Days    Peripheral IV 03/16/24 Anterior;Left;Proximal Forearm 03/16/24  --  Forearm  less than 1    Peripheral IV 03/16/24 1131 Anterior;Right Forearm 03/16/24  1131  Forearm  less than 1                    Labs (abnormal labs have a star):   Labs Reviewed   COMPREHENSIVE METABOLIC PANEL - Abnormal; Notable for the following components:       Result Value    Glucose 163 (*)     Creatinine 0.55 (*)     BUN/Creatinine Ratio 36.4 (*)     All other components within normal limits    Narrative:     GFR  Normal >60  Chronic Kidney Disease <60  Kidney Failure <15     TROPONIN - Abnormal; Notable for the following components:    HS Troponin T 65 (*)     All other components within normal limits    Narrative:     High Sensitive Troponin T Reference Range:  <14.0 ng/L- Negative Female for AMI  <22.0 ng/L- Negative Male for AMI  >=14 - Abnormal Female indicating possible myocardial injury.  >=22 - Abnormal Male indicating possible myocardial injury.   Clinicians would have to utilize clinical acumen, EKG, Troponin, and serial changes to determine if it is an Acute Myocardial Infarction or myocardial injury due to an underlying chronic condition.        CBC WITH AUTO DIFFERENTIAL - Abnormal; Notable for the following components:    Neutrophil % 85.3 (*)     Lymphocyte % 8.9 (*)     Monocyte % 3.9 (*)     Neutrophils, Absolute 7.35 (*)     All other components within normal limits   BLOOD GAS, ARTERIAL - Abnormal; Notable for the following components:    pH, Arterial 7.250 (*)     pCO2, Arterial 62.2 (*)     pO2, Arterial 499.3 (*)     Base Excess, Arterial -1.7 (*)     O2 Saturation, Arterial 100.0 (*)     All other components within normal limits   HIGH SENSITIVITIY TROPONIN T 2HR - Abnormal; Notable for the following components:    HS Troponin T 188 (*)     Troponin T Delta 123 (*)     All other components within normal limits    Narrative:     High Sensitive Troponin T Reference Range:  <14.0 ng/L- Negative Female for AMI  <22.0 ng/L- Negative Male for AMI  >=14 - Abnormal Female indicating possible myocardial injury.  >=22 - Abnormal Male indicating possible myocardial injury.   Clinicians would have to utilize clinical acumen, EKG, Troponin, and serial changes to determine if it is an Acute Myocardial Infarction or myocardial injury due to an underlying chronic condition.        BLOOD GAS, VENOUS - Abnormal; Notable for the following components:    pH, Venous 7.276 (*)     pCO2, Venous 40.7 (*)     pO2, Venous 53.5  (*)     HCO3, Venous 19.0 (*)     Base Excess, Venous -7.4 (*)     O2 Saturation, Venous 83.1 (*)     CO2 Content 20.2 (*)     All other components within normal limits   POC CHEM PANEL - Abnormal; Notable for the following components:    Glucose 170 (*)     Ionized Calcium 1.28 (*)     Chloride 110 (*)     Creatinine 0.42 (*)     CO2 Content 27.2 (*)     All other components within normal limits   RESPIRATORY PANEL PCR W/ COVID-19 (SARS-COV-2), NP SWAB IN UTM/VTP, 2 HR TAT - Normal    Narrative:     In the setting of a positive respiratory panel with a viral infection PLUS a negative procalcitonin without other underlying concern for bacterial infection, consider observing off antibiotics or discontinuation of antibiotics and continue supportive care. If the respiratory panel is positive for atypical bacterial infection (Bordetella pertussis, Chlamydophila pneumoniae, or Mycoplasma pneumoniae), consider antibiotic de-escalation to target atypical bacterial infection.   BNP (IN-HOUSE) - Normal    Narrative:     This assay is used as an aid in the diagnosis of individuals suspected of having heart failure. It can be used as an aid in the diagnosis of acute decompensated heart failure (ADHF) in patients presenting with signs and symptoms of ADHF to the emergency department (ED). In addition, NT-proBNP of <300 pg/mL indicates ADHF is not likely.    Age Range Result Interpretation  NT-proBNP Concentration (pg/mL:      <50             Positive            >450                   Gray                 300-450                    Negative             <300    50-75           Positive            >900                  Gray                300-900                  Negative            <300      >75             Positive            >1800                  Gray                300-1800                  Negative            <300   HGB & HCT POC - Normal   BLOOD GAS, ARTERIAL   POC ELECTROLYTE PANEL   POC LACTATE   POC LACTATE   POCT  GLUCOSE FINGERSTICK   POCT GLUCOSE FINGERSTICK   POCT GLUCOSE FINGERSTICK   POCT GLUCOSE FINGERSTICK   POCT GLUCOSE FINGERSTICK   POCT GLUCOSE FINGERSTICK   CBC AND DIFFERENTIAL    Narrative:     The following orders were created for panel order CBC & Differential.  Procedure                               Abnormality         Status                     ---------                               -----------         ------                     CBC Auto Differential[[id number]]        Abnormal            Final result                 Please view results for these tests on the individual orders.       EKG:   ECG 12 Lead Dyspnea   Preliminary Result   HEART RATE= 121  bpm   RR Interval= 496  ms   TN Interval= 140  ms   P Horizontal Axis= 1  deg   P Front Axis= 99  deg   QRSD Interval= 93  ms   QT Interval= 313  ms   QTcB= 444  ms   QRS Axis= 97  deg   T Wave Axis= 93  deg   - ABNORMAL ECG -   Sinus tachycardia   Consider right atrial enlargement   Right axis deviation   Anteroseptal infarct, old   Nonspecific T abnormalities, lateral leads   Artifact in lead(s) I,III,aVL,V3,V5   Electronically Signed By:    Date and Time of Study: 2024-03-16 09:21:33          Meds given in ED:   Medications   nitroglycerin (NITROSTAT) SL tablet 0.4 mg (has no administration in time range)   sodium chloride 0.9 % flush 10 mL (has no administration in time range)   sodium chloride 0.9 % flush 10 mL (has no administration in time range)   sodium chloride 0.9 % infusion 40 mL (has no administration in time range)   sennosides-docusate (PERICOLACE) 8.6-50 MG per tablet 2 tablet (has no administration in time range)     And   polyethylene glycol (MIRALAX) packet 17 g (has no administration in time range)     And   bisacodyl (DULCOLAX) EC tablet 5 mg (has no administration in time range)     And   bisacodyl (DULCOLAX) suppository 10 mg (has no administration in time range)   ondansetron ODT (ZOFRAN-ODT) disintegrating tablet 4 mg ( Oral Not Given:   See Alt 3/16/24 1035)     Or   ondansetron (ZOFRAN) injection 4 mg (4 mg Intravenous Given 3/16/24 1035)   Potassium Replacement - Follow Nurse / BPA Driven Protocol (has no administration in time range)   Magnesium Standard Dose Replacement - Follow Nurse / BPA Driven Protocol (has no administration in time range)   Phosphorus Replacement - Follow Nurse / BPA Driven Protocol (has no administration in time range)   Calcium Replacement - Follow Nurse / BPA Driven Protocol (has no administration in time range)   acetaminophen (TYLENOL) tablet 650 mg (has no administration in time range)     Or   acetaminophen (TYLENOL) suppository 650 mg (has no administration in time range)   sodium chloride 0.9 % bolus 1,000 mL (1,000 mL Intravenous New Bag 3/16/24 1414)   ipratropium-albuterol (DUO-NEB) nebulizer solution 6 mL (6 mL Nebulization Given 3/16/24 6785)   sodium chloride 0.9 % bolus 1,000 mL (0 mL Intravenous Stopped 3/16/24 1301)       Imaging results:  No radiology results for the last day    Ambulatory status:   - typically pt does not require walker or cane however pt should be a x 1 assist     Social issues:   Social History     Socioeconomic History    Marital status: Single   Tobacco Use    Smoking status: Light Smoker     Current packs/day: 0.50     Average packs/day: 0.5 packs/day for 15.0 years (7.5 ttl pk-yrs)     Types: Cigarettes    Smokeless tobacco: Never    Tobacco comments:     Currently smokes cigarettes on occasion   Vaping Use    Vaping status: Never Used   Substance and Sexual Activity    Alcohol use: Yes     Comment: occassional/ Daily caffeine use    Drug use: No    Sexual activity: Defer       Peripheral Neurovascular  Peripheral Neurovascular (Adult)  Peripheral Neurovascular WDL: WDL, pulse assessment  Pulse Assessment: radial    Neuro Cognitive  Neuro Cognitive (Adult)  Cognitive/Neuro/Behavioral WDL: .WDL except, arousability, level of consciousness, mood/behavior, motor response  Level of  Consciousness: Responds to Voice  Arousal Level: arouses to voice  Mood/Behavior: anxious    Learning  Learning Assessment (Adult)  Learning Readiness and Ability: no barriers identified  Education Provided  Person Taught: patient, spouse, family member/friend    Respiratory  Respiratory WDL  Respiratory WDL: .WDL except, rhythm/pattern, nailbeds, mucous membranes, cough, expansion/retractions, all  Rhythm/Pattern, Respiratory: grunting, tachypneic, shortness of breath  Mucous Membranes: dry, pink  Breath Sounds  Breath Sounds: All Fields  All Lung Fields Breath Sounds: wheezes, inspiratory    Abdominal Pain  Abdominal Pain CPG Interventions  Coping Interventions: anticipatory guidance provided, care explained to patient/family prior to performing, reassurance provided  Safety Interventions  Safety Precautions/Falls Reduction: assistive device/personal items within reach    Pain Assessments  Pain (Adult)  (0-10) Pain Rating: Rest: 0    NIH Stroke Scale       Nabila Gerard RN  03/16/24 14:27 EDT     Electronically signed by Nabila Gerard RN at 03/16/24 1427       Ronald Rogel MD at 03/16/24 0915        Procedure Orders    1. Critical Care [[id number]] ordered by Ronald Rogel MD                  EMERGENCY DEPARTMENT ENCOUNTER    Room Number:  16/16  PCP: Flavia Jaquez APRN  Historian: EMS      HPI:  Chief Complaint: Shortness of breath  A complete HPI/ROS/PMH/PSH/SH/FH are unobtainable due to: Shortness of breath  Context: Scarlet Chakraborty is a 64 y.o. female who presents to the ED c/o shortness of breath.  Patient with history of COPD and congestive heart failure.  Patient has been increasingly short of breath starting on Thursday.  Patient wears oxygen as needed.  EMS arrived and patient was in respiratory distress.  Patient given Solu-Medrol and breathing treatment.  Patient speaking in one-word sentences.  Has had no swelling.  Is had no vomiting or diarrhea.  No chest  pain            PAST MEDICAL HISTORY  Active Ambulatory Problems     Diagnosis Date Noted    Acute respiratory failure with hypoxia 09/13/2018    Cerebrovascular accident (CVA) due to thrombosis 09/24/2018    Stress-induced cardiomyopathy 10/14/2018    Chronic obstructive pulmonary disease with acute exacerbation 10/14/2018    Tobacco abuse 12/18/2018    Mixed hyperlipidemia 12/18/2018    Hx of non-ST elevation myocardial infarction (NSTEMI) 02/17/2019    History of stroke 02/17/2019    Tobacco dependence 02/17/2019    COPD exacerbation 09/11/2019    Breast mass 05/22/2012    Cerebrovascular accident (CVA) due to occlusion of left middle cerebral artery 10/24/2018    Congestive heart failure 11/02/2018    Chronic obstructive lung disease 11/02/2018    Non-ischemic cardiomyopathy 09/19/2019    Acute respiratory failure 09/30/2019    Status post placement of implantable loop recorder 06/15/2020    SOB (shortness of breath) 12/22/2020     Resolved Ambulatory Problems     Diagnosis Date Noted    Acute respiratory failure 09/13/2018     Past Medical History:   Diagnosis Date    Asthma     Cerebrovascular accident (CVA) due to thrombosis of right middle cerebral artery     COPD (chronic obstructive pulmonary disease)     Nonischemic cardiomyopathy     NSTEMI (non-ST elevated myocardial infarction)     Stroke     Takotsubo cardiomyopathy          PAST SURGICAL HISTORY  Past Surgical History:   Procedure Laterality Date    CARDIAC CATHETERIZATION N/A 9/13/2018    Procedure: Left Heart Cath and cors;  Surgeon: Gabino Dozier MD;  Location: [facility] CATH INVASIVE LOCATION;  Service: Cardiology    CARDIAC CATHETERIZATION N/A 9/13/2018    Procedure: Left ventriculography;  Surgeon: Gabino Dozier MD;  Location: [facility] CATH INVASIVE LOCATION;  Service: Cardiology    CARDIAC ELECTROPHYSIOLOGY PROCEDURE N/A 9/27/2018    Procedure: Loop insertion  LINQ;  Surgeon: Jose R Barker MD;  Location: [facility] CATH INVASIVE LOCATION;   Service: Cardiovascular         FAMILY HISTORY  Family History   Problem Relation Age of Onset    Cancer Sister          SOCIAL HISTORY  Social History     Socioeconomic History    Marital status: Single   Tobacco Use    Smoking status: Light Smoker     Current packs/day: 0.50     Average packs/day: 0.5 packs/day for 15.0 years (7.5 ttl pk-yrs)     Types: Cigarettes    Smokeless tobacco: Never    Tobacco comments:     Currently smokes cigarettes on occasion   Vaping Use    Vaping status: Never Used   Substance and Sexual Activity    Alcohol use: Yes     Comment: occassional/ Daily caffeine use    Drug use: No    Sexual activity: Defer         ALLERGIES  Patient has no known allergies.        REVIEW OF SYSTEMS  Review of Systems   None      PHYSICAL EXAM  ED Triage Vitals [03/16/24 0907]   Temp Heart Rate Resp BP SpO2   97.2 °F (36.2 °C) 112 18 169/62 94 %      Temp src Heart Rate Source Patient Position BP Location FiO2 (%)   -- -- -- -- --       Physical Exam      GENERAL: Patient in respiratory distress  HENT: nares patent  EYES: no scleral icterus  CV: Tachycardic  RESPIRATORY: Very diminished breath sounds bilaterally  ABDOMEN: soft  MUSCULOSKELETAL: no deformity  NEURO: alert, moves all extremities, follows commands  PSYCH: Respiratory distress  SKIN: warm, dry    Vital signs and nursing notes reviewed.          LAB RESULTS  Recent Results (from the past 24 hour(s))   Respiratory Panel PCR w/COVID-19(SARS-CoV-2) FRANCISCO JAVIER/JASPAL/ADELINE/PAD/COR/RAUL In-House, NP Swab in UTM/VTM, 2 HR TAT - Swab, Nasopharynx    Collection Time: 03/16/24  9:18 AM    Specimen: Nasopharynx; Swab   Result Value Ref Range    ADENOVIRUS, PCR Not Detected Not Detected    Coronavirus 229E Not Detected Not Detected    Coronavirus HKU1 Not Detected Not Detected    Coronavirus NL63 Not Detected Not Detected    Coronavirus OC43 Not Detected Not Detected    COVID19 Not Detected Not Detected - Ref. Range    Human Metapneumovirus Not Detected Not Detected     Human Rhinovirus/Enterovirus Not Detected Not Detected    Influenza A PCR Not Detected Not Detected    Influenza B PCR Not Detected Not Detected    Parainfluenza Virus 1 Not Detected Not Detected    Parainfluenza Virus 2 Not Detected Not Detected    Parainfluenza Virus 3 Not Detected Not Detected    Parainfluenza Virus 4 Not Detected Not Detected    RSV, PCR Not Detected Not Detected    Bordetella pertussis pcr Not Detected Not Detected    Bordetella parapertussis PCR Not Detected Not Detected    Chlamydophila pneumoniae PCR Not Detected Not Detected    Mycoplasma pneumo by PCR Not Detected Not Detected   ECG 12 Lead Dyspnea    Collection Time: 03/16/24  9:21 AM   Result Value Ref Range    QT Interval 313 ms    QTC Interval 444 ms   Comprehensive Metabolic Panel    Collection Time: 03/16/24  9:23 AM    Specimen: Blood   Result Value Ref Range    Glucose 163 (H) 65 - 99 mg/dL    BUN 20 8 - 23 mg/dL    Creatinine 0.55 (L) 0.57 - 1.00 mg/dL    Sodium 142 136 - 145 mmol/L    Potassium 4.7 3.5 - 5.2 mmol/L    Chloride 106 98 - 107 mmol/L    CO2 25.0 22.0 - 29.0 mmol/L    Calcium 9.4 8.6 - 10.5 mg/dL    Total Protein 7.2 6.0 - 8.5 g/dL    Albumin 4.3 3.5 - 5.2 g/dL    ALT (SGPT) 29 1 - 33 U/L    AST (SGOT) 27 1 - 32 U/L    Alkaline Phosphatase 69 39 - 117 U/L    Total Bilirubin 0.4 0.0 - 1.2 mg/dL    Globulin 2.9 gm/dL    A/G Ratio 1.5 g/dL    BUN/Creatinine Ratio 36.4 (H) 7.0 - 25.0    Anion Gap 11.0 5.0 - 15.0 mmol/L    eGFR 103.1 >60.0 mL/min/1.73   BNP    Collection Time: 03/16/24  9:23 AM    Specimen: Blood   Result Value Ref Range    proBNP 88.9 0.0 - 900.0 pg/mL   High Sensitivity Troponin T    Collection Time: 03/16/24  9:23 AM    Specimen: Blood   Result Value Ref Range    HS Troponin T 65 (C) <14 ng/L   POC Lactate    Collection Time: 03/16/24  9:30 AM    Specimen: Arterial Blood   Result Value Ref Range    Lactate 1.3 0.5 - 2.0 mmol/L    Device Comment avaps 10-25/6 18 100%    POC Chem Panel    Collection  Time: 03/16/24  9:30 AM    Specimen: Arterial Blood   Result Value Ref Range    Glucose 170 (H) 70 - 130 mg/dL    Sodium 142 136 - 145 mmol/L    POC Potassium 4.7 3.5 - 5.2 mmol/L    Ionized Calcium 1.28 (L) 2.20 - 2.55 mmol/L    Chloride 110 (H) 98 - 107 mmol/L    Creatinine 0.42 (L) 0.60 - 130.00 mg/dL    BUN 20 mg/dL    CO2 Content 27.2 (H) 23 - 27 mmol/L    Notified Who dr tse     Read Back Yes     Device Comment avaps 10-25/6 18 100%    POC H&H    Collection Time: 03/16/24  9:30 AM    Specimen: Arterial Blood   Result Value Ref Range    Hemoglobin 16.1 12.0 - 17.0 g/dL    Hematocrit 47 38 - 51 %   Blood Gas, Arterial -    Collection Time: 03/16/24  9:30 AM    Specimen: Arterial Blood   Result Value Ref Range    Site Right Radial     Troy's Test Positive     pH, Arterial 7.250 (C) 7.350 - 7.450 pH units    pCO2, Arterial 62.2 (C) 35.0 - 45.0 mm Hg    pO2, Arterial 499.3 (H) 80.0 - 100.0 mm Hg    HCO3, Arterial 27.3 22.0 - 28.0 mmol/L    Base Excess, Arterial -1.7 (L) 0.0 - 2.0 mmol/L    O2 Saturation, Arterial 100.0 (H) 92.0 - 98.5 %    A-a DO2 141.9 mmHg    A-a DO2 0.7 mmHg    Barometric Pressure for Blood Gas 750.4000 mmHg    Modality NRB     FIO2 100 %    Ventilator Mode NIV     Set Tidal Volume 450     Set Mech Resp Rate 18     Rate 28 Breaths/minute    PIP 17 cmH2O    Notified Who dr tse     Read Back Yes     Notified Time      Hemodilution No     Inspiratory Time 1     Device Comment avaps 10-25/6 18 100%    Blood Gas, Venous -    Collection Time: 03/16/24 10:18 AM    Specimen: Venous Blood   Result Value Ref Range    pH, Venous 7.276 (C) 7.310 - 7.410 pH Units    pCO2, Venous 40.7 (L) 41.0 - 51.0 mm Hg    pO2, Venous 53.5 (H) 35.0 - 45.0 mm Hg    HCO3, Venous 19.0 (L) 22.0 - 28.0 mmol/L    Base Excess, Venous -7.4 (L) -2.0 - 2.0 mmol/L    O2 Saturation, Venous 83.1 (H) 45.0 - 75.0 %    CO2 Content 20.2 (L) 23 - 27 mmol/L    Barometric Pressure for Blood Gas 750.3000 mmHg    Modality BiPap      FIO2 30 %    Ventilator Mode NIV     Set Tidal Volume 450     Set Mech Resp Rate 18     Rate 26 Breaths/minute    PIP 17 cmH2O    Notified Who dr tse     Inspiratory Time 1     Device Comment [id number] avaps 10-25/6 18 30%        Ordered the above labs and reviewed the results.        RADIOLOGY  XR Chest 1 View    Result Date: 3/16/2024  ONE-VIEW PORTABLE CHEST AT 9:52 A.M.  HISTORY: Pulmonary emphysema. Shortness of breath.  FINDINGS: There is severe pulm emphysema with marked hyperinflation. There is no evidence of pneumothorax. There is improvement in some vague interstitial changes and atelectasis at the bases when compared to an exam dating back to 3/15/2021. The heart size remains normal.  This report was finalized on 3/16/2024 10:03 AM by Dr. Jh Wong M.D on Workstation: [id number]       Ordered the above noted radiological studies.  Chest x-ray independently interpreted by me and shows no evidence of pneumothorax          PROCEDURES  Critical Care    Performed by: Ronald Tse MD  Authorized by: Sourav Bales MD    Critical care provider statement:     Critical care time (minutes):  35    Critical care time was exclusive of:  Separately billable procedures and treating other patients    Critical care was necessary to treat or prevent imminent or life-threatening deterioration of the following conditions:  Respiratory failure    Critical care was time spent personally by me on the following activities:  Ordering and performing treatments and interventions, ordering and review of laboratory studies, ordering and review of radiographic studies, discussions with consultants, pulse oximetry and re-evaluation of patient's condition          EKG          EKG time: 921  Rhythm/Rate: Sinus tachycardia 121  P waves and WA: Normal P waves  QRS, axis: Normal QRS  ST and T waves: Nonspecific ST-T wave.  Significant artifact    Interpreted Contemporaneously by me, independently viewed  No  prior      MEDICATIONS GIVEN IN ER  Medications   nitroglycerin (NITROSTAT) SL tablet 0.4 mg (has no administration in time range)   sodium chloride 0.9 % flush 10 mL (has no administration in time range)   sodium chloride 0.9 % flush 10 mL (has no administration in time range)   sodium chloride 0.9 % infusion 40 mL (has no administration in time range)   sennosides-docusate (PERICOLACE) 8.6-50 MG per tablet 2 tablet (has no administration in time range)     And   polyethylene glycol (MIRALAX) packet 17 g (has no administration in time range)     And   bisacodyl (DULCOLAX) EC tablet 5 mg (has no administration in time range)     And   bisacodyl (DULCOLAX) suppository 10 mg (has no administration in time range)   ondansetron ODT (ZOFRAN-ODT) disintegrating tablet 4 mg ( Oral Not Given:  See Alt 3/16/24 1035)     Or   ondansetron (ZOFRAN) injection 4 mg (4 mg Intravenous Given 3/16/24 1035)   Potassium Replacement - Follow Nurse / BPA Driven Protocol (has no administration in time range)   Magnesium Standard Dose Replacement - Follow Nurse / BPA Driven Protocol (has no administration in time range)   Phosphorus Replacement - Follow Nurse / BPA Driven Protocol (has no administration in time range)   Calcium Replacement - Follow Nurse / BPA Driven Protocol (has no administration in time range)   acetaminophen (TYLENOL) tablet 650 mg (has no administration in time range)     Or   acetaminophen (TYLENOL) suppository 650 mg (has no administration in time range)   ipratropium-albuterol (DUO-NEB) nebulizer solution 6 mL (6 mL Nebulization Given 3/16/24 5572)                   MEDICAL DECISION MAKING, PROGRESS, and CONSULTS    All labs have been independently reviewed by me.  All radiology studies have been reviewed by me and I have also reviewed the radiology report.   EKG's independently viewed and interpreted by me.  Discussion below represents my analysis of pertinent findings related to patient's condition, differential  diagnosis, treatment plan and final disposition.      Additional sources:  - Discussed/ obtained information from independent historians: None    - External (non-ED) record review: Epic reviewed patient seen by cardiology 2/9/2024 for cardiomyopathy    - Chronic or social conditions impacting care: None    - Shared decision making: None      Orders placed during this visit:  Orders Placed This Encounter   Procedures    Respiratory Panel PCR w/COVID-19(SARS-CoV-2) FRANCISCO JAVIER/JASPAL/ADELINE/PAD/COR/RAUL In-House, NP Swab in UTM/VTM, 2 HR TAT - Swab, Nasopharynx    XR Chest 1 View    Comprehensive Metabolic Panel    BNP    High Sensitivity Troponin T    Blood Gas, Arterial -    CBC Auto Differential    Blood Gas, Arterial -    High Sensitivity Troponin T 2Hr    Basic Metabolic Panel    CBC Auto Differential    Magnesium    Phosphorus    Blood Gas, Venous -    NPO Diet NPO Type: Strict NPO    Vital Signs Per hospital policy    Telemetry - Place Orders & Notify Provider of Results When Patient Experiences Acute Chest Pain, Dysrhythmia or Respiratory Distress    Continuous Pulse Oximetry    Height and weight    Daily Weights    Intake and Output    Oral Care - Patient Not on NPPV & Not Intubated    Target Arousal Level RASS 0 to -1    If Patient has BG of < 80 and is symptomatic but not on an IV insulin protocol then use the Adult Hypoglycemia Treatment Orders.    Place Order to Consult Intensivist For Critical Care Management (If Patient Not Admitted to Cardiology for Primary Cardiology Condition)    Notify All Physicians When Patient is Transferred    Use Mobility Guidelines for Advancement of Activity    Saline Lock & Maintain IV Access    Place Sequential Compression Device    Maintain Sequential Compression Device    Code Status and Medical Interventions:    Pulmonology (on-call MD unless specified)    NIPPV-IPPV / BIPAP / CPAP    POC Electrolyte Panel    POC Lactate    POC Lactate    POC Chem Panel    POC H&H    POC Glucose Once     POC Glucose Q6H    ECG 12 Lead Dyspnea    Insert Peripheral IV    Inpatient Admission    Inpatient Admission    CBC & Differential         Additional orders considered but not ordered:  None        Differential diagnosis includes but is not limited to:    COPD exacerbation versus CHF versus pneumonia      Independent interpretation of labs, radiology studies, and discussions with consultants:  ED Course as of 03/16/24 1042   Sat Mar 16, 2024   1019 10:19 EDT  Patient presents for acute exacerbation COPD with hypercarbic respiratory failure.  Patient quite short of breath on arrival.  Is on BiPAP and is able to speak sentences now.  Patient's chest x-ray shows no evidence of pneumonia or pneumothorax.  Patient has been discussed with Dr. Bales who will admit to ICU. [SL]      ED Course User Index  [SL] Ronald Rogel MD                 DIAGNOSIS  Final diagnoses:   Acute exacerbation of chronic obstructive pulmonary disease (COPD)   Acute respiratory failure with hypercapnia         DISPOSITION  admit            Latest Documented Vital Signs:  As of 10:42 EDT  BP- 106/80 HR- 120 Temp- 97.2 °F (36.2 °C) O2 sat- 99%              --    Please note that portions of this were completed with a voice recognition program.       Note Disclaimer: At [facility], we believe that sharing information builds trust and better relationships. You are receiving this note because you are receiving care at [facility] or recently visited. It is possible you will see health information before a provider has talked with you about it. This kind of information can be easy to misunderstand. To help you fully understand what it means for your health, we urge you to discuss this note with your provider.            Ronald Rogel MD  03/16/24 1044      Electronically signed by Ronald Rogel MD at 03/16/24 1044       Nabila Gerard, RN at 03/16/24 0915          Pt to ED from home due to SOB x 2 days.  called due to  SOB and AMS. Pt has not been able to communicate to staff at this time due to SOB but  able to follow commands.         Electronically signed by Nabila Gerard, BISI at 03/16/24 0916       Vital Signs (last 3 days)       Date/Time Temp Temp src Pulse Resp BP SpO2    03/18/24 1015 -- -- 86 -- -- 96    03/18/24 1000 -- -- 84 -- 112/47 100    03/18/24 0945 -- -- 85 -- -- 95    03/18/24 0930 -- -- 85 -- 114/56 76    03/18/24 0915 -- -- 82 -- 116/53 96    03/18/24 0907 98.3 (36.8) Oral -- -- -- --    03/18/24 0900 -- -- 86 -- 104/56 97    03/18/24 0845 -- -- 85 -- 114/53 97    03/18/24 0830 -- -- 83 -- 108/52 99    03/18/24 0815 -- -- 77 -- 109/57 96    03/18/24 0800 -- -- 75 -- 110/53 98    03/18/24 0745 -- -- 84 -- 119/61 98    03/18/24 0742 -- -- 83 18 -- 99    03/18/24 0730 -- -- 82 -- 117/59 99    03/18/24 0715 -- -- 72 -- 104/55 96    03/18/24 0700 -- -- 71 -- 107/48 98    03/18/24 0345 -- -- 90 -- 106/71 95    03/18/24 0342 -- -- 85 21 -- 95    03/18/24 0330 -- -- 76 -- 89/55 95    03/18/24 0315 -- -- 77 -- 105/52 97    03/18/24 0300 -- -- 83 -- 96/62 99    03/18/24 0200 -- -- 81 -- -- 96    03/18/24 0147 -- -- -- -- 83/56 --    03/18/24 0000 -- -- 76 -- 82/58 95    03/17/24 2330 -- -- 75 -- 92/51 95    03/17/24 2315 -- -- 78 -- 96/59 95    03/17/24 2300 -- -- 79 -- 93/59 95    03/17/24 2248 -- -- 79 19 -- 98    03/17/24 2200 -- -- 79 -- 103/56 98    03/17/24 2100 -- -- 97 -- 119/50 85    03/17/24 2037 -- -- 102 18 -- 100    03/17/24 2024 -- -- -- 21 -- --    03/17/24 1900 -- -- 91 -- 115/62 96    03/17/24 1845 -- -- 82 -- 116/57 96    03/17/24 1830 -- -- 84 -- 128/57 100    03/17/24 1829 -- -- 89 -- 128/57 98    03/17/24 1815 -- -- 81 -- 115/56 97    03/17/24 1800 -- -- 78 -- 108/56 96    03/17/24 1745 -- -- 80 -- 112/62 94    03/17/24 1730 -- -- 75 -- 104/56 99    03/17/24 1725 -- -- 75 -- 100/42 100    03/17/24 1715 -- -- 68 -- 68/50 95    03/17/24 1700 -- -- 68 -- 75/49 95    03/17/24 1645 -- -- 72 -- -- 95     03/17/24 1630 -- -- 77 -- 75/50 95    03/17/24 1601 -- -- 80 -- 76/53 96    03/17/24 1600 -- -- 81 -- 74/54 96    03/17/24 1531 -- -- -- -- 84/54 --    03/17/24 1530 -- -- 88 18 87/65 96    03/17/24 1500 -- -- 100 -- 118/71 94    03/17/24 1430 -- -- 95 -- 98/67 98    03/17/24 1400 -- -- 96 -- 115/53 93    03/17/24 1330 -- -- 97 -- 118/61 98    03/17/24 1300 -- -- 97 -- 111/62 98    03/17/24 1257 98.4 (36.9) Oral 93 -- -- 100    03/17/24 1230 -- -- 105 -- 127/48 93    03/17/24 1225 -- -- -- 23 -- --    03/17/24 1214 -- -- -- 26 -- --    03/17/24 0955 -- -- -- -- 105/49 --    03/17/24 0804 -- -- -- 17 -- --    03/17/24 0756 -- -- -- 14 -- 94    03/17/24 0730 -- -- 83 -- 93/50 98    03/17/24 0715 98 (36.7) Oral 76 -- -- 95    03/17/24 0702 -- -- -- -- 105/49 --    03/17/24 0700 -- -- 70 -- 92/47 96    03/17/24 0545 -- -- 87 -- 88/44 100    03/17/24 0530 -- -- 81 -- 104/51 98    03/17/24 0515 -- -- 78 -- 105/48 97    03/17/24 0500 -- -- 74 -- 97/46 96    03/17/24 0445 -- -- 80 -- 98/53 97    03/17/24 0430 -- -- 79 -- 101/51 99    03/17/24 0415 -- -- 82 -- 96/49 97    03/17/24 0400 -- -- 85 -- 106/53 70    03/17/24 0345 -- -- 87 -- 91/68 98    03/17/24 0330 -- -- 77 -- 97/58 99    03/17/24 0315 -- -- 76 -- 115/57 100    03/17/24 0300 -- -- 76 -- 96/56 100    03/17/24 0245 -- -- 79 -- 90/53 98    03/17/24 0230 -- -- 76 -- 96/81 98    03/17/24 0215 -- -- 79 -- 95/53 99    03/17/24 0200 -- -- 75 -- 104/54 97    03/17/24 0145 -- -- 72 -- 94/58 100    03/17/24 0130 -- -- 78 -- 97/61 99    03/17/24 0115 -- -- 73 -- 94/55 98    03/17/24 0104 -- -- 89 -- -- 100    03/17/24 0100 -- -- 91 -- 96/51 100    03/17/24 0045 -- -- 88 -- 97/51 99    03/17/24 0033 99.2 (37.3) Oral -- -- -- --    03/17/24 0030 -- -- 85 -- 88/48 98    03/17/24 0015 -- -- 83 -- 93/55 99    03/16/24 2345 -- -- 90 -- 61/40 96    03/16/24 2330 -- -- 83 -- 101/52 97    03/16/24 2315 -- -- 84 -- 96/52 96    03/16/24 2300 -- -- 94 -- 95/48 97    03/16/24 2245 -- --  91 -- 90/46 97    03/16/24 2230 -- -- 92 -- 99/48 97    03/16/24 2215 -- -- 88 -- 89/45 97    03/16/24 2200 -- -- 95 -- 85/47 96    03/16/24 2145 -- -- 66 -- 123/57 95    03/16/24 2130 -- -- 93 -- 77/55 94    03/16/24 2115 -- -- 96 -- 81/45 95    03/16/24 2100 99.8 (37.7) Oral 101 -- 75/52 96    03/16/24 2045 -- -- 109 -- 107/62 86    03/16/24 2030 -- -- 100 -- 87/42 96    03/16/24 2015 -- -- 104 -- 81/41 95    03/16/24 2005 -- -- 109 -- -- 91    03/16/24 2000 99.1 (37.3) Oral 109 -- 94/49 92    03/16/24 1958 -- -- 104 -- -- 95    03/16/24 1956 -- -- 105 -- -- 95    03/16/24 1945 -- -- 105 -- 87/44 94    03/16/24 1930 -- -- 112 -- 80/42 93    03/16/24 1915 -- -- 112 -- 86/43 93    03/16/24 1900 -- -- 110 -- 89/47 95    03/16/24 1845 -- -- 109 -- 81/44 92    03/16/24 1830 -- -- 112 -- 90/45 93    03/16/24 1815 -- -- 116 -- 92/57 95    03/16/24 1800 -- -- 113 -- 99/41 97    03/16/24 1746 -- -- 106 18 90/51 99    03/16/24 1745 -- -- 115 -- 90/51 95    03/16/24 1737 -- -- -- 17 -- --    03/16/24 1730 -- -- 111 -- 90/48 98    03/16/24 1715 -- -- 109 -- 97/49 99    03/16/24 1700 -- -- 103 -- 96/40 97    03/16/24 1645 -- -- 96 -- 99/57 99    03/16/24 1630 -- -- 102 -- 94/52 93    03/16/24 1620 -- -- 101 -- 100/50 93    03/16/24 1615 -- -- 100 -- 98/51 93    03/16/24 1600 -- -- 96 -- 105/56 100    03/16/24 1545 -- -- 102 -- 95/68 100    03/16/24 1536 99.2 (37.3) Oral 104 -- -- --    03/16/24 15:24:40 -- -- -- -- -- 100    03/16/24 1431 -- -- 95 -- 99/49 100    03/16/24 1421 -- -- 96 -- 98/55 99    03/16/24 1411 -- -- 96 -- 94/50 98    03/16/24 1331 -- -- 96 -- 86/56 95    03/16/24 1321 -- -- 98 -- 88/55 94    03/16/24 1311 -- -- 105 -- 90/58 96    03/16/24 1301 -- -- 99 -- 92/56 96    03/16/24 1251 -- -- 96 -- 95/53 97    03/16/24 1241 -- -- 97 -- 101/56 96    03/16/24 1231 -- -- 98 -- 99/58 96    03/16/24 1221 -- -- 100 -- 106/70 100    03/16/24 1201 -- -- 100 -- 115/59 100    03/16/24 1151 -- -- 99 -- 110/61 100     03/16/24 1141 -- -- 96 -- 97/87 100    03/16/24 1137 -- -- 104 -- -- 99    03/16/24 1133 -- -- 111 -- 89/65 98    03/16/24 1115 -- -- 107 -- 86/52 99    03/16/24 1033 -- -- 114 -- -- 96    03/16/24 1031 -- -- 115 -- 113/60 --    03/16/24 0932 -- -- 120 -- 106/80 99    03/16/24 0929 -- -- 118 -- -- 100    03/16/24 0927 -- -- -- 26 -- --    03/16/24 09:19:45 -- -- -- -- -- 90    03/16/24 0907 97.2 (36.2) -- 112 18 169/62 94          Oxygen Therapy (last 3 days)       Date/Time SpO2 Device (Oxygen Therapy) Flow (L/min) Oxygen Concentration (%) ETCO2 (mmHg)    03/18/24 1015 96 -- -- -- --    03/18/24 1000 100 -- -- -- --    03/18/24 0945 95 -- -- -- --    03/18/24 0930 76 -- -- -- --    03/18/24 0915 96 -- -- -- --    03/18/24 0900 97 nasal cannula 1 -- --    03/18/24 0845 97 -- -- -- --    03/18/24 0830 99 -- -- -- --    03/18/24 0815 96 -- -- -- --    03/18/24 0800 98 -- -- -- --    03/18/24 0745 98 -- -- -- --    03/18/24 0742 99 nasal cannula 1 -- --    03/18/24 0730 99 -- -- -- --    03/18/24 0715 96 -- -- -- --    03/18/24 0700 98 -- -- -- --    03/18/24 0345 95 nasal cannula 1 -- --    03/18/24 0342 95 nasal cannula 1 -- --    03/18/24 0330 95 -- -- -- --    03/18/24 0315 97 -- -- -- --    03/18/24 0300 99 -- -- -- --    03/18/24 0200 96 -- -- -- --    03/18/24 0000 95 -- -- -- --    03/17/24 2330 95 -- -- -- --    03/17/24 2315 95 -- -- -- --    03/17/24 2300 95 -- -- -- --    03/17/24 2248 98 NPPV/NIV -- 30 --    03/17/24 2200 98 -- -- -- --    03/17/24 2100 85 -- -- -- --    03/17/24 2037 100 NPPV/NIV -- 30 --    03/17/24 2024 -- nasal cannula 1 -- --    03/17/24 1920 -- nasal cannula 2 -- --    03/17/24 1917 -- nasal cannula 4 -- --    03/17/24 1915 -- nasal cannula 3 -- --    03/17/24 1912 -- nasal cannula 2 -- --    03/17/24 1907 -- NPPV/NIV -- -- --    03/17/24 1900 96 -- -- -- --    03/17/24 1845 96 -- -- -- --    03/17/24 1830 100 -- -- -- --    03/17/24 1829 98 nasal cannula 1 -- --    03/17/24 1815 97  -- -- -- --    03/17/24 1800 96 -- -- -- --    03/17/24 1749 -- nasal cannula 0.5 -- --    03/17/24 1745 94 -- -- -- --    03/17/24 1730 99 -- -- -- --    03/17/24 1725 100 room air -- -- --    03/17/24 1715 95 -- -- -- --    03/17/24 1700 95 -- -- -- --    03/17/24 1645 95 -- -- -- --    03/17/24 1630 95 -- -- -- --    03/17/24 1612 -- NPPV/NIV -- -- --    03/17/24 1601 96 -- -- -- --    03/17/24 1600 96 -- -- -- --    03/17/24 1530 96 NPPV/NIV -- 30 --    03/17/24 1527 -- -- -- 30 --    03/17/24 1500 94 NPPV/NIV -- -- --    03/17/24 1430 98 -- -- -- --    03/17/24 1400 93 -- -- -- --    03/17/24 1330 98 -- -- -- --    03/17/24 1300 98 -- -- -- --    03/17/24 1257 100 -- -- -- --    03/17/24 1250 -- NPPV/NIV 1 -- --    03/17/24 1230 93 -- -- -- --    03/17/24 1214 -- bubble;nasal cannula 1 -- --    03/17/24 1151 -- nasal cannula 1 -- --    03/17/24 1150 -- nasal cannula 1 -- --    03/17/24 0900 -- room air -- -- --    03/17/24 0850 -- nasal cannula 1 -- --    03/17/24 0756 94 nasal cannula 1 -- --    03/17/24 0730 98 -- -- -- --    03/17/24 0715 95 -- -- -- --    03/17/24 0700 96 -- -- -- --    03/17/24 0545 100 -- -- -- --    03/17/24 0530 98 -- -- -- --    03/17/24 0515 97 -- -- -- --    03/17/24 0500 96 -- -- -- --    03/17/24 0445 97 -- -- -- --    03/17/24 0430 99 -- -- -- --    03/17/24 0415 97 -- -- -- --    03/17/24 0400 70 nasal cannula 1 -- --    03/17/24 0345 98 -- -- -- --    03/17/24 0330 99 -- -- -- --    03/17/24 0315 100 -- -- -- --    03/17/24 0300 100 -- -- -- --    03/17/24 0245 98 -- -- -- --    03/17/24 0230 98 -- -- -- --    03/17/24 0215 99 -- -- -- --    03/17/24 0200 97 -- -- -- --    03/17/24 0145 100 -- -- -- --    03/17/24 0130 99 -- -- -- --    03/17/24 0115 98 -- -- -- --    03/17/24 0104 100 ventilator -- 30 --    03/17/24 0100 100 -- -- -- --    03/17/24 0045 99 -- -- -- --    03/17/24 0033 -- nasal cannula 1 -- --    03/17/24 0030 98 -- -- -- --    03/17/24 0015 99 -- -- -- --     03/16/24 2345 96 -- -- -- --    03/16/24 2330 97 -- -- -- --    03/16/24 2315 96 -- -- -- --    03/16/24 2300 97 -- -- -- --    03/16/24 2245 97 -- -- -- --    03/16/24 2230 97 -- -- -- --    03/16/24 2215 97 -- -- -- --    03/16/24 2200 96 -- -- -- --    03/16/24 2145 95 -- -- -- --    03/16/24 2130 94 -- -- -- --    03/16/24 2115 95 -- -- -- --    03/16/24 2100 96 -- -- -- --    03/16/24 2045 86 -- -- -- --    03/16/24 2030 96 -- -- -- --    03/16/24 2015 95 -- -- -- --    03/16/24 2005 91 -- -- -- --    03/16/24 2000 92 nasal cannula 1 -- --    03/16/24 1958 95 -- -- -- --    03/16/24 1956 95 nasal cannula 1 -- --    03/16/24 1945 94 -- -- -- --    03/16/24 1930 93 -- -- -- --    03/16/24 1915 93 -- -- -- --    03/16/24 1900 95 -- -- -- --    03/16/24 1845 92 -- -- -- --    03/16/24 1830 93 -- -- -- --    03/16/24 1815 95 -- -- -- --    03/16/24 1800 97 -- -- -- --    03/16/24 1746 99 nasal cannula 1 -- --    03/16/24 1745 95 -- -- -- --    03/16/24 1737 -- room air -- -- --    03/16/24 1730 98 -- -- -- --    03/16/24 1715 99 -- -- -- --    03/16/24 1700 97 -- -- -- --    03/16/24 1645 99 -- -- -- --    03/16/24 1630 93 -- -- -- --    03/16/24 1620 93 -- -- -- --    03/16/24 1615 93 -- -- -- --    03/16/24 1600 100 -- -- -- --    03/16/24 1545 100 nasal cannula 1 -- --    03/16/24 1536 -- nasal cannula 2 -- --    03/16/24 15:24:40 100 nasal cannula 4 -- --    03/16/24 1431 100 -- -- -- --    03/16/24 1421 99 -- -- -- --    03/16/24 1411 98 -- -- -- --    03/16/24 1331 95 -- -- -- --    03/16/24 1321 94 -- -- -- --    03/16/24 1311 96 -- -- -- --    03/16/24 1301 96 -- -- -- --    03/16/24 1251 97 -- -- -- --    03/16/24 1241 96 -- -- -- --    03/16/24 1231 96 -- -- -- --    03/16/24 1221 100 -- -- -- --    03/16/24 1201 100 -- -- -- --    03/16/24 1151 100 -- -- -- --    03/16/24 1141 100 -- -- -- --    03/16/24 1137 99 -- -- -- --    03/16/24 1133 98 -- -- -- --    03/16/24 1115 99 -- -- -- --    03/16/24 1033  96 -- -- -- --    03/16/24 09:44:45 -- NPPV/NIV -- -- --    03/16/24 0932 99 -- -- -- --    03/16/24 0929 100 -- -- -- --    03/16/24 0927 -- NPPV/NIV -- -- --    03/16/24 09:19:45 90 nasal cannula 7 100 --    03/16/24 0907 94 nasal cannula 2 -- 57          Lines, Drains & Airways       Active LDAs       Name Placement date Placement time Site Days    Peripheral IV 03/16/24 1131 Anterior;Right Forearm 03/16/24  1131  Forearm  2    Peripheral IV 03/17/24 2316 Left;Posterior Wrist 03/17/24  2316  Wrist  less than 1    External Urinary Catheter 03/16/24  1626  --  1              Inactive LDAs       Name Placement date Placement time Removal date Removal time Site Days    [REMOVED] Peripheral IV 03/16/24 Anterior;Left;Proximal Forearm 03/16/24  --  03/17/24  1530  Forearm  1                  Facility-Administered Medications as of 3/18/2024   Medication Dose Route Frequency Provider Last Rate Last Admin    acetaminophen (TYLENOL) tablet 650 mg  650 mg Oral Q4H PRN Sourav Bales MD        Or    acetaminophen (TYLENOL) suppository 650 mg  650 mg Rectal Q4H PRN Sourav Bales MD        apixaban (ELIQUIS) tablet 5 mg  5 mg Oral Q12H Barber Velazco MD   5 mg at 03/18/24 0907    aspirin EC tablet 81 mg  81 mg Oral Daily Barber Velazco MD   81 mg at 03/18/24 0907    sennosides-docusate (PERICOLACE) 8.6-50 MG per tablet 2 tablet  2 tablet Oral BID Sourav Bales MD   2 tablet at 03/18/24 0907    And    polyethylene glycol (MIRALAX) packet 17 g  17 g Oral Daily PRN Sourav Bales MD        And    bisacodyl (DULCOLAX) EC tablet 5 mg  5 mg Oral Daily PRN Sourav Bales MD        And    bisacodyl (DULCOLAX) suppository 10 mg  10 mg Rectal Daily PRN Sourav Bales MD        [COMPLETED] calcium gluconate 1000 Mg/50ml 0.675% NaCl IV SOLN  1,000 mg Intravenous Q1H Givens, Kate Hawkins MD   1,000 mg at 03/17/24 0858    Followed by    [COMPLETED] calcium gluconate 2000-675 MG/100ML NACL IVPB  2,000 mg Intravenous  Q2H Givens, Kate Hawkins MD   2,000 mg at 03/17/24 1730    Calcium Replacement - Follow Nurse / BPA Driven Protocol   Does not apply PRN Sourav Bales MD        dexmedetomidine (PRECEDEX) 400 mcg in 100 mL NS infusion  0.2-1.5 mcg/kg/hr Intravenous Titrated Sourav Bales MD   Stopped at 03/18/24 0700    dextrose (D50W) (25 g/50 mL) IV injection 25 g  25 g Intravenous Q15 Min PRN Zain Mukherjee MD        dextrose (GLUTOSE) oral gel 15 g  15 g Oral Q15 Min PRN Zain Mukherjee MD        glucagon (GLUCAGEN) injection 1 mg  1 mg Intramuscular Q15 Min PRN Zain Mukherjee MD        guaiFENesin (MUCINEX) 12 hr tablet 600 mg  600 mg Oral BID PRN Sourav Bales MD guaiFENesin-dextromethorphan (ROBITUSSIN DM) 100-10 MG/5ML syrup 10 mL  10 mL Oral Q6H PRN Sourav Bales MD        insulin lispro (HUMALOG/ADMELOG) injection 2-7 Units  2-7 Units Subcutaneous 4x Daily AC & at Bedtime Zain Mukherjee MD        ipratropium-albuterol (DUO-NEB) nebulizer solution 3 mL  3 mL Nebulization Q4H PRN Zain Mukherjee MD   3 mL at 03/18/24 0342    ipratropium-albuterol (DUO-NEB) nebulizer solution 3 mL  3 mL Nebulization BID - RT Sourav Bales MD   3 mL at 03/17/24 2025    [COMPLETED] ipratropium-albuterol (DUO-NEB) nebulizer solution 6 mL  6 mL Nebulization Once Ronald Rogel MD   6 mL at 03/16/24 0927    Magnesium Standard Dose Replacement - Follow Nurse / BPA Driven Protocol   Does not apply PRN Sourav Bales MD        nitroglycerin (NITROSTAT) SL tablet 0.4 mg  0.4 mg Sublingual Q5 Min PRN Sourav Bales MD        norepinephrine (LEVOPHED) 8 mg in 250 mL NS infusion (premix)  0.02-0.3 mcg/kg/min Intravenous Titrated Sourav Bales MD   Stopped at 03/18/24 1029    ondansetron ODT (ZOFRAN-ODT) disintegrating tablet 4 mg  4 mg Oral Q6H PRN Sourav Bales MD        Or    ondansetron (ZOFRAN) injection 4 mg  4 mg Intravenous Q6H PRN Sourav Bales MD   4 mg at 03/16/24 1035    Phosphorus  Replacement - Follow Nurse / BPA Driven Protocol   Does not apply PRN Sourav Bales MD        [] potassium chloride (K-DUR,KLOR-CON) ER tablet 40 mEq  40 mEq Oral Q4H Givens, Kate Hawkins MD   40 mEq at 24 1230    [COMPLETED] potassium chloride (KLOR-CON) packet 40 mEq  40 mEq Oral Once Sourav Bales MD   40 mEq at 24 2047    [COMPLETED] potassium phosphate 15 mmol in 0.9% normal saline 250 mL IVPB  15 mmol Intravenous Once Carolyne Hawkins APRN   15 mmol at 24 0900    Potassium Replacement - Follow Nurse / BPA Driven Protocol   Does not apply PRN Sourav Bales MD        [COMPLETED] sodium chloride 0.9 % bolus 1,000 mL  1,000 mL Intravenous Once Ronald Rogel MD   Stopped at 24 1301    [COMPLETED] sodium chloride 0.9 % bolus 1,000 mL  1,000 mL Intravenous Once Sourav Bales MD   Stopped at 24 1523    sodium chloride 0.9 % flush 10 mL  10 mL Intravenous Q12H Sourav Bales MD   10 mL at 24 0912    sodium chloride 0.9 % flush 10 mL  10 mL Intravenous PRN Sourav Bales MD        sodium chloride 0.9 % infusion 40 mL  40 mL Intravenous PRN Sourav Bales MD         Orders (all)        Start     Ordered    24 0600  Basic Metabolic Panel  Morning Draw         24 1117    24 0912  POC Glucose Once  PROCEDURE ONCE        Comments: Complete no more than 45 minutes prior to patient eating      24 0910    24 0600  Calcium  Morning Draw,   Status:  Canceled         24 0741    24 0600  Procalcitonin  Morning Draw         24 1122    24 0600  High Sensitivity Troponin T  Morning Draw         24 1427    24 0538  High Sensitivity Troponin T 2Hr  PROCEDURE ONCE         24 0526    24 0500  ECG 12 Lead Dyspnea  Tomorrow AM         24 1427    24 0430  CBC & Differential  Morning Draw         24 1122    24 0430  Comprehensive Metabolic Panel  Morning Draw         24  "1122    03/18/24 0430  Magnesium  Morning Draw         03/17/24 1122    03/18/24 0430  Phosphorus  Morning Draw,   Status:  Canceled         03/17/24 1122    03/18/24 0430  CBC Auto Differential  PROCEDURE ONCE         03/17/24 2030    03/18/24 0003  Potassium  Timed         03/17/24 0741    03/18/24 0003  Phosphorus  Once         03/17/24 2029 03/18/24 0000  Potassium  Timed         03/17/24 2029 03/17/24 2307  POC Glucose Once  PROCEDURE ONCE        Comments: Complete no more than 45 minutes prior to patient eating      03/17/24 2305    03/17/24 2130  ipratropium-albuterol (DUO-NEB) nebulizer solution 3 mL  2 Times Daily - RT         03/17/24 1122    03/17/24 2100  apixaban (ELIQUIS) tablet 5 mg  Every 12 Hours Scheduled         03/17/24 1426    03/17/24 1945  potassium chloride (KLOR-CON) packet 40 mEq  Once         03/17/24 1854    03/17/24 1800  Dietary Nutrition Supplements Boost Plus (Ensure Enlive, Ensure Plus); chocolate  2 Times Daily       03/17/24 1002    03/17/24 1739  POC Glucose Once  PROCEDURE ONCE        Comments: Complete no more than 45 minutes prior to patient eating      03/17/24 1737    03/17/24 1600  Bladder Scan  Every 8 Hours         03/17/24 1425    03/17/24 1515  aspirin EC tablet 81 mg  Daily         03/17/24 1427    03/17/24 1430  dexmedetomidine (PRECEDEX) 400 mcg in 100 mL NS infusion  Titrated         03/17/24 1340    03/17/24 1430  Enoxaparin Sodium (LOVENOX) syringe 40 mg  Every 24 Hours,   Status:  Discontinued         03/17/24 1341    03/17/24 1300  POC Glucose Once  PROCEDURE ONCE        Comments: Complete no more than 45 minutes prior to patient eating      03/17/24 1258    03/17/24 1124  Up In Chair  Every Shift       03/17/24 1123    03/17/24 0958  Adult Transthoracic Echo Limited W/ Cont if Necessary Per Protocol  Once         03/16/24 1707    03/17/24 0930  calcium gluconate 2000-675 MG/100ML NACL IVPB  Every 2 Hours        Placed in \"Followed by\" Linked Group    " "03/17/24 0741    03/17/24 0830  calcium gluconate 1000 Mg/50ml 0.675% NaCl IV SOLN  Every 1 Hour        Placed in \"Followed by\" Linked Group    03/17/24 0741    03/17/24 0830  potassium chloride (K-DUR,KLOR-CON) ER tablet 40 mEq  Every 4 Hours         03/17/24 0741    03/17/24 0728  POC Glucose Once  PROCEDURE ONCE        Comments: Complete no more than 45 minutes prior to patient eating      03/17/24 0726    03/17/24 0715  potassium phosphate 15 mmol in 0.9% normal saline 250 mL IVPB  Once         03/17/24 0624    03/17/24 0600  Basic Metabolic Panel  Morning Draw         03/16/24 1014    03/17/24 0600  CBC Auto Differential  Morning Draw         03/16/24 1014    03/17/24 0600  Magnesium  Morning Draw         03/16/24 1014    03/17/24 0600  Phosphorus  Morning Draw         03/16/24 1014    03/17/24 0600  Tobacco Cessation Education  Daily      Comments: Document in Education Activity    03/16/24 1641    03/17/24 0600  Respiratory Treatment Education (MDI / Spacer / Nebulizer)  Daily      Comments: Document in Education Activity    03/16/24 1641    03/17/24 0600  COPD Education  Daily      Comments: Document in Education Activity    03/16/24 1641    03/17/24 0600  High Sensitivity Troponin T  Morning Draw         03/16/24 1707    03/17/24 0600  TSH  Morning Draw         03/16/24 2150    03/17/24 0500  ECG 12 Lead Chest Pain  Tomorrow AM,   Status:  Canceled         03/16/24 1707    03/17/24 0500  ECG 12 Lead Dyspnea  Tomorrow AM         03/16/24 2150    03/16/24 2200  POC Glucose 4x Daily Before Meals & at Bedtime  4 Times Daily Before Meals & at Bedtime      Comments: Complete no more than 45 minutes prior to patient eating      03/16/24 1734    03/16/24 2158  POC Glucose Once  PROCEDURE ONCE        Comments: Complete no more than 45 minutes prior to patient eating      03/16/24 2155    03/16/24 2100  insulin lispro (HUMALOG/ADMELOG) injection 2-7 Units  4 Times Daily Before Meals & Nightly         03/16/24 1734    " 03/16/24 2030  Oscillating Positive Expiratory Pressure (OPEP)  4 Times Daily - RT       03/16/24 1641    03/16/24 2000  Cough / Deep Breathe  Every 4 Hours       03/16/24 1641    03/16/24 1945  norepinephrine (LEVOPHED) 8 mg in 250 mL NS infusion (premix)  Titrated         03/16/24 1850    03/16/24 1900  ipratropium-albuterol (DUO-NEB) nebulizer solution 3 mL  Every 6 Hours - RT,   Status:  Discontinued         03/16/24 1734    03/16/24 1735  Diet: Diabetic; Consistent Carbohydrate; Fluid Consistency: Thin (IDDSI 0)  Diet Effective Now         03/16/24 1735    03/16/24 1733  dextrose (GLUTOSE) oral gel 15 g  Every 15 Minutes PRN         03/16/24 1734    03/16/24 1733  dextrose (D50W) (25 g/50 mL) IV injection 25 g  Every 15 Minutes PRN         03/16/24 1734    03/16/24 1733  glucagon (GLUCAGEN) injection 1 mg  Every 15 Minutes PRN         03/16/24 1734    03/16/24 1732  ipratropium-albuterol (DUO-NEB) nebulizer solution 3 mL  Every 4 Hours PRN         03/16/24 1734    03/16/24 1731  POC Glucose Once  PROCEDURE ONCE        Comments: Complete no more than 45 minutes prior to patient eating      03/16/24 1729    03/16/24 1708  Adult Transthoracic Echo Complete W/ Cont if Necessary Per Protocol  Once,   Status:  Canceled         03/16/24 1707    03/16/24 1642  Document Pulse Oximetry - On Room Air / Home O2 Level  Daily      Comments: Reapply Oxygen if O2 Sat Drops Below 88%    03/16/24 1641    03/16/24 1642  NIPPV-IPPV / BIPAP / CPAP  At Bedtime & As Needed-RT         03/16/24 1641    03/16/24 1642  Respiratory communication  Once        Comments: Try to maintain oxygen saturation between 88 and 94%.  No need to saturate above 94%    03/16/24 1641    03/16/24 1641  Inpatient Cardiology Consult  Once        Specialty:  Cardiology  Provider:  Jose R Barros Jr., MD    03/16/24 1641    03/16/24 1641  Reason for COPD Admission: Natural Disease Progression  Once         03/16/24 1641    03/16/24 1641  Respiratory Culture -  "Sputum, Cough  Once         03/16/24 1641    03/16/24 1640  guaiFENesin-dextromethorphan (ROBITUSSIN DM) 100-10 MG/5ML syrup 10 mL  Every 6 Hours PRN         03/16/24 1641    03/16/24 1640  guaiFENesin (MUCINEX) 12 hr tablet 600 mg  2 Times Daily PRN         03/16/24 1641    03/16/24 1618  Diet: Regular/House; Fluid Consistency: Thin (IDDSI 0)  Diet Effective Now,   Status:  Canceled         03/16/24 1618    03/16/24 1615  Inpatient Nutrition Consult  Once        Provider:  (Not yet assigned)    03/16/24 1614    03/16/24 1537  POC Glucose Once  PROCEDURE ONCE        Comments: Complete no more than 45 minutes prior to patient eating      03/16/24 1535    03/16/24 1427  sodium chloride 0.9 % bolus 1,000 mL  Once         03/16/24 1411    03/16/24 1359  Send this patient to CICU please  Nursing Communication  Continuous        Comments: Send this patient to CICU please    03/16/24 1358    03/16/24 1200  POC Glucose Q6H  Every 6 Hours,   Status:  Canceled       03/16/24 1014    03/16/24 1142  sodium chloride 0.9 % bolus 1,000 mL  Once         03/16/24 1126    03/16/24 1123  High Sensitivity Troponin T 2Hr  PROCEDURE ONCE         03/16/24 1001    03/16/24 1044  Critical Care  Once        Comments: This order was created via procedure documentation    03/16/24 1043    03/16/24 1035  CBC Auto Differential  PROCEDURE ONCE         03/16/24 0916    03/16/24 1030  sodium chloride 0.9 % flush 10 mL  Every 12 Hours Scheduled         03/16/24 1014    03/16/24 1030  sennosides-docusate (PERICOLACE) 8.6-50 MG per tablet 2 tablet  2 Times Daily        Placed in \"And\" Linked Group    03/16/24 1014    03/16/24 1023  Blood Gas, Venous -  PROCEDURE ONCE         03/16/24 1018    03/16/24 1022  Inpatient Admission  Once         03/16/24 1021    03/16/24 1015  Daily Weights  Daily       03/16/24 1014    03/16/24 1014  acetaminophen (TYLENOL) tablet 650 mg  Every 4 Hours PRN        Placed in \"Or\" Linked Group    03/16/24 1014    03/16/24 " "1014  acetaminophen (TYLENOL) suppository 650 mg  Every 4 Hours PRN        Placed in \"Or\" Linked Group    03/16/24 1014    03/16/24 1014  Notify All Physicians When Patient is Transferred  Continuous        Comments: For standing ICU/CCU Protocol    03/16/24 1014    03/16/24 1014  Code Status and Medical Interventions:  Continuous         03/16/24 1014    03/16/24 1014  Place Sequential Compression Device  Once         03/16/24 1014    03/16/24 1014  Maintain Sequential Compression Device  Continuous         03/16/24 1014    03/16/24 1014  NPO Diet NPO Type: Strict NPO  Diet Effective Now,   Status:  Canceled         03/16/24 1014    03/16/24 1014  Potassium Replacement - Follow Nurse / BPA Driven Protocol  As Needed         03/16/24 1014    03/16/24 1014  Magnesium Standard Dose Replacement - Follow Nurse / BPA Driven Protocol  As Needed         03/16/24 1014    03/16/24 1014  Phosphorus Replacement - Follow Nurse / BPA Driven Protocol  As Needed         03/16/24 1014    03/16/24 1014  Calcium Replacement - Follow Nurse / BPA Driven Protocol  As Needed         03/16/24 1014    03/16/24 1013  Inpatient Admission  Once         03/16/24 1014    03/16/24 1013  Vital Signs Per hospital policy  Per Hospital Policy         03/16/24 1014    03/16/24 1013  Continuous Cardiac Monitoring  Continuous        Comments: Follow Standing Orders As Outlined in Process Instructions (Open Order Report to View Full Instructions)    03/16/24 1014    03/16/24 1013  Telemetry - Maintain IV Access  Continuous,   Status:  Canceled         03/16/24 1014    03/16/24 1013  Telemetry - Place Orders & Notify Provider of Results When Patient Experiences Acute Chest Pain, Dysrhythmia or Respiratory Distress  Continuous        Comments: Open Order Report to View Parameters Requiring Provider Notification    03/16/24 1014    03/16/24 1013  Continuous Pulse Oximetry  Continuous         03/16/24 1014    03/16/24 1013  Height and weight  Once         " "03/16/24 1014    03/16/24 1013  Intake and Output  Every Shift       03/16/24 1014    03/16/24 1013  Oral Care - Patient Not on NPPV & Not Intubated  Every Shift       03/16/24 1014    03/16/24 1013  Target Arousal Level RASS 0 to -1  Continuous         03/16/24 1014    03/16/24 1013  If Patient has BG of < 80 and is symptomatic but not on an IV insulin protocol then use the Adult Hypoglycemia Treatment Orders.  Once         03/16/24 1014    03/16/24 1013  POC Glucose Once  Once,   Status:  Canceled        Comments: On arrival to unit. If >180, call admitting MD for orders.      03/16/24 1014    03/16/24 1013  Saline Lock  Continuous,   Status:  Canceled        Comments: For standing ICU/CCU Protocol    03/16/24 1014    03/16/24 1013  Place Order to Consult Intensivist For Critical Care Management (If Patient Not Admitted to Cardiology for Primary Cardiology Condition)  Continuous        Comments: For standing ICU/CCU Protocol    03/16/24 1014    03/16/24 1013  Use Mobility Guidelines for Advancement of Activity  Until Discontinued         03/16/24 1014    03/16/24 1013  Insert Peripheral IV  Once         03/16/24 1014    03/16/24 1013  Saline Lock & Maintain IV Access  Continuous         03/16/24 1014    03/16/24 1012  sodium chloride 0.9 % flush 10 mL  As Needed         03/16/24 1014    03/16/24 1012  sodium chloride 0.9 % infusion 40 mL  As Needed         03/16/24 1014    03/16/24 1012  polyethylene glycol (MIRALAX) packet 17 g  Daily PRN        Placed in \"And\" Linked Group    03/16/24 1014    03/16/24 1012  bisacodyl (DULCOLAX) EC tablet 5 mg  Daily PRN        Placed in \"And\" Linked Group    03/16/24 1014    03/16/24 1012  bisacodyl (DULCOLAX) suppository 10 mg  Daily PRN        Placed in \"And\" Linked Group    03/16/24 1014    03/16/24 1012  ondansetron ODT (ZOFRAN-ODT) disintegrating tablet 4 mg  Every 6 Hours PRN        Placed in \"Or\" Linked Group    03/16/24 1014    03/16/24 1012  ondansetron (ZOFRAN) " "injection 4 mg  Every 6 Hours PRN        Placed in \"Or\" Linked Group    03/16/24 1014    03/16/24 1012  nitroglycerin (NITROSTAT) SL tablet 0.4 mg  Every 5 Minutes PRN         03/16/24 1014    03/16/24 0959  Pulmonology (on-call MD unless specified)  Once        Specialty:  Pulmonary Disease  Provider:  Sourav Bales MD    03/16/24 0958    03/16/24 0948  ipratropium-albuterol (DUO-NEB) nebulizer solution 6 mL  Once         03/16/24 0932    03/16/24 0938  POC Lactate  PROCEDURE ONCE         03/16/24 0930    03/16/24 0938  POC Chem Panel  PROCEDURE ONCE         03/16/24 0930    03/16/24 0938  POC H&H  PROCEDURE ONCE         03/16/24 0930    03/16/24 0938  Blood Gas, Arterial -  PROCEDURE ONCE         03/16/24 0930    03/16/24 0937  POC Electrolyte Panel  Once         03/16/24 0936    03/16/24 0937  POC Lactate  Once         03/16/24 0936    03/16/24 0917  Comprehensive Metabolic Panel  Once         03/16/24 0916    03/16/24 0917  BNP  Once         03/16/24 0916    03/16/24 0917  High Sensitivity Troponin T  Once         03/16/24 0916    03/16/24 0917  ECG 12 Lead Dyspnea  Once         03/16/24 0916    03/16/24 0917  XR Chest 1 View  1 Time Imaging         03/16/24 0916    03/16/24 0917  Blood Gas, Arterial -  Once         03/16/24 0916    03/16/24 0917  NIPPV-IPPV / BIPAP / CPAP  Until Discontinued,   Status:  Canceled         03/16/24 0916    03/16/24 0917  Respiratory Panel PCR w/COVID-19(SARS-CoV-2) FRANCISCO JAVIER/JASPAL/ADELINE/PAD/COR/RAUL In-House, NP Swab in UTM/VTM, 2 HR TAT - Swab, Nasopharynx  STAT         03/16/24 0916    03/16/24 0916  CBC & Differential  Once         03/16/24 0916    03/16/24 0000  Telemetry Scan  Once         03/16/24 0000    Unscheduled  Follow Hypoglycemia Standing Orders For Blood Glucose <70 & Notify Provider of Treatment  As Needed      Comments: Follow Hypoglycemia Orders As Outlined in Process Instructions (Open Order Report to View Full Instructions)  Notify Provider Any Time Hypoglycemia " Treatment is Administered    03/16/24 5164                     Physician Progress Notes (all)        Barber Velazco MD at 03/17/24 1418           LOS: 1 day   Patient Care Team:  Flavia Jaquez APRN as PCP - General (Nurse Practitioner)  Beulah Joshi APRN as Nurse Practitioner (Neurology)  Flaco Aguayo MD as Consulting Physician (Pulmonary Disease)    Chief Complaint: Follow-up COPD with acute exacerbation, nonischemic cardiomyopathy, type II NSTEMI.    Interval History: Feels better in general.  Looks better.  She is on minimal Tre-Synephrine.  No chest pain.  Shortness of breath is improved.    Vital Signs:  Temp:  [98 °F (36.7 °C)-99.8 °F (37.7 °C)] 98.4 °F (36.9 °C)  Heart Rate:  [] 97  Resp:  [14-26] 23  BP: ()/(40-81) 118/61    Intake/Output Summary (Last 24 hours) at 3/17/2024 1419  Last data filed at 3/17/2024 0400  Gross per 24 hour   Intake 1290 ml   Output 350 ml   Net 940 ml       Physical Exam:   General Appearance:    No acute distress, alert and oriented x4, thin and chronically ill-appearing.   Lungs:     Mild expiratory wheezing, faint rhonchi.    Heart:    Regular rhythm and normal rate.  No murmurs, gallops, or   rubs.   Abdomen:     Soft, nontender, nondistended.    Extremities:   No clubbing, cyanosis, or edema.     Results Review:    Results from last 7 days   Lab Units 03/17/24  0357   SODIUM mmol/L 145   POTASSIUM mmol/L 3.3*   CHLORIDE mmol/L 116*   CO2 mmol/L 22.0   BUN mg/dL 14   CREATININE mg/dL 0.34*   GLUCOSE mg/dL 74   CALCIUM mg/dL 7.0*     Results from last 7 days   Lab Units 03/17/24  0357 03/16/24  1136 03/16/24  0923   HSTROP T ng/L 180* 188* 65*     Results from last 7 days   Lab Units 03/17/24  0357   WBC 10*3/mm3 6.87   HEMOGLOBIN g/dL 9.8*   HEMATOCRIT % 30.9*   PLATELETS 10*3/mm3 169             Results from last 7 days   Lab Units 03/17/24  0357   MAGNESIUM mg/dL 1.7           I reviewed the patient's new clinical results.         Assessment:  1.  COPD with acute exacerbation  2.  Acute hypercapnic respiratory failure, briefly requiring NIPPV  3.  Elevated troponin, type II NSTEMI secondary to #1 and #2  4.  History of Takotsubo cardiomyopathy in 2018 (EF 20%).  Coronary arteries normal by cath at that time  5.  Nonischemic cardiomyopathy, last EF 45 to 50% (wall motion abnormalities at the basal inferior wall, basal inferoseptum, basal inferolateral wall at that time) unchanged from echo from 2021.  6.  History of embolic CVA in 2018, on Eliquis  7.  Sinus tachycardia, reactive  8.  Cachexia with low body weight  9.  Hypotension    Plan:  -Echocardiogram has basal wall motion abnormalities in the inferior wall, inferoseptal, and inferolateral wall.  This is completely unchanged from her echo from 2021.  Her ejection fraction is 45 to 50%, which is stable.    -The troponin elevation represents a type II NSTEMI secondary to COPD and acute hypoxic respiratory failure.    -History of embolic stroke in 2018.  Resume Eliquis today.    -She is on Tre-Synephrine.  No beta-blocker or other medications for now.      -Sinus tachycardia is better.  I suspect this will continue to improve as her pulmonary status improves.    Barber Velazco MD  03/17/24  14:19 EDT         Electronically signed by Barber Velazco MD at 03/17/24 1426       Sourav Bales MD at 03/17/24 1116          Dr. ELENA Bales    [facility] CARDIAC INTENSIVE CARE        Patient ID:  Name:  Scarlet Chakraborty  MRN:  [id number]  1960  64 y.o.  female            CC/Reason for visit: COPD exacerbation, hypotension    Interval hx: Patient developed some hypotension overnight.  Has been started on IV pressors, norepinephrine.  Now coughing but unable to produce the sputum.  She has developed a wet cough.  Shortness of breath is better now, on 1 L of oxygen saturating 99%    ROS: Positive for cough and congestion and shortness of breath.  No abdominal pain, no  "fever    Vitals:  Vitals:    03/17/24 0730 03/17/24 0756 03/17/24 0804 03/17/24 0955   BP: 93/50   105/49   Pulse: 83      Resp:  14 17    Temp:       TempSrc:       SpO2: 98% 94%     Weight:    34 kg (75 lb)   Height:    162.6 cm (64\")           Body mass index is 12.87 kg/m².    Intake/Output Summary (Last 24 hours) at 3/17/2024 1116  Last data filed at 3/17/2024 0400  Gross per 24 hour   Intake 2290 ml   Output 350 ml   Net 1940 ml       Exam:  GEN:  No distress  Alert, oriented x 4, no focal deficits throughout.   LUNGS: Barrel chest, diminished breath sounds bilaterally and some expiratory wheezes with some rhonchi bilat, no use of accessory muscles  CV:  Normal S1S2, without murmur, no edema  ABD:  Non tender, no enlarged liver or masses      Scheduled meds:  calcium gluconate, 2,000 mg, Intravenous, Q2H  insulin lispro, 2-7 Units, Subcutaneous, 4x Daily AC & at Bedtime  ipratropium-albuterol, 3 mL, Nebulization, Q6H - RT  potassium chloride ER, 40 mEq, Oral, Q4H  potassium phosphate, 15 mmol, Intravenous, Once  senna-docusate sodium, 2 tablet, Oral, BID  sodium chloride, 10 mL, Intravenous, Q12H      IV meds:                      norepinephrine, 0.02-0.3 mcg/kg/min, Last Rate: 0.1 mcg/kg/min (03/17/24 0750)        Data Review:   I reviewed the patient's medications and new clinical results.    COVID19   Date Value Ref Range Status   03/16/2024 Not Detected Not Detected - Ref. Range Final         Lab Results   Component Value Date    CALCIUM 7.0 (L) 03/17/2024    PHOS 1.8 (C) 03/17/2024    MG 1.7 03/17/2024    MG 2.6 (H) 03/16/2021    MG 1.7 03/15/2021     Results from last 7 days   Lab Units 03/17/24  0357 03/16/24  1039 03/16/24  0930 03/16/24  0923   SODIUM mmol/L 145  --   --  142   POTASSIUM mmol/L 3.3*  --   --  4.7   CHLORIDE mmol/L 116*  --   --  106   CO2 mmol/L 22.0  --   --  25.0   BUN mg/dL 14  --   --  20   CREATININE mg/dL 0.34*  --  0.42* 0.55*   CALCIUM mg/dL 7.0*  --   --  9.4   BILIRUBIN " mg/dL  --   --   --  0.4   ALK PHOS U/L  --   --   --  69   ALT (SGPT) U/L  --   --   --  29   AST (SGOT) U/L  --   --   --  27   GLUCOSE mg/dL 74  --   --  163*   WBC 10*3/mm3 6.87 8.63  --   --    HEMOGLOBIN g/dL 9.8* 12.8  --   --    HEMOGLOBIN, POC g/dL  --   --  16.1  --    PLATELETS 10*3/mm3 169 248  --   --    PROBNP pg/mL  --   --   --  88.9             Results from last 7 days   Lab Units 03/17/24  0357 03/16/24  1136 03/16/24  0923   HSTROP T ng/L 180* 188* 65*     Results from last 7 days   Lab Units 03/16/24  1018 03/16/24  0930   PH, ARTERIAL pH units  --  7.250*   PCO2, ARTERIAL mm Hg  --  62.2*   PO2 ART mm Hg  --  499.3*   O2 SATURATION ART %  --  100.0*   MODALITY  BiPap NRB       Estimated Creatinine Clearance: 90.9 mL/min (A) (by C-G formula based on SCr of 0.34 mg/dL (L)).      ASSESSMENT:     COPD with acute exacerbation    Acute respiratory failure    Acute on chronic respiratory failure with hypercapnia  Hypotension, possible nonischemic cardiomyopathy  Previous CVA on Eliquis  Non-ST elevation MI, likely type II  History of Takotsubo cardiomyopathy in 2018        PLAN:  Patient became hypotensive overnight.  Started on pressors.  Based on her history of Takotsubo cardiomyopathy, we have consulted cardiology.  Patient to get echocardiogram done today.  She has been weaned off noninvasive ventilation.  Avoid hyperoxia.  Maintain saturations between 88 to 92% to prevent further hypercapnia.  Start decreasing scheduled beta agonist albuterol since she may have Takotsubo stress cardiomyopathy.  Discussed with Dr. Velazco.  Monitor troponin.  Continue Eliquis for history of stroke in 2018.  This patient has several chronic medical conditions, and now several acute medical illnesses.  Extensive amount of data was reviewed.  Images were directly visualized by me.  This patient warrants high complexity medical decision-making.          Sourav Bales MD  3/17/2024    Electronically signed by Amairani  Sourav ROMERO MD at 24 1120          Consult Notes (all)        Barber Velazco MD at 24 2134        Consult Orders    1. Inpatient Cardiology Consult [[id number]] ordered by Sourav Bales MD at 24 1641                 Date of Consultation: 24    Referral Provider: Ronald Rogel MD     Reason for Consultation: Elevated troponin.    Encounter Provider: Barber Velazco MD    Group of Service: [facility]     Patient Name: Scarlet Chakraborty    :1960    Chief complaint: Shortness of breath.    History of Present Illness:      This is a very pleasant 64 year-old female with a history of prior Takotsubo cardiomyopathy, embolic stroke, and severe COPD.  She normally follows with Dr. Boston in our group.  She has a history of Takotsubo cardiomyopathy in 2018.  She underwent a heart catheterization in  which showed angiographically normal coronary arteries, although her ejection fraction was around 30%.  She eventually recovered some function to 45 to 50% with an echo in  which showed inferior and inferolateral wall motion abnormalities.  Her stress test at that time was negative.  She also had an embolic stroke in 2018, and had a Linq loop recorder placed.  She eventually was placed on Eliquis, although I do not see documented history of atrial fibrillation.    The patient presented with rapidly progressive shortness of breath over the last 24 hours.  She has been also had a significant cough.  She was in significant respiratory distress, and was admitted to the ICU.  She did briefly require NIPPV.  She has been given IV steroids.  Her EKG did not show ischemic changes.  However, her troponin went from 65-1 88.  She is still not having chest discomfort, although she does feel shortness of breath still.    Past Medical History:   Diagnosis Date    Asthma     Cerebrovascular accident (CVA) due to thrombosis of right middle cerebral artery     COPD (chronic  obstructive pulmonary disease)     Nonischemic cardiomyopathy     NSTEMI (non-ST elevated myocardial infarction)     Stress-induced cardiomyopathy     Stroke     Takotsubo cardiomyopathy     Tobacco abuse          Past Surgical History:   Procedure Laterality Date    CARDIAC CATHETERIZATION N/A 9/13/2018    Procedure: Left Heart Cath and cors;  Surgeon: Gabino Dozier MD;  Location: [facility] CATH INVASIVE LOCATION;  Service: Cardiology    CARDIAC CATHETERIZATION N/A 9/13/2018    Procedure: Left ventriculography;  Surgeon: Gabino Dozier MD;  Location: [facility] CATH INVASIVE LOCATION;  Service: Cardiology    CARDIAC ELECTROPHYSIOLOGY PROCEDURE N/A 9/27/2018    Procedure: Loop insertion  LINQ;  Surgeon: Jose R Barker MD;  Location: [facility] CATH INVASIVE LOCATION;  Service: Cardiovascular         No Known Allergies      No current facility-administered medications on file prior to encounter.     Current Outpatient Medications on File Prior to Encounter   Medication Sig Dispense Refill    albuterol sulfate  (90 Base) MCG/ACT inhaler Inhale 2 puffs Every 4 (Four) Hours As Needed for Wheezing.      Eliquis 5 MG tablet tablet TAKE ONE TABLET BY MOUTH EVERY 12 HOURS 180 tablet 3    Fluticasone-Umeclidin-Vilant (Trelegy Ellipta) 200-62.5-25 MCG/ACT aerosol powder  Inhale 1 puff Daily.      ipratropium-albuterol (DUO-NEB) 0.5-2.5 mg/3 ml nebulizer Take 3 mL by nebulization Every 4 (Four) Hours As Needed for Wheezing or Shortness of Air. 360 mL 0    Tiotropium Bromide Monohydrate (SPIRIVA RESPIMAT) 1.25 MCG/ACT aerosol solution inhaler Inhale 2 puffs Daily. 4 g 2         Social History     Socioeconomic History    Marital status: Single   Tobacco Use    Smoking status: Light Smoker     Current packs/day: 0.50     Average packs/day: 0.5 packs/day for 15.0 years (7.5 ttl pk-yrs)     Types: Cigarettes    Smokeless tobacco: Never    Tobacco comments:     Currently smokes cigarettes on occasion   Vaping Use    Vaping  "status: Never Used   Substance and Sexual Activity    Alcohol use: Yes     Comment: occassional/ Daily caffeine use    Drug use: No    Sexual activity: Defer         Family History   Problem Relation Age of Onset    Cancer Sister        REVIEW OF SYSTEMS:   Pertinent positives are noted in the HPI above.  Otherwise, all other systems were reviewed, and are negative.     Objective:     Vitals:    03/16/24 1956 03/16/24 1958 03/16/24 2005 03/16/24 2100   BP:       Pulse: 105 104 109    Resp:       Temp:    99.8 °F (37.7 °C)   TempSrc:    Oral   SpO2: 95% 95% 91%    Weight:       Height:         Body mass index is 12.87 kg/m².  Flowsheet Rows      Flowsheet Row First Filed Value   Admission Height 162.6 cm (64\") Documented at 03/16/2024 1212   Admission Weight 34 kg (75 lb) Documented at 03/16/2024 1212             General:    Labored breathing.  Alert and oriented x 4.  Thin and chronically ill-appearing.                   Head:    Normocephalic, atraumatic.   Eyes:          Conjunctivae and sclerae normal, no icterus.   Throat:   No oral lesions, no thrush, oral mucosa moist.    Neck:   Supple, trachea midline.   Lungs:     Bilateral wheezing and decreased breath sounds.    Heart:    Regular rhythm and tachycardic rate.  No murmurs, gallops, or rubs noted.   Abdomen:     Soft, non-tender, non-distended, positive bowel sounds.    Extremities:   No clubbing, cyanosis, or edema.     Pulses:   Pulses palpable and equal bilaterally.    Skin:   No bleeding or rash.   Neuro:   Non-focal.     Psychiatric:   Anxious, likely secondary to shortness of breath.           Lab Review:                Results from last 7 days   Lab Units 03/16/24  0930 03/16/24  0923   SODIUM mmol/L  --  142   POTASSIUM mmol/L  --  4.7   CHLORIDE mmol/L  --  106   CO2 mmol/L  --  25.0   BUN mg/dL  --  20   CREATININE mg/dL 0.42* 0.55*   GLUCOSE mg/dL  --  163*   CALCIUM mg/dL  --  9.4     Results from last 7 days   Lab Units 03/16/24  1136 " 03/16/24  0923   HSTROP T ng/L 188* 65*     Results from last 7 days   Lab Units 03/16/24  1039   WBC 10*3/mm3 8.63   HEMOGLOBIN g/dL 12.8   HEMATOCRIT % 39.8   PLATELETS 10*3/mm3 248                       EKG (reviewed by me personally): Sinus tachycardia, right atrial enlargement, anteroseptal infarct, nonspecific T wave changes.      Assessment:   1.  COPD with acute exacerbation  2.  Acute hypercapnic respiratory failure, requiring NIPPV  3.  Elevated troponin, suspect type II NSTEMI secondary to #1 and #2  4.  History of Takotsubo cardiomyopathy in 2018 (EF 20%).  Coronary arteries normal by cath at that time  5.  Nonischemic cardiomyopathy, last EF 45 to 50% in 2021 (wall motion abnormalities at the basal inferior wall, basal inferoseptum, basal inferolateral wall at that time)  6.  History of embolic CVA in 2018, on Eliquis  7.  Sinus tachycardia, reactive  8.  Cachexia with low body weight  9.  Hypotension    Plan:       Again, her EKG shows no ischemic changes.  She has not had chest discomfort.  I suspect that this is a type II NSTEMI secondary to the respiratory failure and COPD exacerbation.  She does have a history of Takotsubo cardiomyopathy with an EF as low as 20% in 2018.  Her coronary arteries were normal at that time.  Her last EF was 45 to 50% in 2021.    I would hold on further volume resuscitation, and I would use pressor support if needed until her ejection fraction is known.  I have ordered an echocardiogram for ejection fraction assessment, as well as for any other potential structural abnormalities.  She is in sinus tachycardia currently, which is being exacerbated by her pulmonary issues.  She is also receiving breathing treatments which may make this more prominent.    She does not appear to be in congestive heart failure currently.  She is normally on Eliquis for history of an embolic stroke in 2018.  When she can take oral medications again, this will be reinstated.    Cardiology will  continue to follow.  Discussed with the patient and her family member at bedside today.    Thank you very much for this consult.    Ar Velazco MD       Electronically signed by Barber Velazco MD at 03/16/24 9492

## 2024-03-18 NOTE — PROGRESS NOTES
"   LOS: 2 days   Patient Care Team:  Flavia Jaquez APRN as PCP - General (Nurse Practitioner)  Beulah Joshi APRN as Nurse Practitioner (Neurology)  Flaco Aguayo MD as Consulting Physician (Pulmonary Disease)    Chief Complaint: respiratory failure     Interval History: Awake, lying in bed. Very frail appearing. Not eating well today. Denies CP.       Objective   Vital Signs  Temp:  [98.3 °F (36.8 °C)] 98.3 °F (36.8 °C)  Heart Rate:  [] 93  Resp:  [18-21] 18  BP: ()/(42-78) 105/68    Intake/Output Summary (Last 24 hours) at 3/18/2024 1438  Last data filed at 3/18/2024 1307  Gross per 24 hour   Intake 1275.3 ml   Output 650 ml   Net 625.3 ml       Last Weight and Admission Weight        03/17/24  0955   Weight: 34 kg (75 lb)     Flowsheet Rows      Flowsheet Row First Filed Value   Admission Height 162.6 cm (64\") Documented at 03/16/2024 1212   Admission Weight 34 kg (75 lb) Documented at 03/16/2024 1212            Physical Exam  Vitals reviewed.   Constitutional:       General: She is not in acute distress.     Appearance: She is cachectic.   HENT:      Head: Normocephalic.      Nose: Nose normal.   Eyes:      Conjunctiva/sclera: Conjunctivae normal.   Neck:      Vascular: No JVD.   Cardiovascular:      Rate and Rhythm: Normal rate and regular rhythm.      Pulses: Normal pulses.   Pulmonary:      Effort: Pulmonary effort is normal.      Comments: Decreased BS  Abdominal:      Palpations: Abdomen is soft.      Tenderness: There is no abdominal tenderness.   Musculoskeletal:         General: No swelling. Normal range of motion.      Cervical back: Normal range of motion.   Skin:     General: Skin is warm and dry.      Findings: No erythema.   Neurological:      General: No focal deficit present.   Psychiatric:         Mood and Affect: Affect is flat.         Results Review:      Results from last 7 days   Lab Units 03/18/24  0338 03/18/24  0039 03/17/24  0357 03/16/24  0930 " 03/16/24  0923   SODIUM mmol/L 143  --  145  --  142   POTASSIUM mmol/L 5.4* 5.1 3.3*  --  4.7   CHLORIDE mmol/L 110*  --  116*  --  106   CO2 mmol/L 18.0*  --  22.0  --  25.0   BUN mg/dL 12  --  14  --  20   CREATININE mg/dL 0.48*  --  0.34* 0.42* 0.55*   GLUCOSE mg/dL 109*  --  74  --  163*   CALCIUM mg/dL 10.0  --  7.0*  --  9.4     Results from last 7 days   Lab Units 03/18/24  0918 03/18/24  0338 03/17/24  0357   HSTROP T ng/L 117* 172* 180*     Results from last 7 days   Lab Units 03/18/24  0338 03/17/24  0357 03/16/24  1039   WBC 10*3/mm3 10.16 6.87 8.63   HEMOGLOBIN g/dL 13.1 9.8* 12.8   HEMATOCRIT % 40.3 30.9* 39.8   PLATELETS 10*3/mm3 244 169 248             Results from last 7 days   Lab Units 03/18/24  0338   MAGNESIUM mg/dL 2.7*           I reviewed the patient's new clinical results.  I personally viewed and interpreted the patient's EKG/Telemetry data        Medication Review:   apixaban, 5 mg, Oral, Q12H  aspirin, 81 mg, Oral, Daily  budesonide-formoterol, 2 puff, Inhalation, BID - RT  senna-docusate sodium, 2 tablet, Oral, BID  sodium chloride, 10 mL, Intravenous, Q12H  tiotropium bromide monohydrate, 2 puff, Inhalation, Daily - RT      dexmedetomidine, 0.2-1.5 mcg/kg/hr, Last Rate: Stopped (03/18/24 0700)  norepinephrine, 0.02-0.3 mcg/kg/min, Last Rate: Stopped (03/18/24 1029)      Assessment & Plan     1. AECOPD  2. Elevated Tn, type II   3. History of Takotsubo CMP/normal cors in 2018  4. Mild NICM, EF 45-50%  5. History of embolic CVA in 2018 (from CMP), on apixaban  6. Sinus tachycardia, due to acute illness  7. Cachexia/severe malnutrition     Her CV status appears quite stable. She's now off norepi, her HR and BP are normal. She's back on apixaban.     No further cardiac recs at this time.     Jorge L George MD  03/18/24  14:38 EDT

## 2024-03-18 NOTE — PLAN OF CARE
Goal Outcome Evaluation:  Plan of Care Reviewed With: patient, sibling, significant other, other (see comments)        Progress: no change       Pt remains in CICU.  On 1L NC all day long; no episodes of intense SOA or desatting.  No pain.  Poor appetite.  Up to BSC with stand-by assist.  Output measured.  Pt visited with friends and family at bedside.  Started Symbicort and Spiriva.  Levophed drip off since 10:30am.

## 2024-03-19 LAB
ALBUMIN SERPL-MCNC: 3.2 G/DL (ref 3.5–5.2)
ANION GAP SERPL CALCULATED.3IONS-SCNC: 11.1 MMOL/L (ref 5–15)
BASOPHILS # BLD AUTO: 0.04 10*3/MM3 (ref 0–0.2)
BASOPHILS NFR BLD AUTO: 0.5 % (ref 0–1.5)
BUN SERPL-MCNC: 20 MG/DL (ref 8–23)
BUN/CREAT SERPL: 43.5 (ref 7–25)
CALCIUM SPEC-SCNC: 8.9 MG/DL (ref 8.6–10.5)
CHLORIDE SERPL-SCNC: 106 MMOL/L (ref 98–107)
CO2 SERPL-SCNC: 23.9 MMOL/L (ref 22–29)
CREAT SERPL-MCNC: 0.46 MG/DL (ref 0.57–1)
DEPRECATED RDW RBC AUTO: 41.2 FL (ref 37–54)
EGFRCR SERPLBLD CKD-EPI 2021: 107.7 ML/MIN/1.73
EOSINOPHIL # BLD AUTO: 0.37 10*3/MM3 (ref 0–0.4)
EOSINOPHIL NFR BLD AUTO: 5 % (ref 0.3–6.2)
ERYTHROCYTE [DISTWIDTH] IN BLOOD BY AUTOMATED COUNT: 12.6 % (ref 12.3–15.4)
GLUCOSE BLDC GLUCOMTR-MCNC: 89 MG/DL (ref 70–130)
GLUCOSE SERPL-MCNC: 85 MG/DL (ref 65–99)
HCT VFR BLD AUTO: 41.1 % (ref 34–46.6)
HGB BLD-MCNC: 13.4 G/DL (ref 12–15.9)
IMM GRANULOCYTES # BLD AUTO: 0.02 10*3/MM3 (ref 0–0.05)
IMM GRANULOCYTES NFR BLD AUTO: 0.3 % (ref 0–0.5)
LYMPHOCYTES # BLD AUTO: 2.64 10*3/MM3 (ref 0.7–3.1)
LYMPHOCYTES NFR BLD AUTO: 35.9 % (ref 19.6–45.3)
MAGNESIUM SERPL-MCNC: 2.2 MG/DL (ref 1.6–2.4)
MCH RBC QN AUTO: 29.5 PG (ref 26.6–33)
MCHC RBC AUTO-ENTMCNC: 32.6 G/DL (ref 31.5–35.7)
MCV RBC AUTO: 90.5 FL (ref 79–97)
MONOCYTES # BLD AUTO: 0.61 10*3/MM3 (ref 0.1–0.9)
MONOCYTES NFR BLD AUTO: 8.3 % (ref 5–12)
NEUTROPHILS NFR BLD AUTO: 3.67 10*3/MM3 (ref 1.7–7)
NEUTROPHILS NFR BLD AUTO: 50 % (ref 42.7–76)
NRBC BLD AUTO-RTO: 0 /100 WBC (ref 0–0.2)
PHOSPHATE SERPL-MCNC: 4.1 MG/DL (ref 2.5–4.5)
PLATELET # BLD AUTO: 217 10*3/MM3 (ref 140–450)
PMV BLD AUTO: 10.6 FL (ref 6–12)
POTASSIUM SERPL-SCNC: 4.1 MMOL/L (ref 3.5–5.2)
RBC # BLD AUTO: 4.54 10*6/MM3 (ref 3.77–5.28)
SODIUM SERPL-SCNC: 141 MMOL/L (ref 136–145)
WBC NRBC COR # BLD AUTO: 7.35 10*3/MM3 (ref 3.4–10.8)

## 2024-03-19 PROCEDURE — 94761 N-INVAS EAR/PLS OXIMETRY MLT: CPT

## 2024-03-19 PROCEDURE — 83735 ASSAY OF MAGNESIUM: CPT | Performed by: INTERNAL MEDICINE

## 2024-03-19 PROCEDURE — 94799 UNLISTED PULMONARY SVC/PX: CPT

## 2024-03-19 PROCEDURE — 82948 REAGENT STRIP/BLOOD GLUCOSE: CPT

## 2024-03-19 PROCEDURE — 80069 RENAL FUNCTION PANEL: CPT | Performed by: INTERNAL MEDICINE

## 2024-03-19 PROCEDURE — 94664 DEMO&/EVAL PT USE INHALER: CPT

## 2024-03-19 PROCEDURE — 85025 COMPLETE CBC W/AUTO DIFF WBC: CPT | Performed by: INTERNAL MEDICINE

## 2024-03-19 RX ORDER — BUDESONIDE 0.5 MG/2ML
4 INHALANT ORAL
Status: DISPENSED | OUTPATIENT
Start: 2024-03-19 | End: 2024-03-20

## 2024-03-19 RX ADMIN — TIOTROPIUM BROMIDE INHALATION SPRAY 2 PUFF: 3.12 SPRAY, METERED RESPIRATORY (INHALATION) at 09:06

## 2024-03-19 RX ADMIN — BUDESONIDE AND FORMOTEROL FUMARATE DIHYDRATE 2 PUFF: 160; 4.5 AEROSOL RESPIRATORY (INHALATION) at 09:08

## 2024-03-19 RX ADMIN — APIXABAN 5 MG: 5 TABLET, FILM COATED ORAL at 08:21

## 2024-03-19 RX ADMIN — Medication 10 ML: at 19:06

## 2024-03-19 RX ADMIN — BUDESONIDE AND FORMOTEROL FUMARATE DIHYDRATE 2 PUFF: 160; 4.5 AEROSOL RESPIRATORY (INHALATION) at 20:07

## 2024-03-19 RX ADMIN — ASPIRIN 81 MG: 81 TABLET, COATED ORAL at 08:21

## 2024-03-19 RX ADMIN — APIXABAN 5 MG: 5 TABLET, FILM COATED ORAL at 20:45

## 2024-03-19 RX ADMIN — Medication 10 ML: at 08:21

## 2024-03-19 RX ADMIN — DOCUSATE SODIUM 50MG AND SENNOSIDES 8.6MG 2 TABLET: 8.6; 5 TABLET, FILM COATED ORAL at 20:46

## 2024-03-19 NOTE — CONSULTS
"Nutrition Services    Patient Name:  Scarlet Chakraborty  YOB: 1960  MRN: [id number]  Admit Date:  3/16/2024    Assessment Date:  03/19/24    Summary: Nutrition Follow Up    Anxious.  CV status stable.  Refused bipap overnight.  Poor appetite, eating 0-50% at meals.  Boost in place BID.     Likely discharge home tomorrow.    RD to continue to follow.    CLINICAL NUTRITION ASSESSMENT      Reason for Assessment Follow-up Protocol     Diagnosis/Problem   Pt is a 63 y/o female who presented with SOB. Pt has a hx of COPD, CVA, HLD, cardiomyopathy.     Anthropometrics        Current Height  Current Weight  BMI kg/m2 Height: 162.6 cm (64\")  Weight: 34 kg (75 lb) (03/17/24 0955)  Body mass index is 12.87 kg/m².   Adjusted BMI (if applicable)    BMI Category Underweight (18.4 or below)   Ideal Body Weight (IBW) 126 lbs   Usual Body Weight (UBW) 80 lbs   Weight Trend Loss     Estimated/Assessed Needs        Energy Requirements    Weight for Calculation 34 kg   Method for Estimation  35-40 kcal/kg   EST Needs (kcal/day) 6680-1576       Protein Requirements    Weight for Calculation 34 kg   EST Protein Needs (g/kg) 1.2 - 1.5 gm/kg   EST Daily Needs (g/day) 41-51       Fluid Requirements     Method for Estimation 1 mL/kcal    Estimated Needs (mL/day)      Labs       Pertinent Labs    Results from last 7 days   Lab Units 03/19/24  0330 03/18/24  0338 03/18/24  0039 03/17/24  0357 03/16/24  0930 03/16/24  0923   SODIUM mmol/L 141 143  --  145  --  142   POTASSIUM mmol/L 4.1 5.4* 5.1 3.3*  --  4.7   CHLORIDE mmol/L 106 110*  --  116*  --  106   CO2 mmol/L 23.9 18.0*  --  22.0  --  25.0   BUN mg/dL 20 12  --  14  --  20   CREATININE mg/dL 0.46* 0.48*  --  0.34*   < > 0.55*   CALCIUM mg/dL 8.9 10.0  --  7.0*  --  9.4   BILIRUBIN mg/dL  --  0.4  --   --   --  0.4   ALK PHOS U/L  --  56  --   --   --  69   ALT (SGPT) U/L  --  40*  --   --   --  29   AST (SGOT) U/L  --  42*  --   --   --  27   GLUCOSE mg/dL 85 109*  --  " 74  --  163*    < > = values in this interval not displayed.     Results from last 7 days   Lab Units 03/19/24  0330 03/18/24  0338 03/18/24  0039 03/17/24  0357   MAGNESIUM mg/dL 2.2 2.7*  --  1.7   PHOSPHORUS mg/dL 4.1  --    < > 1.8*   HEMOGLOBIN g/dL 13.4 13.1  --  9.8*   HEMATOCRIT % 41.1 40.3  --  30.9*   WBC 10*3/mm3 7.35 10.16  --  6.87   ALBUMIN g/dL 3.2* 3.7  --   --     < > = values in this interval not displayed.     Results from last 7 days   Lab Units 03/19/24 0330 03/18/24 0338 03/17/24 0357 03/16/24  1039   PLATELETS 10*3/mm3 217 244 169 248     COVID19   Date Value Ref Range Status   03/16/2024 Not Detected Not Detected - Ref. Range Final     Lab Results   Component Value Date    HGBA1C 5.5 11/01/2023          Medications           Scheduled Medications apixaban, 5 mg, Oral, Q12H  aspirin, 81 mg, Oral, Daily  budesonide, 4 mg, Nebulization, BID - RT  budesonide-formoterol, 2 puff, Inhalation, BID - RT  senna-docusate sodium, 2 tablet, Oral, BID  sodium chloride, 10 mL, Intravenous, Q12H  tiotropium bromide monohydrate, 2 puff, Inhalation, Daily - RT       Infusions       PRN Medications   acetaminophen **OR** acetaminophen    albuterol    senna-docusate sodium **AND** polyethylene glycol **AND** bisacodyl **AND** bisacodyl    Calcium Replacement - Follow Nurse / BPA Driven Protocol    dextrose    dextrose    glucagon (human recombinant)    guaiFENesin    guaiFENesin-dextromethorphan    Magnesium Standard Dose Replacement - Follow Nurse / BPA Driven Protocol    melatonin    nitroglycerin    ondansetron ODT **OR** ondansetron    Phosphorus Replacement - Follow Nurse / BPA Driven Protocol    Potassium Replacement - Follow Nurse / BPA Driven Protocol    sodium chloride    sodium chloride     Physical Findings          General Findings alert, frail, generalized wasting, loss of muscle mass, loss of subcutaneous fat, oriented, underweight   Oral/Mouth Cavity WDL   Edema  no edema   Gastrointestinal  hypoactive bowel sounds, nausea, last bowel movement: 3/18   Skin  skin intact   Tubes/Drains/Lines none   NFPE See Malnutrition Severity Assessment     Malnutrition Severity Assessment      Patient meets criteria for : Severe Malnutrition       Current Nutrition Orders & Evaluation of Intake       Oral Nutrition     Food Allergies NKFA   Current PO Diet Diet: Diabetic; Consistent Carbohydrate; Fluid Consistency: Thin (IDDSI 0)   Supplement Boost Plus, BID   PO Evaluation     % PO Intake 0-50%    Factors Affecting Intake: altered respiratory status, decreased appetite     PES STATEMENT / NUTRITION DIAGNOSIS      Nutrition Dx Problem  Problem: Malnutrition (severe) and Inadequate Oral Intake  Etiology: Medical Diagnosis - Pt is a 65 y/o female who presented with SOB. Pt has a hx of COPD, CVA, HLD, cardiomyopathy.    Signs/Symptoms: Report of Minimal PO Intake and NFPE Results   --  NUTRITION INTERVENTION / PLAN OF CARE      Intervention Goal(s) Maintain nutrition status, Meet estimated needs, Disease management/therapy, Tolerate PO , Increase intake, Appropriate weight gain, and PO intake goal %: 75%         RD Intervention/Action Encourage intake, Continue to monitor, and Care plan reviewed         Prescription/Orders:       PO Diet       Supplements Boost BID      Enteral Nutrition       Parenteral Nutrition    New Prescription Ordered? Continue same per protocol   --      Monitor/Evaluation Per protocol, PO intake, Supplement intake, Weight, Skin status, GI status, Symptoms, POC/GOC   Discharge Plan/Needs No discharge needs identified at this time   --    RD to follow per protocol.      Electronically signed by:  Julia Hagan RD  03/19/24 13:55 EDT

## 2024-03-19 NOTE — PROGRESS NOTES
Dr. ELENA Bales    [facility] CARDIAC INTENSIVE CARE        Patient ID:  Name:  Scarlet Chakraborty  MRN:  [id number]  1960  64 y.o.  female            CC/Reason for visit: COPD exacerbation, hypotension     Interval hx: Patient feels better.  Significantly less short of breath.  Not requiring noninvasive ventilation at this time    ROS: Much better, less shortness of breath.  Negative for chest pain or abdominal pain    Vitals:  Vitals:    03/19/24 0908 03/19/24 1000 03/19/24 1100 03/19/24 1200   BP:  105/55 105/61    Pulse: 95 92 100    Resp: 18      Temp:    98.1 °F (36.7 °C)   TempSrc:    Oral   SpO2: 92% 91% (!) 89%    Weight:       Height:               Body mass index is 12.87 kg/m².    Intake/Output Summary (Last 24 hours) at 3/19/2024 1246  Last data filed at 3/19/2024 0800  Gross per 24 hour   Intake 290 ml   Output 1050 ml   Net -760 ml       Exam:  GEN:  No distress  Alert, oriented x 3.   LUNGS: Barrel chest, distant and diminished breath sounds but no wheezing  bilat, no use of accessory muscles  CV:  Normal S1S2, without murmur, no edema  ABD:  Non tender, no enlarged liver or masses      Scheduled meds:  apixaban, 5 mg, Oral, Q12H  aspirin, 81 mg, Oral, Daily  budesonide-formoterol, 2 puff, Inhalation, BID - RT  senna-docusate sodium, 2 tablet, Oral, BID  sodium chloride, 10 mL, Intravenous, Q12H  tiotropium bromide monohydrate, 2 puff, Inhalation, Daily - RT      IV meds:                      dexmedetomidine, 0.2-1.5 mcg/kg/hr, Last Rate: Stopped (03/18/24 0700)  norepinephrine, 0.02-0.3 mcg/kg/min, Last Rate: Stopped (03/18/24 1029)        Data Review:   I reviewed the patient's medications and new clinical results.    COVID19   Date Value Ref Range Status   03/16/2024 Not Detected Not Detected - Ref. Range Final         Lab Results   Component Value Date    CALCIUM 8.9 03/19/2024    PHOS 4.1 03/19/2024    MG 2.2 03/19/2024    MG 2.7 (H) 03/18/2024    MG 1.7 03/17/2024     Results  from last 7 days   Lab Units 03/19/24  0330 03/18/24  0338 03/18/24  0039 03/17/24  0357 03/16/24  0930 03/16/24  0923   SODIUM mmol/L 141 143  --  145  --  142   POTASSIUM mmol/L 4.1 5.4* 5.1 3.3*  --  4.7   CHLORIDE mmol/L 106 110*  --  116*  --  106   CO2 mmol/L 23.9 18.0*  --  22.0  --  25.0   BUN mg/dL 20 12  --  14  --  20   CREATININE mg/dL 0.46* 0.48*  --  0.34*   < > 0.55*   CALCIUM mg/dL 8.9 10.0  --  7.0*  --  9.4   BILIRUBIN mg/dL  --  0.4  --   --   --  0.4   ALK PHOS U/L  --  56  --   --   --  69   ALT (SGPT) U/L  --  40*  --   --   --  29   AST (SGOT) U/L  --  42*  --   --   --  27   GLUCOSE mg/dL 85 109*  --  74  --  163*   WBC 10*3/mm3 7.35 10.16  --  6.87   < >  --    HEMOGLOBIN g/dL 13.4 13.1  --  9.8*   < >  --    HEMOGLOBIN, POC   --   --   --   --    < >  --    PLATELETS 10*3/mm3 217 244  --  169   < >  --    PROBNP pg/mL  --   --   --   --   --  88.9   PROCALCITONIN ng/mL  --  0.06  --   --   --   --     < > = values in this interval not displayed.             Results from last 7 days   Lab Units 03/18/24  0918 03/18/24 0338 03/17/24 0357   HSTROP T ng/L 117* 172* 180*     Results from last 7 days   Lab Units 03/16/24  1018 03/16/24 0930   PH, ARTERIAL pH units  --  7.250*   PCO2, ARTERIAL mm Hg  --  62.2*   PO2 ART mm Hg  --  499.3*   O2 SATURATION ART %  --  100.0*   MODALITY  BiPap NRB       Estimated Creatinine Clearance: 67.2 mL/min (A) (by C-G formula based on SCr of 0.46 mg/dL (L)).      ASSESSMENT:   COPD with acute exacerbation    Acute respiratory failure    Acute on chronic respiratory failure with hypercapnia  Hypotension, possible nonischemic cardiomyopathy  Previous CVA on Eliquis  Non-ST elevation MI, likely type II  History of Takotsubo cardiomyopathy in 2018      PLAN:  Continue Symbicort and Spiriva.  No wheezing on exam today.  No need for systemic corticosteroids.  Give albuterol treatments every 6 hours.  We will give high-dose nebulized budesonide.  Has been weaned off  of noninvasive ventilation.  Stop IV pressors and diuretics.  Hemodynamically stable.  Echo cardiogram this admission shows no evidence of Takotsubo cardiomyopathy.  Hopefully she can go home tomorrow.  Consult physical therapy.  Transfer to telemetry floor today        Sourav Bales MD  3/19/2024

## 2024-03-19 NOTE — PLAN OF CARE
Goal Outcome Evaluation:  Plan of Care Reviewed With: patient        Progress: improving                                Patient refused bipap overnight despite being educated. Levo not required. VSS.

## 2024-03-19 NOTE — PLAN OF CARE
Goal Outcome Evaluation:  Plan of Care Reviewed With: patient        Progress: improving  Outcome Evaluation: transfer from ICU, RA, does desat when ambulating with slow recovery, dimished lung sounds, breathing treatments, up ad siva, A&Ox4, NSR-ST, possib;e d/c tomorrow, vss, will continue to monitor

## 2024-03-20 ENCOUNTER — READMISSION MANAGEMENT (OUTPATIENT)
Dept: CALL CENTER | Facility: HOSPITAL | Age: 64
End: 2024-03-20
Payer: COMMERCIAL

## 2024-03-20 VITALS
RESPIRATION RATE: 18 BRPM | SYSTOLIC BLOOD PRESSURE: 103 MMHG | HEART RATE: 98 BPM | HEIGHT: 64 IN | TEMPERATURE: 98.4 F | WEIGHT: 70.1 LBS | BODY MASS INDEX: 11.97 KG/M2 | OXYGEN SATURATION: 94 % | DIASTOLIC BLOOD PRESSURE: 57 MMHG

## 2024-03-20 LAB
ALBUMIN SERPL-MCNC: 3.6 G/DL (ref 3.5–5.2)
ANION GAP SERPL CALCULATED.3IONS-SCNC: 10.7 MMOL/L (ref 5–15)
BUN SERPL-MCNC: 18 MG/DL (ref 8–23)
BUN/CREAT SERPL: 33.3 (ref 7–25)
CALCIUM SPEC-SCNC: 9.2 MG/DL (ref 8.6–10.5)
CHLORIDE SERPL-SCNC: 105 MMOL/L (ref 98–107)
CO2 SERPL-SCNC: 26.3 MMOL/L (ref 22–29)
CREAT SERPL-MCNC: 0.54 MG/DL (ref 0.57–1)
EGFRCR SERPLBLD CKD-EPI 2021: 103.6 ML/MIN/1.73
GLUCOSE SERPL-MCNC: 84 MG/DL (ref 65–99)
PHOSPHATE SERPL-MCNC: 3.5 MG/DL (ref 2.5–4.5)
POTASSIUM SERPL-SCNC: 4.2 MMOL/L (ref 3.5–5.2)
SODIUM SERPL-SCNC: 142 MMOL/L (ref 136–145)

## 2024-03-20 PROCEDURE — 94799 UNLISTED PULMONARY SVC/PX: CPT

## 2024-03-20 PROCEDURE — 94618 PULMONARY STRESS TESTING: CPT

## 2024-03-20 PROCEDURE — 80069 RENAL FUNCTION PANEL: CPT | Performed by: INTERNAL MEDICINE

## 2024-03-20 PROCEDURE — 94664 DEMO&/EVAL PT USE INHALER: CPT

## 2024-03-20 PROCEDURE — 94761 N-INVAS EAR/PLS OXIMETRY MLT: CPT

## 2024-03-20 RX ORDER — BUDESONIDE AND FORMOTEROL FUMARATE DIHYDRATE 80; 4.5 UG/1; UG/1
2 AEROSOL RESPIRATORY (INHALATION)
Qty: 10.2 G | Refills: 12 | Status: SHIPPED | OUTPATIENT
Start: 2024-03-20

## 2024-03-20 RX ORDER — BUDESONIDE AND FORMOTEROL FUMARATE DIHYDRATE 80; 4.5 UG/1; UG/1
2 AEROSOL RESPIRATORY (INHALATION)
Qty: 10.2 G | Refills: 12 | Status: SHIPPED | OUTPATIENT
Start: 2024-03-20 | End: 2024-03-20

## 2024-03-20 RX ADMIN — APIXABAN 5 MG: 5 TABLET, FILM COATED ORAL at 08:37

## 2024-03-20 RX ADMIN — Medication 10 ML: at 08:38

## 2024-03-20 RX ADMIN — BUDESONIDE AND FORMOTEROL FUMARATE DIHYDRATE 2 PUFF: 160; 4.5 AEROSOL RESPIRATORY (INHALATION) at 07:04

## 2024-03-20 RX ADMIN — ASPIRIN 81 MG: 81 TABLET, COATED ORAL at 08:37

## 2024-03-20 RX ADMIN — TIOTROPIUM BROMIDE INHALATION SPRAY 2 PUFF: 3.12 SPRAY, METERED RESPIRATORY (INHALATION) at 07:03

## 2024-03-20 NOTE — DISCHARGE SUMMARY
Patient Identification:  Name: Scarlet Chakraborty  Age: 64 y.o.  Sex: female  :  1960  MRN: [id number]  Primary Care Physician: Flavia Jaquez APRN    Admit date: 3/16/2024  Discharge date and time: 2024  Discharged Condition: good    Discharge Diagnoses:   COPD with acute exacerbation    Acute on chronic respiratory failure with hypercapnia  Hypotension, possible nonischemic cardiomyopathy  Previous CVA on Eliquis  Non-ST elevation MI, likely type II  History of Takotsubo cardiomyopathy in 2018      Hospital Course: Scarlet Chakraborty presented to [facility] with hypercapnic respiratory failure and required noninvasive ventilation.  She was admitted to the intensive care unit.  She was diagnosed with COPD exacerbation.  Due to her history of nonischemic Takotsubo cardiomyopathy she was seen by cardiology and echocardiogram was performed showing improved ejection fraction compared to her episode of Takotsubo a few years ago.  She was weaned off of noninvasive ventilation and over the ensuing days she continued to improve rapidly after bronchodilators and steroids.  She actually has no additional wheezing.  Her wheezing and bronchospasm have completely resolved.  Cardiology did not feel that she had acute MI and she did not need any ischemic workup.  She was weaned off of oxygen on room air at rest but during exertion walking oximetry showed that she would benefit from 1 to 2 L of oxygen during exertion.  She already owns oxygen equipment at home.  We have instructed her to use it during exertion and at night but at night only 1 L is necessary.  She has maximally benefited from acute inpatient care and is ready to be discharged home today.  She should follow-up with my colleague Dr Mukherjee in the office within the next 30 days.  She was given Symbicort and Spiriva in the hospital and she wants to continue these prescriptions since Trelegy is too expensive  She should  follow-up with her primary care physician within the next week      Consults:   IP CONSULT TO PULMONOLOGY  IP CONSULT TO NUTRITION SERVICES  IP CONSULT TO CARDIOLOGY    Significant Diagnostic Studies:   Imaging Results (Most Recent)       Procedure Component Value Units Date/Time    XR Chest 1 View [[id number]] Collected: 03/16/24 1001     Updated: 03/16/24 1007    Narrative:      ONE-VIEW PORTABLE CHEST AT 9:52 A.M.     HISTORY: Pulmonary emphysema. Shortness of breath.     FINDINGS: There is severe pulm emphysema with marked hyperinflation.  There is no evidence of pneumothorax. There is improvement in some vague  interstitial changes and atelectasis at the bases when compared to an  exam dating back to 3/15/2021. The heart size remains normal.     This report was finalized on 3/16/2024 10:03 AM by Dr. Jh Wong M.D on Workstation: [id number]             Results from last 7 days   Lab Units 03/16/24  0918   ADENOVIRUS DETECTION BY PCR  Not Detected   CORONAVIRUS 229E  Not Detected   CORONAVIRUS HKU1  Not Detected   CORONAVIRUS NL63  Not Detected   CORONAVIRUS OC43  Not Detected   HUMAN METAPNEUMOVIRUS  Not Detected   HUMAN RHINOVIRUS/ENTEROVIRUS  Not Detected   INFLUENZA B PCR  Not Detected   PARAINFLUENZA 1  Not Detected   PARAINFLUENZA VIRUS 2  Not Detected   PARAINFLUENZA VIRUS 3  Not Detected   PARAINFLUENZA VIRUS 4  Not Detected   BORDETELLA PERTUSSIS PCR  Not Detected   CHLAMYDOPHILA PNEUMONIAE PCR  Not Detected   MYCOPLAMA PNEUMO PCR  Not Detected   INFLUENZA A PCR  Not Detected   RSV, PCR  Not Detected         TEST  RESULTS PENDING AT DISCHARGE      Discharge Exam:  Alert and oriented x 4, in NAD  Supple neck, midline trach  RRR, no m/r/g, no edema  Barrel chest, distant diminished breath sounds, no wheezing, nonlabored  No clubbing or cyanosis     Disposition:  Home    Patient Instructions:      Discharge Medications        New Medications        Instructions Start Date   budesonide-formoterol 80-4.5  MCG/ACT inhaler  Commonly known as: Symbicort   2 puffs, Inhalation, 2 Times Daily - RT             Continue These Medications        Instructions Start Date   albuterol sulfate  (90 Base) MCG/ACT inhaler  Commonly known as: PROVENTIL HFA;VENTOLIN HFA;PROAIR HFA   2 puffs, Inhalation, Every 4 Hours PRN      Eliquis 5 MG tablet tablet  Generic drug: apixaban   5 mg, Oral, Every 12 Hours      ipratropium-albuterol 0.5-2.5 mg/3 ml nebulizer  Commonly known as: DUO-NEB   3 mL, Nebulization, Every 4 Hours PRN      Spiriva Respimat 1.25 MCG/ACT aerosol solution inhaler  Generic drug: Tiotropium Bromide Monohydrate   2 puffs, Inhalation, Daily - RT             Stop These Medications      Trelegy Ellipta 200-62.5-25 MCG/ACT inhaler  Generic drug: Fluticasone-Umeclidin-Vilant               Your medication list        START taking these medications        Instructions Last Dose Given Next Dose Due   budesonide-formoterol 80-4.5 MCG/ACT inhaler  Commonly known as: Symbicort      Inhale 2 puffs 2 (Two) Times a Day.              CONTINUE taking these medications        Instructions Last Dose Given Next Dose Due   albuterol sulfate  (90 Base) MCG/ACT inhaler  Commonly known as: PROVENTIL HFA;VENTOLIN HFA;PROAIR HFA      Inhale 2 puffs Every 4 (Four) Hours As Needed for Wheezing.       Eliquis 5 MG tablet tablet  Generic drug: apixaban      TAKE ONE TABLET BY MOUTH EVERY 12 HOURS       ipratropium-albuterol 0.5-2.5 mg/3 ml nebulizer  Commonly known as: DUO-NEB      Take 3 mL by nebulization Every 4 (Four) Hours As Needed for Wheezing or Shortness of Air.       Spiriva Respimat 1.25 MCG/ACT aerosol solution inhaler  Generic drug: Tiotropium Bromide Monohydrate      Inhale 2 puffs Daily.              STOP taking these medications      Trelegy Ellipta 200-62.5-25 MCG/ACT inhaler  Generic drug: Fluticasone-Umeclidin-Vilant                  Where to Get Your Medications        These medications were sent to [facility]  Pharmacy - [address] [address]      Hours: Monday to Friday 7 AM to 6 PM, Saturday & Sunday 8 AM to 4:30 PM (Closed 12 PM to 12:30 PM) Phone: [phone]   budesonide-formoterol 80-4.5 MCG/ACT inhaler             Medication Reconciliation: Please see electronically completed Med Rec.    Total time spent discharging patient including evaluation, medication reconciliation, arranging follow up, and post hospitalization instructions and education to patient and family total time exceeds 30 minutes.    Signed:  Sourav Bales MD  3/20/2024  15:56 EDT

## 2024-03-20 NOTE — PROGRESS NOTES
Exercise Oximetry    Patient Name:Scarlet Chakraborty   MRN: [id number]   Date: 03/20/24             ROOM AIR BASELINE   SpO2% 92   Heart Rate 98   Blood Pressure      EXERCISE ON ROOM AIR SpO2% EXERCISE ON O2 @  2 LPM SpO2%   1 MINUTE  1 MINUTE 92   2 MINUTES  2 MINUTES 91   3 MINUTES  3 MINUTES 91   4 MINUTES  4 MINUTES 93   5 MINUTES  5 MINUTES 94   6 MINUTES  6 MINUTES 91              Distance Walked   Distance Walked   Dyspnea (Shana Scale)   Dyspnea (Shana Scale)   Fatigue (Shana Scale)   Fatigue (Shana Scale)   SpO2% Post Exercise  91 SpO2% Post Exercise   HR Post Exercise   HR Post Exercise   Time to Recovery  10 mins Time to Recovery     Comments: Pt. Was purse lip breathing while walking a lap around the nurses station. States that she is always SOA but since she has been sick its a little worse. Pt. Wears 2L NC at home PRN.

## 2024-03-20 NOTE — PLAN OF CARE
Goal Outcome Evaluation:         Pt AxO, independent, ad siva, in no need of supplemental O2. O2 remained 88-92% only dropping to 85-86% breifly a couple times during the night when up to restroom. No complaints.

## 2024-03-20 NOTE — SIGNIFICANT NOTE
03/20/24 0906   OTHER   Discipline physical therapist   Therapy Assessment/Plan (PT)   Criteria for Skilled Interventions Met (PT) no problems identified which require skilled intervention  (Noted nsg doc pt is up adlib/indep as well as ampac of 23.  Per ccp notes, plan is dc home with no needs.  Consider OP pulm rehab if needed.)

## 2024-03-20 NOTE — PAYOR COMM NOTE
"Scarlet Chavez (64 y.o. Female)      PATIENT DISCHARGED 2024:  REF# [id number]     DEPT: [phone],  [phone]    [facility]: NPI [id number] [facility]# [id number]    MIKE CASTLE RN,[facility]     Date of Birth   1960    Social Security Number       Address   [address] [place] [address]    Home Phone       MRN   [id number]       Voodoo   Latter day    Marital Status   Single                            Admission Date   3/16/24    Admission Type   Emergency    Admitting Provider   Sourav Bales MD    Attending Provider       Department, Room/Bed   [address], [address]/       Discharge Date   3/20/2024    Discharge Disposition   Home or Self Care    Discharge Destination   Home                              Attending Provider: (none)   Allergies: No Known Allergies    Isolation: None   Infection: None   Code Status: CPR    Ht: 162.6 cm (64\")   Wt: 31.8 kg (70 lb 1.6 oz)    Admission Cmt: None   Principal Problem: COPD with acute exacerbation [J44.1]                   Active Insurance as of 3/16/2024       Primary Coverage       Payor Plan Insurance Group Employer/Plan Group    [facility] [employer] H[employer] [id number]       Payor Plan Address Payor Plan Phone Number Payor Plan Fax Number Effective Dates    [address] [phone]  2024 - None Entered    [place] [address]         Subscriber Name Subscriber Birth Date Member ID       SCARLET CHAVEZ 1960 [id number]                     Emergency Contacts        (Rel.) Home Phone Work Phone Mobile Phone    Yvonne Mccollum (Sister) [phone] -- --    Don Taylor (Significant Other) -- -- [phone]             Discharge Summary        Sourav Bales MD at 24 1556          Patient Identification:  Name: cSarlet Chavez  Age: 64 y.o.  Sex: female  :  1960  MRN: [id number]  Primary Care Physician: Flavia Jaquez, SHANKAR    Admit date: " 3/16/2024  Discharge date and time: March 20, 2024  Discharged Condition: good    Discharge Diagnoses:   COPD with acute exacerbation    Acute on chronic respiratory failure with hypercapnia  Hypotension, possible nonischemic cardiomyopathy  Previous CVA on Eliquis  Non-ST elevation MI, likely type II  History of Takotsubo cardiomyopathy in 2018      Hospital Course: Scarlet Chakraborty presented to [facility] with hypercapnic respiratory failure and required noninvasive ventilation.  She was admitted to the intensive care unit.  She was diagnosed with COPD exacerbation.  Due to her history of nonischemic Takotsubo cardiomyopathy she was seen by cardiology and echocardiogram was performed showing improved ejection fraction compared to her episode of Takotsubo a few years ago.  She was weaned off of noninvasive ventilation and over the ensuing days she continued to improve rapidly after bronchodilators and steroids.  She actually has no additional wheezing.  Her wheezing and bronchospasm have completely resolved.  Cardiology did not feel that she had acute MI and she did not need any ischemic workup.  She was weaned off of oxygen on room air at rest but during exertion walking oximetry showed that she would benefit from 1 to 2 L of oxygen during exertion.  She already owns oxygen equipment at home.  We have instructed her to use it during exertion and at night but at night only 1 L is necessary.  She has maximally benefited from acute inpatient care and is ready to be discharged home today.  She should follow-up with my colleague Dr Mukherjee in the office within the next 30 days.  She was given Symbicort and Spiriva in the hospital and she wants to continue these prescriptions since Trelegy is too expensive  She should follow-up with her primary care physician within the next week      Consults:   IP CONSULT TO PULMONOLOGY  IP CONSULT TO NUTRITION SERVICES  IP CONSULT TO CARDIOLOGY    Significant  Diagnostic Studies:   Imaging Results (Most Recent)       Procedure Component Value Units Date/Time    XR Chest 1 View [[id number]] Collected: 03/16/24 1001     Updated: 03/16/24 1007    Narrative:      ONE-VIEW PORTABLE CHEST AT 9:52 A.M.     HISTORY: Pulmonary emphysema. Shortness of breath.     FINDINGS: There is severe pulm emphysema with marked hyperinflation.  There is no evidence of pneumothorax. There is improvement in some vague  interstitial changes and atelectasis at the bases when compared to an  exam dating back to 3/15/2021. The heart size remains normal.     This report was finalized on 3/16/2024 10:03 AM by Dr. Jh Wong M.D on Workstation: [id number]             Results from last 7 days   Lab Units 03/16/24  0918   ADENOVIRUS DETECTION BY PCR  Not Detected   CORONAVIRUS 229E  Not Detected   CORONAVIRUS HKU1  Not Detected   CORONAVIRUS NL63  Not Detected   CORONAVIRUS OC43  Not Detected   HUMAN METAPNEUMOVIRUS  Not Detected   HUMAN RHINOVIRUS/ENTEROVIRUS  Not Detected   INFLUENZA B PCR  Not Detected   PARAINFLUENZA 1  Not Detected   PARAINFLUENZA VIRUS 2  Not Detected   PARAINFLUENZA VIRUS 3  Not Detected   PARAINFLUENZA VIRUS 4  Not Detected   BORDETELLA PERTUSSIS PCR  Not Detected   CHLAMYDOPHILA PNEUMONIAE PCR  Not Detected   MYCOPLAMA PNEUMO PCR  Not Detected   INFLUENZA A PCR  Not Detected   RSV, PCR  Not Detected         TEST  RESULTS PENDING AT DISCHARGE      Discharge Exam:  Alert and oriented x 4, in NAD  Supple neck, midline trach  RRR, no m/r/g, no edema  Barrel chest, distant diminished breath sounds, no wheezing, nonlabored  No clubbing or cyanosis     Disposition:  Home    Patient Instructions:      Discharge Medications        New Medications        Instructions Start Date   budesonide-formoterol 80-4.5 MCG/ACT inhaler  Commonly known as: Symbicort   2 puffs, Inhalation, 2 Times Daily - RT             Continue These Medications        Instructions Start Date   albuterol sulfate   (90 Base) MCG/ACT inhaler  Commonly known as: PROVENTIL HFA;VENTOLIN HFA;PROAIR HFA   2 puffs, Inhalation, Every 4 Hours PRN      Eliquis 5 MG tablet tablet  Generic drug: apixaban   5 mg, Oral, Every 12 Hours      ipratropium-albuterol 0.5-2.5 mg/3 ml nebulizer  Commonly known as: DUO-NEB   3 mL, Nebulization, Every 4 Hours PRN      Spiriva Respimat 1.25 MCG/ACT aerosol solution inhaler  Generic drug: Tiotropium Bromide Monohydrate   2 puffs, Inhalation, Daily - RT             Stop These Medications      Trelegy Ellipta 200-62.5-25 MCG/ACT inhaler  Generic drug: Fluticasone-Umeclidin-Vilant               Your medication list        START taking these medications        Instructions Last Dose Given Next Dose Due   budesonide-formoterol 80-4.5 MCG/ACT inhaler  Commonly known as: Symbicort      Inhale 2 puffs 2 (Two) Times a Day.              CONTINUE taking these medications        Instructions Last Dose Given Next Dose Due   albuterol sulfate  (90 Base) MCG/ACT inhaler  Commonly known as: PROVENTIL HFA;VENTOLIN HFA;PROAIR HFA      Inhale 2 puffs Every 4 (Four) Hours As Needed for Wheezing.       Eliquis 5 MG tablet tablet  Generic drug: apixaban      TAKE ONE TABLET BY MOUTH EVERY 12 HOURS       ipratropium-albuterol 0.5-2.5 mg/3 ml nebulizer  Commonly known as: DUO-NEB      Take 3 mL by nebulization Every 4 (Four) Hours As Needed for Wheezing or Shortness of Air.       Spiriva Respimat 1.25 MCG/ACT aerosol solution inhaler  Generic drug: Tiotropium Bromide Monohydrate      Inhale 2 puffs Daily.              STOP taking these medications      Trelegy Ellipta 200-62.5-25 MCG/ACT inhaler  Generic drug: Fluticasone-Umeclidin-Vilant                  Where to Get Your Medications        These medications were sent to [place] [id number]      Hours: Monday to Friday 7 AM to 6 PM, Saturday & Sunday 8 AM to 4:30 PM (Closed 12 PM to 12:30 PM) Phone:  [phone]   budesonide-formoterol 80-4.5 MCG/ACT inhaler             Medication Reconciliation: Please see electronically completed Med Rec.    Total time spent discharging patient including evaluation, medication reconciliation, arranging follow up, and post hospitalization instructions and education to patient and family total time exceeds 30 minutes.    Signed:  Sourav Boyd MD  3/20/2024  15:56 EDT    Electronically signed by Sourav Boyd MD at 03/20/24 1601       Discharge Order (From admission, onward)       Start     Ordered    03/20/24 1555  Discharge patient  Once        Expected Discharge Date: 03/20/24   Discharge Disposition: Home or Self Care   Physician of Record for Attribution - Please select from Treatment Team: SOURAV BOYD [[id number]]   Review needed by CMO to determine Physician of Record: No      Question Answer Comment   Physician of Record for Attribution - Please select from Treatment Team SOURAV BOYD    Review needed by CMO to determine Physician of Record No        03/20/24 [id number]

## 2024-03-20 NOTE — NURSING NOTE
Pt discharged today accompanied by a family member, oxygen tank delivered at pt's room. Meds prescriptions send it to pt's pharmacy at [facility]. A copy of AVS and med list provided. Instructions for follow up with PCP and pulmonology given. Peripheral Ivs removed.

## 2024-03-20 NOTE — DISCHARGE PLACEMENT REQUEST
"Scarlet Chavez (64 y.o. Female)       Date of Birth   1960    Social Security Number       Address   [address] [place] [address]    Home Phone       MRN   [id number]       Episcopal   Protestant    Marital Status   Single                            Admission Date   3/16/24    Admission Type   Emergency    Admitting Provider   Sourav Bales MD    Attending Provider   Sourav Bales MD    Department, Room/Bed   [address], [address]1       Discharge Date       Discharge Disposition       Discharge Destination                                 Attending Provider: Sourav Bales MD    Allergies: No Known Allergies    Isolation: None   Infection: None   Code Status: CPR    Ht: 162.6 cm (64\")   Wt: 31.8 kg (70 lb 1.6 oz)    Admission Cmt: None   Principal Problem: COPD with acute exacerbation [J44.1]                   Active Insurance as of 3/16/2024       Primary Coverage       Payor Plan Insurance Group Employer/Plan Group    [employer] [id number]       Payor Plan Address Payor Plan Phone Number Payor Plan Fax Number Effective Dates    [address] [phone]  1/1/2024 - None Entered    [facility] [id number]         Subscriber Name Subscriber Birth Date Member ID       SCARLET CHAVEZ 1960 [id number]                     Emergency Contacts        (Rel.) Home Phone Work Phone Mobile Phone    Yvonne Mccollum (Sister) [phone] -- --    Don Taylor (Significant Other) -- -- [phone]                "

## 2024-03-21 NOTE — OUTREACH NOTE
Prep Survey      Flowsheet Row Responses   [facility] facility patient discharged from? [facility]   Is LACE score < 7 ? No   Eligibility Readm Mgmt   Discharge diagnosis COPD with exacerbation   Does the patient have one of the following disease processes/diagnoses(primary or secondary)? COPD   Does the patient have Home health ordered? No   Is there a DME ordered? Yes   What DME was ordered? [employer] for oxygen   Prep survey completed? Yes            NICOLASA SANDERS - Registered Nurse

## 2024-03-21 NOTE — CASE MANAGEMENT/SOCIAL WORK
Case Management Discharge Note      Final Note: Home, no additional CCP needs.    Provided Post Acute Provider List?: N/A  Provided Post Acute Provider Quality & Resource List?: N/A    Selected Continued Care - Discharged on 3/20/2024 Admission date: 3/16/2024 - Discharge disposition: Home or Self Care      Destination    No services have been selected for the patient.                Durable Medical Equipment       Service Provider Selected Services Address Phone Fax Patient Preferred    [facility] [address] [address] [phone] --              Dialysis/Infusion    No services have been selected for the patient.                Home Medical Care    No services have been selected for the patient.                Therapy    No services have been selected for the patient.                Community Resources    No services have been selected for the patient.                Community & DME    No services have been selected for the patient.                    Transportation Services  Private: Car    Final Discharge Disposition Code: 01 - home or self-care

## 2024-03-25 ENCOUNTER — READMISSION MANAGEMENT (OUTPATIENT)
Dept: CALL CENTER | Facility: HOSPITAL | Age: 64
End: 2024-03-25
Payer: COMMERCIAL

## 2024-03-25 NOTE — OUTREACH NOTE
COPD/PN Week 1 Survey      Flowsheet Row Responses   [facility], [place], operated by [facility] patient discharged from? [place]   Does the patient have one of the following disease processes/diagnoses(primary or secondary)? COPD   Week 1 attempt successful? Yes   Call start time 0801   Call end time 0809   Meds reviewed with patient/caregiver? Yes   Is the patient having any side effects they believe may be caused by any medication additions or changes? No   Does the patient have all medications ordered at discharge? Yes   Is the patient taking all medications as directed (includes completed medication regime)? Yes   Comments regarding appointments States will check with PCP to see if earlier appt needed. States will make 30 day appt with pulmonologist.   Does the patient have a primary care provider?  Yes   Does the patient have an appointment with their PCP or specialist within 7 days of discharge? Greater than 7 days   What is preventing the patient from scheduling follow up appointments within 7 days of discharge? Earlier appointment not available   Nursing Interventions Verified appointment date/time/provider   Has the patient kept scheduled appointments due by today? N/A   Has home health visited the patient within 72 hours of discharge? N/A   DME comments Home O2 at 1L as needed.   Pulse Ox monitoring Intermittent   Pulse Ox device source Patient   O2 Sat comments 94-96% on RA.   O2 Sat: education provided Sat levels, When to seek care, Monitoring frequency   Psychosocial issues? No   Did the patient receive a copy of their discharge instructions? Yes   Nursing interventions Reviewed instructions with patient   What is the patient's perception of their health status since discharge? Improving   Nursing Interventions Nurse provided patient education   Are the patient's immunizations up to date?  Yes   Nursing interventions Advised patient to discuss with provider at next visit, Educated on importance of maintaining up to date  immunizations as advised by provider   If the patient is a current smoker, are they able to teach back resources for cessation? Not a smoker   Is the patient/caregiver able to teach back the hierarchy of who to call/visit for symptoms/problems? PCP, Specialist, Home health nurse, Urgent Care, ED, 911 Yes   Is the patient able to teach back COPD zones? No   Nursing interventions Education provided on various zones   Patient reports what zone on this call? Green Zone   Green Zone Reports doing well, Breathing without shortness of breath, Usual activity and exercise level, Usual amounts of cough and phlegm/mucous, Slept well last night, Appetite is good   Green Zone interventions: Take daily medications, Use oxygen as prescribed, Continue regular exercise/diet plan, At all times avoid cigarette smoking, vaping and inhaled irritants   Week 1 call completed? Yes   Graduated Yes   Is the patient interested in additional calls from an ambulatory ? No   Would this patient benefit from a Referral to Amb Social Work? No   Wrap up additional comments Patient states improving. Denies any needs or concerns today. Denies any need for further f/u calls.   Call end time 0809            Altagracia BEE - Registered Nurse

## 2024-05-06 ENCOUNTER — HOSPITAL ENCOUNTER (OUTPATIENT)
Dept: CT IMAGING | Facility: HOSPITAL | Age: 64
Discharge: HOME | End: 2024-05-06
Admitting: HOSPITALIST
Payer: COMMERCIAL

## 2024-05-06 DIAGNOSIS — Z87.891 PERSONAL HISTORY OF TOBACCO USE, PRESENTING HAZARDS TO HEALTH: ICD-10-CM

## 2024-05-06 PROCEDURE — 71271 CT THORAX LUNG CANCER SCR C-: CPT

## 2024-05-20 ENCOUNTER — TRANSCRIBE ORDERS (OUTPATIENT)
Dept: ADMINISTRATIVE | Facility: HOSPITAL | Age: 64
End: 2024-05-20
Payer: COMMERCIAL

## 2024-05-20 DIAGNOSIS — F17.210 CIGARETTE SMOKER: Primary | ICD-10-CM

## 2024-09-22 NOTE — TELEPHONE ENCOUNTER
----- Message from SHANKAR Coker sent at 12/18/2018 10:32 PM EST -----  Please let patient know that her LDL was 50 which is at goal of < 70, she can decrease her dose to 40 mg (take 1/2 her 80 mg tab) which may help decrease her muscle aches. Thanks.   PAIN SCALE 5 OF 10.

## 2024-11-29 RX ORDER — AZITHROMYCIN 250 MG/1
TABLET, FILM COATED ORAL
Qty: 6 TABLET | Refills: 0 | Status: SHIPPED | OUTPATIENT
Start: 2024-11-29

## 2025-02-10 ENCOUNTER — APPOINTMENT (OUTPATIENT)
Dept: CT IMAGING | Facility: HOSPITAL | Age: 65
DRG: 207 | End: 2025-02-10
Payer: COMMERCIAL

## 2025-02-10 ENCOUNTER — HOSPITAL ENCOUNTER (INPATIENT)
Facility: HOSPITAL | Age: 65
LOS: 15 days | Discharge: NURSING, REHAB OR LONG-TERM CARE | DRG: 207 | End: 2025-02-25
Attending: EMERGENCY MEDICINE | Admitting: INTERNAL MEDICINE
Payer: COMMERCIAL

## 2025-02-10 ENCOUNTER — APPOINTMENT (OUTPATIENT)
Dept: GENERAL RADIOLOGY | Facility: HOSPITAL | Age: 65
DRG: 207 | End: 2025-02-10
Payer: COMMERCIAL

## 2025-02-10 DIAGNOSIS — Z79.01 ANTICOAGULATED BY ANTICOAGULATION TREATMENT: ICD-10-CM

## 2025-02-10 DIAGNOSIS — B34.2 CORONAVIRUS INFECTION: ICD-10-CM

## 2025-02-10 DIAGNOSIS — J96.02 ACUTE RESPIRATORY FAILURE WITH HYPOXIA AND HYPERCAPNIA: Primary | ICD-10-CM

## 2025-02-10 DIAGNOSIS — R79.89 ELEVATED D-DIMER: ICD-10-CM

## 2025-02-10 DIAGNOSIS — J96.01 ACUTE RESPIRATORY FAILURE WITH HYPOXIA AND HYPERCAPNIA: Primary | ICD-10-CM

## 2025-02-10 DIAGNOSIS — J44.1 COPD EXACERBATION: ICD-10-CM

## 2025-02-10 LAB
ALBUMIN SERPL-MCNC: 4.2 G/DL (ref 3.5–5.2)
ALBUMIN/GLOB SERPL: 1.4 G/DL
ALP SERPL-CCNC: 70 U/L (ref 39–117)
ALT SERPL W P-5'-P-CCNC: 16 U/L (ref 1–33)
ANION GAP SERPL CALCULATED.3IONS-SCNC: 12 MMOL/L (ref 5–15)
ANION GAP SERPL CALCULATED.3IONS-SCNC: 12 MMOL/L (ref 5–15)
ARTERIAL PATENCY WRIST A: POSITIVE
ARTERIAL PATENCY WRIST A: POSITIVE
AST SERPL-CCNC: 24 U/L (ref 1–32)
ATMOSPHERIC PRESS: 759.8 MMHG
ATMOSPHERIC PRESS: 760.6 MMHG
B PARAPERT DNA SPEC QL NAA+PROBE: NOT DETECTED
B PERT DNA SPEC QL NAA+PROBE: NOT DETECTED
BASE EXCESS BLDA CALC-SCNC: -3.6 MMOL/L (ref 0–2)
BASE EXCESS BLDA CALC-SCNC: -5.1 MMOL/L (ref 0–2)
BASOPHILS # BLD AUTO: 0.01 10*3/MM3 (ref 0–0.2)
BASOPHILS # BLD AUTO: 0.03 10*3/MM3 (ref 0–0.2)
BASOPHILS NFR BLD AUTO: 0.1 % (ref 0–1.5)
BASOPHILS NFR BLD AUTO: 0.3 % (ref 0–1.5)
BDY SITE: ABNORMAL
BDY SITE: ABNORMAL
BILIRUB SERPL-MCNC: 0.3 MG/DL (ref 0–1.2)
BUN SERPL-MCNC: 18 MG/DL (ref 8–23)
BUN SERPL-MCNC: 24 MG/DL (ref 8–23)
BUN/CREAT SERPL: 35.3 (ref 7–25)
BUN/CREAT SERPL: 39.3 (ref 7–25)
C PNEUM DNA NPH QL NAA+NON-PROBE: NOT DETECTED
CALCIUM SPEC-SCNC: 9.2 MG/DL (ref 8.6–10.5)
CALCIUM SPEC-SCNC: 9.5 MG/DL (ref 8.6–10.5)
CHLORIDE SERPL-SCNC: 105 MMOL/L (ref 98–107)
CHLORIDE SERPL-SCNC: 106 MMOL/L (ref 98–107)
CO2 BLDA-SCNC: 28 MMOL/L (ref 23–27)
CO2 BLDA-SCNC: 31.6 MMOL/L (ref 23–27)
CO2 SERPL-SCNC: 23 MMOL/L (ref 22–29)
CO2 SERPL-SCNC: 23 MMOL/L (ref 22–29)
CREAT SERPL-MCNC: 0.51 MG/DL (ref 0.57–1)
CREAT SERPL-MCNC: 0.61 MG/DL (ref 0.57–1)
D DIMER PPP FEU-MCNC: 3.74 MCGFEU/ML (ref 0–0.64)
DEPRECATED RDW RBC AUTO: 42.3 FL (ref 37–54)
DEPRECATED RDW RBC AUTO: 43.3 FL (ref 37–54)
DEVICE COMMENT: ABNORMAL
DEVICE COMMENT: ABNORMAL
EGFRCR SERPLBLD CKD-EPI 2021: 100 ML/MIN/1.73
EGFRCR SERPLBLD CKD-EPI 2021: 104.4 ML/MIN/1.73
EOSINOPHIL # BLD AUTO: 0 10*3/MM3 (ref 0–0.4)
EOSINOPHIL # BLD AUTO: 0.01 10*3/MM3 (ref 0–0.4)
EOSINOPHIL NFR BLD AUTO: 0 % (ref 0.3–6.2)
EOSINOPHIL NFR BLD AUTO: 0.1 % (ref 0.3–6.2)
ERYTHROCYTE [DISTWIDTH] IN BLOOD BY AUTOMATED COUNT: 12.3 % (ref 12.3–15.4)
ERYTHROCYTE [DISTWIDTH] IN BLOOD BY AUTOMATED COUNT: 12.4 % (ref 12.3–15.4)
FLUAV SUBTYP SPEC NAA+PROBE: NOT DETECTED
FLUBV RNA ISLT QL NAA+PROBE: NOT DETECTED
GAS FLOW AIRWAY: 8 LPM
GEN 5 1HR TROPONIN T REFLEX: 95 NG/L
GLOBULIN UR ELPH-MCNC: 3.1 GM/DL
GLUCOSE BLDC GLUCOMTR-MCNC: 170 MG/DL (ref 70–130)
GLUCOSE SERPL-MCNC: 191 MG/DL (ref 65–99)
GLUCOSE SERPL-MCNC: 209 MG/DL (ref 65–99)
HADV DNA SPEC NAA+PROBE: NOT DETECTED
HCO3 BLDA-SCNC: 26 MMOL/L (ref 22–28)
HCO3 BLDA-SCNC: 28.5 MMOL/L (ref 22–28)
HCOV 229E RNA SPEC QL NAA+PROBE: NOT DETECTED
HCOV HKU1 RNA SPEC QL NAA+PROBE: NOT DETECTED
HCOV NL63 RNA SPEC QL NAA+PROBE: NOT DETECTED
HCOV OC43 RNA SPEC QL NAA+PROBE: DETECTED
HCT VFR BLD AUTO: 42.1 % (ref 34–46.6)
HCT VFR BLD AUTO: 46.3 % (ref 34–46.6)
HEMODILUTION: NO
HEMODILUTION: NO
HGB BLD-MCNC: 13.9 G/DL (ref 12–15.9)
HGB BLD-MCNC: 14.7 G/DL (ref 12–15.9)
HMPV RNA NPH QL NAA+NON-PROBE: NOT DETECTED
HPIV1 RNA ISLT QL NAA+PROBE: NOT DETECTED
HPIV2 RNA SPEC QL NAA+PROBE: NOT DETECTED
HPIV3 RNA NPH QL NAA+PROBE: NOT DETECTED
HPIV4 P GENE NPH QL NAA+PROBE: NOT DETECTED
IMM GRANULOCYTES # BLD AUTO: 0.04 10*3/MM3 (ref 0–0.05)
IMM GRANULOCYTES # BLD AUTO: 0.05 10*3/MM3 (ref 0–0.05)
IMM GRANULOCYTES NFR BLD AUTO: 0.5 % (ref 0–0.5)
IMM GRANULOCYTES NFR BLD AUTO: 0.5 % (ref 0–0.5)
INHALED O2 CONCENTRATION: 100 %
INR PPP: 1.04 (ref 0.9–1.1)
LYMPHOCYTES # BLD AUTO: 0.5 10*3/MM3 (ref 0.7–3.1)
LYMPHOCYTES # BLD AUTO: 2.95 10*3/MM3 (ref 0.7–3.1)
LYMPHOCYTES NFR BLD AUTO: 28.2 % (ref 19.6–45.3)
LYMPHOCYTES NFR BLD AUTO: 5.6 % (ref 19.6–45.3)
Lab: ABNORMAL
Lab: ABNORMAL
M PNEUMO IGG SER IA-ACNC: NOT DETECTED
MCH RBC QN AUTO: 30.4 PG (ref 26.6–33)
MCH RBC QN AUTO: 30.8 PG (ref 26.6–33)
MCHC RBC AUTO-ENTMCNC: 31.7 G/DL (ref 31.5–35.7)
MCHC RBC AUTO-ENTMCNC: 33 G/DL (ref 31.5–35.7)
MCV RBC AUTO: 93.3 FL (ref 79–97)
MCV RBC AUTO: 95.7 FL (ref 79–97)
MODALITY: ABNORMAL
MODALITY: ABNORMAL
MONOCYTES # BLD AUTO: 0.24 10*3/MM3 (ref 0.1–0.9)
MONOCYTES # BLD AUTO: 0.67 10*3/MM3 (ref 0.1–0.9)
MONOCYTES NFR BLD AUTO: 2.7 % (ref 5–12)
MONOCYTES NFR BLD AUTO: 6.4 % (ref 5–12)
NEUTROPHILS NFR BLD AUTO: 6.75 10*3/MM3 (ref 1.7–7)
NEUTROPHILS NFR BLD AUTO: 64.5 % (ref 42.7–76)
NEUTROPHILS NFR BLD AUTO: 8.09 10*3/MM3 (ref 1.7–7)
NEUTROPHILS NFR BLD AUTO: 91.1 % (ref 42.7–76)
NOTIFIED WHO: ABNORMAL
NOTIFIED WHO: ABNORMAL
NRBC BLD AUTO-RTO: 0 /100 WBC (ref 0–0.2)
NRBC BLD AUTO-RTO: 0 /100 WBC (ref 0–0.2)
NT-PROBNP SERPL-MCNC: 158 PG/ML (ref 0–900)
O2 A-A PPRESDIFF RESPIRATORY: 0.5 MMHG
PCO2 BLDA: 64.7 MM HG (ref 35–45)
PCO2 BLDA: 98.9 MM HG (ref 35–45)
PH BLDA: 7.07 PH UNITS (ref 7.35–7.45)
PH BLDA: 7.21 PH UNITS (ref 7.35–7.45)
PLATELET # BLD AUTO: 221 10*3/MM3 (ref 140–450)
PLATELET # BLD AUTO: 253 10*3/MM3 (ref 140–450)
PMV BLD AUTO: 10.1 FL (ref 6–12)
PMV BLD AUTO: 11.1 FL (ref 6–12)
PO2 BLD: 327 MM[HG] (ref 0–500)
PO2 BLDA: 327.2 MM HG (ref 80–100)
PO2 BLDA: 71 MM HG (ref 80–100)
POTASSIUM SERPL-SCNC: 4.7 MMOL/L (ref 3.5–5.2)
POTASSIUM SERPL-SCNC: 4.7 MMOL/L (ref 3.5–5.2)
PROCALCITONIN SERPL-MCNC: 0.14 NG/ML (ref 0–0.25)
PROT SERPL-MCNC: 7.3 G/DL (ref 6–8.5)
PROTHROMBIN TIME: 13.5 SECONDS (ref 11.7–14.2)
RBC # BLD AUTO: 4.51 10*6/MM3 (ref 3.77–5.28)
RBC # BLD AUTO: 4.84 10*6/MM3 (ref 3.77–5.28)
READ BACK: YES
READ BACK: YES
RHINOVIRUS RNA SPEC NAA+PROBE: NOT DETECTED
RSV RNA NPH QL NAA+NON-PROBE: NOT DETECTED
SAO2 % BLDCOA: 84 % (ref 92–98.5)
SAO2 % BLDCOA: 99.9 % (ref 92–98.5)
SARS-COV-2 RNA NPH QL NAA+NON-PROBE: NOT DETECTED
SET MECH RESP RATE: 22
SODIUM SERPL-SCNC: 140 MMOL/L (ref 136–145)
SODIUM SERPL-SCNC: 141 MMOL/L (ref 136–145)
TOTAL RATE: 26 BREATHS/MINUTE
TOTAL RATE: 29 BREATHS/MINUTE
TROPONIN T % DELTA: 375
TROPONIN T NUMERIC DELTA: 75 NG/L
TROPONIN T SERPL HS-MCNC: 20 NG/L
VT ON VENT VENT: 450 ML
WBC NRBC COR # BLD AUTO: 10.46 10*3/MM3 (ref 3.4–10.8)
WBC NRBC COR # BLD AUTO: 8.88 10*3/MM3 (ref 3.4–10.8)

## 2025-02-10 PROCEDURE — 36600 WITHDRAWAL OF ARTERIAL BLOOD: CPT

## 2025-02-10 PROCEDURE — 84145 PROCALCITONIN (PCT): CPT | Performed by: EMERGENCY MEDICINE

## 2025-02-10 PROCEDURE — 99291 CRITICAL CARE FIRST HOUR: CPT

## 2025-02-10 PROCEDURE — 25010000002 MAGNESIUM SULFATE 2 GM/50ML SOLUTION: Performed by: EMERGENCY MEDICINE

## 2025-02-10 PROCEDURE — 94640 AIRWAY INHALATION TREATMENT: CPT

## 2025-02-10 PROCEDURE — 93005 ELECTROCARDIOGRAM TRACING: CPT | Performed by: EMERGENCY MEDICINE

## 2025-02-10 PROCEDURE — 85025 COMPLETE CBC W/AUTO DIFF WBC: CPT | Performed by: INTERNAL MEDICINE

## 2025-02-10 PROCEDURE — 83880 ASSAY OF NATRIURETIC PEPTIDE: CPT | Performed by: EMERGENCY MEDICINE

## 2025-02-10 PROCEDURE — 36415 COLL VENOUS BLD VENIPUNCTURE: CPT | Performed by: INTERNAL MEDICINE

## 2025-02-10 PROCEDURE — 94799 UNLISTED PULMONARY SVC/PX: CPT

## 2025-02-10 PROCEDURE — 25510000001 IOPAMIDOL PER 1 ML: Performed by: INTERNAL MEDICINE

## 2025-02-10 PROCEDURE — 94760 N-INVAS EAR/PLS OXIMETRY 1: CPT

## 2025-02-10 PROCEDURE — 82948 REAGENT STRIP/BLOOD GLUCOSE: CPT

## 2025-02-10 PROCEDURE — 25010000002 METHYLPREDNISOLONE PER 40 MG: Performed by: INTERNAL MEDICINE

## 2025-02-10 PROCEDURE — 84484 ASSAY OF TROPONIN QUANT: CPT | Performed by: EMERGENCY MEDICINE

## 2025-02-10 PROCEDURE — 85379 FIBRIN DEGRADATION QUANT: CPT | Performed by: EMERGENCY MEDICINE

## 2025-02-10 PROCEDURE — 82803 BLOOD GASES ANY COMBINATION: CPT

## 2025-02-10 PROCEDURE — 71275 CT ANGIOGRAPHY CHEST: CPT

## 2025-02-10 PROCEDURE — 0202U NFCT DS 22 TRGT SARS-COV-2: CPT | Performed by: EMERGENCY MEDICINE

## 2025-02-10 PROCEDURE — 93010 ELECTROCARDIOGRAM REPORT: CPT | Performed by: INTERNAL MEDICINE

## 2025-02-10 PROCEDURE — 71045 X-RAY EXAM CHEST 1 VIEW: CPT

## 2025-02-10 PROCEDURE — 85610 PROTHROMBIN TIME: CPT | Performed by: EMERGENCY MEDICINE

## 2025-02-10 PROCEDURE — 85025 COMPLETE CBC W/AUTO DIFF WBC: CPT | Performed by: EMERGENCY MEDICINE

## 2025-02-10 PROCEDURE — 80053 COMPREHEN METABOLIC PANEL: CPT | Performed by: EMERGENCY MEDICINE

## 2025-02-10 PROCEDURE — 94660 CPAP INITIATION&MGMT: CPT

## 2025-02-10 PROCEDURE — 94761 N-INVAS EAR/PLS OXIMETRY MLT: CPT

## 2025-02-10 RX ORDER — MAGNESIUM SULFATE HEPTAHYDRATE 40 MG/ML
2 INJECTION, SOLUTION INTRAVENOUS ONCE
Status: COMPLETED | OUTPATIENT
Start: 2025-02-10 | End: 2025-02-10

## 2025-02-10 RX ORDER — IPRATROPIUM BROMIDE AND ALBUTEROL SULFATE 2.5; .5 MG/3ML; MG/3ML
3 SOLUTION RESPIRATORY (INHALATION) ONCE
Status: COMPLETED | OUTPATIENT
Start: 2025-02-10 | End: 2025-02-10

## 2025-02-10 RX ORDER — SODIUM CHLORIDE 9 MG/ML
40 INJECTION, SOLUTION INTRAVENOUS AS NEEDED
Status: DISCONTINUED | OUTPATIENT
Start: 2025-02-10 | End: 2025-02-25 | Stop reason: HOSPADM

## 2025-02-10 RX ORDER — AMOXICILLIN 250 MG
2 CAPSULE ORAL 2 TIMES DAILY
Status: DISCONTINUED | OUTPATIENT
Start: 2025-02-10 | End: 2025-02-25 | Stop reason: HOSPADM

## 2025-02-10 RX ORDER — SODIUM CHLORIDE 0.9 % (FLUSH) 0.9 %
10 SYRINGE (ML) INJECTION EVERY 12 HOURS SCHEDULED
Status: DISCONTINUED | OUTPATIENT
Start: 2025-02-10 | End: 2025-02-25 | Stop reason: HOSPADM

## 2025-02-10 RX ORDER — IPRATROPIUM BROMIDE AND ALBUTEROL SULFATE 2.5; .5 MG/3ML; MG/3ML
3 SOLUTION RESPIRATORY (INHALATION)
Status: DISCONTINUED | OUTPATIENT
Start: 2025-02-10 | End: 2025-02-25 | Stop reason: HOSPADM

## 2025-02-10 RX ORDER — POLYETHYLENE GLYCOL 3350 17 G/17G
17 POWDER, FOR SOLUTION ORAL DAILY PRN
Status: DISCONTINUED | OUTPATIENT
Start: 2025-02-10 | End: 2025-02-25 | Stop reason: HOSPADM

## 2025-02-10 RX ORDER — METHYLPREDNISOLONE SODIUM SUCCINATE 40 MG/ML
40 INJECTION, POWDER, LYOPHILIZED, FOR SOLUTION INTRAMUSCULAR; INTRAVENOUS EVERY 8 HOURS
Status: DISCONTINUED | OUTPATIENT
Start: 2025-02-10 | End: 2025-02-11

## 2025-02-10 RX ORDER — BISACODYL 5 MG/1
5 TABLET, DELAYED RELEASE ORAL DAILY PRN
Status: DISCONTINUED | OUTPATIENT
Start: 2025-02-10 | End: 2025-02-25 | Stop reason: HOSPADM

## 2025-02-10 RX ORDER — IOPAMIDOL 755 MG/ML
100 INJECTION, SOLUTION INTRAVASCULAR
Status: COMPLETED | OUTPATIENT
Start: 2025-02-10 | End: 2025-02-10

## 2025-02-10 RX ORDER — BISACODYL 10 MG
10 SUPPOSITORY, RECTAL RECTAL DAILY PRN
Status: DISCONTINUED | OUTPATIENT
Start: 2025-02-10 | End: 2025-02-25 | Stop reason: HOSPADM

## 2025-02-10 RX ORDER — NITROGLYCERIN 0.4 MG/1
0.4 TABLET SUBLINGUAL
Status: DISCONTINUED | OUTPATIENT
Start: 2025-02-10 | End: 2025-02-25 | Stop reason: HOSPADM

## 2025-02-10 RX ORDER — METHYLPREDNISOLONE SODIUM SUCCINATE 125 MG/2ML
125 INJECTION, POWDER, LYOPHILIZED, FOR SOLUTION INTRAMUSCULAR; INTRAVENOUS ONCE
Status: DISCONTINUED | OUTPATIENT
Start: 2025-02-10 | End: 2025-02-10

## 2025-02-10 RX ORDER — SODIUM CHLORIDE 0.9 % (FLUSH) 0.9 %
10 SYRINGE (ML) INJECTION AS NEEDED
Status: DISCONTINUED | OUTPATIENT
Start: 2025-02-10 | End: 2025-02-25 | Stop reason: HOSPADM

## 2025-02-10 RX ADMIN — IPRATROPIUM BROMIDE AND ALBUTEROL SULFATE 3 ML: .5; 3 SOLUTION RESPIRATORY (INHALATION) at 23:35

## 2025-02-10 RX ADMIN — IOPAMIDOL 65 ML: 755 INJECTION, SOLUTION INTRAVENOUS at 21:17

## 2025-02-10 RX ADMIN — IPRATROPIUM BROMIDE AND ALBUTEROL SULFATE 3 ML: .5; 3 SOLUTION RESPIRATORY (INHALATION) at 16:20

## 2025-02-10 RX ADMIN — MAGNESIUM SULFATE IN WATER FOR 2 G: 40 INJECTION INTRAVENOUS at 15:54

## 2025-02-10 RX ADMIN — METHYLPREDNISOLONE SODIUM SUCCINATE 40 MG: 40 INJECTION, POWDER, FOR SOLUTION INTRAMUSCULAR; INTRAVENOUS at 22:44

## 2025-02-10 NOTE — ED TRIAGE NOTES
Pt was brought from home by EMS, for respiratory distress. Reports flu like symptoms from the last several days. Pt was 86% rooom air for EMS. Pt arrived on CPAP, given duoneb, albuterol and solumedrol.

## 2025-02-10 NOTE — H&P
H&P NOTE    Patient Identification:  Scarlet Chakraborty  65 y.o.  female  1960  [id number]                CC: Shortness of breath    History of Present Illness:  65-year-old female with known history of severe underlying COPD multiple hospital admission for COPD exacerbations follow-up my partner Dr. Mukherjee.  Patient also has underlying history of nonischemic cardiomyopathy previous CVA non-ST elevation MI with Takotsubo cardiomyopathy.  Patient presented to the emergency room with acute shortness of breath ongoing for the last several days.  No fever chills or aspiration was reported.  In the emergency room patient was noted to be in acute respiratory distress and placed on noninvasive ventilation.  At the time of my evaluation she is synchronizing with the noninvasive ventilation AVAPS.  Currently on 100% FiO2 maintaining sats 96 to 97%.  History is somewhat limited with patient's current clinical condition.  Review of Systems  As above and limited with patient's current clinical condition  Past Medical History:  Past Medical History:   Diagnosis Date    Asthma     Cerebrovascular accident (CVA) due to thrombosis of right middle cerebral artery     COPD (chronic obstructive pulmonary disease)     Nonischemic cardiomyopathy     NSTEMI (non-ST elevated myocardial infarction)     Stroke     Takotsubo cardiomyopathy     Tobacco abuse        Past Surgical History:  Past Surgical History:   Procedure Laterality Date    CARDIAC CATHETERIZATION N/A 9/13/2018    Procedure: Left Heart Cath and cors;  Surgeon: Gabino Dozier MD;  Location: [facility] INVASIVE LOCATION;  Service: Cardiology    CARDIAC CATHETERIZATION N/A 9/13/2018    Procedure: Left ventriculography;  Surgeon: Gabino Dozier MD;  Location: [facility] INVASIVE LOCATION;  Service: Cardiology    CARDIAC ELECTROPHYSIOLOGY PROCEDURE N/A 9/27/2018    Procedure: Loop insertion  LINQ;  Surgeon: Jose R Barker MD;  Location: [facility] INVASIVE LOCATION;   Service: Cardiovascular        Home Meds:  (Not in a hospital admission)      Allergies:  No Known Allergies    Social History:   Social History     Socioeconomic History    Marital status: Single   Tobacco Use    Smoking status: Light Smoker     Current packs/day: 0.50     Average packs/day: 0.5 packs/day for 15.0 years (7.5 ttl pk-yrs)     Types: Cigarettes    Smokeless tobacco: Never    Tobacco comments:     Currently smokes cigarettes on occasion   Vaping Use    Vaping status: Never Used   Substance and Sexual Activity    Alcohol use: Yes     Comment: occassional/ Daily caffeine use    Drug use: No    Sexual activity: Defer       Family History:  Family History   Problem Relation Age of Onset    Cancer Sister        Physical Exam:  /67   Pulse 115   Temp 98.1 °F (36.7 °C)   Resp 24   SpO2 96%  There is no height or weight on file to calculate BMI. 96%    Physical Exam  Patient is examined using the personal protective equipment as per guidelines from infection control for this particular patient as enacted.  Hand hygiene was performed before and after patient interaction.  Cachectic female currently on noninvasive ventilation tolerating well   Eyes normal conjunctivae and pupils reactive to light  ENT normocephalic atraumatic  Neck midline trachea no thyromegaly  Chest diminished breath sounds with wheezing bilaterally  CVS regular rate and rhythm no lower extremity edema  Abdomen soft nontender no hepatosplenomegaly  CNS intact moving all extremities  Skin no rashes no nodules  Psych awake on noninvasive ventilation  Musculoskeletal no cyanosis no clubbing normal range of motion    LABS:  Lab Results   Component Value Date    CALCIUM 9.5 02/10/2025    PHOS 3.5 03/20/2024     Results from last 7 days   Lab Units 02/10/25  1547   SODIUM mmol/L 140   POTASSIUM mmol/L 4.7   CHLORIDE mmol/L 105   CO2 mmol/L 23.0   BUN mg/dL 18   CREATININE mg/dL 0.51*   GLUCOSE mg/dL 191*   CALCIUM mg/dL 9.5   WBC 10*3/mm3  10.46   HEMOGLOBIN g/dL 14.7   PLATELETS 10*3/mm3 253   ALT (SGPT) U/L 16   AST (SGOT) U/L 24   PROBNP pg/mL 158.0   PROCALCITONIN ng/mL 0.14     Lab Results   Component Value Date    TROPONINT 95 (C) 02/10/2025     Results from last 7 days   Lab Units 02/10/25  1707 02/10/25  1547   HSTROP T ng/L 95* 20*         Results from last 7 days   Lab Units 02/10/25  1547   PROCALCITONIN ng/mL 0.14     Results from last 7 days   Lab Units 02/10/25  1730 02/10/25  1539   PH, ARTERIAL pH units 7.213* 7.068*   PCO2, ARTERIAL mm Hg 64.7* 98.9*   PO2 ART mm Hg 327.2* 71.0*   O2 SATURATION ART % 99.9* 84.0*   FLOW RATE lpm  --  8.0000   MODALITY  BiPap HFNC     Results from last 7 days   Lab Units 02/10/25  1548   ADENOVIRUS DETECTION BY PCR  Not Detected   CORONAVIRUS 229E  Not Detected   CORONAVIRUS HKU1  Not Detected   CORONAVIRUS NL63  Not Detected   CORONAVIRUS OC43  Detected*   HUMAN METAPNEUMOVIRUS  Not Detected   HUMAN RHINOVIRUS/ENTEROVIRUS  Not Detected   INFLUENZA B PCR  Not Detected   PARAINFLUENZA 1  Not Detected   PARAINFLUENZA VIRUS 2  Not Detected   PARAINFLUENZA VIRUS 3  Not Detected   PARAINFLUENZA VIRUS 4  Not Detected   BORDETELLA PERTUSSIS PCR  Not Detected   BORDETELLA PARAPERTUSSIS PCR  Not Detected   CHLAMYDOPHILA PNEUMONIAE PCR  Not Detected   MYCOPLAMA PNEUMO PCR  Not Detected   RSV, PCR  Not Detected     Results from last 7 days   Lab Units 02/10/25  1547   INR  1.04         Lab Results   Component Value Date    TSH 0.280 03/17/2024     CrCl cannot be calculated (Unknown ideal weight.).         Imaging: I personally visualized the images of scans/x-rays performed within last 3 days.      Assessment:  Acute on chronic hypoxemic hypercapnic respite failure  Acute exacerbation of COPD  Non-COVID coronavirus infection  History of ischemic cardiomyopathy  History of CVA on Eliquis  History of Takotsubo cardiomyopathy        Recommendations:  At this time we have a middle-aged female with severe underlying  COPD coronavirus infection with COPD exacerbation  Continue noninvasive ventilation ABGs have already improved  Wean oxygen down to maintain sats between 89 to 91%.  Avoid hyperoxia  Steroids and bronchodilators will be continued  Symptomatic management for COVID virus infection  No evidence to suggest any secondary bacterial pneumonia at this time  Resume Eliquis  ICU core measures    Critical care time 35 minutes              Zainab Regalado MD  2/10/2025  18:08 EST      Much of this encounter note is an electronic transcription/translation of spoken language to printed text using Dragon Software.

## 2025-02-10 NOTE — ED PROVIDER NOTES
EMERGENCY DEPARTMENT ENCOUNTER  Room Number:  34/34  PCP: Flavia Jaquez APRN  Independent Historians: Patient, EMS who brought patient      HPI:  Chief Complaint: had concerns including Shortness of Breath.     A complete HPI/ROS/PMH/PSH/SH/FH are unobtainable due to: Severe shortness of breath  Chronic or social conditions impacting patient care (Social Determinants of Health):       Context: Scarlet Chakraborty is a 65 y.o. female with a medical history of COPD, Takotsubo's cardiomyopathy who presents to the ED c/o acute shortness of breath.  Shortness of breath ongoing over several days but worsened today.  She does report cough which is occasionally productive.  Denies known fever.  Reports dull aching across the chest.  Patient reports continued tobacco abuse.  She does have sleep apnea machine which she uses occasionally.  She reports compliance with medications including Eliquis, steroid inhaler and rescue inhaler.  States that her regular pulmonologist is Dr. Mukherjee.  Patient was brought in by EMS on BiPAP and she was switched over to high flow nasal cannula here.      Review of prior external notes (non-ED) -and- Review of prior external test results outside of this encounter:   I reviewed prior medical records including last admission from March of last year.  Patient hospitalized with COPD and hypercapnic respiratory failure requiring BiPAP.      Prescription drug monitoring program review:         PAST MEDICAL HISTORY  Active Ambulatory Problems     Diagnosis Date Noted   • Acute respiratory failure with hypoxia 09/13/2018   • Cerebrovascular accident (CVA) due to thrombosis 09/24/2018   • Stress-induced cardiomyopathy 10/14/2018   • Chronic obstructive pulmonary disease with acute exacerbation 10/14/2018   • Tobacco abuse 12/18/2018   • Mixed hyperlipidemia 12/18/2018   • Hx of non-ST elevation myocardial infarction (NSTEMI) 02/17/2019   • History of stroke 02/17/2019   • Tobacco dependence  02/17/2019   • COPD exacerbation 09/11/2019   • Breast mass 05/22/2012   • Cerebrovascular accident (CVA) due to occlusion of left middle cerebral artery 10/24/2018   • Congestive heart failure 11/02/2018   • Chronic obstructive lung disease 11/02/2018   • Non-ischemic cardiomyopathy 09/19/2019   • Acute respiratory failure 09/30/2019   • Status post placement of implantable loop recorder 06/15/2020   • SOB (shortness of breath) 12/22/2020   • COPD with acute exacerbation 03/16/2024   • Acute on chronic respiratory failure with hypercapnia 03/16/2024   • Severe malnutrition 03/17/2024     Resolved Ambulatory Problems     Diagnosis Date Noted   • Acute respiratory failure 09/13/2018     Past Medical History:   Diagnosis Date   • Asthma    • Cerebrovascular accident (CVA) due to thrombosis of right middle cerebral artery    • COPD (chronic obstructive pulmonary disease)    • Nonischemic cardiomyopathy    • NSTEMI (non-ST elevated myocardial infarction)    • Stroke    • Takotsubo cardiomyopathy          PAST SURGICAL HISTORY  Past Surgical History:   Procedure Laterality Date   • CARDIAC CATHETERIZATION N/A 9/13/2018    Procedure: Left Heart Cath and cors;  Surgeon: Gabino Dozier MD;  Location: [facility] CATH INVASIVE LOCATION;  Service: Cardiology   • CARDIAC CATHETERIZATION N/A 9/13/2018    Procedure: Left ventriculography;  Surgeon: Gabino Dozier MD;  Location: [facility] CATH INVASIVE LOCATION;  Service: Cardiology   • CARDIAC ELECTROPHYSIOLOGY PROCEDURE N/A 9/27/2018    Procedure: Loop insertion  LINQ;  Surgeon: Jose R Barker MD;  Location: [facility] CATH INVASIVE LOCATION;  Service: Cardiovascular         FAMILY HISTORY  Family History   Problem Relation Age of Onset   • Cancer Sister          SOCIAL HISTORY  Social History     Socioeconomic History   • Marital status: Single   Tobacco Use   • Smoking status: Light Smoker     Current packs/day: 0.50     Average packs/day: 0.5 packs/day for 15.0 years (7.5 ttl  pk-yrs)     Types: Cigarettes   • Smokeless tobacco: Never   • Tobacco comments:     Currently smokes cigarettes on occasion   Vaping Use   • Vaping status: Never Used   Substance and Sexual Activity   • Alcohol use: Yes     Comment: occassional/ Daily caffeine use   • Drug use: No   • Sexual activity: Defer         ALLERGIES  Patient has no known allergies.      REVIEW OF SYSTEMS  Review of Systems   Unable to perform ROS: Severe respiratory distress     Included in HPI  All systems reviewed and negative except for those discussed in HPI.      PHYSICAL EXAM    I have reviewed the triage vital signs and nursing notes.    ED Triage Vitals   Temp Heart Rate Resp BP SpO2   02/10/25 1533 02/10/25 1535 02/10/25 1533 02/10/25 1533 02/10/25 1533   98.1 °F (36.7 °C) (!) 125 26 140/90 (!) 88 %      Temp src Heart Rate Source Patient Position BP Location FiO2 (%)   -- -- -- 02/10/25 1533 --      Left arm        Physical Exam  GENERAL: Anxious female in moderate to severe respiratory distress.  O2 sats running 88% on high flow nasal cannula.  Patient is tachycardic with pulse 125.  Temperature 98.1.  Blood pressure 140/90  SKIN: Warm, dry  HENT: Normocephalic, atraumatic  EYES: no scleral icterus  CV: regular rhythm, regular rate  RESPIRATORY: Tachypneic with decreased breath sounds and poor air movement-O2 sats upper 80s on high flow nasal cannula  ABDOMEN: soft, nontender, nondistended  MUSCULOSKELETAL: Malnourished and underweight with significant muscle wasting  NEURO: alert, moves all extremities, follows commands      LAB RESULTS  Recent Results (from the past 24 hours)   Blood Gas, Arterial -    Collection Time: 02/10/25  3:39 PM    Specimen: Arterial Blood   Result Value Ref Range    Site Left Radial     Troy's Test Positive     pH, Arterial 7.068 (C) 7.350 - 7.450 pH units    pCO2, Arterial 98.9 (C) 35.0 - 45.0 mm Hg    pO2, Arterial 71.0 (L) 80.0 - 100.0 mm Hg    HCO3, Arterial 28.5 (H) 22.0 - 28.0 mmol/L    Base  Excess, Arterial -5.1 (L) 0.0 - 2.0 mmol/L    O2 Saturation, Arterial 84.0 (L) 92.0 - 98.5 %    CO2 Content 31.6 (H) 23 - 27 mmol/L    Barometric Pressure for Blood Gas 760.6000 mmHg    Modality HFNC     Flow Rate 8.0000 lpm    Rate 26 Breaths/minute    Notified Who      Read Back Yes     Notified Time      Hemodilution No     Device Comment sats 92    Comprehensive Metabolic Panel    Collection Time: 02/10/25  3:47 PM    Specimen: Blood   Result Value Ref Range    Glucose 191 (H) 65 - 99 mg/dL    BUN 18 8 - 23 mg/dL    Creatinine 0.51 (L) 0.57 - 1.00 mg/dL    Sodium 140 136 - 145 mmol/L    Potassium 4.7 3.5 - 5.2 mmol/L    Chloride 105 98 - 107 mmol/L    CO2 23.0 22.0 - 29.0 mmol/L    Calcium 9.5 8.6 - 10.5 mg/dL    Total Protein 7.3 6.0 - 8.5 g/dL    Albumin 4.2 3.5 - 5.2 g/dL    ALT (SGPT) 16 1 - 33 U/L    AST (SGOT) 24 1 - 32 U/L    Alkaline Phosphatase 70 39 - 117 U/L    Total Bilirubin 0.3 0.0 - 1.2 mg/dL    Globulin 3.1 gm/dL    A/G Ratio 1.4 g/dL    BUN/Creatinine Ratio 35.3 (H) 7.0 - 25.0    Anion Gap 12.0 5.0 - 15.0 mmol/L    eGFR 104.4 >60.0 mL/min/1.73   Protime-INR    Collection Time: 02/10/25  3:47 PM    Specimen: Blood   Result Value Ref Range    Protime 13.5 11.7 - 14.2 Seconds    INR 1.04 0.90 - 1.10   BNP    Collection Time: 02/10/25  3:47 PM    Specimen: Blood   Result Value Ref Range    proBNP 158.0 0.0 - 900.0 pg/mL   D-dimer, Quantitative    Collection Time: 02/10/25  3:47 PM    Specimen: Blood   Result Value Ref Range    D-Dimer, Quantitative 3.74 (H) 0.00 - 0.64 MCGFEU/mL   High Sensitivity Troponin T    Collection Time: 02/10/25  3:47 PM    Specimen: Blood   Result Value Ref Range    HS Troponin T 20 (H) <14 ng/L   Procalcitonin    Collection Time: 02/10/25  3:47 PM    Specimen: Blood   Result Value Ref Range    Procalcitonin 0.14 0.00 - 0.25 ng/mL   CBC Auto Differential    Collection Time: 02/10/25  3:47 PM    Specimen: Blood   Result Value Ref Range    WBC 10.46 3.40 - 10.80  10*3/mm3    RBC 4.84 3.77 - 5.28 10*6/mm3    Hemoglobin 14.7 12.0 - 15.9 g/dL    Hematocrit 46.3 34.0 - 46.6 %    MCV 95.7 79.0 - 97.0 fL    MCH 30.4 26.6 - 33.0 pg    MCHC 31.7 31.5 - 35.7 g/dL    RDW 12.4 12.3 - 15.4 %    RDW-SD 43.3 37.0 - 54.0 fl    MPV 11.1 6.0 - 12.0 fL    Platelets 253 140 - 450 10*3/mm3    Neutrophil % 64.5 42.7 - 76.0 %    Lymphocyte % 28.2 19.6 - 45.3 %    Monocyte % 6.4 5.0 - 12.0 %    Eosinophil % 0.1 (L) 0.3 - 6.2 %    Basophil % 0.3 0.0 - 1.5 %    Immature Grans % 0.5 0.0 - 0.5 %    Neutrophils, Absolute 6.75 1.70 - 7.00 10*3/mm3    Lymphocytes, Absolute 2.95 0.70 - 3.10 10*3/mm3    Monocytes, Absolute 0.67 0.10 - 0.90 10*3/mm3    Eosinophils, Absolute 0.01 0.00 - 0.40 10*3/mm3    Basophils, Absolute 0.03 0.00 - 0.20 10*3/mm3    Immature Grans, Absolute 0.05 0.00 - 0.05 10*3/mm3    nRBC 0.0 0.0 - 0.2 /100 WBC   Respiratory Panel PCR w/COVID-19(SARS-CoV-2) FRANCISCO JAVIER/JASPAL/ADELINE/PAD/COR/RAUL In-House, NP Swab in [facility], 2 HR TAT - Swab, Nasopharynx    Collection Time: 02/10/25  3:48 PM    Specimen: Nasopharynx; Swab   Result Value Ref Range    ADENOVIRUS, PCR Not Detected Not Detected    Coronavirus 229E Not Detected Not Detected    Coronavirus HKU1 Not Detected Not Detected    Coronavirus NL63 Not Detected Not Detected    Coronavirus OC43 Detected (A) Not Detected    COVID19 Not Detected Not Detected - Ref. Range    Human Metapneumovirus Not Detected Not Detected    Human Rhinovirus/Enterovirus Not Detected Not Detected    Influenza A PCR Not Detected Not Detected    Influenza B PCR Not Detected Not Detected    Parainfluenza Virus 1 Not Detected Not Detected    Parainfluenza Virus 2 Not Detected Not Detected    Parainfluenza Virus 3 Not Detected Not Detected    Parainfluenza Virus 4 Not Detected Not Detected    RSV, PCR Not Detected Not Detected    Bordetella pertussis pcr Not Detected Not Detected    Bordetella parapertussis PCR Not Detected Not Detected    Chlamydophila pneumoniae PCR Not  Detected Not Detected    Mycoplasma pneumo by PCR Not Detected Not Detected   ECG 12 Lead Dyspnea    Collection Time: 02/10/25  5:16 PM   Result Value Ref Range    QT Interval 340 ms    QTC Interval 470 ms         RADIOLOGY  XR Chest 1 View    Result Date: 2/10/2025  XR CHEST 1 VW-   INDICATION: Shortness of breath  COMPARISON: Chest radiograph March 16, 2024  TECHNIQUE: 1 view chest  FINDINGS:  Increased lung markings. Increased volumes. Linear opacities at the right lung base. Obscuration of the right heart silhouette. No effusions. Stable mediastinum. Heart is normal in size. Chest wall implantable loop recorder.       1. Stable chest. 2. Advanced obstructive airways disease. 3. Suspect chronic right middle lobe collapse and subsegmental atelectasis or scarring at the right lung base  This report was finalized on 2/10/2025 4:25 PM by Dr. Jude Carroll M.D on Workstation: [id number]         MEDICATIONS GIVEN IN ER  Medications   ipratropium-albuterol (DUO-NEB) nebulizer solution 3 mL (3 mL Nebulization Given 2/10/25 1620)   magnesium sulfate 2g/50 mL (PREMIX) infusion (0 g Intravenous Stopped 2/10/25 1617)         ORDERS PLACED DURING THIS VISIT:  Orders Placed This Encounter   Procedures   • Critical Care   • Respiratory Panel PCR w/COVID-19(SARS-CoV-2) FRANCISCO JAVIER/JASPAL/ADELINE/PAD/COR/RAUL In-House, NP Swab in UTM/VTM, 2 HR TAT - Swab, Nasopharynx   • XR Chest 1 View   • CT Angiogram Chest   • Blood Gas, Arterial -   • Comprehensive Metabolic Panel   • Protime-INR   • BNP   • D-dimer, Quantitative   • High Sensitivity Troponin T   • Procalcitonin   • CBC Auto Differential   • Blood Gas, Arterial -   • High Sensitivity Troponin T 1Hr   • Continuous Pulse Oximetry   • Pulmonology (on-call MD unless specified)   • NIPPV - Provider Settings   • ECG 12 Lead Dyspnea   • Inpatient Admission   • CBC & Differential         OUTPATIENT MEDICATION MANAGEMENT:  No current Epic-ordered facility-administered medications on file.      Current Outpatient Medications Ordered in Epic   Medication Sig Dispense Refill   • albuterol sulfate  (90 Base) MCG/ACT inhaler Inhale 2 puffs Every 4 (Four) Hours As Needed for Wheezing.     • azithromycin (ZITHROMAX) 250 MG tablet Take 2 by mouth today then 1 daily for 4 days 6 tablet 0   • budesonide-formoterol (Symbicort) 80-4.5 MCG/ACT inhaler Inhale 2 puffs 2 (Two) Times a Day. 10.2 g 12   • Eliquis 5 MG tablet tablet TAKE ONE TABLET BY MOUTH EVERY 12 HOURS 180 tablet 3   • ipratropium-albuterol (DUO-NEB) 0.5-2.5 mg/3 ml nebulizer Take 3 mL by nebulization Every 4 (Four) Hours As Needed for Wheezing or Shortness of Air. 360 mL 0   • Tiotropium Bromide Monohydrate (SPIRIVA RESPIMAT) 1.25 MCG/ACT aerosol solution inhaler Inhale 2 puffs Daily. 4 g 2         PROCEDURES  Critical Care    Performed by: Darshan Adorno MD  Authorized by: Darshan Adorno MD    Critical care provider statement:     Critical care time (minutes):  43    Critical care time was exclusive of:  Separately billable procedures and treating other patients    Critical care was necessary to treat or prevent imminent or life-threatening deterioration of the following conditions:  Respiratory failure    Critical care was time spent personally by me on the following activities:  Development of treatment plan with patient or surrogate, discussions with consultants, discussions with primary provider, evaluation of patient's response to treatment, examination of patient, re-evaluation of patient's condition, review of old charts, pulse oximetry, ordering and review of radiographic studies, ordering and review of laboratory studies, ordering and performing treatments and interventions and obtaining history from patient or surrogate              PROGRESS, DATA ANALYSIS, CONSULTS, AND MEDICAL DECISION MAKING  All labs have been independently interpreted by me.  All radiology studies have been reviewed by me. All EKG's have been independently  viewed and interpreted by me.  Discussion below represents my analysis of pertinent findings related to patient's condition, differential diagnosis, treatment plan and final disposition.    Differential diagnosis includes but is not limited to COPD exacerbation, hypercapnic failure, hypoxic failure, pneumonia, electrolyte disturbance, arrhythmia.      ED Course as of 02/10/25 1753   Mon Feb 10, 2025   1548 Initial ABG did show pretty significant acute respiratory acidosis with hyper Respiratory failure.  Will go ahead and initiate BiPAP.  Treat with some IV steroids and DuoNebs. [DB]   1548 Will go ahead and treat likely COPD exacerbation with IV Solu-Medrol, DuoNeb and IV magnesium.  Anticipate likely ICU admission. [DB]   1640 Patient with elevated D-dimer of 3.74 worrisome for possible pulmonary embolism. [DB]   1640 CBC is benign without evidence of infection or anemia. [DB]   1640 Chemistries unremarkable without significant hepatic or renal failure.  Electrolytes unremarkable. [DB]   1641 Procalcitonin 0.14 would go against underlying bacterial illness. [DB]   1641 BNP of 158 would go against heart failure. [DB]   1641 High sensory troponin 20 likely related to acute respiratory failure rather than acute coronary syndrome. [DB]   1641 Chest x-ray independently interpreted by me shows COPD changes, no obvious pneumonia    Official read by Dr. Carroll suggest some possible right middle lobe collapse and atelectasis or scarring at the right base. [DB]   1715 I did discuss evaluation and treatment of this patient with Dr. Regalado from pulmonary.  Will admit to the ICU for further care.  Would like me to get a CT angiogram on the way to the ICU.   [DB]   1715 Viral panel noted positive for coronavirus. [DB]   1719 EKG independently interpreted by me    Time 5:16 PM  Sinus 114  Normal P waves and VT interval  QRS-normal axis, unremarkable QRS  ST, T waves-nonspecific change  Not significant change when compared to  3/2024 [DB]      ED Course User Index  [DB] Darshan Adorno MD             AS OF 17:19 EST VITALS:    BP - 101/65  HR - 109  TEMP - 98.1 °F (36.7 °C)  O2 SATS - 97%    COMPLEXITY OF CARE  Patient seen emergently after EMS called ahead.  Patient in severe respiratory distress.      Please see ED course above my independent evaluation and interpretation of ED testing including EKG, x-ray and emergency department laboratory analysis which are notable for the following:    Viral panel positive for coronavirus  EKG nonischemic  X-ray without obvious acute pneumonia  Cardiac markers reassuring  Chemistries benign  ABG did show acute hypercapnic respiratory failure    Patient treated with BiPAP, steroids, magnesium and DuoNeb.  She was found to have hypercapnic and hypoxic respiratory failure.  Plan admission to the ICU for further evaluation treatment.  Given patient's elevated D-dimer a CT angiogram of the chest was ordered on the way to the ICU.        DIAGNOSIS  Final diagnoses:   Acute respiratory failure with hypoxia and hypercapnia   COPD exacerbation   Coronavirus infection   Anticoagulated by anticoagulation treatment   Elevated d-dimer         DISPOSITION  ED Disposition       ED Disposition   Decision to Admit    Condition   --    Comment   Level of Care: Critical Care [6]   Diagnosis: Acute hypercapnic respiratory failure [[id number]]   Admitting Physician: ROLANDA MEJIAS [[id number]]   Attending Physician: ROLANDA MEJIAS [[id number]]   Certification: I Certify That Inpatient Hospital Services Are Medically Necessary For Greater Than 2 Midnights                  Please note that portions of this document were completed with a voice recognition program.    Note Disclaimer: At [facility], we believe that sharing information builds trust and better relationships. You are receiving this note because you recently visited [facility]. It is possible you will see health information before a provider has talked with you about  it. This kind of information can be easy to misunderstand. To help you fully understand what it means for your health, we urge you to discuss this note with your provider.         Darshan Adorno MD  02/10/25 9729

## 2025-02-10 NOTE — ED NOTES
Nursing report ED to floor  Scarlet Chakraborty  65 y.o.  female    HPI :  HPI  Stated Reason for Visit: SOA    Chief Complaint  Chief Complaint   Patient presents with    Shortness of Breath       Admitting doctor:   Zaniab Regalado MD    Admitting diagnosis:   The primary encounter diagnosis was Acute respiratory failure with hypoxia and hypercapnia. Diagnoses of COPD exacerbation, Coronavirus infection, Anticoagulated by anticoagulation treatment, and Elevated d-dimer were also pertinent to this visit.    Code status:   Current Code Status       Date Active Code Status Order ID Comments User Context       2/10/2025 1818 CPR (Attempt to Resuscitate) [id number]  Zainab Regalado MD ED        Question Answer    Code Status (Patient has no pulse and is not breathing) CPR (Attempt to Resuscitate)    Medical Interventions (Patient has pulse or is breathing) Full Support                    Allergies:   Patient has no known allergies.    Isolation:   No active isolations    Intake and Output    Intake/Output Summary (Last 24 hours) at 2/10/2025 1855  Last data filed at 2/10/2025 1617  Gross per 24 hour   Intake 50 ml   Output --   Net 50 ml       Weight:   There were no vitals filed for this visit.    Most recent vitals:   Vitals:    02/10/25 1715 02/10/25 1716 02/10/25 1746 02/10/25 1846   BP:  98/51 107/67 92/64   BP Location:       Pulse: 111 112 115 107   Resp:  24     Temp:       SpO2: 97% 97% 96% 95%       Active LDAs/IV Access:   Lines, Drains & Airways       Active LDAs       Name Placement date Placement time Site Days    Peripheral IV 02/10/25 1529 Anterior;Left Forearm 02/10/25  1529  Forearm  less than 1    Peripheral IV 02/10/25 1546 Anterior;Right Forearm 02/10/25  1546  Forearm  less than 1                    Labs (abnormal labs have a star):   Labs Reviewed   RESPIRATORY PANEL PCR W/ COVID-19 (SARS-COV-2), NP SWAB IN UTM/VTP, 2 HR TAT - Abnormal; Notable for the following components:       Result Value     Coronavirus OC43 Detected (*)     All other components within normal limits    Narrative:     In the setting of a positive respiratory panel with a viral infection PLUS a negative procalcitonin without other underlying concern for bacterial infection, consider observing off antibiotics or discontinuation of antibiotics and continue supportive care. If the respiratory panel is positive for atypical bacterial infection (Bordetella pertussis, Chlamydophila pneumoniae, or Mycoplasma pneumoniae), consider antibiotic de-escalation to target atypical bacterial infection.   BLOOD GAS, ARTERIAL - Abnormal; Notable for the following components:    pH, Arterial 7.068 (*)     pCO2, Arterial 98.9 (*)     pO2, Arterial 71.0 (*)     HCO3, Arterial 28.5 (*)     Base Excess, Arterial -5.1 (*)     O2 Saturation, Arterial 84.0 (*)     CO2 Content 31.6 (*)     All other components within normal limits   COMPREHENSIVE METABOLIC PANEL - Abnormal; Notable for the following components:    Glucose 191 (*)     Creatinine 0.51 (*)     BUN/Creatinine Ratio 35.3 (*)     All other components within normal limits    Narrative:     GFR Categories in Chronic Kidney Disease (CKD)      GFR Category          GFR (mL/min/1.73)    Interpretation  G1                     90 or greater         Normal or high (1)  G2                      60-89                Mild decrease (1)  G3a                   45-59                Mild to moderate decrease  G3b                   30-44                Moderate to severe decrease  G4                    15-29                Severe decrease  G5                    14 or less           Kidney failure          (1)In the absence of evidence of kidney disease, neither GFR category G1 or G2 fulfill the criteria for CKD.    eGFR calculation 2021 CKD-EPI creatinine equation, which does not include race as a factor   D-DIMER, QUANTITATIVE - Abnormal; Notable for the following components:    D-Dimer, Quantitative 3.74 (*)     All  "other components within normal limits    Narrative:     According to the assay 's published package insert, a normal (<0.50 MCGFEU/mL) D-dimer result in conjunction with a non-high clinical probability assessment, excludes deep vein thrombosis (DVT) and pulmonary embolism (PE) with high sensitivity.    D-dimer values increase with age and this can make VTE exclusion of an older population difficult. To address this, the [employer], based on best available evidence and recent guidelines, recommends that clinicians use age-adjusted D-dimer thresholds in patients greater than 50 years of age with: a) a low probability of PE who do not meet all Pulmonary Embolism Rule Out Criteria, or b) in those with intermediate probability of PE.   The formula for an age-adjusted D-dimer cut-off is \"age/100\".  For example, a 60 year old patient would have an age-adjusted cut-off of 0.60 MCGFEU/mL and an 80 year old 0.80 MCGFEU/mL.   TROPONIN - Abnormal; Notable for the following components:    HS Troponin T 20 (*)     All other components within normal limits    Narrative:     High Sensitive Troponin T Reference Range:  <14.0 ng/L- Negative Female for AMI  <22.0 ng/L- Negative Male for AMI  >=14 - Abnormal Female indicating possible myocardial injury.  >=22 - Abnormal Male indicating possible myocardial injury.   Clinicians would have to utilize clinical acumen, EKG, Troponin, and serial changes to determine if it is an Acute Myocardial Infarction or myocardial injury due to an underlying chronic condition.        CBC WITH AUTO DIFFERENTIAL - Abnormal; Notable for the following components:    Eosinophil % 0.1 (*)     All other components within normal limits   HIGH SENSITIVITIY TROPONIN T 1HR - Abnormal; Notable for the following components:    HS Troponin T 95 (*)     Troponin T Numeric Delta 75 (*)     Troponin T % Delta 375 (*)     All other components within normal limits    Narrative:     High " Sensitive Troponin T Reference Range:  <14.0 ng/L- Negative Female for AMI  <22.0 ng/L- Negative Male for AMI  >=14 - Abnormal Female indicating possible myocardial injury.  >=22 - Abnormal Male indicating possible myocardial injury.   Clinicians would have to utilize clinical acumen, EKG, Troponin, and serial changes to determine if it is an Acute Myocardial Infarction or myocardial injury due to an underlying chronic condition.        BLOOD GAS, ARTERIAL - Abnormal; Notable for the following components:    pH, Arterial 7.213 (*)     pCO2, Arterial 64.7 (*)     pO2, Arterial 327.2 (*)     Base Excess, Arterial -3.6 (*)     O2 Saturation, Arterial 99.9 (*)     CO2 Content 28.0 (*)     All other components within normal limits   PROTIME-INR - Normal   BNP (IN-HOUSE) - Normal    Narrative:     This assay is used as an aid in the diagnosis of individuals suspected of having heart failure. It can be used as an aid in the diagnosis of acute decompensated heart failure (ADHF) in patients presenting with signs and symptoms of ADHF to the emergency department (ED). In addition, NT-proBNP of <300 pg/mL indicates ADHF is not likely.    Age Range Result Interpretation  NT-proBNP Concentration (pg/mL:      <50             Positive            >450                   Gray                 300-450                    Negative             <300    50-75           Positive            >900                  Gray                300-900                  Negative            <300      >75             Positive            >1800                  Gray                300-1800                  Negative            <300   PROCALCITONIN - Normal    Narrative:     As a Marker for Sepsis (Non-Neonates):    1. <0.5 ng/mL represents a low risk of severe sepsis and/or septic shock.  2. >2 ng/mL represents a high risk of severe sepsis and/or septic shock.    As a Marker for Lower Respiratory Tract Infections that require antibiotic therapy:    PCT on  "Admission    Antibiotic Therapy       6-12 Hrs later    >0.5                Strongly Recommended  >0.25 - <0.5        Recommended   0.1 - 0.25          Discouraged              Remeasure/reassess PCT  <0.1                Strongly Discouraged     Remeasure/reassess PCT    As 28 day mortality risk marker: \"Change in Procalcitonin Result\" (>80% or <=80%) if Day 0 (or Day 1) and Day 4 values are available. Refer to http://www.[email or web address]-pct-calculator.com    Change in PCT <=80%  A decrease of PCT levels below or equal to 80% defines a positive change in PCT test result representing a higher risk for 28-day all-cause mortality of patients diagnosed with severe sepsis for septic shock.    Change in PCT >80%  A decrease of PCT levels of more than 80% defines a negative change in PCT result representing a lower risk for 28-day all-cause mortality of patients diagnosed with severe sepsis or septic shock.      BLOOD GAS, ARTERIAL   BASIC METABOLIC PANEL   CBC WITH AUTO DIFFERENTIAL   CBC AND DIFFERENTIAL    Narrative:     The following orders were created for panel order CBC & Differential.  Procedure                               Abnormality         Status                     ---------                               -----------         ------                     CBC Auto Differential[[id number]]        Abnormal            Final result                 Please view results for these tests on the individual orders.   CBC AND DIFFERENTIAL    Narrative:     The following orders were created for panel order CBC & Differential.  Procedure                               Abnormality         Status                     ---------                               -----------         ------                     CBC Auto Differential[[id number]]                                                         Please view results for these tests on the individual orders.       EKG:   ECG 12 Lead Dyspnea   Preliminary Result   HEART RFYJ=730  bpm   RR " Qhkbtzdf=236  ms   NC Pgsklouj=415  ms   P Horizontal Axis=32  deg   P Front Axis=84  deg   QRSD Interval=86  ms   QT Ozrscluy=084  ms   CTcJ=836  ms   QRS Axis=76  deg   T Wave Axis=75  deg   - ABNORMAL ECG -   Pacemaker spikes or artifacts   Sinus tachycardia   Ventricular premature complex   Right atrial enlargement   Minimal ST depression, inferior leads   Date and Time of Study:2025-02-10 17:16:39          Meds given in ED:   Medications   methylPREDNISolone sodium succinate (SOLU-Medrol) injection 40 mg (has no administration in time range)   ipratropium-albuterol (DUO-NEB) nebulizer solution 3 mL (has no administration in time range)   nitroglycerin (NITROSTAT) SL tablet 0.4 mg (has no administration in time range)   sodium chloride 0.9 % flush 10 mL (has no administration in time range)   sodium chloride 0.9 % flush 10 mL (has no administration in time range)   sodium chloride 0.9 % infusion 40 mL (has no administration in time range)   mupirocin (BACTROBAN) 2 % nasal ointment 1 Application (has no administration in time range)   sennosides-docusate (PERICOLACE) 8.6-50 MG per tablet 2 tablet (has no administration in time range)     And   polyethylene glycol (MIRALAX) packet 17 g (has no administration in time range)     And   bisacodyl (DULCOLAX) EC tablet 5 mg (has no administration in time range)     And   bisacodyl (DULCOLAX) suppository 10 mg (has no administration in time range)   ipratropium-albuterol (DUO-NEB) nebulizer solution 3 mL (3 mL Nebulization Given 2/10/25 1620)   magnesium sulfate 2g/50 mL (PREMIX) infusion (0 g Intravenous Stopped 2/10/25 1617)       Imaging results:  XR Chest 1 View    Result Date: 2/10/2025   1. Stable chest. 2. Advanced obstructive airways disease. 3. Suspect chronic right middle lobe collapse and subsegmental atelectasis or scarring at the right lung base  This report was finalized on 2/10/2025 4:25 PM by Dr. Jude Carroll M.D on Workstation: [id number]            Social issues:   Social History     Socioeconomic History    Marital status: Single   Tobacco Use    Smoking status: Light Smoker     Current packs/day: 0.50     Average packs/day: 0.5 packs/day for 15.0 years (7.5 ttl pk-yrs)     Types: Cigarettes    Smokeless tobacco: Never    Tobacco comments:     Currently smokes cigarettes on occasion   Vaping Use    Vaping status: Never Used   Substance and Sexual Activity    Alcohol use: Yes     Comment: occassional/ Daily caffeine use    Drug use: No    Sexual activity: Defer       Peripheral Neurovascular  Peripheral Neurovascular (Adult)  Peripheral Neurovascular WDL: WDL    Neuro Cognitive  Neuro Cognitive (Adult)  Cognitive/Neuro/Behavioral WDL: .WDL except, orientation  Orientation: disoriented to, time    Learning  Learning Assessment  Learning Readiness and Ability: cognitive limitation noted    Respiratory  Respiratory  Airway WDL: WDL  Additional Documentation: Breath Sounds (Group)  Respiratory WDL  Respiratory WDL: .WDL except, rhythm/pattern  Rhythm/Pattern, Respiratory: shortness of breath, labored, grunting  Expansion/Accessory Muscles/Retractions: accessory muscle use, retractions marked  Nailbeds: no discoloration  Cough Frequency: infrequent  Cough Type: congested, loose  Breath Sounds  All Lung Fields Breath Sounds: All Fields  All Lung Fields Breath Sounds: diminished, Posterior:    Abdominal Pain       Pain Assessments  Pain (Adult)  Preferred Pain Scale: PAINAD (Pain Assessment in Advance Dementia Scale)  PAINAD Breathin-->noisy labored breathing, long period of hyperventilation, Cheyne-Hughes respirations  PAINAD Negative Vocalization: 2-->repeated troubled calling out, loud moaning/groaning, crying  PAINAD Facial Expression: 2-->facial grimacing  PAINAD Body Language: 2-->rigid, fists clenched, knees up, pushing/pulling away, strikes out  PAINAD Consolability: 2-->unable to console, distract or reassure  PAINAD Score: 10    NIH Stroke  Scale       Estefany Modi RN  02/10/25 18:55 EST

## 2025-02-11 ENCOUNTER — APPOINTMENT (OUTPATIENT)
Dept: GENERAL RADIOLOGY | Facility: HOSPITAL | Age: 65
DRG: 207 | End: 2025-02-11
Payer: COMMERCIAL

## 2025-02-11 LAB
ANION GAP SERPL CALCULATED.3IONS-SCNC: 13.4 MMOL/L (ref 5–15)
ARTERIAL PATENCY WRIST A: ABNORMAL
ATMOSPHERIC PRESS: 754.4 MMHG
BASE EXCESS BLDA CALC-SCNC: 1.6 MMOL/L (ref 0–2)
BASOPHILS # BLD AUTO: 0.01 10*3/MM3 (ref 0–0.2)
BASOPHILS NFR BLD AUTO: 0.1 % (ref 0–1.5)
BDY SITE: ABNORMAL
BUN SERPL-MCNC: 22 MG/DL (ref 8–23)
BUN/CREAT SERPL: 45.8 (ref 7–25)
CALCIUM SPEC-SCNC: 8.7 MG/DL (ref 8.6–10.5)
CHLORIDE SERPL-SCNC: 107 MMOL/L (ref 98–107)
CO2 BLDA-SCNC: 28.6 MMOL/L (ref 23–27)
CO2 SERPL-SCNC: 20.6 MMOL/L (ref 22–29)
CREAT SERPL-MCNC: 0.48 MG/DL (ref 0.57–1)
DEPRECATED RDW RBC AUTO: 41.1 FL (ref 37–54)
EGFRCR SERPLBLD CKD-EPI 2021: 105.9 ML/MIN/1.73
EOSINOPHIL # BLD AUTO: 0 10*3/MM3 (ref 0–0.4)
EOSINOPHIL NFR BLD AUTO: 0 % (ref 0.3–6.2)
ERYTHROCYTE [DISTWIDTH] IN BLOOD BY AUTOMATED COUNT: 12.1 % (ref 12.3–15.4)
GLUCOSE BLDC GLUCOMTR-MCNC: 148 MG/DL (ref 70–130)
GLUCOSE SERPL-MCNC: 136 MG/DL (ref 65–99)
HCO3 BLDA-SCNC: 27.2 MMOL/L (ref 22–28)
HCT VFR BLD AUTO: 40.1 % (ref 34–46.6)
HEMODILUTION: NO
HGB BLD-MCNC: 13.3 G/DL (ref 12–15.9)
IMM GRANULOCYTES # BLD AUTO: 0.02 10*3/MM3 (ref 0–0.05)
IMM GRANULOCYTES NFR BLD AUTO: 0.3 % (ref 0–0.5)
LYMPHOCYTES # BLD AUTO: 0.72 10*3/MM3 (ref 0.7–3.1)
LYMPHOCYTES NFR BLD AUTO: 9.2 % (ref 19.6–45.3)
MCH RBC QN AUTO: 30.8 PG (ref 26.6–33)
MCHC RBC AUTO-ENTMCNC: 33.2 G/DL (ref 31.5–35.7)
MCV RBC AUTO: 92.8 FL (ref 79–97)
MODALITY: ABNORMAL
MONOCYTES # BLD AUTO: 0.41 10*3/MM3 (ref 0.1–0.9)
MONOCYTES NFR BLD AUTO: 5.2 % (ref 5–12)
NEUTROPHILS NFR BLD AUTO: 6.7 10*3/MM3 (ref 1.7–7)
NEUTROPHILS NFR BLD AUTO: 85.2 % (ref 42.7–76)
NRBC BLD AUTO-RTO: 0 /100 WBC (ref 0–0.2)
PCO2 BLDA: 45.1 MM HG (ref 35–45)
PH BLDA: 7.39 PH UNITS (ref 7.35–7.45)
PLATELET # BLD AUTO: 202 10*3/MM3 (ref 140–450)
PMV BLD AUTO: 10.2 FL (ref 6–12)
PO2 BLDA: 56.2 MM HG (ref 80–100)
POTASSIUM SERPL-SCNC: 4.5 MMOL/L (ref 3.5–5.2)
QT INTERVAL: 340 MS
QTC INTERVAL: 470 MS
RBC # BLD AUTO: 4.32 10*6/MM3 (ref 3.77–5.28)
SAO2 % BLDCOA: 88.3 % (ref 92–98.5)
SODIUM SERPL-SCNC: 141 MMOL/L (ref 136–145)
WBC NRBC COR # BLD AUTO: 7.86 10*3/MM3 (ref 3.4–10.8)

## 2025-02-11 PROCEDURE — 25010000002 METHYLPREDNISOLONE PER 40 MG: Performed by: INTERNAL MEDICINE

## 2025-02-11 PROCEDURE — 94660 CPAP INITIATION&MGMT: CPT

## 2025-02-11 PROCEDURE — 0BH17EZ INSERTION OF ENDOTRACHEAL AIRWAY INTO TRACHEA, VIA NATURAL OR ARTIFICIAL OPENING: ICD-10-PCS | Performed by: INTERNAL MEDICINE

## 2025-02-11 PROCEDURE — 94664 DEMO&/EVAL PT USE INHALER: CPT

## 2025-02-11 PROCEDURE — 5A1955Z RESPIRATORY VENTILATION, GREATER THAN 96 CONSECUTIVE HOURS: ICD-10-PCS | Performed by: INTERNAL MEDICINE

## 2025-02-11 PROCEDURE — 85025 COMPLETE CBC W/AUTO DIFF WBC: CPT | Performed by: INTERNAL MEDICINE

## 2025-02-11 PROCEDURE — 94002 VENT MGMT INPAT INIT DAY: CPT

## 2025-02-11 PROCEDURE — 94761 N-INVAS EAR/PLS OXIMETRY MLT: CPT

## 2025-02-11 PROCEDURE — 94760 N-INVAS EAR/PLS OXIMETRY 1: CPT

## 2025-02-11 PROCEDURE — 74018 RADEX ABDOMEN 1 VIEW: CPT

## 2025-02-11 PROCEDURE — 94799 UNLISTED PULMONARY SVC/PX: CPT

## 2025-02-11 PROCEDURE — 82948 REAGENT STRIP/BLOOD GLUCOSE: CPT

## 2025-02-11 PROCEDURE — 80048 BASIC METABOLIC PNL TOTAL CA: CPT | Performed by: INTERNAL MEDICINE

## 2025-02-11 PROCEDURE — 25010000002 FENTANYL CITRATE (PF) 50 MCG/ML SOLUTION: Performed by: INTERNAL MEDICINE

## 2025-02-11 PROCEDURE — 82803 BLOOD GASES ANY COMBINATION: CPT | Performed by: INTERNAL MEDICINE

## 2025-02-11 PROCEDURE — 25810000003 SODIUM CHLORIDE 0.9 % SOLUTION: Performed by: INTERNAL MEDICINE

## 2025-02-11 PROCEDURE — 25010000002 PROPOFOL 10 MG/ML EMULSION: Performed by: INTERNAL MEDICINE

## 2025-02-11 PROCEDURE — 71045 X-RAY EXAM CHEST 1 VIEW: CPT

## 2025-02-11 PROCEDURE — 36600 WITHDRAWAL OF ARTERIAL BLOOD: CPT | Performed by: INTERNAL MEDICINE

## 2025-02-11 PROCEDURE — 25010000002 METHYLPREDNISOLONE PER 125 MG: Performed by: INTERNAL MEDICINE

## 2025-02-11 RX ORDER — METHYLPREDNISOLONE SODIUM SUCCINATE 125 MG/2ML
125 INJECTION, POWDER, LYOPHILIZED, FOR SOLUTION INTRAMUSCULAR; INTRAVENOUS EVERY 8 HOURS
Status: DISCONTINUED | OUTPATIENT
Start: 2025-02-11 | End: 2025-02-14

## 2025-02-11 RX ORDER — METHYLPREDNISOLONE SODIUM SUCCINATE 125 MG/2ML
80 INJECTION, POWDER, LYOPHILIZED, FOR SOLUTION INTRAMUSCULAR; INTRAVENOUS ONCE
Status: COMPLETED | OUTPATIENT
Start: 2025-02-11 | End: 2025-02-11

## 2025-02-11 RX ORDER — FENTANYL CITRATE 50 UG/ML
100 INJECTION, SOLUTION INTRAMUSCULAR; INTRAVENOUS
Status: DISCONTINUED | OUTPATIENT
Start: 2025-02-11 | End: 2025-02-11

## 2025-02-11 RX ORDER — PANTOPRAZOLE SODIUM 40 MG/10ML
40 INJECTION, POWDER, LYOPHILIZED, FOR SOLUTION INTRAVENOUS
Status: DISCONTINUED | OUTPATIENT
Start: 2025-02-11 | End: 2025-02-14

## 2025-02-11 RX ORDER — ALBUTEROL SULFATE 0.83 MG/ML
2.5 SOLUTION RESPIRATORY (INHALATION) ONCE
Status: COMPLETED | OUTPATIENT
Start: 2025-02-11 | End: 2025-02-12

## 2025-02-11 RX ORDER — FENTANYL CITRATE 50 UG/ML
50 INJECTION, SOLUTION INTRAMUSCULAR; INTRAVENOUS
Status: DISPENSED | OUTPATIENT
Start: 2025-02-11 | End: 2025-02-16

## 2025-02-11 RX ORDER — CHLORHEXIDINE GLUCONATE ORAL RINSE 1.2 MG/ML
15 SOLUTION DENTAL EVERY 12 HOURS SCHEDULED
Status: DISCONTINUED | OUTPATIENT
Start: 2025-02-11 | End: 2025-02-15 | Stop reason: SDUPTHER

## 2025-02-11 RX ORDER — PHENYLEPHRINE HCL IN 0.9% NACL 0.5 MG/5ML
.5-3 SYRINGE (ML) INTRAVENOUS
Status: DISCONTINUED | OUTPATIENT
Start: 2025-02-11 | End: 2025-02-20

## 2025-02-11 RX ORDER — DEXMEDETOMIDINE HYDROCHLORIDE 4 UG/ML
.2-1.5 INJECTION, SOLUTION INTRAVENOUS
Status: DISCONTINUED | OUTPATIENT
Start: 2025-02-11 | End: 2025-02-20

## 2025-02-11 RX ADMIN — FENTANYL CITRATE 50 MCG: 50 INJECTION, SOLUTION INTRAMUSCULAR; INTRAVENOUS at 21:23

## 2025-02-11 RX ADMIN — Medication 10 ML: at 08:45

## 2025-02-11 RX ADMIN — IPRATROPIUM BROMIDE AND ALBUTEROL SULFATE 3 ML: .5; 3 SOLUTION RESPIRATORY (INHALATION) at 10:53

## 2025-02-11 RX ADMIN — APIXABAN 5 MG: 5 TABLET, FILM COATED ORAL at 01:58

## 2025-02-11 RX ADMIN — METHYLPREDNISOLONE SODIUM SUCCINATE 125 MG: 125 INJECTION, POWDER, FOR SOLUTION INTRAMUSCULAR; INTRAVENOUS at 09:51

## 2025-02-11 RX ADMIN — FENTANYL CITRATE 100 MCG: 50 INJECTION, SOLUTION INTRAMUSCULAR; INTRAVENOUS at 08:39

## 2025-02-11 RX ADMIN — Medication 0.5 MCG/KG/MIN: at 09:49

## 2025-02-11 RX ADMIN — IPRATROPIUM BROMIDE AND ALBUTEROL SULFATE 3 ML: .5; 3 SOLUTION RESPIRATORY (INHALATION) at 07:21

## 2025-02-11 RX ADMIN — FENTANYL CITRATE 50 MCG: 50 INJECTION, SOLUTION INTRAMUSCULAR; INTRAVENOUS at 11:47

## 2025-02-11 RX ADMIN — MUPIROCIN 1 APPLICATION: 20 OINTMENT TOPICAL at 21:07

## 2025-02-11 RX ADMIN — SODIUM CHLORIDE 1000 ML: 9 INJECTION, SOLUTION INTRAVENOUS at 08:44

## 2025-02-11 RX ADMIN — PANTOPRAZOLE SODIUM 40 MG: 40 INJECTION, POWDER, FOR SOLUTION INTRAVENOUS at 14:44

## 2025-02-11 RX ADMIN — METHYLPREDNISOLONE SODIUM SUCCINATE 80 MG: 125 INJECTION, POWDER, FOR SOLUTION INTRAMUSCULAR; INTRAVENOUS at 09:52

## 2025-02-11 RX ADMIN — DEXMEDETOMIDINE HYDROCHLORIDE 0.2 MCG/KG/HR: 400 INJECTION INTRAVENOUS at 07:59

## 2025-02-11 RX ADMIN — FENTANYL CITRATE 50 MCG: 50 INJECTION, SOLUTION INTRAMUSCULAR; INTRAVENOUS at 17:09

## 2025-02-11 RX ADMIN — METHYLPREDNISOLONE SODIUM SUCCINATE 40 MG: 40 INJECTION, POWDER, FOR SOLUTION INTRAMUSCULAR; INTRAVENOUS at 06:14

## 2025-02-11 RX ADMIN — FENTANYL CITRATE 50 MCG: 50 INJECTION, SOLUTION INTRAMUSCULAR; INTRAVENOUS at 23:14

## 2025-02-11 RX ADMIN — Medication 2 MCG/KG/MIN: at 21:23

## 2025-02-11 RX ADMIN — IPRATROPIUM BROMIDE AND ALBUTEROL SULFATE 3 ML: .5; 3 SOLUTION RESPIRATORY (INHALATION) at 14:48

## 2025-02-11 RX ADMIN — IPRATROPIUM BROMIDE AND ALBUTEROL SULFATE 3 ML: .5; 3 SOLUTION RESPIRATORY (INHALATION) at 19:34

## 2025-02-11 RX ADMIN — APIXABAN 5 MG: 5 TABLET, FILM COATED ORAL at 21:07

## 2025-02-11 RX ADMIN — APIXABAN 5 MG: 5 TABLET, FILM COATED ORAL at 09:52

## 2025-02-11 RX ADMIN — METHYLPREDNISOLONE SODIUM SUCCINATE 125 MG: 125 INJECTION, POWDER, FOR SOLUTION INTRAMUSCULAR; INTRAVENOUS at 18:15

## 2025-02-11 RX ADMIN — PROPOFOL 5 MCG/KG/MIN: 10 INJECTION, EMULSION INTRAVENOUS at 08:39

## 2025-02-11 RX ADMIN — FENTANYL CITRATE 50 MCG: 50 INJECTION, SOLUTION INTRAMUSCULAR; INTRAVENOUS at 14:35

## 2025-02-11 RX ADMIN — MUPIROCIN 1 APPLICATION: 20 OINTMENT TOPICAL at 09:52

## 2025-02-11 NOTE — PROGRESS NOTES
"                                              LOS: 1 day   Patient Care Team:  Flavia Jaquez APRN as PCP - General (Nurse Practitioner)  Beulah Joshi APRN as Nurse Practitioner (Neurology)  Flaco Aguayo MD as Consulting Physician (Pulmonary Disease)    Chief Complaint:  F/up respiratory failure, COPD and critical care management.    Subjective   Interval History  I reviewed the admission note, progress notes, PMH, PSH, Family hx, social history, imagings and prior records on this admission, summarized the finding in my note and formulated a transition of care plan.      Seen this morning and patient was in severe respiratory distress.  She was using her accessory muscles.  She was placed on high flow oxygen and then NIPPV but remained in significant distress.  ABG showed respiratory acidosis with pH of 7.2 and pCO2 in the 60s.    REVIEW OF SYSTEMS:   Cannot obtain due to severe respiratory distress.    Ventilator/Non-Invasive Ventilation Settings (From admission, onward)       Start     Ordered    02/10/25 1620  NIPPV - Provider Settings  (CPAP / BiPAP)  Until Discontinued        Question Answer Comment   Indication Acute Respiratory Failure    Type BIPAP    Titrate Oxygen for SpO2 90 - 95%        02/10/25 1620                          Physical Exam:     Vital Signs  Temp:  [97.6 °F (36.4 °C)-98.1 °F (36.7 °C)] 97.7 °F (36.5 °C)  Heart Rate:  [] 105  Resp:  [20-26] 20  BP: ()/(25-90) 91/78    Intake/Output Summary (Last 24 hours) at 2/11/2025 7407  Last data filed at 2/10/2025 1617  Gross per 24 hour   Intake 50 ml   Output --   Net 50 ml     Flowsheet Rows      Flowsheet Row First Filed Value   Admission Height 152.4 cm (60\") Documented at 02/10/2025 2230   Admission Weight 33.7 kg (74 lb 4.7 oz) Documented at 02/10/2025 2230            PPE used per hospital policy    General Appearance:   Alert, in severe distress..  Cachectic   ENMT:  Mallampati score 2, dry mucous " membrane   Eyes:  Pupils equal and reactive to light. EOMI   Neck:    Trachea midline. No thyromegaly.   Lungs:   Equal but severely diminished air entry throughout.  No wheezing or crackles.    Heart:  Tachycardic but regular rhythm.  No audible murmur.   Skin:   No rash or ecchymosis   Abdomen:    Soft. No tenderness. No HSM.   Neuro/psych:  Conscious, alert,.  Moves all extremities.   Extremities:  No cyanosis, clubbing or edema.  Warm extremities and well-perfused          Results Review:        Results from last 7 days   Lab Units 02/11/25  0449 02/10/25  2054 02/10/25  1547   SODIUM mmol/L 141 141 140   POTASSIUM mmol/L 4.5 4.7 4.7   CHLORIDE mmol/L 107 106 105   CO2 mmol/L 20.6* 23.0 23.0   BUN mg/dL 22 24* 18   CREATININE mg/dL 0.48* 0.61 0.51*   GLUCOSE mg/dL 136* 209* 191*   CALCIUM mg/dL 8.7 9.2 9.5     Results from last 7 days   Lab Units 02/10/25  1707 02/10/25  1547   HSTROP T ng/L 95* 20*     Results from last 7 days   Lab Units 02/11/25  0449 02/10/25  2054 02/10/25  1547   WBC 10*3/mm3 7.86 8.88 10.46   HEMOGLOBIN g/dL 13.3 13.9 14.7   HEMATOCRIT % 40.1 42.1 46.3   PLATELETS 10*3/mm3 202 221 253     Results from last 7 days   Lab Units 02/10/25  1547   INR  1.04     Results from last 7 days   Lab Units 02/10/25  1547   PROBNP pg/mL 158.0       Results from last 7 days   Lab Units 02/10/25  1547   D DIMER QUANT MCGFEU/mL 3.74*             Results from last 7 days   Lab Units 02/10/25  1730 02/10/25  1539   PH, ARTERIAL pH units 7.213* 7.068*   PCO2, ARTERIAL mm Hg 64.7* 98.9*   PO2 ART mm Hg 327.2* 71.0*   O2 SATURATION ART % 99.9* 84.0*   FLOW RATE lpm  --  8.0000   MODALITY  BiPap HFNC         I reviewed the patient's new clinical results.        Medication Review:   apixaban, 5 mg, Oral, Q12H  ipratropium-albuterol, 3 mL, Nebulization, 4x Daily - RT  methylPREDNISolone sodium succinate, 40 mg, Intravenous, Q8H  mupirocin, 1 Application, Each Nare, BID  senna-docusate sodium, 2 tablet, Oral,  BID  sodium chloride, 10 mL, Intravenous, Q12H        dexmedetomidine, 0.2-1.5 mcg/kg/hr        Diagnostic imaging:  I personally and independently reviewed the following images:  CTA chest 2/10/2025: Significant emphysema more prominent on the right side.  Bronchiectasis.    Assessment     End-stage COPD, FEV1 17% on [place] 11/9/23 with current exacerbation  Acute hypoxic and hypercapnic respiratory failure  Sinus tachycardia  Seasonal coronavirus infection  COPD cachexia    Ischemic cardiomyopathy  History of CVA, on Eliquis  History of Takotsubo cardiomyopathy    All problems new to me    Plan       Initiated NIPPV stat and tried briefly but patient failed therefore decided to proceed with intubation..    Mechanical ventilation initiation: Placed on AC PC 14/20.  Initiate propofol for sedation  Give Solu-Medrol 80 mg IV x 1 stat in addition to the 40 mg she received earlier and change her scheduled dose from 40 mg every 8 hours to 125 mg every 8 hours.  DuoNeb 4 times a day        Rafael Brito MD  02/11/25  07:59 EST        Time: Critical care 40 min excluding separately documented procedures.      This note was dictated utilizing Dragon dictation

## 2025-02-11 NOTE — SIGNIFICANT NOTE
02/11/25 0830   B = Both Spontaneous Awakening and Breathing Trials   Was patient receiving mechanical ventilation? Yes   Safety Screen Spontaneous Breathing Trial (SBT)    (Pt intubated today)

## 2025-02-11 NOTE — PROCEDURES
Endotracheal Intubation  (CPT 04036)    Indications:   Acute hypoxic and hypercapnic respiratory failure with respiratory distress, failed NIPPV.      A time-out was completed verifying correct patient, procedure, site, positioning, and special equipment if applicable. The patient was placed in a flat position. Sedation was obtained using propofol 100 mg, and additionally with Fentanyl 50 mcg. The patient was easily ventilated using an ambu bag. The [employer]/ MAC 3 BLADE was used and inserted into the oropharynx at which time there was a Grade 1 view of the vocal cords. A 7.5-Tajik endotracheal tube was inserted and visualized going through the vocal cords. The stylette was removed. Colorimetric change was visualized on the CO2 meter. Breath sounds were heard in both lung fields equally. The endotracheal tube was placed at 24 cm, measured at the teeth.   A chest x-ray was ordered to assess for pneumothorax and verify endotracheal tube placement.     Estimated Blood Loss: None    The patient tolerated the procedure well and there were no complications.      Electronically signed by Rafael Brito MD, 02/11/25, 11:34 AM EST.

## 2025-02-11 NOTE — PAYOR COMM NOTE
"Scarlet Chavez (65 y.o. Female)     PLEASE SEE ATTACHED FOR INPT AUTH     REF # [id number]    PLEASE CALL JOHN GRANDE RN/ DEPT @ [phone]  OR FAX  DEPARTMENT @  [phone]    THANK YOU   JOHN GRANDE RN  [facility]          Date of Birth   1960    Social Security Number       Address   [address] [place] [address]    Home Phone       MRN   [id number]       Hoahaoism   Moravian    Marital Status   Single                            Admission Date   2/10/25    Admission Type   Emergency    Admitting Provider   Zainab Regalado MD    Attending Provider   Zainab Regalado MD    Department, Room/Bed   [facility] INTENSIVE CARE, I381/1       Discharge Date       Discharge Disposition       Discharge Destination                                 Attending Provider: Zainab Regalado MD    Allergies: No Known Allergies    Isolation: Droplet   Infection: None   Code Status: CPR    Ht: 152.4 cm (60\")   Wt: 33.7 kg (74 lb 4.7 oz)    Admission Cmt: None   Principal Problem: Acute hypercapnic respiratory failure [J96.02]                   Active Insurance as of 2/10/2025       Primary Coverage       Payor Plan Insurance Group Employer/Plan Group    [employer] HMO [id number]       Payor Plan Address Payor Plan Phone Number Payor Plan Fax Number Effective Dates    [address] [phone]  1/1/2024 - None Entered    [place] [address]         Subscriber Name Subscriber Birth Date Member ID       SCARLET CHAVEZ 1960 [id number]                     Emergency Contacts        (Rel.) Home Phone Work Phone Mobile Phone    Yvonne Mccollum (Sister) [phone] -- --    Don Taylor (Significant Other) -- -- [phone]              [place]: NPI [id number]  Tax ID [id number]     History & Physical        Zainab Regalado MD at 02/10/25 1808          H&P NOTE    Patient Identification:  Scarlet Chavez  65 " y.o.  female  1960  [id number]                CC: Shortness of breath    History of Present Illness:  65-year-old female with known history of severe underlying COPD multiple hospital admission for COPD exacerbations follow-up my partner Dr. Mukherjee.  Patient also has underlying history of nonischemic cardiomyopathy previous CVA non-ST elevation MI with Takotsubo cardiomyopathy.  Patient presented to the emergency room with acute shortness of breath ongoing for the last several days.  No fever chills or aspiration was reported.  In the emergency room patient was noted to be in acute respiratory distress and placed on noninvasive ventilation.  At the time of my evaluation she is synchronizing with the noninvasive ventilation AVAPS.  Currently on 100% FiO2 maintaining sats 96 to 97%.  History is somewhat limited with patient's current clinical condition.  Review of Systems  As above and limited with patient's current clinical condition  Past Medical History:  Past Medical History:   Diagnosis Date    Asthma     Cerebrovascular accident (CVA) due to thrombosis of right middle cerebral artery     COPD (chronic obstructive pulmonary disease)     Nonischemic cardiomyopathy     NSTEMI (non-ST elevated myocardial infarction)     Stroke     Takotsubo cardiomyopathy     Tobacco abuse        Past Surgical History:  Past Surgical History:   Procedure Laterality Date    CARDIAC CATHETERIZATION N/A 9/13/2018    Procedure: Left Heart Cath and cors;  Surgeon: Gabino Dozier MD;  Location: [facility] CATH INVASIVE LOCATION;  Service: Cardiology    CARDIAC CATHETERIZATION N/A 9/13/2018    Procedure: Left ventriculography;  Surgeon: Gabino Dozier MD;  Location: [facility] CATH INVASIVE LOCATION;  Service: Cardiology    CARDIAC ELECTROPHYSIOLOGY PROCEDURE N/A 9/27/2018    Procedure: Loop insertion  LINQ;  Surgeon: Jose R Barker MD;  Location: [facility] CATH INVASIVE LOCATION;  Service: Cardiovascular        Home Meds:  (Not in a  hospital admission)      Allergies:  No Known Allergies    Social History:   Social History     Socioeconomic History    Marital status: Single   Tobacco Use    Smoking status: Light Smoker     Current packs/day: 0.50     Average packs/day: 0.5 packs/day for 15.0 years (7.5 ttl pk-yrs)     Types: Cigarettes    Smokeless tobacco: Never    Tobacco comments:     Currently smokes cigarettes on occasion   Vaping Use    Vaping status: Never Used   Substance and Sexual Activity    Alcohol use: Yes     Comment: occassional/ Daily caffeine use    Drug use: No    Sexual activity: Defer       Family History:  Family History   Problem Relation Age of Onset    Cancer Sister        Physical Exam:  /67   Pulse 115   Temp 98.1 °F (36.7 °C)   Resp 24   SpO2 96%  There is no height or weight on file to calculate BMI. 96%    Physical Exam  Patient is examined using the personal protective equipment as per guidelines from infection control for this particular patient as enacted.  Hand hygiene was performed before and after patient interaction.  Cachectic female currently on noninvasive ventilation tolerating well   Eyes normal conjunctivae and pupils reactive to light  ENT normocephalic atraumatic  Neck midline trachea no thyromegaly  Chest diminished breath sounds with wheezing bilaterally  CVS regular rate and rhythm no lower extremity edema  Abdomen soft nontender no hepatosplenomegaly  CNS intact moving all extremities  Skin no rashes no nodules  Psych awake on noninvasive ventilation  Musculoskeletal no cyanosis no clubbing normal range of motion    LABS:  Lab Results   Component Value Date    CALCIUM 9.5 02/10/2025    PHOS 3.5 03/20/2024     Results from last 7 days   Lab Units 02/10/25  1547   SODIUM mmol/L 140   POTASSIUM mmol/L 4.7   CHLORIDE mmol/L 105   CO2 mmol/L 23.0   BUN mg/dL 18   CREATININE mg/dL 0.51*   GLUCOSE mg/dL 191*   CALCIUM mg/dL 9.5   WBC 10*3/mm3 10.46   HEMOGLOBIN g/dL 14.7   PLATELETS 10*3/mm3  253   ALT (SGPT) U/L 16   AST (SGOT) U/L 24   PROBNP pg/mL 158.0   PROCALCITONIN ng/mL 0.14     Lab Results   Component Value Date    TROPONINT 95 (C) 02/10/2025     Results from last 7 days   Lab Units 02/10/25  1707 02/10/25  1547   HSTROP T ng/L 95* 20*         Results from last 7 days   Lab Units 02/10/25  1547   PROCALCITONIN ng/mL 0.14     Results from last 7 days   Lab Units 02/10/25  1730 02/10/25  1539   PH, ARTERIAL pH units 7.213* 7.068*   PCO2, ARTERIAL mm Hg 64.7* 98.9*   PO2 ART mm Hg 327.2* 71.0*   O2 SATURATION ART % 99.9* 84.0*   FLOW RATE lpm  --  8.0000   MODALITY  BiPap HFNC     Results from last 7 days   Lab Units 02/10/25  1548   ADENOVIRUS DETECTION BY PCR  Not Detected   CORONAVIRUS 229E  Not Detected   CORONAVIRUS HKU1  Not Detected   CORONAVIRUS NL63  Not Detected   CORONAVIRUS OC43  Detected*   HUMAN METAPNEUMOVIRUS  Not Detected   HUMAN RHINOVIRUS/ENTEROVIRUS  Not Detected   INFLUENZA B PCR  Not Detected   PARAINFLUENZA 1  Not Detected   PARAINFLUENZA VIRUS 2  Not Detected   PARAINFLUENZA VIRUS 3  Not Detected   PARAINFLUENZA VIRUS 4  Not Detected   BORDETELLA PERTUSSIS PCR  Not Detected   BORDETELLA PARAPERTUSSIS PCR  Not Detected   CHLAMYDOPHILA PNEUMONIAE PCR  Not Detected   MYCOPLAMA PNEUMO PCR  Not Detected   RSV, PCR  Not Detected     Results from last 7 days   Lab Units 02/10/25  1547   INR  1.04         Lab Results   Component Value Date    TSH 0.280 03/17/2024     CrCl cannot be calculated (Unknown ideal weight.).         Imaging: I personally visualized the images of scans/x-rays performed within last 3 days.      Assessment:  Acute on chronic hypoxemic hypercapnic respite failure  Acute exacerbation of COPD  Non-COVID coronavirus infection  History of ischemic cardiomyopathy  History of CVA on Eliquis  History of Takotsubo cardiomyopathy        Recommendations:  At this time we have a middle-aged female with severe underlying COPD coronavirus infection with COPD  exacerbation  Continue noninvasive ventilation ABGs have already improved  Wean oxygen down to maintain sats between 89 to 91%.  Avoid hyperoxia  Steroids and bronchodilators will be continued  Symptomatic management for COVID virus infection  No evidence to suggest any secondary bacterial pneumonia at this time  Resume Eliquis  ICU core measures    Critical care time 35 minutes              Zainab Regalado MD  2/10/2025  18:08 EST      Much of this encounter note is an electronic transcription/translation of spoken language to printed text using Dragon Software.    Electronically signed by Zainab Regalado MD at 02/10/25 1816          Emergency Department Notes        Sarah Beth Snell, RN at 02/10/25 2136          Pt cleaned up, new sheets and brief placed on pt    Electronically signed by Sarah Beth Snell, RN at 02/10/25 2136       Estefany Modi, RN at 02/10/25 1855          Nursing report ED to floor  Scarlet Chakraborty  65 y.o.  female    HPI :  HPI  Stated Reason for Visit: SOA    Chief Complaint  Chief Complaint   Patient presents with    Shortness of Breath       Admitting doctor:   Zainab Regalaod MD    Admitting diagnosis:   The primary encounter diagnosis was Acute respiratory failure with hypoxia and hypercapnia. Diagnoses of COPD exacerbation, Coronavirus infection, Anticoagulated by anticoagulation treatment, and Elevated d-dimer were also pertinent to this visit.    Code status:   Current Code Status       Date Active Code Status Order ID Comments User Context       2/10/2025 1818 CPR (Attempt to Resuscitate) [id number]  Zainab Regalado MD ED        Question Answer    Code Status (Patient has no pulse and is not breathing) CPR (Attempt to Resuscitate)    Medical Interventions (Patient has pulse or is breathing) Full Support                    Allergies:   Patient has no known allergies.    Isolation:   No active isolations    Intake and Output    Intake/Output Summary (Last 24 hours) at  2/10/2025 1855  Last data filed at 2/10/2025 1617  Gross per 24 hour   Intake 50 ml   Output --   Net 50 ml       Weight:   There were no vitals filed for this visit.    Most recent vitals:   Vitals:    02/10/25 1715 02/10/25 1716 02/10/25 1746 02/10/25 1846   BP:  98/51 107/67 92/64   BP Location:       Pulse: 111 112 115 107   Resp:  24     Temp:       SpO2: 97% 97% 96% 95%       Active LDAs/IV Access:   Lines, Drains & Airways       Active LDAs       Name Placement date Placement time Site Days    Peripheral IV 02/10/25 1529 Anterior;Left Forearm 02/10/25  1529  Forearm  less than 1    Peripheral IV 02/10/25 1546 Anterior;Right Forearm 02/10/25  1546  Forearm  less than 1                    Labs (abnormal labs have a star):   Labs Reviewed   RESPIRATORY PANEL PCR W/ COVID-19 (SARS-COV-2), NP SWAB IN UTM/VTP, 2 HR TAT - Abnormal; Notable for the following components:       Result Value    Coronavirus OC43 Detected (*)     All other components within normal limits    Narrative:     In the setting of a positive respiratory panel with a viral infection PLUS a negative procalcitonin without other underlying concern for bacterial infection, consider observing off antibiotics or discontinuation of antibiotics and continue supportive care. If the respiratory panel is positive for atypical bacterial infection (Bordetella pertussis, Chlamydophila pneumoniae, or Mycoplasma pneumoniae), consider antibiotic de-escalation to target atypical bacterial infection.   BLOOD GAS, ARTERIAL - Abnormal; Notable for the following components:    pH, Arterial 7.068 (*)     pCO2, Arterial 98.9 (*)     pO2, Arterial 71.0 (*)     HCO3, Arterial 28.5 (*)     Base Excess, Arterial -5.1 (*)     O2 Saturation, Arterial 84.0 (*)     CO2 Content 31.6 (*)     All other components within normal limits   COMPREHENSIVE METABOLIC PANEL - Abnormal; Notable for the following components:    Glucose 191 (*)     Creatinine 0.51 (*)     BUN/Creatinine Ratio  "35.3 (*)     All other components within normal limits    Narrative:     GFR Categories in Chronic Kidney Disease (CKD)      GFR Category          GFR (mL/min/1.73)    Interpretation  G1                     90 or greater         Normal or high (1)  G2                      60-89                Mild decrease (1)  G3a                   45-59                Mild to moderate decrease  G3b                   30-44                Moderate to severe decrease  G4                    15-29                Severe decrease  G5                    14 or less           Kidney failure          (1)In the absence of evidence of kidney disease, neither GFR category G1 or G2 fulfill the criteria for CKD.    eGFR calculation 2021 CKD-EPI creatinine equation, which does not include race as a factor   D-DIMER, QUANTITATIVE - Abnormal; Notable for the following components:    D-Dimer, Quantitative 3.74 (*)     All other components within normal limits    Narrative:     According to the assay 's published package insert, a normal (<0.50 MCGFEU/mL) D-dimer result in conjunction with a non-high clinical probability assessment, excludes deep vein thrombosis (DVT) and pulmonary embolism (PE) with high sensitivity.    D-dimer values increase with age and this can make VTE exclusion of an older population difficult. To address this, the American College of Physicians, based on best available evidence and recent guidelines, recommends that clinicians use age-adjusted D-dimer thresholds in patients greater than 50 years of age with: a) a low probability of PE who do not meet all Pulmonary Embolism Rule Out Criteria, or b) in those with intermediate probability of PE.   The formula for an age-adjusted D-dimer cut-off is \"age/100\".  For example, a 60 year old patient would have an age-adjusted cut-off of 0.60 MCGFEU/mL and an 80 year old 0.80 MCGFEU/mL.   TROPONIN - Abnormal; Notable for the following components:    HS Troponin T 20 (*)     " All other components within normal limits    Narrative:     High Sensitive Troponin T Reference Range:  <14.0 ng/L- Negative Female for AMI  <22.0 ng/L- Negative Male for AMI  >=14 - Abnormal Female indicating possible myocardial injury.  >=22 - Abnormal Male indicating possible myocardial injury.   Clinicians would have to utilize clinical acumen, EKG, Troponin, and serial changes to determine if it is an Acute Myocardial Infarction or myocardial injury due to an underlying chronic condition.        CBC WITH AUTO DIFFERENTIAL - Abnormal; Notable for the following components:    Eosinophil % 0.1 (*)     All other components within normal limits   HIGH SENSITIVITIY TROPONIN T 1HR - Abnormal; Notable for the following components:    HS Troponin T 95 (*)     Troponin T Numeric Delta 75 (*)     Troponin T % Delta 375 (*)     All other components within normal limits    Narrative:     High Sensitive Troponin T Reference Range:  <14.0 ng/L- Negative Female for AMI  <22.0 ng/L- Negative Male for AMI  >=14 - Abnormal Female indicating possible myocardial injury.  >=22 - Abnormal Male indicating possible myocardial injury.   Clinicians would have to utilize clinical acumen, EKG, Troponin, and serial changes to determine if it is an Acute Myocardial Infarction or myocardial injury due to an underlying chronic condition.        BLOOD GAS, ARTERIAL - Abnormal; Notable for the following components:    pH, Arterial 7.213 (*)     pCO2, Arterial 64.7 (*)     pO2, Arterial 327.2 (*)     Base Excess, Arterial -3.6 (*)     O2 Saturation, Arterial 99.9 (*)     CO2 Content 28.0 (*)     All other components within normal limits   PROTIME-INR - Normal   BNP (IN-HOUSE) - Normal    Narrative:     This assay is used as an aid in the diagnosis of individuals suspected of having heart failure. It can be used as an aid in the diagnosis of acute decompensated heart failure (ADHF) in patients presenting with signs and symptoms of ADHF to the  "emergency department (ED). In addition, NT-proBNP of <300 pg/mL indicates ADHF is not likely.    Age Range Result Interpretation  NT-proBNP Concentration (pg/mL:      <50             Positive            >450                   Gray                 300-450                    Negative             <300    50-75           Positive            >900                  Gray                300-900                  Negative            <300      >75             Positive            >1800                  Gray                300-1800                  Negative            <300   PROCALCITONIN - Normal    Narrative:     As a Marker for Sepsis (Non-Neonates):    1. <0.5 ng/mL represents a low risk of severe sepsis and/or septic shock.  2. >2 ng/mL represents a high risk of severe sepsis and/or septic shock.    As a Marker for Lower Respiratory Tract Infections that require antibiotic therapy:    PCT on Admission    Antibiotic Therapy       6-12 Hrs later    >0.5                Strongly Recommended  >0.25 - <0.5        Recommended   0.1 - 0.25          Discouraged              Remeasure/reassess PCT  <0.1                Strongly Discouraged     Remeasure/reassess PCT    As 28 day mortality risk marker: \"Change in Procalcitonin Result\" (>80% or <=80%) if Day 0 (or Day 1) and Day 4 values are available. Refer to http://www.Sh[employer]ag[employer]-pct-calculator.com    Change in PCT <=80%  A decrease of PCT levels below or equal to 80% defines a positive change in PCT test result representing a higher risk for 28-day all-cause mortality of patients diagnosed with severe sepsis for septic shock.    Change in PCT >80%  A decrease of PCT levels of more than 80% defines a negative change in PCT result representing a lower risk for 28-day all-cause mortality of patients diagnosed with severe sepsis or septic shock.      BLOOD GAS, ARTERIAL   BASIC METABOLIC PANEL   CBC WITH AUTO DIFFERENTIAL   CBC AND DIFFERENTIAL    Narrative:     The following orders were " created for panel order CBC & Differential.  Procedure                               Abnormality         Status                     ---------                               -----------         ------                     CBC Auto Differential[[id number]]        Abnormal            Final result                 Please view results for these tests on the individual orders.   CBC AND DIFFERENTIAL    Narrative:     The following orders were created for panel order CBC & Differential.  Procedure                               Abnormality         Status                     ---------                               -----------         ------                     CBC Auto Differential[[id number]]                                                         Please view results for these tests on the individual orders.       EKG:   ECG 12 Lead Dyspnea   Preliminary Result   HEART EDTG=717  bpm   RR Jighvfou=776  ms   AK Hviccugn=077  ms   P Horizontal Axis=32  deg   P Front Axis=84  deg   QRSD Interval=86  ms   QT Ovguglvz=118  ms   SEzC=301  ms   QRS Axis=76  deg   T Wave Axis=75  deg   - ABNORMAL ECG -   Pacemaker spikes or artifacts   Sinus tachycardia   Ventricular premature complex   Right atrial enlargement   Minimal ST depression, inferior leads   Date and Time of Study:2025-02-10 17:16:39          Meds given in ED:   Medications   methylPREDNISolone sodium succinate (SOLU-Medrol) injection 40 mg (has no administration in time range)   ipratropium-albuterol (DUO-NEB) nebulizer solution 3 mL (has no administration in time range)   nitroglycerin (NITROSTAT) SL tablet 0.4 mg (has no administration in time range)   sodium chloride 0.9 % flush 10 mL (has no administration in time range)   sodium chloride 0.9 % flush 10 mL (has no administration in time range)   sodium chloride 0.9 % infusion 40 mL (has no administration in time range)   mupirocin (BACTROBAN) 2 % nasal ointment 1 Application (has no administration in time range)    sennosides-docusate (PERICOLACE) 8.6-50 MG per tablet 2 tablet (has no administration in time range)     And   polyethylene glycol (MIRALAX) packet 17 g (has no administration in time range)     And   bisacodyl (DULCOLAX) EC tablet 5 mg (has no administration in time range)     And   bisacodyl (DULCOLAX) suppository 10 mg (has no administration in time range)   ipratropium-albuterol (DUO-NEB) nebulizer solution 3 mL (3 mL Nebulization Given 2/10/25 1620)   magnesium sulfate 2g/50 mL (PREMIX) infusion (0 g Intravenous Stopped 2/10/25 1617)       Imaging results:  XR Chest 1 View    Result Date: 2/10/2025   1. Stable chest. 2. Advanced obstructive airways disease. 3. Suspect chronic right middle lobe collapse and subsegmental atelectasis or scarring at the right lung base  This report was finalized on 2/10/2025 4:25 PM by Dr. Jude Carroll M.D on Workstation: [id number]           Social issues:   Social History     Socioeconomic History    Marital status: Single   Tobacco Use    Smoking status: Light Smoker     Current packs/day: 0.50     Average packs/day: 0.5 packs/day for 15.0 years (7.5 ttl pk-yrs)     Types: Cigarettes    Smokeless tobacco: Never    Tobacco comments:     Currently smokes cigarettes on occasion   Vaping Use    Vaping status: Never Used   Substance and Sexual Activity    Alcohol use: Yes     Comment: occassional/ Daily caffeine use    Drug use: No    Sexual activity: Defer       Peripheral Neurovascular  Peripheral Neurovascular (Adult)  Peripheral Neurovascular WDL: WDL    Neuro Cognitive  Neuro Cognitive (Adult)  Cognitive/Neuro/Behavioral WDL: .WDL except, orientation  Orientation: disoriented to, time    Learning  Learning Assessment  Learning Readiness and Ability: cognitive limitation noted    Respiratory  Respiratory  Airway WDL: WDL  Additional Documentation: Breath Sounds (Group)  Respiratory WDL  Respiratory WDL: .WDL except, rhythm/pattern  Rhythm/Pattern, Respiratory: shortness  of breath, labored, grunting  Expansion/Accessory Muscles/Retractions: accessory muscle use, retractions marked  Nailbeds: no discoloration  Cough Frequency: infrequent  Cough Type: congested, loose  Breath Sounds  All Lung Fields Breath Sounds: All Fields  All Lung Fields Breath Sounds: diminished, Posterior:    Abdominal Pain       Pain Assessments  Pain (Adult)  Preferred Pain Scale: PAINAD (Pain Assessment in Advance Dementia Scale)  PAINAD Breathin-->noisy labored breathing, long period of hyperventilation, Cheyne-Hughes respirations  PAINAD Negative Vocalization: 2-->repeated troubled calling out, loud moaning/groaning, crying  PAINAD Facial Expression: 2-->facial grimacing  PAINAD Body Language: 2-->rigid, fists clenched, knees up, pushing/pulling away, strikes out  PAINAD Consolability: 2-->unable to console, distract or reassure  PAINAD Score: 10    NIH Stroke Scale       Estefany Modi RN  02/10/25 18:55 EST      Electronically signed by Estefany Modi RN at 02/10/25 8839       Darshan Adorno MD at 02/10/25 1538        Procedure Orders    1. Critical Care [[id number]] ordered by Darshan Adorno MD                  EMERGENCY DEPARTMENT ENCOUNTER  Room Number:  34/34  PCP: lFavia Jaquez APRN  Independent Historians: Patient, EMS who brought patient      HPI:  Chief Complaint: had concerns including Shortness of Breath.     A complete HPI/ROS/PMH/PSH/SH/FH are unobtainable due to: Severe shortness of breath  Chronic or social conditions impacting patient care (Social Determinants of Health):       Context: Scarlet Chakraborty is a 65 y.o. female with a medical history of COPD, Takotsubo's cardiomyopathy who presents to the ED c/o acute shortness of breath.  Shortness of breath ongoing over several days but worsened today.  She does report cough which is occasionally productive.  Denies known fever.  Reports dull aching across the chest.  Patient reports continued tobacco abuse.  She does  have sleep apnea machine which she uses occasionally.  She reports compliance with medications including Eliquis, steroid inhaler and rescue inhaler.  States that her regular pulmonologist is Dr. Mukherjee.  Patient was brought in by EMS on BiPAP and she was switched over to high flow nasal cannula here.      Review of prior external notes (non-ED) -and- Review of prior external test results outside of this encounter:   I reviewed prior medical records including last admission from March of last year.  Patient hospitalized with COPD and hypercapnic respiratory failure requiring BiPAP.      Prescription drug monitoring program review:         PAST MEDICAL HISTORY  Active Ambulatory Problems     Diagnosis Date Noted    Acute respiratory failure with hypoxia 09/13/2018    Cerebrovascular accident (CVA) due to thrombosis 09/24/2018    Stress-induced cardiomyopathy 10/14/2018    Chronic obstructive pulmonary disease with acute exacerbation 10/14/2018    Tobacco abuse 12/18/2018    Mixed hyperlipidemia 12/18/2018    Hx of non-ST elevation myocardial infarction (NSTEMI) 02/17/2019    History of stroke 02/17/2019    Tobacco dependence 02/17/2019    COPD exacerbation 09/11/2019    Breast mass 05/22/2012    Cerebrovascular accident (CVA) due to occlusion of left middle cerebral artery 10/24/2018    Congestive heart failure 11/02/2018    Chronic obstructive lung disease 11/02/2018    Non-ischemic cardiomyopathy 09/19/2019    Acute respiratory failure 09/30/2019    Status post placement of implantable loop recorder 06/15/2020    SOB (shortness of breath) 12/22/2020    COPD with acute exacerbation 03/16/2024    Acute on chronic respiratory failure with hypercapnia 03/16/2024    Severe malnutrition 03/17/2024     Resolved Ambulatory Problems     Diagnosis Date Noted    Acute respiratory failure 09/13/2018     Past Medical History:   Diagnosis Date    Asthma     Cerebrovascular accident (CVA) due to thrombosis of right middle  cerebral artery     COPD (chronic obstructive pulmonary disease)     Nonischemic cardiomyopathy     NSTEMI (non-ST elevated myocardial infarction)     Stroke     Takotsubo cardiomyopathy          PAST SURGICAL HISTORY  Past Surgical History:   Procedure Laterality Date    CARDIAC CATHETERIZATION N/A 9/13/2018    Procedure: Left Heart Cath and cors;  Surgeon: Gabino Dozier MD;  Location:  [facility];  Service: Cardiology    CARDIAC CATHETERIZATION N/A 9/13/2018    Procedure: Left ventriculography;  Surgeon: Gabino Dozier MD;  Location:  [facility];  Service: Cardiology    CARDIAC ELECTROPHYSIOLOGY PROCEDURE N/A 9/27/2018    Procedure: Loop insertion  LINQ;  Surgeon: Jose R Barker MD;  Location:  [facility];  Service: Cardiovascular         FAMILY HISTORY  Family History   Problem Relation Age of Onset    Cancer Sister          SOCIAL HISTORY  Social History     Socioeconomic History    Marital status: Single   Tobacco Use    Smoking status: Light Smoker     Current packs/day: 0.50     Average packs/day: 0.5 packs/day for 15.0 years (7.5 ttl pk-yrs)     Types: Cigarettes    Smokeless tobacco: Never    Tobacco comments:     Currently smokes cigarettes on occasion   Vaping Use    Vaping status: Never Used   Substance and Sexual Activity    Alcohol use: Yes     Comment: occassional/ Daily caffeine use    Drug use: No    Sexual activity: Defer         ALLERGIES  Patient has no known allergies.      REVIEW OF SYSTEMS  Review of Systems   Unable to perform ROS: Severe respiratory distress     Included in HPI  All systems reviewed and negative except for those discussed in HPI.      PHYSICAL EXAM    I have reviewed the triage vital signs and nursing notes.    ED Triage Vitals   Temp Heart Rate Resp BP SpO2   02/10/25 1533 02/10/25 1535 02/10/25 1533 02/10/25 1533 02/10/25 1533   98.1 °F (36.7 °C) (!) 125 26 140/90 (!) 88 %      Temp src Heart Rate Source Patient  Position BP Location FiO2 (%)   -- -- -- 02/10/25 1533 --      Left arm        Physical Exam  GENERAL: Anxious female in moderate to severe respiratory distress.  O2 sats running 88% on high flow nasal cannula.  Patient is tachycardic with pulse 125.  Temperature 98.1.  Blood pressure 140/90  SKIN: Warm, dry  HENT: Normocephalic, atraumatic  EYES: no scleral icterus  CV: regular rhythm, regular rate  RESPIRATORY: Tachypneic with decreased breath sounds and poor air movement-O2 sats upper 80s on high flow nasal cannula  ABDOMEN: soft, nontender, nondistended  MUSCULOSKELETAL: Malnourished and underweight with significant muscle wasting  NEURO: alert, moves all extremities, follows commands      LAB RESULTS  Recent Results (from the past 24 hours)   Blood Gas, Arterial -    Collection Time: 02/10/25  3:39 PM    Specimen: Arterial Blood   Result Value Ref Range    Site Left Radial     Troy's Test Positive     pH, Arterial 7.068 (C) 7.350 - 7.450 pH units    pCO2, Arterial 98.9 (C) 35.0 - 45.0 mm Hg    pO2, Arterial 71.0 (L) 80.0 - 100.0 mm Hg    HCO3, Arterial 28.5 (H) 22.0 - 28.0 mmol/L    Base Excess, Arterial -5.1 (L) 0.0 - 2.0 mmol/L    O2 Saturation, Arterial 84.0 (L) 92.0 - 98.5 %    CO2 Content 31.6 (H) 23 - 27 mmol/L    Barometric Pressure for Blood Gas 760.6000 mmHg    Modality HFNC     Flow Rate 8.0000 lpm    Rate 26 Breaths/minute    Notified Who      Read Back Yes     Notified Time      Hemodilution No     Device Comment sats 92    Comprehensive Metabolic Panel    Collection Time: 02/10/25  3:47 PM    Specimen: Blood   Result Value Ref Range    Glucose 191 (H) 65 - 99 mg/dL    BUN 18 8 - 23 mg/dL    Creatinine 0.51 (L) 0.57 - 1.00 mg/dL    Sodium 140 136 - 145 mmol/L    Potassium 4.7 3.5 - 5.2 mmol/L    Chloride 105 98 - 107 mmol/L    CO2 23.0 22.0 - 29.0 mmol/L    Calcium 9.5 8.6 - 10.5 mg/dL    Total Protein 7.3 6.0 - 8.5 g/dL    Albumin 4.2 3.5 - 5.2 g/dL    ALT (SGPT) 16 1 - 33 U/L    AST  (SGOT) 24 1 - 32 U/L    Alkaline Phosphatase 70 39 - 117 U/L    Total Bilirubin 0.3 0.0 - 1.2 mg/dL    Globulin 3.1 gm/dL    A/G Ratio 1.4 g/dL    BUN/Creatinine Ratio 35.3 (H) 7.0 - 25.0    Anion Gap 12.0 5.0 - 15.0 mmol/L    eGFR 104.4 >60.0 mL/min/1.73   Protime-INR    Collection Time: 02/10/25  3:47 PM    Specimen: Blood   Result Value Ref Range    Protime 13.5 11.7 - 14.2 Seconds    INR 1.04 0.90 - 1.10   BNP    Collection Time: 02/10/25  3:47 PM    Specimen: Blood   Result Value Ref Range    proBNP 158.0 0.0 - 900.0 pg/mL   D-dimer, Quantitative    Collection Time: 02/10/25  3:47 PM    Specimen: Blood   Result Value Ref Range    D-Dimer, Quantitative 3.74 (H) 0.00 - 0.64 MCGFEU/mL   High Sensitivity Troponin T    Collection Time: 02/10/25  3:47 PM    Specimen: Blood   Result Value Ref Range    HS Troponin T 20 (H) <14 ng/L   Procalcitonin    Collection Time: 02/10/25  3:47 PM    Specimen: Blood   Result Value Ref Range    Procalcitonin 0.14 0.00 - 0.25 ng/mL   CBC Auto Differential    Collection Time: 02/10/25  3:47 PM    Specimen: Blood   Result Value Ref Range    WBC 10.46 3.40 - 10.80 10*3/mm3    RBC 4.84 3.77 - 5.28 10*6/mm3    Hemoglobin 14.7 12.0 - 15.9 g/dL    Hematocrit 46.3 34.0 - 46.6 %    MCV 95.7 79.0 - 97.0 fL    MCH 30.4 26.6 - 33.0 pg    MCHC 31.7 31.5 - 35.7 g/dL    RDW 12.4 12.3 - 15.4 %    RDW-SD 43.3 37.0 - 54.0 fl    MPV 11.1 6.0 - 12.0 fL    Platelets 253 140 - 450 10*3/mm3    Neutrophil % 64.5 42.7 - 76.0 %    Lymphocyte % 28.2 19.6 - 45.3 %    Monocyte % 6.4 5.0 - 12.0 %    Eosinophil % 0.1 (L) 0.3 - 6.2 %    Basophil % 0.3 0.0 - 1.5 %    Immature Grans % 0.5 0.0 - 0.5 %    Neutrophils, Absolute 6.75 1.70 - 7.00 10*3/mm3    Lymphocytes, Absolute 2.95 0.70 - 3.10 10*3/mm3    Monocytes, Absolute 0.67 0.10 - 0.90 10*3/mm3    Eosinophils, Absolute 0.01 0.00 - 0.40 10*3/mm3    Basophils, Absolute 0.03 0.00 - 0.20 10*3/mm3    Immature Grans, Absolute 0.05 0.00 - 0.05 10*3/mm3    nRBC 0.0 0.0 -  0.2 /100 WBC   Respiratory Panel PCR w/COVID-19(SARS-CoV-2) [facility], NP Swab in UTM/VTM, 2 HR TAT - Swab, Nasopharynx    Collection Time: 02/10/25  3:48 PM    Specimen: Nasopharynx; Swab   Result Value Ref Range    ADENOVIRUS, PCR Not Detected Not Detected    Coronavirus 229E Not Detected Not Detected    Coronavirus HKU1 Not Detected Not Detected    Coronavirus NL63 Not Detected Not Detected    Coronavirus OC43 Detected (A) Not Detected    COVID19 Not Detected Not Detected - Ref. Range    Human Metapneumovirus Not Detected Not Detected    Human Rhinovirus/Enterovirus Not Detected Not Detected    Influenza A PCR Not Detected Not Detected    Influenza B PCR Not Detected Not Detected    Parainfluenza Virus 1 Not Detected Not Detected    Parainfluenza Virus 2 Not Detected Not Detected    Parainfluenza Virus 3 Not Detected Not Detected    Parainfluenza Virus 4 Not Detected Not Detected    RSV, PCR Not Detected Not Detected    Bordetella pertussis pcr Not Detected Not Detected    Bordetella parapertussis PCR Not Detected Not Detected    Chlamydophila pneumoniae PCR Not Detected Not Detected    Mycoplasma pneumo by PCR Not Detected Not Detected   ECG 12 Lead Dyspnea    Collection Time: 02/10/25  5:16 PM   Result Value Ref Range    QT Interval 340 ms    QTC Interval 470 ms         RADIOLOGY  XR Chest 1 View    Result Date: 2/10/2025  XR CHEST 1 VW-   INDICATION: Shortness of breath  COMPARISON: Chest radiograph March 16, 2024  TECHNIQUE: 1 view chest  FINDINGS:  Increased lung markings. Increased volumes. Linear opacities at the right lung base. Obscuration of the right heart silhouette. No effusions. Stable mediastinum. Heart is normal in size. Chest wall implantable loop recorder.       1. Stable chest. 2. Advanced obstructive airways disease. 3. Suspect chronic right middle lobe collapse and subsegmental atelectasis or scarring at the right lung base  This report was finalized on 2/10/2025 4:25  PM by Dr. Jude Carroll M.D on Workstation: [id number]         MEDICATIONS GIVEN IN ER  Medications   ipratropium-albuterol (DUO-NEB) nebulizer solution 3 mL (3 mL Nebulization Given 2/10/25 1620)   magnesium sulfate 2g/50 mL (PREMIX) infusion (0 g Intravenous Stopped 2/10/25 1617)         ORDERS PLACED DURING THIS VISIT:  Orders Placed This Encounter   Procedures    Critical Care    Respiratory Panel PCR w/COVID-19(SARS-CoV-2) [facility], NP Swab in UTM/VTM, 2 HR TAT - Swab, Nasopharynx    XR Chest 1 View    CT Angiogram Chest    Blood Gas, Arterial -    Comprehensive Metabolic Panel    Protime-INR    BNP    D-dimer, Quantitative    High Sensitivity Troponin T    Procalcitonin    CBC Auto Differential    Blood Gas, Arterial -    High Sensitivity Troponin T 1Hr    Continuous Pulse Oximetry    Pulmonology (on-call MD unless specified)    NIPPV - Provider Settings    ECG 12 Lead Dyspnea    Inpatient Admission    CBC & Differential         OUTPATIENT MEDICATION MANAGEMENT:  No current Epic-ordered facility-administered medications on file.     Current Outpatient Medications Ordered in Epic   Medication Sig Dispense Refill    albuterol sulfate  (90 Base) MCG/ACT inhaler Inhale 2 puffs Every 4 (Four) Hours As Needed for Wheezing.      azithromycin (ZITHROMAX) 250 MG tablet Take 2 by mouth today then 1 daily for 4 days 6 tablet 0    budesonide-formoterol (Symbicort) 80-4.5 MCG/ACT inhaler Inhale 2 puffs 2 (Two) Times a Day. 10.2 g 12    Eliquis 5 MG tablet tablet TAKE ONE TABLET BY MOUTH EVERY 12 HOURS 180 tablet 3    ipratropium-albuterol (DUO-NEB) 0.5-2.5 mg/3 ml nebulizer Take 3 mL by nebulization Every 4 (Four) Hours As Needed for Wheezing or Shortness of Air. 360 mL 0    Tiotropium Bromide Monohydrate (SPIRIVA RESPIMAT) 1.25 MCG/ACT aerosol solution inhaler Inhale 2 puffs Daily. 4 g 2         PROCEDURES  Critical Care    Performed by: Darshan Adorno MD  Authorized by: Darshan Adorno  MD TOMMY    Critical care provider statement:     Critical care time (minutes):  43    Critical care time was exclusive of:  Separately billable procedures and treating other patients    Critical care was necessary to treat or prevent imminent or life-threatening deterioration of the following conditions:  Respiratory failure    Critical care was time spent personally by me on the following activities:  Development of treatment plan with patient or surrogate, discussions with consultants, discussions with primary provider, evaluation of patient's response to treatment, examination of patient, re-evaluation of patient's condition, review of old charts, pulse oximetry, ordering and review of radiographic studies, ordering and review of laboratory studies, ordering and performing treatments and interventions and obtaining history from patient or surrogate              PROGRESS, DATA ANALYSIS, CONSULTS, AND MEDICAL DECISION MAKING  All labs have been independently interpreted by me.  All radiology studies have been reviewed by me. All EKG's have been independently viewed and interpreted by me.  Discussion below represents my analysis of pertinent findings related to patient's condition, differential diagnosis, treatment plan and final disposition.    Differential diagnosis includes but is not limited to COPD exacerbation, hypercapnic failure, hypoxic failure, pneumonia, electrolyte disturbance, arrhythmia.      ED Course as of 02/10/25 1753   Mon Feb 10, 2025   1548 Initial ABG did show pretty significant acute respiratory acidosis with hyper Respiratory failure.  Will go ahead and initiate BiPAP.  Treat with some IV steroids and DuoNebs. [DB]   1548 Will go ahead and treat likely COPD exacerbation with IV Solu-Medrol, DuoNeb and IV magnesium.  Anticipate likely ICU admission. [DB]   1640 Patient with elevated D-dimer of 3.74 worrisome for possible pulmonary embolism. [DB]   1640 CBC is benign without evidence of infection  or anemia. [DB]   1640 Chemistries unremarkable without significant hepatic or renal failure.  Electrolytes unremarkable. [DB]   1641 Procalcitonin 0.14 would go against underlying bacterial illness. [DB]   1641 BNP of 158 would go against heart failure. [DB]   1641 High sensory troponin 20 likely related to acute respiratory failure rather than acute coronary syndrome. [DB]   1641 Chest x-ray independently interpreted by me shows COPD changes, no obvious pneumonia    Official read by Dr. Carroll suggest some possible right middle lobe collapse and atelectasis or scarring at the right base. [DB]   1715 I did discuss evaluation and treatment of this patient with Dr. Regalado from pulmonary.  Will admit to the ICU for further care.  Would like me to get a CT angiogram on the way to the ICU.   [DB]   1715 Viral panel noted positive for coronavirus. [DB]   1719 EKG independently interpreted by me    Time 5:16 PM  Sinus 114  Normal P waves and [place] interval  QRS-normal axis, unremarkable QRS  ST, T waves-nonspecific change  Not significant change when compared to 3/2024 [DB]      ED Course User Index  [DB] Darshan Adorno MD             AS OF 17:19 EST VITALS:    BP - 101/65  HR - 109  TEMP - 98.1 °F (36.7 °C)  O2 SATS - 97%    COMPLEXITY OF CARE  Patient seen emergently after EMS called ahead.  Patient in severe respiratory distress.      Please see ED course above my independent evaluation and interpretation of ED testing including EKG, x-ray and emergency department laboratory analysis which are notable for the following:    Viral panel positive for coronavirus  EKG nonischemic  X-ray without obvious acute pneumonia  Cardiac markers reassuring  Chemistries benign  ABG did show acute hypercapnic respiratory failure    Patient treated with BiPAP, steroids, magnesium and DuoNeb.  She was found to have hypercapnic and hypoxic respiratory failure.  Plan admission to the ICU for further evaluation treatment.  Given patient's  elevated D-dimer a CT angiogram of the chest was ordered on the way to the ICU.        DIAGNOSIS  Final diagnoses:   Acute respiratory failure with hypoxia and hypercapnia   COPD exacerbation   Coronavirus infection   Anticoagulated by anticoagulation treatment   Elevated d-dimer         DISPOSITION  ED Disposition       ED Disposition   Decision to Admit    Condition   --    Comment   Level of Care: Critical Care [6]   Diagnosis: Acute hypercapnic respiratory failure [[id number]]   Admitting Physician: ROLANDA MEJIAS [[id number]]   Attending Physician: ROLANDA MEJIAS [[id number]]   Certification: I Certify That Inpatient Hospital Services Are Medically Necessary For Greater Than 2 Midnights                  Please note that portions of this document were completed with a voice recognition program.    Note Disclaimer: At [facility], we believe that sharing information builds trust and better relationships. You are receiving this note because you recently visited [facility]. It is possible you will see health information before a provider has talked with you about it. This kind of information can be easy to misunderstand. To help you fully understand what it means for your health, we urge you to discuss this note with your provider.         Darshan Adorno MD  02/10/25 1753      Electronically signed by Darshan Adorno MD at 02/10/25 1753       Brian Guy RN at 02/10/25 1530          Pt was brought from home by EMS, for respiratory distress. Reports flu like symptoms from the last several days. Pt was 86% rooom air for EMS. Pt arrived on CPAP, given duoneb, albuterol and solumedrol.     Electronically signed by Brian Guy RN at 02/10/25 1531       Ventilator/Non-Invasive Ventilation Settings (From admission, onward)       Start     Ordered    02/11/25 0847  Ventilator - Vent Mode: AC/PC; Rate: Other; Rate: 14; FiO2: Other; FiO2 (%): 35; PEEP: 5; Inspiratory Pressure: 20  Continuous        Question Answer Comment    Vent Mode AC/PC    Rate Other    Rate 14    FiO2 Other    FiO2 (%) 35    PEEP 5    Inspiratory Pressure 20        02/11/25 0847    02/10/25 1620  NIPPV - Provider Settings  (CPAP / BiPAP)  Until Discontinued        Question Answer Comment   Indication Acute Respiratory Failure    Type BIPAP    Titrate Oxygen for SpO2 90 - 95%        02/10/25 1620                     Physician Progress Notes (last 24 hours)        Rafael Brito MD at 02/11/25 0756                                                        LOS: 1 day   Patient Care Team:  Flavia Jaquez APRN as PCP - General (Nurse Practitioner)  Beulah Joshi APRN as Nurse Practitioner (Neurology)  Flaco Aguayo MD as Consulting Physician (Pulmonary Disease)    Chief Complaint:  F/up respiratory failure, COPD and critical care management.    Subjective   Interval History  I reviewed the admission note, progress notes, PMH, PSH, Family hx, social history, imagings and prior records on this admission, summarized the finding in my note and formulated a transition of care plan.      Seen this morning and patient was in severe respiratory distress.  She was using her accessory muscles.  She was placed on high flow oxygen and then NIPPV but remained in significant distress.  ABG showed respiratory acidosis with pH of 7.2 and pCO2 in the 60s.    REVIEW OF SYSTEMS:   Cannot obtain due to severe respiratory distress.    Ventilator/Non-Invasive Ventilation Settings (From admission, onward)       Start     Ordered    02/10/25 1620  NIPPV - Provider Settings  (CPAP / BiPAP)  Until Discontinued        Question Answer Comment   Indication Acute Respiratory Failure    Type BIPAP    Titrate Oxygen for SpO2 90 - 95%        02/10/25 1620                          Physical Exam:     Vital Signs  Temp:  [97.6 °F (36.4 °C)-98.1 °F (36.7 °C)] 97.7 °F (36.5 °C)  Heart Rate:  [] 105  Resp:  [20-26] 20  BP: ()/(25-90) 91/78    Intake/Output Summary (Last  "24 hours) at 2/11/2025 0759  Last data filed at 2/10/2025 1617  Gross per 24 hour   Intake 50 ml   Output --   Net 50 ml     Flowsheet Rows      Flowsheet Row First Filed Value   Admission Height 152.4 cm (60\") Documented at 02/10/2025 2230   Admission Weight 33.7 kg (74 lb 4.7 oz) Documented at 02/10/2025 2230            PPE used per hospital policy    General Appearance:   Alert, in severe distress..  Cachectic   ENMT:  Mallampati score 2, dry mucous membrane   Eyes:  Pupils equal and reactive to light. EOMI   Neck:    Trachea midline. No thyromegaly.   Lungs:   Equal but severely diminished air entry throughout.  No wheezing or crackles.    Heart:  Tachycardic but regular rhythm.  No audible murmur.   Skin:   No rash or ecchymosis   Abdomen:    Soft. No tenderness. No HSM.   Neuro/psych:  Conscious, alert,.  Moves all extremities.   Extremities:  No cyanosis, clubbing or edema.  Warm extremities and well-perfused          Results Review:        Results from last 7 days   Lab Units 02/11/25  0449 02/10/25  2054 02/10/25  1547   SODIUM mmol/L 141 141 140   POTASSIUM mmol/L 4.5 4.7 4.7   CHLORIDE mmol/L 107 106 105   CO2 mmol/L 20.6* 23.0 23.0   BUN mg/dL 22 24* 18   CREATININE mg/dL 0.48* 0.61 0.51*   GLUCOSE mg/dL 136* 209* 191*   CALCIUM mg/dL 8.7 9.2 9.5     Results from last 7 days   Lab Units 02/10/25  1707 02/10/25  1547   HSTROP T ng/L 95* 20*     Results from last 7 days   Lab Units 02/11/25  0449 02/10/25  2054 02/10/25  1547   WBC 10*3/mm3 7.86 8.88 10.46   HEMOGLOBIN g/dL 13.3 13.9 14.7   HEMATOCRIT % 40.1 42.1 46.3   PLATELETS 10*3/mm3 202 221 253     Results from last 7 days   Lab Units 02/10/25  1547   INR  1.04     Results from last 7 days   Lab Units 02/10/25  1547   PROBNP pg/mL 158.0       Results from last 7 days   Lab Units 02/10/25  1547   D DIMER QUANT MCGFEU/mL 3.74*             Results from last 7 days   Lab Units 02/10/25  1730 02/10/25  1539   PH, ARTERIAL pH units 7.213* 7.068*   PCO2, " ARTERIAL mm Hg 64.7* 98.9*   PO2 ART mm Hg 327.2* 71.0*   O2 SATURATION ART % 99.9* 84.0*   FLOW RATE lpm  --  8.0000   MODALITY  BiPap HFNC         I reviewed the patient's new clinical results.        Medication Review:   apixaban, 5 mg, Oral, Q12H  ipratropium-albuterol, 3 mL, Nebulization, 4x Daily - RT  methylPREDNISolone sodium succinate, 40 mg, Intravenous, Q8H  mupirocin, 1 Application, Each Nare, BID  senna-docusate sodium, 2 tablet, Oral, BID  sodium chloride, 10 mL, Intravenous, Q12H        dexmedetomidine, 0.2-1.5 mcg/kg/hr        Diagnostic imaging:  I personally and independently reviewed the following images:  CTA chest 2/10/2025: Significant emphysema more prominent on the right side.  Bronchiectasis.    Assessment     End-stage COPD, FEV1 17% on Irvin 11/9/23 with current exacerbation  Acute hypoxic and hypercapnic respiratory failure  Sinus tachycardia  Seasonal coronavirus infection  COPD cachexia    Ischemic cardiomyopathy  History of CVA, on Eliquis  History of Takotsubo cardiomyopathy    All problems new to me    Plan       Initiated NIPPV stat and tried briefly but patient failed therefore decided to proceed with intubation..    Mechanical ventilation initiation: Placed on AC PC 14/20.  Initiate propofol for sedation  Give Solu-Medrol 80 mg IV x 1 stat in addition to the 40 mg she received earlier and change her scheduled dose from 40 mg every 8 hours to 125 mg every 8 hours.  DuoNeb 4 times a day        Rafael Brito MD  02/11/25  07:59 EST        Time: Critical care 40 min excluding separately documented procedures.      This note was dictated utilizing Dragon dictation     Electronically signed by Rafael Brito MD at 02/11/25 7084

## 2025-02-11 NOTE — CASE MANAGEMENT/SOCIAL WORK
Discharge Planning Assessment  [facility]     Patient Name: Scarlet Chakraborty  MRN: [id number]  Today's Date: 2/11/2025    Admit Date: 2/10/2025    Plan: Pending clinical course   Discharge Needs Assessment       Row Name 02/11/25 1351       Living Environment    People in Home significant other    Name(s) of People in Home Don Taylor -[phone]    Current Living Arrangements home    Potentially Unsafe Housing Conditions none    In the past 12 months has the electric, gas, oil, or water company threatened to shut off services in your home? No    Primary Care Provided by self;spouse/significant other    Provides Primary Care For no one    Family Caregiver if Needed significant other    Family Caregiver Names Don Taylor -[phone]    Quality of Family Relationships supportive       Resource/Environmental Concerns    Resource/Environmental Concerns none    Transportation Concerns none       Transportation Needs    In the past 12 months, has lack of transportation kept you from medical appointments or from getting medications? no    In the past 12 months, has lack of transportation kept you from meetings, work, or from getting things needed for daily living? No       Food Insecurity    Within the past 12 months, the food you bought just didn't last and you didn't have money to get more. Never true       Transition Planning    Patient/Family Anticipates Transition to home with family;inpatient rehabilitation facility       Discharge Needs Assessment    Readmission Within the Last 30 Days no previous admission in last 30 days    Equipment Currently Used at Home oxygen;nebulizer    Concerns to be Addressed discharge planning    Do you want help finding or keeping work or a job? I do not need or want help    Do you want help with school or training? For example, starting or completing job training or getting a high school diploma, GED or equivalent No    Anticipated Changes Related to Illness none    Provided Post  Acute Provider List? N/A    N/A Provider List Comment Watch for discharge needs, still intubated but responsive    Provided Post Acute Provider Quality & Resource List? N/A    Current Discharge Risk chronically ill                   Discharge Plan       Row Name 02/11/25 4908       Plan    Plan Pending clinical course    Plan Comments Met with patient and Don Brandon PICHARDO [phone] at bedside. Patient is intubated but is able to respond to yes/No questions. She gave me permission to speak with Don Taylor -[phone]. He verified face sheet. Patient is able to manage her own ADL’s with some assist from Don. She has a oxygen concentrator from [employer] and Nebulizer. Patient uses the [facility] on [address], denies any issues affording or remembering to take her medications. Patient will use Meds to Beds at discharge. Patient does not have POA or living will on file. Discharge plans are pending clinical course. Will continue to monitor for new or changing discharge needs Anali CHAVEZ RN CCP                  Continued Care and Services - Admitted Since 2/10/2025    No active coordination exists for this encounter.       Expected Discharge Date and Time       Expected Discharge Date Expected Discharge Time    Feb 14, 2025            Demographic Summary       Row Name 02/11/25 1350       General Information    Admission Type inpatient    Arrived From emergency department    Referral Source admission list    Reason for Consult discharge planning    Preferred Language English       Contact Information    Permission Granted to Share Info With family/designee  Don Taylor -[phone]                   Functional Status       Row Name 02/11/25 1352       Functional Status    Usual Activity Tolerance moderate    Current Activity Tolerance fair       Functional Status, IADL    Medications independent    Meal Preparation assistive person    Housekeeping assistive person    Laundry assistive person    Shopping assistive person    If for any  reason you need help with day-to-day activities such as bathing, preparing meals, shopping, managing finances, etc., do you get the help you need? I don't need any help       Mental Status    General Appearance WDL WDL       Mental Status Summary    Recent Changes in Mental Status/Cognitive Functioning ability to speak clearly       Employment/    Employment Status retired                   Psychosocial    No documentation.                  Abuse/Neglect    No documentation.                  Legal    No documentation.                  Substance Abuse    No documentation.                  Patient Forms    No documentation.                     Anali Conrad RN

## 2025-02-11 NOTE — PLAN OF CARE
Goal Outcome Evaluation:      Pt not tolerating BIPAP per night shift RN.  Pt stable but tachypneic during handoff.  Pt calls out shortly after handoff and has increased WOB, tachycardia in the 140s, and hypertension.  RT called to the bedside and pt educated on BIPAP use.  Precedex ordered for anxiety but pt still having increased WOB on BIPAP.  MD called to the bedside and pt intubated.  1L NS bolus given and humble initiated for hypotension post intubation. Pt tolerating vent with propofol gtt and PRN fentanyl.  Pt alert and following commands while on the ventilator.  Cortrak placed for medication administration.  Bladder scan performed at 1600 due to low UOP, no straight cath performed.  Pt and family educated throughout the shift.

## 2025-02-12 LAB
ANION GAP SERPL CALCULATED.3IONS-SCNC: 10 MMOL/L (ref 5–15)
BASOPHILS # BLD AUTO: 0.01 10*3/MM3 (ref 0–0.2)
BASOPHILS NFR BLD AUTO: 0.1 % (ref 0–1.5)
BUN SERPL-MCNC: 24 MG/DL (ref 8–23)
BUN/CREAT SERPL: 52.2 (ref 7–25)
CALCIUM SPEC-SCNC: 9.4 MG/DL (ref 8.6–10.5)
CHLORIDE SERPL-SCNC: 107 MMOL/L (ref 98–107)
CO2 SERPL-SCNC: 24 MMOL/L (ref 22–29)
CREAT SERPL-MCNC: 0.46 MG/DL (ref 0.57–1)
DEPRECATED RDW RBC AUTO: 43.1 FL (ref 37–54)
EGFRCR SERPLBLD CKD-EPI 2021: 107 ML/MIN/1.73
EOSINOPHIL # BLD AUTO: 0 10*3/MM3 (ref 0–0.4)
EOSINOPHIL NFR BLD AUTO: 0 % (ref 0.3–6.2)
ERYTHROCYTE [DISTWIDTH] IN BLOOD BY AUTOMATED COUNT: 12.6 % (ref 12.3–15.4)
GLUCOSE BLDC GLUCOMTR-MCNC: 110 MG/DL (ref 70–130)
GLUCOSE BLDC GLUCOMTR-MCNC: 117 MG/DL (ref 70–130)
GLUCOSE BLDC GLUCOMTR-MCNC: 124 MG/DL (ref 70–130)
GLUCOSE BLDC GLUCOMTR-MCNC: 125 MG/DL (ref 70–130)
GLUCOSE BLDC GLUCOMTR-MCNC: 130 MG/DL (ref 70–130)
GLUCOSE SERPL-MCNC: 127 MG/DL (ref 65–99)
HCT VFR BLD AUTO: 38.9 % (ref 34–46.6)
HGB BLD-MCNC: 13 G/DL (ref 12–15.9)
IMM GRANULOCYTES # BLD AUTO: 0.04 10*3/MM3 (ref 0–0.05)
IMM GRANULOCYTES NFR BLD AUTO: 0.3 % (ref 0–0.5)
LYMPHOCYTES # BLD AUTO: 0.96 10*3/MM3 (ref 0.7–3.1)
LYMPHOCYTES NFR BLD AUTO: 7.8 % (ref 19.6–45.3)
MCH RBC QN AUTO: 31.1 PG (ref 26.6–33)
MCHC RBC AUTO-ENTMCNC: 33.4 G/DL (ref 31.5–35.7)
MCV RBC AUTO: 93.1 FL (ref 79–97)
MONOCYTES # BLD AUTO: 1 10*3/MM3 (ref 0.1–0.9)
MONOCYTES NFR BLD AUTO: 8.1 % (ref 5–12)
NEUTROPHILS NFR BLD AUTO: 10.29 10*3/MM3 (ref 1.7–7)
NEUTROPHILS NFR BLD AUTO: 83.7 % (ref 42.7–76)
NRBC BLD AUTO-RTO: 0 /100 WBC (ref 0–0.2)
PLATELET # BLD AUTO: 261 10*3/MM3 (ref 140–450)
PMV BLD AUTO: 10.2 FL (ref 6–12)
POTASSIUM SERPL-SCNC: 4.8 MMOL/L (ref 3.5–5.2)
RBC # BLD AUTO: 4.18 10*6/MM3 (ref 3.77–5.28)
SODIUM SERPL-SCNC: 141 MMOL/L (ref 136–145)
WBC NRBC COR # BLD AUTO: 12.3 10*3/MM3 (ref 3.4–10.8)

## 2025-02-12 PROCEDURE — 25010000002 FENTANYL CITRATE (PF) 50 MCG/ML SOLUTION: Performed by: INTERNAL MEDICINE

## 2025-02-12 PROCEDURE — 94761 N-INVAS EAR/PLS OXIMETRY MLT: CPT

## 2025-02-12 PROCEDURE — 94799 UNLISTED PULMONARY SVC/PX: CPT

## 2025-02-12 PROCEDURE — 85025 COMPLETE CBC W/AUTO DIFF WBC: CPT | Performed by: INTERNAL MEDICINE

## 2025-02-12 PROCEDURE — 25010000002 PROPOFOL 10 MG/ML EMULSION: Performed by: INTERNAL MEDICINE

## 2025-02-12 PROCEDURE — 25010000002 METHYLPREDNISOLONE PER 125 MG: Performed by: INTERNAL MEDICINE

## 2025-02-12 PROCEDURE — 94760 N-INVAS EAR/PLS OXIMETRY 1: CPT

## 2025-02-12 PROCEDURE — 82948 REAGENT STRIP/BLOOD GLUCOSE: CPT

## 2025-02-12 PROCEDURE — 80048 BASIC METABOLIC PNL TOTAL CA: CPT | Performed by: INTERNAL MEDICINE

## 2025-02-12 PROCEDURE — 94003 VENT MGMT INPAT SUBQ DAY: CPT

## 2025-02-12 RX ORDER — FLUTICASONE PROPIONATE 44 UG/1
2 AEROSOL, METERED RESPIRATORY (INHALATION)
COMMUNITY
End: 2025-02-25 | Stop reason: HOSPADM

## 2025-02-12 RX ORDER — ERYTHROMYCIN 250 MG/1
250 TABLET, DELAYED RELEASE ORAL DAILY
COMMUNITY

## 2025-02-12 RX ADMIN — MUPIROCIN 1 APPLICATION: 20 OINTMENT TOPICAL at 08:04

## 2025-02-12 RX ADMIN — APIXABAN 5 MG: 5 TABLET, FILM COATED ORAL at 20:56

## 2025-02-12 RX ADMIN — METHYLPREDNISOLONE SODIUM SUCCINATE 125 MG: 125 INJECTION, POWDER, FOR SOLUTION INTRAMUSCULAR; INTRAVENOUS at 09:45

## 2025-02-12 RX ADMIN — IPRATROPIUM BROMIDE AND ALBUTEROL SULFATE 3 ML: .5; 3 SOLUTION RESPIRATORY (INHALATION) at 10:33

## 2025-02-12 RX ADMIN — PROPOFOL 30 MCG/KG/MIN: 10 INJECTION, EMULSION INTRAVENOUS at 03:34

## 2025-02-12 RX ADMIN — CHLORHEXIDINE GLUCONATE 15 ML: 1.2 RINSE ORAL at 03:42

## 2025-02-12 RX ADMIN — FENTANYL CITRATE 50 MCG: 50 INJECTION, SOLUTION INTRAMUSCULAR; INTRAVENOUS at 13:11

## 2025-02-12 RX ADMIN — Medication 10 ML: at 03:43

## 2025-02-12 RX ADMIN — APIXABAN 5 MG: 5 TABLET, FILM COATED ORAL at 08:03

## 2025-02-12 RX ADMIN — IPRATROPIUM BROMIDE AND ALBUTEROL SULFATE 3 ML: .5; 3 SOLUTION RESPIRATORY (INHALATION) at 14:43

## 2025-02-12 RX ADMIN — FENTANYL CITRATE 50 MCG: 50 INJECTION, SOLUTION INTRAMUSCULAR; INTRAVENOUS at 08:31

## 2025-02-12 RX ADMIN — METHYLPREDNISOLONE SODIUM SUCCINATE 125 MG: 125 INJECTION, POWDER, FOR SOLUTION INTRAMUSCULAR; INTRAVENOUS at 02:28

## 2025-02-12 RX ADMIN — PROPOFOL 50 MCG/KG/MIN: 10 INJECTION, EMULSION INTRAVENOUS at 15:09

## 2025-02-12 RX ADMIN — FENTANYL CITRATE 50 MCG: 50 INJECTION, SOLUTION INTRAMUSCULAR; INTRAVENOUS at 03:34

## 2025-02-12 RX ADMIN — Medication 10 ML: at 08:04

## 2025-02-12 RX ADMIN — Medication 0.5 MCG/KG/MIN: at 16:24

## 2025-02-12 RX ADMIN — CHLORHEXIDINE GLUCONATE 15 ML: 1.2 RINSE ORAL at 08:05

## 2025-02-12 RX ADMIN — IPRATROPIUM BROMIDE AND ALBUTEROL SULFATE 3 ML: .5; 3 SOLUTION RESPIRATORY (INHALATION) at 06:25

## 2025-02-12 RX ADMIN — ALBUTEROL SULFATE 2.5 MG: 2.5 SOLUTION RESPIRATORY (INHALATION) at 08:18

## 2025-02-12 RX ADMIN — FENTANYL CITRATE 50 MCG: 50 INJECTION, SOLUTION INTRAMUSCULAR; INTRAVENOUS at 09:47

## 2025-02-12 RX ADMIN — METHYLPREDNISOLONE SODIUM SUCCINATE 125 MG: 125 INJECTION, POWDER, FOR SOLUTION INTRAMUSCULAR; INTRAVENOUS at 18:42

## 2025-02-12 RX ADMIN — IPRATROPIUM BROMIDE AND ALBUTEROL SULFATE 3 ML: .5; 3 SOLUTION RESPIRATORY (INHALATION) at 20:38

## 2025-02-12 RX ADMIN — PANTOPRAZOLE SODIUM 40 MG: 40 INJECTION, POWDER, FOR SOLUTION INTRAVENOUS at 08:02

## 2025-02-12 RX ADMIN — FENTANYL CITRATE 50 MCG: 50 INJECTION, SOLUTION INTRAMUSCULAR; INTRAVENOUS at 20:56

## 2025-02-12 NOTE — PLAN OF CARE
Problem: Enteral Nutrition  Goal: Absence of Aspiration Signs and Symptoms  Outcome: Progressing  Goal: Safe, Effective Therapy Delivery  Outcome: Progressing  Goal: Feeding Tolerance  Outcome: Progressing     Problem: Malnutrition  Goal: Improved Nutritional Intake  Outcome: Progressing   Goal Outcome Evaluation:      TF's per MD order  RD to follow

## 2025-02-12 NOTE — PROGRESS NOTES
"                                              LOS: 2 days   Patient Care Team:  Flavia Jaquez APRN as PCP - General (Nurse Practitioner)  Beulah Joshi APRN as Nurse Practitioner (Neurology)  Flaco Aguayo MD as Consulting Physician (Pulmonary Disease)    Chief Complaint:  F/up respiratory failure, COPD and critical care management.    Subjective   Interval History    On AC PC 12/20/1930 %/5.  No significant secretions from the ET tube.  Sedated with propofol 40 mics/KG/minute.  Slightly hypertensive.      REVIEW OF SYSTEMS:   Cannot obtain due to severe respiratory distress.    Ventilator/Non-Invasive Ventilation Settings (From admission, onward)       Start     Ordered    02/10/25 1620  NIPPV - Provider Settings  (CPAP / BiPAP)  Until Discontinued        Question Answer Comment   Indication Acute Respiratory Failure    Type BIPAP    Titrate Oxygen for SpO2 90 - 95%        02/10/25 1620                          Physical Exam:     Vital Signs  Temp:  [98.1 °F (36.7 °C)-98.9 °F (37.2 °C)] 98.1 °F (36.7 °C)  Heart Rate:  [] 61  Resp:  [14-18] 17  BP: ()/() 117/55  FiO2 (%):  [29 %-35 %] 29 %    Intake/Output Summary (Last 24 hours) at 2/12/2025 0840  Last data filed at 2/12/2025 0600  Gross per 24 hour   Intake 1533 ml   Output 200 ml   Net 1333 ml     Flowsheet Rows      Flowsheet Row First Filed Value   Admission Height 152.4 cm (60\") Documented at 02/10/2025 2230   Admission Weight 33.7 kg (74 lb 4.7 oz) Documented at 02/10/2025 2230            PPE used per hospital policy    General Appearance:  On the ventilator.  NAD...  Cachectic   ENMT: ET tube noted.  External ears normal.   Eyes:  Pupils equal and reactive to light. EOMI   Neck:    Trachea midline. No thyromegaly.   Lungs:   Equal but severely diminished air entry throughout.  Severe bilateral expiratory wheezing.  Bilateral expiratory wheezing.    Heart:  RRR.  No audible murmur.   Skin:   No rash or ecchymosis "   Abdomen:    Soft. No tenderness. No HSM.   Neuro/psych: Sedated.  Arouses to stimulus and follows commands.   Extremities:  No cyanosis, clubbing or edema.  Warm extremities and well-perfused          Results Review:        Results from last 7 days   Lab Units 02/12/25  0404 02/11/25  0449 02/10/25  2054   SODIUM mmol/L 141 141 141   POTASSIUM mmol/L 4.8 4.5 4.7   CHLORIDE mmol/L 107 107 106   CO2 mmol/L 24.0 20.6* 23.0   BUN mg/dL 24* 22 24*   CREATININE mg/dL 0.46* 0.48* 0.61   GLUCOSE mg/dL 127* 136* 209*   CALCIUM mg/dL 9.4 8.7 9.2     Results from last 7 days   Lab Units 02/10/25  1707 02/10/25  1547   HSTROP T ng/L 95* 20*     Results from last 7 days   Lab Units 02/12/25  0404 02/11/25  0449 02/10/25  2054   WBC 10*3/mm3 12.30* 7.86 8.88   HEMOGLOBIN g/dL 13.0 13.3 13.9   HEMATOCRIT % 38.9 40.1 42.1   PLATELETS 10*3/mm3 261 202 221     Results from last 7 days   Lab Units 02/10/25  1547   INR  1.04     Results from last 7 days   Lab Units 02/10/25  1547   PROBNP pg/mL 158.0       Results from last 7 days   Lab Units 02/10/25  1547   D DIMER QUANT MCGFEU/mL 3.74*             Results from last 7 days   Lab Units 02/11/25  0722 02/10/25  1730 02/10/25  1539   PH, ARTERIAL pH units 7.389 7.213* 7.068*   PCO2, ARTERIAL mm Hg 45.1* 64.7* 98.9*   PO2 ART mm Hg 56.2* 327.2* 71.0*   O2 SATURATION ART % 88.3* 99.9* 84.0*   FLOW RATE lpm  --   --  8.0000   MODALITY  N/A BiPap HFNC         I reviewed the patient's new clinical results.        Medication Review:   apixaban, 5 mg, Oral, Q12H  chlorhexidine, 15 mL, Mouth/Throat, Q12H  ipratropium-albuterol, 3 mL, Nebulization, 4x Daily - RT  methylPREDNISolone sodium succinate, 125 mg, Intravenous, Q8H  mupirocin, 1 Application, Each Nare, BID  pantoprazole, 40 mg, Intravenous, Q24H  senna-docusate sodium, 2 tablet, Oral, BID  sodium chloride, 10 mL, Intravenous, Q12H        dexmedetomidine, 0.2-1.5 mcg/kg/hr, Last Rate: Stopped (02/11/25 0846)  phenylephrine, 0.5-3  mcg/kg/min, Last Rate: 1.1 mcg/kg/min (02/12/25 0616)  propofol, 5-50 mcg/kg/min, Last Rate: 30 mcg/kg/min (02/12/25 0334)        Diagnostic imaging:  I personally and independently reviewed the following images:  CTA chest 2/10/2025: Significant emphysema more prominent on the right side.  Bronchiectasis.    Assessment     End-stage COPD, FEV1 17% on [place] 11/9/23 with current exacerbation  Acute hypoxic and hypercapnic respiratory failure  Sinus tachycardia  Seasonal coronavirus infection  COPD cachexia    Ischemic cardiomyopathy  History of CVA, on Eliquis  History of Takotsubo cardiomyopathy        Plan       Mechanical ventilation management: Settings reviewed and adjusted.  Placed on pressure support 7.  She tolerated with the 2 minutes assessment but then had significant tachypnea and low tidal volume and desaturation later and had to be placed back on a controlled mode.  Initiate propofol for sedation  Tre-Synephrine IV drip and titrate to keep MAP >65  Solu-Medrol 125 mg every 8 hours.  Insert core track and start tube feeding  DuoNeb 4 times a day        Rafael Brito MD  02/12/25  08:40 EST        Time: Critical care 35 min       This note was dictated utilizing Dragon dictation

## 2025-02-12 NOTE — CONSULTS
Nutrition Services    Patient Name:  Scarlet Chakraborty  YOB: 1960  MRN: [id number]  Admit Date:  2/10/2025  Assessment Date:  02/12/25    Comment: Nutrition Consult   This is a 66 yo female admitted for shortness of breath. She has a hx of COPD, nonischemic cardiomyopathy previous CVA non-ST elevation MI. Pt intubated. Propofol 6.1(161kcals)  Pts wt has been hanging around 80lb but use to weigh 100lb    Patient meets ASPEN/AND criteria for nutrition diagnosis of severe malnutrition of chronic illness based on: NFPE, inadequate po intake, hx of wt loss.     Plan/Recommendation  Begin TF's with IsoSource HN at 10ml/hr and will advance slowly to goal of 50ml/hr and water [id number].  Calories  1320 kcals plus propofol (100%)    Protein  59 g (100%)    Free water  891 mL   Flushes  180   The above end goal rate is for 22 hrs/day to assume interruptions for ADLs. Water flushes adjusted based on clinical picture + Rx flushes/IV fluids     RD to follow    CLINICAL NUTRITION ASSESSMENT      Reason for Assessment TF Assessment    Diagnosis/Problem Respiratory failure on the vent   Medical & Surgical Hx Past Medical History:   Diagnosis Date    Asthma     Cerebrovascular accident (CVA) due to thrombosis of right middle cerebral artery     COPD (chronic obstructive pulmonary disease)     Nonischemic cardiomyopathy     NSTEMI (non-ST elevated myocardial infarction)     Stroke     Takotsubo cardiomyopathy     Tobacco abuse        Past Surgical History:   Procedure Laterality Date    CARDIAC CATHETERIZATION N/A 9/13/2018    Procedure: Left Heart Cath and cors;  Surgeon: Gabino Dozier MD;  Location: [facility] CATH INVASIVE LOCATION;  Service: Cardiology    CARDIAC CATHETERIZATION N/A 9/13/2018    Procedure: Left ventriculography;  Surgeon: Gabino Dozier MD;  Location: [facility] CATH INVASIVE LOCATION;  Service: Cardiology    CARDIAC ELECTROPHYSIOLOGY PROCEDURE N/A 9/27/2018    Procedure: Loop insertion  LINQ;   "Surgeon: Jose R Barker MD;  Location: [facility] LOCATION;  Service: Cardiovascular        Current Problems      Encounter Information        Nutrition History    Food Preferences    Supplements    Factors Affecting Intake altered respiratory status         Anthropometrics        Current Height   Current Weight  BMI kg/m2 Height: 152.4 cm (60\")  Weight: 33.7 kg (74 lb 4.7 oz) (02/10/25 2230)  Body mass index is 14.51 kg/m².     Adj BMI (if applicable)    BMI Category Underweight (18.4 or below)       Admission Weight    Ideal Body Weight (IBW) 111lb     Adj IBW (if applicable)    Usual Body Weight (UBW) unknown   Weight Change/Trend Unknown       Weight history: Wt Readings from Last 30 Encounters:   02/10/25 2230 33.7 kg (74 lb 4.7 oz)   03/20/24 0537 31.8 kg (70 lb 1.6 oz)   03/17/24 0955 34 kg (75 lb)   03/16/24 1212 34 kg (75 lb)   02/19/24 1508 34.3 kg (75 lb 9.6 oz)   08/03/23 1309 35.4 kg (78 lb)   01/16/23 1323 39 kg (86 lb)   01/12/22 1331 36.3 kg (80 lb)   03/22/21 0618 37.8 kg (83 lb 6.4 oz)   03/21/21 0518 38.9 kg (85 lb 12.8 oz)   03/20/21 0537 39.7 kg (87 lb 9.6 oz)   03/19/21 0500 46 kg (101 lb 6.4 oz)   03/18/21 0546 40.9 kg (90 lb 2.7 oz)   03/17/21 1855 42.2 kg (93 lb)   03/17/21 0500 42.4 kg (93 lb 7.6 oz)   03/16/21 0500 43.3 kg (95 lb 7.4 oz)   03/15/21 0500 42.7 kg (94 lb 2.2 oz)   03/14/21 0445 42.5 kg (93 lb 11.1 oz)   03/13/21 1412 40.8 kg (89 lb 15.2 oz)   03/13/21 0420 40.8 kg (89 lb 15.2 oz)   03/13/21 0108 40.3 kg (88 lb 13.5 oz)   03/12/21 2210 45.4 kg (100 lb)   01/20/21 1431 41.3 kg (91 lb)   12/15/20 1202 41.7 kg (92 lb)   06/15/20 1122 44.5 kg (98 lb)   12/12/19 1339 48.3 kg (106 lb 6.4 oz)   10/24/19 1001 44.9 kg (99 lb)   10/11/19 1422 45.3 kg (99 lb 12.8 oz)   10/03/19 0404 43.4 kg (95 lb 10.9 oz)   10/02/19 0500 45.3 kg (99 lb 13.9 oz)   10/01/19 0952 45.5 kg (100 lb 5 oz)   10/01/19 0600 45.5 kg (100 lb 5 oz)   09/30/19 1131 42.2 kg (93 lb)   09/30/19 0227 42.3 kg " (93 lb 4.1 oz)   09/30/19 0022 49.2 kg (108 lb 8 oz)   09/19/19 1308 45.3 kg (99 lb 12.8 oz)   09/10/19 0512 46.3 kg (102 lb)   06/03/19 1248 46 kg (101 lb 6.4 oz)   02/11/19 1509 46.3 kg (102 lb)   12/18/18 0810 45.8 kg (101 lb)   11/07/18 1314 44.5 kg (98 lb)   10/10/18 1353 44.5 kg (98 lb)   09/29/18 0533 42.8 kg (94 lb 5.7 oz)   09/28/18 0524 42.8 kg (94 lb 5.7 oz)   09/26/18 0500 43.3 kg (95 lb 7.4 oz)   09/25/18 1400 43.5 kg (96 lb)   09/25/18 1056 43.6 kg (96 lb 1.9 oz)   09/25/18 0600 43.6 kg (96 lb 1.9 oz)   09/24/18 1055 44.7 kg (98 lb 8 oz)   09/17/18 0500 44.5 kg (98 lb)   09/16/18 0621 45.9 kg (101 lb 4.8 oz)   09/15/18 0600 44.4 kg (97 lb 15.9 oz)   09/13/18 1227 43.5 kg (96 lb)        Estimated/Assessed Needs        Energy Requirements    Weight for Calculation 50.4   Method for Estimation  25-30 kcal/kg   EST Needs (kcal/day) 8190-3997       Protein Requirements    Weight for Calculation 50.4   EST Protein Needs (g/kg) 1.2 - 1.5 gm/kg   EST Daily Needs (g/day) 60-75       Fluid Requirements     Method for Estimation 1 mL/kcal    Estimated Needs (mL/day)          Labs        Pertinent Labs    Results from last 7 days   Lab Units 02/12/25  0404 02/11/25  0449 02/10/25  2054 02/10/25  1547   SODIUM mmol/L 141 141 141 140   POTASSIUM mmol/L 4.8 4.5 4.7 4.7   CHLORIDE mmol/L 107 107 106 105   CO2 mmol/L 24.0 20.6* 23.0 23.0   BUN mg/dL 24* 22 24* 18   CREATININE mg/dL 0.46* 0.48* 0.61 0.51*   CALCIUM mg/dL 9.4 8.7 9.2 9.5   BILIRUBIN mg/dL  --   --   --  0.3   ALK PHOS U/L  --   --   --  70   ALT (SGPT) U/L  --   --   --  16   AST (SGOT) U/L  --   --   --  24   GLUCOSE mg/dL 127* 136* 209* 191*     Results from last 7 days   Lab Units 02/12/25  0404 02/10/25  2054 02/10/25  1547   HEMOGLOBIN g/dL 13.0   < > 14.7   HEMATOCRIT % 38.9   < > 46.3   WBC 10*3/mm3 12.30*   < > 10.46   ALBUMIN g/dL  --   --  4.2    < > = values in this interval not displayed.     Results from last 7 days   Lab Units  02/12/25  0404 02/11/25  0449 02/10/25  2054 02/10/25  1547   INR   --   --   --  1.04   PLATELETS 10*3/mm3 261 202 221 253     COVID19   Date Value Ref Range Status   02/10/2025 Not Detected Not Detected - Ref. Range Final     Lab Results   Component Value Date    HGBA1C 5.5 11/01/2023          Medications            Scheduled Medications apixaban, 5 mg, Oral, Q12H  chlorhexidine, 15 mL, Mouth/Throat, Q12H  ipratropium-albuterol, 3 mL, Nebulization, 4x Daily - RT  methylPREDNISolone sodium succinate, 125 mg, Intravenous, Q8H  mupirocin, 1 Application, Each Nare, BID  pantoprazole, 40 mg, Intravenous, Q24H  senna-docusate sodium, 2 tablet, Oral, BID  sodium chloride, 10 mL, Intravenous, Q12H        Infusions dexmedetomidine, 0.2-1.5 mcg/kg/hr, Last Rate: Stopped (02/11/25 0846)  phenylephrine, 0.5-3 mcg/kg/min, Last Rate: 0.5 mcg/kg/min (02/12/25 1624)  propofol, 5-50 mcg/kg/min, Last Rate: 50 mcg/kg/min (02/12/25 1509)        PRN Medications   senna-docusate sodium **AND** polyethylene glycol **AND** bisacodyl **AND** bisacodyl    fentaNYL citrate (PF)    nitroglycerin    sodium chloride    sodium chloride     Physical Findings          Physical Appearance frail, loss of muscle mass, loss of subcutaneous fat, underweight, ventilator support   Oral/Mouth Cavity WDL   Edema  no edema   Gastrointestinal hypoactive bowel sounds   Skin  bruising   Tubes/Drains/Lines Cortrak, NG tube   NFPE See Malnutrition Severity Assessment, Date Completed: 2/12   --  Malnutrition Severity Assessment      Patient meets criteria for : Severe Malnutrition  Malnutrition Type (Last 8 Hours)       Malnutrition Severity Assessment       Row Name 02/12/25 1737       Malnutrition Severity Assessment    Malnutrition Type Chronic Disease - Related Malnutrition      Row Name 02/12/25 1737       Insufficient Energy Intake     Insufficient Energy Intake Findings Severe    Insufficient Energy Intake  <75% of est. energy requirement for > or equal  to 1 month      Row Name 02/12/25 1737       Unintentional Weight Loss     Unintentional Weight Loss Findings --  hx of wt loss      Row Name 02/12/25 1737       Muscle Loss    Zoroastrianism Region Severe - deep hollowing/scooping, lack of muscle to touch, facial bones well defined    Clavicle Bone Region Severe - protruding prominent bone    Dorsal Hand Region Severe - prominent depression      Row Name 02/12/25 1737       Fat Loss    Upper Arm Region Severe - mostly skin, very little space between folds, fingers touch      Row Name 02/12/25 1737       Declining Functional Status    Declining Functional Status Findings Measurably Reduced      Row Name 02/12/25 1737       Criteria Met (Must meet criteria for severity in at least 2 of these categories: M Wasting, Fat Loss, Fluid, Secondary Signs, Wt. Status, Intake)    Patient meets criteria for  Severe Malnutrition                       Current Nutrition Orders & Evaluation of Intake       Oral Nutrition     Food Allergies NKFA   Current PO Diet NPO Diet NPO Type: Strict NPO, Sips with Meds   Supplement    PO Evaluation     Trending % PO Intake    --  Nutrition Diagnosis        Nutrition Dx Problem 1 Problem: Inadequate Oral Intake  Etiology: Medical Diagnosis - respiratory failure  Signs/Symptoms: NPO    Comment:      INTERVENTION / PLAN OF CARE  Intervention Goal        Intervention Goal(s) Reduce/improve symptoms, Disease management/therapy, Initiate TF/PN, Tolerate TF/PN at goal, and No significant weight loss     Nutrition Intervention        RD Action Continue to monitor, Care plan reviewed, and Recommend/order: TF's     Prescription         Diet     Supplement/Snack    EN/PN     Prescription Ordered       Enteral Prescription:     Enteral Route NG    TF Delivery Method Continuous    Enteral Product Isosource HN    Modular None    Propofol Rate/Kcal 6.1(161kcals)    TF Start Rate 10    TF Goal Rate 50    Free Water Flush [id number]    TF Provision at Goal: 1320 kcal  plus propofol, 59 gm protein, 891 mL free water + 180 mL water flushes         Calories 100 % needs met         Protein  100 % needs met         Fluid (mL)     Prescription Ordered Yes     Monitor/Evaluation        Monitor Per protocol, I&O, Pertinent labs, EN delivery/tolerance, Weight, Skin status, GI status, Symptoms, Swallow function, Hemodynamic stability   Discharge Plan Pending clinical course   Education Education not appropriate at this time     RD to follow per protocol.       Electronically signed by:  Malia Castillo RD  02/12/25 17:22 EST

## 2025-02-13 ENCOUNTER — APPOINTMENT (OUTPATIENT)
Dept: GENERAL RADIOLOGY | Facility: HOSPITAL | Age: 65
DRG: 207 | End: 2025-02-13
Payer: COMMERCIAL

## 2025-02-13 LAB
ANION GAP SERPL CALCULATED.3IONS-SCNC: 7.5 MMOL/L (ref 5–15)
ARTERIAL PATENCY WRIST A: ABNORMAL
ATMOSPHERIC PRESS: 753.5 MMHG
BASE EXCESS BLDA CALC-SCNC: 6.6 MMOL/L (ref 0–2)
BASOPHILS # BLD AUTO: 0.01 10*3/MM3 (ref 0–0.2)
BASOPHILS NFR BLD AUTO: 0.1 % (ref 0–1.5)
BDY SITE: ABNORMAL
BUN SERPL-MCNC: 29 MG/DL (ref 8–23)
BUN/CREAT SERPL: 85.3 (ref 7–25)
CALCIUM SPEC-SCNC: 9.4 MG/DL (ref 8.6–10.5)
CHLORIDE SERPL-SCNC: 104 MMOL/L (ref 98–107)
CO2 BLDA-SCNC: >32.45 MMOL/L (ref 23–27)
CO2 SERPL-SCNC: 26.5 MMOL/L (ref 22–29)
CREAT SERPL-MCNC: 0.34 MG/DL (ref 0.57–1)
DEPRECATED RDW RBC AUTO: 42.1 FL (ref 37–54)
DEVICE COMMENT: ABNORMAL
EGFRCR SERPLBLD CKD-EPI 2021: 115.1 ML/MIN/1.73
EOSINOPHIL # BLD AUTO: 0 10*3/MM3 (ref 0–0.4)
EOSINOPHIL NFR BLD AUTO: 0 % (ref 0.3–6.2)
ERYTHROCYTE [DISTWIDTH] IN BLOOD BY AUTOMATED COUNT: 12.4 % (ref 12.3–15.4)
GLUCOSE BLDC GLUCOMTR-MCNC: 154 MG/DL (ref 70–130)
GLUCOSE BLDC GLUCOMTR-MCNC: 174 MG/DL (ref 70–130)
GLUCOSE BLDC GLUCOMTR-MCNC: 206 MG/DL (ref 70–130)
GLUCOSE SERPL-MCNC: 166 MG/DL (ref 65–99)
HCO3 BLDA-SCNC: 34.8 MMOL/L (ref 22–28)
HCT VFR BLD AUTO: 37.1 % (ref 34–46.6)
HEMODILUTION: NO
HGB BLD-MCNC: 12.5 G/DL (ref 12–15.9)
IMM GRANULOCYTES # BLD AUTO: 0.03 10*3/MM3 (ref 0–0.05)
IMM GRANULOCYTES NFR BLD AUTO: 0.3 % (ref 0–0.5)
INHALED O2 CONCENTRATION: 30 %
LYMPHOCYTES # BLD AUTO: 0.52 10*3/MM3 (ref 0.7–3.1)
LYMPHOCYTES NFR BLD AUTO: 5.9 % (ref 19.6–45.3)
Lab: ABNORMAL
MCH RBC QN AUTO: 31.4 PG (ref 26.6–33)
MCHC RBC AUTO-ENTMCNC: 33.7 G/DL (ref 31.5–35.7)
MCV RBC AUTO: 93.2 FL (ref 79–97)
MODALITY: ABNORMAL
MONOCYTES # BLD AUTO: 0.67 10*3/MM3 (ref 0.1–0.9)
MONOCYTES NFR BLD AUTO: 7.7 % (ref 5–12)
NEUTROPHILS NFR BLD AUTO: 7.51 10*3/MM3 (ref 1.7–7)
NEUTROPHILS NFR BLD AUTO: 86 % (ref 42.7–76)
NOTIFIED WHO: ABNORMAL
NRBC BLD AUTO-RTO: 0 /100 WBC (ref 0–0.2)
O2 A-A PPRESDIFF RESPIRATORY: 0.4 MMHG
PCO2 BLDA: 64.1 MM HG (ref 35–45)
PEEP RESPIRATORY: 5 CM[H2O]
PH BLDA: 7.34 PH UNITS (ref 7.35–7.45)
PLATELET # BLD AUTO: 239 10*3/MM3 (ref 140–450)
PMV BLD AUTO: 10.5 FL (ref 6–12)
PO2 BLD: 190 MM[HG] (ref 0–500)
PO2 BLDA: 57 MM HG (ref 80–100)
POTASSIUM SERPL-SCNC: 4.2 MMOL/L (ref 3.5–5.2)
PSV: 10 CMH2O
RBC # BLD AUTO: 3.98 10*6/MM3 (ref 3.77–5.28)
READ BACK: YES
SAO2 % BLDCOA: 86.4 % (ref 92–98.5)
SODIUM SERPL-SCNC: 138 MMOL/L (ref 136–145)
TOTAL RATE: 26 BREATHS/MINUTE
VENTILATOR MODE: ABNORMAL
WBC NRBC COR # BLD AUTO: 8.74 10*3/MM3 (ref 3.4–10.8)

## 2025-02-13 PROCEDURE — 94799 UNLISTED PULMONARY SVC/PX: CPT

## 2025-02-13 PROCEDURE — 0B9H8ZZ DRAINAGE OF LUNG LINGULA, VIA NATURAL OR ARTIFICIAL OPENING ENDOSCOPIC: ICD-10-PCS | Performed by: INTERNAL MEDICINE

## 2025-02-13 PROCEDURE — 87205 SMEAR GRAM STAIN: CPT | Performed by: INTERNAL MEDICINE

## 2025-02-13 PROCEDURE — 0B9D8ZZ DRAINAGE OF RIGHT MIDDLE LUNG LOBE, VIA NATURAL OR ARTIFICIAL OPENING ENDOSCOPIC: ICD-10-PCS | Performed by: INTERNAL MEDICINE

## 2025-02-13 PROCEDURE — 36600 WITHDRAWAL OF ARTERIAL BLOOD: CPT

## 2025-02-13 PROCEDURE — 25010000002 PROPOFOL 10 MG/ML EMULSION: Performed by: INTERNAL MEDICINE

## 2025-02-13 PROCEDURE — 0BD68ZX EXTRACTION OF RIGHT LOWER LOBE BRONCHUS, VIA NATURAL OR ARTIFICIAL OPENING ENDOSCOPIC, DIAGNOSTIC: ICD-10-PCS | Performed by: INTERNAL MEDICINE

## 2025-02-13 PROCEDURE — 94664 DEMO&/EVAL PT USE INHALER: CPT

## 2025-02-13 PROCEDURE — 0B968ZZ DRAINAGE OF RIGHT LOWER LOBE BRONCHUS, VIA NATURAL OR ARTIFICIAL OPENING ENDOSCOPIC: ICD-10-PCS | Performed by: INTERNAL MEDICINE

## 2025-02-13 PROCEDURE — 94003 VENT MGMT INPAT SUBQ DAY: CPT

## 2025-02-13 PROCEDURE — 82803 BLOOD GASES ANY COMBINATION: CPT

## 2025-02-13 PROCEDURE — 25010000002 METHYLPREDNISOLONE PER 125 MG: Performed by: INTERNAL MEDICINE

## 2025-02-13 PROCEDURE — 82948 REAGENT STRIP/BLOOD GLUCOSE: CPT

## 2025-02-13 PROCEDURE — 80048 BASIC METABOLIC PNL TOTAL CA: CPT | Performed by: INTERNAL MEDICINE

## 2025-02-13 PROCEDURE — 94761 N-INVAS EAR/PLS OXIMETRY MLT: CPT

## 2025-02-13 PROCEDURE — 25010000002 FENTANYL CITRATE (PF) 50 MCG/ML SOLUTION: Performed by: INTERNAL MEDICINE

## 2025-02-13 PROCEDURE — 94760 N-INVAS EAR/PLS OXIMETRY 1: CPT

## 2025-02-13 PROCEDURE — 71045 X-RAY EXAM CHEST 1 VIEW: CPT

## 2025-02-13 PROCEDURE — 87070 CULTURE OTHR SPECIMN AEROBIC: CPT | Performed by: INTERNAL MEDICINE

## 2025-02-13 PROCEDURE — 85025 COMPLETE CBC W/AUTO DIFF WBC: CPT | Performed by: INTERNAL MEDICINE

## 2025-02-13 RX ADMIN — IPRATROPIUM BROMIDE AND ALBUTEROL SULFATE 3 ML: .5; 3 SOLUTION RESPIRATORY (INHALATION) at 10:48

## 2025-02-13 RX ADMIN — METHYLPREDNISOLONE SODIUM SUCCINATE 125 MG: 125 INJECTION, POWDER, FOR SOLUTION INTRAMUSCULAR; INTRAVENOUS at 09:58

## 2025-02-13 RX ADMIN — METHYLPREDNISOLONE SODIUM SUCCINATE 125 MG: 125 INJECTION, POWDER, FOR SOLUTION INTRAMUSCULAR; INTRAVENOUS at 02:11

## 2025-02-13 RX ADMIN — FENTANYL CITRATE 50 MCG: 50 INJECTION, SOLUTION INTRAMUSCULAR; INTRAVENOUS at 21:10

## 2025-02-13 RX ADMIN — Medication 10 ML: at 23:31

## 2025-02-13 RX ADMIN — DEXMEDETOMIDINE HYDROCHLORIDE 0.2 MCG/KG/HR: 400 INJECTION INTRAVENOUS at 13:16

## 2025-02-13 RX ADMIN — PROPOFOL 40 MCG/KG/MIN: 10 INJECTION, EMULSION INTRAVENOUS at 13:15

## 2025-02-13 RX ADMIN — APIXABAN 5 MG: 5 TABLET, FILM COATED ORAL at 09:58

## 2025-02-13 RX ADMIN — Medication 10 ML: at 00:28

## 2025-02-13 RX ADMIN — METHYLPREDNISOLONE SODIUM SUCCINATE 125 MG: 125 INJECTION, POWDER, FOR SOLUTION INTRAMUSCULAR; INTRAVENOUS at 18:27

## 2025-02-13 RX ADMIN — IPRATROPIUM BROMIDE AND ALBUTEROL SULFATE 3 ML: .5; 3 SOLUTION RESPIRATORY (INHALATION) at 20:28

## 2025-02-13 RX ADMIN — APIXABAN 5 MG: 5 TABLET, FILM COATED ORAL at 23:30

## 2025-02-13 RX ADMIN — FENTANYL CITRATE 50 MCG: 50 INJECTION, SOLUTION INTRAMUSCULAR; INTRAVENOUS at 19:51

## 2025-02-13 RX ADMIN — SENNOSIDES AND DOCUSATE SODIUM 2 TABLET: 50; 8.6 TABLET ORAL at 09:58

## 2025-02-13 RX ADMIN — FENTANYL CITRATE 50 MCG: 50 INJECTION, SOLUTION INTRAMUSCULAR; INTRAVENOUS at 09:00

## 2025-02-13 RX ADMIN — MUPIROCIN 1 APPLICATION: 20 OINTMENT TOPICAL at 00:28

## 2025-02-13 RX ADMIN — FENTANYL CITRATE 50 MCG: 50 INJECTION, SOLUTION INTRAMUSCULAR; INTRAVENOUS at 02:01

## 2025-02-13 RX ADMIN — MUPIROCIN 1 APPLICATION: 20 OINTMENT TOPICAL at 09:58

## 2025-02-13 RX ADMIN — IPRATROPIUM BROMIDE AND ALBUTEROL SULFATE 3 ML: .5; 3 SOLUTION RESPIRATORY (INHALATION) at 07:17

## 2025-02-13 RX ADMIN — PROPOFOL 50 MCG/KG/MIN: 10 INJECTION, EMULSION INTRAVENOUS at 23:06

## 2025-02-13 RX ADMIN — SENNOSIDES AND DOCUSATE SODIUM 2 TABLET: 50; 8.6 TABLET ORAL at 23:30

## 2025-02-13 RX ADMIN — PROPOFOL 40 MCG/KG/MIN: 10 INJECTION, EMULSION INTRAVENOUS at 00:27

## 2025-02-13 RX ADMIN — MUPIROCIN 1 APPLICATION: 20 OINTMENT TOPICAL at 23:30

## 2025-02-13 RX ADMIN — CHLORHEXIDINE GLUCONATE 15 ML: 1.2 RINSE ORAL at 10:00

## 2025-02-13 RX ADMIN — PANTOPRAZOLE SODIUM 40 MG: 40 INJECTION, POWDER, FOR SOLUTION INTRAVENOUS at 09:58

## 2025-02-13 RX ADMIN — IPRATROPIUM BROMIDE AND ALBUTEROL SULFATE 3 ML: .5; 3 SOLUTION RESPIRATORY (INHALATION) at 16:35

## 2025-02-13 RX ADMIN — Medication 10 ML: at 09:58

## 2025-02-13 RX ADMIN — FENTANYL CITRATE 50 MCG: 50 INJECTION, SOLUTION INTRAMUSCULAR; INTRAVENOUS at 10:36

## 2025-02-13 NOTE — PROGRESS NOTES
"                                              LOS: 3 days   Patient Care Team:  Flavia Jaquez APRN as PCP - General (Nurse Practitioner)  Beulah Joshi APRN as Nurse Practitioner (Neurology)  Flaco Aguayo MD as Consulting Physician (Pulmonary Disease)    Chief Complaint:  F/up respiratory failure, COPD and critical care management.    Subjective   Interval History    On AC PC 12/20/.  No significant secretions from the ET tube.  Afebrile.  Tolerating TF.    REVIEW OF SYSTEMS:   Cannot obtain due to mechanical ventilation.    Ventilator/Non-Invasive Ventilation Settings (From admission, onward)       Start     Ordered    02/10/25 1620  NIPPV - Provider Settings  (CPAP / BiPAP)  Until Discontinued        Question Answer Comment   Indication Acute Respiratory Failure    Type BIPAP    Titrate Oxygen for SpO2 90 - 95%        02/10/25 1620                          Physical Exam:     Vital Signs  Temp:  [98.7 °F (37.1 °C)] 98.7 °F (37.1 °C)  Heart Rate:  [] 64  Resp:  [14-16] 16  BP: ()/(38-94) 124/55  FiO2 (%):  [29 %-30 %] 29 %    Intake/Output Summary (Last 24 hours) at 2/13/2025 1632  Last data filed at 2/13/2025 0600  Gross per 24 hour   Intake 859.64 ml   Output --   Net 859.64 ml     Flowsheet Rows      Flowsheet Row First Filed Value   Admission Height 152.4 cm (60\") Documented at 02/10/2025 2230   Admission Weight 33.7 kg (74 lb 4.7 oz) Documented at 02/10/2025 2230            PPE used per hospital policy    General Appearance:  On the ventilator.  NAD...  Cachectic   ENMT: ET tube noted.  External ears normal.   Eyes:  Pupils equal and reactive to light. EOMI   Neck:    Trachea midline. No thyromegaly.   Lungs:   Equal but severely diminished air entry throughout.  Bilateral expiratory wheezing.  Prolonged expiratory time.    Heart:  RRR.  No audible murmur.   Skin:   No rash or ecchymosis   Abdomen:    Soft. No tenderness. No HSM.   Neuro/psych: Sedated.  Arouses to " stimulus and follows commands.   Extremities:  No cyanosis, clubbing or edema.  Warm extremities and well-perfused          Results Review:        Results from last 7 days   Lab Units 02/13/25  0442 02/12/25  0404 02/11/25  0449   SODIUM mmol/L 138 141 141   POTASSIUM mmol/L 4.2 4.8 4.5   CHLORIDE mmol/L 104 107 107   CO2 mmol/L 26.5 24.0 20.6*   BUN mg/dL 29* 24* 22   CREATININE mg/dL 0.34* 0.46* 0.48*   GLUCOSE mg/dL 166* 127* 136*   CALCIUM mg/dL 9.4 9.4 8.7     Results from last 7 days   Lab Units 02/10/25  1707 02/10/25  1547   HSTROP T ng/L 95* 20*     Results from last 7 days   Lab Units 02/13/25  0442 02/12/25  0404 02/11/25  0449   WBC 10*3/mm3 8.74 12.30* 7.86   HEMOGLOBIN g/dL 12.5 13.0 13.3   HEMATOCRIT % 37.1 38.9 40.1   PLATELETS 10*3/mm3 239 261 202     Results from last 7 days   Lab Units 02/10/25  1547   INR  1.04     Results from last 7 days   Lab Units 02/10/25  1547   PROBNP pg/mL 158.0       Results from last 7 days   Lab Units 02/10/25  1547   D DIMER QUANT MCGFEU/mL 3.74*             Results from last 7 days   Lab Units 02/13/25  0850 02/11/25  0722 02/10/25  1730 02/10/25  1539   PH, ARTERIAL pH units 7.343* 7.389 7.213* 7.068*   PCO2, ARTERIAL mm Hg 64.1* 45.1* 64.7* 98.9*   PO2 ART mm Hg 57.0* 56.2* 327.2* 71.0*   O2 SATURATION ART % 86.4* 88.3* 99.9* 84.0*   FLOW RATE lpm  --   --   --  8.0000   MODALITY  Adult Vent N/A BiPap HFNC         I reviewed the patient's new clinical results.        Medication Review:   apixaban, 5 mg, Oral, Q12H  chlorhexidine, 15 mL, Mouth/Throat, Q12H  ipratropium-albuterol, 3 mL, Nebulization, 4x Daily - RT  methylPREDNISolone sodium succinate, 125 mg, Intravenous, Q8H  mupirocin, 1 Application, Each Nare, BID  pantoprazole, 40 mg, Intravenous, Q24H  senna-docusate sodium, 2 tablet, Oral, BID  sodium chloride, 10 mL, Intravenous, Q12H        dexmedetomidine, 0.2-1.5 mcg/kg/hr, Last Rate: 0.2 mcg/kg/hr (02/13/25 1316)  phenylephrine, 0.5-3 mcg/kg/min, Last  Rate: 0.6 mcg/kg/min (02/13/25 0300)  propofol, 5-50 mcg/kg/min, Last Rate: 40 mcg/kg/min (02/13/25 1315)        Diagnostic imaging:  I personally and independently reviewed the following images:  CTA chest 2/10/2025: Significant emphysema more prominent on the right side.  Bronchiectasis.    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  Tried on pressure support again today and she tolerated initially but became very tachypneic with low tidal volume later.  This was initially thought to be secondary to anxiety but it persisted despite placing her on propofol.  AC PC mode was resumed with PI 20 as previously.  Unfortunately she would be difficult to liberate of the ventilator and I did explain that to her family.  She has very severe COPD at baseline.  Propofol.  Tre-Synephrine IV drip and titrate to keep MAP >65  Solu-Medrol 125 mg every 8 hours.  Tube feeding  DuoNeb 4 times a day  Eliquis 5 mg twice daily        Rafael Briot MD  02/13/25  16:32 EST        Time: Critical care 35 min       This note was dictated utilizing Dragon dictation

## 2025-02-13 NOTE — PAYOR COMM NOTE
"Scarlet Chavez (65 y.o. Female)                       ATTENTION; CONTINUED CLINICALS CASE REF [id number]                      REPLY TO [facility] DEPT  [phone] OR CALL            Date of Birth   1960    Social Security Number       Address   [address] [place] [address]    Home Phone       MRN   [id number]       Yazdanism   Jew    Marital Status   Single                            Admission Date   2/10/25    Admission Type   Emergency    Admitting Provider   Zainab Regalado MD    Attending Provider   Zainab Regalado MD    Department, Room/Bed   [facility] INTENSIVE CARE, I381/1       Discharge Date       Discharge Disposition       Discharge Destination                                 Attending Provider: Zainab Regalado MD    Allergies: No Known Allergies    Isolation: None   Infection: None   Code Status: CPR    Ht: 152.4 cm (60\")   Wt: 33.7 kg (74 lb 4.7 oz)    Admission Cmt: None   Principal Problem: Acute hypercapnic respiratory failure [J96.02]                   Active Insurance as of 2/10/2025       Primary Coverage       Payor Plan Insurance Group Employer/Plan Group    [employer] HMO [id number]       Payor Plan Address Payor Plan Phone Number Payor Plan Fax Number Effective Dates    [address] [phone]  1/1/2024 - None Entered    [place] [address]         Subscriber Name Subscriber Birth Date Member ID       SCARLET CHAVEZ 1960 [id number]                     Emergency Contacts        (Rel.) Home Phone Work Phone Mobile Phone    Yvonne Mccollum (Sister) [phone] -- --    Don Taylor (Significant Other) -- -- [phone]              Vital Signs (last day)       Date/Time Temp Temp src Pulse Resp BP Patient Position SpO2    02/13/25 1635 -- -- 74 18 -- -- 96    02/13/25 1545 -- -- 64 -- 124/55 -- 96    02/13/25 1530 -- -- 69 -- 120/66 -- 98    02/13/25 1515 -- -- 81 -- 103/58 -- 97    02/13/25 " 1500 -- -- 63 -- 117/56 -- 97    02/13/25 1445 -- -- 67 -- 109/59 -- 96    02/13/25 1430 -- -- 66 -- 105/59 -- 95    02/13/25 1415 -- -- 65 -- 117/66 -- 97    02/13/25 1400 -- -- 66 -- 114/67 -- 96    02/13/25 1345 -- -- 71 -- 109/53 -- 96    02/13/25 1330 -- -- 78 -- 120/54 -- 95    02/13/25 1315 -- -- 91 -- 116/46 -- 97    02/13/25 1300 -- -- 90 -- 102/53 -- 98    02/13/25 1245 -- -- 96 -- 113/53 -- 97    02/13/25 1230 -- -- 97 -- 115/38 -- 97    02/13/25 1215 -- -- 84 -- 122/54 -- 97    02/13/25 1200 -- -- 79 -- 119/57 -- 95    02/13/25 1145 -- -- 83 -- 125/53 -- 95    02/13/25 1130 -- -- 88 -- 118/50 -- 95    02/13/25 1115 -- -- 94 -- 113/56 -- 95    02/13/25 1100 98.7 (37.1) Oral 99 -- 102/54 -- 94    02/13/25 1045 -- -- 103 -- 85/45 -- 91    02/13/25 1030 -- -- 116 -- 110/80 -- 98    02/13/25 1015 -- -- 95 -- 91/44 -- 100    02/13/25 1000 -- -- 91 -- 98/54 -- 97    02/13/25 0945 -- -- 87 -- 98/51 -- 96    02/13/25 0930 -- -- 98 -- 95/53 -- 95    02/13/25 0915 -- -- 114 -- 86/57 -- 93    02/13/25 0900 -- -- 133 -- 147/67 -- 95    02/13/25 0845 -- -- 119 -- 121/49 -- 93    02/13/25 0830 -- -- 113 -- 115/56 -- 91    02/13/25 0815 -- -- 116 -- 120/58 -- 92    02/13/25 0800 -- -- 106 -- 114/54 -- 94    02/13/25 0745 -- -- 103 -- 114/49 -- 94    02/13/25 0730 -- -- 90 -- 118/55 -- 93    02/13/25 0717 -- -- 78 16 111/57 -- 95    02/13/25 0715 -- -- 72 -- -- -- 93    02/13/25 0700 -- -- 62 -- 96/52 -- 94    02/13/25 0645 -- -- 62 -- 96/58 -- 93    02/13/25 0630 -- -- 70 -- 104/55 -- 93    02/13/25 0615 -- -- 70 -- 105/53 -- 94    02/13/25 0600 -- -- 75 -- 99/57 -- 93    02/13/25 0545 -- -- 76 -- 101/54 -- 94    02/13/25 0530 -- -- 69 -- 100/54 -- 93    02/13/25 0515 -- -- 71 -- 100/57 -- 92    02/13/25 0500 -- -- 76 -- 100/53 -- 92    02/13/25 0445 -- -- 77 -- 114/55 -- 92    02/13/25 0430 -- -- 86 -- 102/57 -- 93    02/13/25 0415 -- -- 96 -- 116/48 -- 93    02/13/25 0409 -- -- 97 -- -- -- 94    02/13/25 0400 -- -- 92  14 110/47 Lying 93    02/13/25 0359 98.7 (37.1) Oral -- -- -- -- --    02/13/25 0345 -- -- 97 -- 112/52 -- 93    02/13/25 0330 -- -- 98 -- 108/59 -- 93    02/13/25 0315 -- -- 105 -- 115/67 -- 93    02/13/25 0300 -- -- 99 -- 117/55 -- 93    02/13/25 0245 -- -- 98 -- 113/59 -- 95    02/13/25 0230 -- -- 92 -- 107/63 -- 96    02/13/25 0215 -- -- 82 -- 117/62 -- 93    02/13/25 0200 -- -- 113 -- 124/94 -- 100    02/13/25 0145 -- -- 92 -- 109/62 -- 96    02/13/25 0130 -- -- 92 -- 89/78 -- 94    02/13/25 0123 -- -- 68 -- -- -- 93    02/13/25 0115 -- -- 68 -- 113/62 -- 93    02/13/25 0100 -- -- 72 -- 111/60 -- 93    02/13/25 0045 -- -- 80 -- 116/59 -- 93    02/13/25 0030 -- -- 89 -- 119/55 -- 92    02/13/25 0015 -- -- 82 -- 127/60 -- 92    02/13/25 0000 98.7 (37.1) Oral 86 -- 120/62 -- 92    02/12/25 2345 -- -- 69 -- 125/65 -- 94    02/12/25 2330 -- -- 68 -- 122/61 -- 91    02/12/25 2315 -- -- 74 -- 126/63 -- 91    02/12/25 2300 -- -- 73 -- 126/62 -- 93    02/12/25 2245 -- -- 74 -- 129/63 -- 93    02/12/25 2230 -- -- 78 -- 121/63 -- 94    02/12/25 2215 -- -- 79 -- 121/65 -- 93    02/12/25 2200 -- -- 74 -- 120/65 -- 93    02/12/25 2145 -- -- 77 -- 116/61 -- 92    02/12/25 2130 -- -- 80 -- 118/60 -- 93    02/12/25 2115 -- -- 87 -- 122/61 -- 91    02/12/25 2100 -- -- 88 -- 120/58 -- 96    02/12/25 2045 -- -- 82 -- 125/60 -- 96    02/12/25 2044 -- -- 75 -- -- -- 96    02/12/25 2038 -- -- 82 -- -- -- 96    02/12/25 1443 -- -- 55 15 -- -- 95    02/12/25 1200 97.4 (36.3) Axillary 83 -- 107/65 -- 96    02/12/25 1145 -- -- 89 -- 93/60 -- 100    02/12/25 1140 -- -- 89 -- 100/67 -- 98    02/12/25 1130 -- -- 88 -- 75/39 -- 99    02/12/25 1115 -- -- 66 -- 114/66 -- 97    02/12/25 1100 -- -- 71 -- 116/69 -- 98    02/12/25 1045 -- -- 68 -- 112/67 -- 98    02/12/25 1034 -- -- 79 -- -- -- 98    02/12/25 1030 -- -- 69 -- 114/55 -- 98    02/12/25 1015 -- -- 68 -- 116/57 -- 97    02/12/25 1000 -- -- 79 -- 109/51 -- 94    02/12/25 0945 -- -- 104  -- 126/53 -- 99    02/12/25 0942 -- -- 120 54 -- -- 74    02/12/25 0930 -- -- 77 -- 120/55 -- 96    02/12/25 0915 -- -- 71 -- 122/60 -- 97    02/12/25 0900 -- -- 76 -- 116/57 -- 97    02/12/25 0845 -- -- 85 -- 113/61 -- 95    02/12/25 0830 -- -- 93 -- 116/57 -- 96    02/12/25 0815 -- -- 88 -- 116/58 -- 95    02/12/25 0800 97.8 (36.6) Axillary 89 30 102/59 -- 95    02/12/25 0745 -- -- 83 -- 105/64 -- 95    02/12/25 0730 -- -- 84 -- 115/61 -- 97    02/12/25 0715 -- -- 75 -- 121/58 -- 95    02/12/25 0700 -- -- 80 -- 121/57 -- 95    02/12/25 0625 -- -- -- 17 -- -- --    02/12/25 0507 -- -- 61 -- -- -- 97    02/12/25 0441 98.1 (36.7) Oral -- -- -- -- --    02/12/25 0430 -- -- 61 -- 117/55 -- 97    02/12/25 0415 -- -- 68 -- 107/66 -- 97    02/12/25 0400 -- -- 81 14 102/57 Lying 95    02/12/25 0345 -- -- 71 -- 102/50 -- 93    02/12/25 0330 -- -- 86 -- 157/115 -- 96    02/12/25 0315 -- -- 76 -- 101/56 -- 94    02/12/25 0300 -- -- 72 -- 104/65 -- 95    02/12/25 0245 -- -- 73 -- 106/53 -- 95    02/12/25 0230 -- -- 69 -- 115/60 -- 96    02/12/25 0215 -- -- 70 -- 114/63 -- 97    02/12/25 0200 -- -- 68 -- 111/66 -- 96    02/12/25 0145 -- -- 70 -- 117/58 -- 96    02/12/25 0128 -- -- 67 -- 105/86 -- 97    02/12/25 0100 -- -- 74 -- 115/69 -- 99    02/12/25 0041 98.3 (36.8) Oral -- -- -- -- --    02/12/25 0000 -- -- 74 14 120/48 Lying 98          Oxygen Therapy (last day)       Date/Time SpO2 Device (Oxygen Therapy) Flow (L/min) (Oxygen Therapy) Oxygen Concentration (%) ETCO2 (mmHg)    02/13/25 1635 96 ventilator -- 30 34    02/13/25 1545 96 -- -- -- 34    02/13/25 1530 98 -- -- -- 35    02/13/25 1515 97 -- -- -- 36    02/13/25 1500 97 -- -- -- 35    02/13/25 1445 96 -- -- -- 35    02/13/25 1430 95 -- -- -- 33    02/13/25 1415 97 -- -- -- 36    02/13/25 1400 96 -- -- -- 35    02/13/25 1345 96 -- -- -- 35    02/13/25 1330 95 -- -- -- 36    02/13/25 1315 97 -- -- -- 36    02/13/25 1300 98 -- -- -- 34    02/13/25 1245 97 -- -- -- 38     02/13/25 1230 97 -- -- -- 39    02/13/25 1215 97 -- -- -- 38    02/13/25 1200 95 -- -- -- 37    02/13/25 1145 95 -- -- -- 39    02/13/25 1130 95 -- -- -- 39    02/13/25 1115 95 -- -- -- 41    02/13/25 1100 94 -- -- -- 41    02/13/25 1045 91 -- -- -- 43    02/13/25 1030 98 -- -- -- 46    02/13/25 1015 100 -- -- -- 42    02/13/25 1000 97 -- -- -- 36    02/13/25 0945 96 -- -- -- 39    02/13/25 0930 95 -- -- -- 40    02/13/25 0915 93 -- -- -- 48    02/13/25 0900 95 -- -- -- 62    02/13/25 0845 93 -- -- -- 41    02/13/25 0830 91 -- -- -- 37    02/13/25 0815 92 -- -- -- 36    02/13/25 0800 94 -- -- -- 40    02/13/25 0745 94 -- -- -- 39    02/13/25 0730 93 -- -- -- 44    02/13/25 0724 -- -- -- -- 36    02/13/25 0723 -- -- -- -- 31    02/13/25 0717 95 ventilator -- 30 36    02/13/25 0715 93 -- -- -- 35    02/13/25 0700 94 -- -- -- 36    02/13/25 0645 93 -- -- -- 34    02/13/25 0630 93 -- -- -- 36    02/13/25 0615 94 -- -- -- 34    02/13/25 0600 93 -- -- -- 36    02/13/25 0545 94 -- -- -- 33    02/13/25 0530 93 -- -- -- 33    02/13/25 0515 92 -- -- -- 33    02/13/25 0500 92 -- -- -- 34    02/13/25 0445 92 -- -- -- 36    02/13/25 0430 93 -- -- -- 35    02/13/25 0415 93 -- -- -- 38    02/13/25 0409 94 ventilator -- -- 41    02/13/25 0400 93 ventilator -- -- 36    02/13/25 0345 93 -- -- -- 39    02/13/25 0330 93 -- -- -- 40    02/13/25 0315 93 -- -- -- 45    02/13/25 0300 93 -- -- -- 39    02/13/25 0245 95 -- -- -- 42    02/13/25 0230 96 -- -- -- 39    02/13/25 0215 93 -- -- -- 39    02/13/25 0200 100 -- -- -- 48    02/13/25 0145 96 -- -- -- 34    02/13/25 0130 94 -- -- -- 35    02/13/25 0123 93 ventilator -- -- 29    02/13/25 0115 93 -- -- -- 29    02/13/25 0100 93 -- -- -- 31    02/13/25 0045 93 -- -- -- 31    02/13/25 0030 92 -- -- -- 31    02/13/25 0015 92 -- -- -- 30    02/13/25 0000 92 ventilator -- -- 32    02/12/25 2345 94 -- -- -- 29    02/12/25 2330 91 -- -- -- 27    02/12/25 2315 91 -- -- -- 28    02/12/25 2300 93 --  -- -- 30 02/12/25 2245 93 -- -- -- 27 02/12/25 2230 94 -- -- -- 28 02/12/25 2215 93 -- -- -- 30 02/12/25 2200 93 -- -- -- 28 02/12/25 2145 92 -- -- -- 28 02/12/25 2130 93 -- -- -- 30 02/12/25 2115 91 -- -- -- 31 02/12/25 2100 96 -- -- -- 30 02/12/25 2045 96 -- -- -- 30    02/12/25 2044 96 -- -- -- 30    02/12/25 2038 96 ventilator -- -- 33    02/12/25 2000 -- ventilator -- -- --    02/12/25 1443 95 ventilator -- 30 36    02/12/25 1200 96 -- -- -- 39    02/12/25 1145 100 -- -- -- 38    02/12/25 1140 98 -- -- -- 39    02/12/25 1130 99 -- -- -- 39    02/12/25 1115 97 -- -- -- 36    02/12/25 1100 98 -- -- -- 36    02/12/25 1045 98 -- -- -- 37    02/12/25 1034 98 ventilator -- 30 36    02/12/25 1030 98 -- -- -- 37    02/12/25 1015 97 -- -- -- 37    02/12/25 1000 94 -- -- -- 39    02/12/25 0945 99 -- -- -- 48    02/12/25 0942 74 -- -- -- 47    02/12/25 0930 96 -- -- -- 37    02/12/25 0915 97 -- -- -- 35    02/12/25 0900 97 -- -- -- 36    02/12/25 0845 95 -- -- -- 37    02/12/25 0830 96 -- -- -- 42    02/12/25 0815 95 -- -- -- 30    02/12/25 0800 95 ventilator -- 30 31    02/12/25 0745 95 -- -- -- 30    02/12/25 0730 97 -- -- -- 31    02/12/25 0715 95 -- -- -- 33    02/12/25 0700 95 -- -- -- 33    02/12/25 0625 -- -- -- 30 33    02/12/25 0507 97 ventilator -- -- 33    02/12/25 0430 97 -- -- -- 33    02/12/25 0415 97 -- -- -- 34    02/12/25 0400 95 ventilator -- -- 36    02/12/25 0345 93 -- -- -- 38    02/12/25 0330 96 -- -- -- 34    02/12/25 0315 94 -- -- -- 33    02/12/25 0300 95 -- -- -- 31    02/12/25 0245 95 -- -- -- 31    02/12/25 0230 96 -- -- -- 32    02/12/25 0215 97 -- -- -- 32    02/12/25 0200 96 -- -- -- 32    02/12/25 0145 96 -- -- -- 33    02/12/25 0128 97 ventilator -- -- 27    02/12/25 0100 99 -- -- -- 30    02/12/25 0000 98 ventilator -- -- 28          Lines, Drains & Airways       Active LDAs       Name Placement date Placement time Site Days    Peripheral IV 02/10/25 7397  Anterior;Left Forearm 02/10/25  1529  Forearm  3    Peripheral IV 02/13/25 1045 Anterior;Right Forearm 02/13/25  1045  Forearm  less than 1    NG/OG Tube Nasogastric Right nostril 02/11/25  1100  Right nostril  2    External Urinary Catheter 02/10/25  2300  --  2    ETT  02/11/25  0830  -- 2                  Orders (last 24 hrs)        Start     Ordered    02/14/25 0600  Triglycerides  Morning Draw         02/13/25 1513    02/13/25 1151  POC Glucose Once  PROCEDURE ONCE        Comments: Complete no more than 45 minutes prior to patient eating      02/13/25 1148    02/13/25 0858  Blood Gas, Arterial -  PROCEDURE ONCE         02/13/25 0850    02/13/25 0600  Basic Metabolic Panel  Morning Draw         02/12/25 1936    02/13/25 0600  CBC & Differential  Morning Draw         02/12/25 1936    02/13/25 0600  CBC Auto Differential  PROCEDURE ONCE         02/12/25 2202    02/13/25 0559  POC Glucose Once  PROCEDURE ONCE        Comments: Complete no more than 45 minutes prior to patient eating      02/13/25 0557    02/12/25 2355  POC Glucose Once  PROCEDURE ONCE        Comments: Complete no more than 45 minutes prior to patient eating      02/12/25 2353    02/12/25 1800  POC Glucose Q6H  Every 6 Hours      Comments: Complete no more than 45 minutes prior to patient eating      02/12/25 1742    02/12/25 1743  Daily Weights  Daily       02/12/25 1742    02/12/25 1742  Tube Feeding Assessment and I/O  Every Shift       02/12/25 1742    02/11/25 2100  chlorhexidine (PERIDEX) 0.12 % solution 15 mL  Every 12 Hours Scheduled         02/11/25 1401    02/11/25 1600  Oral Care & Teeth Brushing - Intubated Patient  Every 4 Hours      Comments: [place] Teeth at Least 2x/day    02/11/25 1401    02/11/25 1500  pantoprazole (PROTONIX) injection 40 mg  Every 24 Hours Scheduled         02/11/25 1401    02/11/25 1110  fentaNYL citrate (PF) (SUBLIMAZE) injection 50 mcg  Every 1 Hour PRN         02/11/25 1110    02/11/25 1030  methylPREDNISolone  "sodium succinate (SOLU-Medrol) injection 125 mg  Every 8 Hours         02/11/25 0801    02/11/25 1030  phenylephrine (ZULY-SYNEPHRINE) 50 mg in 250 mL NS infusion  Titrated         02/11/25 0938    02/11/25 0900  propofol (DIPRIVAN) infusion 10 mg/mL 100 mL  Titrated         02/11/25 0814    02/11/25 0845  dexmedetomidine (PRECEDEX) 400 mcg in 100 mL NS infusion  Titrated         02/11/25 0757    02/10/25 2230  apixaban (ELIQUIS) tablet 5 mg  Every 12 Hours Scheduled         02/10/25 2122    02/10/25 2100  sodium chloride 0.9 % flush 10 mL  Every 12 Hours Scheduled         02/10/25 1818    02/10/25 2100  sennosides-docusate (PERICOLACE) 8.6-50 MG per tablet 2 tablet  2 Times Daily        Placed in \"And\" Linked Group    02/10/25 1818    02/10/25 2030  ipratropium-albuterol (DUO-NEB) nebulizer solution 3 mL  4 Times Daily - RT         02/10/25 1817    02/10/25 1900  Vital Signs Every Hour and Per Hospital Policy Based on Patient Condition  Every Hour       02/10/25 1818    02/10/25 1900  Intake & Output  Every Hour       02/10/25 1818    02/10/25 1834  mupirocin (BACTROBAN) 2 % nasal ointment 1 Application  2 Times Daily         02/10/25 1818    02/10/25 1819  Daily Weights  Daily       02/10/25 1818    02/10/25 1819  Daily CHG Bath While in Critical Care  Daily      Comments: Use for admission bath and length of critical care stay.    02/10/25 1818    02/10/25 1818  Oral Care - Patient Not on NPPV & Not Intubated  Every Shift       02/10/25 1818    02/10/25 1817  nitroglycerin (NITROSTAT) SL tablet 0.4 mg  Every 5 Minutes PRN         02/10/25 1818    02/10/25 1817  sodium chloride 0.9 % flush 10 mL  As Needed         02/10/25 1818    02/10/25 1817  sodium chloride 0.9 % infusion 40 mL  As Needed         02/10/25 1818    02/10/25 1817  polyethylene glycol (MIRALAX) packet 17 g  Daily PRN        Placed in \"And\" Linked Group    02/10/25 1818    02/10/25 1817  bisacodyl (DULCOLAX) EC tablet 5 mg  Daily PRN        Placed " "in \"And\" Linked Group    02/10/25 1818    02/10/25 1817  bisacodyl (DULCOLAX) suppository 10 mg  Daily PRN        Placed in \"And\" Linked Group    02/10/25 1818    Unscheduled  Spontaneous Awakening Trial  Daily - SAT       02/11/25 1401    Unscheduled  Spontaneous Breathing Trial  Daily - SBT       02/11/25 1401    Unscheduled  Holden & Document Feeding Tube Depth (in cm)  As Needed       02/12/25 1742    Unscheduled  Verify Feeding Tube Placement Upon Insertion & As Needed  As Needed       02/12/25 1742    Unscheduled  Flush Feeding Tube With 30-50mL Water As Needed  As Needed       02/12/25 1742    --  tiotropium bromide monohydrate (SPIRIVA RESPIMAT) 2.5 MCG/ACT aerosol solution inhaler  Daily - RT         02/12/25 0814    --  Erythromycin 250 MG EC tablet  Daily         02/12/25 0820    --  fluticasone (FLOVENT HFA) 44 MCG/ACT inhaler  2 Times Daily - RT         02/12/25 0823                     Physician Progress Notes (last 24 hours)        Rafael Brito MD at 02/13/25 1632                                                        LOS: 3 days   Patient Care Team:  Flavia Jaquez APRN as PCP - General (Nurse Practitioner)  Beulah Joshi APRN as Nurse Practitioner (Neurology)  Flaco Aguayo MD as Consulting Physician (Pulmonary Disease)    Chief Complaint:  F/up respiratory failure, COPD and critical care management.    Subjective   Interval History    On AC PC 12/20/.  No significant secretions from the ET tube.  Afebrile.  Tolerating TF.    REVIEW OF SYSTEMS:   Cannot obtain due to mechanical ventilation.    Ventilator/Non-Invasive Ventilation Settings (From admission, onward)       Start     Ordered    02/10/25 1620  NIPPV - Provider Settings  (CPAP / BiPAP)  Until Discontinued        Question Answer Comment   Indication Acute Respiratory Failure    Type BIPAP    Titrate Oxygen for SpO2 90 - 95%        02/10/25 1620                          Physical Exam:     Vital Signs  Temp:  [98.7 " "°F (37.1 °C)] 98.7 °F (37.1 °C)  Heart Rate:  [] 64  Resp:  [14-16] 16  BP: ()/(38-94) 124/55  FiO2 (%):  [29 %-30 %] 29 %    Intake/Output Summary (Last 24 hours) at 2/13/2025 1632  Last data filed at 2/13/2025 0600  Gross per 24 hour   Intake 859.64 ml   Output --   Net 859.64 ml     Flowsheet Rows      Flowsheet Row First Filed Value   Admission Height 152.4 cm (60\") Documented at 02/10/2025 2230   Admission Weight 33.7 kg (74 lb 4.7 oz) Documented at 02/10/2025 2230            PPE used per hospital policy    General Appearance:  On the ventilator.  NAD...  Cachectic   ENMT: ET tube noted.  External ears normal.   Eyes:  Pupils equal and reactive to light. EOMI   Neck:    Trachea midline. No thyromegaly.   Lungs:   Equal but severely diminished air entry throughout.  Bilateral expiratory wheezing.  Prolonged expiratory time.    Heart:  RRR.  No audible murmur.   Skin:   No rash or ecchymosis   Abdomen:    Soft. No tenderness. No HSM.   Neuro/psych: Sedated.  Arouses to stimulus and follows commands.   Extremities:  No cyanosis, clubbing or edema.  Warm extremities and well-perfused          Results Review:        Results from last 7 days   Lab Units 02/13/25 0442 02/12/25 0404 02/11/25 0449   SODIUM mmol/L 138 141 141   POTASSIUM mmol/L 4.2 4.8 4.5   CHLORIDE mmol/L 104 107 107   CO2 mmol/L 26.5 24.0 20.6*   BUN mg/dL 29* 24* 22   CREATININE mg/dL 0.34* 0.46* 0.48*   GLUCOSE mg/dL 166* 127* 136*   CALCIUM mg/dL 9.4 9.4 8.7     Results from last 7 days   Lab Units 02/10/25  1707 02/10/25  1547   HSTROP T ng/L 95* 20*     Results from last 7 days   Lab Units 02/13/25 0442 02/12/25 0404 02/11/25 0449   WBC 10*3/mm3 8.74 12.30* 7.86   HEMOGLOBIN g/dL 12.5 13.0 13.3   HEMATOCRIT % 37.1 38.9 40.1   PLATELETS 10*3/mm3 239 261 202     Results from last 7 days   Lab Units 02/10/25  1547   INR  1.04     Results from last 7 days   Lab Units 02/10/25  1547   PROBNP pg/mL 158.0       Results from last 7 days "   Lab Units 02/10/25  1547   D DIMER QUANT MCGFEU/mL 3.74*             Results from last 7 days   Lab Units 02/13/25  0850 02/11/25  0722 02/10/25  1730 02/10/25  1539   PH, ARTERIAL pH units 7.343* 7.389 7.213* 7.068*   PCO2, ARTERIAL mm Hg 64.1* 45.1* 64.7* 98.9*   PO2 ART mm Hg 57.0* 56.2* 327.2* 71.0*   O2 SATURATION ART % 86.4* 88.3* 99.9* 84.0*   FLOW RATE lpm  --   --   --  8.0000   MODALITY  Adult Vent N/A BiPap HFNC         I reviewed the patient's new clinical results.        Medication Review:   apixaban, 5 mg, Oral, Q12H  chlorhexidine, 15 mL, Mouth/Throat, Q12H  ipratropium-albuterol, 3 mL, Nebulization, 4x Daily - RT  methylPREDNISolone sodium succinate, 125 mg, Intravenous, Q8H  mupirocin, 1 Application, Each Nare, BID  pantoprazole, 40 mg, Intravenous, Q24H  senna-docusate sodium, 2 tablet, Oral, BID  sodium chloride, 10 mL, Intravenous, Q12H        dexmedetomidine, 0.2-1.5 mcg/kg/hr, Last Rate: 0.2 mcg/kg/hr (02/13/25 1316)  phenylephrine, 0.5-3 mcg/kg/min, Last Rate: 0.6 mcg/kg/min (02/13/25 0300)  propofol, 5-50 mcg/kg/min, Last Rate: 40 mcg/kg/min (02/13/25 1315)        Diagnostic imaging:  I personally and independently reviewed the following images:  CTA chest 2/10/2025: Significant emphysema more prominent on the right side.  Bronchiectasis.    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  Tried on pressure support again today and she tolerated initially but became very tachypneic with low tidal volume later.  This was initially thought to be secondary to anxiety but it persisted despite placing her on propofol.  AC PC mode was resumed with PI 20 as previously.   Unfortunately she would be difficult to liberate of the ventilator and I did explain that to her family.  She has very severe COPD at baseline.  Propofol.  Tre-Synephrine IV drip and titrate to keep MAP >65  Solu-Medrol 125 mg every 8 hours.  Tube feeding  DuoNeb 4 times a day  Eliquis 5 mg twice daily        Rafael Brito MD  02/13/25  16:32 EST        Time: Critical care 35 min       This note was dictated utilizing Dragon dictation     Electronically signed by Rafael Brito MD at 02/13/25 1711          Cintia Graves     Case Management     Case Management/Social Work      Signed     Date of Service: 02/13/25 1320  Creation Time: 02/13/25 1320     Signed         Continued Stay Note  [facility]     Patient Name: Scarlet Chakraborty                    MRN: [id number]  Today's Date: 2/13/2025                Admit Date: 2/10/2025     Plan: Pending clinical course    Discharge Plan         Row Name 02/13/25 1319           Plan     Plan Pending clinical course     Patient/Family in Agreement with Plan yes     Plan Comments Patient discussed in huddle and clinical chart reviewed; patient remains on the vent. CCP to follow. CD, CSW.                         Discharge Codes    No documentation.                      Expected Discharge Date and Time         Expected Discharge Date Expected Discharge Time     Feb 14, 2025

## 2025-02-13 NOTE — CASE MANAGEMENT/SOCIAL WORK
Continued Stay Note  [facility]     Patient Name: Scarlet Chakraborty  MRN: [id number]  Today's Date: 2/13/2025    Admit Date: 2/10/2025    Plan: Pending clinical course   Discharge Plan       Row Name 02/13/25 1319       Plan    Plan Pending clinical course    Patient/Family in Agreement with Plan yes    Plan Comments Patient discussed in huddle and clinical chart reviewed; patient remains on the vent. CCP to follow. CD, CSW.                   Discharge Codes    No documentation.                 Expected Discharge Date and Time       Expected Discharge Date Expected Discharge Time    Feb 14, 2025

## 2025-02-13 NOTE — PLAN OF CARE
Goal Outcome Evaluation:      Pt failed SBT due to anxiety and desated to 72. Sedation increased and put back on rate. Anxiety remained hard to control throughout the AM, and prn fentanyl was used with the propofol. Intervention was successful and the rest of the shift, I slowly titrated back down on gtts.

## 2025-02-14 ENCOUNTER — APPOINTMENT (OUTPATIENT)
Dept: GENERAL RADIOLOGY | Facility: HOSPITAL | Age: 65
DRG: 207 | End: 2025-02-14
Payer: COMMERCIAL

## 2025-02-14 LAB
ANION GAP SERPL CALCULATED.3IONS-SCNC: 6 MMOL/L (ref 5–15)
ARTERIAL PATENCY WRIST A: ABNORMAL
ARTERIAL PATENCY WRIST A: POSITIVE
ATMOSPHERIC PRESS: 765.2 MMHG
ATMOSPHERIC PRESS: 765.4 MMHG
BASE EXCESS BLDA CALC-SCNC: 6.5 MMOL/L (ref 0–2)
BASE EXCESS BLDA CALC-SCNC: 8.7 MMOL/L (ref 0–2)
BASOPHILS # BLD AUTO: 0.01 10*3/MM3 (ref 0–0.2)
BASOPHILS NFR BLD AUTO: 0.1 % (ref 0–1.5)
BDY SITE: ABNORMAL
BDY SITE: ABNORMAL
BUN BLDA-MCNC: 31 MG/DL (ref 8–26)
BUN SERPL-MCNC: 31 MG/DL (ref 8–23)
BUN/CREAT SERPL: 79.5 (ref 7–25)
CA-I BLDA-SCNC: 1.45 MMOL/L (ref 2.2–2.55)
CALCIUM SPEC-SCNC: 10 MG/DL (ref 8.6–10.5)
CHLORIDE BLDA-SCNC: 105 MMOL/L (ref 98–107)
CHLORIDE SERPL-SCNC: 106 MMOL/L (ref 98–107)
CO2 BLDA-SCNC: >32.45 MMOL/L (ref 23–27)
CO2 BLDA-SCNC: >32.45 MMOL/L (ref 23–27)
CO2 SERPL-SCNC: 33 MMOL/L (ref 22–29)
CREAT BLDA-MCNC: 0.52 MG/DL (ref 0.6–1.3)
CREAT SERPL-MCNC: 0.39 MG/DL (ref 0.57–1)
D-LACTATE SERPL-SCNC: 1 MMOL/L
DEPRECATED RDW RBC AUTO: 41.1 FL (ref 37–54)
EGFRCR SERPLBLD CKD-EPI 2021: 111.4 ML/MIN/1.73
EOSINOPHIL # BLD AUTO: 0 10*3/MM3 (ref 0–0.4)
EOSINOPHIL NFR BLD AUTO: 0 % (ref 0.3–6.2)
ERYTHROCYTE [DISTWIDTH] IN BLOOD BY AUTOMATED COUNT: 12.2 % (ref 12.3–15.4)
GLUCOSE BLDC GLUCOMTR-MCNC: 163 MG/DL (ref 70–130)
GLUCOSE BLDC GLUCOMTR-MCNC: 163 MG/DL (ref 70–130)
GLUCOSE BLDC GLUCOMTR-MCNC: 199 MG/DL (ref 70–130)
GLUCOSE BLDC GLUCOMTR-MCNC: 207 MG/DL (ref 65–99)
GLUCOSE BLDC GLUCOMTR-MCNC: 238 MG/DL (ref 70–130)
GLUCOSE BLDC GLUCOMTR-MCNC: 267 MG/DL (ref 70–130)
GLUCOSE BLDC GLUCOMTR-MCNC: 272 MG/DL (ref 70–130)
GLUCOSE SERPL-MCNC: 178 MG/DL (ref 65–99)
HCO3 BLDA-SCNC: 36.5 MMOL/L (ref 22–28)
HCO3 BLDA-SCNC: 36.8 MMOL/L (ref 22–28)
HCT VFR BLD AUTO: 39.6 % (ref 34–46.6)
HCT VFR BLDA CALC: 46 % (ref 38–51)
HEMODILUTION: NO
HEMODILUTION: NO
HGB BLD-MCNC: 13.1 G/DL (ref 12–15.9)
HGB BLDA-MCNC: 15.7 G/DL (ref 12–17)
IMM GRANULOCYTES # BLD AUTO: 0.03 10*3/MM3 (ref 0–0.05)
IMM GRANULOCYTES NFR BLD AUTO: 0.3 % (ref 0–0.5)
INHALED O2 CONCENTRATION: 50 %
INHALED O2 CONCENTRATION: 60 %
LYMPHOCYTES # BLD AUTO: 0.35 10*3/MM3 (ref 0.7–3.1)
LYMPHOCYTES NFR BLD AUTO: 3.3 % (ref 19.6–45.3)
Lab: ABNORMAL
Lab: ABNORMAL
MCH RBC QN AUTO: 30.8 PG (ref 26.6–33)
MCHC RBC AUTO-ENTMCNC: 33.1 G/DL (ref 31.5–35.7)
MCV RBC AUTO: 93.2 FL (ref 79–97)
MODALITY: ABNORMAL
MODALITY: ABNORMAL
MONOCYTES # BLD AUTO: 1.19 10*3/MM3 (ref 0.1–0.9)
MONOCYTES NFR BLD AUTO: 11.1 % (ref 5–12)
MRSA DNA SPEC QL NAA+PROBE: NORMAL
NEUTROPHILS NFR BLD AUTO: 85.2 % (ref 42.7–76)
NEUTROPHILS NFR BLD AUTO: 9.13 10*3/MM3 (ref 1.7–7)
NOTIFIED WHO: ABNORMAL
NRBC BLD AUTO-RTO: 0 /100 WBC (ref 0–0.2)
O2 A-A PPRESDIFF RESPIRATORY: 0.2 MMHG
O2 A-A PPRESDIFF RESPIRATORY: 0.4 MMHG
PCO2 BLDA: 62.7 MM HG (ref 35–45)
PCO2 BLDA: 76.3 MM HG (ref 35–45)
PEEP RESPIRATORY: 5 CM[H2O]
PEEP RESPIRATORY: 5 CM[H2O]
PH BLDA: 7.29 PH UNITS (ref 7.35–7.45)
PH BLDA: 7.38 PH UNITS (ref 7.35–7.45)
PLATELET # BLD AUTO: 254 10*3/MM3 (ref 140–450)
PMV BLD AUTO: 10.9 FL (ref 6–12)
PO2 BLD: 141 MM[HG] (ref 0–500)
PO2 BLD: 276 MM[HG] (ref 0–500)
PO2 BLDA: 165.5 MM HG (ref 80–100)
PO2 BLDA: 70.5 MM HG (ref 80–100)
POTASSIUM BLDA-SCNC: 5 MMOL/L (ref 3.5–5.2)
POTASSIUM SERPL-SCNC: 4.9 MMOL/L (ref 3.5–5.2)
RBC # BLD AUTO: 4.25 10*6/MM3 (ref 3.77–5.28)
READ BACK: YES
SAO2 % BLDCOA: 90.7 % (ref 92–98.5)
SAO2 % BLDCOA: 99.4 % (ref 92–98.5)
SET MECH RESP RATE: 14
SET MECH RESP RATE: 20
SODIUM BLD-SCNC: 147 MMOL/L (ref 136–145)
SODIUM SERPL-SCNC: 145 MMOL/L (ref 136–145)
TOTAL RATE: 17 BREATHS/MINUTE
TOTAL RATE: 20 BREATHS/MINUTE
TRIGL SERPL-MCNC: 142 MG/DL (ref 0–150)
VENTILATOR MODE: AC
VENTILATOR MODE: AC
VT ON VENT VENT: 350 ML
VT ON VENT VENT: 400 ML
WBC NRBC COR # BLD AUTO: 10.71 10*3/MM3 (ref 3.4–10.8)

## 2025-02-14 PROCEDURE — 94799 UNLISTED PULMONARY SVC/PX: CPT

## 2025-02-14 PROCEDURE — 25010000002 PROPOFOL 10 MG/ML EMULSION: Performed by: INTERNAL MEDICINE

## 2025-02-14 PROCEDURE — 71045 X-RAY EXAM CHEST 1 VIEW: CPT

## 2025-02-14 PROCEDURE — 85018 HEMOGLOBIN: CPT

## 2025-02-14 PROCEDURE — 80047 BASIC METABLC PNL IONIZED CA: CPT

## 2025-02-14 PROCEDURE — 94003 VENT MGMT INPAT SUBQ DAY: CPT

## 2025-02-14 PROCEDURE — 85025 COMPLETE CBC W/AUTO DIFF WBC: CPT

## 2025-02-14 PROCEDURE — 25010000002 FENTANYL CITRATE (PF) 50 MCG/ML SOLUTION: Performed by: INTERNAL MEDICINE

## 2025-02-14 PROCEDURE — 94761 N-INVAS EAR/PLS OXIMETRY MLT: CPT

## 2025-02-14 PROCEDURE — 84478 ASSAY OF TRIGLYCERIDES: CPT | Performed by: INTERNAL MEDICINE

## 2025-02-14 PROCEDURE — 25010000002 CEFTRIAXONE PER 250 MG: Performed by: INTERNAL MEDICINE

## 2025-02-14 PROCEDURE — 82948 REAGENT STRIP/BLOOD GLUCOSE: CPT

## 2025-02-14 PROCEDURE — 83605 ASSAY OF LACTIC ACID: CPT

## 2025-02-14 PROCEDURE — 82803 BLOOD GASES ANY COMBINATION: CPT

## 2025-02-14 PROCEDURE — 25010000002 FENTANYL CITRATE (PF) 2500 MCG/50ML SOLUTION: Performed by: INTERNAL MEDICINE

## 2025-02-14 PROCEDURE — 25010000002 VANCOMYCIN PER 500 MG: Performed by: INTERNAL MEDICINE

## 2025-02-14 PROCEDURE — 80048 BASIC METABOLIC PNL TOTAL CA: CPT

## 2025-02-14 PROCEDURE — 63710000001 INSULIN REGULAR HUMAN PER 5 UNITS

## 2025-02-14 PROCEDURE — 25010000002 METHYLPREDNISOLONE PER 125 MG: Performed by: INTERNAL MEDICINE

## 2025-02-14 PROCEDURE — 36600 WITHDRAWAL OF ARTERIAL BLOOD: CPT

## 2025-02-14 PROCEDURE — 87641 MR-STAPH DNA AMP PROBE: CPT | Performed by: INTERNAL MEDICINE

## 2025-02-14 RX ORDER — IBUPROFEN 600 MG/1
1 TABLET ORAL
Status: DISCONTINUED | OUTPATIENT
Start: 2025-02-14 | End: 2025-02-25 | Stop reason: HOSPADM

## 2025-02-14 RX ORDER — DEXTROSE MONOHYDRATE 25 G/50ML
25 INJECTION, SOLUTION INTRAVENOUS
Status: DISCONTINUED | OUTPATIENT
Start: 2025-02-14 | End: 2025-02-25 | Stop reason: HOSPADM

## 2025-02-14 RX ORDER — METHYLPREDNISOLONE SODIUM SUCCINATE 125 MG/2ML
80 INJECTION, POWDER, LYOPHILIZED, FOR SOLUTION INTRAMUSCULAR; INTRAVENOUS EVERY 8 HOURS
Status: DISCONTINUED | OUTPATIENT
Start: 2025-02-14 | End: 2025-02-18

## 2025-02-14 RX ORDER — NICOTINE POLACRILEX 4 MG
15 LOZENGE BUCCAL
Status: DISCONTINUED | OUTPATIENT
Start: 2025-02-14 | End: 2025-02-25 | Stop reason: HOSPADM

## 2025-02-14 RX ORDER — FENTANYL CITRATE-0.9 % NACL/PF 10 MCG/ML
50-300 PLASTIC BAG, INJECTION (ML) INTRAVENOUS
Status: DISCONTINUED | OUTPATIENT
Start: 2025-02-14 | End: 2025-02-19

## 2025-02-14 RX ADMIN — PROPOFOL 50 MCG/KG/MIN: 10 INJECTION, EMULSION INTRAVENOUS at 08:26

## 2025-02-14 RX ADMIN — FENTANYL CITRATE 50 MCG: 50 INJECTION, SOLUTION INTRAMUSCULAR; INTRAVENOUS at 13:38

## 2025-02-14 RX ADMIN — INSULIN HUMAN 2 UNITS: 100 INJECTION, SOLUTION PARENTERAL at 01:12

## 2025-02-14 RX ADMIN — MUPIROCIN 1 APPLICATION: 20 OINTMENT TOPICAL at 21:19

## 2025-02-14 RX ADMIN — Medication 10 ML: at 10:22

## 2025-02-14 RX ADMIN — VANCOMYCIN HYDROCHLORIDE 500 MG: 500 INJECTION, POWDER, LYOPHILIZED, FOR SOLUTION INTRAVENOUS at 18:04

## 2025-02-14 RX ADMIN — FENTANYL CITRATE 50 MCG: 50 INJECTION, SOLUTION INTRAMUSCULAR; INTRAVENOUS at 05:39

## 2025-02-14 RX ADMIN — FENTANYL CITRATE 50 MCG: 50 INJECTION, SOLUTION INTRAMUSCULAR; INTRAVENOUS at 12:41

## 2025-02-14 RX ADMIN — Medication 1 MCG/KG/MIN: at 02:52

## 2025-02-14 RX ADMIN — SENNOSIDES AND DOCUSATE SODIUM 2 TABLET: 50; 8.6 TABLET ORAL at 21:20

## 2025-02-14 RX ADMIN — DEXMEDETOMIDINE HYDROCHLORIDE 1.5 MCG/KG/HR: 400 INJECTION INTRAVENOUS at 08:26

## 2025-02-14 RX ADMIN — METHYLPREDNISOLONE SODIUM SUCCINATE 80 MG: 125 INJECTION, POWDER, FOR SOLUTION INTRAMUSCULAR; INTRAVENOUS at 18:03

## 2025-02-14 RX ADMIN — APIXABAN 5 MG: 5 TABLET, FILM COATED ORAL at 21:19

## 2025-02-14 RX ADMIN — APIXABAN 5 MG: 5 TABLET, FILM COATED ORAL at 10:19

## 2025-02-14 RX ADMIN — INSULIN HUMAN 3 UNITS: 100 INJECTION, SOLUTION PARENTERAL at 18:03

## 2025-02-14 RX ADMIN — METHYLPREDNISOLONE SODIUM SUCCINATE 125 MG: 125 INJECTION, POWDER, FOR SOLUTION INTRAMUSCULAR; INTRAVENOUS at 02:30

## 2025-02-14 RX ADMIN — FENTANYL CITRATE 50 MCG/HR: 50 INJECTION INTRAVENOUS at 14:26

## 2025-02-14 RX ADMIN — CHLORHEXIDINE GLUCONATE 15 ML: 1.2 RINSE ORAL at 10:19

## 2025-02-14 RX ADMIN — FENTANYL CITRATE 100 MCG/HR: 50 INJECTION INTRAVENOUS at 23:32

## 2025-02-14 RX ADMIN — INSULIN HUMAN 2 UNITS: 100 INJECTION, SOLUTION PARENTERAL at 06:51

## 2025-02-14 RX ADMIN — INSULIN HUMAN 4 UNITS: 100 INJECTION, SOLUTION PARENTERAL at 13:36

## 2025-02-14 RX ADMIN — DEXMEDETOMIDINE HYDROCHLORIDE 1.5 MCG/KG/HR: 400 INJECTION INTRAVENOUS at 23:32

## 2025-02-14 RX ADMIN — CHLORHEXIDINE GLUCONATE 15 ML: 1.2 RINSE ORAL at 21:19

## 2025-02-14 RX ADMIN — FENTANYL CITRATE 50 MCG: 50 INJECTION, SOLUTION INTRAMUSCULAR; INTRAVENOUS at 10:19

## 2025-02-14 RX ADMIN — IPRATROPIUM BROMIDE AND ALBUTEROL SULFATE 3 ML: .5; 3 SOLUTION RESPIRATORY (INHALATION) at 20:07

## 2025-02-14 RX ADMIN — PROPOFOL 50 MCG/KG/MIN: 10 INJECTION, EMULSION INTRAVENOUS at 23:31

## 2025-02-14 RX ADMIN — CEFTRIAXONE SODIUM 1000 MG: 1 INJECTION, POWDER, FOR SOLUTION INTRAMUSCULAR; INTRAVENOUS at 19:55

## 2025-02-14 RX ADMIN — Medication 10 ML: at 21:20

## 2025-02-14 RX ADMIN — CHLORHEXIDINE GLUCONATE 15 ML: 1.2 RINSE ORAL at 06:54

## 2025-02-14 RX ADMIN — FENTANYL CITRATE 50 MCG: 50 INJECTION, SOLUTION INTRAMUSCULAR; INTRAVENOUS at 04:14

## 2025-02-14 RX ADMIN — SENNOSIDES AND DOCUSATE SODIUM 2 TABLET: 50; 8.6 TABLET ORAL at 10:19

## 2025-02-14 RX ADMIN — IPRATROPIUM BROMIDE AND ALBUTEROL SULFATE 3 ML: .5; 3 SOLUTION RESPIRATORY (INHALATION) at 15:22

## 2025-02-14 RX ADMIN — METHYLPREDNISOLONE SODIUM SUCCINATE 125 MG: 125 INJECTION, POWDER, FOR SOLUTION INTRAMUSCULAR; INTRAVENOUS at 10:19

## 2025-02-14 RX ADMIN — IPRATROPIUM BROMIDE AND ALBUTEROL SULFATE 3 ML: .5; 3 SOLUTION RESPIRATORY (INHALATION) at 07:02

## 2025-02-14 RX ADMIN — DEXMEDETOMIDINE HYDROCHLORIDE 1.5 MCG/KG/HR: 400 INJECTION INTRAVENOUS at 16:27

## 2025-02-14 RX ADMIN — PANTOPRAZOLE SODIUM 40 MG: 40 INJECTION, POWDER, FOR SOLUTION INTRAVENOUS at 10:19

## 2025-02-14 RX ADMIN — PROPOFOL 50 MCG/KG/MIN: 10 INJECTION, EMULSION INTRAVENOUS at 16:27

## 2025-02-14 RX ADMIN — MUPIROCIN 1 APPLICATION: 20 OINTMENT TOPICAL at 10:19

## 2025-02-14 RX ADMIN — IPRATROPIUM BROMIDE AND ALBUTEROL SULFATE 3 ML: .5; 3 SOLUTION RESPIRATORY (INHALATION) at 10:45

## 2025-02-14 NOTE — PROGRESS NOTES
Pulmonary/critical care  Called to see patient desaturating and suctioning copious amounts of thick green sputum from her endotracheal tube.  By suctioning lavage she still laboring some to breathe despite 20 cm of pressure support they have increased her O2 to 45% she seems to be working hard.  Got a chest x-ray there is a little bit increased infiltrate in the little area of atelectasis in the right base.  I think given the absolutely copious thick secretions we will go ahead and try and bronchoscope her see if there is anything more distal we can pull out, focus on that right lung base.  We been trying to get hold of family for consent nursing just got hold of them and obtain consent..  Critical care time 39 minutes including any future procedures

## 2025-02-14 NOTE — PROGRESS NOTES
Prelim respiratory culture positive for gram-negative bacilli and gram cocci.  Give Vanco loading dose and check MRSA screen.  Start Rocephin.

## 2025-02-14 NOTE — PROGRESS NOTES
"                                              LOS: 4 days   Patient Care Team:  Flavia Jaquez APRN as PCP - General (Nurse Practitioner)  Beulah Joshi APRN as Nurse Practitioner (Neurology)  Flaco Aguayo MD as Consulting Physician (Pulmonary Disease)    Chief Complaint:  F/up respiratory failure, COPD and critical care management.    Subjective   Interval History    Noted the events from overnight.  She had difficulty with ventilation and required suction bronchoscopy for mucous plugging.  Currently on low tidal volume as she was able to tolerate that much better than the previous settings.  She is sedated with high-dose propofol.    REVIEW OF SYSTEMS:   Cannot obtain due to mechanical ventilation.    Ventilator/Non-Invasive Ventilation Settings (From admission, onward)       Start     Ordered    02/10/25 1620  NIPPV - Provider Settings  (CPAP / BiPAP)  Until Discontinued        Question Answer Comment   Indication Acute Respiratory Failure    Type BIPAP    Titrate Oxygen for SpO2 90 - 95%        02/10/25 1620                          Physical Exam:     Vital Signs  Temp:  [97.7 °F (36.5 °C)-98.9 °F (37.2 °C)] 98.2 °F (36.8 °C)  Heart Rate:  [] 61  Resp:  [14-20] 20  BP: ()/(31-73) 139/58  FiO2 (%):  [29 %-100 %] 45 %    Intake/Output Summary (Last 24 hours) at 2/14/2025 1414  Last data filed at 2/13/2025 1843  Gross per 24 hour   Intake 551.67 ml   Output 300 ml   Net 251.67 ml     Flowsheet Rows      Flowsheet Row First Filed Value   Admission Height 152.4 cm (60\") Documented at 02/10/2025 2230   Admission Weight 33.7 kg (74 lb 4.7 oz) Documented at 02/10/2025 2230            PPE used per hospital policy    General Appearance:  On the ventilator.  NAD...  Cachectic   ENMT: ET tube noted.  External ears normal.   Eyes:  Pupils equal and reactive to light. EOMI   Neck:    Trachea midline. No thyromegaly.   Lungs:   Equal but severely diminished air entry throughout.  " Bilateral expiratory wheezing.  Prolonged expiratory time.  Coarse.  Similar to yesterday    Heart:  RRR.  No audible murmur.   Skin:   No rash or ecchymosis   Abdomen:    Soft. No tenderness. No HSM.   Neuro/psych: Sedated.  Arouses to stimulus and follows commands.   Extremities:  No cyanosis, clubbing or edema.  Warm extremities and well-perfused          Results Review:        Results from last 7 days   Lab Units 02/14/25  0840 02/14/25  0426 02/13/25  0442 02/12/25  0404   SODIUM mmol/L 145  --  138 141   POTASSIUM mmol/L 4.9  --  4.2 4.8   CHLORIDE mmol/L 106  --  104 107   CO2 mmol/L 33.0*  --  26.5 24.0   BUN mg/dL 31*  --  29* 24*   CREATININE mg/dL 0.39* 0.52* 0.34* 0.46*   GLUCOSE mg/dL 178*  --  166* 127*   CALCIUM mg/dL 10.0  --  9.4 9.4     Results from last 7 days   Lab Units 02/10/25  1707 02/10/25  1547   HSTROP T ng/L 95* 20*     Results from last 7 days   Lab Units 02/14/25  0448 02/14/25  0426 02/13/25  0442 02/12/25  0404   WBC 10*3/mm3 10.71  --  8.74 12.30*   HEMOGLOBIN g/dL 13.1  --  12.5 13.0   HEMOGLOBIN, POC g/dL  --  15.7  --   --    HEMATOCRIT % 39.6  --  37.1 38.9   HEMATOCRIT POC %  --  46  --   --    PLATELETS 10*3/mm3 254  --  239 261     Results from last 7 days   Lab Units 02/10/25  1547   INR  1.04     Results from last 7 days   Lab Units 02/10/25  1547   PROBNP pg/mL 158.0       Results from last 7 days   Lab Units 02/10/25  1547   D DIMER QUANT MCGFEU/mL 3.74*             Results from last 7 days   Lab Units 02/14/25  0509 02/14/25  0426 02/13/25  0850 02/11/25  0722 02/10/25  1730 02/10/25  1539   PH, ARTERIAL pH units 7.376 7.288* 7.343* 7.389 7.213* 7.068*   PCO2, ARTERIAL mm Hg 62.7* 76.3* 64.1* 45.1* 64.7* 98.9*   PO2 ART mm Hg 165.5* 70.5* 57.0* 56.2* 327.2* 71.0*   O2 SATURATION ART % 99.4* 90.7* 86.4* 88.3* 99.9* 84.0*   FLOW RATE lpm  --   --   --   --   --  8.0000   MODALITY  Adult Vent Adult Vent Adult Vent N/A BiPap HFNC         I reviewed the patient's new clinical  results.        Medication Review:   apixaban, 5 mg, Oral, Q12H  chlorhexidine, 15 mL, Mouth/Throat, Q12H  insulin regular, 2-7 Units, Subcutaneous, Q6H  ipratropium-albuterol, 3 mL, Nebulization, 4x Daily - RT  methylPREDNISolone sodium succinate, 125 mg, Intravenous, Q8H  mupirocin, 1 Application, Each Nare, BID  pantoprazole, 40 mg, Intravenous, Q24H  senna-docusate sodium, 2 tablet, Oral, BID  sodium chloride, 10 mL, Intravenous, Q12H        dexmedetomidine, 0.2-1.5 mcg/kg/hr, Last Rate: 1.5 mcg/kg/hr (02/14/25 0826)  fentanyl 10 mcg/mL,  mcg/hr  phenylephrine, 0.5-3 mcg/kg/min, Last Rate: 1 mcg/kg/min (02/14/25 0252)  propofol, 5-50 mcg/kg/min, Last Rate: 50 mcg/kg/min (02/14/25 0826)        Diagnostic imaging:  I personally and independently reviewed the following images:  CTA chest 2/10/2025: Significant emphysema more prominent on the right side.  Bronchiectasis.    Assessment     End-stage COPD, FEV1 17% on [place] 11/9/23 with current exacerbation  Acute hypoxic and hypercapnic respiratory failure  Mucous plugging, s/p bronchoscopy 2/14/2025  Sinus tachycardia  Seasonal coronavirus infection  COPD cachexia  Hypotension, secondary to sedation and positive pressure ventilation    Ischemic cardiomyopathy  History of CVA, on Eliquis  History of Takotsubo cardiomyopathy        Plan       Mechanical ventilation management: Settings reviewed and adjusted.  AC/VC 18/375/45%/5.  Unlikely to be able to wean today due to her current settings and the fact that she had bronchoscopy overnight.  I did discuss with her family (sister and significant other) at bedside and explained the fact that she has very severe lung disease at baseline and might be difficult to be able to wean her off the ventilator and she might be heading towards tracheostomy.  Discussed that there is no particular recommendation regarding timing of tracheostomy but it might be a good idea to look into that sometimes next week if she is unable to  come off the ventilator  Tre-Synephrine IV drip and titrate to keep MAP >65  Solu-Medrol 125 mg every 8 hours-day 3.  No benefit to extended high-dose for too long and therefore we will reduce to 80 mg every 8 hours.  Fentanyl as needed for analgesia  Tube feeding  DuoNeb 4 times a day  Eliquis 5 mg twice daily        Rafael Brito MD  02/14/25  14:14 EST        Time: Critical care 35 min       This note was dictated utilizing Dragon dictation

## 2025-02-14 NOTE — POST-PROCEDURE NOTE
Procedure: Flexible bronchoscopy  Indication patient with extensive mucous plugging and labored respiration  Consent: Informed consent obtained from family.  Procedure: Patient placed on 100% O2 Portex adapter used to connect the ventilator endotracheal tube and allow introduction of the flexible bronchoscope.  The bronchoscope was passed into the airway there was copious thick dark green secretions filling the endotracheal tube right middle and lower lobe and left lingula.  Many of the secretions were too thick to come up with the suction channel of the bronchoscope I had to pull them out on the scope.  Multiple passes made to clear the airways I used a total of about 150 cc of saline washing and loosening and removing secretions.  I did collect washings from the right lower lobe and sent those for culture.  Patient seemed to tolerate the procedure well.  Note patient is on an HME I have asked respiratory to change her over to heated humidity circuit to try and reduce this problem.

## 2025-02-14 NOTE — PROGRESS NOTES
Pulmonary/critical care  I was called to bedside patient in distress looking terrible.  Apparently she had been doing well all night and had a rather abrupt change they had been cleaning her up and retiring her tubes etc.  She had been on a tidal volume of 350 rate of 14 and a PEEP of 5 had a blood gas with a pH of 7.29 pCO2 is 76 and a pO2 of 70 on FiO2 of 50% in response increased her tidal volume of 450 and respiratory rate to 24 and sedated her more peak airway pressures were close to 40.  She has no air movement on the right does not have a lot on the left.  ET tube looked similar position to yesterday.  I quickly did an ultrasound did not see evidence of pneumothorax.  While awaiting chest x-ray we are adjusting patient she had become hypotensive they had to increase her Tre-Synephrine also before I arrived.  I decreased her tidal volume back down to 400 decreased to respiratory rate down to 20 and her peak airway pressures are down to 30-31.  Her blood pressure is better.  Her blood pressure has improved with this and they have been dropping her Tre-Synephrine pretty quickly.  I do not see any evidence for a big mucous plug etc. she has such bullous disease on the right that she does not move much air.  I think it sounds like probably she got really anxious was hyperventilating and probably air trapped and got into severe distress.  I think probably sedation has been the biggest treatment.  We may have to drop her volumes and rate further I am going to see what a follow-up blood gas shows here shortly since we have her airway pressures much better and her blood pressure better.  Will  keep her heavily sedated until we see where she is at then we can make any further adjustments.  I am hoping to be able to drop her volumes a little further.  Critical care time thus far 39 minutes    Plan: Repeat blood gas looks better I am going to try and drop her volumes a little further I still do not like in this large I  will try and drop to 375 cc watch her see if we can get her stabilized and pressors wean down further today if she continues to improve be able to drop her even little lower.

## 2025-02-14 NOTE — CONSULTS
Nutrition Services    Patient Name:  Scarlet Chakraborty  YOB: 1960  MRN: [id number]  Admit Date:  2/10/2025  Assessment Date:  02/14/25    Comment: Follow up    Current TF's IsoSource HN at 40ml/hr working up slowly to goal of 50ml/hr. Water flushes [id number].   Pt tolerating TF's.  No BM yet, on pericolace  Continuous meds: Fentanyl, phenylephrine, propofol 10.1(264kcals)      Plan/Recommendation  TF's with IsoSource HN at 10ml/hr and will advance slowly to goal of 50ml/hr and water [id number].  Calories  1320 kcals plus propofol (100%)    Protein  59 g (100%)    Free water  891 mL   Flushes  180   The above end goal rate is for 22 hrs/day to assume interruptions for ADLs. Water flushes adjusted based on clinical picture + Rx flushes/IV fluids     RD to follow    CLINICAL NUTRITION ASSESSMENT      Reason for Assessment Follow-up Protocol   Diagnosis/Problem Respiratory failure on the [facility]   Medical & Surgical Hx Past Medical History:   Diagnosis Date    Asthma     Cerebrovascular accident (CVA) due to thrombosis of right middle cerebral artery     COPD (chronic obstructive pulmonary disease)     Nonischemic cardiomyopathy     NSTEMI (non-ST elevated myocardial infarction)     Stroke     Takotsubo cardiomyopathy     Tobacco abuse        Past Surgical History:   Procedure Laterality Date    CARDIAC CATHETERIZATION N/A 9/13/2018    Procedure: Left Heart Cath and cors;  Surgeon: Gabino Dozier MD;  Location: [facility] CATH INVASIVE LOCATION;  Service: Cardiology    CARDIAC CATHETERIZATION N/A 9/13/2018    Procedure: Left ventriculography;  Surgeon: Gabino Dozier MD;  Location: [facility] CATH INVASIVE LOCATION;  Service: Cardiology    CARDIAC ELECTROPHYSIOLOGY PROCEDURE N/A 9/27/2018    Procedure: Loop insertion  LINQ;  Surgeon: Jose R Barker MD;  Location: [facility] CATH INVASIVE LOCATION;  Service: Cardiovascular        Current Problems npo     Encounter Information        Nutrition History    Food  "Preferences    Supplements    Factors Affecting Intake altered respiratory status         Anthropometrics        Current Height   Current Weight  BMI kg/m2 Height: 152.4 cm (60\")  Weight: 33.7 kg (74 lb 4.7 oz) (02/10/25 2230)  Body mass index is 14.51 kg/m².     Adj BMI (if applicable)    BMI Category Underweight (18.4 or below)       Admission Weight    Ideal Body Weight (IBW) 111lb     Adj IBW (if applicable)    Usual Body Weight (UBW) unknown   Weight Change/Trend Unknown       Weight history: Wt Readings from Last 30 Encounters:   02/10/25 2230 33.7 kg (74 lb 4.7 oz)   03/20/24 0537 31.8 kg (70 lb 1.6 oz)   03/17/24 0955 34 kg (75 lb)   03/16/24 1212 34 kg (75 lb)   02/19/24 1508 34.3 kg (75 lb 9.6 oz)   08/03/23 1309 35.4 kg (78 lb)   01/16/23 1323 39 kg (86 lb)   01/12/22 1331 36.3 kg (80 lb)   03/22/21 0618 37.8 kg (83 lb 6.4 oz)   03/21/21 0518 38.9 kg (85 lb 12.8 oz)   03/20/21 0537 39.7 kg (87 lb 9.6 oz)   03/19/21 0500 46 kg (101 lb 6.4 oz)   03/18/21 0546 40.9 kg (90 lb 2.7 oz)   03/17/21 1855 42.2 kg (93 lb)   03/17/21 0500 42.4 kg (93 lb 7.6 oz)   03/16/21 0500 43.3 kg (95 lb 7.4 oz)   03/15/21 0500 42.7 kg (94 lb 2.2 oz)   03/14/21 0445 42.5 kg (93 lb 11.1 oz)   03/13/21 1412 40.8 kg (89 lb 15.2 oz)   03/13/21 0420 40.8 kg (89 lb 15.2 oz)   03/13/21 0108 40.3 kg (88 lb 13.5 oz)   03/12/21 2210 45.4 kg (100 lb)   01/20/21 1431 41.3 kg (91 lb)   12/15/20 1202 41.7 kg (92 lb)   06/15/20 1122 44.5 kg (98 lb)   12/12/19 1339 48.3 kg (106 lb 6.4 oz)   10/24/19 1001 44.9 kg (99 lb)   10/11/19 1422 45.3 kg (99 lb 12.8 oz)   10/03/19 0404 43.4 kg (95 lb 10.9 oz)   10/02/19 0500 45.3 kg (99 lb 13.9 oz)   10/01/19 0952 45.5 kg (100 lb 5 oz)   10/01/19 0600 45.5 kg (100 lb 5 oz)   09/30/19 1131 42.2 kg (93 lb)   09/30/19 0227 42.3 kg (93 lb 4.1 oz)   09/30/19 0022 49.2 kg (108 lb 8 oz)   09/19/19 1308 45.3 kg (99 lb 12.8 oz)   09/10/19 0512 46.3 kg (102 lb)   06/03/19 1248 46 kg (101 lb 6.4 oz)   02/11/19 1509 " 46.3 kg (102 lb)   12/18/18 0810 45.8 kg (101 lb)   11/07/18 1314 44.5 kg (98 lb)   10/10/18 1353 44.5 kg (98 lb)   09/29/18 0533 42.8 kg (94 lb 5.7 oz)   09/28/18 0524 42.8 kg (94 lb 5.7 oz)   09/26/18 0500 43.3 kg (95 lb 7.4 oz)   09/25/18 1400 43.5 kg (96 lb)   09/25/18 1056 43.6 kg (96 lb 1.9 oz)   09/25/18 0600 43.6 kg (96 lb 1.9 oz)   09/24/18 1055 44.7 kg (98 lb 8 oz)   09/17/18 0500 44.5 kg (98 lb)   09/16/18 0621 45.9 kg (101 lb 4.8 oz)   09/15/18 0600 44.4 kg (97 lb 15.9 oz)   09/13/18 1227 43.5 kg (96 lb)        Estimated/Assessed Needs        Energy Requirements    Weight for Calculation 50.4   Method for Estimation  25-30 kcal/kg   EST Needs (kcal/day) 0777-8772       Protein Requirements    Weight for Calculation 50.4   EST Protein Needs (g/kg) 1.2 - 1.5 gm/kg   EST Daily Needs (g/day) 60-75       Fluid Requirements     Method for Estimation 1 mL/kcal    Estimated Needs (mL/day)          Labs        Pertinent Labs    Results from last 7 days   Lab Units 02/14/25  0840 02/14/25  0426 02/13/25  0442 02/12/25  0404 02/10/25  2054 02/10/25  1547   SODIUM mmol/L 145  --  138 141   < > 140   POTASSIUM mmol/L 4.9  --  4.2 4.8   < > 4.7   CHLORIDE mmol/L 106  --  104 107   < > 105   CO2 mmol/L 33.0*  --  26.5 24.0   < > 23.0   BUN mg/dL 31*  --  29* 24*   < > 18   CREATININE mg/dL 0.39* 0.52* 0.34* 0.46*   < > 0.51*   CALCIUM mg/dL 10.0  --  9.4 9.4   < > 9.5   BILIRUBIN mg/dL  --   --   --   --   --  0.3   ALK PHOS U/L  --   --   --   --   --  70   ALT (SGPT) U/L  --   --   --   --   --  16   AST (SGOT) U/L  --   --   --   --   --  24   GLUCOSE mg/dL 178*  --  166* 127*   < > 191*    < > = values in this interval not displayed.     Results from last 7 days   Lab Units 02/14/25  0448 02/10/25  2054 02/10/25  1547   HEMOGLOBIN g/dL 13.1   < > 14.7   HEMOGLOBIN, POC   --    < >  --    HEMATOCRIT % 39.6   < > 46.3   HEMATOCRIT POC   --    < >  --    WBC 10*3/mm3 10.71   < > 10.46   TRIGLYCERIDES mg/dL 142  --    --    ALBUMIN g/dL  --   --  4.2    < > = values in this interval not displayed.     Results from last 7 days   Lab Units 02/14/25  0448 02/13/25  0442 02/12/25  0404 02/11/25 0449 02/10/25  2054 02/10/25  1547   INR   --   --   --   --   --  1.04   PLATELETS 10*3/mm3 254 239 261 202 221 253     COVID19   Date Value Ref Range Status   02/10/2025 Not Detected Not Detected - Ref. Range Final     Lab Results   Component Value Date    HGBA1C 5.5 11/01/2023          Medications            Scheduled Medications apixaban, 5 mg, Oral, Q12H  chlorhexidine, 15 mL, Mouth/Throat, Q12H  insulin regular, 2-7 Units, Subcutaneous, Q6H  ipratropium-albuterol, 3 mL, Nebulization, 4x Daily - RT  [START ON 2/15/2025] lansoprazole, 30 mg, Nasogastric, QAM AC  methylPREDNISolone sodium succinate, 80 mg, Intravenous, Q8H  mupirocin, 1 Application, Each Nare, BID  senna-docusate sodium, 2 tablet, Oral, BID  sodium chloride, 10 mL, Intravenous, Q12H        Infusions dexmedetomidine, 0.2-1.5 mcg/kg/hr, Last Rate: 1.5 mcg/kg/hr (02/14/25 0826)  fentanyl 10 mcg/mL,  mcg/hr, Last Rate: 50 mcg/hr (02/14/25 1426)  phenylephrine, 0.5-3 mcg/kg/min, Last Rate: 1 mcg/kg/min (02/14/25 0252)  propofol, 5-50 mcg/kg/min, Last Rate: 50 mcg/kg/min (02/14/25 0826)        PRN Medications   senna-docusate sodium **AND** polyethylene glycol **AND** bisacodyl **AND** bisacodyl    dextrose    dextrose    fentaNYL citrate (PF)    glucagon (human recombinant)    nitroglycerin    sodium chloride    sodium chloride     Physical Findings          Physical Appearance frail, loss of muscle mass, loss of subcutaneous fat, underweight, ventilator support   Oral/Mouth Cavity WDL   Edema  no edema   Gastrointestinal hypoactive bowel sounds   Skin  bruising   Tubes/Drains/Lines Cortrak, NG tube   NFPE See Malnutrition Severity Assessment, Date Completed: 2/12   --  Malnutrition Severity Assessment      Patient meets criteria for : Severe Malnutrition            Current Nutrition Orders & Evaluation of Intake       Oral Nutrition     Food Allergies NKFA   Current PO Diet NPO Diet NPO Type: Tube Feeding   Supplement    PO Evaluation     Trending % PO Intake    --   Enteral Nutrition     Enteral Route NG    TF Delivery Method Continuous    Propofol Rate/Kcal 10/2(264kcals)    Current TF Order/Rate  Isosource HN @ 40 mL/hr    TF Goal Rate 50 mL/hr    Current Water Flush 30 mL Q 4 hr    Modular None    TF Residual  no or minimal residual    TF Tolerance tolerating    TF Observation Verified correct TF and water flush infusing per orders     Nutrition Diagnosis        Nutrition Dx Problem 1 Problem: Inadequate Oral Intake  Etiology: Medical Diagnosis - respiratory failure  Signs/Symptoms: NPO    Comment:      INTERVENTION / PLAN OF CARE  Intervention Goal        Intervention Goal(s) Reduce/improve symptoms, Disease management/therapy, Initiate TF/PN, Tolerate TF/PN at goal, and No significant weight loss     Nutrition Intervention        RD Action Continue to monitor, Care plan reviewed, and Recommend/order: TF's     Prescription         Diet     Supplement/Snack    EN/PN     Prescription Ordered       Enteral Prescription:     Enteral Route NG    TF Delivery Method Continuous    Enteral Product Isosource HN    Modular None    Propofol Rate/Kcal yes    TF Start Rate 10    TF Goal Rate 50    Free Water Flush [id number]    TF Provision at Goal: 1320 kcal plus propofol, 59 gm protein, 891 mL free water + 180 mL water flushes         Calories 100 % needs met         Protein  100 % needs met         Fluid (mL)     Prescription Ordered Yes     Monitor/Evaluation        Monitor Per protocol, I&O, Pertinent labs, EN delivery/tolerance, Weight, Skin status, GI status, Symptoms, Swallow function, Hemodynamic stability   Discharge Plan Pending clinical course   Education Education not appropriate at this time     RD to follow per protocol.       Electronically signed by:  Malia Castillo,  RD  02/14/25 14:38 EST

## 2025-02-14 NOTE — SIGNIFICANT NOTE
02/14/25 0702   B = Both Spontaneous Awakening and Breathing Trials   Was patient receiving mechanical ventilation? Yes   Safety Screen Spontaneous Breathing Trial (SBT)  Lack of inspiratory efforts        complains of pain/discomfort

## 2025-02-14 NOTE — PAYOR COMM NOTE
"Scarlet Chavez (65 y.o. Female)                            ATTENTION; CONTINUED STAY CLINICALS CASE [id number]                            REPLY TO [facility] DEPT  [phone] OR CALL            Date of Birth   1960    Social Security Number       Address   [address] [place] [address]    Home Phone       MRN   [id number]       [employer]   [employer]    Marital Status   Single                            Admission Date   2/10/25    Admission Type   Emergency    Admitting Provider   Zainab Regalado MD    Attending Provider   Zainab Regalado MD    Department, Room/Bed   [facility] INTENSIVE CARE, I381/1       Discharge Date       Discharge Disposition       Discharge Destination                                 Attending Provider: Zainab Regalado MD    Allergies: No Known Allergies    Isolation: None   Infection: None   Code Status: CPR    Ht: 152.4 cm (60\")   Wt: 33.7 kg (74 lb 4.7 oz)    Admission Cmt: None   Principal Problem: Acute hypercapnic respiratory failure [J96.02]                   Active Insurance as of 2/10/2025       Primary Coverage       Payor Plan Insurance Group Employer/Plan Group    [employer] H[employer] [id number]       Payor Plan Address Payor Plan Phone Number Payor Plan Fax Number Effective Dates    [address] [phone]  1/1/2024 - None Entered    [place] [address]         Subscriber Name Subscriber Birth Date Member ID       SCARLET CHAVEZ 1960 [id number]                     Emergency Contacts        (Rel.) Home Phone Work Phone Mobile Phone    Yvonne Mccollum (Sister) [phone] -- --    Don Taylor (Significant Other) -- -- [phone]              Vital Signs (last day)       Date/Time Temp Temp src Pulse Resp BP Patient Position SpO2    02/14/25 1313 98.2 (36.8) Oral -- -- -- -- --    02/14/25 1045 -- -- 61 20 139/58 -- 99    02/14/25 0712 -- -- 56 20 -- -- 100    02/14/25 0702 -- -- 60 20 -- -- " 99    02/14/25 0630 -- -- 61 -- 133/63 -- 98    02/14/25 0615 -- -- 64 -- 146/62 -- 97    02/14/25 0600 -- -- 64 -- 119/52 -- 96    02/14/25 0545 -- -- 65 -- 122/50 -- 97    02/14/25 0530 -- -- 73 -- 97/48 -- 95    02/14/25 0519 -- -- 69 -- 119/59 -- 96    02/14/25 0515 -- -- 71 -- 131/65 -- 99    02/14/25 0500 -- -- 68 20 148/67 -- 100    02/14/25 0445 -- -- 64 -- 158/70 -- 99    02/14/25 0430 -- -- 76 -- 57/41 -- 95    02/14/25 0415 -- -- 84 -- 114/49 -- 94    02/14/25 0400 -- -- 75 -- 117/48 -- 97    02/14/25 0345 -- -- 75 -- 116/50 -- 97    02/14/25 0330 -- -- 77 -- 120/51 -- 97    02/14/25 0315 -- -- 79 -- 109/43 -- 96    02/14/25 0300 -- -- 80 -- 100/57 -- 95    02/14/25 0245 -- -- 80 -- 93/47 -- 96    02/14/25 0230 -- -- 82 -- 103/58 -- 96    02/14/25 0215 -- -- 84 -- 103/50 -- 98    02/14/25 0200 -- -- 90 -- 99/54 -- --    02/14/25 0145 -- -- 79 -- 106/52 -- 96    02/14/25 0130 -- -- 78 -- 115/54 -- 96    02/14/25 0115 -- -- 81 -- 120/49 -- 93    02/14/25 0100 -- -- 82 -- 111/62 -- 95    02/14/25 0045 -- -- 82 -- 113/51 -- 95    02/14/25 0034 97.7 (36.5) Axillary -- -- -- -- --    02/14/25 0030 -- -- 83 -- 108/57 -- 95    02/14/25 0015 -- -- 86 -- 89/46 -- 94    02/14/25 0000 -- -- 84 14 98/48 Lying 96    02/13/25 2345 -- -- 83 -- 102/53 -- 98    02/13/25 2336 -- -- 80 15 -- -- 98    02/13/25 2330 -- -- 81 -- 111/55 -- 98    02/13/25 2315 -- -- 82 -- 115/62 -- 96    02/13/25 2300 -- -- 86 -- 105/57 -- 98    02/13/25 2245 -- -- 87 -- 105/57 -- 98    02/13/25 2230 -- -- 89 -- 142/62 -- 96    02/13/25 2215 -- -- 91 -- 132/57 -- 96    02/13/25 2200 -- -- 96 -- 133/65 -- 96    02/13/25 2145 -- -- 100 -- 129/61 -- 97    02/13/25 2130 -- -- 102 -- 113/69 -- 96    02/13/25 2115 -- -- 109 -- 144/50 -- 96    02/13/25 2100 -- -- 99 -- 153/65 -- 97    02/13/25 2045 -- -- 78 -- 123/49 -- 98    02/13/25 2031 -- -- 71 18 86/41 -- 98    02/13/25 2028 97.8 (36.6) Oral 69 16 91/31 Lying 98    02/13/25 2015 -- -- 82 -- 122/62  -- 99    02/13/25 2008 -- -- 65 -- -- -- 98    02/13/25 2000 -- -- 67 -- 120/60 -- 98    02/13/25 1945 -- -- 77 -- 128/55 -- 98    02/13/25 1930 -- -- 71 -- 121/66 -- 99    02/13/25 1915 -- -- 83 -- 106/52 -- 99    02/13/25 1900 -- -- 89 -- 95/54 -- 93    02/13/25 1845 -- -- 72 -- 52/44 -- 93    02/13/25 1830 -- -- 68 -- 128/73 -- 92    02/13/25 1815 -- -- 50 -- 122/60 -- 94    02/13/25 1800 -- -- 53 -- 129/60 -- 94    02/13/25 1745 -- -- 59 -- 127/58 -- 97    02/13/25 1730 -- -- 70 -- 109/55 -- 95    02/13/25 1715 -- -- 58 -- 119/55 -- 93    02/13/25 1700 98.9 (37.2) Oral 58 18 114/58 Lying 93    02/13/25 1645 -- -- 64 -- 128/66 -- 93    02/13/25 1635 -- -- 74 18 -- -- 96    02/13/25 1630 -- -- 76 -- 120/60 -- 96    02/13/25 1615 -- -- 73 -- 123/59 -- 97    02/13/25 1600 -- -- 71 -- 111/52 -- 95    02/13/25 1545 -- -- 64 -- 124/55 -- 96    02/13/25 1530 -- -- 69 -- 120/66 -- 98    02/13/25 1515 -- -- 81 -- 103/58 -- 97    02/13/25 1500 -- -- 63 -- 117/56 -- 97    02/13/25 1445 -- -- 67 -- 109/59 -- 96    02/13/25 1430 -- -- 66 -- 105/59 -- 95    02/13/25 1415 -- -- 65 -- 117/66 -- 97    02/13/25 1400 -- -- 66 -- 114/67 -- 96    02/13/25 1345 -- -- 71 -- 109/53 -- 96    02/13/25 1330 -- -- 78 -- 120/54 -- 95    02/13/25 1315 -- -- 91 -- 116/46 -- 97    02/13/25 1300 -- -- 90 -- 102/53 -- 98    02/13/25 1245 -- -- 96 -- 113/53 -- 97    02/13/25 1230 -- -- 97 -- 115/38 -- 97    02/13/25 1215 -- -- 84 -- 122/54 -- 97    02/13/25 1200 -- -- 79 -- 119/57 -- 95    02/13/25 1145 -- -- 83 -- 125/53 -- 95    02/13/25 1130 -- -- 88 -- 118/50 -- 95    02/13/25 1115 -- -- 94 -- 113/56 -- 95    02/13/25 1100 98.7 (37.1) Oral 99 -- 102/54 -- 94    02/13/25 1045 -- -- 103 -- 85/45 -- 91    02/13/25 1030 -- -- 116 -- 110/80 -- 98    02/13/25 1015 -- -- 95 -- 91/44 -- 100    02/13/25 1000 -- -- 91 -- 98/54 -- 97    02/13/25 0945 -- -- 87 -- 98/51 -- 96    02/13/25 0930 -- -- 98 -- 95/53 -- 95    02/13/25 0915 -- -- 114 -- 86/57 --  93    02/13/25 0900 -- -- 133 -- 147/67 -- 95    02/13/25 0845 -- -- 119 -- 121/49 -- 93    02/13/25 0830 -- -- 113 -- 115/56 -- 91    02/13/25 0815 -- -- 116 -- 120/58 -- 92    02/13/25 0800 -- -- 106 -- 114/54 -- 94    02/13/25 0745 -- -- 103 -- 114/49 -- 94    02/13/25 0730 -- -- 90 -- 118/55 -- 93    02/13/25 0717 -- -- 78 16 111/57 -- 95    02/13/25 0715 -- -- 72 -- -- -- 93    02/13/25 0700 -- -- 62 -- 96/52 -- 94    02/13/25 0645 -- -- 62 -- 96/58 -- 93    02/13/25 0630 -- -- 70 -- 104/55 -- 93    02/13/25 0615 -- -- 70 -- 105/53 -- 94    02/13/25 0600 -- -- 75 -- 99/57 -- 93    02/13/25 0545 -- -- 76 -- 101/54 -- 94    02/13/25 0530 -- -- 69 -- 100/54 -- 93    02/13/25 0515 -- -- 71 -- 100/57 -- 92    02/13/25 0500 -- -- 76 -- 100/53 -- 92    02/13/25 0445 -- -- 77 -- 114/55 -- 92    02/13/25 0430 -- -- 86 -- 102/57 -- 93    02/13/25 0415 -- -- 96 -- 116/48 -- 93    02/13/25 0409 -- -- 97 -- -- -- 94    02/13/25 0400 -- -- 92 14 110/47 Lying 93    02/13/25 0359 98.7 (37.1) Oral -- -- -- -- --    02/13/25 0345 -- -- 97 -- 112/52 -- 93    02/13/25 0330 -- -- 98 -- 108/59 -- 93    02/13/25 0315 -- -- 105 -- 115/67 -- 93    02/13/25 0300 -- -- 99 -- 117/55 -- 93    02/13/25 0245 -- -- 98 -- 113/59 -- 95    02/13/25 0230 -- -- 92 -- 107/63 -- 96    02/13/25 0215 -- -- 82 -- 117/62 -- 93    02/13/25 0200 -- -- 113 -- 124/94 -- 100    02/13/25 0145 -- -- 92 -- 109/62 -- 96    02/13/25 0130 -- -- 92 -- 89/78 -- 94    02/13/25 0123 -- -- 68 -- -- -- 93    02/13/25 0115 -- -- 68 -- 113/62 -- 93    02/13/25 0100 -- -- 72 -- 111/60 -- 93    02/13/25 0045 -- -- 80 -- 116/59 -- 93    02/13/25 0030 -- -- 89 -- 119/55 -- 92    02/13/25 0015 -- -- 82 -- 127/60 -- 92    02/13/25 0000 98.7 (37.1) Oral 86 -- 120/62 -- 92          Oxygen Therapy (last day)       Date/Time SpO2 Device (Oxygen Therapy) Flow (L/min) (Oxygen Therapy) Oxygen Concentration (%) ETCO2 (mmHg)    02/14/25 1045 99 ventilator -- 45 32    02/14/25 0712 100  ventilator -- 45 25    02/14/25 0702 99 ventilator -- 45 30    02/14/25 0630 98 -- -- -- 30    02/14/25 0615 97 -- -- -- 31    02/14/25 0600 96 -- -- -- 32    02/14/25 0545 97 -- -- -- 32    02/14/25 0530 95 -- -- -- 32    02/14/25 0519 96 -- -- -- 31    02/14/25 0515 99 -- -- -- 31    02/14/25 0500 100 ventilator -- 60 31    02/14/25 0445 99 -- -- -- 27    02/14/25 0430 95 -- -- -- 42    02/14/25 0415 94 -- -- -- 41    02/14/25 0400 97 ventilator -- -- 45    02/14/25 0345 97 -- -- -- 47    02/14/25 0330 97 -- -- -- 46    02/14/25 0315 96 -- -- -- 46    02/14/25 0300 95 -- -- -- 46    02/14/25 0245 96 -- -- -- 45    02/14/25 0230 96 -- -- -- 47    02/14/25 0215 98 -- -- -- 42    02/14/25 0200 -- -- -- -- 47    02/14/25 0145 96 -- -- -- 42    02/14/25 0130 96 -- -- -- 42    02/14/25 0115 93 -- -- -- 39    02/14/25 0100 95 -- -- -- 42    02/14/25 0045 95 -- -- -- 42    02/14/25 0030 95 -- -- -- 40    02/14/25 0015 94 -- -- -- 41    02/14/25 0000 96 ventilator -- -- 39    02/13/25 2345 98 -- -- -- 36    02/13/25 2336 98 ventilator -- 60 32    02/13/25 2330 98 -- -- -- 32    02/13/25 2315 96 -- -- -- 32    02/13/25 2300 98 -- -- -- 32    02/13/25 2245 98 -- -- -- 31    02/13/25 2230 96 -- -- -- 33    02/13/25 2215 96 -- -- -- 33    02/13/25 2200 96 -- -- -- 34    02/13/25 2145 97 -- -- -- 35    02/13/25 2130 96 -- -- -- 36    02/13/25 2115 96 -- -- -- 40    02/13/25 2100 97 -- -- -- 45    02/13/25 2045 98 -- -- -- 45    02/13/25 2031 98 -- -- -- 44    02/13/25 2028 98 ventilator -- 100 47    02/13/25 2015 99 -- -- -- 27 02/13/25 2008 98 -- -- -- 45    02/13/25 2000 98 ventilator -- -- 45    02/13/25 1945 98 -- -- -- 43    02/13/25 1930 99 -- -- -- 30 02/13/25 1915 99 -- -- -- 36    02/13/25 1900 93 -- -- -- 31    02/13/25 1845 93 -- -- -- 32    02/13/25 1830 92 -- -- -- 30 02/13/25 1815 94 -- -- -- 27 02/13/25 1800 94 -- -- -- 30 02/13/25 1745 97 -- -- -- 30 02/13/25 1730 95 -- -- -- 30 02/13/25  1715 93 -- -- -- 29    02/13/25 1700 93 -- -- -- 30    02/13/25 1645 93 -- -- -- 32    02/13/25 1635 96 ventilator -- 30 34    02/13/25 1630 96 -- -- -- 36    02/13/25 1615 97 -- -- -- 36    02/13/25 1600 95 -- -- -- 33    02/13/25 1545 96 -- -- -- 34    02/13/25 1530 98 -- -- -- 35    02/13/25 1515 97 -- -- -- 36    02/13/25 1500 97 -- -- -- 35    02/13/25 1445 96 -- -- -- 35    02/13/25 1430 95 -- -- -- 33    02/13/25 1415 97 -- -- -- 36    02/13/25 1400 96 -- -- -- 35    02/13/25 1345 96 -- -- -- 35    02/13/25 1330 95 -- -- -- 36    02/13/25 1315 97 -- -- -- 36    02/13/25 1300 98 -- -- -- 34    02/13/25 1245 97 -- -- -- 38    02/13/25 1230 97 -- -- -- 39    02/13/25 1215 97 -- -- -- 38    02/13/25 1200 95 -- -- -- 37    02/13/25 1145 95 -- -- -- 39    02/13/25 1130 95 -- -- -- 39    02/13/25 1115 95 -- -- -- 41    02/13/25 1100 94 -- -- -- 41    02/13/25 1045 91 -- -- -- 43    02/13/25 1030 98 -- -- -- 46    02/13/25 1015 100 -- -- -- 42    02/13/25 1000 97 -- -- -- 36    02/13/25 0945 96 -- -- -- 39    02/13/25 0930 95 -- -- -- 40    02/13/25 0915 93 -- -- -- 48    02/13/25 0900 95 -- -- -- 62    02/13/25 0845 93 -- -- -- 41    02/13/25 0830 91 -- -- -- 37    02/13/25 0815 92 -- -- -- 36    02/13/25 0800 94 -- -- -- 40    02/13/25 0745 94 -- -- -- 39    02/13/25 0730 93 -- -- -- 44    02/13/25 0724 -- -- -- -- 36    02/13/25 0723 -- -- -- -- 31    02/13/25 0717 95 ventilator -- 30 36    02/13/25 0715 93 -- -- -- 35    02/13/25 0700 94 -- -- -- 36    02/13/25 0645 93 -- -- -- 34    02/13/25 0630 93 -- -- -- 36    02/13/25 0615 94 -- -- -- 34    02/13/25 0600 93 -- -- -- 36    02/13/25 0545 94 -- -- -- 33    02/13/25 0530 93 -- -- -- 33    02/13/25 0515 92 -- -- -- 33    02/13/25 0500 92 -- -- -- 34    02/13/25 0445 92 -- -- -- 36    02/13/25 0430 93 -- -- -- 35    02/13/25 0415 93 -- -- -- 38    02/13/25 0409 94 ventilator -- -- 41    02/13/25 0400 93 ventilator -- -- 36    02/13/25 0345 93 -- -- -- 39     02/13/25 0330 93 -- -- -- 40    02/13/25 0315 93 -- -- -- 45    02/13/25 0300 93 -- -- -- 39    02/13/25 0245 95 -- -- -- 42    02/13/25 0230 96 -- -- -- 39    02/13/25 0215 93 -- -- -- 39    02/13/25 0200 100 -- -- -- 48    02/13/25 0145 96 -- -- -- 34    02/13/25 0130 94 -- -- -- 35    02/13/25 0123 93 ventilator -- -- 29    02/13/25 0115 93 -- -- -- 29    02/13/25 0100 93 -- -- -- 31    02/13/25 0045 93 -- -- -- 31    02/13/25 0030 92 -- -- -- 31    02/13/25 0015 92 -- -- -- 30    02/13/25 0000 92 ventilator -- -- 32          Lines, Drains & Airways       Active LDAs       Name Placement date Placement time Site Days    Peripheral IV 02/10/25 1529 Anterior;Left Forearm 02/10/25  1529  Forearm  3    Peripheral IV 02/13/25 1045 Anterior;Right Forearm 02/13/25  1045  Forearm  1    NG/OG Tube Nasogastric Right nostril 02/11/25  1100  Right nostril  3    External Urinary Catheter 02/10/25  2300  --  3    ETT  02/11/25  0830  -- 3                  Current Facility-Administered Medications   Medication Dose Route Frequency Provider Last Rate Last Admin    apixaban (ELIQUIS) tablet 5 mg  5 mg Oral Q12H Zainab Regalado MD   5 mg at 02/14/25 1019    sennosides-docusate (PERICOLACE) 8.6-50 MG per tablet 2 tablet  2 tablet Oral BID Zainab Regalado MD   2 tablet at 02/14/25 1019    And    polyethylene glycol (MIRALAX) packet 17 g  17 g Oral Daily PRN Zainab Regalado MD        And    bisacodyl (DULCOLAX) EC tablet 5 mg  5 mg Oral Daily PRN Zainab Regalado MD        And    bisacodyl (DULCOLAX) suppository 10 mg  10 mg Rectal Daily PRN Zainab Regalado MD        chlorhexidine (PERIDEX) 0.12 % solution 15 mL  15 mL Mouth/Throat Q12H Rafael Brito MD   15 mL at 02/14/25 1019    dexmedetomidine (PRECEDEX) 400 mcg in 100 mL NS infusion  0.2-1.5 mcg/kg/hr Intravenous Titrated Rafael Brito MD 12.6 mL/hr at 02/14/25 0826 1.5 mcg/kg/hr at 02/14/25 0826    dextrose (D50W) (25 g/50 mL) IV injection 25 g  25 g Intravenous Q15 Min PRN  Carolyne Hawkins APRN        dextrose (GLUTOSE) oral gel 15 g  15 g Oral Q15 Min PRN Carolyne Hawkins APRN        fentaNYL 1000 mcg in 100 mL NS infusion   mcg/hr Intravenous Titrated Rafael Brito MD 5 mL/hr at 02/14/25 1426 50 mcg/hr at 02/14/25 1426    fentaNYL citrate (PF) (SUBLIMAZE) injection 50 mcg  50 mcg Intravenous Q1H PRN Rafael Brito MD   50 mcg at 02/14/25 1338    glucagon (GLUCAGEN) injection 1 mg  1 mg Intramuscular Q15 Min PRN Carolyne Hawkins APRN        insulin regular (humuLIN R,novoLIN R) injection 2-7 Units  2-7 Units Subcutaneous Q6H Carolyne Hawkins APRN   4 Units at 02/14/25 1336    ipratropium-albuterol (DUO-NEB) nebulizer solution 3 mL  3 mL Nebulization 4x Daily - RT Zainab Regalado MD   3 mL at 02/14/25 1045    [START ON 2/15/2025] lansoprazole (PREVACID SOLUTAB) disintegrating tablet Tablet Delayed Release Dispersible 30 mg  30 mg Nasogastric QAM AC Rafael Brito MD        methylPREDNISolone sodium succinate (SOLU-Medrol) injection 80 mg  80 mg Intravenous Q8H Rafael Brito MD        mupirocin (BACTROBAN) 2 % nasal ointment 1 Application  1 Application Each Nare BID Zainab Regalado MD   1 Application at 02/14/25 1019    nitroglycerin (NITROSTAT) SL tablet 0.4 mg  0.4 mg Sublingual Q5 Min PRN Zainab Regalado MD        phenylephrine (ZULY-SYNEPHRINE) 50 mg in 250 mL NS infusion  0.5-3 mcg/kg/min Intravenous Titrated Rafael Brito MD 10.11 mL/hr at 02/14/25 0252 1 mcg/kg/min at 02/14/25 0252    propofol (DIPRIVAN) infusion 10 mg/mL 100 mL  5-50 mcg/kg/min Intravenous Titrated Rafael Brito MD 10.11 mL/hr at 02/14/25 0826 50 mcg/kg/min at 02/14/25 0826    sodium chloride 0.9 % flush 10 mL  10 mL Intravenous Q12H Zainab Regalado MD   10 mL at 02/14/25 1022    sodium chloride 0.9 % flush 10 mL  10 mL Intravenous PRN Zainab Regalado MD        sodium chloride 0.9 % infusion 40 mL  40 mL Intravenous PRN Zainab Regalado MD         Orders (last 24 hrs)        Start     Ordered     02/15/25 0730  lansoprazole (PREVACID SOLUTAB) disintegrating tablet Tablet Delayed Release Dispersible 30 mg  Every Morning Before Breakfast         02/14/25 1416    02/14/25 1830  methylPREDNISolone sodium succinate (SOLU-Medrol) injection 80 mg  Every 8 Hours         02/14/25 1425    02/14/25 1500  fentaNYL 1000 mcg in 100 mL NS infusion  Titrated         02/14/25 1409    02/14/25 1317  POC Glucose Once  PROCEDURE ONCE        Comments: Complete no more than 45 minutes prior to patient eating      02/14/25 1314    02/14/25 0719  Basic Metabolic Panel  Morning Draw         02/14/25 0401    02/14/25 0648  POC Glucose Once  PROCEDURE ONCE        Comments: Complete no more than 45 minutes prior to patient eating      02/14/25 0645    02/14/25 0600  Triglycerides  Morning Draw         02/13/25 1513    02/14/25 0600  CBC & Differential  Morning Draw         02/14/25 0401    02/14/25 0600  CBC Auto Differential  PROCEDURE ONCE         02/14/25 0401    02/14/25 0520  Blood Gas, Arterial -  Once         02/14/25 0506    02/14/25 0516  Blood Gas, Arterial -  PROCEDURE ONCE         02/14/25 0509    02/14/25 0515  Ventilator - Vent Mode: AC/VC+; Rate: Other; Rate: 20; FiO2: Titrate Per SpO2; Titrate Oxygen for SpO2: 90 - 95%; PEEP: 5; Tidal Volume: mL; TV: 375  Continuous         02/14/25 0514    02/14/25 0510  Blood Gas, Arterial -  Once         02/14/25 0505    02/14/25 0507  Ventilator - Vent Mode: AC/VC+; Rate: Other; Rate: 20; FiO2: Titrate Per SpO2; Titrate Oxygen for SpO2: 90 - 95%; PEEP: 5; Tidal Volume: mL; TV: 400  Continuous,   Status:  Canceled         02/14/25 0508    02/14/25 0445  POC Lactate  PROCEDURE ONCE         02/14/25 0426    02/14/25 0445  POC Chem Panel  PROCEDURE ONCE         02/14/25 0426    02/14/25 0445  POC H&H  PROCEDURE ONCE         02/14/25 0426    02/14/25 0445  Blood Gas, Arterial -  PROCEDURE ONCE         02/14/25 0426    02/14/25 0443  XR Chest 1 View  1 Time Imaging         02/14/25 0446     02/14/25 0440  POC Glucose Once  PROCEDURE ONCE        Comments: Complete no more than 45 minutes prior to patient eating      02/14/25 0438    02/14/25 0145  insulin regular (humuLIN R,novoLIN R) injection 2-7 Units  Every 6 Hours Scheduled         02/14/25 0058    02/14/25 0057  dextrose (GLUTOSE) oral gel 15 g  Every 15 Minutes PRN         02/14/25 0058    02/14/25 0057  dextrose (D50W) (25 g/50 mL) IV injection 25 g  Every 15 Minutes PRN         02/14/25 0058    02/14/25 0057  glucagon (GLUCAGEN) injection 1 mg  Every 15 Minutes PRN         02/14/25 0058    02/14/25 0041  POC Glucose Once  PROCEDURE ONCE        Comments: Complete no more than 45 minutes prior to patient eating      02/14/25 0038    02/13/25 2021  Respiratory Culture - Wash, Lung, Right Lower Lobe  Once         02/13/25 2020    02/13/25 2021  Respiratory communication  Once        Comments: Please change from HME to heated humidity circuit extensive mucous plugging    02/13/25 2021 02/13/25 1926  XR Chest 1 View  1 Time Imaging         02/13/25 1925    02/13/25 1821  POC Glucose Once  PROCEDURE ONCE        Comments: Complete no more than 45 minutes prior to patient eating      02/13/25 1816    02/12/25 1800  POC Glucose Q6H  Every 6 Hours      Comments: Complete no more than 45 minutes prior to patient eating      02/12/25 1742    02/12/25 1743  Daily Weights  Daily       02/12/25 1742    02/12/25 1742  Tube Feeding Assessment and I/O  Every Shift       02/12/25 1742    02/11/25 2100  chlorhexidine (PERIDEX) 0.12 % solution 15 mL  Every 12 Hours Scheduled         02/11/25 1401    02/11/25 1600  Oral Care & Teeth Brushing - Intubated Patient  Every 4 Hours      Comments: Crapo Teeth at Least 2x/day    02/11/25 1401    02/11/25 1500  pantoprazole (PROTONIX) injection 40 mg  Every 24 Hours Scheduled,   Status:  Discontinued         02/11/25 1401    02/11/25 1110  fentaNYL citrate (PF) (SUBLIMAZE) injection 50 mcg  Every 1 Hour PRN          "02/11/25 1110    02/11/25 1030  methylPREDNISolone sodium succinate (SOLU-Medrol) injection 125 mg  Every 8 Hours,   Status:  Discontinued         02/11/25 0801    02/11/25 1030  phenylephrine (ZULY-SYNEPHRINE) 50 mg in 250 mL NS infusion  Titrated         02/11/25 0938    02/11/25 0900  propofol (DIPRIVAN) infusion 10 mg/mL 100 mL  Titrated         02/11/25 0814    02/11/25 0845  dexmedetomidine (PRECEDEX) 400 mcg in 100 mL NS infusion  Titrated         02/11/25 0757    02/10/25 2230  apixaban (ELIQUIS) tablet 5 mg  Every 12 Hours Scheduled         02/10/25 2122    02/10/25 2100  sodium chloride 0.9 % flush 10 mL  Every 12 Hours Scheduled         02/10/25 1818    02/10/25 2100  sennosides-docusate (PERICOLACE) 8.6-50 MG per tablet 2 tablet  2 Times Daily        Placed in \"And\" Linked Group    02/10/25 1818    02/10/25 2030  ipratropium-albuterol (DUO-NEB) nebulizer solution 3 mL  4 Times Daily - RT         02/10/25 1817    02/10/25 1900  Vital Signs Every Hour and Per Hospital Policy Based on Patient Condition  Every Hour       02/10/25 1818    02/10/25 1900  Intake & Output  Every Hour       02/10/25 1818    02/10/25 1834  mupirocin (BACTROBAN) 2 % nasal ointment 1 Application  2 Times Daily         02/10/25 1818    02/10/25 1819  Daily Weights  Daily       02/10/25 1818    02/10/25 1819  Daily CHG Bath While in Critical Care  Daily      Comments: Use for admission bath and length of critical care stay.    02/10/25 1818    02/10/25 1818  Oral Care - Patient Not on NPPV & Not Intubated  Every Shift       02/10/25 1818    02/10/25 1817  nitroglycerin (NITROSTAT) SL tablet 0.4 mg  Every 5 Minutes PRN         02/10/25 1818    02/10/25 1817  sodium chloride 0.9 % flush 10 mL  As Needed         02/10/25 1818    02/10/25 1817  sodium chloride 0.9 % infusion 40 mL  As Needed         02/10/25 1818    02/10/25 1817  polyethylene glycol (MIRALAX) packet 17 g  Daily PRN        Placed in \"And\" Linked Group    02/10/25 1818    " "02/10/25 1817  bisacodyl (DULCOLAX) EC tablet 5 mg  Daily PRN        Placed in \"And\" Linked Group    02/10/25 1818    02/10/25 1817  bisacodyl (DULCOLAX) suppository 10 mg  Daily PRN        Placed in \"And\" Linked Group    02/10/25 1818    Unscheduled  Spontaneous Awakening Trial  Daily - SAT       02/11/25 1401    Unscheduled  Spontaneous Breathing Trial  Daily - SBT       02/11/25 1401    Unscheduled  Holden & Document Feeding Tube Depth (in cm)  As Needed       02/12/25 1742    Unscheduled  Verify Feeding Tube Placement Upon Insertion & As Needed  As Needed       02/12/25 1742    Unscheduled  Flush Feeding Tube With 30-50mL Water As Needed  As Needed       02/12/25 1742    Unscheduled  Follow Hypoglycemia Standing Orders For Blood Glucose <70 & Notify Provider of Treatment  As Needed      Comments: Follow Hypoglycemia Orders As Outlined in Process Instructions (Open Order Report to View Full Instructions)  Notify Provider Any Time Hypoglycemia Treatment is Administered    02/14/25 0058    --  tiotropium bromide monohydrate (SPIRIVA RESPIMAT) 2.5 MCG/ACT aerosol solution inhaler  Daily - RT         02/12/25 0814    --  Erythromycin 250 MG EC tablet  Daily         02/12/25 0820    --  fluticasone (FLOVENT HFA) 44 MCG/ACT inhaler  2 Times Daily - RT         02/12/25 0823                     Physician Progress Notes (last 24 hours)        Rafael Brito MD at 02/14/25 1414                                                        LOS: 4 days   Patient Care Team:  Flavia Jaquez APRN as PCP - General (Nurse Practitioner)  Beulah Joshi APRN as Nurse Practitioner (Neurology)  Flaco Aguayo MD as Consulting Physician (Pulmonary Disease)    Chief Complaint:  F/up respiratory failure, COPD and critical care management.    Subjective   Interval History    Noted the events from overnight.  She had difficulty with ventilation and required suction bronchoscopy for mucous plugging.  Currently on low tidal " "volume as she was able to tolerate that much better than the previous settings.  She is sedated with high-dose propofol.    REVIEW OF SYSTEMS:   Cannot obtain due to mechanical ventilation.    Ventilator/Non-Invasive Ventilation Settings (From admission, onward)       Start     Ordered    02/10/25 1620  NIPPV - Provider Settings  (CPAP / BiPAP)  Until Discontinued        Question Answer Comment   Indication Acute Respiratory Failure    Type BIPAP    Titrate Oxygen for SpO2 90 - 95%        02/10/25 1620                          Physical Exam:     Vital Signs  Temp:  [97.7 °F (36.5 °C)-98.9 °F (37.2 °C)] 98.2 °F (36.8 °C)  Heart Rate:  [] 61  Resp:  [14-20] 20  BP: ()/(31-73) 139/58  FiO2 (%):  [29 %-100 %] 45 %    Intake/Output Summary (Last 24 hours) at 2/14/2025 1414  Last data filed at 2/13/2025 1843  Gross per 24 hour   Intake 551.67 ml   Output 300 ml   Net 251.67 ml     Flowsheet Rows      Flowsheet Row First Filed Value   Admission Height 152.4 cm (60\") Documented at 02/10/2025 2230   Admission Weight 33.7 kg (74 lb 4.7 oz) Documented at 02/10/2025 2230            PPE used per hospital policy    General Appearance:  On the ventilator.  NAD...  Cachectic   ENMT: ET tube noted.  External ears normal.   Eyes:  Pupils equal and reactive to light. EOMI   Neck:    Trachea midline. No thyromegaly.   Lungs:   Equal but severely diminished air entry throughout.  Bilateral expiratory wheezing.  Prolonged expiratory time.  Coarse.  Similar to yesterday    Heart:  RRR.  No audible murmur.   Skin:   No rash or ecchymosis   Abdomen:    Soft. No tenderness. No HSM.   Neuro/psych: Sedated.  Arouses to stimulus and follows commands.   Extremities:  No cyanosis, clubbing or edema.  Warm extremities and well-perfused          Results Review:        Results from last 7 days   Lab Units 02/14/25  0840 02/14/25  0426 02/13/25  0442 02/12/25  0404   SODIUM mmol/L 145  --  138 141   POTASSIUM mmol/L 4.9  --  4.2 4.8 "   CHLORIDE mmol/L 106  --  104 107   CO2 mmol/L 33.0*  --  26.5 24.0   BUN mg/dL 31*  --  29* 24*   CREATININE mg/dL 0.39* 0.52* 0.34* 0.46*   GLUCOSE mg/dL 178*  --  166* 127*   CALCIUM mg/dL 10.0  --  9.4 9.4     Results from last 7 days   Lab Units 02/10/25  1707 02/10/25  1547   HSTROP T ng/L 95* 20*     Results from last 7 days   Lab Units 02/14/25  0448 02/14/25  0426 02/13/25  0442 02/12/25  0404   WBC 10*3/mm3 10.71  --  8.74 12.30*   HEMOGLOBIN g/dL 13.1  --  12.5 13.0   HEMOGLOBIN, POC g/dL  --  15.7  --   --    HEMATOCRIT % 39.6  --  37.1 38.9   HEMATOCRIT POC %  --  46  --   --    PLATELETS 10*3/mm3 254  --  239 261     Results from last 7 days   Lab Units 02/10/25  1547   INR  1.04     Results from last 7 days   Lab Units 02/10/25  1547   PROBNP pg/mL 158.0       Results from last 7 days   Lab Units 02/10/25  1547   D DIMER QUANT MCGFEU/mL 3.74*             Results from last 7 days   Lab Units 02/14/25  0509 02/14/25  0426 02/13/25  0850 02/11/25  0722 02/10/25  1730 02/10/25  1539   PH, ARTERIAL pH units 7.376 7.288* 7.343* 7.389 7.213* 7.068*   PCO2, ARTERIAL mm Hg 62.7* 76.3* 64.1* 45.1* 64.7* 98.9*   PO2 ART mm Hg 165.5* 70.5* 57.0* 56.2* 327.2* 71.0*   O2 SATURATION ART % 99.4* 90.7* 86.4* 88.3* 99.9* 84.0*   FLOW RATE lpm  --   --   --   --   --  8.0000   MODALITY  Adult Vent Adult Vent Adult Vent N/A BiPap HFNC         I reviewed the patient's new clinical results.        Medication Review:   apixaban, 5 mg, Oral, Q12H  chlorhexidine, 15 mL, Mouth/Throat, Q12H  insulin regular, 2-7 Units, Subcutaneous, Q6H  ipratropium-albuterol, 3 mL, Nebulization, 4x Daily - RT  methylPREDNISolone sodium succinate, 125 mg, Intravenous, Q8H  mupirocin, 1 Application, Each Nare, BID  pantoprazole, 40 mg, Intravenous, Q24H  senna-docusate sodium, 2 tablet, Oral, BID  sodium chloride, 10 mL, Intravenous, Q12H        dexmedetomidine, 0.2-1.5 mcg/kg/hr, Last Rate: 1.5 mcg/kg/hr (02/14/25 1383)  fentanyl 10 mcg/mL,   mcg/hr  phenylephrine, 0.5-3 mcg/kg/min, Last Rate: 1 mcg/kg/min (02/14/25 0252)  propofol, 5-50 mcg/kg/min, Last Rate: 50 mcg/kg/min (02/14/25 5056)        Diagnostic imaging:  I personally and independently reviewed the following images:  CTA chest 2/10/2025: Significant emphysema more prominent on the right side.  Bronchiectasis.    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  AC/VC 18/375/45%/5.  Unlikely to be able to wean today due to her current settings and the fact that she had bronchoscopy overnight.  I did discuss with her family (sister and significant other) at bedside and explained the fact that she has very severe lung disease at baseline and might be difficult to be able to wean her off the ventilator and she might be heading towards tracheostomy.  Discussed that there is no particular recommendation regarding timing of tracheostomy but it might be a good idea to look into that sometimes next week if she is unable to come off the ventilator  Tre-Synephrine IV drip and titrate to keep MAP >65  Solu-Medrol 125 mg every 8 hours-day 3.  No benefit to extended high-dose for too long and therefore we will reduce to 80 mg every 8 hours.  Fentanyl as needed for analgesia  Tube feeding  DuoNeb 4 times a day  Eliquis 5 mg twice daily        Rafael Brito MD  02/14/25  14:14 EST        Time: Critical care 35 min       This note was dictated utilizing Dragon dictation     Electronically signed by Rafael Brito MD at 02/14/25 5731       Donald Ryder Jr., MD  at 02/14/25 0501          Pulmonary/critical care  I was called to bedside patient in distress looking terrible.  Apparently she had been doing well all night and had a rather abrupt change they had been cleaning her up and retiring her tubes etc.  She had been on a tidal volume of 350 rate of 14 and a PEEP of 5 had a blood gas with a pH of 7.29 pCO2 is 76 and a pO2 of 70 on FiO2 of 50% in response increased her tidal volume of 450 and respiratory rate to 24 and sedated her more peak airway pressures were close to 40.  She has no air movement on the right does not have a lot on the left.  ET tube looked similar position to yesterday.  I quickly did an ultrasound did not see evidence of pneumothorax.  While awaiting chest x-ray we are adjusting patient she had become hypotensive they had to increase her Tre-Synephrine also before I arrived.  I decreased her tidal volume back down to 400 decreased to respiratory rate down to 20 and her peak airway pressures are down to 30-31.  Her blood pressure is better.  Her blood pressure has improved with this and they have been dropping her Tre-Synephrine pretty quickly.  I do not see any evidence for a big mucous plug etc. she has such bullous disease on the right that she does not move much air.  I think it sounds like probably she got really anxious was hyperventilating and probably air trapped and got into severe distress.  I think probably sedation has been the biggest treatment.  We may have to drop her volumes and rate further I am going to see what a follow-up blood gas shows here shortly since we have her airway pressures much better and her blood pressure better.  Will  keep her heavily sedated until we see where she is at then we can make any further adjustments.  I am hoping to be able to drop her volumes a little further.  Critical care time thus far 39 minutes    Plan: Repeat blood gas looks better I am going to try and drop her volumes a little further I still do  not like in this large I will try and drop to 375 cc watch her see if we can get her stabilized and pressors wean down further today if she continues to improve be able to drop her even little lower.    Electronically signed by Donald Ryder Jr., MD at 02/14/25 4651

## 2025-02-15 LAB
ANION GAP SERPL CALCULATED.3IONS-SCNC: 6 MMOL/L (ref 5–15)
ARTERIAL PATENCY WRIST A: ABNORMAL
ATMOSPHERIC PRESS: 748.6 MMHG
BACTERIA SPEC RESP CULT: NORMAL
BASE EXCESS BLDA CALC-SCNC: 7.2 MMOL/L (ref 0–2)
BDY SITE: ABNORMAL
BUN SERPL-MCNC: 28 MG/DL (ref 8–23)
BUN/CREAT SERPL: 90.3 (ref 7–25)
CALCIUM SPEC-SCNC: 9 MG/DL (ref 8.6–10.5)
CHLORIDE SERPL-SCNC: 108 MMOL/L (ref 98–107)
CO2 BLDA-SCNC: >32.45 MMOL/L (ref 23–27)
CO2 SERPL-SCNC: 28 MMOL/L (ref 22–29)
CREAT SERPL-MCNC: 0.31 MG/DL (ref 0.57–1)
DEPRECATED RDW RBC AUTO: 42.9 FL (ref 37–54)
DEVICE COMMENT: ABNORMAL
EGFRCR SERPLBLD CKD-EPI 2021: 117.7 ML/MIN/1.73
ERYTHROCYTE [DISTWIDTH] IN BLOOD BY AUTOMATED COUNT: 12.1 % (ref 12.3–15.4)
GLUCOSE BLDC GLUCOMTR-MCNC: 215 MG/DL (ref 70–130)
GLUCOSE BLDC GLUCOMTR-MCNC: 255 MG/DL (ref 70–130)
GLUCOSE BLDC GLUCOMTR-MCNC: 273 MG/DL (ref 70–130)
GLUCOSE BLDC GLUCOMTR-MCNC: 332 MG/DL (ref 70–130)
GLUCOSE BLDC GLUCOMTR-MCNC: 382 MG/DL (ref 70–130)
GLUCOSE SERPL-MCNC: 196 MG/DL (ref 65–99)
GRAM STN SPEC: NORMAL
HCO3 BLDA-SCNC: 33.6 MMOL/L (ref 22–28)
HCT VFR BLD AUTO: 41.7 % (ref 34–46.6)
HEMODILUTION: NO
HGB BLD-MCNC: 13.2 G/DL (ref 12–15.9)
INHALED O2 CONCENTRATION: 45 %
MCH RBC QN AUTO: 30.3 PG (ref 26.6–33)
MCHC RBC AUTO-ENTMCNC: 31.7 G/DL (ref 31.5–35.7)
MCV RBC AUTO: 95.6 FL (ref 79–97)
MODALITY: ABNORMAL
O2 A-A PPRESDIFF RESPIRATORY: 0.3 MMHG
PCO2 BLDA: 53.2 MM HG (ref 35–45)
PEEP RESPIRATORY: 5 CM[H2O]
PH BLDA: 7.41 PH UNITS (ref 7.35–7.45)
PLATELET # BLD AUTO: 258 10*3/MM3 (ref 140–450)
PMV BLD AUTO: 11 FL (ref 6–12)
PO2 BLD: 158 MM[HG] (ref 0–500)
PO2 BLDA: 71 MM HG (ref 80–100)
POTASSIUM SERPL-SCNC: 4.5 MMOL/L (ref 3.5–5.2)
RBC # BLD AUTO: 4.36 10*6/MM3 (ref 3.77–5.28)
SAO2 % BLDCOA: 93.7 % (ref 92–98.5)
SET MECH RESP RATE: 20
SODIUM SERPL-SCNC: 142 MMOL/L (ref 136–145)
TOTAL RATE: 20 BREATHS/MINUTE
VENTILATOR MODE: ABNORMAL
VT ON VENT VENT: 375 ML
WBC NRBC COR # BLD AUTO: 10.4 10*3/MM3 (ref 3.4–10.8)

## 2025-02-15 PROCEDURE — 80048 BASIC METABOLIC PNL TOTAL CA: CPT

## 2025-02-15 PROCEDURE — 36600 WITHDRAWAL OF ARTERIAL BLOOD: CPT

## 2025-02-15 PROCEDURE — 94799 UNLISTED PULMONARY SVC/PX: CPT

## 2025-02-15 PROCEDURE — 25010000002 CEFTRIAXONE PER 250 MG: Performed by: INTERNAL MEDICINE

## 2025-02-15 PROCEDURE — 82948 REAGENT STRIP/BLOOD GLUCOSE: CPT

## 2025-02-15 PROCEDURE — 25010000002 FENTANYL CITRATE (PF) 2500 MCG/50ML SOLUTION: Performed by: INTERNAL MEDICINE

## 2025-02-15 PROCEDURE — 25010000002 PROPOFOL 10 MG/ML EMULSION: Performed by: INTERNAL MEDICINE

## 2025-02-15 PROCEDURE — 85027 COMPLETE CBC AUTOMATED: CPT

## 2025-02-15 PROCEDURE — 94761 N-INVAS EAR/PLS OXIMETRY MLT: CPT

## 2025-02-15 PROCEDURE — 82803 BLOOD GASES ANY COMBINATION: CPT

## 2025-02-15 PROCEDURE — 63710000001 INSULIN REGULAR HUMAN PER 5 UNITS

## 2025-02-15 PROCEDURE — 25010000002 METHYLPREDNISOLONE PER 125 MG: Performed by: INTERNAL MEDICINE

## 2025-02-15 PROCEDURE — 94003 VENT MGMT INPAT SUBQ DAY: CPT

## 2025-02-15 RX ORDER — ACETAMINOPHEN 160 MG/5ML
650 SOLUTION ORAL EVERY 6 HOURS PRN
Status: DISCONTINUED | OUTPATIENT
Start: 2025-02-15 | End: 2025-02-25 | Stop reason: HOSPADM

## 2025-02-15 RX ORDER — ACETAMINOPHEN 325 MG/1
650 TABLET ORAL EVERY 6 HOURS PRN
Status: DISCONTINUED | OUTPATIENT
Start: 2025-02-15 | End: 2025-02-25 | Stop reason: HOSPADM

## 2025-02-15 RX ORDER — CHLORHEXIDINE GLUCONATE ORAL RINSE 1.2 MG/ML
15 SOLUTION DENTAL EVERY 12 HOURS SCHEDULED
Status: DISCONTINUED | OUTPATIENT
Start: 2025-02-15 | End: 2025-02-25 | Stop reason: HOSPADM

## 2025-02-15 RX ADMIN — CEFTRIAXONE SODIUM 1000 MG: 1 INJECTION, POWDER, FOR SOLUTION INTRAMUSCULAR; INTRAVENOUS at 17:56

## 2025-02-15 RX ADMIN — APIXABAN 5 MG: 5 TABLET, FILM COATED ORAL at 09:33

## 2025-02-15 RX ADMIN — Medication 10 ML: at 09:34

## 2025-02-15 RX ADMIN — IPRATROPIUM BROMIDE AND ALBUTEROL SULFATE 3 ML: .5; 3 SOLUTION RESPIRATORY (INHALATION) at 14:38

## 2025-02-15 RX ADMIN — METHYLPREDNISOLONE SODIUM SUCCINATE 80 MG: 125 INJECTION, POWDER, FOR SOLUTION INTRAMUSCULAR; INTRAVENOUS at 11:51

## 2025-02-15 RX ADMIN — SENNOSIDES AND DOCUSATE SODIUM 2 TABLET: 50; 8.6 TABLET ORAL at 09:33

## 2025-02-15 RX ADMIN — SENNOSIDES AND DOCUSATE SODIUM 2 TABLET: 50; 8.6 TABLET ORAL at 21:19

## 2025-02-15 RX ADMIN — INSULIN HUMAN 3 UNITS: 100 INJECTION, SOLUTION PARENTERAL at 18:21

## 2025-02-15 RX ADMIN — DEXMEDETOMIDINE HYDROCHLORIDE 1.5 MCG/KG/HR: 400 INJECTION INTRAVENOUS at 11:42

## 2025-02-15 RX ADMIN — APIXABAN 5 MG: 5 TABLET, FILM COATED ORAL at 21:19

## 2025-02-15 RX ADMIN — INSULIN HUMAN 4 UNITS: 100 INJECTION, SOLUTION PARENTERAL at 11:51

## 2025-02-15 RX ADMIN — METHYLPREDNISOLONE SODIUM SUCCINATE 80 MG: 125 INJECTION, POWDER, FOR SOLUTION INTRAMUSCULAR; INTRAVENOUS at 17:56

## 2025-02-15 RX ADMIN — FENTANYL CITRATE 100 MCG/HR: 50 INJECTION INTRAVENOUS at 13:09

## 2025-02-15 RX ADMIN — INSULIN HUMAN 6 UNITS: 100 INJECTION, SOLUTION PARENTERAL at 06:43

## 2025-02-15 RX ADMIN — DEXMEDETOMIDINE HYDROCHLORIDE 0.8 MCG/KG/HR: 400 INJECTION INTRAVENOUS at 21:31

## 2025-02-15 RX ADMIN — IPRATROPIUM BROMIDE AND ALBUTEROL SULFATE 3 ML: .5; 3 SOLUTION RESPIRATORY (INHALATION) at 19:33

## 2025-02-15 RX ADMIN — CHLORHEXIDINE GLUCONATE 15 ML: 1.2 RINSE ORAL at 09:33

## 2025-02-15 RX ADMIN — IPRATROPIUM BROMIDE AND ALBUTEROL SULFATE 3 ML: .5; 3 SOLUTION RESPIRATORY (INHALATION) at 06:46

## 2025-02-15 RX ADMIN — DEXMEDETOMIDINE HYDROCHLORIDE 1.5 MCG/KG/HR: 400 INJECTION INTRAVENOUS at 07:29

## 2025-02-15 RX ADMIN — INSULIN HUMAN 4 UNITS: 100 INJECTION, SOLUTION PARENTERAL at 00:55

## 2025-02-15 RX ADMIN — IPRATROPIUM BROMIDE AND ALBUTEROL SULFATE 3 ML: .5; 3 SOLUTION RESPIRATORY (INHALATION) at 10:39

## 2025-02-15 RX ADMIN — LANSOPRAZOLE 30 MG: 15 TABLET, ORALLY DISINTEGRATING ORAL at 09:33

## 2025-02-15 RX ADMIN — METHYLPREDNISOLONE SODIUM SUCCINATE 80 MG: 125 INJECTION, POWDER, FOR SOLUTION INTRAMUSCULAR; INTRAVENOUS at 02:16

## 2025-02-15 RX ADMIN — PROPOFOL 50 MCG/KG/MIN: 10 INJECTION, EMULSION INTRAVENOUS at 06:43

## 2025-02-15 RX ADMIN — CHLORHEXIDINE GLUCONATE 15 ML: 1.2 RINSE ORAL at 21:20

## 2025-02-15 RX ADMIN — MUPIROCIN 1 APPLICATION: 20 OINTMENT TOPICAL at 09:33

## 2025-02-15 RX ADMIN — Medication 10 ML: at 21:20

## 2025-02-15 RX ADMIN — POLYETHYLENE GLYCOL 3350 17 G: 17 POWDER, FOR SOLUTION ORAL at 18:46

## 2025-02-15 RX ADMIN — ACETAMINOPHEN 650 MG: 650 SOLUTION ORAL at 09:33

## 2025-02-15 RX ADMIN — PROPOFOL 30 MCG/KG/MIN: 10 INJECTION, EMULSION INTRAVENOUS at 18:32

## 2025-02-15 NOTE — SIGNIFICANT NOTE
02/15/25 0646   B = Both Spontaneous Awakening and Breathing Trials   Was patient receiving mechanical ventilation? Yes   Safety Screen Spontaneous Breathing Trial (SBT)  Lack of inspiratory efforts

## 2025-02-15 NOTE — SIGNIFICANT NOTE
02/15/25 3360   B = Both Spontaneous Awakening and Breathing Trials   Was patient receiving mechanical ventilation? Yes   Spontaneous Breathing Trial (SBT) Outcome Respiratory rate less than 8/min - SBT Failure  (placed back on rate)

## 2025-02-15 NOTE — SIGNIFICANT NOTE
02/15/25 1517   B = Both Spontaneous Awakening and Breathing Trials   Was patient receiving mechanical ventilation? Yes   Safety Screen Spontaneous Breathing Trial (SBT)  Proceed with SBT - No exclusion criteria met  (PS 5)

## 2025-02-15 NOTE — SIGNIFICANT NOTE
02/15/25 1336   Oxygen Therapy   SpO2 90 %   Pulse Oximetry Type Continuous   Oxygen Concentration (%) 60  (increased to 60% d/t desaturations)   ETCO2 (mmHg) 34 mmHg   Device (Oxygen Therapy) ventilator

## 2025-02-15 NOTE — PROGRESS NOTES
LPC INPATIENT PROGRESS NOTE         [facility] INTENSIVE CARE    2/15/2025      PATIENT IDENTIFICATION:  Name: Scarlet Chakraborty ADMIT: 2/10/2025   : 1960  PCP: Flavia Jaquez APRN    MRN: [id number] LOS: 5 days   AGE/SEX: 65 y.o. female  ROOM: [facility]                     LOS 5    Reason for visit: respiratory failure, COPD and critical care management.       SUBJECTIVE:      Sedated on the ventilator.  Not breathing over the ventilator.  Blood gas this morning is pending.  On propofol and Precedex as well as fentanyl drip.  On phenylephrine drip for blood pressure support.  Tolerating tube feeds.  Significantly diminished breath sounds bilaterally.  Add ventilator bundle set orders. I am seeing the patient for the first time today.  All patient problems are new to me.      Objective   OBJECTIVE:    Vital Sign Min/Max for last 24 hours  Temp  Min: 97.3 °F (36.3 °C)  Max: 100.4 °F (38 °C)   BP  Min: 103/61  Max: 150/68   Pulse  Min: 70  Max: 106   Resp  Min: 20  Max: 22   SpO2  Min: 93 %  Max: 99 %   No data recorded   Weight  Min: 37.8 kg (83 lb 5.3 oz)  Max: 37.8 kg (83 lb 5.3 oz)    Vitals:    02/15/25 0718 02/15/25 0729 02/15/25 0900 02/15/25 1000   BP:  121/66 116/61 129/55   BP Location:       Patient Position:       Pulse:  106 100 87   Resp:       Temp: 100.4 °F (38 °C)   98.8 °F (37.1 °C)   TempSrc: Oral   Oral   SpO2:   95% 95%   Weight:       Height:                02/10/25  2230 02/15/25  0515   Weight: 33.7 kg (74 lb 4.7 oz) 37.8 kg (83 lb 5.3 oz)       Body mass index is 16.28 kg/m².        Mode: VC+/AC  FiO2 (%):  [45 %] 45 %  S RR:  [20] 20  S VT:  [375 mL] 375 mL  PEEP/CPAP (cm H2O):  [5 cm H20] 5 cm H20  MAP (cm H2O):  [10-11] 11           FiO2 (%): 45 %     Body mass index is 16.28 kg/m².    Intake/Output Summary (Last 24 hours) at 2/15/2025 1108  Last data filed at 2/15/2025 0518  Gross per 24 hour   Intake 2473.81 ml   Output 1000 ml   Net 1473.81 ml          Exam:  GEN:  No distress, appears stated age  EYES:   PERRL, anicteric sclerae  ENT:    External ears/nose normal, OP clear  NECK:  No adenopathy, midline trachea  LUNGS: Normal chest on inspection, palpation and diminished bilateral breath sounds on auscultation  CV:  Normal S1S2, without murmur  ABD:  Nontender, nondistended, no hepatosplenomegaly, +BS  EXT:  No edema.  No cyanosis or clubbing.  No mottling and normal cap refill.    Assessment     Scheduled meds:  apixaban, 5 mg, Oral, Q12H  cefTRIAXone, 1,000 mg, Intravenous, Q24H  chlorhexidine, 15 mL, Mouth/Throat, Q12H  insulin regular, 2-7 Units, Subcutaneous, Q6H  ipratropium-albuterol, 3 mL, Nebulization, 4x Daily - RT  lansoprazole, 30 mg, Nasogastric, QAM AC  methylPREDNISolone sodium succinate, 80 mg, Intravenous, Q8H  mupirocin, 1 Application, Each Nare, BID  senna-docusate sodium, 2 tablet, Oral, BID  sodium chloride, 10 mL, Intravenous, Q12H      IV meds:                      dexmedetomidine, 0.2-1.5 mcg/kg/hr, Last Rate: 1.5 mcg/kg/hr (02/15/25 0729)  fentanyl 10 mcg/mL,  mcg/hr, Last Rate: 50 mcg/hr (02/15/25 0935)  phenylephrine, 0.5-3 mcg/kg/min, Last Rate: 0.2 mcg/kg/min (02/14/25 8860)  propofol, 5-50 mcg/kg/min, Last Rate: 50 mcg/kg/min (02/15/25 0643)      Data Review:  Results from last 7 days   Lab Units 02/15/25  0425 02/14/25  0840 02/14/25  0426 02/13/25  0442 02/12/25  0404 02/11/25  0449   SODIUM mmol/L 142 145  --  138 141 141   POTASSIUM mmol/L 4.5 4.9  --  4.2 4.8 4.5   CHLORIDE mmol/L 108* 106  --  104 107 107   CO2 mmol/L 28.0 33.0*  --  26.5 24.0 20.6*   BUN mg/dL 28* 31*  --  29* 24* 22   CREATININE mg/dL 0.31* 0.39* 0.52* 0.34* 0.46* 0.48*   GLUCOSE mg/dL 196* 178*  --  166* 127* 136*   CALCIUM mg/dL 9.0 10.0  --  9.4 9.4 8.7         Estimated Creatinine Clearance: 109.4 mL/min (A) (by C-G formula based on SCr of 0.31 mg/dL (L)).  Results from last 7 days   Lab Units 02/15/25  0425 02/14/25  0448 02/14/25  0426  02/13/25  0442 02/12/25  0404 02/11/25  0449   WBC 10*3/mm3 10.40 10.71  --  8.74 12.30* 7.86   HEMOGLOBIN g/dL 13.2 13.1  --  12.5 13.0 13.3   HEMOGLOBIN, POC g/dL  --   --  15.7  --   --   --    PLATELETS 10*3/mm3 258 254  --  239 261 202     Results from last 7 days   Lab Units 02/10/25  1547   INR  1.04     Results from last 7 days   Lab Units 02/10/25  1547   ALT (SGPT) U/L 16   AST (SGOT) U/L 24     Results from last 7 days   Lab Units 02/15/25  1045 02/14/25  0509 02/14/25  0426 02/13/25  0850 02/11/25  0722 02/10/25  1730 02/10/25  1539   PH, ARTERIAL pH units 7.408 7.376 7.288* 7.343* 7.389 7.213* 7.068*   PO2 ART mm Hg 71.0* 165.5* 70.5* 57.0* 56.2* 327.2* 71.0*   PCO2, ARTERIAL mm Hg 53.2* 62.7* 76.3* 64.1* 45.1* 64.7* 98.9*   HCO3 ART mmol/L 33.6* 36.8* 36.5* 34.8* 27.2 26.0 28.5*     Results from last 7 days   Lab Units 02/14/25  0426 02/10/25  1547   PROCALCITONIN ng/mL  --  0.14   LACTATE mmol/L 1.0  --          Glucose   Date/Time Value Ref Range Status   02/15/2025 0634 332 (H) 70 - 130 mg/dL Final   02/15/2025 0634 382 (H) 70 - 130 mg/dL Final   02/15/2025 0046 255 (H) 70 - 130 mg/dL Final   02/14/2025 2023 272 (H) 70 - 130 mg/dL Final   02/14/2025 1747 238 (H) 70 - 130 mg/dL Final   02/14/2025 1314 267 (H) 70 - 130 mg/dL Final   02/14/2025 0645 163 (H) 70 - 130 mg/dL Final         Imaging reviewed  Chest x-ray 2/14 reviewed          Microbiology reviewed            Active Hospital Problems    Diagnosis  POA    **Acute hypercapnic respiratory failure [J96.02]  Yes      Resolved Hospital Problems   No resolved problems to display.         ASSESSMENT:  End-stage COPD, FEV1 17% on [place] 11/9/23 with current exacerbation  Acute hypoxic and hypercapnic respiratory failure  Mucous plugging, s/p bronchoscopy 2/14/2025  Sinus tachycardia  Seasonal coronavirus infection: OC43  COPD cachexia  Hypotension, secondary to sedation and positive pressure ventilation  Ischemic cardiomyopathy  History of CVA, on  Eliquis  History of Takotsubo cardiomyopathy      PLAN:  Plan Daily sedation vacation and spontaneous breathing trials.  Decrease set respiratory rate to give patient room for changing ventilation.  Add ventilator order bundle set.  Wean pressor as able.  Tolerating tube feeds.  Scheduled and as needed bronchodilators and systemic steroids for COPD exacerbation.  Continue antibiotic.  On DOAC.  Ulcer prophylaxis.  Discussed with nursing staff at bedside.        Called and left voice message on patients sisters phone after no answer.        CCT: 35 min    Holden Tracy MD  Pulmonary and Critical Care Medicine  [facility], [facility]  2/15/2025    11:08 EST

## 2025-02-16 ENCOUNTER — APPOINTMENT (OUTPATIENT)
Dept: GENERAL RADIOLOGY | Facility: HOSPITAL | Age: 65
DRG: 207 | End: 2025-02-16
Payer: COMMERCIAL

## 2025-02-16 LAB
ANION GAP SERPL CALCULATED.3IONS-SCNC: 3.7 MMOL/L (ref 5–15)
ARTERIAL PATENCY WRIST A: POSITIVE
ATMOSPHERIC PRESS: 735.6 MMHG
BASE EXCESS BLDA CALC-SCNC: 9.4 MMOL/L (ref 0–2)
BDY SITE: ABNORMAL
BUN SERPL-MCNC: 23 MG/DL (ref 8–23)
BUN/CREAT SERPL: 104.5 (ref 7–25)
CALCIUM SPEC-SCNC: 7.4 MG/DL (ref 8.6–10.5)
CHLORIDE SERPL-SCNC: 104 MMOL/L (ref 98–107)
CO2 BLDA-SCNC: >32.45 MMOL/L (ref 23–27)
CO2 SERPL-SCNC: 27.3 MMOL/L (ref 22–29)
CREAT SERPL-MCNC: 0.22 MG/DL (ref 0.57–1)
DEPRECATED RDW RBC AUTO: 41.8 FL (ref 37–54)
EGFRCR SERPLBLD CKD-EPI 2021: 127.8 ML/MIN/1.73
ERYTHROCYTE [DISTWIDTH] IN BLOOD BY AUTOMATED COUNT: 12.1 % (ref 12.3–15.4)
GLUCOSE BLDC GLUCOMTR-MCNC: 171 MG/DL (ref 70–130)
GLUCOSE BLDC GLUCOMTR-MCNC: 199 MG/DL (ref 70–130)
GLUCOSE BLDC GLUCOMTR-MCNC: 226 MG/DL (ref 70–130)
GLUCOSE BLDC GLUCOMTR-MCNC: 237 MG/DL (ref 70–130)
GLUCOSE BLDC GLUCOMTR-MCNC: 253 MG/DL (ref 70–130)
GLUCOSE SERPL-MCNC: 173 MG/DL (ref 65–99)
HCO3 BLDA-SCNC: 34.8 MMOL/L (ref 22–28)
HCT VFR BLD AUTO: 36.5 % (ref 34–46.6)
HEMODILUTION: NO
HGB BLD-MCNC: 12.6 G/DL (ref 12–15.9)
INHALED O2 CONCENTRATION: 50 %
MCH RBC QN AUTO: 32.2 PG (ref 26.6–33)
MCHC RBC AUTO-ENTMCNC: 34.5 G/DL (ref 31.5–35.7)
MCV RBC AUTO: 93.4 FL (ref 79–97)
MODALITY: ABNORMAL
O2 A-A PPRESDIFF RESPIRATORY: 0.3 MMHG
PCO2 BLDA: 50.2 MM HG (ref 35–45)
PEEP RESPIRATORY: 5 CM[H2O]
PH BLDA: 7.45 PH UNITS (ref 7.35–7.45)
PLATELET # BLD AUTO: 205 10*3/MM3 (ref 140–450)
PMV BLD AUTO: 11.5 FL (ref 6–12)
PO2 BLD: 175 MM[HG] (ref 0–500)
PO2 BLDA: 87.7 MM HG (ref 80–100)
POTASSIUM SERPL-SCNC: 4.1 MMOL/L (ref 3.5–5.2)
RBC # BLD AUTO: 3.91 10*6/MM3 (ref 3.77–5.28)
SAO2 % BLDCOA: 96.9 % (ref 92–98.5)
SET MECH RESP RATE: 14
SODIUM SERPL-SCNC: 135 MMOL/L (ref 136–145)
TOTAL RATE: 20 BREATHS/MINUTE
TRIGL SERPL-MCNC: 1531 MG/DL (ref 0–150)
VENTILATOR MODE: ABNORMAL
VT ON VENT VENT: 375 ML
WBC NRBC COR # BLD AUTO: 12.4 10*3/MM3 (ref 3.4–10.8)

## 2025-02-16 PROCEDURE — 82803 BLOOD GASES ANY COMBINATION: CPT

## 2025-02-16 PROCEDURE — 94799 UNLISTED PULMONARY SVC/PX: CPT

## 2025-02-16 PROCEDURE — 94761 N-INVAS EAR/PLS OXIMETRY MLT: CPT

## 2025-02-16 PROCEDURE — 25010000002 METHYLPREDNISOLONE PER 125 MG: Performed by: INTERNAL MEDICINE

## 2025-02-16 PROCEDURE — 93005 ELECTROCARDIOGRAM TRACING: CPT | Performed by: INTERNAL MEDICINE

## 2025-02-16 PROCEDURE — 82948 REAGENT STRIP/BLOOD GLUCOSE: CPT

## 2025-02-16 PROCEDURE — 84478 ASSAY OF TRIGLYCERIDES: CPT | Performed by: INTERNAL MEDICINE

## 2025-02-16 PROCEDURE — 80048 BASIC METABOLIC PNL TOTAL CA: CPT | Performed by: INTERNAL MEDICINE

## 2025-02-16 PROCEDURE — 25010000002 PIPERACILLIN SOD-TAZOBACTAM PER 1 G: Performed by: INTERNAL MEDICINE

## 2025-02-16 PROCEDURE — 25010000002 FENTANYL CITRATE (PF) 2500 MCG/50ML SOLUTION: Performed by: INTERNAL MEDICINE

## 2025-02-16 PROCEDURE — 85027 COMPLETE CBC AUTOMATED: CPT | Performed by: INTERNAL MEDICINE

## 2025-02-16 PROCEDURE — 93010 ELECTROCARDIOGRAM REPORT: CPT | Performed by: STUDENT IN AN ORGANIZED HEALTH CARE EDUCATION/TRAINING PROGRAM

## 2025-02-16 PROCEDURE — 94003 VENT MGMT INPAT SUBQ DAY: CPT

## 2025-02-16 PROCEDURE — 36600 WITHDRAWAL OF ARTERIAL BLOOD: CPT

## 2025-02-16 PROCEDURE — 25010000002 PROPOFOL 10 MG/ML EMULSION: Performed by: INTERNAL MEDICINE

## 2025-02-16 PROCEDURE — 63710000001 INSULIN REGULAR HUMAN PER 5 UNITS

## 2025-02-16 PROCEDURE — 71045 X-RAY EXAM CHEST 1 VIEW: CPT

## 2025-02-16 RX ORDER — HYDROCODONE BITARTRATE AND ACETAMINOPHEN 5; 325 MG/1; MG/1
1 TABLET ORAL EVERY 4 HOURS PRN
Status: DISCONTINUED | OUTPATIENT
Start: 2025-02-16 | End: 2025-02-16

## 2025-02-16 RX ORDER — HYDROCODONE BITARTRATE AND ACETAMINOPHEN 5; 325 MG/1; MG/1
1 TABLET ORAL EVERY 4 HOURS PRN
Status: DISPENSED | OUTPATIENT
Start: 2025-02-16 | End: 2025-02-21

## 2025-02-16 RX ADMIN — MUPIROCIN 1 APPLICATION: 20 OINTMENT TOPICAL at 20:47

## 2025-02-16 RX ADMIN — IPRATROPIUM BROMIDE AND ALBUTEROL SULFATE 3 ML: .5; 3 SOLUTION RESPIRATORY (INHALATION) at 19:30

## 2025-02-16 RX ADMIN — INSULIN HUMAN 4 UNITS: 100 INJECTION, SOLUTION PARENTERAL at 01:02

## 2025-02-16 RX ADMIN — IPRATROPIUM BROMIDE AND ALBUTEROL SULFATE 3 ML: .5; 3 SOLUTION RESPIRATORY (INHALATION) at 06:36

## 2025-02-16 RX ADMIN — APIXABAN 5 MG: 5 TABLET, FILM COATED ORAL at 08:49

## 2025-02-16 RX ADMIN — MUPIROCIN 1 APPLICATION: 20 OINTMENT TOPICAL at 08:49

## 2025-02-16 RX ADMIN — METHYLPREDNISOLONE SODIUM SUCCINATE 80 MG: 125 INJECTION, POWDER, FOR SOLUTION INTRAMUSCULAR; INTRAVENOUS at 03:17

## 2025-02-16 RX ADMIN — METHYLPREDNISOLONE SODIUM SUCCINATE 80 MG: 125 INJECTION, POWDER, FOR SOLUTION INTRAMUSCULAR; INTRAVENOUS at 18:29

## 2025-02-16 RX ADMIN — DEXMEDETOMIDINE HYDROCHLORIDE 0.8 MCG/KG/HR: 400 INJECTION INTRAVENOUS at 11:06

## 2025-02-16 RX ADMIN — SENNOSIDES AND DOCUSATE SODIUM 2 TABLET: 50; 8.6 TABLET ORAL at 08:49

## 2025-02-16 RX ADMIN — INSULIN HUMAN 3 UNITS: 100 INJECTION, SOLUTION PARENTERAL at 11:24

## 2025-02-16 RX ADMIN — DEXMEDETOMIDINE HYDROCHLORIDE 1.3 MCG/KG/HR: 400 INJECTION INTRAVENOUS at 21:44

## 2025-02-16 RX ADMIN — Medication 10 ML: at 08:50

## 2025-02-16 RX ADMIN — MUPIROCIN 1 APPLICATION: 20 OINTMENT TOPICAL at 01:02

## 2025-02-16 RX ADMIN — SENNOSIDES AND DOCUSATE SODIUM 2 TABLET: 50; 8.6 TABLET ORAL at 20:47

## 2025-02-16 RX ADMIN — LANSOPRAZOLE 30 MG: 15 TABLET, ORALLY DISINTEGRATING ORAL at 06:49

## 2025-02-16 RX ADMIN — PROPOFOL 45 MCG/KG/MIN: 10 INJECTION, EMULSION INTRAVENOUS at 21:44

## 2025-02-16 RX ADMIN — PROPOFOL 30 MCG/KG/MIN: 10 INJECTION, EMULSION INTRAVENOUS at 07:43

## 2025-02-16 RX ADMIN — Medication 10 ML: at 21:02

## 2025-02-16 RX ADMIN — INSULIN HUMAN 2 UNITS: 100 INJECTION, SOLUTION PARENTERAL at 18:29

## 2025-02-16 RX ADMIN — INSULIN HUMAN 2 UNITS: 100 INJECTION, SOLUTION PARENTERAL at 06:49

## 2025-02-16 RX ADMIN — PIPERACILLIN AND TAZOBACTAM 3.38 G: 3; .375 INJECTION, POWDER, FOR SOLUTION INTRAVENOUS at 11:13

## 2025-02-16 RX ADMIN — CHLORHEXIDINE GLUCONATE 15 ML: 1.2 RINSE ORAL at 08:49

## 2025-02-16 RX ADMIN — CHLORHEXIDINE GLUCONATE 15 ML: 1.2 RINSE ORAL at 21:02

## 2025-02-16 RX ADMIN — BISACODYL 10 MG: 10 SUPPOSITORY RECTAL at 17:46

## 2025-02-16 RX ADMIN — FENTANYL CITRATE 100 MCG/HR: 50 INJECTION INTRAVENOUS at 02:40

## 2025-02-16 RX ADMIN — METHYLPREDNISOLONE SODIUM SUCCINATE 80 MG: 125 INJECTION, POWDER, FOR SOLUTION INTRAMUSCULAR; INTRAVENOUS at 09:43

## 2025-02-16 RX ADMIN — PIPERACILLIN AND TAZOBACTAM 3.38 G: 3; .375 INJECTION, POWDER, FOR SOLUTION INTRAVENOUS at 16:58

## 2025-02-16 RX ADMIN — IPRATROPIUM BROMIDE AND ALBUTEROL SULFATE 3 ML: .5; 3 SOLUTION RESPIRATORY (INHALATION) at 10:34

## 2025-02-16 RX ADMIN — APIXABAN 5 MG: 5 TABLET, FILM COATED ORAL at 20:47

## 2025-02-16 RX ADMIN — HYDROCODONE BITARTRATE AND ACETAMINOPHEN 1 TABLET: 5; 325 TABLET ORAL at 16:56

## 2025-02-16 RX ADMIN — IPRATROPIUM BROMIDE AND ALBUTEROL SULFATE 3 ML: .5; 3 SOLUTION RESPIRATORY (INHALATION) at 14:32

## 2025-02-16 NOTE — PROGRESS NOTES
LPC INPATIENT PROGRESS NOTE         [facility] INTENSIVE CARE    2025      PATIENT IDENTIFICATION:  Name: Scarlet Chakraborty ADMIT: 2/10/2025   : 1960  PCP: Flavia Jaquez APRN    MRN: [id number] LOS: 6 days   AGE/SEX: 65 y.o. female  ROOM: [facility]                     LOS 6    Reason for visit: respiratory failure, COPD and critical care management.       SUBJECTIVE:      Sedated on ventilator.  Has failed spontaneous breathing trials.  Plan to try again today.  Discussed with family at bedside and expressed the gravity of patient's situation.  They expressed understanding.  Discussed with nursing staff.      Objective   OBJECTIVE:    Vital Sign Min/Max for last 24 hours  Temp  Min: 97.2 °F (36.2 °C)  Max: 101.5 °F (38.6 °C)   BP  Min: 80/54  Max: 161/64   Pulse  Min: 68  Max: 127   Resp  Min: 14  Max: 28   SpO2  Min: 89 %  Max: 98 %   No data recorded   Weight  Min: 41.5 kg (91 lb 7.9 oz)  Max: 41.5 kg (91 lb 7.9 oz)    Vitals:    25 0815 25 0845 25 0900 25 0946   BP: (!) 80/54 150/62 143/66 157/63   Pulse: 98 77 75 71   Resp:       Temp: 99.7 °F (37.6 °C) 99.3 °F (37.4 °C) 99 °F (37.2 °C)    TempSrc:   Rectal    SpO2: 95% 98% 98%    Weight:       Height:                02/10/25  2230 02/15/25  0515 25  0600   Weight: 33.7 kg (74 lb 4.7 oz) 37.8 kg (83 lb 5.3 oz) 41.5 kg (91 lb 7.9 oz)       Body mass index is 17.87 kg/m².        Mode: VC+/AC  FiO2 (%):  [45 %-60 %] 50 %  S RR:  [14-20] 14  S VT:  [375 mL] 375 mL  PEEP/CPAP (cm H2O):  [5 cm H20] 5 cm H20  CT SUP:  [5 cm H20] 5 cm H20  MAP (cm H2O):  [6.3-11] 9.6           FiO2 (%): 50 %     Body mass index is 17.87 kg/m².    Intake/Output Summary (Last 24 hours) at 2025 1018  Last data filed at 2025 0911  Gross per 24 hour   Intake 2877.9 ml   Output 675 ml   Net 2202.9 ml         Exam:  GEN:  No distress, appears stated age  EYES:   PERRL, anicteric sclerae  ENT:    External ears/nose  normal, OP clear  NECK:  No adenopathy, midline trachea  LUNGS: Normal chest on inspection, palpation and diminished bilateral breath sounds on auscultation  CV:  Normal S1S2, without murmur  ABD:  Nontender, nondistended, no hepatosplenomegaly, +BS  EXT:  No edema.  No cyanosis or clubbing.  No mottling and normal cap refill.    Assessment     Scheduled meds:  apixaban, 5 mg, Oral, Q12H  cefTRIAXone, 1,000 mg, Intravenous, Q24H  chlorhexidine, 15 mL, Mouth/Throat, Q12H  insulin regular, 2-7 Units, Subcutaneous, Q6H  ipratropium-albuterol, 3 mL, Nebulization, 4x Daily - RT  lansoprazole, 30 mg, Nasogastric, QAM AC  methylPREDNISolone sodium succinate, 80 mg, Intravenous, Q8H  mupirocin, 1 Application, Each Nare, BID  senna-docusate sodium, 2 tablet, Oral, BID  sodium chloride, 10 mL, Intravenous, Q12H      IV meds:                      dexmedetomidine, 0.2-1.5 mcg/kg/hr, Last Rate: 1.5 mcg/kg/hr (02/16/25 0910)  fentanyl 10 mcg/mL,  mcg/hr, Last Rate: 50 mcg/hr (02/16/25 0828)  phenylephrine, 0.5-3 mcg/kg/min, Last Rate: Stopped (02/16/25 0946)  propofol, 5-50 mcg/kg/min, Last Rate: 15 mcg/kg/min (02/16/25 0910)      Data Review:  Results from last 7 days   Lab Units 02/16/25  0402 02/15/25  0425 02/14/25  0840 02/14/25  0426 02/13/25  0442 02/12/25  0404   SODIUM mmol/L 135* 142 145  --  138 141   POTASSIUM mmol/L 4.1 4.5 4.9  --  4.2 4.8   CHLORIDE mmol/L 104 108* 106  --  104 107   CO2 mmol/L 27.3 28.0 33.0*  --  26.5 24.0   BUN mg/dL 23 28* 31*  --  29* 24*   CREATININE mg/dL 0.22* 0.31* 0.39* 0.52* 0.34* 0.46*   GLUCOSE mg/dL 173* 196* 178*  --  166* 127*   CALCIUM mg/dL 7.4* 9.0 10.0  --  9.4 9.4         Estimated Creatinine Clearance: 169.2 mL/min (A) (by C-G formula based on SCr of 0.22 mg/dL (L)).  Results from last 7 days   Lab Units 02/16/25  0402 02/15/25  0425 02/14/25  0448 02/14/25  0426 02/13/25  0442 02/12/25  0404   WBC 10*3/mm3 12.40* 10.40 10.71  --  8.74 12.30*   HEMOGLOBIN g/dL 12.6 13.2  13.1  --  12.5 13.0   HEMOGLOBIN, POC g/dL  --   --   --  15.7  --   --    PLATELETS 10*3/mm3 205 258 254  --  239 261     Results from last 7 days   Lab Units 02/10/25  1547   INR  1.04     Results from last 7 days   Lab Units 02/10/25  1547   ALT (SGPT) U/L 16   AST (SGOT) U/L 24     Results from last 7 days   Lab Units 02/16/25  0345 02/15/25  1045 02/14/25  0509 02/14/25  0426 02/13/25  0850 02/11/25  0722 02/10/25  1730   PH, ARTERIAL pH units 7.449 7.408 7.376 7.288* 7.343* 7.389 7.213*   PO2 ART mm Hg 87.7 71.0* 165.5* 70.5* 57.0* 56.2* 327.2*   PCO2, ARTERIAL mm Hg 50.2* 53.2* 62.7* 76.3* 64.1* 45.1* 64.7*   HCO3 ART mmol/L 34.8* 33.6* 36.8* 36.5* 34.8* 27.2 26.0     Results from last 7 days   Lab Units 02/14/25  0426 02/10/25  1547   PROCALCITONIN ng/mL  --  0.14   LACTATE mmol/L 1.0  --          Glucose   Date/Time Value Ref Range Status   02/16/2025 0643 199 (H) 70 - 130 mg/dL Final   02/16/2025 0040 253 (H) 70 - 130 mg/dL Final   02/15/2025 1748 215 (H) 70 - 130 mg/dL Final   02/15/2025 1113 273 (H) 70 - 130 mg/dL Final   02/15/2025 0634 332 (H) 70 - 130 mg/dL Final   02/15/2025 0634 382 (H) 70 - 130 mg/dL Final   02/15/2025 0046 255 (H) 70 - 130 mg/dL Final         Imaging reviewed  Chest x-ray 2/16 reviewed          Microbiology reviewed            Active Hospital Problems    Diagnosis  POA    **Acute hypercapnic respiratory failure [J96.02]  Yes      Resolved Hospital Problems   No resolved problems to display.         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     Weaning off pressor.  Tolerating tube feeds.  Scheduled and as needed bronchodilators and systemic steroids for COPD exacerbation.  Increasing infiltrate left lower showing progressive pneumonia.  On antibiotics with Rocephin.  Change to Zosyn.  Treatment for viral process is supportive.  On DOAC.  Ulcer prophylaxis.  Discussed with nursing staff at bedside.    Discussed with family at bedside.  Poor prognosis.      CCT: 36 min    Holden Tracy MD  Pulmonary and Critical Care Medicine  [facility], [facility]  2/16/2025    10:18 EST

## 2025-02-16 NOTE — SIGNIFICANT NOTE
02/16/25 1055   B = Both Spontaneous Awakening and Breathing Trials   Was patient receiving mechanical ventilation? Yes   Spontaneous Breathing Trial (SBT) Outcome Respiratory distress - SBT Failure  (increased RR and decreased VT placed back on rate)

## 2025-02-16 NOTE — SIGNIFICANT NOTE
02/16/25 0636   B = Both Spontaneous Awakening and Breathing Trials   Was patient receiving mechanical ventilation? Yes   Safety Screen Spontaneous Breathing Trial (SBT)  FiO2 is not at an appropriate level for patient

## 2025-02-16 NOTE — SIGNIFICANT NOTE
02/16/25 1053   B = Both Spontaneous Awakening and Breathing Trials   Was patient receiving mechanical ventilation? Yes   Safety Screen Spontaneous Breathing Trial (SBT)  Proceed with SBT - No exclusion criteria met  (PS 5 for SBT)

## 2025-02-16 NOTE — PLAN OF CARE
Goal Outcome Evaluation:      Patient remain in ac vc mode in vent throughout the shift. Got very aditated , breathing off vent and tachycardic to 150 while tried to do sedation vacation. Had to heavily sedate throughout the shift to calm pt and maintain ventilator synchrony. Updated family about pts condition and plan of care. Hr and bp fluctuate throughout the shift.

## 2025-02-17 ENCOUNTER — APPOINTMENT (OUTPATIENT)
Dept: GENERAL RADIOLOGY | Facility: HOSPITAL | Age: 65
DRG: 207 | End: 2025-02-17
Payer: COMMERCIAL

## 2025-02-17 LAB
ANION GAP SERPL CALCULATED.3IONS-SCNC: 8 MMOL/L (ref 5–15)
ARTERIAL PATENCY WRIST A: POSITIVE
ATMOSPHERIC PRESS: 755 MMHG
BASE EXCESS BLDA CALC-SCNC: 9.3 MMOL/L (ref 0–2)
BDY SITE: ABNORMAL
BUN SERPL-MCNC: 25 MG/DL (ref 8–23)
BUN/CREAT SERPL: 86.2 (ref 7–25)
CALCIUM SPEC-SCNC: 8.8 MG/DL (ref 8.6–10.5)
CHLORIDE SERPL-SCNC: 103 MMOL/L (ref 98–107)
CO2 BLDA-SCNC: >32.45 MMOL/L (ref 23–27)
CO2 SERPL-SCNC: 28 MMOL/L (ref 22–29)
CREAT SERPL-MCNC: 0.29 MG/DL (ref 0.57–1)
DEPRECATED RDW RBC AUTO: 42.4 FL (ref 37–54)
DEVICE COMMENT: ABNORMAL
EGFRCR SERPLBLD CKD-EPI 2021: 119.6 ML/MIN/1.73
ERYTHROCYTE [DISTWIDTH] IN BLOOD BY AUTOMATED COUNT: 12 % (ref 12.3–15.4)
GLUCOSE BLDC GLUCOMTR-MCNC: 166 MG/DL (ref 70–130)
GLUCOSE BLDC GLUCOMTR-MCNC: 186 MG/DL (ref 70–130)
GLUCOSE BLDC GLUCOMTR-MCNC: 195 MG/DL (ref 70–130)
GLUCOSE BLDC GLUCOMTR-MCNC: 197 MG/DL (ref 70–130)
GLUCOSE SERPL-MCNC: 222 MG/DL (ref 65–99)
HCO3 BLDA-SCNC: 32.9 MMOL/L (ref 22–28)
HCT VFR BLD AUTO: 37.2 % (ref 34–46.6)
HEMODILUTION: NO
HGB BLD-MCNC: 12 G/DL (ref 12–15.9)
INHALED O2 CONCENTRATION: 50 %
MCH RBC QN AUTO: 30.5 PG (ref 26.6–33)
MCHC RBC AUTO-ENTMCNC: 32.3 G/DL (ref 31.5–35.7)
MCV RBC AUTO: 94.7 FL (ref 79–97)
MODALITY: ABNORMAL
O2 A-A PPRESDIFF RESPIRATORY: 0.4 MMHG
PCO2 BLDA: 40 MM HG (ref 35–45)
PEEP RESPIRATORY: 5 CM[H2O]
PH BLDA: 7.52 PH UNITS (ref 7.35–7.45)
PLATELET # BLD AUTO: 215 10*3/MM3 (ref 140–450)
PMV BLD AUTO: 10.8 FL (ref 6–12)
PO2 BLD: 282 MM[HG] (ref 0–500)
PO2 BLDA: 141.1 MM HG (ref 80–100)
POTASSIUM SERPL-SCNC: 4.5 MMOL/L (ref 3.5–5.2)
RBC # BLD AUTO: 3.93 10*6/MM3 (ref 3.77–5.28)
SAO2 % BLDCOA: 99.4 % (ref 92–98.5)
SET MECH RESP RATE: 14
SODIUM SERPL-SCNC: 139 MMOL/L (ref 136–145)
TOTAL RATE: 20 BREATHS/MINUTE
TRIGL SERPL-MCNC: 124 MG/DL (ref 0–150)
VENTILATOR MODE: AC
VT ON VENT VENT: 375 ML
WBC NRBC COR # BLD AUTO: 13.47 10*3/MM3 (ref 3.4–10.8)

## 2025-02-17 PROCEDURE — 82803 BLOOD GASES ANY COMBINATION: CPT

## 2025-02-17 PROCEDURE — 82948 REAGENT STRIP/BLOOD GLUCOSE: CPT

## 2025-02-17 PROCEDURE — 94003 VENT MGMT INPAT SUBQ DAY: CPT

## 2025-02-17 PROCEDURE — 25010000002 PIPERACILLIN SOD-TAZOBACTAM PER 1 G: Performed by: INTERNAL MEDICINE

## 2025-02-17 PROCEDURE — 85027 COMPLETE CBC AUTOMATED: CPT | Performed by: INTERNAL MEDICINE

## 2025-02-17 PROCEDURE — 25010000002 PROPOFOL 10 MG/ML EMULSION: Performed by: INTERNAL MEDICINE

## 2025-02-17 PROCEDURE — 63710000001 INSULIN REGULAR HUMAN PER 5 UNITS: Performed by: INTERNAL MEDICINE

## 2025-02-17 PROCEDURE — 25010000002 METHYLPREDNISOLONE PER 125 MG: Performed by: INTERNAL MEDICINE

## 2025-02-17 PROCEDURE — 63710000001 INSULIN REGULAR HUMAN PER 5 UNITS

## 2025-02-17 PROCEDURE — 94799 UNLISTED PULMONARY SVC/PX: CPT

## 2025-02-17 PROCEDURE — 94760 N-INVAS EAR/PLS OXIMETRY 1: CPT

## 2025-02-17 PROCEDURE — 80048 BASIC METABOLIC PNL TOTAL CA: CPT | Performed by: INTERNAL MEDICINE

## 2025-02-17 PROCEDURE — 36600 WITHDRAWAL OF ARTERIAL BLOOD: CPT

## 2025-02-17 PROCEDURE — 94664 DEMO&/EVAL PT USE INHALER: CPT

## 2025-02-17 PROCEDURE — 84478 ASSAY OF TRIGLYCERIDES: CPT | Performed by: INTERNAL MEDICINE

## 2025-02-17 PROCEDURE — 94761 N-INVAS EAR/PLS OXIMETRY MLT: CPT

## 2025-02-17 PROCEDURE — 71045 X-RAY EXAM CHEST 1 VIEW: CPT

## 2025-02-17 RX ORDER — ARFORMOTEROL TARTRATE 15 UG/2ML
15 SOLUTION RESPIRATORY (INHALATION)
Status: DISCONTINUED | OUTPATIENT
Start: 2025-02-17 | End: 2025-02-25 | Stop reason: HOSPADM

## 2025-02-17 RX ORDER — BUDESONIDE 0.5 MG/2ML
0.5 INHALANT ORAL
Status: DISCONTINUED | OUTPATIENT
Start: 2025-02-17 | End: 2025-02-25 | Stop reason: HOSPADM

## 2025-02-17 RX ORDER — MORPHINE SULFATE 20 MG/ML
10 SOLUTION ORAL EVERY 8 HOURS
Status: DISPENSED | OUTPATIENT
Start: 2025-02-17 | End: 2025-02-22

## 2025-02-17 RX ADMIN — INSULIN HUMAN 2 UNITS: 100 INJECTION, SOLUTION PARENTERAL at 06:24

## 2025-02-17 RX ADMIN — IPRATROPIUM BROMIDE AND ALBUTEROL SULFATE 3 ML: .5; 3 SOLUTION RESPIRATORY (INHALATION) at 15:09

## 2025-02-17 RX ADMIN — Medication 0.5 MCG/KG/MIN: at 16:25

## 2025-02-17 RX ADMIN — ARFORMOTEROL TARTRATE 15 MCG: 15 SOLUTION RESPIRATORY (INHALATION) at 20:27

## 2025-02-17 RX ADMIN — BUDESONIDE 0.5 MG: 0.5 INHALANT RESPIRATORY (INHALATION) at 11:40

## 2025-02-17 RX ADMIN — IPRATROPIUM BROMIDE AND ALBUTEROL SULFATE 3 ML: .5; 3 SOLUTION RESPIRATORY (INHALATION) at 07:48

## 2025-02-17 RX ADMIN — METHYLPREDNISOLONE SODIUM SUCCINATE 80 MG: 125 INJECTION, POWDER, FOR SOLUTION INTRAMUSCULAR; INTRAVENOUS at 17:57

## 2025-02-17 RX ADMIN — APIXABAN 5 MG: 5 TABLET, FILM COATED ORAL at 20:31

## 2025-02-17 RX ADMIN — PIPERACILLIN AND TAZOBACTAM 3.38 G: 3; .375 INJECTION, POWDER, FOR SOLUTION INTRAVENOUS at 01:05

## 2025-02-17 RX ADMIN — HYDROCODONE BITARTRATE AND ACETAMINOPHEN 1 TABLET: 5; 325 TABLET ORAL at 13:27

## 2025-02-17 RX ADMIN — Medication 10 ML: at 20:31

## 2025-02-17 RX ADMIN — APIXABAN 5 MG: 5 TABLET, FILM COATED ORAL at 08:53

## 2025-02-17 RX ADMIN — METHYLPREDNISOLONE SODIUM SUCCINATE 80 MG: 125 INJECTION, POWDER, FOR SOLUTION INTRAMUSCULAR; INTRAVENOUS at 10:39

## 2025-02-17 RX ADMIN — PIPERACILLIN AND TAZOBACTAM 3.38 G: 3; .375 INJECTION, POWDER, FOR SOLUTION INTRAVENOUS at 08:52

## 2025-02-17 RX ADMIN — DEXMEDETOMIDINE HYDROCHLORIDE 1.1 MCG/KG/HR: 400 INJECTION INTRAVENOUS at 22:40

## 2025-02-17 RX ADMIN — PIPERACILLIN AND TAZOBACTAM 3.38 G: 3; .375 INJECTION, POWDER, FOR SOLUTION INTRAVENOUS at 17:08

## 2025-02-17 RX ADMIN — MORPHINE SULFATE 10 MG: 20 SOLUTION ORAL at 17:57

## 2025-02-17 RX ADMIN — IPRATROPIUM BROMIDE AND ALBUTEROL SULFATE 3 ML: .5; 3 SOLUTION RESPIRATORY (INHALATION) at 20:35

## 2025-02-17 RX ADMIN — Medication 10 ML: at 09:01

## 2025-02-17 RX ADMIN — LANSOPRAZOLE 30 MG: 15 TABLET, ORALLY DISINTEGRATING ORAL at 09:50

## 2025-02-17 RX ADMIN — IPRATROPIUM BROMIDE AND ALBUTEROL SULFATE 3 ML: .5; 3 SOLUTION RESPIRATORY (INHALATION) at 11:33

## 2025-02-17 RX ADMIN — PROPOFOL 25 MCG/KG/MIN: 10 INJECTION, EMULSION INTRAVENOUS at 11:53

## 2025-02-17 RX ADMIN — BUDESONIDE 0.5 MG: 0.5 INHALANT RESPIRATORY (INHALATION) at 20:25

## 2025-02-17 RX ADMIN — SENNOSIDES AND DOCUSATE SODIUM 2 TABLET: 50; 8.6 TABLET ORAL at 20:31

## 2025-02-17 RX ADMIN — INSULIN HUMAN 3 UNITS: 100 INJECTION, SOLUTION PARENTERAL at 00:33

## 2025-02-17 RX ADMIN — DEXMEDETOMIDINE HYDROCHLORIDE 1.5 MCG/KG/HR: 400 INJECTION INTRAVENOUS at 04:57

## 2025-02-17 RX ADMIN — PROPOFOL 20 MCG/KG/MIN: 10 INJECTION, EMULSION INTRAVENOUS at 22:40

## 2025-02-17 RX ADMIN — POLYETHYLENE GLYCOL 3350 17 G: 17 POWDER, FOR SOLUTION ORAL at 08:52

## 2025-02-17 RX ADMIN — INSULIN HUMAN 2 UNITS: 100 INJECTION, SOLUTION PARENTERAL at 11:53

## 2025-02-17 RX ADMIN — ARFORMOTEROL TARTRATE 15 MCG: 15 SOLUTION RESPIRATORY (INHALATION) at 11:39

## 2025-02-17 RX ADMIN — MUPIROCIN 1 APPLICATION: 20 OINTMENT TOPICAL at 08:52

## 2025-02-17 RX ADMIN — MUPIROCIN 1 APPLICATION: 20 OINTMENT TOPICAL at 20:31

## 2025-02-17 RX ADMIN — CHLORHEXIDINE GLUCONATE 15 ML: 1.2 RINSE ORAL at 09:51

## 2025-02-17 RX ADMIN — METHYLPREDNISOLONE SODIUM SUCCINATE 80 MG: 125 INJECTION, POWDER, FOR SOLUTION INTRAMUSCULAR; INTRAVENOUS at 02:03

## 2025-02-17 RX ADMIN — INSULIN HUMAN 2 UNITS: 100 INJECTION, SOLUTION PARENTERAL at 17:56

## 2025-02-17 RX ADMIN — DEXMEDETOMIDINE HYDROCHLORIDE 1.1 MCG/KG/HR: 400 INJECTION INTRAVENOUS at 16:31

## 2025-02-17 RX ADMIN — SENNOSIDES AND DOCUSATE SODIUM 2 TABLET: 50; 8.6 TABLET ORAL at 08:52

## 2025-02-17 RX ADMIN — HYDROCODONE BITARTRATE AND ACETAMINOPHEN 1 TABLET: 5; 325 TABLET ORAL at 21:18

## 2025-02-17 RX ADMIN — CHLORHEXIDINE GLUCONATE 15 ML: 1.2 RINSE ORAL at 20:31

## 2025-02-17 NOTE — PLAN OF CARE
Goal Outcome Evaluation:  Plan of Care Reviewed With: patient, family        Progress: no change    Pt remains sedated and on ventilator (FiO2 decreased from 60 to 50% and pt able to overbreathe the ventilator). Start of shift pt very sedated and challenging to perform neuro assessment- Dr. Tracy at bedside advised titrating down sedation. Fentanyl gtt weaned off with initiation of PRN Norco. Pt unable to pass SBT due to pulling low tidal volumes. Around noon as sedation was being weaned lower (Propofol 5 mcg/kg/min & Fentanyl 50mcg/hr) to maintain RASS score and significant other was at bedside, pt's HR increased to 140-150 bpm and sustained. Pt was also in respiratory distress breathing 30-40 breaths per minute and using accessory muscles. STAT EKG obtained and sedation increased to maintain RASS score. Breathing and HR improved. Pt at the time was able to squeeze both hands, only. Dr. Tracy updated and no new orders on EKG results. Bilateral soft wrist restraints in place to prevent pulling out ETT tube. Tolerates TF and minimal residuals. Abdomen remains slightly distended and suppository given. Pt voids in Purewick. Family at bedside. Ongoing care provided.

## 2025-02-17 NOTE — PROGRESS NOTES
[facility] INPATIENT PROGRESS NOTE         [facility] INTENSIVE CARE    2025      PATIENT IDENTIFICATION:  Name: Scarlet Chakraborty ADMIT: 2/10/2025   : 1960  PCP: Flavia Jaquez APRN    MRN: [id number] LOS: 7 days   AGE/SEX: 65 y.o. female  ROOM: [facility]                     LOS 7    Reason for visit: respiratory failure, COPD and critical care management.       SUBJECTIVE:      Will try spontaneous breathing trial again today.  She has not done well with previous trials.  Discussed with family at bedside and expressed that I think that she is not likely going to survive.  Consulting palliative care service to help with goals of care.      Objective   OBJECTIVE:    Vital Sign Min/Max for last 24 hours  Temp  Min: 97.3 °F (36.3 °C)  Max: 99.7 °F (37.6 °C)   BP  Min: 82/67  Max: 157/63   Pulse  Min: 70  Max: 150   Resp  Min: 16  Max: 28   SpO2  Min: 91 %  Max: 100 %   No data recorded   Weight  Min: 39.7 kg (87 lb 8.4 oz)  Max: 39.7 kg (87 lb 8.4 oz)    Vitals:    25 0645 25 0700 25 0748 25 0756   BP: 123/72 125/64     Pulse: 97 101 105 114   Resp:   28 25   Temp: 99.5 °F (37.5 °C) 99.5 °F (37.5 °C) 99.7 °F (37.6 °C) 99.7 °F (37.6 °C)   TempSrc:       SpO2: 96% 96% 98% 95%   Weight:       Height:                02/15/25  0515 25  0600 25  0230   Weight: 37.8 kg (83 lb 5.3 oz) 41.5 kg (91 lb 7.9 oz) 39.7 kg (87 lb 8.4 oz)       Body mass index is 17.09 kg/m².        Mode: VC+/AC  FiO2 (%):  [40 %-100 %] 40 %  S RR:  [14] 14  S VT:  [375 mL] 375 mL  PEEP/CPAP (cm H2O):  [5 cm H20] 5 cm H20  WA SUP:  [5 cm H20-10 cm H20] 10 cm H20  MAP (cm H2O):  [8-14] 13           FiO2 (%): 40 %     Body mass index is 17.09 kg/m².    Intake/Output Summary (Last 24 hours) at 2025 0916  Last data filed at 2025 0500  Gross per 24 hour   Intake 1759.22 ml   Output 770 ml   Net 989.22 ml         Exam:  GEN:  No distress, appears stated age  EYES:   PERRL,  anicteric sclerae  ENT:    External ears/nose normal, OP clear  NECK:  No adenopathy, midline trachea  LUNGS: Normal chest on inspection, palpation and diminished bilateral breath sounds on auscultation  CV:  Normal S1S2, without murmur  ABD:  Nontender, nondistended, no hepatosplenomegaly, +BS  EXT:  No edema.  No cyanosis or clubbing.  No mottling and normal cap refill.    Assessment     Scheduled meds:  apixaban, 5 mg, Oral, Q12H  chlorhexidine, 15 mL, Mouth/Throat, Q12H  insulin regular, 2-7 Units, Subcutaneous, Q6H  ipratropium-albuterol, 3 mL, Nebulization, 4x Daily - RT  lansoprazole, 30 mg, Nasogastric, QAM AC  methylPREDNISolone sodium succinate, 80 mg, Intravenous, Q8H  mupirocin, 1 Application, Each Nare, BID  piperacillin-tazobactam, 3.375 g, Intravenous, Q8H  senna-docusate sodium, 2 tablet, Oral, BID  sodium chloride, 10 mL, Intravenous, Q12H      IV meds:                      dexmedetomidine, 0.2-1.5 mcg/kg/hr, Last Rate: 1.1 mcg/kg/hr (02/17/25 0530)  fentanyl 10 mcg/mL,  mcg/hr, Last Rate: Stopped (02/16/25 1802)  phenylephrine, 0.5-3 mcg/kg/min, Last Rate: Stopped (02/16/25 0946)  propofol, 5-50 mcg/kg/min, Last Rate: 15 mcg/kg/min (02/17/25 0615)      Data Review:  Results from last 7 days   Lab Units 02/17/25  0422 02/16/25  0402 02/15/25  0425 02/14/25  0840 02/14/25  0426 02/13/25  0442   SODIUM mmol/L 139 135* 142 145  --  138   POTASSIUM mmol/L 4.5 4.1 4.5 4.9  --  4.2   CHLORIDE mmol/L 103 104 108* 106  --  104   CO2 mmol/L 28.0 27.3 28.0 33.0*  --  26.5   BUN mg/dL 25* 23 28* 31*  --  29*   CREATININE mg/dL 0.29* 0.22* 0.31* 0.39* 0.52* 0.34*   GLUCOSE mg/dL 222* 173* 196* 178*  --  166*   CALCIUM mg/dL 8.8 7.4* 9.0 10.0  --  9.4         Estimated Creatinine Clearance: 122.8 mL/min (A) (by C-G formula based on SCr of 0.29 mg/dL (L)).  Results from last 7 days   Lab Units 02/17/25  0422 02/16/25  0402 02/15/25  0425 02/14/25  0448 02/14/25  0426 02/13/25  0442   WBC 10*3/mm3 13.47*  12.40* 10.40 10.71  --  8.74   HEMOGLOBIN g/dL 12.0 12.6 13.2 13.1  --  12.5   HEMOGLOBIN, POC g/dL  --   --   --   --  15.7  --    PLATELETS 10*3/mm3 215 205 258 254  --  239     Results from last 7 days   Lab Units 02/10/25  1547   INR  1.04     Results from last 7 days   Lab Units 02/10/25  1547   ALT (SGPT) U/L 16   AST (SGOT) U/L 24     Results from last 7 days   Lab Units 02/17/25  0351 02/16/25  0345 02/15/25  1045 02/14/25  0509 02/14/25  0426 02/13/25  0850 02/11/25  0722   PH, ARTERIAL pH units 7.523* 7.449 7.408 7.376 7.288* 7.343* 7.389   PO2 ART mm Hg 141.1* 87.7 71.0* 165.5* 70.5* 57.0* 56.2*   PCO2, ARTERIAL mm Hg 40.0 50.2* 53.2* 62.7* 76.3* 64.1* 45.1*   HCO3 ART mmol/L 32.9* 34.8* 33.6* 36.8* 36.5* 34.8* 27.2     Results from last 7 days   Lab Units 02/14/25  0426 02/10/25  1547   PROCALCITONIN ng/mL  --  0.14   LACTATE mmol/L 1.0  --          Glucose   Date/Time Value Ref Range Status   02/17/2025 0552 197 (H) 70 - 130 mg/dL Final   02/16/2025 2333 226 (H) 70 - 130 mg/dL Final   02/16/2025 1807 171 (H) 70 - 130 mg/dL Final   02/16/2025 1120 237 (H) 70 - 130 mg/dL Final   02/16/2025 0643 199 (H) 70 - 130 mg/dL Final   02/16/2025 0040 253 (H) 70 - 130 mg/dL Final   02/15/2025 1748 215 (H) 70 - 130 mg/dL Final         Imaging reviewed  Chest x-ray 2/17 reviewed          Microbiology reviewed            Active Hospital Problems    Diagnosis  POA    **Acute hypercapnic respiratory failure [J96.02]  Yes      Resolved Hospital Problems   No resolved problems to display.         ASSESSMENT:  End-stage COPD, FEV1 17% on Irvin 11/9/23 with current exacerbation  Acute hypoxic and hypercapnic respiratory failure  Mucous plugging, s/p bronchoscopy 2/14/2025  Sinus tachycardia  Seasonal coronavirus infection: OC43  COPD cachexia  Hypotension, secondary to sedation and positive pressure ventilation  Ischemic cardiomyopathy  History of CVA, on Eliquis  History of Takotsubo cardiomyopathy      PLAN:  Plan Daily  sedation vacation and spontaneous breathing trials.  Has failed multiple days.  Off pressors this morning.  Tolerating tube feeds.  Scheduled and as needed bronchodilators and systemic steroids for COPD exacerbation.  Increasing infiltrate left lower showing progressive pneumonia.  On antibiotics with Rocephin.  Changed to Zosyn 2/16.  Treatment for viral process is supportive.  On DOAC.  Ulcer prophylaxis.  Discussed with family at bedside.  Discussed with them prognosis and suspect poor outcome.  Consulting palliative care to help with goals of care.  Adding scheduled morphine for severe dyspnea symptoms.    Discussed with multidisciplinary ICU team on rounds this morning.        CCT: 38 min    Holden Tracy MD  Pulmonary and Critical Care Medicine  [facility], [facility]  2/17/2025    09:16 EST

## 2025-02-17 NOTE — CONSULTS
Purpose of the visit was to evaluate for: goals of care/advanced care planning and support for patient/family. Spoke with RN as well as family and discussed palliative care, goals of care, care options, and resuscitation status.        Assessment:  Patient is palliative care appropriate for inpatient care given end-stage COPD with current exacerbation, mucous plugging, sinus tachycardia, seasonal coronavirus infection, COPD cachexia, hypotension, ischemic cardiomyopathy, and acute hypoxic and hypercapnic respiratory failure necessitating mechanical ventilation. Patient has been repeatedly unsuccessful with spontaneous breathing trial. She is restless. PPS 30%       Cultural and spiritual needs have been assessed. Patient is Taoism. Patient's sister plans to contact their Confucianist for a .       Recommendations/Plan: No changes to goals of care at this time.       Other Comments: I visited with patient's sister, Yvonne. We discussed goals of care at length including trach/PEG, bipap, and comfort measures. Yvonne explained that nobody had ever had these conversations with her sister, and she wants to be able to talk with the patient. We discussed why this will be unlikely. At the end of our conversation, Yvonne shared that the patient has a living mother and father. Per Yvonne, the mother has vascular dementia, but that she and her father live on their own. I explained to her that we will need to discuss things with her father and that a meeting with the entire family will be beneficial. Yvonne is in agreement and plans to bring her father, brother, and the patient's significant other to the hospital to re-discuss goals of care. Palliative care will continue to follow.

## 2025-02-17 NOTE — CONSULTS
Nutrition Services    Patient Name:  Scarlet Chakraborty  YOB: 1960  MRN: [id number]  Admit Date:  2/10/2025  Assessment Date:  02/17/25    Comment: Follow up    Current TF's: IsoSource HN at 50ml/hr and water flushes [id number].   Pt tolerating TF's at goal rate.  No BM yet, on bowel regimen and RN to give suppository today  Continuous meds: precedes propofol 5.06(133kcals)  Family met with palliative RN to discuss goals of care.    Plan/Recommendation  TF's with IsoSource HN at 50ml/hr and water [id number].  Calories  1320 kcals plus propofol (100%)    Protein  59 g (100%)    Free water  891 mL   Flushes  180   The above end goal rate is for 22 hrs/day to assume interruptions for ADLs.   Water flushes adjusted based on clinical picture + Rx flushes/IV fluids     RD to follow    CLINICAL NUTRITION ASSESSMENT      Reason for Assessment Follow-up Protocol   Diagnosis/Problem Respiratory failure on the [facility]   Medical & Surgical Hx Past Medical History:   Diagnosis Date    Asthma     Cerebrovascular accident (CVA) due to thrombosis of right middle cerebral artery     COPD (chronic obstructive pulmonary disease)     Nonischemic cardiomyopathy     NSTEMI (non-ST elevated myocardial infarction)     Stroke     Takotsubo cardiomyopathy     Tobacco abuse        Past Surgical History:   Procedure Laterality Date    CARDIAC CATHETERIZATION N/A 9/13/2018    Procedure: Left Heart Cath and cors;  Surgeon: Gabino Dozier MD;  Location: [facility] CATH INVASIVE LOCATION;  Service: Cardiology    CARDIAC CATHETERIZATION N/A 9/13/2018    Procedure: Left ventriculography;  Surgeon: Gabino Dozier MD;  Location: [facility] CATH INVASIVE LOCATION;  Service: Cardiology    CARDIAC ELECTROPHYSIOLOGY PROCEDURE N/A 9/27/2018    Procedure: Loop insertion  LINQ;  Surgeon: Jose R Barker MD;  Location: [facility] CATH INVASIVE LOCATION;  Service: Cardiovascular        Current Problems npo     Encounter Information        Nutrition History   "  Food Preferences    Supplements    Factors Affecting Intake altered respiratory status         Anthropometrics        Current Height   Current Weight  BMI kg/m2 Height: 152.4 cm (60\")  Weight: 39.7 kg (87 lb 8.4 oz) (02/17/25 0230)  Body mass index is 17.09 kg/m².     Adj BMI (if applicable)    BMI Category Underweight (18.4 or below)       Admission Weight    Ideal Body Weight (IBW) 111lb     Adj IBW (if applicable)    Usual Body Weight (UBW) unknown   Weight Change/Trend Unknown       Weight history: Wt Readings from Last 30 Encounters:   02/17/25 0230 39.7 kg (87 lb 8.4 oz)   02/16/25 0600 41.5 kg (91 lb 7.9 oz)   02/15/25 0515 37.8 kg (83 lb 5.3 oz)   02/10/25 2230 33.7 kg (74 lb 4.7 oz)   03/20/24 0537 31.8 kg (70 lb 1.6 oz)   03/17/24 0955 34 kg (75 lb)   03/16/24 1212 34 kg (75 lb)   02/19/24 1508 34.3 kg (75 lb 9.6 oz)   08/03/23 1309 35.4 kg (78 lb)   01/16/23 1323 39 kg (86 lb)   01/12/22 1331 36.3 kg (80 lb)   03/22/21 0618 37.8 kg (83 lb 6.4 oz)   03/21/21 0518 38.9 kg (85 lb 12.8 oz)   03/20/21 0537 39.7 kg (87 lb 9.6 oz)   03/19/21 0500 46 kg (101 lb 6.4 oz)   03/18/21 0546 40.9 kg (90 lb 2.7 oz)   03/17/21 1855 42.2 kg (93 lb)   03/17/21 0500 42.4 kg (93 lb 7.6 oz)   03/16/21 0500 43.3 kg (95 lb 7.4 oz)   03/15/21 0500 42.7 kg (94 lb 2.2 oz)   03/14/21 0445 42.5 kg (93 lb 11.1 oz)   03/13/21 1412 40.8 kg (89 lb 15.2 oz)   03/13/21 0420 40.8 kg (89 lb 15.2 oz)   03/13/21 0108 40.3 kg (88 lb 13.5 oz)   03/12/21 2210 45.4 kg (100 lb)   01/20/21 1431 41.3 kg (91 lb)   12/15/20 1202 41.7 kg (92 lb)   06/15/20 1122 44.5 kg (98 lb)   12/12/19 1339 48.3 kg (106 lb 6.4 oz)   10/24/19 1001 44.9 kg (99 lb)   10/11/19 1422 45.3 kg (99 lb 12.8 oz)   10/03/19 0404 43.4 kg (95 lb 10.9 oz)   10/02/19 0500 45.3 kg (99 lb 13.9 oz)   10/01/19 0952 45.5 kg (100 lb 5 oz)   10/01/19 0600 45.5 kg (100 lb 5 oz)   09/30/19 1131 42.2 kg (93 lb)   09/30/19 0227 42.3 kg (93 lb 4.1 oz)   09/30/19 0022 49.2 kg (108 lb 8 oz) "   09/19/19 1308 45.3 kg (99 lb 12.8 oz)   09/10/19 0512 46.3 kg (102 lb)   06/03/19 1248 46 kg (101 lb 6.4 oz)   02/11/19 1509 46.3 kg (102 lb)   12/18/18 0810 45.8 kg (101 lb)   11/07/18 1314 44.5 kg (98 lb)   10/10/18 1353 44.5 kg (98 lb)   09/29/18 0533 42.8 kg (94 lb 5.7 oz)   09/28/18 0524 42.8 kg (94 lb 5.7 oz)   09/26/18 0500 43.3 kg (95 lb 7.4 oz)   09/25/18 1400 43.5 kg (96 lb)   09/25/18 1056 43.6 kg (96 lb 1.9 oz)   09/25/18 0600 43.6 kg (96 lb 1.9 oz)   09/24/18 1055 44.7 kg (98 lb 8 oz)   09/17/18 0500 44.5 kg (98 lb)   09/16/18 0621 45.9 kg (101 lb 4.8 oz)   09/15/18 0600 44.4 kg (97 lb 15.9 oz)   09/13/18 1227 43.5 kg (96 lb)        Estimated/Assessed Needs        Energy Requirements    Weight for Calculation 50.4   Method for Estimation  25-30 kcal/kg   EST Needs (kcal/day) 4480-5735       Protein Requirements    Weight for Calculation 50.4   EST Protein Needs (g/kg) 1.2 - 1.5 gm/kg   EST Daily Needs (g/day) 60-75       Fluid Requirements     Method for Estimation 1 mL/kcal    Estimated Needs (mL/day)          Labs        Pertinent Labs    Results from last 7 days   Lab Units 02/17/25  0422 02/16/25  0402 02/15/25  0425 02/10/25  2054 02/10/25  1547   SODIUM mmol/L 139 135* 142   < > 140   POTASSIUM mmol/L 4.5 4.1 4.5   < > 4.7   CHLORIDE mmol/L 103 104 108*   < > 105   CO2 mmol/L 28.0 27.3 28.0   < > 23.0   BUN mg/dL 25* 23 28*   < > 18   CREATININE mg/dL 0.29* 0.22* 0.31*   < > 0.51*   CALCIUM mg/dL 8.8 7.4* 9.0   < > 9.5   BILIRUBIN mg/dL  --   --   --   --  0.3   ALK PHOS U/L  --   --   --   --  70   ALT (SGPT) U/L  --   --   --   --  16   AST (SGOT) U/L  --   --   --   --  24   GLUCOSE mg/dL 222* 173* 196*   < > 191*    < > = values in this interval not displayed.     Results from last 7 days   Lab Units 02/17/25  0422 02/10/25  2054 02/10/25  1547   HEMOGLOBIN g/dL 12.0   < > 14.7   HEMOGLOBIN, POC   --    < >  --    HEMATOCRIT % 37.2   < > 46.3   HEMATOCRIT POC   --    < >  --    WBC 10*3/mm3  13.47*   < > 10.46   TRIGLYCERIDES mg/dL 124   < >  --    ALBUMIN g/dL  --   --  4.2    < > = values in this interval not displayed.     Results from last 7 days   Lab Units 02/17/25  0422 02/16/25  0402 02/15/25  0425 02/14/25  0448 02/13/25  0442 02/10/25  2054 02/10/25  1547   INR   --   --   --   --   --   --  1.04   PLATELETS 10*3/mm3 215 205 258 254 239   < > 253    < > = values in this interval not displayed.     COVID19   Date Value Ref Range Status   02/10/2025 Not Detected Not Detected - Ref. Range Final     Lab Results   Component Value Date    HGBA1C 5.5 11/01/2023          Medications            Scheduled Medications apixaban, 5 mg, Oral, Q12H  arformoterol, 15 mcg, Nebulization, BID - RT  budesonide, 0.5 mg, Nebulization, BID - RT  chlorhexidine, 15 mL, Mouth/Throat, Q12H  insulin regular, 2-9 Units, Subcutaneous, Q6H  ipratropium-albuterol, 3 mL, Nebulization, 4x Daily - RT  lansoprazole, 30 mg, Nasogastric, QAM AC  methylPREDNISolone sodium succinate, 80 mg, Intravenous, Q8H  morphine, 10 mg, Oral, Q8H  mupirocin, 1 Application, Each Nare, BID  piperacillin-tazobactam, 3.375 g, Intravenous, Q8H  senna-docusate sodium, 2 tablet, Oral, BID  sodium chloride, 10 mL, Intravenous, Q12H        Infusions dexmedetomidine, 0.2-1.5 mcg/kg/hr, Last Rate: 1.1 mcg/kg/hr (02/17/25 0530)  fentanyl 10 mcg/mL,  mcg/hr, Last Rate: Stopped (02/16/25 1802)  phenylephrine, 0.5-3 mcg/kg/min, Last Rate: Stopped (02/17/25 1039)  propofol, 5-50 mcg/kg/min, Last Rate: 25 mcg/kg/min (02/17/25 1153)        PRN Medications   acetaminophen    acetaminophen    senna-docusate sodium **AND** polyethylene glycol **AND** bisacodyl **AND** bisacodyl    dextrose    dextrose    glucagon (human recombinant)    HYDROcodone-acetaminophen    nitroglycerin    sodium chloride    sodium chloride     Physical Findings          Physical Appearance frail, loss of muscle mass, loss of subcutaneous fat, underweight, ventilator support    Oral/Mouth Cavity WDL   Edema  no edema   Gastrointestinal constipation, hypoactive bowel sounds   Skin  bruising   Tubes/Drains/Lines Cortrak, NG tube   NFPE See Malnutrition Severity Assessment, Date Completed: 2/12   --  Malnutrition Severity Assessment      Patient meets criteria for : Severe Malnutrition       Current Nutrition Orders & Evaluation of Intake       Oral Nutrition     Food Allergies NKFA   Current PO Diet NPO Diet NPO Type: Strict NPO   Supplement    PO Evaluation     Trending % PO Intake    --   Enteral Nutrition     Enteral Route NG    TF Delivery Method Continuous    Propofol Rate/Kcal 5.06(133kcals)    Current TF Order/Rate  Isosource HN @ 50 mL/hr    TF Goal Rate 50 mL/hr    Current Water Flush 30 mL Q 4 hr    Modular None    TF Residual  no or minimal residual    TF Tolerance tolerating    TF Observation Verified correct TF and water flush infusing per orders     Nutrition Diagnosis        Nutrition Dx Problem 1 Problem: Inadequate Oral Intake  Etiology: Medical Diagnosis - respiratory failure  Signs/Symptoms: NPO    Comment:      INTERVENTION / PLAN OF CARE  Intervention Goal        Intervention Goal(s) Reduce/improve symptoms, Disease management/therapy, Tolerate TF/PN at goal, and No significant weight loss     Nutrition Intervention        RD Action Continue to monitor, Care plan reviewed, and Recommend/order: TF's     Prescription         Diet     Supplement/Snack    EN/PN     Prescription Ordered       Enteral Prescription:     Enteral Route NG    TF Delivery Method Continuous    Enteral Product Isosource HN    Modular None    Propofol Rate/Kcal yes    TF Start Rate 10    TF Goal Rate 50    Free Water Flush [id number]    TF Provision at Goal: 1320 kcal plus propofol, 59 gm protein, 891 mL free water + 180 mL water flushes         Calories 100 % needs met         Protein  100 % needs met         Fluid (mL)     Prescription Ordered Yes     Monitor/Evaluation        Monitor Per protocol,  I&O, Pertinent labs, EN delivery/tolerance, Weight, Skin status, GI status, Symptoms, Swallow function, Hemodynamic stability   Discharge Plan Pending clinical course   Education Education not appropriate at this time     RD to follow per protocol.       Electronically signed by:  Malia Castillo RD  02/17/25 14:25 EST

## 2025-02-17 NOTE — NURSING NOTE
At about 0800, MD Tracy rounded on pt. RT was in the room and pt was flipped to SBT. Pt started breathing 30 times a minute and HR was in the 120s. MD said to switch pt back to ACVC rate on ventilator. MD spoke with pt family about care progression. Will continue to monitor. BISI Baeza    At about 1000, Malia with palliative care came to bedside to speak with pt family. Family meeting planned for tomorrow. BISI Baeza    At about 1200, pt started to become a bit more alert and pt was able to follow simple commands. GCS 11. Pt neuro status remained stable for the remainder of shift. BISI Baeza    Rn had to turn ordered humble gtt on and off throughout shift rt hypotension. See eMar. Pt got full CHG bath and linen change about 1600. Pt tolerated well. Will pass report on to PM shift shortly. BISI Baeza

## 2025-02-17 NOTE — PAYOR COMM NOTE
"Scarlet Chavez (65 y.o. Female)     PLEASE SEE ATTACHED FOR CONTINUED INPT STAY DAYS    REF #   [id number]    PLEASE CALL JOHN GRANDE RN/ DEPT @ [phone]   OR [phone]    THANK YOU  JOHN GRANDE RN  [facility]        Date of Birth   1960    Social Security Number       Address   [address] [place] [address]    Home Phone       MRN   [id number]       Muslim   [employer]    Marital Status   Single                            Admission Date   2/10/25    Admission Type   Emergency    Admitting Provider   Zainab Regalado MD    Attending Provider   Zainab Regalado MD    Department, Room/Bed   [facility] INTENSIVE CARE, I381/1       Discharge Date       Discharge Disposition       Discharge Destination                                 Attending Provider: Zainab Regalado MD    Allergies: No Known Allergies    Isolation: None   Infection: None   Code Status: CPR    Ht: 152.4 cm (60\")   Wt: 39.7 kg (87 lb 8.4 oz)    Admission Cmt: None   Principal Problem: Acute hypercapnic respiratory failure [J96.02]                   Active Insurance as of 2/10/2025       Primary Coverage       Payor Plan Insurance Group Employer/Plan Group    [employer] HMO [id number]       Payor Plan Address Payor Plan Phone Number Payor Plan Fax Number Effective Dates    [address] [phone]  1/1/2024 - None Entered    [facility] [id number]         Subscriber Name Subscriber Birth Date Member ID       SCARLET CHAVEZ 1960 [id number]                     Emergency Contacts        (Rel.) Home Phone Work Phone Mobile Phone    Yvonne Mccollum (Sister) [phone] -- --    Don Taylor (Significant Other) -- -- [phone]              [place]: NPI [id number]  Tax ID [id number]     Physician Progress Notes (last 72 hours)        Holden Tracy MD at 02/17/25 0753              [facility] INPATIENT PROGRESS NOTE         [facility] " [place] INTENSIVE CARE    2025      PATIENT IDENTIFICATION:  Name: Scarlet Chakraborty ADMIT: 2/10/2025   : 1960  PCP: Flavia Jaquez APRN    MRN: [id number] LOS: 7 days   AGE/SEX: 65 y.o. female  ROOM: [facility]                     LOS 7    Reason for visit: respiratory failure, COPD and critical care management.       SUBJECTIVE:      Will try spontaneous breathing trial again today.  She has not done well with previous trials.  Discussed with family at bedside and expressed that I think that she is not likely going to survive.  Consulting palliative care service to help with goals of care.      Objective   OBJECTIVE:    Vital Sign Min/Max for last 24 hours  Temp  Min: 97.3 °F (36.3 °C)  Max: 99.7 °F (37.6 °C)   BP  Min: 82/67  Max: 157/63   Pulse  Min: 70  Max: 150   Resp  Min: 16  Max: 28   SpO2  Min: 91 %  Max: 100 %   No data recorded   Weight  Min: 39.7 kg (87 lb 8.4 oz)  Max: 39.7 kg (87 lb 8.4 oz)    Vitals:    25 0645 25 0700 25 0748 25 0756   BP: 123/72 125/64     Pulse: 97 101 105 114   Resp:   28 25   Temp: 99.5 °F (37.5 °C) 99.5 °F (37.5 °C) 99.7 °F (37.6 °C) 99.7 °F (37.6 °C)   TempSrc:       SpO2: 96% 96% 98% 95%   Weight:       Height:                02/15/25  0515 25  0600 25  0230   Weight: 37.8 kg (83 lb 5.3 oz) 41.5 kg (91 lb 7.9 oz) 39.7 kg (87 lb 8.4 oz)       Body mass index is 17.09 kg/m².        Mode: VC+/AC  FiO2 (%):  [40 %-100 %] 40 %  S RR:  [14] 14  S VT:  [375 mL] 375 mL  PEEP/CPAP (cm H2O):  [5 cm H20] 5 cm H20  NV SUP:  [5 cm H20-10 cm H20] 10 cm H20  MAP (cm H2O):  [8-14] 13           FiO2 (%): 40 %     Body mass index is 17.09 kg/m².    Intake/Output Summary (Last 24 hours) at 2025 0916  Last data filed at 2025 0500  Gross per 24 hour   Intake 1759.22 ml   Output 770 ml   Net 989.22 ml         Exam:  GEN:  No distress, appears stated age  EYES:   PERRL, anicteric sclerae  ENT:    External ears/nose normal, OP  clear  NECK:  No adenopathy, midline trachea  LUNGS: Normal chest on inspection, palpation and diminished bilateral breath sounds on auscultation  CV:  Normal S1S2, without murmur  ABD:  Nontender, nondistended, no hepatosplenomegaly, +BS  EXT:  No edema.  No cyanosis or clubbing.  No mottling and normal cap refill.    Assessment     Scheduled meds:  apixaban, 5 mg, Oral, Q12H  chlorhexidine, 15 mL, Mouth/Throat, Q12H  insulin regular, 2-7 Units, Subcutaneous, Q6H  ipratropium-albuterol, 3 mL, Nebulization, 4x Daily - RT  lansoprazole, 30 mg, Nasogastric, QAM AC  methylPREDNISolone sodium succinate, 80 mg, Intravenous, Q8H  mupirocin, 1 Application, Each Nare, BID  piperacillin-tazobactam, 3.375 g, Intravenous, Q8H  senna-docusate sodium, 2 tablet, Oral, BID  sodium chloride, 10 mL, Intravenous, Q12H      IV meds:                      dexmedetomidine, 0.2-1.5 mcg/kg/hr, Last Rate: 1.1 mcg/kg/hr (02/17/25 0530)  fentanyl 10 mcg/mL,  mcg/hr, Last Rate: Stopped (02/16/25 1802)  phenylephrine, 0.5-3 mcg/kg/min, Last Rate: Stopped (02/16/25 0946)  propofol, 5-50 mcg/kg/min, Last Rate: 15 mcg/kg/min (02/17/25 0615)      Data Review:  Results from last 7 days   Lab Units 02/17/25  0422 02/16/25  0402 02/15/25  0425 02/14/25  0840 02/14/25  0426 02/13/25  0442   SODIUM mmol/L 139 135* 142 145  --  138   POTASSIUM mmol/L 4.5 4.1 4.5 4.9  --  4.2   CHLORIDE mmol/L 103 104 108* 106  --  104   CO2 mmol/L 28.0 27.3 28.0 33.0*  --  26.5   BUN mg/dL 25* 23 28* 31*  --  29*   CREATININE mg/dL 0.29* 0.22* 0.31* 0.39* 0.52* 0.34*   GLUCOSE mg/dL 222* 173* 196* 178*  --  166*   CALCIUM mg/dL 8.8 7.4* 9.0 10.0  --  9.4         Estimated Creatinine Clearance: 122.8 mL/min (A) (by C-G formula based on SCr of 0.29 mg/dL (L)).  Results from last 7 days   Lab Units 02/17/25  0422 02/16/25  0402 02/15/25  0425 02/14/25  0448 02/14/25  0426 02/13/25  0442   WBC 10*3/mm3 13.47* 12.40* 10.40 10.71  --  8.74   HEMOGLOBIN g/dL 12.0 12.6  13.2 13.1  --  12.5   HEMOGLOBIN, POC g/dL  --   --   --   --  15.7  --    PLATELETS 10*3/mm3 215 205 258 254  --  239     Results from last 7 days   Lab Units 02/10/25  1547   INR  1.04     Results from last 7 days   Lab Units 02/10/25  1547   ALT (SGPT) U/L 16   AST (SGOT) U/L 24     Results from last 7 days   Lab Units 02/17/25  0351 02/16/25  0345 02/15/25  1045 02/14/25  0509 02/14/25  0426 02/13/25  0850 02/11/25  0722   PH, ARTERIAL pH units 7.523* 7.449 7.408 7.376 7.288* 7.343* 7.389   PO2 ART mm Hg 141.1* 87.7 71.0* 165.5* 70.5* 57.0* 56.2*   PCO2, ARTERIAL mm Hg 40.0 50.2* 53.2* 62.7* 76.3* 64.1* 45.1*   HCO3 ART mmol/L 32.9* 34.8* 33.6* 36.8* 36.5* 34.8* 27.2     Results from last 7 days   Lab Units 02/14/25  0426 02/10/25  1547   PROCALCITONIN ng/mL  --  0.14   LACTATE mmol/L 1.0  --          Glucose   Date/Time Value Ref Range Status   02/17/2025 0552 197 (H) 70 - 130 mg/dL Final   02/16/2025 2333 226 (H) 70 - 130 mg/dL Final   02/16/2025 1807 171 (H) 70 - 130 mg/dL Final   02/16/2025 1120 237 (H) 70 - 130 mg/dL Final   02/16/2025 0643 199 (H) 70 - 130 mg/dL Final   02/16/2025 0040 253 (H) 70 - 130 mg/dL Final   02/15/2025 1748 215 (H) 70 - 130 mg/dL Final         Imaging reviewed  Chest x-ray 2/17 reviewed          Microbiology reviewed           Active Hospital Problems    Diagnosis  POA    **Acute hypercapnic respiratory failure [J96.02]  Yes      Resolved Hospital Problems   No resolved problems to display.         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  Has  failed multiple days.  Off pressors this morning.  Tolerating tube feeds.  Scheduled and as needed bronchodilators and systemic steroids for COPD exacerbation.  Increasing infiltrate left lower showing progressive pneumonia.  On antibiotics with Rocephin.  Changed to Zosyn .  Treatment for viral process is supportive.  On DOAC.  Ulcer prophylaxis.  Discussed with family at bedside.  Discussed with them prognosis and suspect poor outcome.  Consulting palliative care to help with goals of care.  Adding scheduled morphine for severe dyspnea symptoms.    Discussed with multidisciplinary ICU team on rounds this morning.        CCT: 38 min    Holden Tracy MD  Pulmonary and Critical Care Medicine  [facility], [facility]  2025    09:16 EST       Electronically signed by Holden Tracy MD at 25 0922       Holden Tracy MD at 25 0736              [facility] INPATIENT PROGRESS NOTE         [facility] INTENSIVE CARE    2025      PATIENT IDENTIFICATION:  Name: Scarlet Chakraborty ADMIT: 2/10/2025   : 1960  PCP: Flavia Jaquez APRN    MRN: [id number] LOS: 6 days   AGE/SEX: 65 y.o. female  ROOM: [facility]                     LOS 6    Reason for visit: respiratory failure, COPD and critical care management.       SUBJECTIVE:      Sedated on ventilator.  Has failed spontaneous breathing trials.  Plan to try again today.  Discussed with family at bedside and expressed the gravity of patient's situation.  They expressed understanding.  Discussed with nursing staff.      Objective   OBJECTIVE:    Vital Sign Min/Max for last 24 hours  Temp  Min: 97.2 °F (36.2 °C)  Max: 101.5 °F (38.6 °C)   BP  Min: 80/54  Max: 161/64   Pulse  Min: 68  Max: 127   Resp  Min: 14  Max: 28   SpO2  Min: 89 %  Max: 98 %   No data recorded   Weight  Min: 41.5 kg (91 lb 7.9 oz)  Max: 41.5 kg (91 lb 7.9 oz)    Vitals:    25 0815 25 0845 25 0900 25 0946   BP: (!) 80/54  150/62 143/66 157/63   Pulse: 98 77 75 71   Resp:       Temp: 99.7 °F (37.6 °C) 99.3 °F (37.4 °C) 99 °F (37.2 °C)    TempSrc:   Rectal    SpO2: 95% 98% 98%    Weight:       Height:                02/10/25  2230 02/15/25  0515 02/16/25  0600   Weight: 33.7 kg (74 lb 4.7 oz) 37.8 kg (83 lb 5.3 oz) 41.5 kg (91 lb 7.9 oz)       Body mass index is 17.87 kg/m².        Mode: VC+/AC  FiO2 (%):  [45 %-60 %] 50 %  S RR:  [14-20] 14  S VT:  [375 mL] 375 mL  PEEP/CPAP (cm H2O):  [5 cm H20] 5 cm H20  CO SUP:  [5 cm H20] 5 cm H20  MAP (cm H2O):  [6.3-11] 9.6           FiO2 (%): 50 %     Body mass index is 17.87 kg/m².    Intake/Output Summary (Last 24 hours) at 2/16/2025 1018  Last data filed at 2/16/2025 0911  Gross per 24 hour   Intake 2877.9 ml   Output 675 ml   Net 2202.9 ml         Exam:  GEN:  No distress, appears stated age  EYES:   PERRL, anicteric sclerae  ENT:    External ears/nose normal, OP clear  NECK:  No adenopathy, midline trachea  LUNGS: Normal chest on inspection, palpation and diminished bilateral breath sounds on auscultation  CV:  Normal S1S2, without murmur  ABD:  Nontender, nondistended, no hepatosplenomegaly, +BS  EXT:  No edema.  No cyanosis or clubbing.  No mottling and normal cap refill.    Assessment     Scheduled meds:  apixaban, 5 mg, Oral, Q12H  cefTRIAXone, 1,000 mg, Intravenous, Q24H  chlorhexidine, 15 mL, Mouth/Throat, Q12H  insulin regular, 2-7 Units, Subcutaneous, Q6H  ipratropium-albuterol, 3 mL, Nebulization, 4x Daily - RT  lansoprazole, 30 mg, Nasogastric, QAM AC  methylPREDNISolone sodium succinate, 80 mg, Intravenous, Q8H  mupirocin, 1 Application, Each Nare, BID  senna-docusate sodium, 2 tablet, Oral, BID  sodium chloride, 10 mL, Intravenous, Q12H      IV meds:                      dexmedetomidine, 0.2-1.5 mcg/kg/hr, Last Rate: 1.5 mcg/kg/hr (02/16/25 0910)  fentanyl 10 mcg/mL,  mcg/hr, Last Rate: 50 mcg/hr (02/16/25 0828)  phenylephrine, 0.5-3 mcg/kg/min, Last Rate: Stopped  (02/16/25 0946)  propofol, 5-50 mcg/kg/min, Last Rate: 15 mcg/kg/min (02/16/25 0910)      Data Review:  Results from last 7 days   Lab Units 02/16/25  0402 02/15/25  0425 02/14/25  0840 02/14/25  0426 02/13/25  0442 02/12/25  0404   SODIUM mmol/L 135* 142 145  --  138 141   POTASSIUM mmol/L 4.1 4.5 4.9  --  4.2 4.8   CHLORIDE mmol/L 104 108* 106  --  104 107   CO2 mmol/L 27.3 28.0 33.0*  --  26.5 24.0   BUN mg/dL 23 28* 31*  --  29* 24*   CREATININE mg/dL 0.22* 0.31* 0.39* 0.52* 0.34* 0.46*   GLUCOSE mg/dL 173* 196* 178*  --  166* 127*   CALCIUM mg/dL 7.4* 9.0 10.0  --  9.4 9.4         Estimated Creatinine Clearance: 169.2 mL/min (A) (by C-G formula based on SCr of 0.22 mg/dL (L)).  Results from last 7 days   Lab Units 02/16/25  0402 02/15/25  0425 02/14/25  0448 02/14/25  0426 02/13/25  0442 02/12/25  0404   WBC 10*3/mm3 12.40* 10.40 10.71  --  8.74 12.30*   HEMOGLOBIN g/dL 12.6 13.2 13.1  --  12.5 13.0   HEMOGLOBIN, POC g/dL  --   --   --  15.7  --   --    PLATELETS 10*3/mm3 205 258 254  --  239 261     Results from last 7 days   Lab Units 02/10/25  1547   INR  1.04     Results from last 7 days   Lab Units 02/10/25  1547   ALT (SGPT) U/L 16   AST (SGOT) U/L 24     Results from last 7 days   Lab Units 02/16/25  0345 02/15/25  1045 02/14/25  0509 02/14/25  0426 02/13/25  0850 02/11/25  0722 02/10/25  1730   PH, ARTERIAL pH units 7.449 7.408 7.376 7.288* 7.343* 7.389 7.213*   PO2 ART mm Hg 87.7 71.0* 165.5* 70.5* 57.0* 56.2* 327.2*   PCO2, ARTERIAL mm Hg 50.2* 53.2* 62.7* 76.3* 64.1* 45.1* 64.7*   HCO3 ART mmol/L 34.8* 33.6* 36.8* 36.5* 34.8* 27.2 26.0     Results from last 7 days   Lab Units 02/14/25  0426 02/10/25  1547   PROCALCITONIN ng/mL  --  0.14   LACTATE mmol/L 1.0  --          Glucose   Date/Time Value Ref Range Status   02/16/2025 0643 199 (H) 70 - 130 mg/dL Final   02/16/2025 0040 253 (H) 70 - 130 mg/dL Final   02/15/2025 1748 215 (H) 70 - 130 mg/dL Final   02/15/2025 1113 273 (H) 70 - 130 mg/dL Final    02/15/2025 0634 332 (H) 70 - 130 mg/dL Final   02/15/2025 0634 382 (H) 70 - 130 mg/dL Final   02/15/2025 0046 255 (H) 70 - 130 mg/dL Final         Imaging reviewed  Chest x-ray  reviewed          Microbiology reviewed           Active Hospital Problems    Diagnosis  POA    **Acute hypercapnic respiratory failure [J96.02]  Yes      Resolved Hospital Problems   No resolved problems to display.         ASSESSMENT:  End-stage COPD, FEV1 17% on [place] 23 with current exacerbation  Acute hypoxic and hypercapnic respiratory failure  Mucous plugging, s/p bronchoscopy 2025  Sinus tachycardia  Seasonal coronavirus infection: OC43  COPD cachexia  Hypotension, secondary to sedation and positive pressure ventilation  Ischemic cardiomyopathy  History of CVA, on Eliquis  History of Takotsubo cardiomyopathy      PLAN:  Plan Daily sedation vacation and spontaneous breathing trials.    Weaning off pressor.  Tolerating tube feeds.  Scheduled and as needed bronchodilators and systemic steroids for COPD exacerbation.  Increasing infiltrate left lower showing progressive pneumonia.  On antibiotics with Rocephin.  Change to Zosyn.  Treatment for viral process is supportive.  On DOAC.  Ulcer prophylaxis.  Discussed with nursing staff at bedside.    Discussed with family at bedside.  Poor prognosis.      CCT: 36 min    Holden Tracy MD  Pulmonary and Critical Care Medicine  [facility], [facility]  2025    10:18 EST       Electronically signed by Holden Tracy MD at 25 1021       Holden Tracy MD at 02/15/25 0744              [facility] INPATIENT PROGRESS NOTE         [facility] INTENSIVE CARE    2/15/2025      PATIENT IDENTIFICATION:  Name: Scarlet Chakraborty ADMIT: 2/10/2025   : 1960  PCP: Flavia Jaquez APRN    MRN: [id number] LOS: 5 days   AGE/SEX: 65 y.o. female  ROOM: I1/                     LOS 5    Reason for visit: respiratory failure, COPD and critical  care management.       SUBJECTIVE:      Sedated on the ventilator.  Not breathing over the ventilator.  Blood gas this morning is pending.  On propofol and Precedex as well as fentanyl drip.  On phenylephrine drip for blood pressure support.  Tolerating tube feeds.  Significantly diminished breath sounds bilaterally.  Add ventilator bundle set orders. I am seeing the patient for the first time today.  All patient problems are new to me.      Objective   OBJECTIVE:    Vital Sign Min/Max for last 24 hours  Temp  Min: 97.3 °F (36.3 °C)  Max: 100.4 °F (38 °C)   BP  Min: 103/61  Max: 150/68   Pulse  Min: 70  Max: 106   Resp  Min: 20  Max: 22   SpO2  Min: 93 %  Max: 99 %   No data recorded   Weight  Min: 37.8 kg (83 lb 5.3 oz)  Max: 37.8 kg (83 lb 5.3 oz)    Vitals:    02/15/25 0718 02/15/25 0729 02/15/25 0900 02/15/25 1000   BP:  121/66 116/61 129/55   BP Location:       Patient Position:       Pulse:  106 100 87   Resp:       Temp: 100.4 °F (38 °C)   98.8 °F (37.1 °C)   TempSrc: Oral   Oral   SpO2:   95% 95%   Weight:       Height:                02/10/25  2230 02/15/25  0515   Weight: 33.7 kg (74 lb 4.7 oz) 37.8 kg (83 lb 5.3 oz)       Body mass index is 16.28 kg/m².        Mode: VC+/AC  FiO2 (%):  [45 %] 45 %  S RR:  [20] 20  S VT:  [375 mL] 375 mL  PEEP/CPAP (cm H2O):  [5 cm H20] 5 cm H20  MAP (cm H2O):  [10-11] 11           FiO2 (%): 45 %     Body mass index is 16.28 kg/m².    Intake/Output Summary (Last 24 hours) at 2/15/2025 1108  Last data filed at 2/15/2025 0518  Gross per 24 hour   Intake 2473.81 ml   Output 1000 ml   Net 1473.81 ml         Exam:  GEN:  No distress, appears stated age  EYES:   PERRL, anicteric sclerae  ENT:    External ears/nose normal, OP clear  NECK:  No adenopathy, midline trachea  LUNGS: Normal chest on inspection, palpation and diminished bilateral breath sounds on auscultation  CV:  Normal S1S2, without murmur  ABD:  Nontender, nondistended, no hepatosplenomegaly, +BS  EXT:  No edema.  No  cyanosis or clubbing.  No mottling and normal cap refill.    Assessment     Scheduled meds:  apixaban, 5 mg, Oral, Q12H  cefTRIAXone, 1,000 mg, Intravenous, Q24H  chlorhexidine, 15 mL, Mouth/Throat, Q12H  insulin regular, 2-7 Units, Subcutaneous, Q6H  ipratropium-albuterol, 3 mL, Nebulization, 4x Daily - RT  lansoprazole, 30 mg, Nasogastric, QAM AC  methylPREDNISolone sodium succinate, 80 mg, Intravenous, Q8H  mupirocin, 1 Application, Each Nare, BID  senna-docusate sodium, 2 tablet, Oral, BID  sodium chloride, 10 mL, Intravenous, Q12H      IV meds:                      dexmedetomidine, 0.2-1.5 mcg/kg/hr, Last Rate: 1.5 mcg/kg/hr (02/15/25 0729)  fentanyl 10 mcg/mL,  mcg/hr, Last Rate: 50 mcg/hr (02/15/25 0935)  phenylephrine, 0.5-3 mcg/kg/min, Last Rate: 0.2 mcg/kg/min (02/14/25 0660)  propofol, 5-50 mcg/kg/min, Last Rate: 50 mcg/kg/min (02/15/25 0643)      Data Review:  Results from last 7 days   Lab Units 02/15/25  0425 02/14/25  0840 02/14/25  0426 02/13/25  0442 02/12/25  0404 02/11/25  0449   SODIUM mmol/L 142 145  --  138 141 141   POTASSIUM mmol/L 4.5 4.9  --  4.2 4.8 4.5   CHLORIDE mmol/L 108* 106  --  104 107 107   CO2 mmol/L 28.0 33.0*  --  26.5 24.0 20.6*   BUN mg/dL 28* 31*  --  29* 24* 22   CREATININE mg/dL 0.31* 0.39* 0.52* 0.34* 0.46* 0.48*   GLUCOSE mg/dL 196* 178*  --  166* 127* 136*   CALCIUM mg/dL 9.0 10.0  --  9.4 9.4 8.7         Estimated Creatinine Clearance: 109.4 mL/min (A) (by C-G formula based on SCr of 0.31 mg/dL (L)).  Results from last 7 days   Lab Units 02/15/25  0425 02/14/25  0448 02/14/25  0426 02/13/25  0442 02/12/25  0404 02/11/25  0449   WBC 10*3/mm3 10.40 10.71  --  8.74 12.30* 7.86   HEMOGLOBIN g/dL 13.2 13.1  --  12.5 13.0 13.3   HEMOGLOBIN, POC g/dL  --   --  15.7  --   --   --    PLATELETS 10*3/mm3 258 254  --  239 261 202     Results from last 7 days   Lab Units 02/10/25  1547   INR  1.04     Results from last 7 days   Lab Units 02/10/25  1547   ALT (SGPT) U/L 16    AST (SGOT) U/L 24     Results from last 7 days   Lab Units 02/15/25  1045 02/14/25  0509 02/14/25  0426 02/13/25  0850 02/11/25  0722 02/10/25  1730 02/10/25  1539   PH, ARTERIAL pH units 7.408 7.376 7.288* 7.343* 7.389 7.213* 7.068*   PO2 ART mm Hg 71.0* 165.5* 70.5* 57.0* 56.2* 327.2* 71.0*   PCO2, ARTERIAL mm Hg 53.2* 62.7* 76.3* 64.1* 45.1* 64.7* 98.9*   HCO3 ART mmol/L 33.6* 36.8* 36.5* 34.8* 27.2 26.0 28.5*     Results from last 7 days   Lab Units 02/14/25  0426 02/10/25  1547   PROCALCITONIN ng/mL  --  0.14   LACTATE mmol/L 1.0  --          Glucose   Date/Time Value Ref Range Status   02/15/2025 0634 332 (H) 70 - 130 mg/dL Final   02/15/2025 0634 382 (H) 70 - 130 mg/dL Final   02/15/2025 0046 255 (H) 70 - 130 mg/dL Final   02/14/2025 2023 272 (H) 70 - 130 mg/dL Final   02/14/2025 1747 238 (H) 70 - 130 mg/dL Final   02/14/2025 1314 267 (H) 70 - 130 mg/dL Final   02/14/2025 0645 163 (H) 70 - 130 mg/dL Final         Imaging reviewed  Chest x-ray 2/14 reviewed          Microbiology reviewed           Active Hospital Problems    Diagnosis  POA    **Acute hypercapnic respiratory failure [J96.02]  Yes      Resolved Hospital Problems   No resolved problems to display.         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  Decrease set respiratory rate to give patient room for changing ventilation.  Add ventilator order bundle set.  Wean pressor as able.  Tolerating tube feeds.  Scheduled and as needed bronchodilators and systemic steroids for COPD exacerbation.  Continue  antibiotic.  On DOAC.  Ulcer prophylaxis.  Discussed with nursing staff at bedside.        Called and left voice message on patients sisters phone after no answer.        CCT: 35 min    Holden Tracy MD  Pulmonary and Critical Care Medicine  [facility], [facility]  2/15/2025    11:08 EST       Electronically signed by Holden Tracy MD at 02/15/25 1511       Rafael Brito MD at 02/14/25 1725          Prelim respiratory culture positive for gram-negative bacilli and gram cocci.  Give Vanco loading dose and check MRSA screen.  Start Rocephin.    Electronically signed by Rafael Brito MD at 02/14/25 1725       Rafael Brito MD at 02/14/25 1414                                                        LOS: 4 days   Patient Care Team:  Flavia Jaquez APRN as PCP - General (Nurse Practitioner)  Beulah Joshi APRN as Nurse Practitioner (Neurology)  Flaco Aguayo MD as Consulting Physician (Pulmonary Disease)    Chief Complaint:  F/up respiratory failure, COPD and critical care management.    Subjective   Interval History    Noted the events from overnight.  She had difficulty with ventilation and required suction bronchoscopy for mucous plugging.  Currently on low tidal volume as she was able to tolerate that much better than the previous settings.  She is sedated with high-dose propofol.    REVIEW OF SYSTEMS:   Cannot obtain due to mechanical ventilation.    Ventilator/Non-Invasive Ventilation Settings (From admission, onward)       Start     Ordered    02/10/25 1620  NIPPV - Provider Settings  (CPAP / BiPAP)  Until Discontinued        Question Answer Comment   Indication Acute Respiratory Failure    Type BIPAP    Titrate Oxygen for SpO2 90 - 95%        02/10/25 1620                          Physical Exam:     Vital Signs  Temp:  [97.7 °F (36.5 °C)-98.9 °F (37.2 °C)] 98.2 °F (36.8 °C)  Heart Rate:  [] 61  Resp:  [14-20] 20  BP: ()/(31-73) 139/58  FiO2 (%):  [29 %-100  "%] 45 %    Intake/Output Summary (Last 24 hours) at 2/14/2025 1414  Last data filed at 2/13/2025 1843  Gross per 24 hour   Intake 551.67 ml   Output 300 ml   Net 251.67 ml     Flowsheet Rows      Flowsheet Row First Filed Value   Admission Height 152.4 cm (60\") Documented at 02/10/2025 2230   Admission Weight 33.7 kg (74 lb 4.7 oz) Documented at 02/10/2025 2230            PPE used per hospital policy    General Appearance:  On the ventilator.  NAD...  Cachectic   ENMT: ET tube noted.  External ears normal.   Eyes:  Pupils equal and reactive to light. EOMI   Neck:    Trachea midline. No thyromegaly.   Lungs:   Equal but severely diminished air entry throughout.  Bilateral expiratory wheezing.  Prolonged expiratory time.  Coarse.  Similar to yesterday    Heart:  RRR.  No audible murmur.   Skin:   No rash or ecchymosis   Abdomen:    Soft. No tenderness. No HSM.   Neuro/psych: Sedated.  Arouses to stimulus and follows commands.   Extremities:  No cyanosis, clubbing or edema.  Warm extremities and well-perfused          Results Review:        Results from last 7 days   Lab Units 02/14/25  0840 02/14/25 0426 02/13/25 0442 02/12/25  0404   SODIUM mmol/L 145  --  138 141   POTASSIUM mmol/L 4.9  --  4.2 4.8   CHLORIDE mmol/L 106  --  104 107   CO2 mmol/L 33.0*  --  26.5 24.0   BUN mg/dL 31*  --  29* 24*   CREATININE mg/dL 0.39* 0.52* 0.34* 0.46*   GLUCOSE mg/dL 178*  --  166* 127*   CALCIUM mg/dL 10.0  --  9.4 9.4     Results from last 7 days   Lab Units 02/10/25  1707 02/10/25  1547   HSTROP T ng/L 95* 20*     Results from last 7 days   Lab Units 02/14/25  0448 02/14/25  0426 02/13/25  0442 02/12/25  0404   WBC 10*3/mm3 10.71  --  8.74 12.30*   HEMOGLOBIN g/dL 13.1  --  12.5 13.0   HEMOGLOBIN, POC g/dL  --  15.7  --   --    HEMATOCRIT % 39.6  --  37.1 38.9   HEMATOCRIT POC %  --  46  --   --    PLATELETS 10*3/mm3 254  --  239 261     Results from last 7 days   Lab Units 02/10/25  1547   INR  1.04     Results from last 7 " days   Lab Units 02/10/25  1547   PROBNP pg/mL 158.0       Results from last 7 days   Lab Units 02/10/25  1547   D DIMER QUANT MCGFEU/mL 3.74*             Results from last 7 days   Lab Units 02/14/25  0509 02/14/25  0426 02/13/25  0850 02/11/25  0722 02/10/25  1730 02/10/25  1539   PH, ARTERIAL pH units 7.376 7.288* 7.343* 7.389 7.213* 7.068*   PCO2, ARTERIAL mm Hg 62.7* 76.3* 64.1* 45.1* 64.7* 98.9*   PO2 ART mm Hg 165.5* 70.5* 57.0* 56.2* 327.2* 71.0*   O2 SATURATION ART % 99.4* 90.7* 86.4* 88.3* 99.9* 84.0*   FLOW RATE lpm  --   --   --   --   --  8.0000   MODALITY  Adult Vent Adult Vent Adult Vent N/A BiPap HFNC         I reviewed the patient's new clinical results.        Medication Review:   apixaban, 5 mg, Oral, Q12H  chlorhexidine, 15 mL, Mouth/Throat, Q12H  insulin regular, 2-7 Units, Subcutaneous, Q6H  ipratropium-albuterol, 3 mL, Nebulization, 4x Daily - RT  methylPREDNISolone sodium succinate, 125 mg, Intravenous, Q8H  mupirocin, 1 Application, Each Nare, BID  pantoprazole, 40 mg, Intravenous, Q24H  senna-docusate sodium, 2 tablet, Oral, BID  sodium chloride, 10 mL, Intravenous, Q12H        dexmedetomidine, 0.2-1.5 mcg/kg/hr, Last Rate: 1.5 mcg/kg/hr (02/14/25 0826)  fentanyl 10 mcg/mL,  mcg/hr  phenylephrine, 0.5-3 mcg/kg/min, Last Rate: 1 mcg/kg/min (02/14/25 0252)  propofol, 5-50 mcg/kg/min, Last Rate: 50 mcg/kg/min (02/14/25 0826)        Diagnostic imaging:  I personally and independently reviewed the following images:  CTA chest 2/10/2025: Significant emphysema more prominent on the right side.  Bronchiectasis.    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  AC/VC 18/375/45%/5.  Unlikely to be able to wean today due to her current settings and the fact that she had bronchoscopy overnight.  I did discuss with her family (sister and significant other) at bedside and explained the fact that she has very severe lung disease at baseline and might be difficult to be able to wean her off the ventilator and she might be heading towards tracheostomy.  Discussed that there is no particular recommendation regarding timing of tracheostomy but it might be a good idea to look into that sometimes next week if she is unable to come off the ventilator  Tre-Synephrine IV drip and titrate to keep MAP >65  Solu-Medrol 125 mg every 8 hours-day 3.  No benefit to extended high-dose for too long and therefore we will reduce to 80 mg every 8 hours.  Fentanyl as needed for analgesia  Tube feeding  DuoNeb 4 times a day  Eliquis 5 mg twice daily        Rafael Brito MD  02/14/25  14:14 EST        Time: Critical care 35 min       This note was dictated utilizing Dragon dictation     Electronically signed by Rafael Brito MD at 02/14/25 7260

## 2025-02-17 NOTE — PLAN OF CARE
Problem: Noninvasive Ventilation Acute  Goal: Effective Unassisted Ventilation and Oxygenation  Outcome: Not Progressing  Intervention: Monitor and Manage Noninvasive Ventilation  Recent Flowsheet Documentation  Taken 2/17/2025 1200 by Beulah Kelsey RN  Airway/Ventilation Management: airway patency maintained  NPPV/CPAP Maintenance: skin-to-device protection utilized  Taken 2/17/2025 0800 by Beulah Kelsey RN  Airway/Ventilation Management: airway patency maintained     Problem: Adult Inpatient Plan of Care  Goal: Patient-Specific Goal (Individualized)  Outcome: Not Progressing  Goal: Optimal Comfort and Wellbeing  Outcome: Not Progressing  Intervention: Monitor Pain and Promote Comfort  Recent Flowsheet Documentation  Taken 2/17/2025 1700 by Beulah Kelsey RN  Pain Management Interventions: care clustered  Taken 2/17/2025 1600 by Beulah Kelsey RN  Pain Management Interventions: care clustered  Taken 2/17/2025 1500 by Beulah Kelsey RN  Pain Management Interventions: care clustered  Taken 2/17/2025 1400 by Beulah Kelsey RN  Pain Management Interventions: care clustered  Taken 2/17/2025 1300 by Beulah Kelsey RN  Pain Management Interventions: care clustered  Taken 2/17/2025 1200 by Beulah Kelsey RN  Pain Management Interventions: care clustered  Taken 2/17/2025 1100 by Beulah Kelsey RN  Pain Management Interventions: care clustered  Taken 2/17/2025 1000 by Beulah Kelsey RN  Pain Management Interventions: care clustered  Taken 2/17/2025 0900 by Beulah Kelsey RN  Pain Management Interventions: care clustered  Taken 2/17/2025 0800 by Beulah Kelesy RN  Pain Management Interventions: care clustered  Intervention: Provide Person-Centered Care  Recent Flowsheet Documentation  Taken 2/17/2025 1200 by Beulah Kelsey RN  Trust Relationship/Rapport: care explained  Taken 2/17/2025 0800 by Beulah Kelsey RN  Trust Relationship/Rapport: care explained  Goal: Readiness for Transition of Care  Outcome: Not  Progressing     Problem: Mechanical Ventilation Invasive  Goal: Effective Communication  Outcome: Not Progressing  Intervention: Ensure Effective Communication  Recent Flowsheet Documentation  Taken 2/17/2025 1600 by Beulah Kelsey RN  Diversional Activities: television  Communication Enhancement Strategies: call light answered in person  Taken 2/17/2025 1400 by Beulah Kelsey RN  Diversional Activities: television  Taken 2/17/2025 1200 by Beulah Kelsey RN  Trust Relationship/Rapport: care explained  Diversional Activities: television  Taken 2/17/2025 1000 by Beulah Kelsey RN  Diversional Activities: television  Taken 2/17/2025 0800 by Beulah Kelsey RN  Trust Relationship/Rapport: care explained  Diversional Activities: television  Family/Support System Care: self-care encouraged  Communication Enhancement Strategies: call light answered in person  Goal: Optimal Device Function  Outcome: Not Progressing  Intervention: Optimize Device Care and Function  Recent Flowsheet Documentation  Taken 2/17/2025 1700 by Beulah Kelsey RN  Airway Safety Measures: mask valve resuscitator at bedside  Taken 2/17/2025 1600 by Beulah Kelsey RN  Oral Care: teeth brushed - suction toothbrush  Airway Safety Measures: mask valve resuscitator at bedside  Taken 2/17/2025 1500 by Beulah Kelsey RN  Airway Safety Measures: mask valve resuscitator at bedside  Taken 2/17/2025 1400 by Beulah Kelsey RN  Oral Care: teeth brushed - suction toothbrush  Airway Safety Measures: mask valve resuscitator at bedside  Taken 2/17/2025 1300 by Beulah Kelsey RN  Airway Safety Measures: mask valve resuscitator at bedside  Taken 2/17/2025 1200 by Beulah Kelsey RN  Airway/Ventilation Management: airway patency maintained  Oral Care: teeth brushed - suction toothbrush  Airway Safety Measures: mask valve resuscitator at bedside  Taken 2/17/2025 1100 by Beulah Kelsey RN  Airway Safety Measures: mask valve resuscitator at bedside  Taken 2/17/2025  1000 by Beulah Kelsey RN  Oral Care: (chg peridex) teeth brushed - suction toothbrush  Airway Safety Measures: mask valve resuscitator at bedside  Taken 2/17/2025 0900 by Beulah Kelsey RN  Airway Safety Measures: mask valve resuscitator at bedside  Taken 2/17/2025 0800 by Beulah Kelsey RN  Airway/Ventilation Management: airway patency maintained  Oral Care: teeth brushed - suction toothbrush  Airway Safety Measures: mask valve resuscitator at bedside  Goal: Mechanical Ventilation Liberation  Outcome: Not Progressing  Intervention: Promote Extubation and Mechanical Ventilation Liberation  Recent Flowsheet Documentation  Taken 2/17/2025 1700 by Beulah Kelsey RN  Medication Review/Management: medications reviewed  Taken 2/17/2025 1600 by Beulah Kelsey RN  Medication Review/Management: medications reviewed  Taken 2/17/2025 1500 by Beulah Kelsey RN  Medication Review/Management: medications reviewed  Taken 2/17/2025 1400 by Beulah Kelsey RN  Medication Review/Management: medications reviewed  Taken 2/17/2025 1300 by Beulah Kelsey RN  Medication Review/Management: medications reviewed  Taken 2/17/2025 1200 by Beulah Kelsey RN  Sleep/Rest Enhancement: awakenings minimized  Medication Review/Management: medications reviewed  Taken 2/17/2025 1100 by Beulah Kelsey RN  Medication Review/Management: medications reviewed  Taken 2/17/2025 1000 by Beulah Kelsey RN  Medication Review/Management: medications reviewed  Taken 2/17/2025 0900 by Beulah Kelsey RN  Medication Review/Management: medications reviewed  Taken 2/17/2025 0800 by Beulah Kelsey RN  Environmental Support: calm environment promoted  Sleep/Rest Enhancement: awakenings minimized  Medication Review/Management: medications reviewed  Goal: Optimal Nutrition Delivery  Outcome: Not Progressing  Intervention: Optimize Nutrition Delivery  Recent Flowsheet Documentation  Taken 2/17/2025 0800 by Beulah Kelsey RN  Nutrition Support Management: (tf  goal rate) other (see comments)  Goal: Absence of Ventilator-Induced Lung Injury  Outcome: Not Progressing  Intervention: Facilitate Lung-Protection Measures  Recent Flowsheet Documentation  Taken 2/17/2025 1200 by Beulah Kelsey RN  Lung Protection Measures: ventilator synchrony promoted  Taken 2/17/2025 0800 by Beulah Kelsey RN  Lung Protection Measures: ventilator synchrony promoted  Intervention: Prevent Ventilator-Associated Pneumonia  Recent Flowsheet Documentation  Taken 2/17/2025 1600 by Beulah Kelsey RN  Head of Bed ([facility]) Positioning: HOB at 30 degrees  Oral Care: teeth brushed - suction toothbrush  Taken 2/17/2025 1400 by Beulah Kelsey RN  Head of Bed ([facility]) Positioning: HOB at 30 degrees  Oral Care: teeth brushed - suction toothbrush  Taken 2/17/2025 1200 by Beulah Kelsey RN  Head of Bed ([facility]) Positioning: HOB at 30 degrees  Oral Care: teeth brushed - suction toothbrush  Taken 2/17/2025 1000 by Beulah Kelsey RN  Head of Bed ([facility]) Positioning: HOB at 30 degrees  Oral Care: (chg peridex) teeth brushed - suction toothbrush  Taken 2/17/2025 0800 by Beulah Kelsey RN  Head of Bed ([facility]) Positioning: HOB at 30-45 degrees  VAP Prevention Bundle: HOB elevation maintained  VAP Prevention Measures: completed  Oral Care: teeth brushed - suction toothbrush   Goal Outcome Evaluation:

## 2025-02-18 ENCOUNTER — APPOINTMENT (OUTPATIENT)
Dept: GENERAL RADIOLOGY | Facility: HOSPITAL | Age: 65
DRG: 207 | End: 2025-02-18
Payer: COMMERCIAL

## 2025-02-18 LAB
ANION GAP SERPL CALCULATED.3IONS-SCNC: 7 MMOL/L (ref 5–15)
ARTERIAL PATENCY WRIST A: POSITIVE
ATMOSPHERIC PRESS: 758.4 MMHG
BASE EXCESS BLDA CALC-SCNC: 4.9 MMOL/L (ref 0–2)
BDY SITE: ABNORMAL
BUN SERPL-MCNC: 22 MG/DL (ref 8–23)
BUN/CREAT SERPL: 57.9 (ref 7–25)
CALCIUM SPEC-SCNC: 9 MG/DL (ref 8.6–10.5)
CHLORIDE SERPL-SCNC: 105 MMOL/L (ref 98–107)
CO2 BLDA-SCNC: 29 MMOL/L (ref 23–27)
CO2 SERPL-SCNC: 28 MMOL/L (ref 22–29)
CREAT SERPL-MCNC: 0.38 MG/DL (ref 0.57–1)
DEPRECATED RDW RBC AUTO: 43.1 FL (ref 37–54)
DEVICE COMMENT: ABNORMAL
EGFRCR SERPLBLD CKD-EPI 2021: 112.1 ML/MIN/1.73
ERYTHROCYTE [DISTWIDTH] IN BLOOD BY AUTOMATED COUNT: 12.6 % (ref 12.3–15.4)
GLUCOSE BLDC GLUCOMTR-MCNC: 187 MG/DL (ref 70–130)
GLUCOSE BLDC GLUCOMTR-MCNC: 234 MG/DL (ref 70–130)
GLUCOSE BLDC GLUCOMTR-MCNC: 235 MG/DL (ref 70–130)
GLUCOSE BLDC GLUCOMTR-MCNC: 262 MG/DL (ref 70–130)
GLUCOSE SERPL-MCNC: 126 MG/DL (ref 65–99)
HCO3 BLDA-SCNC: 28 MMOL/L (ref 22–28)
HCT VFR BLD AUTO: 39.4 % (ref 34–46.6)
HEMODILUTION: NO
HGB BLD-MCNC: 12.6 G/DL (ref 12–15.9)
INHALED O2 CONCENTRATION: 30 %
MCH RBC QN AUTO: 29.9 PG (ref 26.6–33)
MCHC RBC AUTO-ENTMCNC: 32 G/DL (ref 31.5–35.7)
MCV RBC AUTO: 93.6 FL (ref 79–97)
MODALITY: ABNORMAL
O2 A-A PPRESDIFF RESPIRATORY: 0.3 MMHG
PCO2 BLDA: 35 MM HG (ref 35–45)
PEEP RESPIRATORY: 5 CM[H2O]
PH BLDA: 7.51 PH UNITS (ref 7.35–7.45)
PLATELET # BLD AUTO: 292 10*3/MM3 (ref 140–450)
PMV BLD AUTO: 11.5 FL (ref 6–12)
PO2 BLD: 208 MM[HG] (ref 0–500)
PO2 BLDA: 62.3 MM HG (ref 80–100)
POTASSIUM SERPL-SCNC: 4.3 MMOL/L (ref 3.5–5.2)
QT INTERVAL: 308 MS
QTC INTERVAL: 446 MS
RBC # BLD AUTO: 4.21 10*6/MM3 (ref 3.77–5.28)
SAO2 % BLDCOA: 93.7 % (ref 92–98.5)
SET MECH RESP RATE: 14
SODIUM SERPL-SCNC: 140 MMOL/L (ref 136–145)
TOTAL RATE: 24 BREATHS/MINUTE
VENTILATOR MODE: ABNORMAL
VT ON VENT VENT: 375 ML
WBC NRBC COR # BLD AUTO: 18.62 10*3/MM3 (ref 3.4–10.8)

## 2025-02-18 PROCEDURE — 94799 UNLISTED PULMONARY SVC/PX: CPT

## 2025-02-18 PROCEDURE — 85027 COMPLETE CBC AUTOMATED: CPT | Performed by: INTERNAL MEDICINE

## 2025-02-18 PROCEDURE — 25010000002 METHYLPREDNISOLONE PER 125 MG: Performed by: INTERNAL MEDICINE

## 2025-02-18 PROCEDURE — 94761 N-INVAS EAR/PLS OXIMETRY MLT: CPT

## 2025-02-18 PROCEDURE — 80048 BASIC METABOLIC PNL TOTAL CA: CPT | Performed by: INTERNAL MEDICINE

## 2025-02-18 PROCEDURE — 82948 REAGENT STRIP/BLOOD GLUCOSE: CPT

## 2025-02-18 PROCEDURE — 63710000001 INSULIN REGULAR HUMAN PER 5 UNITS: Performed by: INTERNAL MEDICINE

## 2025-02-18 PROCEDURE — 82803 BLOOD GASES ANY COMBINATION: CPT

## 2025-02-18 PROCEDURE — 36600 WITHDRAWAL OF ARTERIAL BLOOD: CPT

## 2025-02-18 PROCEDURE — 25010000002 PIPERACILLIN SOD-TAZOBACTAM PER 1 G: Performed by: INTERNAL MEDICINE

## 2025-02-18 PROCEDURE — 94003 VENT MGMT INPAT SUBQ DAY: CPT

## 2025-02-18 PROCEDURE — 25010000002 METHYLPREDNISOLONE PER 40 MG: Performed by: INTERNAL MEDICINE

## 2025-02-18 PROCEDURE — 25010000002 PROPOFOL 10 MG/ML EMULSION: Performed by: INTERNAL MEDICINE

## 2025-02-18 PROCEDURE — 94664 DEMO&/EVAL PT USE INHALER: CPT

## 2025-02-18 PROCEDURE — 71045 X-RAY EXAM CHEST 1 VIEW: CPT

## 2025-02-18 RX ORDER — METHYLPREDNISOLONE SODIUM SUCCINATE 40 MG/ML
40 INJECTION, POWDER, LYOPHILIZED, FOR SOLUTION INTRAMUSCULAR; INTRAVENOUS EVERY 8 HOURS
Status: DISCONTINUED | OUTPATIENT
Start: 2025-02-18 | End: 2025-02-20

## 2025-02-18 RX ADMIN — MORPHINE SULFATE 10 MG: 20 SOLUTION ORAL at 11:25

## 2025-02-18 RX ADMIN — BUDESONIDE 0.5 MG: 0.5 INHALANT RESPIRATORY (INHALATION) at 20:01

## 2025-02-18 RX ADMIN — METHYLPREDNISOLONE SODIUM SUCCINATE 40 MG: 40 INJECTION, POWDER, FOR SOLUTION INTRAMUSCULAR; INTRAVENOUS at 11:25

## 2025-02-18 RX ADMIN — MUPIROCIN 1 APPLICATION: 20 OINTMENT TOPICAL at 20:45

## 2025-02-18 RX ADMIN — ARFORMOTEROL TARTRATE 15 MCG: 15 SOLUTION RESPIRATORY (INHALATION) at 08:21

## 2025-02-18 RX ADMIN — IPRATROPIUM BROMIDE AND ALBUTEROL SULFATE 3 ML: .5; 3 SOLUTION RESPIRATORY (INHALATION) at 08:17

## 2025-02-18 RX ADMIN — BUDESONIDE 0.5 MG: 0.5 INHALANT RESPIRATORY (INHALATION) at 08:22

## 2025-02-18 RX ADMIN — Medication 10 ML: at 09:13

## 2025-02-18 RX ADMIN — MUPIROCIN 1 APPLICATION: 20 OINTMENT TOPICAL at 09:12

## 2025-02-18 RX ADMIN — INSULIN HUMAN 4 UNITS: 100 INJECTION, SOLUTION PARENTERAL at 11:25

## 2025-02-18 RX ADMIN — SENNOSIDES AND DOCUSATE SODIUM 2 TABLET: 50; 8.6 TABLET ORAL at 20:42

## 2025-02-18 RX ADMIN — DEXMEDETOMIDINE HYDROCHLORIDE 1.1 MCG/KG/HR: 400 INJECTION INTRAVENOUS at 21:02

## 2025-02-18 RX ADMIN — MORPHINE SULFATE 10 MG: 20 SOLUTION ORAL at 02:49

## 2025-02-18 RX ADMIN — DEXMEDETOMIDINE HYDROCHLORIDE 1.1 MCG/KG/HR: 400 INJECTION INTRAVENOUS at 00:47

## 2025-02-18 RX ADMIN — APIXABAN 5 MG: 5 TABLET, FILM COATED ORAL at 09:12

## 2025-02-18 RX ADMIN — PIPERACILLIN AND TAZOBACTAM 3.38 G: 3; .375 INJECTION, POWDER, FOR SOLUTION INTRAVENOUS at 00:47

## 2025-02-18 RX ADMIN — IPRATROPIUM BROMIDE AND ALBUTEROL SULFATE 3 ML: .5; 3 SOLUTION RESPIRATORY (INHALATION) at 19:59

## 2025-02-18 RX ADMIN — IPRATROPIUM BROMIDE AND ALBUTEROL SULFATE 3 ML: .5; 3 SOLUTION RESPIRATORY (INHALATION) at 12:41

## 2025-02-18 RX ADMIN — CHLORHEXIDINE GLUCONATE 15 ML: 1.2 RINSE ORAL at 09:12

## 2025-02-18 RX ADMIN — INSULIN HUMAN 2 UNITS: 100 INJECTION, SOLUTION PARENTERAL at 06:22

## 2025-02-18 RX ADMIN — IPRATROPIUM BROMIDE AND ALBUTEROL SULFATE 3 ML: .5; 3 SOLUTION RESPIRATORY (INHALATION) at 15:15

## 2025-02-18 RX ADMIN — DEXMEDETOMIDINE HYDROCHLORIDE 1.1 MCG/KG/HR: 400 INJECTION INTRAVENOUS at 11:27

## 2025-02-18 RX ADMIN — INSULIN HUMAN 6 UNITS: 100 INJECTION, SOLUTION PARENTERAL at 18:21

## 2025-02-18 RX ADMIN — PROPOFOL 25 MCG/KG/MIN: 10 INJECTION, EMULSION INTRAVENOUS at 15:11

## 2025-02-18 RX ADMIN — Medication 10 ML: at 20:45

## 2025-02-18 RX ADMIN — MORPHINE SULFATE 10 MG: 20 SOLUTION ORAL at 20:42

## 2025-02-18 RX ADMIN — METHYLPREDNISOLONE SODIUM SUCCINATE 40 MG: 40 INJECTION, POWDER, FOR SOLUTION INTRAMUSCULAR; INTRAVENOUS at 18:12

## 2025-02-18 RX ADMIN — METHYLPREDNISOLONE SODIUM SUCCINATE 80 MG: 125 INJECTION, POWDER, FOR SOLUTION INTRAMUSCULAR; INTRAVENOUS at 02:49

## 2025-02-18 RX ADMIN — PIPERACILLIN AND TAZOBACTAM 3.38 G: 3; .375 INJECTION, POWDER, FOR SOLUTION INTRAVENOUS at 18:12

## 2025-02-18 RX ADMIN — SENNOSIDES AND DOCUSATE SODIUM 2 TABLET: 50; 8.6 TABLET ORAL at 09:12

## 2025-02-18 RX ADMIN — INSULIN HUMAN 2 UNITS: 100 INJECTION, SOLUTION PARENTERAL at 00:47

## 2025-02-18 RX ADMIN — PIPERACILLIN AND TAZOBACTAM 3.38 G: 3; .375 INJECTION, POWDER, FOR SOLUTION INTRAVENOUS at 09:12

## 2025-02-18 RX ADMIN — LANSOPRAZOLE 30 MG: 15 TABLET, ORALLY DISINTEGRATING ORAL at 06:32

## 2025-02-18 RX ADMIN — APIXABAN 5 MG: 5 TABLET, FILM COATED ORAL at 20:42

## 2025-02-18 RX ADMIN — ARFORMOTEROL TARTRATE 15 MCG: 15 SOLUTION RESPIRATORY (INHALATION) at 20:07

## 2025-02-18 NOTE — PLAN OF CARE
Problem: Noninvasive Ventilation Acute  Goal: Effective Unassisted Ventilation and Oxygenation  Outcome: Not Progressing  Intervention: Monitor and Manage Noninvasive Ventilation  Recent Flowsheet Documentation  Taken 2/18/2025 0800 by Beulah Kelsey RN  Airway/Ventilation Management: airway patency maintained  NPPV/CPAP Maintenance: skin-to-device protection utilized     Problem: Adult Inpatient Plan of Care  Goal: Patient-Specific Goal (Individualized)  Outcome: Not Progressing  Goal: Optimal Comfort and Wellbeing  Outcome: Not Progressing  Intervention: Monitor Pain and Promote Comfort  Recent Flowsheet Documentation  Taken 2/18/2025 0900 by Beulah Kelsey RN  Pain Management Interventions: care clustered  Taken 2/18/2025 0800 by Beulah Kelsey RN  Pain Management Interventions: care clustered  Taken 2/18/2025 0700 by Beulah Kelsey RN  Pain Management Interventions: care clustered  Intervention: Provide Person-Centered Care  Recent Flowsheet Documentation  Taken 2/18/2025 0800 by Beulah Kelsey RN  Trust Relationship/Rapport: care explained  Goal: Readiness for Transition of Care  Outcome: Not Progressing     Problem: Restraint, Nonviolent  Goal: Absence of Harm or Injury  Outcome: Not Progressing  Intervention: Implement Least Restrictive Safety Strategies  Recent Flowsheet Documentation  Taken 2/18/2025 0900 by Beulah Kelsey RN  Medical Device Protection: IV pole/bag removed from visual field  Taken 2/18/2025 0800 by Beulah Kelsey RN  Medical Device Protection: IV pole/bag removed from visual field  Diversional Activities: television  Taken 2/18/2025 0700 by Beulah Kelsey RN  Medical Device Protection: IV pole/bag removed from visual field  Intervention: Protect Dignity, Rights and Personal Wellbeing  Recent Flowsheet Documentation  Taken 2/18/2025 0800 by Beulah Kelsey RN  Trust Relationship/Rapport: care explained  Intervention: Protect Skin and Joint Integrity  Recent Flowsheet  Documentation  Taken 2/18/2025 0800 by Beulah Kelsey RN  Body Position:   turned   30 degrees lateral   left  Skin Protection: transparent dressing maintained  Range of Motion: ROM (range of motion) performed     Problem: Sepsis/Septic Shock  Goal: Optimal Coping  Outcome: Not Progressing  Goal: Optimal Nutrition Delivery  Outcome: Not Progressing     Problem: Mechanical Ventilation Invasive  Goal: Effective Communication  Outcome: Not Progressing  Intervention: Ensure Effective Communication  Recent Flowsheet Documentation  Taken 2/18/2025 0800 by Beulah Kelsey RN  Trust Relationship/Rapport: care explained  Diversional Activities: television  Communication Enhancement Strategies: call light answered in person  Goal: Optimal Device Function  Outcome: Not Progressing  Intervention: Optimize Device Care and Function  Recent Flowsheet Documentation  Taken 2/18/2025 0900 by Beulah Kelsey RN  Airway Safety Measures: mask valve resuscitator at bedside  Taken 2/18/2025 0800 by Beulah Kelsey RN  Airway/Ventilation Management: airway patency maintained  Oral Care: teeth brushed - suction toothbrush  Airway Safety Measures: mask valve resuscitator at bedside  Taken 2/18/2025 0700 by Beulah Kelsey RN  Airway Safety Measures: mask valve resuscitator at bedside  Goal: Mechanical Ventilation Liberation  Outcome: Not Progressing  Intervention: Promote Extubation and Mechanical Ventilation Liberation  Recent Flowsheet Documentation  Taken 2/18/2025 0900 by Beulah Kelsey RN  Medication Review/Management: medications reviewed  Taken 2/18/2025 0800 by Beulah Kelsey RN  Medication Review/Management: medications reviewed  Taken 2/18/2025 0700 by Beulah Kelsey RN  Medication Review/Management: medications reviewed  Goal: Optimal Nutrition Delivery  Outcome: Not Progressing  Goal: Absence of Device-Related Skin and Tissue Injury  Outcome: Not Progressing  Intervention: Maintain Skin and Tissue Health  Recent Flowsheet  Documentation  Taken 2/18/2025 0800 by Beulah Kelsey, RN  Device Skin Pressure Protection: skin-to-device areas padded   Goal Outcome Evaluation:

## 2025-02-18 NOTE — PAYOR COMM NOTE
"Scarlet Chavez (65 y.o. Female)     PLEASE SEE ATTACHED FOR CONTINUED INPT STAY DAYS    REF # [id number]     PLEASE CALL JOHN GRANDE RN/ DEPT @ [phone]   OR [phone]    THANK YOU  JOHN GRANDE RN  [facility]        Date of Birth   1960    Social Security Number       Address   [address] [place] [address]    Home Phone       MRN   [id number]       Catholic   Buddhism    Marital Status   Single                            Admission Date   2/10/25    Admission Type   Emergency    Admitting Provider   Zainab Regalado MD    Attending Provider   Zainab Regalado MD    Department, Room/Bed   [facility] INTENSIVE CARE, I381/1       Discharge Date       Discharge Disposition       Discharge Destination                                 Attending Provider: Zainab Regalado MD    Allergies: No Known Allergies    Isolation: None   Infection: None   Code Status: CPR    Ht: 152.4 cm (60\")   Wt: 42 kg (92 lb 9.5 oz)    Admission Cmt: None   Principal Problem: Acute hypercapnic respiratory failure [J96.02]                   Active Insurance as of 2/10/2025       Primary Coverage       Payor Plan Insurance Group Employer/Plan Group    [employer] HMO [id number]       Payor Plan Address Payor Plan Phone Number Payor Plan Fax Number Effective Dates    [address] [phone]  1/1/2024 - None Entered    [place] [id number]         Subscriber Name Subscriber Birth Date Member ID       SCARLET CHAVEZ 1960 [id number]                     Emergency Contacts        (Rel.) Home Phone Work Phone Mobile Phone    Yvonne Mccollum (Sister) [phone] -- --    Don Taylor (Significant Other) -- -- [phone]              [place]: [facility] [id number]  Tax ID [id number]     Physician Progress Notes (last 24 hours)        Holden Tracy MD at 02/18/25 0752              [facility] INPATIENT PROGRESS NOTE         [facility] " INTENSIVE CARE    2025      PATIENT IDENTIFICATION:  Name: Scarlet Chakraborty ADMIT: 2/10/2025   : 1960  PCP: Flavia Jaquez APRN    MRN: [id number] LOS: 8 days   AGE/SEX: 65 y.o. female  ROOM: [facility]                     LOS 8    Reason for visit: respiratory failure, COPD and critical care management.       SUBJECTIVE:      Sedated on ventilator on propofol and Precedex.  On pressor for blood pressure support.  A little more alert today and discussed with nursing staff.  Noted family meeting with palliative for goals of care today at 3 PM.  Will try spontaneous breathing trial again today.      Objective   OBJECTIVE:    Vital Sign Min/Max for last 24 hours  Temp  Min: 98.1 °F (36.7 °C)  Max: 99.9 °F (37.7 °C)   BP  Min: 84/56  Max: 158/69   Pulse  Min: 66  Max: 118   Resp  Min: 13  Max: 29   SpO2  Min: 88 %  Max: 99 %   No data recorded   Weight  Min: 42 kg (92 lb 9.5 oz)  Max: 42 kg (92 lb 9.5 oz)    Vitals:    25 0800 25 0817 25 0821 25 0822   BP: 131/54 122/49     BP Location: Left arm      Patient Position: Lying      Pulse: 72 68 86 90   Resp: 22 13     Temp: 98.5 °F (36.9 °C)      TempSrc: Oral      SpO2: 97% 98% 97% 97%   Weight:       Height:                25  0600 25  0230 25  0600   Weight: 41.5 kg (91 lb 7.9 oz) 39.7 kg (87 lb 8.4 oz) 42 kg (92 lb 9.5 oz)       Body mass index is 18.08 kg/m².        Mode: VC+/AC  FiO2 (%):  [30 %-99 %] 30 %  S RR:  [14] 14  S VT:  [375 mL] 375 mL  PEEP/CPAP (cm H2O):  [5 cm H20] 5 cm H20  UT SUP:  [10 cm H20] 10 cm H20  MAP (cm H2O):  [7.8-12] 8.5           FiO2 (%): 30 %     Body mass index is 18.08 kg/m².    Intake/Output Summary (Last 24 hours) at 2025 0912  Last data filed at 2025 0600  Gross per 24 hour   Intake 1586.18 ml   Output 650 ml   Net 936.18 ml         Exam:  GEN:  No distress, appears stated age  EYES:   PERRL, anicteric sclerae  ENT:    External ears/nose normal, OP clear  NECK:   No adenopathy, midline trachea  LUNGS: Normal chest on inspection, palpation and diminished bilateral breath sounds on auscultation  CV:  Normal S1S2, without murmur  ABD:  Nontender, nondistended, no hepatosplenomegaly, +BS  EXT:  No edema.  No cyanosis or clubbing.  No mottling and normal cap refill.    Assessment     Scheduled meds:  apixaban, 5 mg, Oral, Q12H  arformoterol, 15 mcg, Nebulization, BID - RT  budesonide, 0.5 mg, Nebulization, BID - RT  chlorhexidine, 15 mL, Mouth/Throat, Q12H  insulin regular, 2-9 Units, Subcutaneous, Q6H  ipratropium-albuterol, 3 mL, Nebulization, 4x Daily - RT  lansoprazole, 30 mg, Nasogastric, QAM AC  methylPREDNISolone sodium succinate, 80 mg, Intravenous, Q8H  morphine, 10 mg, Oral, Q8H  mupirocin, 1 Application, Each Nare, BID  piperacillin-tazobactam, 3.375 g, Intravenous, Q8H  senna-docusate sodium, 2 tablet, Oral, BID  sodium chloride, 10 mL, Intravenous, Q12H      IV meds:                      dexmedetomidine, 0.2-1.5 mcg/kg/hr, Last Rate: 1.1 mcg/kg/hr (02/18/25 0047)  fentanyl 10 mcg/mL,  mcg/hr, Last Rate: Stopped (02/16/25 1802)  phenylephrine, 0.5-3 mcg/kg/min, Last Rate: 0.3 mcg/kg/min (02/18/25 0754)  propofol, 5-50 mcg/kg/min, Last Rate: 20 mcg/kg/min (02/17/25 2240)      Data Review:  Results from last 7 days   Lab Units 02/18/25  0341 02/17/25  0422 02/16/25  0402 02/15/25  0425 02/14/25  0840   SODIUM mmol/L 140 139 135* 142 145   POTASSIUM mmol/L 4.3 4.5 4.1 4.5 4.9   CHLORIDE mmol/L 105 103 104 108* 106   CO2 mmol/L 28.0 28.0 27.3 28.0 33.0*   BUN mg/dL 22 25* 23 28* 31*   CREATININE mg/dL 0.38* 0.29* 0.22* 0.31* 0.39*   GLUCOSE mg/dL 126* 222* 173* 196* 178*   CALCIUM mg/dL 9.0 8.8 7.4* 9.0 10.0         Estimated Creatinine Clearance: 99.2 mL/min (A) (by C-G formula based on SCr of 0.38 mg/dL (L)).  Results from last 7 days   Lab Units 02/18/25  0341 02/17/25  0422 02/16/25  0402 02/15/25  0425 02/14/25  0448   WBC 10*3/mm3 18.62* 13.47* 12.40* 10.40  10.71   HEMOGLOBIN g/dL 12.6 12.0 12.6 13.2 13.1   PLATELETS 10*3/mm3 292 215 205 258 254                 Results from last 7 days   Lab Units 02/18/25  0355 02/17/25  0351 02/16/25  0345 02/15/25  1045 02/14/25  0509 02/14/25  0426 02/13/25  0850   PH, ARTERIAL pH units 7.511* 7.523* 7.449 7.408 7.376 7.288* 7.343*   PO2 ART mm Hg 62.3* 141.1* 87.7 71.0* 165.5* 70.5* 57.0*   PCO2, ARTERIAL mm Hg 35.0 40.0 50.2* 53.2* 62.7* 76.3* 64.1*   HCO3 ART mmol/L 28.0 32.9* 34.8* 33.6* 36.8* 36.5* 34.8*     Results from last 7 days   Lab Units 02/14/25  0426   LACTATE mmol/L 1.0         Glucose   Date/Time Value Ref Range Status   02/18/2025 0614 187 (H) 70 - 130 mg/dL Final   02/17/2025 2346 186 (H) 70 - 130 mg/dL Final   02/17/2025 1707 166 (H) 70 - 130 mg/dL Final   02/17/2025 1112 195 (H) 70 - 130 mg/dL Final   02/17/2025 0552 197 (H) 70 - 130 mg/dL Final   02/16/2025 2333 226 (H) 70 - 130 mg/dL Final   02/16/2025 1807 171 (H) 70 - 130 mg/dL Final         Imaging reviewed  Chest x-ray 2/18 reviewed          Microbiology reviewed           Active Hospital Problems    Diagnosis  POA    **Acute hypercapnic respiratory failure [J96.02]  Yes      Resolved Hospital Problems   No resolved problems to display.         ASSESSMENT:  End-stage COPD, FEV1 17% on [place] 11/9/23 with current exacerbation  Acute hypoxic and hypercapnic respiratory failure  Mucous plugging, s/p bronchoscopy 2/14/2025  Sinus tachycardia  Seasonal coronavirus infection: OC43  COPD cachexia  Hypotension, secondary to sedation and positive pressure ventilation  Ischemic cardiomyopathy  History of CVA, on Eliquis  History of Takotsubo cardiomyopathy      PLAN:  Plan Daily sedation vacation and spontaneous breathing trials.  Has failed multiple days.  Plan for family meeting with palliative today at 3 PM.  Tolerating tube feeds.  Scheduled and as needed bronchodilators and systemic steroids for COPD exacerbation.  Increasing infiltrate left lower showing  progressive pneumonia.  On antibiotics with Rocephin.  Changed to Zosyn 2/16.  Treatment for viral process is supportive.  On DOAC.  On morphine for anxiety with increased work of breathing.  This has made some improvement.  Control glucose.  On sliding scale insulin.  Decreasing steroids.  Ulcer prophylaxis.  Poor prognosis from end-stage lung disease.    Discussed with multidisciplinary ICU team on rounds this morning.        CCT: 35 min    Holden Tracy MD  Pulmonary and Critical Care Medicine  [facility]  2/18/2025    09:12 EST       Electronically signed by Holden Tracy MD at 02/18/25 0944

## 2025-02-18 NOTE — SIGNIFICANT NOTE
02/18/25 0821   Vent Information   Vent Serial Number [id number]   Settings   FiO2 (%) 30 %   PEEP/CPAP (cm H2O) 5 cm H20   Insp Rise Time (%) 70 %   Pressure Support (cm H2O) 10 cm H20   Trigger Sensitivity Flow (L/min) 3 L/min   Disconnect Sensitivity (%) 75 %   Readings   ETCO2 (mmHg) 28 mmHg   Resp Rate (Observed) Vent 33   I:E Ratio (Obs) 1:1.9   Vt Mandatory Ins (observed, mL) 215 mL   Vt Spontaneous (mL) (Obs) 195 mL   Vt, Exp (observed, mL) 195 mL   Minute Ventilation (L/min) (Obs) 7.49 L/min   PIP Observed (cm H2O) 15 cm H2O   MAP (cm H2O) 8.5   PEEP Observed cmH2O 4.9 cmH2O   Plateau Pressure (cm H2O) 0 cm H2O   Driving Pressure (cm H2O) -4.9 cm H2O   Static Compliance (L/cm H2O) 0   Dynamic Compliance (L/cm H2O) 33 L/cm H2O   Alarms   Insp Pressure High (cm H2O) 40 cm H2O   MV High (L/min) 24 L/min   MV Low (L/min) 4 L/min   Vt High (ML) 1000 ML   Vt Low (ML) 200 ML   Resp Rate High (bpm) 40 bpm   Apnea Interval (sec) 20 seconds   Apnea Resp Rate (bpm) 14 bpm   Apnea Volume (mL) 375   Apnea Peak Flow (L/min) 40 L/min   Apnea FIO2% 100 %   B = Both Spontaneous Awakening and Breathing Trials   Was patient receiving mechanical ventilation? Yes   Safety Screen Spontaneous Breathing Trial (SBT)  Proceed with SBT - No exclusion criteria met   Spontaneous Breathing Trial (SBT) Outcome Respiratory rate greater than 35/min - SBT Failure     Low vt 215 RR 34.   Pt back on AC/VC+

## 2025-02-18 NOTE — PROGRESS NOTES
[facility] INPATIENT PROGRESS NOTE         [facility] INTENSIVE CARE    2025      PATIENT IDENTIFICATION:  Name: Scarlet Chakraborty ADMIT: 2/10/2025   : 1960  PCP: Flavia Jaquez APRN    MRN: [id number] LOS: 8 days   AGE/SEX: 65 y.o. female  ROOM: [facility]                     LOS 8    Reason for visit: respiratory failure, COPD and critical care management.       SUBJECTIVE:      Sedated on ventilator on propofol and Precedex.  On pressor for blood pressure support.  A little more alert today and discussed with nursing staff.  Noted family meeting with palliative for goals of care today at 3 PM.  Will try spontaneous breathing trial again today.      Objective   OBJECTIVE:    Vital Sign Min/Max for last 24 hours  Temp  Min: 98.1 °F (36.7 °C)  Max: 99.9 °F (37.7 °C)   BP  Min: 84/56  Max: 158/69   Pulse  Min: 66  Max: 118   Resp  Min: 13  Max: 29   SpO2  Min: 88 %  Max: 99 %   No data recorded   Weight  Min: 42 kg (92 lb 9.5 oz)  Max: 42 kg (92 lb 9.5 oz)    Vitals:    25 0800 25 0817 25 0821 25 0822   BP: 131/54 122/49     BP Location: Left arm      Patient Position: Lying      Pulse: 72 68 86 90   Resp: 22 13     Temp: 98.5 °F (36.9 °C)      TempSrc: Oral      SpO2: 97% 98% 97% 97%   Weight:       Height:                25  0600 25  0230 25  06   Weight: 41.5 kg (91 lb 7.9 oz) 39.7 kg (87 lb 8.4 oz) 42 kg (92 lb 9.5 oz)       Body mass index is 18.08 kg/m².        Mode: VC+/AC  FiO2 (%):  [30 %-99 %] 30 %  S RR:  [14] 14  S VT:  [375 mL] 375 mL  PEEP/CPAP (cm H2O):  [5 cm H20] 5 cm H20  VA SUP:  [10 cm H20] 10 cm H20  MAP (cm H2O):  [7.8-12] 8.5           FiO2 (%): 30 %     Body mass index is 18.08 kg/m².    Intake/Output Summary (Last 24 hours) at 2025 0912  Last data filed at 2025 0600  Gross per 24 hour   Intake 1586.18 ml   Output 650 ml   Net 936.18 ml         Exam:  GEN:  No distress, appears stated age  EYES:   PERRL,  anicteric sclerae  ENT:    External ears/nose normal, OP clear  NECK:  No adenopathy, midline trachea  LUNGS: Normal chest on inspection, palpation and diminished bilateral breath sounds on auscultation  CV:  Normal S1S2, without murmur  ABD:  Nontender, nondistended, no hepatosplenomegaly, +BS  EXT:  No edema.  No cyanosis or clubbing.  No mottling and normal cap refill.    Assessment     Scheduled meds:  apixaban, 5 mg, Oral, Q12H  arformoterol, 15 mcg, Nebulization, BID - RT  budesonide, 0.5 mg, Nebulization, BID - RT  chlorhexidine, 15 mL, Mouth/Throat, Q12H  insulin regular, 2-9 Units, Subcutaneous, Q6H  ipratropium-albuterol, 3 mL, Nebulization, 4x Daily - RT  lansoprazole, 30 mg, Nasogastric, QAM AC  methylPREDNISolone sodium succinate, 80 mg, Intravenous, Q8H  morphine, 10 mg, Oral, Q8H  mupirocin, 1 Application, Each Nare, BID  piperacillin-tazobactam, 3.375 g, Intravenous, Q8H  senna-docusate sodium, 2 tablet, Oral, BID  sodium chloride, 10 mL, Intravenous, Q12H      IV meds:                      dexmedetomidine, 0.2-1.5 mcg/kg/hr, Last Rate: 1.1 mcg/kg/hr (02/18/25 0047)  fentanyl 10 mcg/mL,  mcg/hr, Last Rate: Stopped (02/16/25 1802)  phenylephrine, 0.5-3 mcg/kg/min, Last Rate: 0.3 mcg/kg/min (02/18/25 0754)  propofol, 5-50 mcg/kg/min, Last Rate: 20 mcg/kg/min (02/17/25 2240)      Data Review:  Results from last 7 days   Lab Units 02/18/25  0341 02/17/25  0422 02/16/25  0402 02/15/25  0425 02/14/25  0840   SODIUM mmol/L 140 139 135* 142 145   POTASSIUM mmol/L 4.3 4.5 4.1 4.5 4.9   CHLORIDE mmol/L 105 103 104 108* 106   CO2 mmol/L 28.0 28.0 27.3 28.0 33.0*   BUN mg/dL 22 25* 23 28* 31*   CREATININE mg/dL 0.38* 0.29* 0.22* 0.31* 0.39*   GLUCOSE mg/dL 126* 222* 173* 196* 178*   CALCIUM mg/dL 9.0 8.8 7.4* 9.0 10.0         Estimated Creatinine Clearance: 99.2 mL/min (A) (by C-G formula based on SCr of 0.38 mg/dL (L)).  Results from last 7 days   Lab Units 02/18/25  0341 02/17/25  0422 02/16/25  0402  02/15/25  0425 02/14/25  0448   WBC 10*3/mm3 18.62* 13.47* 12.40* 10.40 10.71   HEMOGLOBIN g/dL 12.6 12.0 12.6 13.2 13.1   PLATELETS 10*3/mm3 292 215 205 258 254                 Results from last 7 days   Lab Units 02/18/25  0355 02/17/25  0351 02/16/25  0345 02/15/25  1045 02/14/25  0509 02/14/25  0426 02/13/25  0850   PH, ARTERIAL pH units 7.511* 7.523* 7.449 7.408 7.376 7.288* 7.343*   PO2 ART mm Hg 62.3* 141.1* 87.7 71.0* 165.5* 70.5* 57.0*   PCO2, ARTERIAL mm Hg 35.0 40.0 50.2* 53.2* 62.7* 76.3* 64.1*   HCO3 ART mmol/L 28.0 32.9* 34.8* 33.6* 36.8* 36.5* 34.8*     Results from last 7 days   Lab Units 02/14/25  0426   LACTATE mmol/L 1.0         Glucose   Date/Time Value Ref Range Status   02/18/2025 0614 187 (H) 70 - 130 mg/dL Final   02/17/2025 2346 186 (H) 70 - 130 mg/dL Final   02/17/2025 1707 166 (H) 70 - 130 mg/dL Final   02/17/2025 1112 195 (H) 70 - 130 mg/dL Final   02/17/2025 0552 197 (H) 70 - 130 mg/dL Final   02/16/2025 2333 226 (H) 70 - 130 mg/dL Final   02/16/2025 1807 171 (H) 70 - 130 mg/dL Final         Imaging reviewed  Chest x-ray 2/18 reviewed          Microbiology reviewed            Active Hospital Problems    Diagnosis  POA    **Acute hypercapnic respiratory failure [J96.02]  Yes      Resolved Hospital Problems   No resolved problems to display.         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  Has failed multiple days.  Plan for family meeting with palliative today at 3 PM.  Tolerating tube feeds.  Scheduled and as needed bronchodilators and systemic  steroids for COPD exacerbation.  Increasing infiltrate left lower showing progressive pneumonia.  On antibiotics with Rocephin.  Changed to Zosyn 2/16.  Treatment for viral process is supportive.  On DOAC.  On morphine for anxiety with increased work of breathing.  This has made some improvement.  Control glucose.  On sliding scale insulin.  Decreasing steroids.  Ulcer prophylaxis.  Poor prognosis from end-stage lung disease.    Discussed with multidisciplinary ICU team on rounds this morning.        CCT: 35 min    Holden Tracy MD  Pulmonary and Critical Care Medicine  [facility], [facility]  2/18/2025    09:12 EST

## 2025-02-18 NOTE — CASE MANAGEMENT/SOCIAL WORK
Continued Stay Note  [facility]     Patient Name: Scarlet Chakraborty  MRN: [id number]  Today's Date: 2/18/2025    Admit Date: 2/10/2025    Plan: Still intubated. Palliative Team following.   Discharge Plan       Row Name 02/18/25 1144       Plan    Plan Still intubated. Palliative Team following.    Plan Comments Still intubated. Palliative Team following.  Will continue to follow for new or changing discharge needs. Anali CHAVEZ RN CCP                   Discharge Codes    No documentation.                 Expected Discharge Date and Time       Expected Discharge Date Expected Discharge Time    Feb 20, 2025               Anali Conrad RN

## 2025-02-18 NOTE — NURSING NOTE
I met with patient's family in a conference room. Present family members were:   Yvonne (sister), Don (significant other), Caio (brother), Traci (mother), Danita (Father), Julia (niece), and Urmila (niece).     We discussed goals of care at length. We discussed risks versus benefits of tracheostomy. We also discussed comfort care, 4 park, and [facility]. Family had several questions and all were answered satisfactorily. Family understands that a decision will need to be made in the near future, given today being day 7 of intubation. Family was grateful for all of the information and will take time to process and discuss. At this time no changes to goals of care. Palliative care will continue to follow.

## 2025-02-18 NOTE — NURSING NOTE
At about 0815, RN spoke with MD Tracy concerning pt care. MD said to go ahead and proceed with SAT and SBT. RN spoke with RT at about 0915 and SBT was attempted. Pt failed rt high RR and low TV. Family at bedside was educated. Will continue to monitor. BISI Baeza    Pt VSS throughout rest of shift. Neuro status gcs 11. Family at bedside. PT got full chg bath at 1600. Gtts adjusted per protocol (see EMAR). Will continue to monitor and pass report on to PM shift. BISI Baeza

## 2025-02-19 ENCOUNTER — APPOINTMENT (OUTPATIENT)
Dept: GENERAL RADIOLOGY | Facility: HOSPITAL | Age: 65
DRG: 207 | End: 2025-02-19
Payer: COMMERCIAL

## 2025-02-19 LAB
ANION GAP SERPL CALCULATED.3IONS-SCNC: 5.6 MMOL/L (ref 5–15)
ARTERIAL PATENCY WRIST A: POSITIVE
ARTERIAL PATENCY WRIST A: POSITIVE
ATMOSPHERIC PRESS: 759.8 MMHG
ATMOSPHERIC PRESS: 760 MMHG
BASE EXCESS BLDA CALC-SCNC: 4 MMOL/L (ref 0–2)
BASE EXCESS BLDA CALC-SCNC: 8 MMOL/L (ref 0–2)
BDY SITE: ABNORMAL
BDY SITE: ABNORMAL
BUN SERPL-MCNC: 19 MG/DL (ref 8–23)
BUN/CREAT SERPL: 54.3 (ref 7–25)
CALCIUM SPEC-SCNC: 8.7 MG/DL (ref 8.6–10.5)
CHLORIDE SERPL-SCNC: 106 MMOL/L (ref 98–107)
CO2 BLDA-SCNC: 30.3 MMOL/L (ref 23–27)
CO2 BLDA-SCNC: >32.45 MMOL/L (ref 23–27)
CO2 SERPL-SCNC: 30.4 MMOL/L (ref 22–29)
CREAT SERPL-MCNC: 0.35 MG/DL (ref 0.57–1)
DEPRECATED RDW RBC AUTO: 41.5 FL (ref 37–54)
DEVICE COMMENT: ABNORMAL
DEVICE COMMENT: ABNORMAL
EGFRCR SERPLBLD CKD-EPI 2021: 114.3 ML/MIN/1.73
ERYTHROCYTE [DISTWIDTH] IN BLOOD BY AUTOMATED COUNT: 12.2 % (ref 12.3–15.4)
GLUCOSE BLDC GLUCOMTR-MCNC: 150 MG/DL (ref 70–130)
GLUCOSE BLDC GLUCOMTR-MCNC: 152 MG/DL (ref 70–130)
GLUCOSE BLDC GLUCOMTR-MCNC: 189 MG/DL (ref 70–130)
GLUCOSE BLDC GLUCOMTR-MCNC: 231 MG/DL (ref 70–130)
GLUCOSE SERPL-MCNC: 147 MG/DL (ref 65–99)
HCO3 BLDA-SCNC: 29 MMOL/L (ref 22–28)
HCO3 BLDA-SCNC: 32.5 MMOL/L (ref 22–28)
HCT VFR BLD AUTO: 40.5 % (ref 34–46.6)
HEMODILUTION: NO
HEMODILUTION: NO
HGB BLD-MCNC: 13.3 G/DL (ref 12–15.9)
INHALED O2 CONCENTRATION: 40 %
INHALED O2 CONCENTRATION: 40 %
INSPIRATORY TIME: 1
MCH RBC QN AUTO: 30.2 PG (ref 26.6–33)
MCHC RBC AUTO-ENTMCNC: 32.8 G/DL (ref 31.5–35.7)
MCV RBC AUTO: 92 FL (ref 79–97)
MODALITY: ABNORMAL
MODALITY: ABNORMAL
O2 A-A PPRESDIFF RESPIRATORY: 0.4 MMHG
O2 A-A PPRESDIFF RESPIRATORY: 0.4 MMHG
PAW @ PEAK INSP FLOW SETTING VENT: 17 CMH2O
PCO2 BLDA: 43.7 MM HG (ref 35–45)
PCO2 BLDA: 44 MM HG (ref 35–45)
PEEP RESPIRATORY: 5 CM[H2O]
PEEP RESPIRATORY: 5 CM[H2O]
PH BLDA: 7.43 PH UNITS (ref 7.35–7.45)
PH BLDA: 7.48 PH UNITS (ref 7.35–7.45)
PLATELET # BLD AUTO: 287 10*3/MM3 (ref 140–450)
PMV BLD AUTO: 11.1 FL (ref 6–12)
PO2 BLD: 224 MM[HG] (ref 0–500)
PO2 BLD: 238 MM[HG] (ref 0–500)
PO2 BLDA: 89.5 MM HG (ref 80–100)
PO2 BLDA: 95.3 MM HG (ref 80–100)
POTASSIUM SERPL-SCNC: 4.6 MMOL/L (ref 3.5–5.2)
RBC # BLD AUTO: 4.4 10*6/MM3 (ref 3.77–5.28)
SAO2 % BLDCOA: 97.1 % (ref 92–98.5)
SAO2 % BLDCOA: 97.8 % (ref 92–98.5)
SET MECH RESP RATE: 14
SODIUM SERPL-SCNC: 142 MMOL/L (ref 136–145)
TOTAL RATE: 15 BREATHS/MINUTE
TOTAL RATE: 17 BREATHS/MINUTE
VENTILATOR MODE: ABNORMAL
VENTILATOR MODE: ABNORMAL
VT ON VENT VENT: 375 ML
VT ON VENT VENT: 432 ML
WBC NRBC COR # BLD AUTO: 16.57 10*3/MM3 (ref 3.4–10.8)

## 2025-02-19 PROCEDURE — 25010000002 PROPOFOL 10 MG/ML EMULSION: Performed by: INTERNAL MEDICINE

## 2025-02-19 PROCEDURE — 63710000001 INSULIN REGULAR HUMAN PER 5 UNITS: Performed by: INTERNAL MEDICINE

## 2025-02-19 PROCEDURE — 94761 N-INVAS EAR/PLS OXIMETRY MLT: CPT

## 2025-02-19 PROCEDURE — 94664 DEMO&/EVAL PT USE INHALER: CPT

## 2025-02-19 PROCEDURE — 94799 UNLISTED PULMONARY SVC/PX: CPT

## 2025-02-19 PROCEDURE — 85027 COMPLETE CBC AUTOMATED: CPT | Performed by: INTERNAL MEDICINE

## 2025-02-19 PROCEDURE — 80048 BASIC METABOLIC PNL TOTAL CA: CPT | Performed by: INTERNAL MEDICINE

## 2025-02-19 PROCEDURE — 25010000002 PIPERACILLIN SOD-TAZOBACTAM PER 1 G: Performed by: INTERNAL MEDICINE

## 2025-02-19 PROCEDURE — 82803 BLOOD GASES ANY COMBINATION: CPT

## 2025-02-19 PROCEDURE — 94760 N-INVAS EAR/PLS OXIMETRY 1: CPT

## 2025-02-19 PROCEDURE — 36600 WITHDRAWAL OF ARTERIAL BLOOD: CPT

## 2025-02-19 PROCEDURE — 71045 X-RAY EXAM CHEST 1 VIEW: CPT

## 2025-02-19 PROCEDURE — 82948 REAGENT STRIP/BLOOD GLUCOSE: CPT

## 2025-02-19 PROCEDURE — 94003 VENT MGMT INPAT SUBQ DAY: CPT

## 2025-02-19 PROCEDURE — 25010000002 METHYLPREDNISOLONE PER 40 MG: Performed by: INTERNAL MEDICINE

## 2025-02-19 RX ADMIN — IPRATROPIUM BROMIDE AND ALBUTEROL SULFATE 3 ML: .5; 3 SOLUTION RESPIRATORY (INHALATION) at 07:41

## 2025-02-19 RX ADMIN — INSULIN HUMAN 2 UNITS: 100 INJECTION, SOLUTION PARENTERAL at 06:46

## 2025-02-19 RX ADMIN — INSULIN HUMAN 2 UNITS: 100 INJECTION, SOLUTION PARENTERAL at 17:48

## 2025-02-19 RX ADMIN — DEXMEDETOMIDINE HYDROCHLORIDE 1.3 MCG/KG/HR: 400 INJECTION INTRAVENOUS at 05:19

## 2025-02-19 RX ADMIN — INSULIN HUMAN 4 UNITS: 100 INJECTION, SOLUTION PARENTERAL at 12:04

## 2025-02-19 RX ADMIN — IPRATROPIUM BROMIDE AND ALBUTEROL SULFATE 3 ML: .5; 3 SOLUTION RESPIRATORY (INHALATION) at 12:12

## 2025-02-19 RX ADMIN — PIPERACILLIN AND TAZOBACTAM 3.38 G: 3; .375 INJECTION, POWDER, FOR SOLUTION INTRAVENOUS at 08:10

## 2025-02-19 RX ADMIN — BUDESONIDE 0.5 MG: 0.5 INHALANT RESPIRATORY (INHALATION) at 07:41

## 2025-02-19 RX ADMIN — SENNOSIDES AND DOCUSATE SODIUM 2 TABLET: 50; 8.6 TABLET ORAL at 08:10

## 2025-02-19 RX ADMIN — Medication 10 ML: at 20:03

## 2025-02-19 RX ADMIN — METHYLPREDNISOLONE SODIUM SUCCINATE 40 MG: 40 INJECTION, POWDER, FOR SOLUTION INTRAMUSCULAR; INTRAVENOUS at 17:48

## 2025-02-19 RX ADMIN — IPRATROPIUM BROMIDE AND ALBUTEROL SULFATE 3 ML: .5; 3 SOLUTION RESPIRATORY (INHALATION) at 15:41

## 2025-02-19 RX ADMIN — MORPHINE SULFATE 10 MG: 20 SOLUTION ORAL at 10:48

## 2025-02-19 RX ADMIN — MORPHINE SULFATE 10 MG: 20 SOLUTION ORAL at 02:19

## 2025-02-19 RX ADMIN — ARFORMOTEROL TARTRATE 15 MCG: 15 SOLUTION RESPIRATORY (INHALATION) at 20:03

## 2025-02-19 RX ADMIN — PIPERACILLIN AND TAZOBACTAM 3.38 G: 3; .375 INJECTION, POWDER, FOR SOLUTION INTRAVENOUS at 01:16

## 2025-02-19 RX ADMIN — APIXABAN 5 MG: 5 TABLET, FILM COATED ORAL at 20:02

## 2025-02-19 RX ADMIN — Medication 10 ML: at 09:45

## 2025-02-19 RX ADMIN — CHLORHEXIDINE GLUCONATE 15 ML: 1.2 RINSE ORAL at 20:03

## 2025-02-19 RX ADMIN — PROPOFOL 30 MCG/KG/MIN: 10 INJECTION, EMULSION INTRAVENOUS at 05:18

## 2025-02-19 RX ADMIN — METHYLPREDNISOLONE SODIUM SUCCINATE 40 MG: 40 INJECTION, POWDER, FOR SOLUTION INTRAMUSCULAR; INTRAVENOUS at 10:48

## 2025-02-19 RX ADMIN — MUPIROCIN 1 APPLICATION: 20 OINTMENT TOPICAL at 08:12

## 2025-02-19 RX ADMIN — INSULIN HUMAN 2 UNITS: 100 INJECTION, SOLUTION PARENTERAL at 01:15

## 2025-02-19 RX ADMIN — ARFORMOTEROL TARTRATE 15 MCG: 15 SOLUTION RESPIRATORY (INHALATION) at 07:41

## 2025-02-19 RX ADMIN — METHYLPREDNISOLONE SODIUM SUCCINATE 40 MG: 40 INJECTION, POWDER, FOR SOLUTION INTRAMUSCULAR; INTRAVENOUS at 02:19

## 2025-02-19 RX ADMIN — MUPIROCIN 1 APPLICATION: 20 OINTMENT TOPICAL at 20:03

## 2025-02-19 RX ADMIN — BUDESONIDE 0.5 MG: 0.5 INHALANT RESPIRATORY (INHALATION) at 20:00

## 2025-02-19 RX ADMIN — METOPROLOL TARTRATE 5 MG: 1 INJECTION, SOLUTION INTRAVENOUS at 17:21

## 2025-02-19 RX ADMIN — LANSOPRAZOLE 30 MG: 15 TABLET, ORALLY DISINTEGRATING ORAL at 06:46

## 2025-02-19 RX ADMIN — IPRATROPIUM BROMIDE AND ALBUTEROL SULFATE 3 ML: .5; 3 SOLUTION RESPIRATORY (INHALATION) at 19:55

## 2025-02-19 RX ADMIN — MORPHINE SULFATE 10 MG: 20 SOLUTION ORAL at 17:47

## 2025-02-19 RX ADMIN — APIXABAN 5 MG: 5 TABLET, FILM COATED ORAL at 08:10

## 2025-02-19 RX ADMIN — PIPERACILLIN AND TAZOBACTAM 3.38 G: 3; .375 INJECTION, POWDER, FOR SOLUTION INTRAVENOUS at 17:48

## 2025-02-19 NOTE — PLAN OF CARE
Goal Outcome Evaluation:         Pt extubated this AM, tolerating 2L NC.  Pt alert and oriented and weakly moving all extremities. HR increased to the 120s around 1600.  Dr. Tracy notified and lopressor 5 mg IV ordered.  HR down to high 90s post med administration.  Cortrak in place, pt tolerating TF.  Multiple Bms, adequate UOP.  Pt and family educated throughout the shift.

## 2025-02-19 NOTE — PLAN OF CARE
Goal Outcome Evaluation:         Patient remains sedated on Vent with  wrist restraints.  Phenylephrine drip continues.  Tube  feeding at goal.

## 2025-02-19 NOTE — NURSING NOTE
I visited with family at the bedside prior to extubation. Family is in agreement that if patient does not do well without ventilator support they will not have her re-intubated. They are willing to try bipap if respiratory more respiratory support is required, but if that is unsuccessful, they will opt for comfort measures. Palliative care will continue to follow.

## 2025-02-19 NOTE — PROGRESS NOTES
[facility] INPATIENT PROGRESS NOTE         [facility] INTENSIVE CARE    2025      PATIENT IDENTIFICATION:  Name: Scarlet Chakraborty ADMIT: 2/10/2025   : 1960  PCP: Flavia Jaquez APRN    MRN: [id number] LOS: 9 days   AGE/SEX: 65 y.o. female  ROOM: [facility]                     LOS 9    Reason for visit: respiratory failure, COPD and critical care management.       SUBJECTIVE:      Calm and cooperative.  On spontaneous settings.  Discussed with family at bedside.  Plan for spontaneous breathing trial.  May be ready to extubate today.  Patient is DNR.      Objective   OBJECTIVE:    Vital Sign Min/Max for last 24 hours  Temp  Min: 97.2 °F (36.2 °C)  Max: 98.5 °F (36.9 °C)   BP  Min: 79/49  Max: 167/72   Pulse  Min: 57  Max: 112   Resp  Min: 16  Max: 25   SpO2  Min: 95 %  Max: 99 %   No data recorded   Weight  Min: 39.9 kg (87 lb 15.4 oz)  Max: 39.9 kg (87 lb 15.4 oz)    Vitals:    25 0600 25 0615 25 0630 25 0745   BP: 152/59 144/57 145/51    Pulse: 57 63 64 60   Resp:    16   Temp:       TempSrc:       SpO2: 98% 98% 98% 98%   Weight:       Height:                25  0230 25  0600 25  0519   Weight: 39.7 kg (87 lb 8.4 oz) 42 kg (92 lb 9.5 oz) 39.9 kg (87 lb 15.4 oz)       Body mass index is 17.18 kg/m².        Mode: PS  FiO2 (%):  [40 %] 40 %  S RR:  [14] 14  S VT:  [375 mL] 375 mL  PEEP/CPAP (cm H2O):  [5 cm H20] 5 cm H20  OH SUP:  [8 cm H20-10 cm H20] 8 cm H20  MAP (cm H2O):  [7.7-10] 7.7           FiO2 (%): 40 %     Body mass index is 17.18 kg/m².    Intake/Output Summary (Last 24 hours) at 2025 0924  Last data filed at 2025 0519  Gross per 24 hour   Intake 1904.54 ml   Output 900 ml   Net 1004.54 ml         Exam:  GEN:  No distress, appears stated age  EYES:   PERRL, anicteric sclerae  ENT:    External ears/nose normal, OP clear  NECK:  No adenopathy, midline trachea  LUNGS: Normal chest on inspection, palpation and diminished  bilateral breath sounds on auscultation  CV:  Normal S1S2, without murmur  ABD:  Nontender, nondistended, no hepatosplenomegaly, +BS  EXT:  No edema.  No cyanosis or clubbing.  No mottling and normal cap refill.    Assessment     Scheduled meds:  apixaban, 5 mg, Oral, Q12H  arformoterol, 15 mcg, Nebulization, BID - RT  budesonide, 0.5 mg, Nebulization, BID - RT  chlorhexidine, 15 mL, Mouth/Throat, Q12H  insulin regular, 2-9 Units, Subcutaneous, Q6H  ipratropium-albuterol, 3 mL, Nebulization, 4x Daily - RT  lansoprazole, 30 mg, Nasogastric, QAM AC  methylPREDNISolone sodium succinate, 40 mg, Intravenous, Q8H  morphine, 10 mg, Oral, Q8H  mupirocin, 1 Application, Each Nare, BID  piperacillin-tazobactam, 3.375 g, Intravenous, Q8H  senna-docusate sodium, 2 tablet, Oral, BID  sodium chloride, 10 mL, Intravenous, Q12H      IV meds:                      dexmedetomidine, 0.2-1.5 mcg/kg/hr, Last Rate: 1.3 mcg/kg/hr (02/19/25 0519)  phenylephrine, 0.5-3 mcg/kg/min, Last Rate: 0.4 mcg/kg/min (02/19/25 0603)  propofol, 5-50 mcg/kg/min, Last Rate: 30 mcg/kg/min (02/19/25 0518)      Data Review:  Results from last 7 days   Lab Units 02/19/25  0407 02/18/25  0341 02/17/25  0422 02/16/25  0402 02/15/25  0425   SODIUM mmol/L 142 140 139 135* 142   POTASSIUM mmol/L 4.6 4.3 4.5 4.1 4.5   CHLORIDE mmol/L 106 105 103 104 108*   CO2 mmol/L 30.4* 28.0 28.0 27.3 28.0   BUN mg/dL 19 22 25* 23 28*   CREATININE mg/dL 0.35* 0.38* 0.29* 0.22* 0.31*   GLUCOSE mg/dL 147* 126* 222* 173* 196*   CALCIUM mg/dL 8.7 9.0 8.8 7.4* 9.0         Estimated Creatinine Clearance: 102.3 mL/min (A) (by C-G formula based on SCr of 0.35 mg/dL (L)).  Results from last 7 days   Lab Units 02/19/25  0407 02/18/25  0341 02/17/25  0422 02/16/25  0402 02/15/25  0425   WBC 10*3/mm3 16.57* 18.62* 13.47* 12.40* 10.40   HEMOGLOBIN g/dL 13.3 12.6 12.0 12.6 13.2   PLATELETS 10*3/mm3 287 292 215 205 258                 Results from last 7 days   Lab Units 02/19/25  0414  02/18/25  0355 02/17/25  0351 02/16/25  0345 02/15/25  1045 02/14/25  0509 02/14/25  0426   PH, ARTERIAL pH units 7.477* 7.511* 7.523* 7.449 7.408 7.376 7.288*   PO2 ART mm Hg 95.3 62.3* 141.1* 87.7 71.0* 165.5* 70.5*   PCO2, ARTERIAL mm Hg 44.0 35.0 40.0 50.2* 53.2* 62.7* 76.3*   HCO3 ART mmol/L 32.5* 28.0 32.9* 34.8* 33.6* 36.8* 36.5*     Results from last 7 days   Lab Units 02/14/25  0426   LACTATE mmol/L 1.0         Glucose   Date/Time Value Ref Range Status   02/19/2025 0643 150 (H) 70 - 130 mg/dL Final   02/19/2025 0102 189 (H) 70 - 130 mg/dL Final   02/18/2025 1817 262 (H) 70 - 130 mg/dL Final   02/18/2025 1703 235 (H) 70 - 130 mg/dL Final   02/18/2025 1115 234 (H) 70 - 130 mg/dL Final   02/18/2025 0614 187 (H) 70 - 130 mg/dL Final   02/17/2025 2346 186 (H) 70 - 130 mg/dL Final         Imaging reviewed  Chest x-ray 2/19 reviewed          Microbiology reviewed            Active Hospital Problems    Diagnosis  POA    **Acute hypercapnic respiratory failure [J96.02]  Yes      Resolved Hospital Problems   No resolved problems to display.         ASSESSMENT:  End-stage COPD, FEV1 17% on [place] 11/9/23 with current exacerbation  Acute hypoxic and hypercapnic respiratory failure  Mucous plugging, s/p bronchoscopy 2/14/2025  Sinus tachycardia  Seasonal coronavirus infection: OC43  COPD cachexia  Hypotension, secondary to sedation and positive pressure ventilation  Ischemic cardiomyopathy  History of CVA, on Eliquis  History of Takotsubo cardiomyopathy      PLAN:  Plan Daily sedation vacation and spontaneous breathing trials.  Has failed multiple days.  Discussed with family.  They met with palliative yesterday.  They do not want to code her.  If able to extubate today and does worse we will treat with BiPAP but not reintubate.  Tolerating tube feeds.  Scheduled and as needed bronchodilators and systemic steroids for COPD exacerbation.  Increasing infiltrate left lower on most recent chest x-ray showing progressive  pneumonia.  On antibiotics with Rocephin.  Changed to Zosyn 2/16.  Treatment for viral process is supportive.  On DOAC.  On morphine for anxiety with increased work of breathing.  This has made some improvement.  Control glucose.  On sliding scale insulin.  Decreasing steroids.  And hyperglycemia improving with that.  Control blood pressure.  Pressor for blood pressure support as needed.  Ulcer prophylaxis.  Poor prognosis from end-stage lung disease.    Discussed with multidisciplinary ICU team on rounds this morning.        CCT: 35 min    Holden Tracy MD  Pulmonary and Critical Care Medicine  [facility], [facility]  2/19/2025    09:24 EST

## 2025-02-20 ENCOUNTER — APPOINTMENT (OUTPATIENT)
Dept: GENERAL RADIOLOGY | Facility: HOSPITAL | Age: 65
DRG: 207 | End: 2025-02-20
Payer: COMMERCIAL

## 2025-02-20 ENCOUNTER — APPOINTMENT (OUTPATIENT)
Dept: CT IMAGING | Facility: HOSPITAL | Age: 65
DRG: 207 | End: 2025-02-20
Payer: COMMERCIAL

## 2025-02-20 LAB
ANION GAP SERPL CALCULATED.3IONS-SCNC: 4.6 MMOL/L (ref 5–15)
BUN SERPL-MCNC: 18 MG/DL (ref 8–23)
BUN/CREAT SERPL: 60 (ref 7–25)
CALCIUM SPEC-SCNC: 8.7 MG/DL (ref 8.6–10.5)
CHLORIDE SERPL-SCNC: 105 MMOL/L (ref 98–107)
CO2 SERPL-SCNC: 28.4 MMOL/L (ref 22–29)
CREAT SERPL-MCNC: 0.3 MG/DL (ref 0.57–1)
DEPRECATED RDW RBC AUTO: 42.6 FL (ref 37–54)
EGFRCR SERPLBLD CKD-EPI 2021: 118.6 ML/MIN/1.73
ERYTHROCYTE [DISTWIDTH] IN BLOOD BY AUTOMATED COUNT: 12.5 % (ref 12.3–15.4)
GLUCOSE BLDC GLUCOMTR-MCNC: 139 MG/DL (ref 70–130)
GLUCOSE BLDC GLUCOMTR-MCNC: 150 MG/DL (ref 70–130)
GLUCOSE BLDC GLUCOMTR-MCNC: 156 MG/DL (ref 70–130)
GLUCOSE BLDC GLUCOMTR-MCNC: 173 MG/DL (ref 70–130)
GLUCOSE SERPL-MCNC: 115 MG/DL (ref 65–99)
HCT VFR BLD AUTO: 42.1 % (ref 34–46.6)
HGB BLD-MCNC: 13.9 G/DL (ref 12–15.9)
MCH RBC QN AUTO: 31 PG (ref 26.6–33)
MCHC RBC AUTO-ENTMCNC: 33 G/DL (ref 31.5–35.7)
MCV RBC AUTO: 93.8 FL (ref 79–97)
PLATELET # BLD AUTO: 308 10*3/MM3 (ref 140–450)
PMV BLD AUTO: 11.2 FL (ref 6–12)
POTASSIUM SERPL-SCNC: 4.7 MMOL/L (ref 3.5–5.2)
RBC # BLD AUTO: 4.49 10*6/MM3 (ref 3.77–5.28)
SODIUM SERPL-SCNC: 138 MMOL/L (ref 136–145)
WBC NRBC COR # BLD AUTO: 14.98 10*3/MM3 (ref 3.4–10.8)

## 2025-02-20 PROCEDURE — 94760 N-INVAS EAR/PLS OXIMETRY 1: CPT

## 2025-02-20 PROCEDURE — 63710000001 INSULIN REGULAR HUMAN PER 5 UNITS: Performed by: INTERNAL MEDICINE

## 2025-02-20 PROCEDURE — 0042T HC CT CEREBRAL PERFUSION W/WO CONTRAST: CPT

## 2025-02-20 PROCEDURE — 82948 REAGENT STRIP/BLOOD GLUCOSE: CPT

## 2025-02-20 PROCEDURE — 94799 UNLISTED PULMONARY SVC/PX: CPT

## 2025-02-20 PROCEDURE — 25010000002 PIPERACILLIN SOD-TAZOBACTAM PER 1 G: Performed by: INTERNAL MEDICINE

## 2025-02-20 PROCEDURE — 70498 CT ANGIOGRAPHY NECK: CPT

## 2025-02-20 PROCEDURE — 94761 N-INVAS EAR/PLS OXIMETRY MLT: CPT

## 2025-02-20 PROCEDURE — 80048 BASIC METABOLIC PNL TOTAL CA: CPT | Performed by: INTERNAL MEDICINE

## 2025-02-20 PROCEDURE — 25510000001 IOPAMIDOL PER 1 ML: Performed by: INTERNAL MEDICINE

## 2025-02-20 PROCEDURE — 94664 DEMO&/EVAL PT USE INHALER: CPT

## 2025-02-20 PROCEDURE — 99222 1ST HOSP IP/OBS MODERATE 55: CPT | Performed by: STUDENT IN AN ORGANIZED HEALTH CARE EDUCATION/TRAINING PROGRAM

## 2025-02-20 PROCEDURE — 85027 COMPLETE CBC AUTOMATED: CPT | Performed by: INTERNAL MEDICINE

## 2025-02-20 PROCEDURE — 70496 CT ANGIOGRAPHY HEAD: CPT

## 2025-02-20 PROCEDURE — 4A03X5D MEASUREMENT OF ARTERIAL FLOW, INTRACRANIAL, EXTERNAL APPROACH: ICD-10-PCS | Performed by: RADIOLOGY

## 2025-02-20 PROCEDURE — 25010000002 METHYLPREDNISOLONE PER 40 MG: Performed by: INTERNAL MEDICINE

## 2025-02-20 RX ORDER — METHYLPREDNISOLONE SODIUM SUCCINATE 40 MG/ML
40 INJECTION, POWDER, LYOPHILIZED, FOR SOLUTION INTRAMUSCULAR; INTRAVENOUS EVERY 12 HOURS
Status: DISCONTINUED | OUTPATIENT
Start: 2025-02-20 | End: 2025-02-21

## 2025-02-20 RX ORDER — IOPAMIDOL 755 MG/ML
150 INJECTION, SOLUTION INTRAVASCULAR
Status: COMPLETED | OUTPATIENT
Start: 2025-02-20 | End: 2025-02-20

## 2025-02-20 RX ADMIN — METHYLPREDNISOLONE SODIUM SUCCINATE 40 MG: 40 INJECTION, POWDER, FOR SOLUTION INTRAMUSCULAR; INTRAVENOUS at 21:53

## 2025-02-20 RX ADMIN — METHYLPREDNISOLONE SODIUM SUCCINATE 40 MG: 40 INJECTION, POWDER, FOR SOLUTION INTRAMUSCULAR; INTRAVENOUS at 09:55

## 2025-02-20 RX ADMIN — APIXABAN 5 MG: 5 TABLET, FILM COATED ORAL at 20:12

## 2025-02-20 RX ADMIN — IPRATROPIUM BROMIDE AND ALBUTEROL SULFATE 3 ML: .5; 3 SOLUTION RESPIRATORY (INHALATION) at 07:37

## 2025-02-20 RX ADMIN — BUDESONIDE 0.5 MG: 0.5 INHALANT RESPIRATORY (INHALATION) at 19:21

## 2025-02-20 RX ADMIN — APIXABAN 5 MG: 5 TABLET, FILM COATED ORAL at 08:29

## 2025-02-20 RX ADMIN — MORPHINE SULFATE 10 MG: 20 SOLUTION ORAL at 09:55

## 2025-02-20 RX ADMIN — CHLORHEXIDINE GLUCONATE 15 ML: 1.2 RINSE ORAL at 10:02

## 2025-02-20 RX ADMIN — PIPERACILLIN AND TAZOBACTAM 3.38 G: 3; .375 INJECTION, POWDER, FOR SOLUTION INTRAVENOUS at 08:45

## 2025-02-20 RX ADMIN — ARFORMOTEROL TARTRATE 15 MCG: 15 SOLUTION RESPIRATORY (INHALATION) at 07:37

## 2025-02-20 RX ADMIN — Medication 10 ML: at 20:12

## 2025-02-20 RX ADMIN — ARFORMOTEROL TARTRATE 15 MCG: 15 SOLUTION RESPIRATORY (INHALATION) at 19:21

## 2025-02-20 RX ADMIN — PIPERACILLIN AND TAZOBACTAM 3.38 G: 3; .375 INJECTION, POWDER, FOR SOLUTION INTRAVENOUS at 00:22

## 2025-02-20 RX ADMIN — IPRATROPIUM BROMIDE AND ALBUTEROL SULFATE 3 ML: .5; 3 SOLUTION RESPIRATORY (INHALATION) at 19:21

## 2025-02-20 RX ADMIN — PIPERACILLIN AND TAZOBACTAM 3.38 G: 3; .375 INJECTION, POWDER, FOR SOLUTION INTRAVENOUS at 17:22

## 2025-02-20 RX ADMIN — METHYLPREDNISOLONE SODIUM SUCCINATE 40 MG: 40 INJECTION, POWDER, FOR SOLUTION INTRAMUSCULAR; INTRAVENOUS at 02:59

## 2025-02-20 RX ADMIN — LANSOPRAZOLE 30 MG: 15 TABLET, ORALLY DISINTEGRATING ORAL at 07:34

## 2025-02-20 RX ADMIN — MORPHINE SULFATE 10 MG: 20 SOLUTION ORAL at 02:59

## 2025-02-20 RX ADMIN — IPRATROPIUM BROMIDE AND ALBUTEROL SULFATE 3 ML: .5; 3 SOLUTION RESPIRATORY (INHALATION) at 15:31

## 2025-02-20 RX ADMIN — IPRATROPIUM BROMIDE AND ALBUTEROL SULFATE 3 ML: .5; 3 SOLUTION RESPIRATORY (INHALATION) at 11:04

## 2025-02-20 RX ADMIN — IOPAMIDOL 145 ML: 755 INJECTION, SOLUTION INTRAVENOUS at 14:19

## 2025-02-20 RX ADMIN — INSULIN HUMAN 2 UNITS: 100 INJECTION, SOLUTION PARENTERAL at 12:01

## 2025-02-20 RX ADMIN — BUDESONIDE 0.5 MG: 0.5 INHALANT RESPIRATORY (INHALATION) at 07:37

## 2025-02-20 RX ADMIN — Medication 10 ML: at 09:57

## 2025-02-20 RX ADMIN — INSULIN HUMAN 2 UNITS: 100 INJECTION, SOLUTION PARENTERAL at 00:22

## 2025-02-20 RX ADMIN — CHLORHEXIDINE GLUCONATE 15 ML: 1.2 RINSE ORAL at 20:12

## 2025-02-20 RX ADMIN — MUPIROCIN 1 APPLICATION: 20 OINTMENT TOPICAL at 08:30

## 2025-02-20 RX ADMIN — MORPHINE SULFATE 10 MG: 20 SOLUTION ORAL at 18:52

## 2025-02-20 NOTE — PAYOR COMM NOTE
"Scarlet Chavez (65 y.o. Female)     PLEASE SEE ATTACHED FOR CONTINUED INPT STAY DAYS    REF #   [id number]    PLEASE CALL JOHN GRANDE RN/ DEPT @ [phone]   OR [phone]    THANK YOU  JOHN GRANDE RN  [facility]        Date of Birth   1960    Social Security Number       Address   [address] [place] [address]    Home Phone       MRN   [id number]       Adventism   Gnosticism    Marital Status   Single                            Admission Date   2/10/25    Admission Type   Emergency    Admitting Provider   Zainab Regalado MD    Attending Provider   Zainab Regalado MD    Department, Room/Bed   [facility] INTENSIVE CARE, I381/1       Discharge Date       Discharge Disposition       Discharge Destination                                 Attending Provider: Zainab Regalado MD    Allergies: No Known Allergies    Isolation: None   Infection: None   Code Status: No CPR    Ht: 152.4 cm (60\")   Wt: 39.9 kg (87 lb 15.4 oz)    Admission Cmt: None   Principal Problem: Acute hypercapnic respiratory failure [J96.02]                   Active Insurance as of 2/10/2025       Primary Coverage       Payor Plan Insurance Group Employer/Plan Group    [employer] HMO [id number]       Payor Plan Address Payor Plan Phone Number Payor Plan Fax Number Effective Dates    [address] [phone]  1/1/2024 - None Entered    [place] [address]         Subscriber Name Subscriber Birth Date Member ID       SCARLET CHAVEZ 1960 [id number]                     Emergency Contacts        (Rel.) Home Phone Work Phone Mobile Phone    Yvonne Mccollum (Sister) [phone] -- --    Don Taylor (Significant Other) -- -- [phone]              [place]: NPI [id number]  Tax ID [id number]     Physician Progress Notes (last 48 hours)        Holden Tracy MD at 02/20/25 0719              [facility] INPATIENT PROGRESS NOTE         [facility] " [place] INTENSIVE CARE    2025      PATIENT IDENTIFICATION:  Name: Scarlet Chakraborty ADMIT: 2/10/2025   : 1960  PCP: Flavia Jaquez APRN    MRN: [id number] LOS: 10 days   AGE/SEX: 65 y.o. female  ROOM: [facility]                     LOS 10    Reason for visit: respiratory failure, COPD and critical care management.       SUBJECTIVE:      Alert and oriented.  Complains of some anxiety but overall looks much better.  Successfully extubated.  Plan for swallow evaluation and hope to be able to get the [place] out as well.      Objective   OBJECTIVE:    Vital Sign Min/Max for last 24 hours  Temp  Min: 97.5 °F (36.4 °C)  Max: 98.6 °F (37 °C)   BP  Min: 85/54  Max: 146/126   Pulse  Min: 89  Max: 122   Resp  Min: 16  Max: 18   SpO2  Min: 92 %  Max: 100 %   No data recorded   No data recorded    Vitals:    25 0700 25 0717 25 0741 25 0800   BP: 120/81      Pulse: 116  109    Resp:   18 18   Temp:  97.5 °F (36.4 °C)     TempSrc:  Oral     SpO2: 92%  93%    Weight:       Height:                25  0230 25  0600 25  0519   Weight: 39.7 kg (87 lb 8.4 oz) 42 kg (92 lb 9.5 oz) 39.9 kg (87 lb 15.4 oz)       Body mass index is 17.18 kg/m².                    FiO2 (%): 40 %     Body mass index is 17.18 kg/m².    Intake/Output Summary (Last 24 hours) at 2025 1054  Last data filed at 2025 0800  Gross per 24 hour   Intake 2154 ml   Output 3100 ml   Net -946 ml         Exam:  GEN:  No distress, appears stated age  EYES:   PERRL, anicteric sclerae  ENT:    External ears/nose normal, OP clear  NECK:  No adenopathy, midline trachea  LUNGS: Normal chest on inspection, palpation and diminished bilateral breath sounds on auscultation  CV:  Normal S1S2, without murmur  ABD:  Nontender, nondistended, no hepatosplenomegaly, +BS  EXT:  No edema.  No cyanosis or clubbing.  No mottling and normal cap refill.    Assessment     Scheduled meds:  apixaban, 5 mg, Oral,  Q12H  arformoterol, 15 mcg, Nebulization, BID - RT  budesonide, 0.5 mg, Nebulization, BID - RT  chlorhexidine, 15 mL, Mouth/Throat, Q12H  insulin regular, 2-9 Units, Subcutaneous, Q6H  ipratropium-albuterol, 3 mL, Nebulization, 4x Daily - RT  lansoprazole, 30 mg, Nasogastric, QAM AC  methylPREDNISolone sodium succinate, 40 mg, Intravenous, Q8H  morphine, 10 mg, Oral, Q8H  piperacillin-tazobactam, 3.375 g, Intravenous, Q8H  senna-docusate sodium, 2 tablet, Oral, BID  sodium chloride, 10 mL, Intravenous, Q12H      IV meds:                      phenylephrine, 0.5-3 mcg/kg/min, Last Rate: 0.4 mcg/kg/min (02/19/25 0603)      Data Review:  Results from last 7 days   Lab Units 02/20/25 0417 02/19/25 0407 02/18/25 0341 02/17/25 0422 02/16/25  0402   SODIUM mmol/L 138 142 140 139 135*   POTASSIUM mmol/L 4.7 4.6 4.3 4.5 4.1   CHLORIDE mmol/L 105 106 105 103 104   CO2 mmol/L 28.4 30.4* 28.0 28.0 27.3   BUN mg/dL 18 19 22 25* 23   CREATININE mg/dL 0.30* 0.35* 0.38* 0.29* 0.22*   GLUCOSE mg/dL 115* 147* 126* 222* 173*   CALCIUM mg/dL 8.7 8.7 9.0 8.8 7.4*         Estimated Creatinine Clearance: 119.3 mL/min (A) (by C-G formula based on SCr of 0.3 mg/dL (L)).  Results from last 7 days   Lab Units 02/20/25 0417 02/19/25 0407 02/18/25 0341 02/17/25 0422 02/16/25  0402   WBC 10*3/mm3 14.98* 16.57* 18.62* 13.47* 12.40*   HEMOGLOBIN g/dL 13.9 13.3 12.6 12.0 12.6   PLATELETS 10*3/mm3 308 287 292 215 205                 Results from last 7 days   Lab Units 02/19/25  0928 02/19/25  0414 02/18/25  0355 02/17/25  0351 02/16/25  0345 02/15/25  1045 02/14/25  0509   PH, ARTERIAL pH units 7.429 7.477* 7.511* 7.523* 7.449 7.408 7.376   PO2 ART mm Hg 89.5 95.3 62.3* 141.1* 87.7 71.0* 165.5*   PCO2, ARTERIAL mm Hg 43.7 44.0 35.0 40.0 50.2* 53.2* 62.7*   HCO3 ART mmol/L 29.0* 32.5* 28.0 32.9* 34.8* 33.6* 36.8*     Results from last 7 days   Lab Units 02/14/25  0426   LACTATE mmol/L 1.0         Glucose   Date/Time Value Ref Range Status    02/20/2025 1052 156 (H) 70 - 130 mg/dL Final   02/20/2025 0612 150 (H) 70 - 130 mg/dL Final   02/20/2025 0009 173 (H) 70 - 130 mg/dL Final   02/19/2025 1736 152 (H) 70 - 130 mg/dL Final   02/19/2025 1104 231 (H) 70 - 130 mg/dL Final   02/19/2025 0643 150 (H) 70 - 130 mg/dL Final   02/19/2025 0102 189 (H) 70 - 130 mg/dL Final         Imaging reviewed  Chest x-ray 2/19 reviewed          Microbiology reviewed           Active Hospital Problems    Diagnosis  POA    **Acute hypercapnic respiratory failure [J96.02]  Yes      Resolved Hospital Problems   No resolved problems to display.         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  Failed swallow study.  Tolerating tube feeds.  Scheduled and as needed bronchodilators and systemic steroids for COPD exacerbation.  Increasing infiltrate left lower on most recent chest x-ray showing progressive pneumonia.  On antibiotics with Rocephin.  Changed to Zosyn 2/16.  Treatment for viral process is supportive.  On DOAC.  On morphine for anxiety with increased work of breathing.  This has made some significant improvement.  Control glucose.  On sliding scale insulin.  Decreasing steroids.  And hyperglycemia improving with that.  Control blood pressure.  Pressor for blood pressure support as needed.  Ulcer prophylaxis.  Poor prognosis from end-stage lung disease.    Discussed with multidisciplinary ICU team on rounds this morning.          Holden Tracy MD  Pulmonary and Critical Care Medicine  [facility], [facility]  2/20/2025    10:54 EST       Electronically  signed by Holden Tracy MD at 25 1057       Holden Tracy MD at 25 0837              [facility] INPATIENT PROGRESS NOTE         [facility] INTENSIVE CARE    2025      PATIENT IDENTIFICATION:  Name: Scarlet Chakraborty ADMIT: 2/10/2025   : 1960  PCP: Flavia Jaquez APRN    MRN: [id number] LOS: 9 days   AGE/SEX: 65 y.o. female  ROOM: [facility]                     LOS 9    Reason for visit: respiratory failure, COPD and critical care management.       SUBJECTIVE:      Calm and cooperative.  On spontaneous settings.  Discussed with family at bedside.  Plan for spontaneous breathing trial.  May be ready to extubate today.  Patient is DNR.      Objective   OBJECTIVE:    Vital Sign Min/Max for last 24 hours  Temp  Min: 97.2 °F (36.2 °C)  Max: 98.5 °F (36.9 °C)   BP  Min: 79/49  Max: 167/72   Pulse  Min: 57  Max: 112   Resp  Min: 16  Max: 25   SpO2  Min: 95 %  Max: 99 %   No data recorded   Weight  Min: 39.9 kg (87 lb 15.4 oz)  Max: 39.9 kg (87 lb 15.4 oz)    Vitals:    25 0600 25 0615 25 0630 25 0745   BP: 152/59 144/57 145/51    Pulse: 57 63 64 60   Resp:    16   Temp:       TempSrc:       SpO2: 98% 98% 98% 98%   Weight:       Height:                25  0230 25  0600 25  0519   Weight: 39.7 kg (87 lb 8.4 oz) 42 kg (92 lb 9.5 oz) 39.9 kg (87 lb 15.4 oz)       Body mass index is 17.18 kg/m².        Mode: PS  FiO2 (%):  [40 %] 40 %  S RR:  [14] 14  S VT:  [375 mL] 375 mL  PEEP/CPAP (cm H2O):  [5 cm H20] 5 cm H20  NV SUP:  [8 cm H20-10 cm H20] 8 cm H20  MAP (cm H2O):  [7.7-10] 7.7           FiO2 (%): 40 %     Body mass index is 17.18 kg/m².    Intake/Output Summary (Last 24 hours) at 2025 0924  Last data filed at 2025 0519  Gross per 24 hour   Intake 1904.54 ml   Output 900 ml   Net 1004.54 ml         Exam:  GEN:  No distress, appears stated age  EYES:   PERRL, anicteric sclerae  ENT:    External ears/nose normal, OP  clear  NECK:  No adenopathy, midline trachea  LUNGS: Normal chest on inspection, palpation and diminished bilateral breath sounds on auscultation  CV:  Normal S1S2, without murmur  ABD:  Nontender, nondistended, no hepatosplenomegaly, +BS  EXT:  No edema.  No cyanosis or clubbing.  No mottling and normal cap refill.    Assessment     Scheduled meds:  apixaban, 5 mg, Oral, Q12H  arformoterol, 15 mcg, Nebulization, BID - RT  budesonide, 0.5 mg, Nebulization, BID - RT  chlorhexidine, 15 mL, Mouth/Throat, Q12H  insulin regular, 2-9 Units, Subcutaneous, Q6H  ipratropium-albuterol, 3 mL, Nebulization, 4x Daily - RT  lansoprazole, 30 mg, Nasogastric, QAM AC  methylPREDNISolone sodium succinate, 40 mg, Intravenous, Q8H  morphine, 10 mg, Oral, Q8H  mupirocin, 1 Application, Each Nare, BID  piperacillin-tazobactam, 3.375 g, Intravenous, Q8H  senna-docusate sodium, 2 tablet, Oral, BID  sodium chloride, 10 mL, Intravenous, Q12H      IV meds:                      dexmedetomidine, 0.2-1.5 mcg/kg/hr, Last Rate: 1.3 mcg/kg/hr (02/19/25 0519)  phenylephrine, 0.5-3 mcg/kg/min, Last Rate: 0.4 mcg/kg/min (02/19/25 0603)  propofol, 5-50 mcg/kg/min, Last Rate: 30 mcg/kg/min (02/19/25 0518)      Data Review:  Results from last 7 days   Lab Units 02/19/25  0407 02/18/25  0341 02/17/25  0422 02/16/25  0402 02/15/25  0425   SODIUM mmol/L 142 140 139 135* 142   POTASSIUM mmol/L 4.6 4.3 4.5 4.1 4.5   CHLORIDE mmol/L 106 105 103 104 108*   CO2 mmol/L 30.4* 28.0 28.0 27.3 28.0   BUN mg/dL 19 22 25* 23 28*   CREATININE mg/dL 0.35* 0.38* 0.29* 0.22* 0.31*   GLUCOSE mg/dL 147* 126* 222* 173* 196*   CALCIUM mg/dL 8.7 9.0 8.8 7.4* 9.0         Estimated Creatinine Clearance: 102.3 mL/min (A) (by C-G formula based on SCr of 0.35 mg/dL (L)).  Results from last 7 days   Lab Units 02/19/25  0407 02/18/25  0341 02/17/25  0422 02/16/25  0402 02/15/25  0425   WBC 10*3/mm3 16.57* 18.62* 13.47* 12.40* 10.40   HEMOGLOBIN g/dL 13.3 12.6 12.0 12.6 13.2    PLATELETS 10*3/mm3 287 292 215 205 258                 Results from last 7 days   Lab Units 02/19/25  0414 02/18/25  0355 02/17/25  0351 02/16/25  0345 02/15/25  1045 02/14/25  0509 02/14/25  0426   PH, ARTERIAL pH units 7.477* 7.511* 7.523* 7.449 7.408 7.376 7.288*   PO2 ART mm Hg 95.3 62.3* 141.1* 87.7 71.0* 165.5* 70.5*   PCO2, ARTERIAL mm Hg 44.0 35.0 40.0 50.2* 53.2* 62.7* 76.3*   HCO3 ART mmol/L 32.5* 28.0 32.9* 34.8* 33.6* 36.8* 36.5*     Results from last 7 days   Lab Units 02/14/25  0426   LACTATE mmol/L 1.0         Glucose   Date/Time Value Ref Range Status   02/19/2025 0643 150 (H) 70 - 130 mg/dL Final   02/19/2025 0102 189 (H) 70 - 130 mg/dL Final   02/18/2025 1817 262 (H) 70 - 130 mg/dL Final   02/18/2025 1703 235 (H) 70 - 130 mg/dL Final   02/18/2025 1115 234 (H) 70 - 130 mg/dL Final   02/18/2025 0614 187 (H) 70 - 130 mg/dL Final   02/17/2025 2346 186 (H) 70 - 130 mg/dL Final         Imaging reviewed  Chest x-ray 2/19 reviewed          Microbiology reviewed           Active Hospital Problems    Diagnosis  POA    **Acute hypercapnic respiratory failure [J96.02]  Yes      Resolved Hospital Problems   No resolved problems to display.         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  Has failed multiple days.  Discussed with family.  They met with palliative yesterday.  They do not want to code her.  If able to extubate today and does worse we will treat with BiPAP but not reintubate.  Tolerating tube feeds.  Scheduled and as needed bronchodilators and systemic steroids  for COPD exacerbation.  Increasing infiltrate left lower on most recent chest x-ray showing progressive pneumonia.  On antibiotics with Rocephin.  Changed to Zosyn 2/16.  Treatment for viral process is supportive.  On DOAC.  On morphine for anxiety with increased work of breathing.  This has made some improvement.  Control glucose.  On sliding scale insulin.  Decreasing steroids.  And hyperglycemia improving with that.  Control blood pressure.  Pressor for blood pressure support as needed.  Ulcer prophylaxis.  Poor prognosis from end-stage lung disease.    Discussed with multidisciplinary ICU team on rounds this morning.        CCT: 35 min    Holden Tracy MD  Pulmonary and Critical Care Medicine  [facility], [facility]  2/19/2025    09:24 EST       Electronically signed by Holden Tracy MD at 02/19/25 7072

## 2025-02-20 NOTE — PROGRESS NOTES
[facility] INPATIENT PROGRESS NOTE         [facility] INTENSIVE CARE    2025      PATIENT IDENTIFICATION:  Name: Scarlet Chakraborty ADMIT: 2/10/2025   : 1960  PCP: Flavia Jaquez APRN    MRN: [id number] LOS: 10 days   AGE/SEX: 65 y.o. female  ROOM: [facility]                     LOS 10    Reason for visit: respiratory failure, COPD and critical care management.       SUBJECTIVE:      Alert and oriented.  Complains of some anxiety but overall looks much better.  Successfully extubated.  Plan for swallow evaluation and hope to be able to get the Cortrak out as well.      Objective   OBJECTIVE:    Vital Sign Min/Max for last 24 hours  Temp  Min: 97.5 °F (36.4 °C)  Max: 98.6 °F (37 °C)   BP  Min: 85/54  Max: 146/126   Pulse  Min: 89  Max: 122   Resp  Min: 16  Max: 18   SpO2  Min: 92 %  Max: 100 %   No data recorded   No data recorded    Vitals:    25 0700 25 0717 25 0741 25 0800   BP: 120/81      Pulse: 116  109    Resp:   18 18   Temp:  97.5 °F (36.4 °C)     TempSrc:  Oral     SpO2: 92%  93%    Weight:       Height:                25  0230 25  0600 25  0519   Weight: 39.7 kg (87 lb 8.4 oz) 42 kg (92 lb 9.5 oz) 39.9 kg (87 lb 15.4 oz)       Body mass index is 17.18 kg/m².                    FiO2 (%): 40 %     Body mass index is 17.18 kg/m².    Intake/Output Summary (Last 24 hours) at 2025 1054  Last data filed at 2025 0800  Gross per 24 hour   Intake 2154 ml   Output 3100 ml   Net -946 ml         Exam:  GEN:  No distress, appears stated age  EYES:   PERRL, anicteric sclerae  ENT:    External ears/nose normal, OP clear  NECK:  No adenopathy, midline trachea  LUNGS: Normal chest on inspection, palpation and diminished bilateral breath sounds on auscultation  CV:  Normal S1S2, without murmur  ABD:  Nontender, nondistended, no hepatosplenomegaly, +BS  EXT:  No edema.  No cyanosis or clubbing.  No mottling and normal cap refill.    Assessment      Scheduled meds:  apixaban, 5 mg, Oral, Q12H  arformoterol, 15 mcg, Nebulization, BID - RT  budesonide, 0.5 mg, Nebulization, BID - RT  chlorhexidine, 15 mL, Mouth/Throat, Q12H  insulin regular, 2-9 Units, Subcutaneous, Q6H  ipratropium-albuterol, 3 mL, Nebulization, 4x Daily - RT  lansoprazole, 30 mg, Nasogastric, QAM AC  methylPREDNISolone sodium succinate, 40 mg, Intravenous, Q8H  morphine, 10 mg, Oral, Q8H  piperacillin-tazobactam, 3.375 g, Intravenous, Q8H  senna-docusate sodium, 2 tablet, Oral, BID  sodium chloride, 10 mL, Intravenous, Q12H      IV meds:                      phenylephrine, 0.5-3 mcg/kg/min, Last Rate: 0.4 mcg/kg/min (02/19/25 0603)      Data Review:  Results from last 7 days   Lab Units 02/20/25 0417 02/19/25 0407 02/18/25 0341 02/17/25 0422 02/16/25 0402   SODIUM mmol/L 138 142 140 139 135*   POTASSIUM mmol/L 4.7 4.6 4.3 4.5 4.1   CHLORIDE mmol/L 105 106 105 103 104   CO2 mmol/L 28.4 30.4* 28.0 28.0 27.3   BUN mg/dL 18 19 22 25* 23   CREATININE mg/dL 0.30* 0.35* 0.38* 0.29* 0.22*   GLUCOSE mg/dL 115* 147* 126* 222* 173*   CALCIUM mg/dL 8.7 8.7 9.0 8.8 7.4*         Estimated Creatinine Clearance: 119.3 mL/min (A) (by C-G formula based on SCr of 0.3 mg/dL (L)).  Results from last 7 days   Lab Units 02/20/25 0417 02/19/25 0407 02/18/25 0341 02/17/25 0422 02/16/25  0402   WBC 10*3/mm3 14.98* 16.57* 18.62* 13.47* 12.40*   HEMOGLOBIN g/dL 13.9 13.3 12.6 12.0 12.6   PLATELETS 10*3/mm3 308 287 292 215 205                 Results from last 7 days   Lab Units 02/19/25  0928 02/19/25  0414 02/18/25  0355 02/17/25  0351 02/16/25  0345 02/15/25  1045 02/14/25  0509   PH, ARTERIAL pH units 7.429 7.477* 7.511* 7.523* 7.449 7.408 7.376   PO2 ART mm Hg 89.5 95.3 62.3* 141.1* 87.7 71.0* 165.5*   PCO2, ARTERIAL mm Hg 43.7 44.0 35.0 40.0 50.2* 53.2* 62.7*   HCO3 ART mmol/L 29.0* 32.5* 28.0 32.9* 34.8* 33.6* 36.8*     Results from last 7 days   Lab Units 02/14/25  0426   LACTATE mmol/L 1.0          Glucose   Date/Time Value Ref Range Status   02/20/2025 1052 156 (H) 70 - 130 mg/dL Final   02/20/2025 0612 150 (H) 70 - 130 mg/dL Final   02/20/2025 0009 173 (H) 70 - 130 mg/dL Final   02/19/2025 1736 152 (H) 70 - 130 mg/dL Final   02/19/2025 1104 231 (H) 70 - 130 mg/dL Final   02/19/2025 0643 150 (H) 70 - 130 mg/dL Final   02/19/2025 0102 189 (H) 70 - 130 mg/dL Final         Imaging reviewed  Chest x-ray 2/19 reviewed          Microbiology reviewed            Active Hospital Problems    Diagnosis  POA    **Acute hypercapnic respiratory failure [J96.02]  Yes      Resolved Hospital Problems   No resolved problems to display.         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  Failed swallow study.  Tolerating tube feeds.  Scheduled and as needed bronchodilators and systemic steroids for COPD exacerbation.  Increasing infiltrate left lower on most recent chest x-ray showing progressive pneumonia.  On antibiotics with Rocephin.  Changed to Zosyn 2/16.  Treatment for viral process is supportive.  On DOAC.  On morphine for anxiety with increased work of breathing.  This has made some significant improvement.  Control glucose.  On sliding scale insulin.  Decreasing steroids.  And hyperglycemia improving with that.  Control blood pressure.  Pressor for blood pressure support as needed.  Ulcer prophylaxis.  Poor prognosis from end-stage lung disease.    Discussed with multidisciplinary ICU team on rounds this morning.          Holden Tracy MD  Pulmonary and Critical Care Medicine  [facility]  2/20/2025    10:54 EST

## 2025-02-20 NOTE — PROGRESS NOTES
Enter Query Response Below      Query Response:         Bacterial pneumonia unspecified        If applicable, please update the problem list.

## 2025-02-20 NOTE — CASE MANAGEMENT/SOCIAL WORK
Continued Stay Note  [facility]     Patient Name: Scarlet Chakraborty  MRN: [id number]  Today's Date: 2/20/2025    Admit Date: 2/10/2025    Plan: Pending clinical course   Discharge Plan       Row Name 02/20/25 1240       Plan    Plan Pending clinical course    Patient/Family in Agreement with Plan yes    Plan Comments Patient extubated yesterday. Palliative has signed off. Patient has voiced she would like to return home. CCP asked nurse for PT/OT orders. Orders have been put in. Plan pending patients work with therapy. Family and patient are aware and are agreeable to possible rehab placement. CCP following.                   Discharge Codes    No documentation.                 Expected Discharge Date and Time       Expected Discharge Date Expected Discharge Time    Feb 20, 2025

## 2025-02-20 NOTE — SIGNIFICANT NOTE
02/20/25 1413   OTHER   Discipline speech language pathologist   Rehab Time/Intention   Session Not Performed other (see comments)  (Patient currently off the floor in CT. SLP to follow for FEES as schedule allows in PM versus next date AM.)

## 2025-02-20 NOTE — CONSULTS
"Neurology Consult Note    Consult Date: 2/20/2025    Referring MD: No ref. provider found    Reason for Consult I have been asked to see the patient in neurological consultation to render advice and opinion regarding worsening left-sided weakness    Scarlet Chakraborty is a 65 y.o. white female with known diagnosis of chronic right MCA stroke with residual left-sided weakness since 2018, atrial fibrillation on Eliquis, end-stage COPD, non-STEMI, tobacco abuse presented to the hospital on 2/10 with respiratory distress and flulike symptoms she was found to have acute hypoxic and hypercapnic respiratory failure with respiratory distress and failed NIPPV status post intubation on 2/11, the patient was extubated yesterday.  This morning nursing staff noticed worsening left-sided weakness and a code stroke was called.  She had a CT, CT angio head and neck and CT perfusion.  I personally reviewed images no LVO on the CT angio head and neck.  CT head showed new areas of hypodensities on bilateral PCA territory compared to prior scan in 2018.  CT perfusion negative.  On exam the patient was awake alert and oriented x 3 and follows commands appropriately, she stated that her left side seems weaker than usual she is also weak on her lower extremities bilaterally which is also new to her.  She stated that she recovered well from her prior stroke and she was able to walk independently prior to presentation to the hospital.    Past Medical History:   Diagnosis Date    Asthma     Cerebrovascular accident (CVA) due to thrombosis of right middle cerebral artery     COPD (chronic obstructive pulmonary disease)     Nonischemic cardiomyopathy     NSTEMI (non-ST elevated myocardial infarction)     Stroke     Takotsubo cardiomyopathy     Tobacco abuse        Exam  /81   Pulse 107   Temp 98.5 °F (36.9 °C) (Oral)   Resp 18   Ht 152.4 cm (60\")   Wt 39.9 kg (87 lb 15.4 oz)   SpO2 94% Comment: RA sat  BMI 17.18 kg/m²   Gen: NAD, " vitals reviewed  MS: oriented x3, recent/remote memory intact, normal attention/concentration, language intact, no neglect.  CN: visual acuity grossly normal, PERRL, EOMI, no facial droop, no dysarthria  Motor: 4/5 right upper extremity, 3 -/5 bilateral lower extremities, 3 -/5 left upper extremity, normal tone  Sensory exam: Normal to light touch throughout    NIH Stroke Scale :    (0_) 1a. Level of consciousness (LOC): 0=alert;1=arousable by minor stimulation;2=obtunded or needs strong stimulation to attend;3=unresponsive or reflex responses only  (0_) 1b. LOC Questions: 0=answers both;1=answers one;2=answers neither  (0_) 1c. LOC Commands: 0=performs both tasks;1=performs one task;2=performs neither task  (0_) 2. Best Gaze: 0=normal;1=partial gaze palsy;2=forced deviation or total gaze paresis  (0_) 3. Visual: 0=normal;1=partial hemianopia;2=complete hemianopia;3=blind  (0_) 4. Facial palsy: 0=normal;1=minor paresis;2=partial paralysis;3=complete paralysis  FOR 5 AND 6 BELOW: 0=normal;1=drifts but maintains in air;2=unable to maintain in air;3=moves but unable to lift against gravity;4=no movement  (0_)0 (_)1 (x)2 (_)3 (_)4 (_)NA 5a. Motor arm-left  (0_)0 (x)1 (_)2 (_)3 (_)4 (_)NA 5b. Motor arm-right  (0_)0 (_)1 (x)2 (_)3 (_)4 (_)NA 6a. Motor leg-left  (0_)0 (_)1 (x)2 (_)3 (_)4 (_)NA 6ba. Motor leg-right  (0_) 7. Limb ataxia:0=absent;1=unilateral;2=bilateral; NA=unable to test  (0_) 8. Sensory: 0=normal;1=mild-moderate loss;2-severe or total loss  (0_) 9. Best language: 0=normal;1=mild-moderate aphasia, some deficits apparent but able to communicate;2=severe aphasia, fragmentary expression only, unable to communicate well;3=global aphasia, mute and no comprehension  (0_)10. Dysarthria: 0=normal;1=mild-moderate, slurs some words;2=severe, speech mostly unintelligible; NA=unable to test (e.g.,intubation)  (0_)11. Extinction/Inattention: 0=normal;1=visual,tactile,auditory or other extinction to bilateral  simultaneous stimulation, but no severe neglect;2=answers neither      NIH Score: Complete  7      DATA:    Lab Results   Component Value Date    GLUCOSE 115 (H) 02/20/2025    CALCIUM 8.7 02/20/2025     02/20/2025    K 4.7 02/20/2025    CO2 28.4 02/20/2025     02/20/2025    BUN 18 02/20/2025    CREATININE 0.30 (L) 02/20/2025    EGFRIFNONA 142 03/22/2021    BCR 60.0 (H) 02/20/2025    ANIONGAP 4.6 (L) 02/20/2025     Lab Results   Component Value Date    WBC 14.98 (H) 02/20/2025    HGB 13.9 02/20/2025    HCT 42.1 02/20/2025    MCV 93.8 02/20/2025     02/20/2025       Lab review: Above labs reviewed    Imaging review:  I personally reviewed images no LVO on the CT angio head and neck.  CT head showed new areas of hypodensities on bilateral PCA territory compared to prior scan in 2018.  CT perfusion negative.     Diagnoses:  -Prior right MCA stroke in 2018 with residual left-sided weakness  -Generalized weakness with worsening residual left sided weakness  -Respiratory failure status post intubation on 2/11 and extubation on 2/19  -Tobacco abuse  -Atrial fibrillation on Eliquis prior to arrival    Pre-stroke MRS: 1  NIHSS: 7      PLAN:   -Continue Eliquis for now  -MRI brain without contrast to rule out new acute ischemic stroke  -Further recommendation pending MRI brain results  -Blood pressure to normalize    Discussed with patient, nursing staff    Medical Decision Making for this neurology consultation consists of the following:  Review of previous chart, including H/P, provider and nursing notes as applicable.  Review of medications and vital signs.  Review of previous labs, neuroimaging, and additional relevant diagnostics.   Interpretation of laboratory, imaging, and other diagnostic results  Discussion with other providers and family   Total face-to-face/floor time: 30-75 minutes.

## 2025-02-20 NOTE — NURSING NOTE
I visited with patient and her sister (Yvonne) at the bedside. Patient states she is feeling much better and she and her sister are discussing a plan for getting home. Patient is asking to designate Yvonne as her healthcare surrogate (social work notified for advanced care planning). I encouraged the patient to share her wishes with Yvonne, especially concerning mechanical ventilation, tracheostomy, and long-term care placement. Palliative care will sign off at this time. Please re-consult if we can be of service.

## 2025-02-20 NOTE — CONSULTS
Nutrition Services    Patient Name:  Scarlet Chakraborty  YOB: 1960  MRN: [id number]  Admit Date:  2/10/2025  Assessment Date:  02/20/25    Comment: Follow up    Current TF's: IsoSource HN at 50ml/hr and water flushes [id number].   Pt tolerating TF's at goal rate.  Pt now off the vent  BM 2/20     Plan/Recommendation  TF's with IsoSource HN at 50ml/hr and water [id number].  Calories  1320 kcals  (100%)    Protein  59 g (100%)    Free water  891 mL   Flushes  180   The above end goal rate is for 22 hrs/day to assume interruptions for ADLs.   Water flushes adjusted based on clinical picture + Rx flushes/IV fluids     Speech to evaluate for diet  RD to follow    CLINICAL NUTRITION ASSESSMENT      Reason for Assessment Follow-up Protocol   Diagnosis/Problem Respiratory failure extubated now   Medical & Surgical Hx Past Medical History:   Diagnosis Date    Asthma     Cerebrovascular accident (CVA) due to thrombosis of right middle cerebral artery     COPD (chronic obstructive pulmonary disease)     Nonischemic cardiomyopathy     NSTEMI (non-ST elevated myocardial infarction)     Stroke     Takotsubo cardiomyopathy     Tobacco abuse        Past Surgical History:   Procedure Laterality Date    CARDIAC CATHETERIZATION N/A 9/13/2018    Procedure: Left Heart Cath and cors;  Surgeon: Gabino Dozier MD;  Location: [facility] CATH INVASIVE LOCATION;  Service: Cardiology    CARDIAC CATHETERIZATION N/A 9/13/2018    Procedure: Left ventriculography;  Surgeon: Gabino Dozier MD;  Location: [facility] CATH INVASIVE LOCATION;  Service: Cardiology    CARDIAC ELECTROPHYSIOLOGY PROCEDURE N/A 9/27/2018    Procedure: Loop insertion  LINQ;  Surgeon: Jose R Barker MD;  Location: [facility] CATH INVASIVE LOCATION;  Service: Cardiovascular        Current Problems npo     Encounter Information        Nutrition History    Food Preferences    Supplements    Factors Affecting Intake altered respiratory status         Anthropometrics       "  Current Height   Current Weight  BMI kg/m2 Height: 152.4 cm (60\")  Weight: 39.9 kg (87 lb 15.4 oz) (02/19/25 0519)  Body mass index is 17.18 kg/m².     Adj BMI (if applicable)    BMI Category Underweight (18.4 or below)       Admission Weight    Ideal Body Weight (IBW) 111lb     Adj IBW (if applicable)    Usual Body Weight (UBW) unknown   Weight Change/Trend Unknown       Weight history: Wt Readings from Last 30 Encounters:   02/19/25 0519 39.9 kg (87 lb 15.4 oz)   02/18/25 0600 42 kg (92 lb 9.5 oz)   02/17/25 0230 39.7 kg (87 lb 8.4 oz)   02/16/25 0600 41.5 kg (91 lb 7.9 oz)   02/15/25 0515 37.8 kg (83 lb 5.3 oz)   02/10/25 2230 33.7 kg (74 lb 4.7 oz)   03/20/24 0537 31.8 kg (70 lb 1.6 oz)   03/17/24 0955 34 kg (75 lb)   03/16/24 1212 34 kg (75 lb)   02/19/24 1508 34.3 kg (75 lb 9.6 oz)   08/03/23 1309 35.4 kg (78 lb)   01/16/23 1323 39 kg (86 lb)   01/12/22 1331 36.3 kg (80 lb)   03/22/21 0618 37.8 kg (83 lb 6.4 oz)   03/21/21 0518 38.9 kg (85 lb 12.8 oz)   03/20/21 0537 39.7 kg (87 lb 9.6 oz)   03/19/21 0500 46 kg (101 lb 6.4 oz)   03/18/21 0546 40.9 kg (90 lb 2.7 oz)   03/17/21 1855 42.2 kg (93 lb)   03/17/21 0500 42.4 kg (93 lb 7.6 oz)   03/16/21 0500 43.3 kg (95 lb 7.4 oz)   03/15/21 0500 42.7 kg (94 lb 2.2 oz)   03/14/21 0445 42.5 kg (93 lb 11.1 oz)   03/13/21 1412 40.8 kg (89 lb 15.2 oz)   03/13/21 0420 40.8 kg (89 lb 15.2 oz)   03/13/21 0108 40.3 kg (88 lb 13.5 oz)   03/12/21 2210 45.4 kg (100 lb)   01/20/21 1431 41.3 kg (91 lb)   12/15/20 1202 41.7 kg (92 lb)   06/15/20 1122 44.5 kg (98 lb)   12/12/19 1339 48.3 kg (106 lb 6.4 oz)   10/24/19 1001 44.9 kg (99 lb)   10/11/19 1422 45.3 kg (99 lb 12.8 oz)   10/03/19 0404 43.4 kg (95 lb 10.9 oz)   10/02/19 0500 45.3 kg (99 lb 13.9 oz)   10/01/19 0952 45.5 kg (100 lb 5 oz)   10/01/19 0600 45.5 kg (100 lb 5 oz)   09/30/19 1131 42.2 kg (93 lb)   09/30/19 0227 42.3 kg (93 lb 4.1 oz)   09/30/19 0022 49.2 kg (108 lb 8 oz)   09/19/19 1308 45.3 kg (99 lb 12.8 oz) "   09/10/19 0512 46.3 kg (102 lb)   06/03/19 1248 46 kg (101 lb 6.4 oz)   02/11/19 1509 46.3 kg (102 lb)   12/18/18 0810 45.8 kg (101 lb)   11/07/18 1314 44.5 kg (98 lb)   10/10/18 1353 44.5 kg (98 lb)   09/29/18 0533 42.8 kg (94 lb 5.7 oz)   09/28/18 0524 42.8 kg (94 lb 5.7 oz)   09/26/18 0500 43.3 kg (95 lb 7.4 oz)   09/25/18 1400 43.5 kg (96 lb)   09/25/18 1056 43.6 kg (96 lb 1.9 oz)   09/25/18 0600 43.6 kg (96 lb 1.9 oz)   09/24/18 1055 44.7 kg (98 lb 8 oz)   09/17/18 0500 44.5 kg (98 lb)   09/16/18 0621 45.9 kg (101 lb 4.8 oz)   09/15/18 0600 44.4 kg (97 lb 15.9 oz)   09/13/18 1227 43.5 kg (96 lb)        Estimated/Assessed Needs        Energy Requirements    Weight for Calculation 50.4   Method for Estimation  25-30 kcal/kg   EST Needs (kcal/day) 8602-4564       Protein Requirements    Weight for Calculation 50.4   EST Protein Needs (g/kg) 1.2 - 1.5 gm/kg   EST Daily Needs (g/day) 60-75       Fluid Requirements     Method for Estimation 1 mL/kcal    Estimated Needs (mL/day)          Labs        Pertinent Labs    Results from last 7 days   Lab Units 02/20/25 0417 02/19/25  0407 02/18/25  0341   SODIUM mmol/L 138 142 140   POTASSIUM mmol/L 4.7 4.6 4.3   CHLORIDE mmol/L 105 106 105   CO2 mmol/L 28.4 30.4* 28.0   BUN mg/dL 18 19 22   CREATININE mg/dL 0.30* 0.35* 0.38*   CALCIUM mg/dL 8.7 8.7 9.0   GLUCOSE mg/dL 115* 147* 126*     Results from last 7 days   Lab Units 02/20/25 0417 02/18/25  0341 02/17/25  0422   HEMOGLOBIN g/dL 13.9   < > 12.0   HEMATOCRIT % 42.1   < > 37.2   WBC 10*3/mm3 14.98*   < > 13.47*   TRIGLYCERIDES mg/dL  --   --  124    < > = values in this interval not displayed.     Results from last 7 days   Lab Units 02/20/25  0417 02/19/25  0407 02/18/25  0341 02/17/25  0422 02/16/25  0402   PLATELETS 10*3/mm3 308 287 292 215 205     COVID19   Date Value Ref Range Status   02/10/2025 Not Detected Not Detected - Ref. Range Final     Lab Results   Component Value Date    HGBA1C 5.5 11/01/2023           Medications            Scheduled Medications apixaban, 5 mg, Oral, Q12H  arformoterol, 15 mcg, Nebulization, BID - RT  budesonide, 0.5 mg, Nebulization, BID - RT  chlorhexidine, 15 mL, Mouth/Throat, Q12H  insulin regular, 2-9 Units, Subcutaneous, Q6H  ipratropium-albuterol, 3 mL, Nebulization, 4x Daily - RT  lansoprazole, 30 mg, Nasogastric, QAM AC  methylPREDNISolone sodium succinate, 40 mg, Intravenous, Q8H  morphine, 10 mg, Oral, Q8H  piperacillin-tazobactam, 3.375 g, Intravenous, Q8H  senna-docusate sodium, 2 tablet, Oral, BID  sodium chloride, 10 mL, Intravenous, Q12H        Infusions phenylephrine, 0.5-3 mcg/kg/min, Last Rate: 0.4 mcg/kg/min (02/19/25 0603)        PRN Medications   acetaminophen    acetaminophen    senna-docusate sodium **AND** polyethylene glycol **AND** bisacodyl **AND** bisacodyl    dextrose    dextrose    glucagon (human recombinant)    HYDROcodone-acetaminophen    nitroglycerin    sodium chloride    sodium chloride     Physical Findings          Physical Appearance frail, loss of muscle mass, loss of subcutaneous fat, underweight   Oral/Mouth Cavity WDL   Edema  no edema   Gastrointestinal last bowel movement: 2/20   Skin  bruising   Tubes/Drains/Lines Cortrak, NG tube   NFPE See Malnutrition Severity Assessment, Date Completed: 2/12   --  Malnutrition Severity Assessment      Patient meets criteria for : Severe Malnutrition       Current Nutrition Orders & Evaluation of Intake       Oral Nutrition     Food Allergies NKFA   Current PO Diet NPO Diet NPO Type: Strict NPO   Supplement    PO Evaluation     Trending % PO Intake    --   Enteral Nutrition     Enteral Route NG    TF Delivery Method Continuous    Propofol Rate/Kcal off    Current TF Order/Rate  Isosource HN @ 50 mL/hr    TF Goal Rate 50 mL/hr    Current Water Flush 30 mL Q 4 hr    Modular None    TF Residual  no or minimal residual    TF Tolerance tolerating    TF Observation Verified correct TF and water flush infusing per  orders     Nutrition Diagnosis        Nutrition Dx Problem 1 Problem: Inadequate Oral Intake  Etiology: Medical Diagnosis - respiratory failure  Signs/Symptoms: NPO    Comment:      INTERVENTION / PLAN OF CARE  Intervention Goal        Intervention Goal(s) Reduce/improve symptoms, Disease management/therapy, Initiate feeding/diet, Tolerate TF/PN at goal, Transition TF to PO, and No significant weight loss     Nutrition Intervention        RD Action Await initiation/advancement of PO diet, Continue to monitor, and Care plan reviewed     Prescription         Diet     Supplement/Snack    EN/PN     Prescription Ordered       Enteral Prescription:     Enteral Route NG    TF Delivery Method Continuous    Enteral Product Isosource HN    Modular None    Propofol Rate/Kcal     TF Start Rate 10    TF Goal Rate 50    Free Water Flush [id number]    TF Provision at Goal: 1320 kcal, 59 gm protein, 891 mL free water + 180 mL water flushes         Calories 100 % needs met         Protein  100 % needs met         Fluid (mL)     Prescription Ordered Yes     Monitor/Evaluation        Monitor Per protocol, I&O, Pertinent labs, EN delivery/tolerance, Weight, Skin status, GI status, Symptoms, Swallow function, Hemodynamic stability   Discharge Plan Pending clinical course   Education Education not appropriate at this time     RD to follow per protocol.       Electronically signed by:  Malia Castillo RD  02/20/25 09:23 EST

## 2025-02-21 ENCOUNTER — ANCILLARY PROCEDURE (OUTPATIENT)
Dept: SPEECH THERAPY | Facility: HOSPITAL | Age: 65
DRG: 207 | End: 2025-02-21
Payer: COMMERCIAL

## 2025-02-21 ENCOUNTER — APPOINTMENT (OUTPATIENT)
Dept: MRI IMAGING | Facility: HOSPITAL | Age: 65
DRG: 207 | End: 2025-02-21
Payer: COMMERCIAL

## 2025-02-21 ENCOUNTER — APPOINTMENT (OUTPATIENT)
Dept: GENERAL RADIOLOGY | Facility: HOSPITAL | Age: 65
DRG: 207 | End: 2025-02-21
Payer: COMMERCIAL

## 2025-02-21 LAB
ANION GAP SERPL CALCULATED.3IONS-SCNC: 11 MMOL/L (ref 5–15)
BUN SERPL-MCNC: 18 MG/DL (ref 8–23)
BUN/CREAT SERPL: 64.3 (ref 7–25)
CALCIUM SPEC-SCNC: 9.4 MG/DL (ref 8.6–10.5)
CHLORIDE SERPL-SCNC: 102 MMOL/L (ref 98–107)
CO2 SERPL-SCNC: 27 MMOL/L (ref 22–29)
CREAT SERPL-MCNC: 0.28 MG/DL (ref 0.57–1)
DEPRECATED RDW RBC AUTO: 42 FL (ref 37–54)
EGFRCR SERPLBLD CKD-EPI 2021: 120.6 ML/MIN/1.73
ERYTHROCYTE [DISTWIDTH] IN BLOOD BY AUTOMATED COUNT: 12.4 % (ref 12.3–15.4)
GLUCOSE BLDC GLUCOMTR-MCNC: 113 MG/DL (ref 70–130)
GLUCOSE BLDC GLUCOMTR-MCNC: 117 MG/DL (ref 70–130)
GLUCOSE BLDC GLUCOMTR-MCNC: 124 MG/DL (ref 70–130)
GLUCOSE BLDC GLUCOMTR-MCNC: 132 MG/DL (ref 70–130)
GLUCOSE BLDC GLUCOMTR-MCNC: 153 MG/DL (ref 70–130)
GLUCOSE SERPL-MCNC: 144 MG/DL (ref 65–99)
HCT VFR BLD AUTO: 45.7 % (ref 34–46.6)
HGB BLD-MCNC: 15.2 G/DL (ref 12–15.9)
MCH RBC QN AUTO: 31.1 PG (ref 26.6–33)
MCHC RBC AUTO-ENTMCNC: 33.3 G/DL (ref 31.5–35.7)
MCV RBC AUTO: 93.6 FL (ref 79–97)
PLATELET # BLD AUTO: 347 10*3/MM3 (ref 140–450)
PMV BLD AUTO: 11.5 FL (ref 6–12)
POTASSIUM SERPL-SCNC: 4.7 MMOL/L (ref 3.5–5.2)
RBC # BLD AUTO: 4.88 10*6/MM3 (ref 3.77–5.28)
SODIUM SERPL-SCNC: 140 MMOL/L (ref 136–145)
WBC NRBC COR # BLD AUTO: 16.32 10*3/MM3 (ref 3.4–10.8)

## 2025-02-21 PROCEDURE — 94799 UNLISTED PULMONARY SVC/PX: CPT

## 2025-02-21 PROCEDURE — 97166 OT EVAL MOD COMPLEX 45 MIN: CPT

## 2025-02-21 PROCEDURE — 97530 THERAPEUTIC ACTIVITIES: CPT

## 2025-02-21 PROCEDURE — 97162 PT EVAL MOD COMPLEX 30 MIN: CPT

## 2025-02-21 PROCEDURE — 25010000002 PIPERACILLIN SOD-TAZOBACTAM PER 1 G: Performed by: INTERNAL MEDICINE

## 2025-02-21 PROCEDURE — 94760 N-INVAS EAR/PLS OXIMETRY 1: CPT

## 2025-02-21 PROCEDURE — 85027 COMPLETE CBC AUTOMATED: CPT | Performed by: INTERNAL MEDICINE

## 2025-02-21 PROCEDURE — 94761 N-INVAS EAR/PLS OXIMETRY MLT: CPT

## 2025-02-21 PROCEDURE — 99232 SBSQ HOSP IP/OBS MODERATE 35: CPT | Performed by: PHYSICIAN ASSISTANT

## 2025-02-21 PROCEDURE — 92612 ENDOSCOPY SWALLOW (FEES) VID: CPT

## 2025-02-21 PROCEDURE — 82948 REAGENT STRIP/BLOOD GLUCOSE: CPT

## 2025-02-21 PROCEDURE — 94664 DEMO&/EVAL PT USE INHALER: CPT

## 2025-02-21 PROCEDURE — 80048 BASIC METABOLIC PNL TOTAL CA: CPT | Performed by: INTERNAL MEDICINE

## 2025-02-21 PROCEDURE — 70551 MRI BRAIN STEM W/O DYE: CPT

## 2025-02-21 PROCEDURE — 25010000002 METHYLPREDNISOLONE PER 40 MG: Performed by: INTERNAL MEDICINE

## 2025-02-21 RX ORDER — SIMETHICONE 80 MG
80 TABLET,CHEWABLE ORAL 4 TIMES DAILY PRN
Status: DISCONTINUED | OUTPATIENT
Start: 2025-02-21 | End: 2025-02-25 | Stop reason: HOSPADM

## 2025-02-21 RX ORDER — PREDNISONE 20 MG/1
40 TABLET ORAL
Status: DISCONTINUED | OUTPATIENT
Start: 2025-02-22 | End: 2025-02-23

## 2025-02-21 RX ADMIN — APIXABAN 5 MG: 5 TABLET, FILM COATED ORAL at 20:33

## 2025-02-21 RX ADMIN — Medication 5 MG: at 20:55

## 2025-02-21 RX ADMIN — CHLORHEXIDINE GLUCONATE 15 ML: 1.2 RINSE ORAL at 20:33

## 2025-02-21 RX ADMIN — LANSOPRAZOLE 30 MG: 15 TABLET, ORALLY DISINTEGRATING ORAL at 08:35

## 2025-02-21 RX ADMIN — METHYLPREDNISOLONE SODIUM SUCCINATE 40 MG: 40 INJECTION, POWDER, FOR SOLUTION INTRAMUSCULAR; INTRAVENOUS at 11:43

## 2025-02-21 RX ADMIN — Medication 10 ML: at 20:37

## 2025-02-21 RX ADMIN — IPRATROPIUM BROMIDE AND ALBUTEROL SULFATE 3 ML: .5; 3 SOLUTION RESPIRATORY (INHALATION) at 18:59

## 2025-02-21 RX ADMIN — BUDESONIDE 0.5 MG: 0.5 INHALANT RESPIRATORY (INHALATION) at 06:53

## 2025-02-21 RX ADMIN — Medication 10 ML: at 08:36

## 2025-02-21 RX ADMIN — SIMETHICONE 80 MG: 80 TABLET, CHEWABLE ORAL at 18:38

## 2025-02-21 RX ADMIN — IPRATROPIUM BROMIDE AND ALBUTEROL SULFATE 3 ML: .5; 3 SOLUTION RESPIRATORY (INHALATION) at 06:52

## 2025-02-21 RX ADMIN — IPRATROPIUM BROMIDE AND ALBUTEROL SULFATE 3 ML: .5; 3 SOLUTION RESPIRATORY (INHALATION) at 15:50

## 2025-02-21 RX ADMIN — BUDESONIDE 0.5 MG: 0.5 INHALANT RESPIRATORY (INHALATION) at 19:07

## 2025-02-21 RX ADMIN — ARFORMOTEROL TARTRATE 15 MCG: 15 SOLUTION RESPIRATORY (INHALATION) at 06:55

## 2025-02-21 RX ADMIN — PIPERACILLIN AND TAZOBACTAM 3.38 G: 3; .375 INJECTION, POWDER, FOR SOLUTION INTRAVENOUS at 02:25

## 2025-02-21 RX ADMIN — APIXABAN 5 MG: 5 TABLET, FILM COATED ORAL at 08:35

## 2025-02-21 RX ADMIN — MORPHINE SULFATE 10 MG: 20 SOLUTION ORAL at 03:01

## 2025-02-21 RX ADMIN — IPRATROPIUM BROMIDE AND ALBUTEROL SULFATE 3 ML: .5; 3 SOLUTION RESPIRATORY (INHALATION) at 10:56

## 2025-02-21 RX ADMIN — PIPERACILLIN AND TAZOBACTAM 3.38 G: 3; .375 INJECTION, POWDER, FOR SOLUTION INTRAVENOUS at 08:36

## 2025-02-21 RX ADMIN — CHLORHEXIDINE GLUCONATE 15 ML: 1.2 RINSE ORAL at 08:35

## 2025-02-21 RX ADMIN — ARFORMOTEROL TARTRATE 15 MCG: 15 SOLUTION RESPIRATORY (INHALATION) at 19:03

## 2025-02-21 RX ADMIN — MORPHINE SULFATE 10 MG: 20 SOLUTION ORAL at 11:43

## 2025-02-21 NOTE — CASE MANAGEMENT/SOCIAL WORK
Continued Stay Note  [facility]     Patient Name: Scarlet Chakraborty  MRN: [id number]  Today's Date: 2/21/2025    Admit Date: 2/10/2025    Plan: IRF referrals pending [facility], pending DC medical readiness   Discharge Plan       Row Name 02/21/25 1355       Plan    Plan IRF referrals pending [facility], pending DC medical readiness    Patient/Family in Agreement with Plan yes    Plan Comments Clinicals reviewed. CCP met with pt & 3 visitors at bedside. CCP obtained permission from pt to speak in front of all visitors. CCP introduced self, role, & DC planning discussed. CCP reviewed PT & OT evaluation & recommendations for IRF. Pt verbalized understanding. CCP provided pt with Patient Choice List for IRF. Pt reports preference 1. [facility] & 2. [facility]. DCP report created. Epic referrals placed. CCP notified Giselle/[facility] of referral. CCP notified Carlotta/Tesfaye of referral. Partial packet with pt's chart. CCP following. DC plan IRF referrals pending [facility]. BISI Oliver/JAMI             Expected Discharge Date and Time       Expected Discharge Date Expected Discharge Time    Feb 24, 2025    Paola Bennett RN

## 2025-02-21 NOTE — PLAN OF CARE
Goal Outcome Evaluation:  Plan of Care Reviewed With: patient        Progress: improving     Pt a/ox4, 1L NC, NSR. Cortrak removed today, pt tolerating diet. No c/o pain. PT/OT both saw pt for initial evals. [employer] appplied to bed. WCTM.

## 2025-02-21 NOTE — THERAPY EVALUATION
Patient Name: Scarlet Chakraborty  : 1960    MRN: [id number]                              Today's Date: 2025       Admit Date: 2/10/2025    Visit Dx:     ICD-10-CM ICD-9-CM   1. Acute respiratory failure with hypoxia and hypercapnia  J96.01 518.81    J96.02    2. COPD exacerbation  J44.1 491.21   3. Coronavirus infection  B34.2 079.89   4. Anticoagulated by anticoagulation treatment  Z79.01 V58.61   5. Elevated d-dimer  R79.89 790.92     Patient Active Problem List   Diagnosis    Acute respiratory failure with hypoxia    Cerebrovascular accident (CVA) due to thrombosis    Stress-induced cardiomyopathy    Chronic obstructive pulmonary disease with acute exacerbation    Tobacco abuse    Mixed hyperlipidemia    Hx of non-ST elevation myocardial infarction (NSTEMI)    History of stroke    Tobacco dependence    COPD exacerbation    Breast mass    Cerebrovascular accident (CVA) due to occlusion of left middle cerebral artery    Congestive heart failure    Chronic obstructive lung disease    Non-ischemic cardiomyopathy    Acute respiratory failure    Status post placement of implantable loop recorder    SOB (shortness of breath)    COPD with acute exacerbation    Acute on chronic respiratory failure with hypercapnia    Severe malnutrition    Acute hypercapnic respiratory failure     Past Medical History:   Diagnosis Date    Asthma     Cerebrovascular accident (CVA) due to thrombosis of right middle cerebral artery     COPD (chronic obstructive pulmonary disease)     Nonischemic cardiomyopathy     NSTEMI (non-ST elevated myocardial infarction)     Stroke     Takotsubo cardiomyopathy     Tobacco abuse      Past Surgical History:   Procedure Laterality Date    CARDIAC CATHETERIZATION N/A 2018    Procedure: Left Heart Cath and cors;  Surgeon: Gabino Dozier MD;  Location: [facility] INVASIVE LOCATION;  Service: Cardiology    CARDIAC CATHETERIZATION N/A 2018    Procedure: Left ventriculography;   Surgeon: Gabino Dozier MD;  Location:  [facility];  Service: Cardiology    CARDIAC ELECTROPHYSIOLOGY PROCEDURE N/A 09/27/2018    Procedure: Loop insertion  LINQ;  Surgeon: Jose R Barker MD;  Location:  [facility];  Service: Cardiovascular      General Information       Row Name 02/21/25 1648          Physical Therapy Time and Intention    Document Type evaluation  -EJ     Mode of Treatment physical therapy  -EJ       Row Name 02/21/25 1648          General Information    Patient Profile Reviewed yes  -EJ     Prior Level of Function independent:;all household mobility;ADL's  does not use AD, lives w s.o.  -EJ     Existing Precautions/Restrictions fall  -EJ     Barriers to Rehab medically complex  -EJ       Row Name 02/21/25 1648          Living Environment    People in Home significant other  -EJ       Row Name 02/21/25 1648          Cognition    Orientation Status (Cognition) oriented x 4  -EJ       Row Name 02/21/25 1648          Safety Issues/Impairments Affecting Functional Mobility    Impairments Affecting Function (Mobility) balance;endurance/activity tolerance;postural/trunk control;shortness of breath;strength;grasp;range of motion (ROM)  -EJ               User Key  (r) = Recorded By, (t) = Taken By, (c) = Cosigned By      Initials Name Provider Type    EJ Miriam Gill, PT Physical Therapist                   Mobility       Row Name 02/21/25 1649          Bed Mobility    Supine-Sit [place] (Bed Mobility) verbal cues;moderate assist (50% patient effort)  -EJ     Sit-Supine [place] (Bed Mobility) verbal cues;moderate assist (50% patient effort);maximum assist (25% patient effort)  -EJ     Assistive Device (Bed Mobility) bed rails;head of bed elevated  -EJ     Comment, (Bed Mobility) cues for sequencing, increased time required  -EJ       Row Name 02/21/25 1649          Transfers    Comment, (Transfers) deferred 2' weakness and fatigue  -EJ                User Key  (r) = Recorded By, (t) = Taken By, (c) = Cosigned By      Initials Name Provider Type     Miriam Gill PT Physical Therapist                   Obj/Interventions       Row Name 02/21/25 1651          Range of Motion Comprehensive    General Range of Motion bilateral lower extremity ROM WFL  -EJ     Comment, General Range of Motion limited AROM in left extremities  -       Row Name 02/21/25 1651          Strength Comprehensive (MMT)    Comment, General Manual Muscle Testing (MMT) Assessment gross weakness, increased L sided weakness-unable to raise LLE against gravity,  pt does have hx of previous R MCA stroke but states her left side is weaker since being in hospital  -       Row Name 02/21/25 1651          Balance    Balance Assessment sitting static balance  -EJ     Static Sitting Balance contact guard;standby assist  -EJ     Dynamic Sitting Balance minimal assist;moderate assist  -EJ     Position, Sitting Balance sitting edge of bed  -EJ     Comment, Balance sat EOB for approx 7 minutes  -EJ               User Key  (r) = Recorded By, (t) = Taken By, (c) = Cosigned By      Initials Name Provider Type     Miriam Gill, PT Physical Therapist                   Goals/Plan       [facility] Name 02/21/25 1658          Bed Mobility Goal 1 (PT)    Activity/Assistive Device (Bed Mobility Goal 1, PT) bed mobility activities, all  -EJ     [place] Level/Cues Needed (Bed Mobility Goal 1, PT) contact guard required  -EJ     Time Frame (Bed Mobility Goal 1, PT) 2 weeks  -[facility] Name 02/21/25 1658          Transfer Goal 1 (PT)    Activity/Assistive Device (Transfer Goal 1, PT) transfers, all;walker, rolling;bed-to-chair/chair-to-bed;sit-to-stand/stand-to-sit  -EJ     [place] Level/Cues Needed (Transfer Goal 1, PT) minimum assist (75% or more patient effort)  -EJ     Time Frame (Transfer Goal 1, PT) 2 weeks  -[facility] Name 02/21/25 1658          Gait Training Goal 1 (PT)     Activity/Assistive Device (Gait Training Goal 1, PT) gait (walking locomotion);walker, rolling  -EJ     [place] Level (Gait Training Goal 1, PT) minimum assist (75% or more patient effort)  -EJ     Distance (Gait Training Goal 1, PT) 10  -EJ     Time Frame (Gait Training Goal 1, PT) 2 weeks  -EJ       Row Name 02/21/25 8719          Therapy Assessment/Plan (PT)    Planned Therapy Interventions (PT) balance training;bed mobility training;gait training;home exercise program;transfer training;stretching;strengthening;stair training;ROM (range of motion);patient/family education  -EJ               User Key  (r) = Recorded By, (t) = Taken By, (c) = Cosigned By      Initials Name Provider Type    Miriam Britt, PT Physical Therapist                   Clinical Impression       Row Name 02/21/25 1663          Pain    Pretreatment Pain Rating 0/10 - no pain  -EJ     Posttreatment Pain Rating 0/10 - no pain  -EJ       Row Name 02/21/25 0005          Plan of Care Review    Plan of Care Reviewed With patient;significant other  -EJ     Outcome Evaluation Pt seen for PT this afternoon after admission to [facility] due to respiratory failure. Pt was intubated on 2/11 adn extubated on 2/20. Pt has hx of previous R MCA stroke w slight residual L sided weakness; however, she presents w significantly increased weakness since extubation and stroke code called on 2/20.  Today, pt is alert and oriented and agreeable to therapy. She required mod A to transition to EOB. SHe tolerated sitting EOB for approx 7 minutes w SBA/CGA. Pt is extremely weak ( L>R). She is unable to raise left extremities against gravity. Pt fatigues quickly and slightly anxious throughout session. Pt unable to attempt standing or transfers at this time. She was assisted back to supine at end of session. Recommend IRF upon DC. Discussed w pt and spouse. Pt will continue to benefit from skilled PT to maximize safety, strength, and independence w mobility.  -EJ        Row Name 02/21/25 1654          Therapy Assessment/Plan (PT)    Rehab Potential (PT) fair  -EJ     Criteria for Skilled Interventions Met (PT) yes  -EJ     Therapy Frequency (PT) 6 times/wk  -EJ       Row Name 02/21/25 1654          Positioning and Restraints    Pre-Treatment Position in bed  -EJ     Post Treatment Position bed  -EJ     In Bed notified nsg;supine;call light within reach;encouraged to call for assist;exit alarm on;fowlers  -EJ               User Key  (r) = Recorded By, (t) = Taken By, (c) = Cosigned By      Initials Name Provider Type    Miriam Britt, PT Physical Therapist                   Outcome Measures       Row Name 02/21/25 1659          How much help from another person do you currently need...    Turning from your back to your side while in flat bed without using bedrails? 2  -EJ     Moving from lying on back to sitting on the side of a flat bed without bedrails? 2  -EJ     Moving to and from a bed to a chair (including a wheelchair)? 1  -EJ     Standing up from a chair using your arms (e.g., wheelchair, bedside chair)? 1  -EJ     Climbing 3-5 steps with a railing? 1  -EJ     To walk in hospital room? 1  -EJ     AM-PAC 6 Clicks Score (PT) 8  -EJ       Row Name 02/21/25 1238          Modified [place] Scale    Pre-Stroke Modified [place] Scale 1 - No significant disability despite symptoms.  Able to carry out all usual duties and activities.  -CE     Modified [place] Scale 4 - Moderately severe disability.  Unable to walk without assistance, and unable to attend to own bodily needs without assistance.  -CE       Row Name 02/21/25 1238          Functional Assessment    Outcome Measure Options AM-PAC 6 Clicks Daily Activity (OT);Modified [place]  -CE               User Key  (r) = Recorded By, (t) = Taken By, (c) = Cosigned By      Initials Name Provider Type    Miriam Britt, PT Physical Therapist    Amberly Busch, OT Occupational Therapist                                  Physical Therapy Education        No education to display                  PT Recommendation and Plan  Planned Therapy Interventions (PT): balance training, bed mobility training, gait training, home exercise program, transfer training, stretching, strengthening, stair training, ROM (range of motion), patient/family education  Outcome Evaluation: Pt seen for PT this afternoon after admission to [facility] due to respiratory failure. Pt was intubated on 2/11 adn extubated on 2/20. Pt has hx of previous R MCA stroke w slight residual L sided weakness; however, she presents w significantly increased weakness since extubation and stroke code called on 2/20.  Today, pt is alert and oriented and agreeable to therapy. She required mod A to transition to EOB. SHe tolerated sitting EOB for approx 7 minutes w SBA/CGA. Pt is extremely weak ( L>R). She is unable to raise left extremities against gravity. Pt fatigues quickly and slightly anxious throughout session. Pt unable to attempt standing or transfers at this time. She was assisted back to supine at end of session. Recommend IRF upon DC. Discussed w pt and spouse. Pt will continue to benefit from skilled PT to maximize safety, strength, and independence w mobility.     Time Calculation:         PT Charges       Row Name 02/21/25 1700             Time Calculation    Start Time 1451  -EJ      Stop Time 1506  -EJ      Time Calculation (min) 15 min  -EJ      PT Received On 02/21/25  -EJ      PT - Next Appointment 02/22/25  -EJ      PT Goal Re-Cert Due Date 03/07/25  -EJ         Time Calculation- PT    Total Timed Code Minutes- PT 10 minute(s)  -EJ                User Key  (r) = Recorded By, (t) = Taken By, (c) = Cosigned By      Initials Name Provider Type    Miriam Britt, PT Physical Therapist                  Therapy Charges for Today       Code Description Service Date Service Provider Modifiers Qty    76969371434 HC PT EVAL MOD COMPLEXITY 3 2/21/2025 Miriam Gill  C, PT GP 1    [id number]  PT THERAPEUTIC ACT EA 15 MIN 2/21/2025 Miriam Gill C, PT GP 1            PT G-Codes  Outcome Measure Options: AM-PAC 6 Clicks Daily Activity (OT), Modified [place]  AM-PAC 6 Clicks Score (PT): 8  AM-PAC 6 Clicks Score (OT): 8  Modified [place] Scale: 4 - Moderately severe disability.  Unable to walk without assistance, and unable to attend to own bodily needs without assistance.  PT Discharge Summary  Anticipated Discharge Disposition (PT): inpatient rehabilitation facility    Miriam Gill, PT  2/21/2025

## 2025-02-21 NOTE — MBS/VFSS/FEES
Acute Care - Speech Language Pathology   Swallow Initial Evaluation [facility]     Patient Name: Scarlet Chakraborty  : 1960  MRN: [id number]  Today's Date: 2025               Admit Date: 2/10/2025    Visit Dx:     ICD-10-CM ICD-9-CM   1. Acute respiratory failure with hypoxia and hypercapnia  J96.01 518.81    J96.02    2. COPD exacerbation  J44.1 491.21   3. Coronavirus infection  B34.2 079.89   4. Anticoagulated by anticoagulation treatment  Z79.01 V58.61   5. Elevated d-dimer  R79.89 790.92     Patient Active Problem List   Diagnosis    Acute respiratory failure with hypoxia    Cerebrovascular accident (CVA) due to thrombosis    Stress-induced cardiomyopathy    Chronic obstructive pulmonary disease with acute exacerbation    Tobacco abuse    Mixed hyperlipidemia    Hx of non-ST elevation myocardial infarction (NSTEMI)    History of stroke    Tobacco dependence    COPD exacerbation    Breast mass    Cerebrovascular accident (CVA) due to occlusion of left middle cerebral artery    Congestive heart failure    Chronic obstructive lung disease    Non-ischemic cardiomyopathy    Acute respiratory failure    Status post placement of implantable loop recorder    SOB (shortness of breath)    COPD with acute exacerbation    Acute on chronic respiratory failure with hypercapnia    Severe malnutrition    Acute hypercapnic respiratory failure     Past Medical History:   Diagnosis Date    Asthma     Cerebrovascular accident (CVA) due to thrombosis of right middle cerebral artery     COPD (chronic obstructive pulmonary disease)     Nonischemic cardiomyopathy     NSTEMI (non-ST elevated myocardial infarction)     Stroke     Takotsubo cardiomyopathy     Tobacco abuse      Past Surgical History:   Procedure Laterality Date    CARDIAC CATHETERIZATION N/A 2018    Procedure: Left Heart Cath and cors;  Surgeon: Gabino Dozier MD;  Location: [facility] CATH INVASIVE LOCATION;  Service: Cardiology    CARDIAC  CATHETERIZATION N/A 09/13/2018    Procedure: Left ventriculography;  Surgeon: Gabino Dozier MD;  Location:  [facility] CATH INVASIVE LOCATION;  Service: Cardiology    CARDIAC ELECTROPHYSIOLOGY PROCEDURE N/A 09/27/2018    Procedure: Loop insertion  LINQ;  Surgeon: Jose R Barker MD;  Location:  [facility] CATH INVASIVE LOCATION;  Service: Cardiovascular       SLP Recommendation and Plan  SLP Swallowing Diagnosis: mild, pharyngeal dysphagia (02/21/25 0900)  SLP Diet Recommendation: mechanical ground textures, no mixed consistencies, thin liquids (02/21/25 0900)  Recommended Precautions and Strategies: upright posture during/after eating, small bites of food and sips of liquid, multiple swallows per bite of food, multiple swallows per sip of liquid, volitional throat clear (02/21/25 0900)  SLP Rec. for Method of Medication Administration: meds whole, meds crushed, with puree, as tolerated (02/21/25 0900)     Monitor for Signs of Aspiration: yes, notify SLP if any concerns (02/21/25 0900)  Recommended Diagnostics: reassess via clinical swallow evaluation (02/21/25 0900)  Swallow Criteria for Skilled Therapeutic Interventions Met: demonstrates skilled criteria (02/21/25 0900)  Anticipated Discharge Disposition (SLP): inpatient rehabilitation facility (02/21/25 0900)  Rehab Potential/Prognosis, Swallowing: good, to achieve stated therapy goals (02/21/25 0900)  Therapy Frequency (Swallow): PRN (02/21/25 0900)  Predicted Duration Therapy Intervention (Days): until discharge (02/21/25 0900)  Oral Care Recommendations: Oral Care BID/PRN (02/21/25 0900)                                        Outcome Evaluation: FEES completed. Recommend mechanical ground diet with thin liquids, NO mixed consistencies. Meds whole or crushed in puree. Sitting upright, slow rate, small bites/sips, double swallow, intentional periodic throat clear.      SWALLOW EVALUATION (Last 72 Hours)       SLP Adult Swallow Evaluation       Row Name 02/21/25 0900                    Rehab Evaluation    Document Type evaluation  -CR        Subjective Information no complaints  -CR        Patient Observations alert;cooperative  -CR        Patient Effort excellent  -CR        Symptoms Noted During/After Treatment none  -CR           General Information    Patient Profile Reviewed yes  -CR        Pertinent History Of Current Problem Respiratory fail with COPD exacerbation. Intubated 2/11-19. Neuro following, questioning new R darvin CVA. Hx includes R MCA stroke 2018.  -CR        Current Method of Nutrition NPO  -CR        Precautions/Limitations, Vision WFL;for purposes of eval  -CR        Precautions/Limitations, Hearing WFL;for purposes of eval  -CR        Prior Level of Function-Communication WFL  -CR        Prior Level of Function-Swallowing no diet consistency restrictions  -CR        Plans/Goals Discussed with patient;agreed upon  -CR        Barriers to Rehab none identified  -CR           Pain    Pretreatment Pain Rating 0/10 - no pain  -CR        Posttreatment Pain Rating 0/10 - no pain  -CR           Oral Motor Structure and Function    Dentition Assessment natural, present and adequate  -CR        Secretion Management WNL/WFL  -CR        Mucosal Quality moist, healthy  -CR           Oral Musculature and Cranial Nerve Assessment    Oral Motor General Assessment WFL  -CR           Fiberoptic Endoscopic Evaluation of Swallowing (FEES)    Risks/Benefits Reviewed risks/benefits explained;patient;family;agreed to eval  -CR        Nasal Entry right:  -CR        Scope serial number/identification Ambu  -CR           Anatomy and Physiology    Anatomic Considerations no anatomic structural deviation  -CR        Velopharyngeal WFL  -CR        Base of Tongue symmetrical  -CR        Epiglottis WFL  -CR        Laryngeal Function Breathing symmetrical  -CR        Laryngeal Function Phonation symmetrical  -CR        Laryngeal Function to Breath Hold incomplete closure;can't sustain closure   -CR        Secretion Rating Scale (Tejas et al. 1996) 1- secretions present around the laryngeal vestibule  -CR        Secretion Description thin;white  -CR        Ice Chips elicited swallow;fully cleared secretions  -CR        Spontaneous Swallow frequency adequate  -CR        Sensory reduced sensation  -CR        Utensils Used Spoon;Cup;Straw  -CR        Consistencies Trialed ice chips;thin liquids;nectar-thick liquids;pudding/puree;soft to chew;chopped;mixed consistency;spoon;cup;straw  -CR           Initiation of Pharyngeal Swallow    FEES Summary FEES completed. Passed through right nare. Patient presents with mild pharyngeal dysphagia characterized by decreased laryngeal elevation and poor airway sensation. Adequate swallow initiation. As trials progressed, elevation improved. No penetration visualized with thin liquids, nectar thick liquids. Trace thin liquid residue cleared with spontaneous double swallow. Mild diffuse puree residue with epiglottic undercoating with puree and soft/chopped solid trials, suspected to be increased due to Cortrak. Trace puree residue within right lateral channel, pt able to clear with cued effortful throat clear. Deep penetration to the cords with thin portion of soft/mixed consistency, cleared with cued throat clear and re-swallow. Recommend mechanical ground diet with thin liquids, NO mixed consistencies. Meds whole or crushed in puree. Sitting upright, slow rate, small bites/sips, double swallow, intentional periodic throat clear.  -CR           SLP Evaluation Clinical Impression    SLP Swallowing Diagnosis mild;pharyngeal dysphagia  -CR        Functional Impact risk of aspiration/pneumonia  -CR        Rehab Potential/Prognosis, Swallowing good, to achieve stated therapy goals  -CR        Swallow Criteria for Skilled Therapeutic Interventions Met demonstrates skilled criteria  -CR           Recommendations    Therapy Frequency (Swallow) PRN  -CR        Predicted Duration  Therapy Intervention (Days) until discharge  -CR        SLP Diet Recommendation mechanical ground textures;no mixed consistencies;thin liquids  -CR        Recommended Diagnostics reassess via clinical swallow evaluation  -CR        Recommended Precautions and Strategies upright posture during/after eating;small bites of food and sips of liquid;multiple swallows per bite of food;multiple swallows per sip of liquid;volitional throat clear  -CR        Oral Care Recommendations Oral Care BID/PRN  -CR        SLP Rec. for Method of Medication Administration meds whole;meds crushed;with puree;as tolerated  -CR        Monitor for Signs of Aspiration yes;notify SLP if any concerns  -CR        Anticipated Discharge Disposition (SLP) inpatient rehabilitation facility  -CR           Swallow Goals (SLP)    Swallow LTGs Patient will demonstrate functional swallow for  -CR           (LTG) Patient will demonstrate functional swallow for    Diet Texture (Demonstrate functional swallow) regular textures  -CR        Liquid viscosity (Demonstrate functional swallow) thin liquids  -CR        [place] (Demonstrate functional swallow) independently (over 90% accuracy)  -CR        Time Frame (Demonstrate functional swallow) by discharge  -CR        Progress/Outcomes (Demonstrate functional swallow) new goal  -CR                  User Key  (r) = Recorded By, (t) = Taken By, (c) = Cosigned By      Initials Name Effective Dates    CR Arlette Williamson SLP 12/03/24 -                     EDUCATION  The patient has been educated in the following areas:   Dysphagia (Swallowing Impairment).        SLP GOALS       Row Name 02/21/25 0900             (LTG) Patient will demonstrate functional swallow for    Diet Texture (Demonstrate functional swallow) regular textures  -CR      Liquid viscosity (Demonstrate functional swallow) thin liquids  -CR      [place] (Demonstrate functional swallow) independently (over 90% accuracy)  -CR      Time  Frame (Demonstrate functional swallow) by discharge  -CR      Progress/Outcomes (Demonstrate functional swallow) new goal  -CR                User Key  (r) = Recorded By, (t) = Taken By, (c) = Cosigned By      Initials Name Provider Type    Arlette Abernathy SLP Speech and Language Pathologist                         Time Calculation:    Time Calculation- SLP       Row Name 02/21/25 1042             Time Calculation- SLP    SLP Start Time 0730  -CR      SLP Received On 02/21/25  -CR         Untimed Charges    92494-WF Fiberoptic Endo Eval Swallow Minutes 120  -CR         Total Minutes    Untimed Charges Total Minutes 120  -CR       Total Minutes 120  -CR                User Key  (r) = Recorded By, (t) = Taken By, (c) = Cosigned By      Initials Name Provider Type    Arlette bAernathy SLP Speech and Language Pathologist                    Therapy Charges for Today       Code Description Service Date Service Provider Modifiers Qty    [id number]  ST FIBEROPTIC ENDO EVAL SWALL 8 2/21/2025 Arlette Williamson SLP GN 1    [id number] [facility] S[facility] BRONCH ASCOPE FLX DISPOSABLE 2/21/2025 Arlette Williamson SLP  1                 MIESHA Guzman  2/21/2025

## 2025-02-21 NOTE — PROGRESS NOTES
Consult Daily Progress Note  [facility]   02/21/25      Patient Name:  Scarlet Chakraborty  MRN:  [id number]   YOB: 1960  Age: 65 y.o.  Sex: female  LOS: 11    Reason for Consult:  Respiratory failure, COPD    Interval History:  Transferred out of the ICU  No acute events overnight  Mild increase in leukocytosis  1 L nasal cannula  Family is at bedside  States her breathing is improved  No chest pain  No nausea vomiting  Does feel weak  Ongoing work with PT    Physical Exam:  Vitals:    02/21/25 1605   BP: 134/56   Pulse: 108   Resp: 18   Temp: 97.9 °F (36.6 °C)   SpO2: 91%       Intake/Output    Intake/Output Summary (Last 24 hours) at 2/21/2025 1824  Last data filed at 2/21/2025 1822  Gross per 24 hour   Intake 480 ml   Output --   Net 480 ml       General: Alert, nontoxic, NAD, weak appearing  HEENT: NC/AT, EOMI, MMM  Neck: Supple, trachea midline  Cardiac: RRR, no murmur, gallops, rubs  Pulmonary: Diminished bilaterally  GI: Soft, non-tender, non-distended, normal bowel sounds  Extremities: Warm, well perfused, no LE edema  Skin: no visible rash  Neuro: CN II - XII grossly intact  Psychiatry: Normal mood and affect      Data Review:  Results from last 7 days   Lab Units 02/21/25  0733 02/20/25 0417 02/19/25 0407 02/18/25  0341 02/17/25  0422 02/16/25  0402 02/15/25  0425   WBC 10*3/mm3 16.32* 14.98* 16.57* 18.62* 13.47* 12.40* 10.40   HEMOGLOBIN g/dL 15.2 13.9 13.3 12.6 12.0 12.6 13.2   PLATELETS 10*3/mm3 347 308 287 292 215 205 258     Results from last 7 days   Lab Units 02/21/25  0733 02/20/25  0417 02/19/25  0407 02/18/25  0341 02/17/25  0422 02/16/25  0402 02/15/25  0425   SODIUM mmol/L 140 138 142 140 139 135* 142   POTASSIUM mmol/L 4.7 4.7 4.6 4.3 4.5 4.1 4.5   CHLORIDE mmol/L 102 105 106 105 103 104 108*   CO2 mmol/L 27.0 28.4 30.4* 28.0 28.0 27.3 28.0   BUN mg/dL 18 18 19 22 25* 23 28*   CREATININE mg/dL 0.28* 0.30* 0.35* 0.38* 0.29* 0.22* 0.31*   GLUCOSE mg/dL 144* 115*  147* 126* 222* 173* 196*   CALCIUM mg/dL 9.4 8.7 8.7 9.0 8.8 7.4* 9.0   Estimated Creatinine Clearance: 129.1 mL/min (A) (by C-G formula based on SCr of 0.28 mg/dL (L)).    Results from last 7 days   Lab Units 02/21/25  0733 02/20/25  0417 02/19/25  0407   PLATELETS 10*3/mm3 347 308 287       Results from last 7 days   Lab Units 02/19/25  0928 02/19/25  0414 02/18/25  0355 02/17/25  0351 02/16/25  0345 02/15/25  1045   PH, ARTERIAL pH units 7.429 7.477* 7.511* 7.523* 7.449 7.408   PCO2, ARTERIAL mm Hg 43.7 44.0 35.0 40.0 50.2* 53.2*   PO2 ART mm Hg 89.5 95.3 62.3* 141.1* 87.7 71.0*   HCO3 ART mmol/L 29.0* 32.5* 28.0 32.9* 34.8* 33.6*       Result Review:  I have personally reviewed the results from the time of this admission to 2/21/2025 18:24 EST and agree with these findings:  [x]  Laboratory list / accordion  [x]  Microbiology  [x]  Radiology  []  EKG/Telemetry   [x]  Cardiology/Vascular   []  Pathology  [x]  Old records  []  Other:    Imaging:  Reviewed chest images personally from past 3 days    ASSESSMENT  /  PLAN:    End-stage COPD, FEV1 17% on Welda 11/9/23 with current exacerbation  Acute hypoxic and hypercapnic respiratory failure  Mucous plugging, s/p bronchoscopy 2/14/2025  Sinus tachycardia  Seasonal coronavirus infection: OC43  COPD cachexia  Hypotension, secondary to sedation and positive pressure ventilation  Ischemic cardiomyopathy  History of CVA, on Eliquis  History of Takotsubo cardiomyopathy    -Transferred to the ICU, respiratory status improving  -1 L nasal cannula, SpO2 goal 88 to 92%  -Continue bronchodilators with Brovana, Pulmicort, DuoNebs.  -Solu-Medrol transition to prednisone  -Completed course of antibiotics with Zosyn  -Ongoing swallow evaluation  -Inpatient rehab facility referrals ongoing  -Patient is seen by me as an outpatient.  I will set her up for follow-up after discharge    All issues new to me today.  Prior hospital course, labs and imaging reviewed.    Disposition: Pending  rehab placement    Zain Mukherjee MD  [facility]  Pulmonary and Critical Care Medicine, Interventional Pulmonology    Parts of this note may be an electronic transcription/translation of spoken language to printed text using the Dragon dictation system.

## 2025-02-21 NOTE — PLAN OF CARE
Goal Outcome Evaluation:  Plan of Care Reviewed With: patient, sibling           Outcome Evaluation: Pt seen for OT eval after admission 2/2 [facility] ([place] Pt intubated on 2/11 and extubated on 2/20. Pt has h/o CVA and has slight LUE residual weakness but with significantly increased weakness since extubation with stroke code called on 2/20. Pt currently motivated and agreeable to EOB or OOB activity. Required mod/maxA for sup>sit and pt not yet appropriate for STS or transfer to chair 2/2 signficant generalized weakness in UEs and LEs (L>R). Able to maintain static sitting balance with CGA and min/modA for dynamic balance. Pt required maxA for grooming as well as depA for LB dressing tasks. OT will con't to follow for stated goals and recommend d/c to IPR.    Anticipated Discharge Disposition (OT): inpatient rehabilitation facility

## 2025-02-21 NOTE — PLAN OF CARE
Goal Outcome Evaluation:  Plan of Care Reviewed With: patient        Progress: improving    This RN took over care at 1300. Pt A+O x4, VSS & on 2L O2 NC all shift. Large BM x1 and pt noted to have left arm weakness more prominent than right. Pt able to grasp and have some effort against gravity, but unable to lift elbow off bed compared to right. Pt told RN she has TIA history and no known deficits, confirming left arm weakness was new for her. Pt's chart showing hx of left MCA stroke. Pt had been receiving home dose Eliquis since admitted. Dr. Tracy notified and orders received to get STAT head CT. Charge RN also notified and communicated with on-call neurologist to receive orders for full CTA head/neck/perfusion studies. Pt scanned and chronic completed right MCA stroke visible, but per neurologist no new areas of infarction. Pt stable to still transfer out of ICU and plan for MRI when able. Family at bedside. Ongoing care provided.

## 2025-02-21 NOTE — PROGRESS NOTES
Nutrition Services    Patient Name:  Scarlet Chakraborty  YOB: 1960  MRN: [id number]  Admit Date:  2/10/2025Nutrition Services    Patient Name: Scarlet Chakraborty  YOB: 1960  MRN: [id number]  Admission date: 2/10/2025    PROGRESS NOTE      Encounter Information: Follow up   Diet started, NG removed        PO Diet: Diet: Regular/House; No Mixed Consistencies; Texture: Mechanical Ground (NDD 2); Fluid Consistency: Thin (IDDSI 0)   PO Supplements: -   PO Intake:  50% x 1 meal       Current nutrition support: Ground, thin    Nutrition support review: Passed swallow study today and diet started.        Labs (reviewed below): reviewed        GI Function:  2/20 BM, diarrhea        Nutrition Intervention Updates: Boost TID   Encourage oral intakes >75%       Results from last 7 days   Lab Units 02/21/25  0733 02/20/25  0417 02/19/25  0407   SODIUM mmol/L 140 138 142   POTASSIUM mmol/L 4.7 4.7 4.6   CHLORIDE mmol/L 102 105 106   CO2 mmol/L 27.0 28.4 30.4*   BUN mg/dL 18 18 19   CREATININE mg/dL 0.28* 0.30* 0.35*   CALCIUM mg/dL 9.4 8.7 8.7   GLUCOSE mg/dL 144* 115* 147*     Results from last 7 days   Lab Units 02/21/25  0733 02/18/25  0341 02/17/25  0422   HEMOGLOBIN g/dL 15.2   < > 12.0   HEMATOCRIT % 45.7   < > 37.2   TRIGLYCERIDES mg/dL  --   --  124    < > = values in this interval not displayed.     COVID19   Date Value Ref Range Status   02/10/2025 Not Detected Not Detected - Ref. Range Final     Lab Results   Component Value Date    HGBA1C 5.5 11/01/2023       Wt Readings from Last 10 Encounters:   02/21/25 0347 40.3 kg (88 lb 13.5 oz)   02/19/25 0519 39.9 kg (87 lb 15.4 oz)   02/18/25 0600 42 kg (92 lb 9.5 oz)   02/17/25 0230 39.7 kg (87 lb 8.4 oz)   02/16/25 0600 41.5 kg (91 lb 7.9 oz)   02/15/25 0515 37.8 kg (83 lb 5.3 oz)   02/10/25 2230 33.7 kg (74 lb 4.7 oz)   03/20/24 0537 31.8 kg (70 lb 1.6 oz)   03/17/24 0955 34 kg (75 lb)   03/16/24 1212 34 kg (75 lb)   02/19/24 1508 34.3 kg (75  lb 9.6 oz)   08/03/23 1309 35.4 kg (78 lb)   01/16/23 1323 39 kg (86 lb)   01/12/22 1331 36.3 kg (80 lb)   03/22/21 0618 37.8 kg (83 lb 6.4 oz)   03/21/21 0518 38.9 kg (85 lb 12.8 oz)   03/20/21 0537 39.7 kg (87 lb 9.6 oz)   03/19/21 0500 46 kg (101 lb 6.4 oz)   03/18/21 0546 40.9 kg (90 lb 2.7 oz)   03/17/21 1855 42.2 kg (93 lb)   03/17/21 0500 42.4 kg (93 lb 7.6 oz)   03/16/21 0500 43.3 kg (95 lb 7.4 oz)   03/15/21 0500 42.7 kg (94 lb 2.2 oz)   03/14/21 0445 42.5 kg (93 lb 11.1 oz)   03/13/21 1412 40.8 kg (89 lb 15.2 oz)   03/13/21 0420 40.8 kg (89 lb 15.2 oz)   03/13/21 0108 40.3 kg (88 lb 13.5 oz)   03/12/21 2210 45.4 kg (100 lb)   01/20/21 1431 41.3 kg (91 lb)   12/15/20 1202 41.7 kg (92 lb)   06/15/20 1122 44.5 kg (98 lb)       RD to follow up per protocol.    Electronically signed by:  Divya Mcqueen RD  02/21/25 15:56 EST

## 2025-02-21 NOTE — PLAN OF CARE
Goal Outcome Evaluation:              Outcome Evaluation: FEES completed. Recommend mechanical ground diet with thin liquids, NO mixed consistencies. Meds whole or crushed in puree. Sitting upright, slow rate, small bites/sips, double swallow, intentional periodic throat clear.               SLP Swallowing Diagnosis: mild, pharyngeal dysphagia (02/21/25 0900)

## 2025-02-21 NOTE — PLAN OF CARE
Goal Outcome Evaluation:      Pt transferred from ICU. A&Ox4. 2LPM nasal cannula. L sided weakness noted. Pt and sister at bedside state this is from previous stroke. MRI completed this shift. Tube feeds at goal. VSS. Safety maintained.

## 2025-02-21 NOTE — PROGRESS NOTES
"DOS: 2025  NAME: Scarlet Chakraborty   : 1960  PCP: Flavia Jaquez APRN  Chief Complaint   Patient presents with    Shortness of Breath       Chief complaint: L sided weakness  Subjective: She tells of difficulty with left arm after stroke.  She feels like she is not weak of left leg as well overall diffusely weak.  No problems with speech or vision    Objective:  Vital signs: BP 98/54 (BP Location: Left arm, Patient Position: Lying)   Pulse 101   Temp 97.9 °F (36.6 °C) (Oral)   Resp 16   Ht 152.4 cm (60\")   Wt 40.3 kg (88 lb 13.5 oz)   SpO2 98%   BMI 17.35 kg/m²      Gen: NAD, vitals reviewed  MS: oriented x3, recent/remote memory intact, normal attention/concentration, language intact, no neglect.  CN: visual acuity grossly normal, PERRL, EOMI, no facial droop, no dysarthria  Motor: no drift of UE, 2/5 b/l LE, 3/5 dorsiflexion/plantar flexion on R, 2/5 on R  Sensory: intact to light touch all 4 ext.  Reflexes: down toes    ROS:  No weakness, numbness  No fevers, chills      Laboratory results:  Lab Results   Component Value Date    GLUCOSE 144 (H) 2025    CALCIUM 9.4 2025     2025    K 4.7 2025    CO2 27.0 2025     2025    BUN 18 2025    CREATININE 0.28 (L) 2025    EGFRIFNONA 142 2021    BCR 64.3 (H) 2025    ANIONGAP 11.0 2025     Lab Results   Component Value Date    WBC 16.32 (H) 2025    HGB 15.2 2025    HCT 45.7 2025    MCV 93.6 2025     2025     Lab Results   Component Value Date    LDL 50 2018    LDL 90 2018     Labs reviewed    Review and interpretation of imaging: Personally reviewed MRI-encephalomalacia of right MCA, old right cerebellar infarct with patient and significant other at bedside-there is DWI hyperintensity with faint ADC, correlate but not on FLAIR raising thought of artifact versus subacute stroke , favor the former    Workup to " date:    Diagnoses:  COPD exacerbation  Weakness  History of stroke  Chronic anticoagulation  Tobacco abuse    Impression: 65-year-old female with end-stage COPD, nonischemic cardiomyopathy, previous CVA felt to be cardioembolic and on empiric Eliquis with EF of around 20-35%.  She has a loop monitor as far as I can tell no captured A-fib.  She presents to the hospital on 2/10 and respiratory failure requiring intubation extubated on 2/19.  There was complaint of worsening left-sided weakness for which MRI was obtained.  There is abnormal signal of right darvin favored to artifact.  On exam she is diffusely weak 2 out of 5 bilateral lower extremities with foot drop on the left.  She will need aggressive PT after acute illness.  She previously followed in neurology clinic for stroke history.  I will ensure she has follow-up    Neuro team available as needed      Thank you for this consultation.  Discussed above plan with neuro attending, Dr. Gilliland who agrees with above plan.  Neurology team is available for concerns or questions.

## 2025-02-21 NOTE — DISCHARGE PLACEMENT REQUEST
"Scarlet Chavez (65 y.o. Female)       Date of Birth   1960    Social Security Number       Address   [address] [place] [address]    Home Phone       MRN   [id number]       Alevism   Latter day    Marital Status   Single                            Admission Date   2/10/25    Admission Type   Emergency    Admitting Provider   Zainab Regalado MD    Attending Provider   Zainab Regalado MD    Department, Room/Bed   [facility], 3110/1       Discharge Date       Discharge Disposition       Discharge Destination                                 Attending Provider: Zainab Regalado MD    Allergies: No Known Allergies    Isolation: None   Infection: None   Code Status: No CPR    Ht: 152.4 cm (60\")   Wt: 40.3 kg (88 lb 13.5 oz)    Admission Cmt: None   Principal Problem: Acute hypercapnic respiratory failure [J96.02]                   Active Insurance as of 2/10/2025       Primary Coverage       Payor Plan Insurance Group Employer/Plan Group    [facility] [id number]       Payor Plan Address Payor Plan Phone Number Payor Plan Fax Number Effective Dates    [address] [phone]  1/1/2024 - None Entered    [facility] [id number]         Subscriber Name Subscriber Birth Date Member ID       SCARLET CHAVEZ 1960 [id number]                     Emergency Contacts        (Rel.) Home Phone Work Phone Mobile Phone    Yvonne Mccollum (Sister) [phone] -- --    Don Taylor (Significant Other) -- -- [phone]          "

## 2025-02-21 NOTE — THERAPY EVALUATION
Patient Name: Scarlet Chakraborty  : 1960    MRN: [id number]                              Today's Date: 2025       Admit Date: 2/10/2025    Visit Dx:     ICD-10-CM ICD-9-CM   1. Acute respiratory failure with hypoxia and hypercapnia  J96.01 518.81    J96.02    2. COPD exacerbation  J44.1 491.21   3. Coronavirus infection  B34.2 079.89   4. Anticoagulated by anticoagulation treatment  Z79.01 V58.61   5. Elevated d-dimer  R79.89 790.92     Patient Active Problem List   Diagnosis    Acute respiratory failure with hypoxia    Cerebrovascular accident (CVA) due to thrombosis    Stress-induced cardiomyopathy    Chronic obstructive pulmonary disease with acute exacerbation    Tobacco abuse    Mixed hyperlipidemia    Hx of non-ST elevation myocardial infarction (NSTEMI)    History of stroke    Tobacco dependence    COPD exacerbation    Breast mass    Cerebrovascular accident (CVA) due to occlusion of left middle cerebral artery    Congestive heart failure    Chronic obstructive lung disease    Non-ischemic cardiomyopathy    Acute respiratory failure    Status post placement of implantable loop recorder    SOB (shortness of breath)    COPD with acute exacerbation    Acute on chronic respiratory failure with hypercapnia    Severe malnutrition    Acute hypercapnic respiratory failure     Past Medical History:   Diagnosis Date    Asthma     Cerebrovascular accident (CVA) due to thrombosis of right middle cerebral artery     COPD (chronic obstructive pulmonary disease)     Nonischemic cardiomyopathy     NSTEMI (non-ST elevated myocardial infarction)     Stroke     Takotsubo cardiomyopathy     Tobacco abuse      Past Surgical History:   Procedure Laterality Date    CARDIAC CATHETERIZATION N/A 2018    Procedure: Left Heart Cath and cors;  Surgeon: Gabino Dozier MD;  Location: [facility] INVASIVE LOCATION;  Service: Cardiology    CARDIAC CATHETERIZATION N/A 2018    Procedure: Left ventriculography;   Surgeon: Gabino Dozier MD;  Location:  [facility];  Service: Cardiology    CARDIAC ELECTROPHYSIOLOGY PROCEDURE N/A 09/27/2018    Procedure: Loop insertion  LINQ;  Surgeon: Jose R Barker MD;  Location:  [facility];  Service: Cardiovascular      General Information       Row Name 02/21/25 1227          OT Time and Intention    Subjective Information no complaints  -CE     Document Type evaluation  -CE     Mode of Treatment occupational therapy  -CE     Patient Effort good  -CE     Symptoms Noted During/After Treatment dizziness  -CE       Row Name 02/21/25 1227          General Information    Patient Profile Reviewed yes  -CE     Prior Level of Function independent:;ADL's;all household mobility  drove occasionally and s.o. assists with IADLs  -CE     Existing Precautions/Restrictions fall  -CE     Barriers to Rehab medically complex  -CE       Row Name 02/21/25 1227          Living Environment    People in Home significant other  -CE       Row Name 02/21/25 1227          Stairs Within Home, Primary    Number of Stairs, Within Home, Primary none  -CE     Stair Railings, Within Home, Primary --  no AD at baseline; has tub/shower  -CE       Row Name 02/21/25 1227          Cognition    Orientation Status (Cognition) oriented x 4  -CE       Row Name 02/21/25 1227          Safety Issues/Impairments Affecting Functional Mobility    Impairments Affecting Function (Mobility) balance;endurance/activity tolerance;postural/trunk control;shortness of breath;strength;grasp  -CE               User Key  (r) = Recorded By, (t) = Taken By, (c) = Cosigned By      Initials Name Provider Type    CE Amberly Alcaraz OT Occupational Therapist                     Mobility/ADL's       Row Name 02/21/25 1229          Bed Mobility    Bed Mobility supine-sit;sit-supine  -CE     Supine-Sit [place] (Bed Mobility) 1 person assist;moderate assist (50% patient effort);maximum assist (25% patient effort)   -CE     Sit-Supine [place] (Bed Mobility) 1 person assist;maximum assist (25% patient effort)  -CE     Assistive Device (Bed Mobility) bed rails;head of bed elevated  -CE     Comment, (Bed Mobility) increased time  -CE       Row Name 02/21/25 1229          Transfers    Comment, (Transfers) deferred 2/2 significant weakness and pt with increased WOB at EOB; maintaining SpO2 88-91% at EOB on .5L  -CE       Row Name 02/21/25 1229          Activities of Daily Living    BADL Assessment/Intervention lower body dressing;grooming  -CE       Row Name 02/21/25 1229          Lower Body Dressing Assessment/Training    [place] Level (Lower Body Dressing) don;socks;dependent (less than 25% patient effort)  -CE     Position (Lower Body Dressing) sitting up in bed  -CE       Row Name 02/21/25 1229          Grooming Assessment/Training    [place] Level (Grooming) maximum assist (25% patient effort)  -CE     Position (Grooming) edge of bed sitting;unsupported sitting  -               User Key  (r) = Recorded By, (t) = Taken By, (c) = Cosigned By      Initials Name Provider Type    CE Amberly Alcaraz OT Occupational Therapist                   Obj/Interventions       Row Name 02/21/25 1231          Sensory Assessment (Somatosensory)    Sensory Assessment (Somatosensory) sensation intact  -     Sensory Assessment pt denies any N/T in BUEs  -CE       Row Name 02/21/25 1231          Vision Assessment/Intervention    Visual Impairment/Limitations WFL  -CE       Row Name 02/21/25 1231          Range of Motion Comprehensive    Comment, General Range of Motion AAROM WFL BUE and BLE  -       Row Name 02/21/25 1231          Strength Comprehensive (MMT)    General Manual Muscle Testing (MMT) Assessment upper extremity strength deficits identified;lower extremity strength deficits identified  -       Row Name 02/21/25 1231          Upper Extremity (Manual Muscle Testing)    Comment, MMT: Upper Extremity RUE grossly  3-/5 at shoulder, 3/5 elbow and grasp intact but weak; LUE 2-/5 shoulder, 2-/5 elbow and grasp about 75%  -CE       Row Name 02/21/25 1231          Lower Extremity (Manual Muscle Testing)    Comment, MMT: Lower Extremity significant BLE weakness L>R and grossly 2-3-/5 throughout  -CE       Row Name 02/21/25 1231          Motor Skills    Motor Skills functional endurance  -CE     Functional Endurance poor  -CE       Row Name 02/21/25 1231          Balance    Balance Assessment sitting dynamic balance  -CE     Dynamic Sitting Balance minimal assist;moderate assist  -CE     Balance Interventions weight shifting activity  -CE     Comment, Balance able to lean laterally to R and L but requires assist to regain midline at EOB  -CE               User Key  (r) = Recorded By, (t) = Taken By, (c) = Cosigned By      Initials Name Provider Type    CE Amberly Alcaraz, OT Occupational Therapist                   Goals/Plan       Row Name 02/21/25 1237          Bed Mobility Goal 1 (OT)    Activity/Assistive Device (Bed Mobility Goal 1, OT) bed mobility activities, all  -CE     [place] Level/Cues Needed (Bed Mobility Goal 1, OT) modified independence  -CE     Time Frame (Bed Mobility Goal 1, OT) short term goal (STG);2 weeks  -CE       Row Name 02/21/25 1237          Transfer Goal 1 (OT)    Activity/Assistive Device (Transfer Goal 1, OT) transfers, all  -CE     [place] Level/Cues Needed (Transfer Goal 1, OT) modified independence  -CE     Time Frame (Transfer Goal 1, OT) short term goal (STG);2 weeks  -CE       Row Name 02/21/25 1237          Bathing Goal 1 (OT)    Activity/Device (Bathing Goal 1, OT) bathing skills, all  -CE     [place] Level/Cues Needed (Bathing Goal 1, OT) modified independence  -CE     Time Frame (Bathing Goal 1, OT) short term goal (STG);2 weeks  -CE       Row Name 02/21/25 1237          Dressing Goal 1 (OT)    Activity/Device (Dressing Goal 1, OT) dressing skills, all  -CE      [place]/Cues Needed (Dressing Goal 1, OT) modified independence  -CE     Time Frame (Dressing Goal 1, OT) short term goal (STG);2 weeks  -CE       Row Name 02/21/25 1237          Toileting Goal 1 (OT)    Activity/Device (Toileting Goal 1, OT) toileting skills, all  -CE     [place] Level/Cues Needed (Toileting Goal 1, OT) modified independence  -CE     Time Frame (Toileting Goal 1, OT) short term goal (STG);2 weeks  -CE       Row Name 02/21/25 1237          Strength Goal 1 (OT)    Strength Goal 1 (OT) Pt will be independent with HEP focusing on increasing strength in prep for I/ADLs.  -CE       Row Name 02/21/25 1237          Therapy Assessment/Plan (OT)    Planned Therapy Interventions (OT) activity tolerance training;adaptive equipment training;BADL retraining;functional balance retraining;IADL retraining;neuromuscular control/coordination retraining;transfer/mobility retraining;strengthening exercise  -CE               User Key  (r) = Recorded By, (t) = Taken By, (c) = Cosigned By      Initials Name Provider Type    CE Amberly Alcaraz, RONNY Occupational Therapist                   Clinical Impression       Row Name 02/21/25 1234          Pain Assessment    Pretreatment Pain Rating 0/10 - no pain  -CE     Posttreatment Pain Rating 0/10 - no pain  -CE       Row Name 02/21/25 1234          Plan of Care Review    Plan of Care Reviewed With patient;sibling  -CE     Outcome Evaluation Pt seen for OT eval after admission 2/2 [facility]. Pt intubated on 2/11 and extubated on 2/20. Pt has h/o CVA and has slight LUE residual weakness but with significantly increased weakness since extubation with stroke code called on 2/20. Pt currently motivated and agreeable to EOB or OOB activity. Required mod/maxA for sup>sit and pt not yet appropriate for STS or transfer to chair 2/2 signficant generalized weakness in UEs and LEs (L>R). Able to maintain static sitting balance with CGA and min/modA for dynamic balance. Pt required  maxA for grooming as well as depA for LB dressing tasks. OT will con't to follow for stated goals and recommend d/c to IPR.  -CE       Row Name 02/21/25 1234          Therapy Assessment/Plan (OT)    Rehab Potential (OT) good  -CE     Therapy Frequency (OT) 5 times/wk  -CE       Row Name 02/21/25 1234          Therapy Plan Review/Discharge Plan (OT)    Anticipated Discharge Disposition (OT) inpatient rehabilitation facility  -CE       Row Name 02/21/25 1234          Vital Signs    O2 Delivery Pre Treatment supplemental O2  -CE     O2 Delivery Intra Treatment supplemental O2  -CE     O2 Delivery Post Treatment supplemental O2  -CE     Pre Patient Position Supine  -CE     Intra Patient Position Sitting  -CE     Post Patient Position Supine  -CE       Row Name 02/21/25 1234          Positioning and Restraints    Pre-Treatment Position in bed  -CE     Post Treatment Position bed  -CE     In Bed notified nsg;supine;call light within reach;encouraged to call for assist;exit alarm on;with family/caregiver  bed>chair position with HOB 40 deg  -CE               User Key  (r) = Recorded By, (t) = Taken By, (c) = Cosigned By      Initials Name Provider Type    CE Amberly Alcaraz, OT Occupational Therapist                   Outcome Measures       Row Name 02/21/25 1238          How much help from another is currently needed...    Putting on and taking off regular lower body clothing? 1  -CE     Bathing (including washing, rinsing, and drying) 1  -CE     Toileting (which includes using toilet bed pan or urinal) 1  -CE     Putting on and taking off regular upper body clothing 2  -CE     Taking care of personal grooming (such as brushing teeth) 2  -CE     Eating meals 1  -CE     AM-PAC 6 Clicks Score (OT) 8  -CE       Row Name 02/21/25 1238          Modified [place] Scale    Pre-Stroke Modified [place] Scale 1 - No significant disability despite symptoms.  Able to carry out all usual duties and activities.  -CE     Modified [place]  Scale 4 - Moderately severe disability.  Unable to walk without assistance, and unable to attend to own bodily needs without assistance.  -       Row Name 02/21/25 1238          Functional Assessment    Outcome Measure Options AM-PAC 6 Clicks Daily Activity (OT);Modified [place]  -               User Key  (r) = Recorded By, (t) = Taken By, (c) = Cosigned By      Initials Name Provider Type    CE Amberly Alcaraz OT Occupational Therapist                    Occupational Therapy Education       Title: PT OT SLP Therapies (Done)       Topic: Occupational Therapy (Done)       Point: ADL training (Done)       Description:   Instruct learner(s) on proper safety adaptation and remediation techniques during self care or transfers.   Instruct in proper use of assistive devices.                  Learning Progress Summary            Patient Acceptance, E, VU,NR by CE at 2/21/2025 1239                      Point: Home exercise program (Done)       Description:   Instruct learner(s) on appropriate technique for monitoring, assisting and/or progressing therapeutic exercises/activities.                  Learning Progress Summary            Patient Acceptance, E, VU,NR by CE at 2/21/2025 1239                      Point: Precautions (Done)       Description:   Instruct learner(s) on prescribed precautions during self-care and functional transfers.                  Learning Progress Summary            Patient Acceptance, E, VU,NR by CE at 2/21/2025 1239                      Point: Body mechanics (Done)       Description:   Instruct learner(s) on proper positioning and spine alignment during self-care, functional mobility activities and/or exercises.                  Learning Progress Summary            Patient Acceptance, E, VU,NR by CE at 2/21/2025 1239                                      User Key       Initials Effective Dates Name Provider Type Discipline    CE 10/17/22 -  Amberly Alcaraz OT Occupational Therapist OT                   OT Recommendation and Plan  Planned Therapy Interventions (OT): activity tolerance training, adaptive equipment training, BADL retraining, functional balance retraining, IADL retraining, neuromuscular control/coordination retraining, transfer/mobility retraining, strengthening exercise  Therapy Frequency (OT): 5 times/wk  Plan of Care Review  Plan of Care Reviewed With: patient, sibling  Outcome Evaluation: Pt seen for OT eval after admission 2/2 [facility]. Pt intubated on 2/11 and extubated on 2/20. Pt has h/o CVA and has slight LUE residual weakness but with significantly increased weakness since extubation with stroke code called on 2/20. Pt currently motivated and agreeable to EOB or OOB activity. Required mod/maxA for sup>sit and pt not yet appropriate for STS or transfer to chair 2/2 signficant generalized weakness in UEs and LEs (L>R). Able to maintain static sitting balance with CGA and min/modA for dynamic balance. Pt required maxA for grooming as well as depA for LB dressing tasks. OT will con't to follow for stated goals and recommend d/c to IPR.     Time Calculation:   Evaluation Complexity (OT)  Review Occupational Profile/Medical/Therapy History Complexity: expanded/moderate complexity  Assessment, Occupational Performance/Identification of Deficit Complexity: 3-5 performance deficits  Clinical Decision Making Complexity (OT): detailed assessment/moderate complexity  Overall Complexity of Evaluation (OT): moderate complexity     Time Calculation- OT       Row Name 02/21/25 1239             Time Calculation- OT    OT Start Time 0854  -CE      OT Stop Time 0930  -CE      OT Time Calculation (min) 36 min  -CE      Total Timed Code Minutes- OT 10 minute(s)  -CE      OT Received On 02/21/25  -CE      OT - Next Appointment 02/24/25  -CE      OT Goal Re-Cert Due Date 03/07/25  -CE         Timed Charges    91103 - OT Therapeutic Activity Minutes 10  -CE         Total Minutes    Timed Charges Total  Minutes 10  -CE       Total Minutes 10  -CE                User Key  (r) = Recorded By, (t) = Taken By, (c) = Cosigned By      Initials Name Provider Type    Amberly Busch OT Occupational Therapist                  Therapy Charges for Today       Code Description Service Date Service Provider Modifiers Qty    [id number]  OT THERAPEUTIC ACT EA 15 MIN 2/21/2025 Amberly Alcaraz OT GO 1    [id number]  OT EVAL MOD COMPLEXITY 3 2/21/2025 Amberly Alcaraz OT GO 1                 Amberly Alcaraz OT  2/21/2025

## 2025-02-21 NOTE — ACP (ADVANCE CARE PLANNING)
GUY met with patient and her sister, Yvonne, at bedside this morning for assistance with healthcare surrogate designation. Completed healthcare surrogate form with patient choosing to name Yvonne as her HCS with her brother, Jude (Caio), serving as alternate HCS. Document completed, notarized, and faxed to HIM. Original returned to patient with multiple copies and a copy was placed in patient's paper chart.

## 2025-02-21 NOTE — PAYOR COMM NOTE
"Scarlet Chavez (65 y.o. Female)        PLEASE ATTACHED FOR CONTINUED STAY REVIEW    AUTH#[id number]    PLEASE CALL  OR  [phone]      THANK YOU    GEMINI HARRIS LPN CCP     Date of Birth   1960    Social Security Number       Address   [address] [place] [address]    Home Phone       MRN   [id number]       Confucianist   Religion    Marital Status   Single                            Admission Date   2/10/25    Admission Type   Emergency    Admitting Provider   Zainab Regalado MD    Attending Provider   Zainab Regalado MD    Department, Room/Bed   [facility], 3110/1       Discharge Date       Discharge Disposition       Discharge Destination                                 Attending Provider: Zainab Regalado MD    Allergies: No Known Allergies    Isolation: None   Infection: None   Code Status: No CPR    Ht: 152.4 cm (60\")   Wt: 40.3 kg (88 lb 13.5 oz)    Admission Cmt: None   Principal Problem: Acute hypercapnic respiratory failure [J96.02]                   Active Insurance as of 2/10/2025       Primary Coverage       Payor Plan Insurance Group Employer/Plan Group    [employer] HMO [id number]       Payor Plan Address Payor Plan Phone Number Payor Plan Fax Number Effective Dates    [address] [phone]  1/1/2024 - None Entered    [place] [address]         Subscriber Name Subscriber Birth Date Member ID       SCARLET CHAVEZ 1960 [id number]                     Emergency Contacts        (Rel.) Home Phone Work Phone Mobile Phone    VedaGayeYvonne (Sister) [phone] -- --    Don Taylor (Significant Other) -- -- [phone]              [place]: [facility] [id number]  Tax ID [id number]  Oxygen Therapy (last day)       Date/Time SpO2 Device (Oxygen Therapy) Flow (L/min) (Oxygen Therapy) Oxygen Concentration (%) ETCO2 (mmHg)    02/21/25 1550 94 nasal cannula 1 -- --    02/21/25 1325 -- nasal cannula 1 -- -- "    02/21/25 1222 92 -- -- -- --    02/21/25 1056 91 nasal cannula 1 -- --    02/21/25 0804 -- nasal cannula 1 -- --    02/21/25 0652 -- nasal cannula 2 -- --    02/21/25 0347 98 -- -- -- --    02/21/25 0005 92 -- -- -- --    02/20/25 2200 -- nasal cannula 2 -- --    02/20/25 2153 95 -- -- -- --    02/20/25 2000 95 -- -- -- --    02/20/25 1929 100 -- -- -- --    02/20/25 1923 98 -- -- -- --    02/20/25 1922 94 -- -- -- --    02/20/25 1921 94 nasal cannula 2 -- --    02/20/25 1900 95 nasal cannula 2 -- --    02/20/25 1800 92 nasal cannula 2 -- --    02/20/25 1700 94 nasal cannula 2 -- --    02/20/25 1600 92 nasal cannula 2 -- --    02/20/25 1500 93 nasal cannula 2 -- --    02/20/25 1445 93 nasal cannula 2 -- --    02/20/25 1300 94 nasal cannula 2 -- --    02/20/25 1200 97 -- -- -- --    02/20/25 1105 94 room air -- -- --    02/20/25 1100 -- nasal cannula 2 -- --    02/20/25 1000 -- nasal cannula 2 -- --    02/20/25 0900 -- nasal cannula 2 -- --    02/20/25 0800 -- nasal cannula 2 -- --    02/20/25 0741 93 nasal cannula 3 -- --    02/20/25 0700 92 -- -- -- --    02/20/25 0600 96 -- -- -- --    02/20/25 0500 98 -- -- -- --    02/20/25 0400 97 nasal cannula 2 -- --    02/20/25 0300 96 -- -- -- --    02/20/25 0200 98 -- -- -- --    02/20/25 0100 99 -- -- -- --    02/20/25 0000 97 nasal cannula 2 -- --          Intake & Output (last day)         02/20 0701 02/21 0700 02/21 0701 02/22 0700    P.O. 0 240    I.V. (mL/kg) 257 (6.4)     Other 115     NG/     IV Piggyback 100     Total Intake(mL/kg) 1377 (34.2) 240 (6)    Urine (mL/kg/hr) 800 (0.8)     Stool 0     Total Output 800     Net +577 +240          Stool Unmeasured Occurrence 1 x           Lines, Drains & Airways       Active LDAs       Name Placement date Placement time Site Days    Peripheral IV 02/10/25 1529 Anterior;Left Forearm 02/10/25  1529  Forearm  11    Peripheral IV 02/14/25 1815 Left;Posterior Forearm 02/14/25  1815  Forearm  6    External Urinary  "Catheter 25  0745  found on initial assessment  --  5                  Operative/Procedure Notes (last 24 hours)  Notes from 25 1609 through 25 1609   No notes of this type exist for this encounter.          Physician Progress Notes (last 24 hours)        Chey Rosales PA at 25 1013          DOS: 2025  NAME: Scarlet Chakraborty   : 1960  PCP: Flavia Jaquez APRN  Chief Complaint   Patient presents with    Shortness of Breath       Chief complaint: L sided weakness  Subjective: She tells of difficulty with left arm after stroke.  She feels like she is not weak of left leg as well overall diffusely weak.  No problems with speech or vision    Objective:  Vital signs: BP 98/54 (BP Location: Left arm, Patient Position: Lying)   Pulse 101   Temp 97.9 °F (36.6 °C) (Oral)   Resp 16   Ht 152.4 cm (60\")   Wt 40.3 kg (88 lb 13.5 oz)   SpO2 98%   BMI 17.35 kg/m²      Gen: NAD, vitals reviewed  MS: oriented x3, recent/remote memory intact, normal attention/concentration, language intact, no neglect.  CN: visual acuity grossly normal, PERRL, EOMI, no facial droop, no dysarthria  Motor: no drift of UE, 2/5 b/l LE, 3/5 dorsiflexion/plantar flexion on R, 2/5 on R  Sensory: intact to light touch all 4 ext.  Reflexes: down toes    ROS:  No weakness, numbness  No fevers, chills      Laboratory results:  Lab Results   Component Value Date    GLUCOSE 144 (H) 2025    CALCIUM 9.4 2025     2025    K 4.7 2025    CO2 27.0 2025     2025    BUN 18 2025    CREATININE 0.28 (L) 2025    EGFRIFNONA 142 2021    BCR 64.3 (H) 2025    ANIONGAP 11.0 2025     Lab Results   Component Value Date    WBC 16.32 (H) 2025    HGB 15.2 2025    HCT 45.7 2025    MCV 93.6 2025     2025     Lab Results   Component Value Date    LDL 50 2018    LDL 90 2018     Labs reviewed    Review and " interpretation of imaging: Personally reviewed MRI-encephalomalacia of right MCA, old right cerebellar infarct with patient and significant other at bedside-there is DWI hyperintensity with faint ADC, correlate but not on FLAIR raising thought of artifact versus subacute stroke , favor the former    Workup to date:    Diagnoses:  COPD exacerbation  Weakness  History of stroke  Chronic anticoagulation  Tobacco abuse    Impression: 65-year-old female with end-stage COPD, nonischemic cardiomyopathy, previous CVA felt to be cardioembolic and on empiric Eliquis with EF of around 20-35%.  She has a loop monitor as far as I can tell no captured A-fib.  She presents to the hospital on 2/10 and respiratory failure requiring intubation extubated on 2/19.  There was complaint of worsening left-sided weakness for which MRI was obtained.  There is abnormal signal of right darvin favored to artifact.  On exam she is diffusely weak 2 out of 5 bilateral lower extremities with foot drop on the left.  She will need aggressive PT after acute illness.  She previously followed in neurology clinic for stroke history.  I will ensure she has follow-up    Neuro team available as needed      Thank you for this consultation.  Discussed above plan with neuro attending, Dr. Gilliland who agrees with above plan.  Neurology team is available for concerns or questions.        Electronically signed by Chey Rosales PA at 02/21/25 1449       Consult Notes (last 24 hours)  Notes from 02/20/25 1609 through 02/21/25 1609   No notes of this type exist for this encounter.

## 2025-02-21 NOTE — PLAN OF CARE
Goal Outcome Evaluation:  Plan of Care Reviewed With: patient, significant other           Outcome Evaluation: Pt seen for PT this afternoon after admission to [facility] due to respiratory failure. Pt was intubated on 2/11 adn extubated on 2/20. Pt has hx of previous R MCA stroke w slight residual L sided weakness; however, she presents w significantly increased weakness since extubation and stroke code called on 2/20.  Today, pt is alert and oriented and agreeable to therapy. She required mod A to transition to EOB. SHe tolerated sitting EOB for approx 7 minutes w SBA/CGA. Pt is extremely weak ( L>R). She is unable to raise left extremities against gravity. Pt fatigues quickly and slightly anxious throughout session. Pt unable to attempt standing or transfers at this time. She was assisted back to supine at end of session. Recommend IRF upon DC. Discussed w pt and spouse. Pt will continue to benefit from skilled PT to maximize safety, strength, and independence w mobility.    Anticipated Discharge Disposition (PT): inpatient rehabilitation facility

## 2025-02-22 LAB
ANION GAP SERPL CALCULATED.3IONS-SCNC: 7.4 MMOL/L (ref 5–15)
BUN SERPL-MCNC: 25 MG/DL (ref 8–23)
BUN/CREAT SERPL: 71.4 (ref 7–25)
CALCIUM SPEC-SCNC: 9.3 MG/DL (ref 8.6–10.5)
CHLORIDE SERPL-SCNC: 100 MMOL/L (ref 98–107)
CO2 SERPL-SCNC: 29.6 MMOL/L (ref 22–29)
CREAT SERPL-MCNC: 0.35 MG/DL (ref 0.57–1)
DEPRECATED RDW RBC AUTO: 42.5 FL (ref 37–54)
EGFRCR SERPLBLD CKD-EPI 2021: 114.3 ML/MIN/1.73
ERYTHROCYTE [DISTWIDTH] IN BLOOD BY AUTOMATED COUNT: 12.4 % (ref 12.3–15.4)
GLUCOSE BLDC GLUCOMTR-MCNC: 132 MG/DL (ref 70–130)
GLUCOSE BLDC GLUCOMTR-MCNC: 139 MG/DL (ref 70–130)
GLUCOSE BLDC GLUCOMTR-MCNC: 202 MG/DL (ref 70–130)
GLUCOSE BLDC GLUCOMTR-MCNC: 84 MG/DL (ref 70–130)
GLUCOSE SERPL-MCNC: 87 MG/DL (ref 65–99)
HCT VFR BLD AUTO: 45.2 % (ref 34–46.6)
HGB BLD-MCNC: 14.4 G/DL (ref 12–15.9)
MCH RBC QN AUTO: 29.9 PG (ref 26.6–33)
MCHC RBC AUTO-ENTMCNC: 31.9 G/DL (ref 31.5–35.7)
MCV RBC AUTO: 94 FL (ref 79–97)
PLATELET # BLD AUTO: 377 10*3/MM3 (ref 140–450)
PMV BLD AUTO: 11.2 FL (ref 6–12)
POTASSIUM SERPL-SCNC: 4 MMOL/L (ref 3.5–5.2)
RBC # BLD AUTO: 4.81 10*6/MM3 (ref 3.77–5.28)
SODIUM SERPL-SCNC: 137 MMOL/L (ref 136–145)
WBC NRBC COR # BLD AUTO: 15.55 10*3/MM3 (ref 3.4–10.8)

## 2025-02-22 PROCEDURE — 80048 BASIC METABOLIC PNL TOTAL CA: CPT | Performed by: INTERNAL MEDICINE

## 2025-02-22 PROCEDURE — 94664 DEMO&/EVAL PT USE INHALER: CPT

## 2025-02-22 PROCEDURE — 82948 REAGENT STRIP/BLOOD GLUCOSE: CPT

## 2025-02-22 PROCEDURE — 94799 UNLISTED PULMONARY SVC/PX: CPT

## 2025-02-22 PROCEDURE — 85027 COMPLETE CBC AUTOMATED: CPT | Performed by: INTERNAL MEDICINE

## 2025-02-22 PROCEDURE — 63710000001 PREDNISONE PER 1 MG: Performed by: HOSPITALIST

## 2025-02-22 PROCEDURE — 94761 N-INVAS EAR/PLS OXIMETRY MLT: CPT

## 2025-02-22 RX ADMIN — ARFORMOTEROL TARTRATE 15 MCG: 15 SOLUTION RESPIRATORY (INHALATION) at 20:21

## 2025-02-22 RX ADMIN — APIXABAN 5 MG: 5 TABLET, FILM COATED ORAL at 20:44

## 2025-02-22 RX ADMIN — IPRATROPIUM BROMIDE AND ALBUTEROL SULFATE 3 ML: .5; 3 SOLUTION RESPIRATORY (INHALATION) at 20:15

## 2025-02-22 RX ADMIN — Medication 10 ML: at 08:58

## 2025-02-22 RX ADMIN — Medication 5 MG: at 20:44

## 2025-02-22 RX ADMIN — APIXABAN 5 MG: 5 TABLET, FILM COATED ORAL at 08:58

## 2025-02-22 RX ADMIN — BUDESONIDE 0.5 MG: 0.5 INHALANT RESPIRATORY (INHALATION) at 20:18

## 2025-02-22 RX ADMIN — IPRATROPIUM BROMIDE AND ALBUTEROL SULFATE 3 ML: .5; 3 SOLUTION RESPIRATORY (INHALATION) at 15:31

## 2025-02-22 RX ADMIN — BUDESONIDE 0.5 MG: 0.5 INHALANT RESPIRATORY (INHALATION) at 06:45

## 2025-02-22 RX ADMIN — LANSOPRAZOLE 30 MG: 15 TABLET, ORALLY DISINTEGRATING ORAL at 06:15

## 2025-02-22 RX ADMIN — PREDNISONE 40 MG: 20 TABLET ORAL at 08:58

## 2025-02-22 RX ADMIN — ARFORMOTEROL TARTRATE 15 MCG: 15 SOLUTION RESPIRATORY (INHALATION) at 06:44

## 2025-02-22 RX ADMIN — IPRATROPIUM BROMIDE AND ALBUTEROL SULFATE 3 ML: .5; 3 SOLUTION RESPIRATORY (INHALATION) at 06:43

## 2025-02-22 RX ADMIN — Medication 10 ML: at 20:44

## 2025-02-22 RX ADMIN — CHLORHEXIDINE GLUCONATE 15 ML: 1.2 RINSE ORAL at 20:44

## 2025-02-22 RX ADMIN — IPRATROPIUM BROMIDE AND ALBUTEROL SULFATE 3 ML: .5; 3 SOLUTION RESPIRATORY (INHALATION) at 10:51

## 2025-02-22 NOTE — PROGRESS NOTES
"  PROGRESS NOTE  Patient Name: Scarlet Chakraborty  Age/Sex: 65 y.o. female  : 1960  MRN: [id number]    Date of Admission: 2/10/2025  Date of Encounter Visit: 25   LOS: 12 days   Patient Care Team:  Flavia Jaquez APRN as PCP - General (Nurse Practitioner)  Beulah Joshi APRN as Nurse Practitioner (Neurology)  Flaco Aguayo MD as Consulting Physician (Pulmonary Disease)    Chief Complaint: Respiratory failure, COPD exacerbation, Takotsubo    Hospital course: Patient is doing better, on nasal cannula at 1 L, afebrile, very weak, needing assistance with the slightest activity.  Plan to go to rehab, waiting on a pre-CERT  Patient has been transition to oral prednisone  Patient is done with the antibiotic course  Patient is afebrile  Patient is on oral anticoagulation         REVIEW OF SYSTEMS:   CONSTITUTIONAL: no fever or chills  CARDIOVASCULAR: No chest pain, chest pressure or chest discomfort. No palpitations or edema.   RESPIRATORY: Shortness of breath and dyspnea with the slightest activity   GASTROINTESTINAL: No anorexia, nausea, vomiting or diarrhea. No abdominal pain or blood.   HEMATOLOGIC: No bleeding or bruising.     Ventilator/Non-Invasive Ventilation Settings (From admission, onward)   1 L/min nasal cannula oxygen                      Vital Signs  Temp:  [97.5 °F (36.4 °C)-97.9 °F (36.6 °C)] 97.5 °F (36.4 °C)  Heart Rate:  [] 92  Resp:  [16-20] 16  BP: (115-136)/(45-68) 127/51  SpO2:  [91 %-99 %] 91 %  on  Flow (L/min) (Oxygen Therapy):  [1] 1 Device (Oxygen Therapy): nasal cannula    Intake/Output Summary (Last 24 hours) at 2025 1327  Last data filed at 2025 0500  Gross per 24 hour   Intake 240 ml   Output 700 ml   Net -460 ml     Flowsheet Rows      Flowsheet Row First Filed Value   Admission Height 152.4 cm (60\") Documented at 02/10/2025 2230   Admission Weight 33.7 kg (74 lb 4.7 oz) Documented at 02/10/2025 2230          Body mass index is 17.35 " kg/m².      02/18/25  0600 02/19/25  0519 02/21/25  0347   Weight: 42 kg (92 lb 9.5 oz) 39.9 kg (87 lb 15.4 oz) 40.3 kg (88 lb 13.5 oz)       Physical Exam:  GEN:  No acute distress, alert,  well developed, cachectic  EYES:   Sclerae clear. No icterus. PERRL. Normal EOM  ENT:   External ears/nose normal, no oral lesions, no thrush, mucous membranes moist  NECK:  Supple, midline trachea, no JVD  LUNGS: Normal chest on inspection, CTAB, significantly diminished no wheezes. No rhonchi. No crackles. Respirations regular, even and unlabored.   CV:  Regular rhythm and rate. Normal S1/S2. No murmurs, gallops, or rubs noted.  ABD:  Soft, nontender and nondistended. Normal bowel sounds. No guarding  EXT:  Moves all extremities well. No cyanosis. No redness. No edema.   Skin: Dry, intact, no bleeding    Results Review:    Results From Last 14 Days   Lab Units 02/17/25  0422 02/16/25  0402 02/14/25  0448 02/14/25  0426 02/10/25  1547   D DIMER QUANT MCGFEU/mL  --   --   --   --  3.74*   LACTATE mmol/L  --   --   --  1.0  --    TRIGLYCERIDES mg/dL 124 1,531* 142  --   --      Results from last 7 days   Lab Units 02/22/25  0437 02/21/25  0733 02/20/25  0417 02/19/25  0407 02/18/25  0341 02/17/25  0422 02/16/25  0402   SODIUM mmol/L 137 140 138 142 140 139 135*   POTASSIUM mmol/L 4.0 4.7 4.7 4.6 4.3 4.5 4.1   CHLORIDE mmol/L 100 102 105 106 105 103 104   CO2 mmol/L 29.6* 27.0 28.4 30.4* 28.0 28.0 27.3   BUN mg/dL 25* 18 18 19 22 25* 23   CREATININE mg/dL 0.35* 0.28* 0.30* 0.35* 0.38* 0.29* 0.22*   CALCIUM mg/dL 9.3 9.4 8.7 8.7 9.0 8.8 7.4*   ANION GAP mmol/L 7.4 11.0 4.6* 5.6 7.0 8.0 3.7*                 Results from last 7 days   Lab Units 02/22/25  0437 02/21/25  0733 02/20/25  0417 02/19/25  0407 02/18/25  0341 02/17/25  0422 02/16/25  0402   WBC 10*3/mm3 15.55* 16.32* 14.98* 16.57* 18.62* 13.47* 12.40*   HEMOGLOBIN g/dL 14.4 15.2 13.9 13.3 12.6 12.0 12.6   HEMATOCRIT % 45.2 45.7 42.1 40.5 39.4 37.2 36.5   PLATELETS 10*3/mm3  377 347 308 287 292 215 205   MCV fL 94.0 93.6 93.8 92.0 93.6 94.7 93.4             Results from last 7 days   Lab Units 02/17/25  0422 02/16/25  0402   TRIGLYCERIDES mg/dL 124 1,531*     Results from last 7 days   Lab Units 02/19/25  0928 02/19/25  0414 02/18/25  0355 02/17/25  0351 02/16/25  0345   PH, ARTERIAL pH units 7.429 7.477* 7.511* 7.523* 7.449   PCO2, ARTERIAL mm Hg 43.7 44.0 35.0 40.0 50.2*   PO2 ART mm Hg 89.5 95.3 62.3* 141.1* 87.7   HCO3 ART mmol/L 29.0* 32.5* 28.0 32.9* 34.8*         Glucose   Date/Time Value Ref Range Status   02/22/2025 1104 139 (H) 70 - 130 mg/dL Final   02/22/2025 0610 84 70 - 130 mg/dL Final   02/21/2025 2048 113 70 - 130 mg/dL Final   02/21/2025 1606 124 70 - 130 mg/dL Final   02/21/2025 1109 117 70 - 130 mg/dL Final   02/21/2025 0614 153 (H) 70 - 130 mg/dL Final   02/21/2025 0004 132 (H) 70 - 130 mg/dL Final   02/20/2025 1744 139 (H) 70 - 130 mg/dL Final                               Imaging:   Imaging Results (All)               I reviewed the patient's new clinical results.  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  Patient is currently on oral medication, bronchodilators, and oral anticoagulation and patient is done with the course of the systemic antibiotics  Accu-Chek is showing adequate glycemic control  Last ABG was showing adequate ventilation and oxygenation  White count is mildly elevated and stable probably steroids related, electrolytes and kidney function are reassuring  Will consider further taper on the prednisone by tomorrow, continue with the oxygen weaning and continue with physical therapy  Expect the cardiomyopathy to improve, so far no sign of any acute decompensation  Patient is afebrile, on minimal oxygen supplementation 1 L/min  Discussed with patient and family, notes reviewed, patient is new to me  Labs/Notes/films were independently reviewed and pertinent results are summarized above  The copied texts in this note were reviewed and they are accurate as of 02/22/25    Disposition: Rehab    Gabo Mckeon MD  02/22/25  13:27 EST           Dictated utilizing Dragon dictation

## 2025-02-23 LAB
DEPRECATED RDW RBC AUTO: 41.3 FL (ref 37–54)
ERYTHROCYTE [DISTWIDTH] IN BLOOD BY AUTOMATED COUNT: 12.5 % (ref 12.3–15.4)
GLUCOSE BLDC GLUCOMTR-MCNC: 128 MG/DL (ref 70–130)
GLUCOSE BLDC GLUCOMTR-MCNC: 132 MG/DL (ref 70–130)
GLUCOSE BLDC GLUCOMTR-MCNC: 155 MG/DL (ref 70–130)
GLUCOSE BLDC GLUCOMTR-MCNC: 176 MG/DL (ref 70–130)
GLUCOSE BLDC GLUCOMTR-MCNC: 71 MG/DL (ref 70–130)
HCT VFR BLD AUTO: 43 % (ref 34–46.6)
HGB BLD-MCNC: 14.4 G/DL (ref 12–15.9)
MCH RBC QN AUTO: 30.7 PG (ref 26.6–33)
MCHC RBC AUTO-ENTMCNC: 33.5 G/DL (ref 31.5–35.7)
MCV RBC AUTO: 91.7 FL (ref 79–97)
PLATELET # BLD AUTO: 395 10*3/MM3 (ref 140–450)
PMV BLD AUTO: 10.8 FL (ref 6–12)
RBC # BLD AUTO: 4.69 10*6/MM3 (ref 3.77–5.28)
WBC NRBC COR # BLD AUTO: 12.19 10*3/MM3 (ref 3.4–10.8)

## 2025-02-23 PROCEDURE — 97530 THERAPEUTIC ACTIVITIES: CPT

## 2025-02-23 PROCEDURE — 85027 COMPLETE CBC AUTOMATED: CPT | Performed by: INTERNAL MEDICINE

## 2025-02-23 PROCEDURE — 63710000001 INSULIN REGULAR HUMAN PER 5 UNITS: Performed by: INTERNAL MEDICINE

## 2025-02-23 PROCEDURE — 94799 UNLISTED PULMONARY SVC/PX: CPT

## 2025-02-23 PROCEDURE — 63710000001 PREDNISONE PER 1 MG: Performed by: HOSPITALIST

## 2025-02-23 PROCEDURE — 82948 REAGENT STRIP/BLOOD GLUCOSE: CPT

## 2025-02-23 PROCEDURE — 94664 DEMO&/EVAL PT USE INHALER: CPT

## 2025-02-23 PROCEDURE — 94761 N-INVAS EAR/PLS OXIMETRY MLT: CPT

## 2025-02-23 RX ORDER — PREDNISONE 20 MG/1
20 TABLET ORAL
Status: DISCONTINUED | OUTPATIENT
Start: 2025-02-24 | End: 2025-02-24

## 2025-02-23 RX ADMIN — APIXABAN 5 MG: 5 TABLET, FILM COATED ORAL at 09:01

## 2025-02-23 RX ADMIN — BUDESONIDE 0.5 MG: 0.5 INHALANT RESPIRATORY (INHALATION) at 19:50

## 2025-02-23 RX ADMIN — BUDESONIDE 0.5 MG: 0.5 INHALANT RESPIRATORY (INHALATION) at 07:48

## 2025-02-23 RX ADMIN — PREDNISONE 40 MG: 20 TABLET ORAL at 09:00

## 2025-02-23 RX ADMIN — ARFORMOTEROL TARTRATE 15 MCG: 15 SOLUTION RESPIRATORY (INHALATION) at 07:47

## 2025-02-23 RX ADMIN — APIXABAN 5 MG: 5 TABLET, FILM COATED ORAL at 21:16

## 2025-02-23 RX ADMIN — CHLORHEXIDINE GLUCONATE 15 ML: 1.2 RINSE ORAL at 09:00

## 2025-02-23 RX ADMIN — Medication 10 ML: at 21:16

## 2025-02-23 RX ADMIN — LANSOPRAZOLE 30 MG: 15 TABLET, ORALLY DISINTEGRATING ORAL at 06:08

## 2025-02-23 RX ADMIN — IPRATROPIUM BROMIDE AND ALBUTEROL SULFATE 3 ML: .5; 3 SOLUTION RESPIRATORY (INHALATION) at 07:46

## 2025-02-23 RX ADMIN — IPRATROPIUM BROMIDE AND ALBUTEROL SULFATE 3 ML: .5; 3 SOLUTION RESPIRATORY (INHALATION) at 11:17

## 2025-02-23 RX ADMIN — INSULIN HUMAN 2 UNITS: 100 INJECTION, SOLUTION PARENTERAL at 17:14

## 2025-02-23 RX ADMIN — CHLORHEXIDINE GLUCONATE 15 ML: 1.2 RINSE ORAL at 21:16

## 2025-02-23 RX ADMIN — Medication 5 MG: at 21:16

## 2025-02-23 RX ADMIN — IPRATROPIUM BROMIDE AND ALBUTEROL SULFATE 3 ML: .5; 3 SOLUTION RESPIRATORY (INHALATION) at 19:49

## 2025-02-23 RX ADMIN — IPRATROPIUM BROMIDE AND ALBUTEROL SULFATE 3 ML: .5; 3 SOLUTION RESPIRATORY (INHALATION) at 15:46

## 2025-02-23 RX ADMIN — ARFORMOTEROL TARTRATE 15 MCG: 15 SOLUTION RESPIRATORY (INHALATION) at 19:53

## 2025-02-23 NOTE — PROGRESS NOTES
"  PROGRESS NOTE  Patient Name: Scarlet Chakraborty  Age/Sex: 65 y.o. female  : 1960  MRN: [id number]    Date of Admission: 2/10/2025  Date of Encounter Visit: 25   LOS: 13 days   Patient Care Team:  Flavia Jaquez APRN as PCP - General (Nurse Practitioner)  Beulah Joshi APRN as Nurse Practitioner (Neurology)  Flaco Aguayo MD as Consulting Physician (Pulmonary Disease)    Chief Complaint: Respiratory failure, COPD exacerbation, Takotsubo    Hospital course: Patient is doing better, on nasal cannula at 1 L, afebrile, very weak, needing assistance with the slightest activity.  Plan to go to rehab, waiting on a pre-CERT  Patient has been transitioned to oral prednisone  Patient is done with the antibiotic course  Patient is afebrile  Patient is on oral anticoagulation  She continues to feel slightly better each day compared to the day prior         REVIEW OF SYSTEMS:   CONSTITUTIONAL: no fever or chills  CARDIOVASCULAR: No chest pain, chest pressure or chest discomfort. No palpitations or edema.   RESPIRATORY: Shortness of breath  GASTROINTESTINAL: No anorexia, nausea, vomiting or diarrhea. No abdominal pain or blood.   HEMATOLOGIC: No bleeding or bruising.     Ventilator/Non-Invasive Ventilation Settings (From admission, onward)   1 L/min nasal cannula oxygen                      Vital Signs  Temp:  [97.7 °F (36.5 °C)-97.8 °F (36.6 °C)] 97.7 °F (36.5 °C)  Heart Rate:  [] 102  Resp:  [16-20] 20  BP: (111-129)/(48-63) 129/48  SpO2:  [92 %-97 %] 94 %  on  Flow (L/min) (Oxygen Therapy):  [1] 1 Device (Oxygen Therapy): nasal cannula    Intake/Output Summary (Last 24 hours) at 2025 1142  Last data filed at 2025 2141  Gross per 24 hour   Intake --   Output 1300 ml   Net -1300 ml     Flowsheet Rows      Flowsheet Row First Filed Value   Admission Height 152.4 cm (60\") Documented at 02/10/2025 2230   Admission Weight 33.7 kg (74 lb 4.7 oz) Documented at 02/10/2025 2230 "          Body mass index is 14.51 kg/m².      02/19/25  0519 02/21/25  0347 02/23/25  0626   Weight: 39.9 kg (87 lb 15.4 oz) 40.3 kg (88 lb 13.5 oz) 33.7 kg (74 lb 4.7 oz)       Physical Exam:  GEN:  No acute distress, alert,  well developed, cachectic  EYES:   Sclerae clear. No icterus. PERRL. Normal EOM  ENT:   External ears/nose normal, no oral lesions, no thrush, mucous membranes moist  NECK:  Supple, midline trachea, no JVD  LUNGS: Normal chest on inspection, CTAB, significantly diminished however she able to move more air today compared to yesterday, is no wheezes. No rhonchi. No crackles. Respirations regular, even and unlabored.   CV:  Regular rhythm and rate. Normal S1/S2. No murmurs, gallops, or rubs noted.  ABD:  Soft, nontender and nondistended. Normal bowel sounds. No guarding  EXT:  Moves all extremities well. No cyanosis. No redness. No edema.   Skin: Dry, intact, no bleeding    Results Review:    Results From Last 14 Days   Lab Units 02/17/25  0422 02/16/25  0402 02/14/25  0448 02/14/25  0426 02/10/25  1547   D DIMER QUANT MCGFEU/mL  --   --   --   --  3.74*   LACTATE mmol/L  --   --   --  1.0  --    TRIGLYCERIDES mg/dL 124 1,531* 142  --   --      Results from last 7 days   Lab Units 02/22/25  0437 02/21/25  0733 02/20/25  0417 02/19/25  0407 02/18/25  0341 02/17/25  0422   SODIUM mmol/L 137 140 138 142 140 139   POTASSIUM mmol/L 4.0 4.7 4.7 4.6 4.3 4.5   CHLORIDE mmol/L 100 102 105 106 105 103   CO2 mmol/L 29.6* 27.0 28.4 30.4* 28.0 28.0   BUN mg/dL 25* 18 18 19 22 25*   CREATININE mg/dL 0.35* 0.28* 0.30* 0.35* 0.38* 0.29*   CALCIUM mg/dL 9.3 9.4 8.7 8.7 9.0 8.8   ANION GAP mmol/L 7.4 11.0 4.6* 5.6 7.0 8.0                 Results from last 7 days   Lab Units 02/23/25  0503 02/22/25  0437 02/21/25  0733 02/20/25  0417 02/19/25  0407 02/18/25  0341 02/17/25  0422   WBC 10*3/mm3 12.19* 15.55* 16.32* 14.98* 16.57* 18.62* 13.47*   HEMOGLOBIN g/dL 14.4 14.4 15.2 13.9 13.3 12.6 12.0   HEMATOCRIT % 43.0  45.2 45.7 42.1 40.5 39.4 37.2   PLATELETS 10*3/mm3 395 377 347 308 287 292 215   MCV fL 91.7 94.0 93.6 93.8 92.0 93.6 94.7             Results from last 7 days   Lab Units 02/17/25  0422   TRIGLYCERIDES mg/dL 124     Results from last 7 days   Lab Units 02/19/25  0928 02/19/25  0414 02/18/25  0355 02/17/25  0351   PH, ARTERIAL pH units 7.429 7.477* 7.511* 7.523*   PCO2, ARTERIAL mm Hg 43.7 44.0 35.0 40.0   PO2 ART mm Hg 89.5 95.3 62.3* 141.1*   HCO3 ART mmol/L 29.0* 32.5* 28.0 32.9*         Glucose   Date/Time Value Ref Range Status   02/23/2025 0619 71 70 - 130 mg/dL Final   02/22/2025 2033 132 (H) 70 - 130 mg/dL Final   02/22/2025 1603 202 (H) 70 - 130 mg/dL Final   02/22/2025 1104 139 (H) 70 - 130 mg/dL Final   02/22/2025 0610 84 70 - 130 mg/dL Final   02/21/2025 2048 113 70 - 130 mg/dL Final   02/21/2025 1606 124 70 - 130 mg/dL Final   02/21/2025 1109 117 70 - 130 mg/dL Final                               Imaging:   Imaging Results (All)               I reviewed the patient's new clinical results.  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% on [place] 11/9/23 with current exacerbation  Acute hypoxic and hypercapnic respiratory failure  Mucous plugging, s/p bronchoscopy 2/14/2025  Sinus tachycardia  Seasonal coronavirus infection: OC43  COPD cachexia  Hypotension, secondary to sedation and positive pressure ventilation  Ischemic cardiomyopathy  History of CVA, on Eliquis  History of  Takotsubo cardiomyopathy    PLAN:  Patient is on oral prednisone, she does sound better, we will start the prednisone taper  Awaiting rehab facility approval  She is cleared for the rehab discharge as soon as we have the pre-CERT and the bed availability  Accu-Chek is showing adequate glycemic control except for 1 hide measurements at 300  Last ABG was showing adequate ventilation and oxygenation  White count is trending down and the steroid dose is being tapered  Expect the cardiomyopathy to improve, so far no sign of any acute decompensation  Patient is afebrile, on minimal oxygen supplementation 1 L/min  Discussed with patient    Labs/Notes/films were independently reviewed and pertinent results are summarized above  The copied texts in this note were reviewed and they are accurate as of 02/23/25    Disposition: Rehab    Gabo Mckeon MD  02/23/25  11:42 EST           Dictated utilizing Dragon dictation

## 2025-02-23 NOTE — THERAPY TREATMENT NOTE
Patient Name: Scarlet Chakraborty  : 1960    MRN: [id number]                              Today's Date: 2025       Admit Date: 2/10/2025    Visit Dx:     ICD-10-CM ICD-9-CM   1. Acute respiratory failure with hypoxia and hypercapnia  J96.01 518.81    J96.02    2. COPD exacerbation  J44.1 491.21   3. Coronavirus infection  B34.2 079.89   4. Anticoagulated by anticoagulation treatment  Z79.01 V58.61   5. Elevated d-dimer  R79.89 790.92     Patient Active Problem List   Diagnosis    Acute respiratory failure with hypoxia    Cerebrovascular accident (CVA) due to thrombosis    Stress-induced cardiomyopathy    Chronic obstructive pulmonary disease with acute exacerbation    Tobacco abuse    Mixed hyperlipidemia    Hx of non-ST elevation myocardial infarction (NSTEMI)    History of stroke    Tobacco dependence    COPD exacerbation    Breast mass    Cerebrovascular accident (CVA) due to occlusion of left middle cerebral artery    Congestive heart failure    Chronic obstructive lung disease    Non-ischemic cardiomyopathy    Acute respiratory failure    Status post placement of implantable loop recorder    SOB (shortness of breath)    COPD with acute exacerbation    Acute on chronic respiratory failure with hypercapnia    Severe malnutrition    Acute hypercapnic respiratory failure     Past Medical History:   Diagnosis Date    Asthma     Cerebrovascular accident (CVA) due to thrombosis of right middle cerebral artery     COPD (chronic obstructive pulmonary disease)     Nonischemic cardiomyopathy     NSTEMI (non-ST elevated myocardial infarction)     Stroke     Takotsubo cardiomyopathy     Tobacco abuse      Past Surgical History:   Procedure Laterality Date    CARDIAC CATHETERIZATION N/A 2018    Procedure: Left Heart Cath and cors;  Surgeon: Gabino Dozier MD;  Location: [facility] INVASIVE LOCATION;  Service: Cardiology    CARDIAC CATHETERIZATION N/A 2018    Procedure: Left ventriculography;   Surgeon: Gabino Dozier MD;  Location:  [facility];  Service: Cardiology    CARDIAC ELECTROPHYSIOLOGY PROCEDURE N/A 09/27/2018    Procedure: Loop insertion  LINQ;  Surgeon: Jose R Barker MD;  Location:  [facility];  Service: Cardiovascular      General Information       Row Name 02/23/25 1511          Physical Therapy Time and Intention    Document Type therapy note (daily note)  -ER     Mode of Treatment individual therapy;physical therapy  -ER       Row Name 02/23/25 1511          General Information    Patient Profile Reviewed yes  -ER     Existing Precautions/Restrictions fall  -ER       Row Name 02/23/25 1511          Cognition    Orientation Status (Cognition) oriented x 4  -ER       Row Name 02/23/25 1511          Safety Issues/Impairments Affecting Functional Mobility    Impairments Affecting Function (Mobility) balance;endurance/activity tolerance;postural/trunk control;shortness of breath;strength;grasp;range of motion (ROM)  -ER               User Key  (r) = Recorded By, (t) = Taken By, (c) = Cosigned By      Initials Name Provider Type    ER Ivy Anderson PT Physical Therapist                   Mobility       Row Name 02/23/25 1511          Bed Mobility    Bed Mobility supine-sit;sit-supine  -ER     Supine-Sit Robinson (Bed Mobility) minimum assist (75% patient effort);contact guard  -ER     Sit-Supine Robinson (Bed Mobility) minimum assist (75% patient effort)  -ER     Assistive Device (Bed Mobility) bed rails;head of bed elevated  -ER       Row Name 02/23/25 1511          Sit-Stand Transfer    Sit-Stand Robinson (Transfers) unable to assess  -ER       Row Name 02/23/25 1511          Gait/Stairs (Locomotion)    Robinson Level (Gait) unable to assess  -ER     Patient was able to Ambulate no, other medical factors prevent ambulation  -ER     Reason Patient was unable to Ambulate Excessive Weakness  -ER               User Key  (r) = Recorded By, (t) =  Taken By, (c) = Cosigned By      Initials Name Provider Type    ER Ivy Anderson, PT Physical Therapist                   Obj/Interventions       Row Name 02/23/25 1511          Balance    Balance Assessment sitting static balance;sitting dynamic balance  -ER     Static Sitting Balance contact guard  -ER     Dynamic Sitting Balance minimal assist;moderate assist  -ER     Position, Sitting Balance unsupported;sitting edge of bed  -ER     Balance Interventions sitting;dynamic;static;supported  -ER     Comment, Balance anterior abrupt lean, Min-Mod to recover  -ER               User Key  (r) = Recorded By, (t) = Taken By, (c) = Cosigned By      Initials Name Provider Type    ER Ivy Anderson, PT Physical Therapist                   Goals/Plan    No documentation.                  Clinical Impression       Row Name 02/23/25 1512          Pain    Pretreatment Pain Rating 0/10 - no pain  -ER     Posttreatment Pain Rating 0/10 - no pain  -ER       Row Name 02/23/25 1512          Plan of Care Review    Plan of Care Reviewed With patient  -ER     Outcome Evaluation Scarlet Chakraborty is a 65 year old female seen for physical therapy treatment session this afternoon. Today, she demo's improved indep with BM, now requiring CGA-Min A to sit EOB. Her sitting balance varies intermittently between CGA-Min/mod with several abrupt anterior lean even when holding bed rail. She performs x10 LAQ, before becoming extremely short of breath, requiring ~5-6 minutes of static sitting while completing pursed lip breathing. PT recommending IRF at d/c.  -ER       Row Name 02/23/25 1512          Therapy Assessment/Plan (PT)    Rehab Potential (PT) fair  -ER     Criteria for Skilled Interventions Met (PT) yes  -ER     Therapy Frequency (PT) 6 times/wk  -ER       Row Name 02/23/25 1512          Positioning and Restraints    Pre-Treatment Position in bed  -ER     Post Treatment Position bed  -ER     In Bed notified nsg;call light within reach;encouraged  to call for assist;exit alarm on  -ER               User Key  (r) = Recorded By, (t) = Taken By, (c) = Cosigned By      Initials Name Provider Type    Ivy Ervin PT Physical Therapist                   Outcome Measures       Row Name 02/23/25 1513 02/23/25 0900       How much help from another person do you currently need...    Turning from your back to your side while in flat bed without using bedrails? 3  -ER 2  -DS    Moving from lying on back to sitting on the side of a flat bed without bedrails? 3  -ER 2  -DS    Moving to and from a bed to a chair (including a wheelchair)? 1  -ER 1  -DS    Standing up from a chair using your arms (e.g., wheelchair, bedside chair)? 1  -ER 1  -DS    Climbing 3-5 steps with a railing? 1  -ER 1  -DS    To walk in hospital room? 1  -ER 1  -DS    AM-PAC 6 Clicks Score (PT) 10  -ER 8  -DS    Highest Level of Mobility Goal 4 --> Transfer to chair/commode  -ER 3 --> Sit at edge of bed  -DS      Row Name 02/23/25 1513          Functional Assessment    Outcome Measure Options AM-PAC 6 Clicks Basic Mobility (PT)  -ER               User Key  (r) = Recorded By, (t) = Taken By, (c) = Cosigned By      Initials Name Provider Type    Margie Dennison, RN Registered Nurse    Ivy Ervin PT Physical Therapist                                 Physical Therapy Education       Title: PT OT SLP Therapies (Done)       Topic: Physical Therapy (Done)       Point: Mobility training (Done)       Learning Progress Summary            Patient Acceptance, E, VU by ER at 2/23/2025 1514                      Point: Home exercise program (Done)       Learning Progress Summary            Patient Acceptance, E, VU by ER at 2/23/2025 1514                      Point: Body mechanics (Done)       Learning Progress Summary            Patient Acceptance, E, VU by ER at 2/23/2025 1514                      Point: Precautions (Done)       Learning Progress Summary            Patient Acceptance, E, VU by ER at  2/23/2025 1514                                      User Key       Initials Effective Dates Name Provider Type Discipline    ER 10/15/23 -  Ivy Anderson PT Physical Therapist PT                  PT Recommendation and Plan     Outcome Evaluation: Scarlet Chakraborty is a 65 year old female seen for physical therapy treatment session this afternoon. Today, she demo's improved indep with BM, now requiring CGA-Min A to sit EOB. Her sitting balance varies intermittently between CGA-Min/mod with several abrupt anterior lean even when holding bed rail. She performs x10 LAQ, before becoming extremely short of breath, requiring ~5-6 minutes of static sitting while completing pursed lip breathing. PT recommending IRF at d/c.     Time Calculation:         PT Charges       Row Name 02/23/25 1514             Time Calculation    Start Time 1443  -ER      Stop Time 1503  -ER      Time Calculation (min) 20 min  -ER      PT Received On 02/23/25  -ER      PT - Next Appointment 02/24/25  -ER         Time Calculation- PT    Total Timed Code Minutes- PT 20 minute(s)  -ER         Timed Charges    75634 - PT Therapeutic Activity Minutes 20  -ER         Total Minutes    Timed Charges Total Minutes 20  -ER       Total Minutes 20  -ER                User Key  (r) = Recorded By, (t) = Taken By, (c) = Cosigned By      Initials Name Provider Type    ER Ivy Anderson, RUDOLPH Physical Therapist                  Therapy Charges for Today       Code Description Service Date Service Provider Modifiers Qty    [id number]  PT THERAPEUTIC ACT EA 15 MIN 2/23/2025 Ivy Anderson, PT GP 1            PT G-Codes  Outcome Measure Options: AM-PAC 6 Clicks Basic Mobility (PT)  AM-PAC 6 Clicks Score (PT): 10  AM-PAC 6 Clicks Score (OT): 8  Modified Everett Scale: 4 - Moderately severe disability.  Unable to walk without assistance, and unable to attend to own bodily needs without assistance.  PT Discharge Summary  Anticipated Discharge Disposition (PT): inpatient  rehabilitation facility    Ivy Anderson, PT  2/23/2025     [Follow-Up - Clinic] : a clinic follow-up of [Hypertension] : hypertension [Syncope] : syncope

## 2025-02-23 NOTE — PLAN OF CARE
Goal Outcome Evaluation:  Plan of Care Reviewed With: patient           Outcome Evaluation: Scarlet Chakraborty is a 65 year old female seen for physical therapy treatment session this afternoon. Today, she demo's improved indep with BM, now requiring CGA-Min A to sit EOB. Her sitting balance varies intermittently between CGA-Min/mod with several abrupt anterior lean even when holding bed rail. She performs x10 LAQ, before becoming extremely short of breath, requiring ~5-6 minutes of static sitting while completing pursed lip breathing. PT recommending IRF at d/c.    Anticipated Discharge Disposition (PT): inpatient rehabilitation facility

## 2025-02-23 NOTE — PLAN OF CARE
Problem: Adult Inpatient Plan of Care  Goal: Plan of Care Review  Outcome: Progressing  Flowsheets (Taken 2/23/2025 0700)  Progress: improving  Outcome Evaluation: A&Ox4, VSS, no complaints of pain, encouraged repositioning, plan of care continues.                 Problem: Adult Inpatient Plan of Care  Goal: Absence of Hospital-Acquired Illness or Injury  Intervention: Prevent and Manage VTE (Venous Thromboembolism) Risk  Recent Flowsheet Documentation  Taken 2/22/2025 2045 by Nona Moran, RN  VTE Prevention/Management:   bilateral   SCDs (sequential compression devices) on

## 2025-02-24 LAB
DEPRECATED RDW RBC AUTO: 42.6 FL (ref 37–54)
ERYTHROCYTE [DISTWIDTH] IN BLOOD BY AUTOMATED COUNT: 12.7 % (ref 12.3–15.4)
GLUCOSE BLDC GLUCOMTR-MCNC: 105 MG/DL (ref 70–130)
GLUCOSE BLDC GLUCOMTR-MCNC: 124 MG/DL (ref 70–130)
GLUCOSE BLDC GLUCOMTR-MCNC: 178 MG/DL (ref 70–130)
GLUCOSE BLDC GLUCOMTR-MCNC: 81 MG/DL (ref 70–130)
HCT VFR BLD AUTO: 45.8 % (ref 34–46.6)
HGB BLD-MCNC: 15.4 G/DL (ref 12–15.9)
MCH RBC QN AUTO: 30.9 PG (ref 26.6–33)
MCHC RBC AUTO-ENTMCNC: 33.6 G/DL (ref 31.5–35.7)
MCV RBC AUTO: 92 FL (ref 79–97)
PLATELET # BLD AUTO: 319 10*3/MM3 (ref 140–450)
PMV BLD AUTO: 11.9 FL (ref 6–12)
RBC # BLD AUTO: 4.98 10*6/MM3 (ref 3.77–5.28)
WBC NRBC COR # BLD AUTO: 13.98 10*3/MM3 (ref 3.4–10.8)

## 2025-02-24 PROCEDURE — 63710000001 INSULIN REGULAR HUMAN PER 5 UNITS: Performed by: INTERNAL MEDICINE

## 2025-02-24 PROCEDURE — 94761 N-INVAS EAR/PLS OXIMETRY MLT: CPT

## 2025-02-24 PROCEDURE — 94799 UNLISTED PULMONARY SVC/PX: CPT

## 2025-02-24 PROCEDURE — 97530 THERAPEUTIC ACTIVITIES: CPT

## 2025-02-24 PROCEDURE — 94760 N-INVAS EAR/PLS OXIMETRY 1: CPT

## 2025-02-24 PROCEDURE — 63710000001 PREDNISONE PER 1 MG: Performed by: INTERNAL MEDICINE

## 2025-02-24 PROCEDURE — 82948 REAGENT STRIP/BLOOD GLUCOSE: CPT

## 2025-02-24 PROCEDURE — 94664 DEMO&/EVAL PT USE INHALER: CPT

## 2025-02-24 PROCEDURE — 85027 COMPLETE CBC AUTOMATED: CPT | Performed by: INTERNAL MEDICINE

## 2025-02-24 RX ORDER — PREDNISONE 20 MG/1
10 TABLET ORAL
Status: DISCONTINUED | OUTPATIENT
Start: 2025-02-25 | End: 2025-02-25 | Stop reason: HOSPADM

## 2025-02-24 RX ADMIN — ARFORMOTEROL TARTRATE 15 MCG: 15 SOLUTION RESPIRATORY (INHALATION) at 06:45

## 2025-02-24 RX ADMIN — APIXABAN 5 MG: 5 TABLET, FILM COATED ORAL at 08:23

## 2025-02-24 RX ADMIN — BUDESONIDE 0.5 MG: 0.5 INHALANT RESPIRATORY (INHALATION) at 06:44

## 2025-02-24 RX ADMIN — ARFORMOTEROL TARTRATE 15 MCG: 15 SOLUTION RESPIRATORY (INHALATION) at 20:31

## 2025-02-24 RX ADMIN — IPRATROPIUM BROMIDE AND ALBUTEROL SULFATE 3 ML: .5; 3 SOLUTION RESPIRATORY (INHALATION) at 06:44

## 2025-02-24 RX ADMIN — LANSOPRAZOLE 30 MG: 15 TABLET, ORALLY DISINTEGRATING ORAL at 06:21

## 2025-02-24 RX ADMIN — IPRATROPIUM BROMIDE AND ALBUTEROL SULFATE 3 ML: .5; 3 SOLUTION RESPIRATORY (INHALATION) at 20:29

## 2025-02-24 RX ADMIN — IPRATROPIUM BROMIDE AND ALBUTEROL SULFATE 3 ML: .5; 3 SOLUTION RESPIRATORY (INHALATION) at 09:51

## 2025-02-24 RX ADMIN — BUDESONIDE 0.5 MG: 0.5 INHALANT RESPIRATORY (INHALATION) at 20:30

## 2025-02-24 RX ADMIN — INSULIN HUMAN 2 UNITS: 100 INJECTION, SOLUTION PARENTERAL at 17:55

## 2025-02-24 RX ADMIN — CHLORHEXIDINE GLUCONATE 15 ML: 1.2 RINSE ORAL at 08:23

## 2025-02-24 RX ADMIN — Medication 10 ML: at 08:23

## 2025-02-24 RX ADMIN — Medication 5 MG: at 20:06

## 2025-02-24 RX ADMIN — APIXABAN 5 MG: 5 TABLET, FILM COATED ORAL at 20:06

## 2025-02-24 RX ADMIN — Medication 10 ML: at 20:06

## 2025-02-24 RX ADMIN — PREDNISONE 20 MG: 20 TABLET ORAL at 08:23

## 2025-02-24 RX ADMIN — IPRATROPIUM BROMIDE AND ALBUTEROL SULFATE 3 ML: .5; 3 SOLUTION RESPIRATORY (INHALATION) at 14:05

## 2025-02-24 NOTE — CASE MANAGEMENT/SOCIAL WORK
Continued Stay Note  [facility]     Patient Name: Scarlet Chakraborty  MRN: [id number]  Today's Date: 2/24/2025    Admit Date: 2/10/2025    Plan: IRF [facility], pending pre-cert initiated today by JESSICA   Discharge Plan       Row Name 02/24/25 1414       Plan    Plan IRF [facility], pending pre-cert initiated today by JESSICA    Patient/Family in Agreement with Plan yes    Plan Comments [facility] reports pt has been accepted & she will initiate pre-cert. CCP updated pt at bedside who verbalized understanding. CCP called pt's sister, Yvonne Cobb, to update regarding the above who also verbalized understanding. [facility] updated Dr. Regalado & Betty/RN. DC plan IRF [facility], pending pre-cert which Giselle will initiate today. Partial packet in pt's chart. BISI Oliver/JAMI      Row Name 02/24/25 1126       Plan    Plan IRF [facility] pending accepting & pre-cert.    Patient/Family in Agreement with Plan yes    Plan Comments JAMI SW [facility] who reports pt physican had not yet been accepted or denied, was following for medical readiness. Giselle reports she will send physican updated clinicals & if approves pt, she can send for pre-cert authorization today. DC plan IRF [facility] pending accepting & pre-cert. BISI Oliver/JAMI Bennett RN

## 2025-02-24 NOTE — PAYOR COMM NOTE
"Scarlet Chavez (65 y.o. Female)        PLEASE SEE ATTACHED FOR INPT AUTH.     REF#[id number]    PLEASE SEE CALL  OR  [phone]    THANK YOU    GEMINI STEVEN MONTERO [facility]   Date of Birth   1960    Social Security Number       Address   [address] [address]    Home Phone       MRN   [id number]       Worship   Confucianism    Marital Status   Single                            Admission Date   2/10/25    Admission Type   Emergency    Admitting Provider   Zainab Regalado MD    Attending Provider   Zainab Regalado MD    Department, Room/Bed   [facility], 3110/1       Discharge Date       Discharge Disposition       Discharge Destination                                 Attending Provider: Zainab Regalado MD    Allergies: No Known Allergies    Isolation: None   Infection: None   Code Status: No CPR    Ht: 152.4 cm (60\")   Wt: 29.6 kg (65 lb 4.1 oz)    Admission Cmt: None   Principal Problem: Acute hypercapnic respiratory failure [J96.02]                   Active Insurance as of 2/10/2025       Primary Coverage       Payor Plan Insurance Group Employer/Plan Group    [employer] HMO [id number]       Payor Plan Address Payor Plan Phone Number Payor Plan Fax Number Effective Dates    [id number] [phone]  1/1/2024 - None Entered    [facility] [id number]         Subscriber Name Subscriber Birth Date Member ID       SCARLET CHAVEZ 1960 [id number]                     Emergency Contacts        (Rel.) Home Phone Work Phone Mobile Phone    JordynYvonne (Sister) [phone] -- --    Don Taylor (Significant Other) -- -- [phone]              [place]: [facility] [id number]  Tax ID [id number]  Oxygen Therapy (last 3 days)       Date/Time SpO2 Device (Oxygen Therapy) Flow (L/min) (Oxygen Therapy) Oxygen Concentration (%) ETCO2 (mmHg)    02/24/25 1405 95 nasal cannula 1 -- --    02/24/25 1400 -- nasal cannula 1 -- --    " 02/24/25 0951 87 nasal cannula 2 -- --    02/24/25 0822 -- nasal cannula 1 -- --    02/24/25 0645 95 -- -- -- --    02/24/25 0644 92 room air -- -- --    02/24/25 0000 -- nasal cannula 1 -- --    02/23/25 2346 90 nasal cannula 1 -- --    02/23/25 2055 -- nasal cannula 1 -- --    02/23/25 1949 95 nasal cannula 1 -- --    02/23/25 1600 -- nasal cannula -- -- --    02/23/25 1546 -- nasal cannula 1 -- --    02/23/25 1400 -- nasal cannula 1 -- --    02/23/25 1207 92 -- -- -- --    02/23/25 1121 94 -- -- -- --    02/23/25 1117 92 -- 1 -- --    02/23/25 0900 -- nasal cannula 1 -- --    02/23/25 0815 97 -- -- -- --    02/23/25 0746 92 nasal cannula 1 -- --    02/23/25 0000 -- nasal cannula 1 -- --    02/22/25 2335 95 nasal cannula 1 -- --    02/22/25 2045 -- nasal cannula 1 -- --    02/22/25 2015 94 nasal cannula 1 -- --    02/22/25 1924 92 nasal cannula 1 -- --    02/22/25 1534 95 -- -- -- --    02/22/25 1531 92 nasal cannula 1 -- --    02/22/25 1106 91 -- -- -- --    02/22/25 1054 91 -- -- -- --    02/22/25 1051 92 nasal cannula 1 -- --    02/22/25 0648 96 -- -- -- --    02/22/25 0643 91 nasal cannula 1 -- --    02/21/25 2331 91 nasal cannula 1 -- --    02/21/25 2031 95 nasal cannula 1 -- --    02/21/25 1912 99 -- -- -- --    02/21/25 1907 98 -- -- -- --    02/21/25 1903 93 -- -- -- --    02/21/25 1859 93 -- -- -- --    02/21/25 1605 91 -- -- -- --    02/21/25 1550 94 nasal cannula 1 -- --    02/21/25 1325 -- nasal cannula 1 -- --    02/21/25 1222 92 -- -- -- --    02/21/25 1056 91 nasal cannula 1 -- --    02/21/25 0804 -- nasal cannula 1 -- --    02/21/25 0652 -- nasal cannula 2 -- --    02/21/25 0347 98 -- -- -- --    02/21/25 0005 92 -- -- -- --          Intake & Output (last 3 days)         02/21 0701 02/22 0700 02/22 0701 02/23 0700 02/23 0701 02/24 0700 02/24 0701 02/25 0700    P.O. 480   954    I.V. (mL/kg)        Other        NG/GT        IV Piggyback        Total Intake(mL/kg) 480 (11.9)   954 (32.2)    Urine  (mL/kg/hr) 700 (0.7) 1300 (1.6) 1000 (1.4)     Stool  0 0     Total Output 700 1300 1000     Net -220 -1300 -1000 +954            Urine Unmeasured Occurrence   1 x     Stool Unmeasured Occurrence  2 x 1 x            Lines, Drains & Airways       Active LDAs       Name Placement date Placement time Site Days    Peripheral IV 02/10/25 1529 Anterior;Left Forearm 02/10/25  1529  Forearm  14    Peripheral IV 02/14/25 1815 Left;Posterior Forearm 02/14/25  1815  Forearm  9    External Urinary Catheter 02/16/25  0745  found on initial assessment  --  8              Inactive LDAs       Name Placement date Placement time Removal date Removal time Site Days    [REMOVED] Peripheral IV 02/10/25 1546 Anterior;Right Forearm 02/10/25  1546  02/13/25  1030  Forearm  2    [REMOVED] Peripheral IV 02/13/25 1045 Anterior;Right Forearm 02/13/25  1045  02/20/25  0835  Forearm  6    [REMOVED] NG/OG Tube Nasogastric Right nostril 02/11/25  1100  02/21/25  1400  Right nostril  10    [REMOVED] External Urinary Catheter 02/10/25  2300  02/15/25  0200  --  4    [REMOVED] ETT  02/11/25  0830  02/19/25  1010  -- 8                  Medication Administration Report for Scarlet Chakraborty as of 2/22/25 through 2/24/25     Legend:    Given Hold Not Given Due Canceled Entry Other Actions    Time Time (Time) Time Time-Action         Discontinued     Completed     Future     MAR Hold     Linked             Medications 02/22/25 02/23/25 02/24/25     acetaminophen (TYLENOL) 160 MG/5ML oral solution 650 mg  Dose: 650 mg  Freq: Every 6 Hours PRN Route: PO  PRN Reason: Mild Pain  Start: 02/15/25 0916     Admin Instructions:   If given for fever, use fever parameter: fever greater than 100.4 °F  Based on patient request - if ordered for moderate or severe pain, provider allows for administration of a medication prescribed for a lower pain scale.    Do not exceed 4 grams of acetaminophen in a 24 hr period. Max dose of 2gm for AST/ALT greater than 120  units/L.    If given for pain, use the following pain scale:   Mild Pain = Pain Score of 1-3, CPOT 1-2  Moderate Pain = Pain Score of 4-6, CPOT 3-4  Severe Pain = Pain Score of 7-10, CPOT 5-8           acetaminophen (TYLENOL) tablet 650 mg  Dose: 650 mg  Freq: Every 6 Hours PRN Route: PO  PRN Reason: Mild Pain  Start: 02/15/25 0916     Admin Instructions:   If given for fever, use fever parameter: fever greater than 100.4 °F  Based on patient request - if ordered for moderate or severe pain, provider allows for administration of a medication prescribed for a lower pain scale.    Do not exceed 4 grams of acetaminophen in a 24 hr period. Max dose of 2gm for AST/ALT greater than 120 units/L.    If given for pain, use the following pain scale:   Mild Pain = Pain Score of 1-3, CPOT 1-2  Moderate Pain = Pain Score of 4-6, CPOT 3-4  Severe Pain = Pain Score of 7-10, CPOT 5-8            sennosides-docusate (PERICOLACE) 8.6-50 MG per tablet 2 tablet  Dose: 2 tablet  Freq: 2 Times Daily Route: PO  Start: 02/10/25 2100     Admin Instructions:   HOLD MEDICATION IF PATIENT HAS HAD BOWEL MOVEMENT. Start bowel management regimen if patient has not had a bowel movement after 12 hours.      (0848)-Not Given     (2039)-Not Given         (0900)-Not Given     (2119)-Not Given         (0823)-Not Given     2100           And   polyethylene glycol (MIRALAX) packet 17 g  Dose: 17 g  Freq: Daily PRN Route: PO  PRN Reason: Constipation  PRN Comment: Use if senna-docusate is ineffective  Start: 02/10/25 1817     Admin Instructions:   Use if no bowel movement after 12 hours. Mix in 6-8 ounces of water.  Use 4-8 ounces of water, tea, or juice for each 17 gram dose.           And   bisacodyl (DULCOLAX) EC tablet 5 mg  Dose: 5 mg  Freq: Daily PRN Route: PO  PRN Reason: Constipation  PRN Comment: Use if polyethylene glycol is ineffective  Start: 02/10/25 1817     Admin Instructions:   Use if no bowel movement after 12 hours.  Swallow whole. Do not  crush, split, or chew tablet.           And   bisacodyl (DULCOLAX) suppository 10 mg  Dose: 10 mg  Freq: Daily PRN Route: RE  PRN Reason: Constipation  PRN Comment: Use if bisacodyl oral is ineffective  Start: 02/10/25 1817     Admin Instructions:   Use if no bowel movement after 12 hours.  Hold for diarrhea           dextrose (D50W) (25 g/50 mL) IV injection 25 g  Dose: 25 g  Freq: Every 15 Minutes PRN Route: IV  PRN Reason: Low Blood Sugar  PRN Comment: Blood Sugar Less Than 70  Start: 02/14/25 0057     Admin Instructions:   Blood sugar less than 70; patient has IV access - Unresponsive, NPO or Unable To Safely Swallow           dextrose (GLUTOSE) oral gel 15 g  Dose: 15 g  Freq: Every 15 Minutes PRN Route: PO  PRN Reason: Low Blood Sugar  PRN Comment: Blood sugar less than 70  Start: 02/14/25 0057     Admin Instructions:   BS<70, Patient Alert, Is not NPO, Can safely swallow.           glucagon (GLUCAGEN) injection 1 mg  Dose: 1 mg  Freq: Every 15 Minutes PRN Route: IM  PRN Reason: Low Blood Sugar  PRN Comment: Blood Glucose Less Than 70  Start: 02/14/25 0057     Admin Instructions:   Blood Glucose Less Than 70 - Patient Without IV Access - Unresponsive, NPO or Unable To Safely Swallow  Reconstitute powder for injection by adding 1 mL of -supplied sterile diluent or sterile water for injection to a vial containing 1 mg of the drug, to provide solutions containing 1 mg/mL. Shake vial gently to dissolve.           melatonin tablet 5 mg  Dose: 5 mg  Freq: Nightly PRN Route: PO  PRN Reason: Sleep  Start: 02/21/25 2045 2044-Given          2116-Given             nitroglycerin (NITROSTAT) SL tablet 0.4 mg  Dose: 0.4 mg  Freq: Every 5 Minutes PRN Route: SL  PRN Reason: Chest Pain  Start: 02/10/25 1817     Admin Instructions:   If Pain Unrelieved After 3 Doses Notify MD  May administer up to 3 doses per episode. Hold if SBP less than 100.           simethicone (MYLICON) chewable tablet 80 mg  Dose: 80  mg  Freq: 4 Times Daily PRN Route: PO  PRN Reason: Flatulence  Start: 02/21/25 1828           sodium chloride 0.9 % flush 10 mL  Dose: 10 mL  Freq: As Needed Route: IV  PRN Reason: Line Care  Start: 02/10/25 1817           sodium chloride 0.9 % infusion 40 mL  Dose: 40 mL  Freq: As Needed Route: IV  PRN Reason: Line Care  Start: 02/10/25 1817     Admin Instructions:   Following administration of an IV intermittent medication, flush line with 40mL NS at 100mL/hr.           Discontinued Medications  Medications 02/22/25 02/23/25 02/24/25      fentaNYL citrate (PF) (SUBLIMAZE) injection 100 mcg  Dose: 100 mcg  Freq: Every 1 Hour PRN Route: IV  PRN Reason: Severe Pain  Start: 02/11/25 0813 End: 02/11/25 1110     Admin Instructions:   Use filter needle to withdraw dose from ampule. Based on patient request - if ordered for moderate or severe pain, provider allows for administration of a medication prescribed for a lower pain scale.  If given for pain, use the following pain scale:  Mild Pain = Pain Score of 1-3, CPOT 1-2  Moderate Pain = Pain Score of 4-6, CPOT 3-4  Severe Pain = Pain Score of 7-10, CPOT 5-8           fentaNYL citrate (PF) (SUBLIMAZE) injection 50 mcg  Dose: 50 mcg  Freq: Every 1 Hour PRN Route: IV  PRN Reason: Severe Pain  Start: 02/11/25 1110 End: 02/16/25 0812     Admin Instructions:   Use filter needle to withdraw dose from ampule. Based on patient request - if ordered for moderate or severe pain, provider allows for administration of a medication prescribed for a lower pain scale.  If given for pain, use the following pain scale:  Mild Pain = Pain Score of 1-3, CPOT 1-2  Moderate Pain = Pain Score of 4-6, CPOT 3-4  Severe Pain = Pain Score of 7-10, CPOT 5-8           HYDROcodone-acetaminophen (NORCO) 5-325 MG per tablet 1 tablet  Dose: 1 tablet  Freq: Every 4 Hours PRN Route: NG  PRN Reasons: Moderate Pain,Severe Pain  Start: 02/16/25 1343 End: 02/21/25 1341     Admin Instructions:   Based on  patient request - if ordered for moderate or severe pain, provider allows for administration of a medication prescribed for a lower pain scale.  [JANET]    Do not exceed 4 grams of acetaminophen in a 24 hr period. Max dose of 2gm for AST/ALT greater than 120 units/L        If given for pain, use the following pain scale:   Mild Pain = Pain Score of 1-3, CPOT 1-2  Moderate Pain = Pain Score of 4-6, CPOT 3-4  Severe Pain = Pain Score of 7-10, CPOT 5-8           HYDROcodone-acetaminophen (NORCO) 5-325 MG per tablet 1 tablet  Dose: 1 tablet  Freq: Every 4 Hours PRN Route: PO  PRN Reasons: Moderate Pain,Severe Pain  Start: 25 End: 25     Admin Instructions:   Based on patient request - if ordered for moderate or severe pain, provider allows for administration of a medication prescribed for a lower pain scale.  [JANET]    Do not exceed 4 grams of acetaminophen in a 24 hr period. Max dose of 2gm for AST/ALT greater than 120 units/L        If given for pain, use the following pain scale:   Mild Pain = Pain Score of 1-3, CPOT 1-2  Moderate Pain = Pain Score of 4-6, CPOT 3-4  Severe Pain = Pain Score of 7-10, CPOT 5-8                       Blood Administration Record (From admission, onward)      None          Operative/Procedure Notes (last 48 hours)  Notes from 25 1657 through 25 1657   No notes of this type exist for this encounter.          Physician Progress Notes (last 72 hours)        Gabo Mckeon MD at 25 1643            PROGRESS NOTE  Patient Name: Scarlet Chakraborty  Age/Sex: 65 y.o. female  : 1960  MRN: [id number]    Date of Admission: 2/10/2025  Date of Encounter Visit: 25   LOS: 14 days   Patient Care Team:  Flavia Jaquez APRN as PCP - General (Nurse Practitioner)  Beulah Joshi APRN as Nurse Practitioner (Neurology)  Flaco Aguayo MD as Consulting Physician (Pulmonary Disease)    Chief Complaint: Respiratory failure, COPD  "exacerbation, Takotsubo    Hospital course: Patient is doing better, on nasal cannula at 1 L, afebrile, very weak, needing assistance with the slightest activity.  Plan to go to rehab, waiting on a pre-CERT, the bed is already available  Patient has been transitioned to oral prednisone  Patient is done with the antibiotic course  Patient is afebrile  Patient is on oral anticoagulation  She continues to feel slightly better each day compared to the day prior         REVIEW OF SYSTEMS:   CONSTITUTIONAL: no fever or chills  CARDIOVASCULAR: No chest pain, chest pressure or chest discomfort. No palpitations or edema.   RESPIRATORY: Shortness of breath  GASTROINTESTINAL: No anorexia, nausea, vomiting or diarrhea. No abdominal pain or blood.   HEMATOLOGIC: No bleeding or bruising.     Ventilator/Non-Invasive Ventilation Settings (From admission, onward)   1 L/min nasal cannula oxygen                      Vital Signs  Temp:  [97.5 °F (36.4 °C)-98.4 °F (36.9 °C)] 97.7 °F (36.5 °C)  Heart Rate:  [] 117  Resp:  [16-18] 18  BP: ()/(45-54) 97/52  SpO2:  [87 %-95 %] 95 %  on  Flow (L/min) (Oxygen Therapy):  [1-2] 1 Device (Oxygen Therapy): nasal cannula    Intake/Output Summary (Last 24 hours) at 2/24/2025 1643  Last data filed at 2/24/2025 1400  Gross per 24 hour   Intake 954 ml   Output 1000 ml   Net -46 ml     Flowsheet Rows      Flowsheet Row First Filed Value   Admission Height 152.4 cm (60\") Documented at 02/10/2025 2230   Admission Weight 33.7 kg (74 lb 4.7 oz) Documented at 02/10/2025 2230          Body mass index is 12.74 kg/m².      02/21/25  0347 02/23/25  0626 02/24/25  0500   Weight: 40.3 kg (88 lb 13.5 oz) 33.7 kg (74 lb 4.7 oz) 29.6 kg (65 lb 4.1 oz) (rezero bed when up with pt for accurae weight)       Physical Exam:  GEN:  No acute distress, alert,  well developed, cachectic  EYES:   Sclerae clear. No icterus. PERRL. Normal EOM  ENT:   External ears/nose normal, no oral lesions, no thrush, mucous " "membranes moist  NECK:  Supple, midline trachea, no JVD  LUNGS: Normal chest on inspection, CTAB, further movement and breath sounds bilaterally, clear to auscultation, no wheezes. No rhonchi. No crackles. Respirations regular, even and unlabored.   CV:  Regular rhythm and rate. Normal S1/S2. No murmurs, gallops, or rubs noted.  ABD:  Soft, nontender and nondistended. Normal bowel sounds. No guarding  EXT:  Moves all extremities well. No cyanosis. No redness. No edema.   Skin: Dry, intact, no bleeding    Results Review:    Results From Last 14 Days   Lab Units 02/17/25  0422 02/16/25  0402 02/14/25  0448 02/14/25  0426   LACTATE mmol/L  --   --   --  1.0   TRIGLYCERIDES mg/dL 124 1,531* 142  --      Results from last 7 days   Lab Units 02/22/25  0437 02/21/25  0733 02/20/25  0417 02/19/25 0407 02/18/25  0341   SODIUM mmol/L 137 140 138 142 140   POTASSIUM mmol/L 4.0 4.7 4.7 4.6 4.3   CHLORIDE mmol/L 100 102 105 106 105   CO2 mmol/L 29.6* 27.0 28.4 30.4* 28.0   BUN mg/dL 25* 18 18 19 22   CREATININE mg/dL 0.35* 0.28* 0.30* 0.35* 0.38*   CALCIUM mg/dL 9.3 9.4 8.7 8.7 9.0   ANION GAP mmol/L 7.4 11.0 4.6* 5.6 7.0                 Results from last 7 days   Lab Units 02/24/25  0505 02/23/25  0503 02/22/25  0437 02/21/25  0733 02/20/25  0417 02/19/25  0407 02/18/25  0341   WBC 10*3/mm3 13.98* 12.19* 15.55* 16.32* 14.98* 16.57* 18.62*   HEMOGLOBIN g/dL 15.4 14.4 14.4 15.2 13.9 13.3 12.6   HEMATOCRIT % 45.8 43.0 45.2 45.7 42.1 40.5 39.4   PLATELETS 10*3/mm3 319 395 377 347 308 287 292   MCV fL 92.0 91.7 94.0 93.6 93.8 92.0 93.6                   Invalid input(s): \"LDLCALC\"    Results from last 7 days   Lab Units 02/19/25  0928 02/19/25  0414 02/18/25  0355   PH, ARTERIAL pH units 7.429 7.477* 7.511*   PCO2, ARTERIAL mm Hg 43.7 44.0 35.0   PO2 ART mm Hg 89.5 95.3 62.3*   HCO3 ART mmol/L 29.0* 32.5* 28.0         Glucose   Date/Time Value Ref Range Status   02/24/2025 1133 105 70 - 130 mg/dL Final   02/24/2025 0544 81 70 - 130 " mg/dL Final   02/23/2025 2345 132 (H) 70 - 130 mg/dL Final   02/23/2025 2014 176 (H) 70 - 130 mg/dL Final   02/23/2025 1558 155 (H) 70 - 130 mg/dL Final   02/23/2025 1142 128 70 - 130 mg/dL Final   02/23/2025 0619 71 70 - 130 mg/dL Final   02/22/2025 2033 132 (H) 70 - 130 mg/dL Final                               Imaging:   Imaging Results (All)               I reviewed the patient's new clinical results.  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/23 with current exacerbation  Acute hypoxic and hypercapnic respiratory failure  Mucous plugging, s/p bronchoscopy 2/14/2025  Sinus tachycardia  Seasonal coronavirus infection: OC43  COPD cachexia  Hypotension, secondary to sedation and positive pressure ventilation  Ischemic cardiomyopathy  History of CVA, on Eliquis  History of Takotsubo cardiomyopathy    PLAN:  Patient is on oral prednisone, she is getting better, we will de-escalate further  She does sound better, we will start the prednisone taper  She is cleared for the rehab discharge as soon as we have the pre-CERT, the bed is already available  Glycemic control is satisfactory     Discussed with patient and with the nursing staff, CCP note reviewed and input appreciated further and  Labs/Notes/films were independently reviewed and pertinent results are summarized above  The copied texts in this note were reviewed and they are accurate as of 02/24/25    Disposition:  Rehab    Gabo Mckeon MD  25  16:43 EST           Dictated utilizing Dragon dictation    Electronically signed by Gabo Mckeon MD at 25 1653       Gabo Mckeon MD at 25 1142            PROGRESS NOTE  Patient Name: Scarlet Chakraborty  Age/Sex: 65 y.o. female  : 1960  MRN: [id number]    Date of Admission: 2/10/2025  Date of Encounter Visit: 25   LOS: 13 days   Patient Care Team:  Flavia Jaquez APRN as PCP - General (Nurse Practitioner)  Beulah Joshi APRN as Nurse Practitioner (Neurology)  Flaco Aguayo MD as Consulting Physician (Pulmonary Disease)    Chief Complaint: Respiratory failure, COPD exacerbation, Takotsubo    Hospital course: Patient is doing better, on nasal cannula at 1 L, afebrile, very weak, needing assistance with the slightest activity.  Plan to go to rehab, waiting on a pre-CERT  Patient has been transitioned to oral prednisone  Patient is done with the antibiotic course  Patient is afebrile  Patient is on oral anticoagulation  She continues to feel slightly better each day compared to the day prior         REVIEW OF SYSTEMS:   CONSTITUTIONAL: no fever or chills  CARDIOVASCULAR: No chest pain, chest pressure or chest discomfort. No palpitations or edema.   RESPIRATORY: Shortness of breath  GASTROINTESTINAL: No anorexia, nausea, vomiting or diarrhea. No abdominal pain or blood.   HEMATOLOGIC: No bleeding or bruising.     Ventilator/Non-Invasive Ventilation Settings (From admission, onward)   1 L/min nasal cannula oxygen                      Vital Signs  Temp:  [97.7 °F (36.5 °C)-97.8 °F (36.6 °C)] 97.7 °F (36.5 °C)  Heart Rate:  [] 102  Resp:  [16-20] 20  BP: (111-129)/(48-63) 129/48  SpO2:  [92 %-97 %] 94 %  on  Flow (L/min) (Oxygen Therapy):  [1] 1 Device (Oxygen Therapy): nasal cannula    Intake/Output Summary (Last 24 hours) at 2025 1142  Last data filed at 2025 2141  Gross per 24 hour   Intake --   Output 1300  "ml   Net -1300 ml     Flowsheet Rows      Flowsheet Row First Filed Value   Admission Height 152.4 cm (60\") Documented at 02/10/2025 2230   Admission Weight 33.7 kg (74 lb 4.7 oz) Documented at 02/10/2025 2230          Body mass index is 14.51 kg/m².      02/19/25  0519 02/21/25  0347 02/23/25  0626   Weight: 39.9 kg (87 lb 15.4 oz) 40.3 kg (88 lb 13.5 oz) 33.7 kg (74 lb 4.7 oz)       Physical Exam:  GEN:  No acute distress, alert,  well developed, cachectic  EYES:   Sclerae clear. No icterus. PERRL. Normal EOM  ENT:   External ears/nose normal, no oral lesions, no thrush, mucous membranes moist  NECK:  Supple, midline trachea, no JVD  LUNGS: Normal chest on inspection, CTAB, significantly diminished however she able to move more air today compared to yesterday, is no wheezes. No rhonchi. No crackles. Respirations regular, even and unlabored.   CV:  Regular rhythm and rate. Normal S1/S2. No murmurs, gallops, or rubs noted.  ABD:  Soft, nontender and nondistended. Normal bowel sounds. No guarding  EXT:  Moves all extremities well. No cyanosis. No redness. No edema.   Skin: Dry, intact, no bleeding    Results Review:    Results From Last 14 Days   Lab Units 02/17/25  0422 02/16/25  0402 02/14/25  0448 02/14/25  0426 02/10/25  1547   D DIMER QUANT MCGFEU/mL  --   --   --   --  3.74*   LACTATE mmol/L  --   --   --  1.0  --    TRIGLYCERIDES mg/dL 124 1,531* 142  --   --      Results from last 7 days   Lab Units 02/22/25  0437 02/21/25  0733 02/20/25  0417 02/19/25  0407 02/18/25  0341 02/17/25  0422   SODIUM mmol/L 137 140 138 142 140 139   POTASSIUM mmol/L 4.0 4.7 4.7 4.6 4.3 4.5   CHLORIDE mmol/L 100 102 105 106 105 103   CO2 mmol/L 29.6* 27.0 28.4 30.4* 28.0 28.0   BUN mg/dL 25* 18 18 19 22 25*   CREATININE mg/dL 0.35* 0.28* 0.30* 0.35* 0.38* 0.29*   CALCIUM mg/dL 9.3 9.4 8.7 8.7 9.0 8.8   ANION GAP mmol/L 7.4 11.0 4.6* 5.6 7.0 8.0                 Results from last 7 days   Lab Units 02/23/25  0503 02/22/25  0437 " 02/21/25  0733 02/20/25  0417 02/19/25  0407 02/18/25  0341 02/17/25  0422   WBC 10*3/mm3 12.19* 15.55* 16.32* 14.98* 16.57* 18.62* 13.47*   HEMOGLOBIN g/dL 14.4 14.4 15.2 13.9 13.3 12.6 12.0   HEMATOCRIT % 43.0 45.2 45.7 42.1 40.5 39.4 37.2   PLATELETS 10*3/mm3 395 377 347 308 287 292 215   MCV fL 91.7 94.0 93.6 93.8 92.0 93.6 94.7             Results from last 7 days   Lab Units 02/17/25  0422   TRIGLYCERIDES mg/dL 124     Results from last 7 days   Lab Units 02/19/25  0928 02/19/25  0414 02/18/25  0355 02/17/25  0351   PH, ARTERIAL pH units 7.429 7.477* 7.511* 7.523*   PCO2, ARTERIAL mm Hg 43.7 44.0 35.0 40.0   PO2 ART mm Hg 89.5 95.3 62.3* 141.1*   HCO3 ART mmol/L 29.0* 32.5* 28.0 32.9*         Glucose   Date/Time Value Ref Range Status   02/23/2025 0619 71 70 - 130 mg/dL Final   02/22/2025 2033 132 (H) 70 - 130 mg/dL Final   02/22/2025 1603 202 (H) 70 - 130 mg/dL Final   02/22/2025 1104 139 (H) 70 - 130 mg/dL Final   02/22/2025 0610 84 70 - 130 mg/dL Final   02/21/2025 2048 113 70 - 130 mg/dL Final   02/21/2025 1606 124 70 - 130 mg/dL Final   02/21/2025 1109 117 70 - 130 mg/dL Final                               Imaging:   Imaging Results (All)               I reviewed the patient's new clinical results.  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% on [place] 11/9/23 with current exacerbation  Acute hypoxic and hypercapnic respiratory failure  Mucous plugging, s/p bronchoscopy  2025  Sinus tachycardia  Seasonal coronavirus infection: OC43  COPD cachexia  Hypotension, secondary to sedation and positive pressure ventilation  Ischemic cardiomyopathy  History of CVA, on Eliquis  History of Takotsubo cardiomyopathy    PLAN:  Patient is on oral prednisone, she does sound better, we will start the prednisone taper  Awaiting rehab facility approval  She is cleared for the rehab discharge as soon as we have the pre-CERT and the bed availability  Accu-Chek is showing adequate glycemic control except for 1 hide measurements at 300  Last ABG was showing adequate ventilation and oxygenation  White count is trending down and the steroid dose is being tapered  Expect the cardiomyopathy to improve, so far no sign of any acute decompensation  Patient is afebrile, on minimal oxygen supplementation 1 L/min  Discussed with patient    Labs/Notes/films were independently reviewed and pertinent results are summarized above  The copied texts in this note were reviewed and they are accurate as of 25    Disposition: Rehab    Gabo Mckeon MD  25  11:42 EST           Dictated utilizing Dragon dictation    Electronically signed by Gabo Mckeon MD at 25 1225       Gabo Mckeon MD at 25 1327            PROGRESS NOTE  Patient Name: Scarlet Chakraborty  Age/Sex: 65 y.o. female  : 1960  MRN: [id number]    Date of Admission: 2/10/2025  Date of Encounter Visit: 25   LOS: 12 days   Patient Care Team:  Flavia Jaquez APRN as PCP - General (Nurse Practitioner)  Beulah Joshi APRN as Nurse Practitioner (Neurology)  Flaco Aguayo MD as Consulting Physician (Pulmonary Disease)    Chief Complaint: Respiratory failure, COPD exacerbation, Takotsubo    Hospital course: Patient is doing better, on nasal cannula at 1 L, afebrile, very weak, needing assistance with the slightest activity.  Plan to go to rehab, waiting on a pre-CERT  Patient has been transition  "to oral prednisone  Patient is done with the antibiotic course  Patient is afebrile  Patient is on oral anticoagulation         REVIEW OF SYSTEMS:   CONSTITUTIONAL: no fever or chills  CARDIOVASCULAR: No chest pain, chest pressure or chest discomfort. No palpitations or edema.   RESPIRATORY: Shortness of breath and dyspnea with the slightest activity   GASTROINTESTINAL: No anorexia, nausea, vomiting or diarrhea. No abdominal pain or blood.   HEMATOLOGIC: No bleeding or bruising.     Ventilator/Non-Invasive Ventilation Settings (From admission, onward)   1 L/min nasal cannula oxygen                      Vital Signs  Temp:  [97.5 °F (36.4 °C)-97.9 °F (36.6 °C)] 97.5 °F (36.4 °C)  Heart Rate:  [] 92  Resp:  [16-20] 16  BP: (115-136)/(45-68) 127/51  SpO2:  [91 %-99 %] 91 %  on  Flow (L/min) (Oxygen Therapy):  [1] 1 Device (Oxygen Therapy): nasal cannula    Intake/Output Summary (Last 24 hours) at 2/22/2025 1327  Last data filed at 2/22/2025 0500  Gross per 24 hour   Intake 240 ml   Output 700 ml   Net -460 ml     Flowsheet Rows      Flowsheet Row First Filed Value   Admission Height 152.4 cm (60\") Documented at 02/10/2025 2230   Admission Weight 33.7 kg (74 lb 4.7 oz) Documented at 02/10/2025 2230          Body mass index is 17.35 kg/m².      02/18/25  0600 02/19/25  0519 02/21/25  0347   Weight: 42 kg (92 lb 9.5 oz) 39.9 kg (87 lb 15.4 oz) 40.3 kg (88 lb 13.5 oz)       Physical Exam:  GEN:  No acute distress, alert,  well developed, cachectic  EYES:   Sclerae clear. No icterus. PERRL. Normal EOM  ENT:   External ears/nose normal, no oral lesions, no thrush, mucous membranes moist  NECK:  Supple, midline trachea, no JVD  LUNGS: Normal chest on inspection, CTAB, significantly diminished no wheezes. No rhonchi. No crackles. Respirations regular, even and unlabored.   CV:  Regular rhythm and rate. Normal S1/S2. No murmurs, gallops, or rubs noted.  ABD:  Soft, nontender and nondistended. Normal bowel sounds. No " guarding  EXT:  Moves all extremities well. No cyanosis. No redness. No edema.   Skin: Dry, intact, no bleeding    Results Review:    Results From Last 14 Days   Lab Units 02/17/25 0422 02/16/25 0402 02/14/25  0448 02/14/25  0426 02/10/25  1547   D DIMER QUANT MCGFEU/mL  --   --   --   --  3.74*   LACTATE mmol/L  --   --   --  1.0  --    TRIGLYCERIDES mg/dL 124 1,531* 142  --   --      Results from last 7 days   Lab Units 02/22/25 0437 02/21/25 0733 02/20/25 0417 02/19/25 0407 02/18/25 0341 02/17/25 0422 02/16/25  0402   SODIUM mmol/L 137 140 138 142 140 139 135*   POTASSIUM mmol/L 4.0 4.7 4.7 4.6 4.3 4.5 4.1   CHLORIDE mmol/L 100 102 105 106 105 103 104   CO2 mmol/L 29.6* 27.0 28.4 30.4* 28.0 28.0 27.3   BUN mg/dL 25* 18 18 19 22 25* 23   CREATININE mg/dL 0.35* 0.28* 0.30* 0.35* 0.38* 0.29* 0.22*   CALCIUM mg/dL 9.3 9.4 8.7 8.7 9.0 8.8 7.4*   ANION GAP mmol/L 7.4 11.0 4.6* 5.6 7.0 8.0 3.7*                 Results from last 7 days   Lab Units 02/22/25 0437 02/21/25 0733 02/20/25 0417 02/19/25 0407 02/18/25  0341 02/17/25 0422 02/16/25  0402   WBC 10*3/mm3 15.55* 16.32* 14.98* 16.57* 18.62* 13.47* 12.40*   HEMOGLOBIN g/dL 14.4 15.2 13.9 13.3 12.6 12.0 12.6   HEMATOCRIT % 45.2 45.7 42.1 40.5 39.4 37.2 36.5   PLATELETS 10*3/mm3 377 347 308 287 292 215 205   MCV fL 94.0 93.6 93.8 92.0 93.6 94.7 93.4             Results from last 7 days   Lab Units 02/17/25  0422 02/16/25  0402   TRIGLYCERIDES mg/dL 124 1,531*     Results from last 7 days   Lab Units 02/19/25  0928 02/19/25  0414 02/18/25  0355 02/17/25  0351 02/16/25  0345   PH, ARTERIAL pH units 7.429 7.477* 7.511* 7.523* 7.449   PCO2, ARTERIAL mm Hg 43.7 44.0 35.0 40.0 50.2*   PO2 ART mm Hg 89.5 95.3 62.3* 141.1* 87.7   HCO3 ART mmol/L 29.0* 32.5* 28.0 32.9* 34.8*         Glucose   Date/Time Value Ref Range Status   02/22/2025 1104 139 (H) 70 - 130 mg/dL Final   02/22/2025 0610 84 70 - 130 mg/dL Final   02/21/2025 2048 113 70 - 130 mg/dL Final    02/21/2025 1606 124 70 - 130 mg/dL Final   02/21/2025 1109 117 70 - 130 mg/dL Final   02/21/2025 0614 153 (H) 70 - 130 mg/dL Final   02/21/2025 0004 132 (H) 70 - 130 mg/dL Final   02/20/2025 1744 139 (H) 70 - 130 mg/dL Final                               Imaging:   Imaging Results (All)               I reviewed the patient's new clinical results.  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% on [place] 11/9/23 with current exacerbation  Acute hypoxic and hypercapnic respiratory failure  Mucous plugging, s/p bronchoscopy 2/14/2025  Sinus tachycardia  Seasonal coronavirus infection: OC43  COPD cachexia  Hypotension, secondary to sedation and positive pressure ventilation  Ischemic cardiomyopathy  History of CVA, on Eliquis  History of Takotsubo cardiomyopathy    PLAN:  Patient is on oral prednisone, no longer in the ICU  Awaiting rehab facility approval  Case management note reviewed, different referrals have been placed and waiting on answers.  Patient is currently on oral medication, bronchodilators, and oral anticoagulation and patient is done with the course of the systemic antibiotics  Accu-Chek is showing adequate glycemic control  Last ABG was showing adequate ventilation and oxygenation  White count is mildly elevated and stable probably steroids related, electrolytes and kidney function are reassuring  Will consider further taper on the prednisone by tomorrow, continue with the  oxygen weaning and continue with physical therapy  Expect the cardiomyopathy to improve, so far no sign of any acute decompensation  Patient is afebrile, on minimal oxygen supplementation 1 L/min  Discussed with patient and family, notes reviewed, patient is new to me  Labs/Notes/films were independently reviewed and pertinent results are summarized above  The copied texts in this note were reviewed and they are accurate as of 02/22/25    Disposition: Rehab    Gabo Mckeon MD  02/22/25  13:27 EST           Dictated utilizing Dragon dictation    Electronically signed by Gabo Mckeon MD at 02/22/25 4095

## 2025-02-24 NOTE — PLAN OF CARE
Goal Outcome Evaluation:  Plan of Care Reviewed With: patient        Progress: improving  Outcome Evaluation: Pt seen for PT this afternoon. SHe is in bed upon entry to room w no complaints at this time. Pt able to transition to EOB w min A and cues for sequencing and positioning at EOB. Pt agreeable to stand w HHA/min A x 2. She was also able to pivot from bed > chair w mod/HHA x 2. L knee buckling with attempted weight bearing; however, therapist blocking knee to assist w transfer.  Pt somewhat anxious during session, but overall tolerated activity well.  Pt left in chair at end of session. RN notified. Plans for rehab at [place]. Will continue to progress as tolerated.    Anticipated Discharge Disposition (PT): inpatient rehabilitation facility

## 2025-02-24 NOTE — PLAN OF CARE
Goal Outcome Evaluation:  Plan of Care Reviewed With: patient           Outcome Evaluation: Pt presents in bed, just finished breathing treatment and on 1L NC with sats 88-93% during session. Pt is visibly SOA throughout session. Pt is mod A to move to/from EOB.  Demo's improved sitting balance at SBA today.   OT assist pt to stand twice with mod A and pt needs to sit within 5 seconds each time due to SOA.   Unable to stand long enough to xfer to chair.  OT works with pt on scooting along EOB while seated before return to supine. At this time anticipate assistance needed for all ADL due to SOA with tasks.   Will cont to follow for skilled OT and agree with plan for rehab at d/c.    Anticipated Discharge Disposition (OT): inpatient rehabilitation facility

## 2025-02-24 NOTE — PROGRESS NOTES
"  PROGRESS NOTE  Patient Name: Scarlet Chakraborty  Age/Sex: 65 y.o. female  : 1960  MRN: [id number]    Date of Admission: 2/10/2025  Date of Encounter Visit: 25   LOS: 14 days   Patient Care Team:  Flavia Jaquez APRN as PCP - General (Nurse Practitioner)  Beulah Joshi APRN as Nurse Practitioner (Neurology)  Flaco Aguayo MD as Consulting Physician (Pulmonary Disease)    Chief Complaint: Respiratory failure, COPD exacerbation, Takotsubo    Hospital course: Patient is doing better, on nasal cannula at 1 L, afebrile, very weak, needing assistance with the slightest activity.  Plan to go to rehab, waiting on a pre-CERT, the bed is already available  Patient has been transitioned to oral prednisone  Patient is done with the antibiotic course  Patient is afebrile  Patient is on oral anticoagulation  She continues to feel slightly better each day compared to the day prior         REVIEW OF SYSTEMS:   CONSTITUTIONAL: no fever or chills  CARDIOVASCULAR: No chest pain, chest pressure or chest discomfort. No palpitations or edema.   RESPIRATORY: Shortness of breath  GASTROINTESTINAL: No anorexia, nausea, vomiting or diarrhea. No abdominal pain or blood.   HEMATOLOGIC: No bleeding or bruising.     Ventilator/Non-Invasive Ventilation Settings (From admission, onward)   1 L/min nasal cannula oxygen                      Vital Signs  Temp:  [97.5 °F (36.4 °C)-98.4 °F (36.9 °C)] 97.7 °F (36.5 °C)  Heart Rate:  [] 117  Resp:  [16-18] 18  BP: ()/(45-54) 97/52  SpO2:  [87 %-95 %] 95 %  on  Flow (L/min) (Oxygen Therapy):  [1-2] 1 Device (Oxygen Therapy): nasal cannula    Intake/Output Summary (Last 24 hours) at 2025 1643  Last data filed at 2025 1400  Gross per 24 hour   Intake 954 ml   Output 1000 ml   Net -46 ml     Flowsheet Rows      Flowsheet Row First Filed Value   Admission Height 152.4 cm (60\") Documented at 02/10/2025 2230   Admission Weight 33.7 kg (74 lb 4.7 " oz) Documented at 02/10/2025 2230          Body mass index is 12.74 kg/m².      02/21/25  0347 02/23/25  0626 02/24/25  0500   Weight: 40.3 kg (88 lb 13.5 oz) 33.7 kg (74 lb 4.7 oz) 29.6 kg (65 lb 4.1 oz) (rezero bed when up with pt for accurae weight)       Physical Exam:  GEN:  No acute distress, alert,  well developed, cachectic  EYES:   Sclerae clear. No icterus. PERRL. Normal EOM  ENT:   External ears/nose normal, no oral lesions, no thrush, mucous membranes moist  NECK:  Supple, midline trachea, no JVD  LUNGS: Normal chest on inspection, CTAB, further movement and breath sounds bilaterally, clear to auscultation, no wheezes. No rhonchi. No crackles. Respirations regular, even and unlabored.   CV:  Regular rhythm and rate. Normal S1/S2. No murmurs, gallops, or rubs noted.  ABD:  Soft, nontender and nondistended. Normal bowel sounds. No guarding  EXT:  Moves all extremities well. No cyanosis. No redness. No edema.   Skin: Dry, intact, no bleeding    Results Review:    Results From Last 14 Days   Lab Units 02/17/25  0422 02/16/25  0402 02/14/25  0448 02/14/25  0426   LACTATE mmol/L  --   --   --  1.0   TRIGLYCERIDES mg/dL 124 1,531* 142  --      Results from last 7 days   Lab Units 02/22/25  0437 02/21/25  0733 02/20/25  0417 02/19/25  0407 02/18/25  0341   SODIUM mmol/L 137 140 138 142 140   POTASSIUM mmol/L 4.0 4.7 4.7 4.6 4.3   CHLORIDE mmol/L 100 102 105 106 105   CO2 mmol/L 29.6* 27.0 28.4 30.4* 28.0   BUN mg/dL 25* 18 18 19 22   CREATININE mg/dL 0.35* 0.28* 0.30* 0.35* 0.38*   CALCIUM mg/dL 9.3 9.4 8.7 8.7 9.0   ANION GAP mmol/L 7.4 11.0 4.6* 5.6 7.0                 Results from last 7 days   Lab Units 02/24/25  0505 02/23/25  0503 02/22/25  0437 02/21/25  0733 02/20/25  0417 02/19/25  0407 02/18/25  0341   WBC 10*3/mm3 13.98* 12.19* 15.55* 16.32* 14.98* 16.57* 18.62*   HEMOGLOBIN g/dL 15.4 14.4 14.4 15.2 13.9 13.3 12.6   HEMATOCRIT % 45.8 43.0 45.2 45.7 42.1 40.5 39.4   PLATELETS 10*3/mm3 319 395 377 347  "308 287 292   MCV fL 92.0 91.7 94.0 93.6 93.8 92.0 93.6                   Invalid input(s): \"LDLCALC\"    Results from last 7 days   Lab Units 02/19/25  0928 02/19/25  0414 02/18/25  0355   PH, ARTERIAL pH units 7.429 7.477* 7.511*   PCO2, ARTERIAL mm Hg 43.7 44.0 35.0   PO2 ART mm Hg 89.5 95.3 62.3*   HCO3 ART mmol/L 29.0* 32.5* 28.0         Glucose   Date/Time Value Ref Range Status   02/24/2025 1133 105 70 - 130 mg/dL Final   02/24/2025 0544 81 70 - 130 mg/dL Final   02/23/2025 2345 132 (H) 70 - 130 mg/dL Final   02/23/2025 2014 176 (H) 70 - 130 mg/dL Final   02/23/2025 1558 155 (H) 70 - 130 mg/dL Final   02/23/2025 1142 128 70 - 130 mg/dL Final   02/23/2025 0619 71 70 - 130 mg/dL Final   02/22/2025 2033 132 (H) 70 - 130 mg/dL Final                               Imaging:   Imaging Results (All)               I reviewed the patient's new clinical results.  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% on [place] 11/9/23 with current exacerbation  Acute hypoxic and hypercapnic respiratory failure  Mucous plugging, s/p bronchoscopy 2/14/2025  Sinus tachycardia  Seasonal coronavirus infection: OC43  COPD cachexia  Hypotension, secondary to sedation and positive pressure ventilation  Ischemic cardiomyopathy  History of CVA, on Eliquis  History of Takotsubo cardiomyopathy    PLAN:  Patient is on oral prednisone, she is getting better, we will de-escalate further  She does sound better, we " will start the prednisone taper  She is cleared for the rehab discharge as soon as we have the pre-CERT, the bed is already available  Glycemic control is satisfactory     Discussed with patient and with the nursing staff, CCP note reviewed and input appreciated further and  Labs/Notes/films were independently reviewed and pertinent results are summarized above  The copied texts in this note were reviewed and they are accurate as of 02/24/25    Disposition: Rehab    Gabo Mckeon MD  02/24/25  16:43 EST           Dictated utilizing Dragon dictation

## 2025-02-24 NOTE — THERAPY TREATMENT NOTE
Patient Name: Scarlet Chakraborty  : 1960    MRN: [id number]                              Today's Date: 2025       Admit Date: 2/10/2025    Visit Dx:     ICD-10-CM ICD-9-CM   1. Acute respiratory failure with hypoxia and hypercapnia  J96.01 518.81    J96.02    2. COPD exacerbation  J44.1 491.21   3. Coronavirus infection  B34.2 079.89   4. Anticoagulated by anticoagulation treatment  Z79.01 V58.61   5. Elevated d-dimer  R79.89 790.92     Patient Active Problem List   Diagnosis    Acute respiratory failure with hypoxia    Cerebrovascular accident (CVA) due to thrombosis    Stress-induced cardiomyopathy    Chronic obstructive pulmonary disease with acute exacerbation    Tobacco abuse    Mixed hyperlipidemia    Hx of non-ST elevation myocardial infarction (NSTEMI)    History of stroke    Tobacco dependence    COPD exacerbation    Breast mass    Cerebrovascular accident (CVA) due to occlusion of left middle cerebral artery    Congestive heart failure    Chronic obstructive lung disease    Non-ischemic cardiomyopathy    Acute respiratory failure    Status post placement of implantable loop recorder    SOB (shortness of breath)    COPD with acute exacerbation    Acute on chronic respiratory failure with hypercapnia    Severe malnutrition    Acute hypercapnic respiratory failure     Past Medical History:   Diagnosis Date    Asthma     Cerebrovascular accident (CVA) due to thrombosis of right middle cerebral artery     COPD (chronic obstructive pulmonary disease)     Nonischemic cardiomyopathy     NSTEMI (non-ST elevated myocardial infarction)     Stroke     Takotsubo cardiomyopathy     Tobacco abuse      Past Surgical History:   Procedure Laterality Date    CARDIAC CATHETERIZATION N/A 2018    Procedure: Left Heart Cath and cors;  Surgeon: Gabino Dozier MD;  Location: [facility] INVASIVE LOCATION;  Service: Cardiology    CARDIAC CATHETERIZATION N/A 2018    Procedure: Left ventriculography;   Surgeon: Gabino Dozier MD;  Location:  [facility];  Service: Cardiology    CARDIAC ELECTROPHYSIOLOGY PROCEDURE N/A 09/27/2018    Procedure: Loop insertion  LINQ;  Surgeon: Jose R Barker MD;  Location:  [facility];  Service: Cardiovascular      General Information       Row Name 02/24/25 1022          OT Time and Intention    Subjective Information complains of;fatigue;dyspnea  -LE     Document Type therapy note (daily note)  -LE     Mode of Treatment individual therapy;occupational therapy  -LE     Patient Effort good  -LE     Symptoms Noted During/After Treatment shortness of breath  -LE       Row Name 02/24/25 1022          General Information    Existing Precautions/Restrictions fall;oxygen therapy device and L/min  -LE       Row Name 02/24/25 1022          Cognition    Orientation Status (Cognition) oriented x 4  -LE       Row Name 02/24/25 1022          Safety Issues/Impairments Affecting Functional Mobility    Impairments Affecting Function (Mobility) endurance/activity tolerance;balance;strength;shortness of breath  -LE     Comment, Safety Issues/Impairments (Mobility) Non skid socks and gait belt worn  -LE               User Key  (r) = Recorded By, (t) = Taken By, (c) = Cosigned By      Initials Name Provider Type    LE Beulah Mendez OTR Occupational Therapist                     Mobility/ADL's       Row Name 02/24/25 1022          Bed Mobility    Bed Mobility supine-sit;sit-supine;scooting/bridging  -LE     Supine-Sit [place] (Bed Mobility) set up;verbal cues;moderate assist (50% patient effort)  -LE     Sit-Supine [place] (Bed Mobility) set up;verbal cues;moderate assist (50% patient effort)  -LE     Bed Mobility, Safety Issues decreased use of legs for bridging/pushing;decreased use of arms for pushing/pulling  -LE     Assistive Device (Bed Mobility) bed rails;head of bed elevated  -LE     Comment, (Bed Mobility) mod A to scoot while seated EOB  -LE        Row Name 02/24/25 1022          Transfers    Transfers sit-stand transfer;stand-sit transfer;bed-chair transfer;toilet transfer  -LE       Row Name 02/24/25 1022          Sit-Stand Transfer    Sit-Stand [place] (Transfers) set up;verbal cues;moderate assist (50% patient effort)  -LE       Row Name 02/24/25 1022          Toilet Transfer    [place] Level (Toilet Transfer) not tested;unable to assess  -LE       Row Name 02/24/25 1022          Functional Mobility    Functional Mobility- Ind. Level unable to perform;not tested  -LE       Row Name 02/24/25 1022          Activities of Daily Living    BADL Assessment/Intervention toileting;feeding;grooming;lower body dressing;upper body dressing;bathing  -[facility] 02/24/25 1022          Lower Body Dressing Assessment/Training    [place] Level (Lower Body Dressing) dependent (less than 25% patient effort)  -HANNAH     Comment, (Lower Body Dressing) dep to adjust socks.  -LE       Row Name 02/24/25 1022          Self-Feeding Assessment/Training    Comment, (Feeding) reports can usually feed herself but needs set up and food cut up  -LE       Row Name 02/24/25 1022          Toileting Assessment/Training    [place] Level (Toileting) dependent (less than 25% patient effort)  -HANNAH     Comment, (Toileting) [place].  -HANNAH               User Key  (r) = Recorded By, (t) = Taken By, (c) = Cosigned By      Initials Name Provider Type    Beulah Duque OTR Occupational Therapist                   Obj/Interventions       Row Name 02/24/25 1024          Balance    Balance Assessment sitting static balance;standing static balance;standing dynamic balance  -LE     Static Sitting Balance standby assist  -LE     Static Standing Balance set-up;verbal cues;moderate assist  -LE     Balance Interventions standing;supported;static  -LE     Comment, Balance cues for gaze and glute activation to increase posture for standing.  pt demo flexed knees and trunk.  -HANNAH                User Key  (r) = Recorded By, (t) = Taken By, (c) = Cosigned By      Initials Name Provider Type    Beulah Duque OTR Occupational Therapist                   Goals/Plan    No documentation.                  Clinical Impression       Row Name 02/24/25 1018          Pain Assessment    Pretreatment Pain Rating 0/10 - no pain  -LE       Row Name 02/24/25 1018          Plan of Care Review    Plan of Care Reviewed With patient  -LE     Outcome Evaluation Pt presents in bed, just finished breathing treatment and on 1L NC with sats 88-93% during session. Pt is visibly SOA throughout session. Pt is mod A to move to/from EOB.  Demo's improved sitting balance at SBA today.   OT assist pt to stand twice with mod A and pt needs to sit within 5 seconds each time due to SOA.   Unable to stand long enough to xfer to chair.  OT works with pt on scooting along EOB while seated before return to supine. At this time anticipate assistance needed for all ADL due to SOA with tasks.   Will cont to follow for skilled OT and agree with plan for rehab at d/c.  -LE       Row Name 02/24/25 1018          Therapy Plan Review/Discharge Plan (OT)    Anticipated Discharge Disposition (OT) inpatient rehabilitation facility  -       Row Name 02/24/25 1018          Vital Signs    Pre SpO2 (%) 92  -LE     O2 Delivery Pre Treatment nasal cannula  1L  -LE     Intra SpO2 (%) 88  -LE     O2 Delivery Intra Treatment nasal cannula  -LE     Post SpO2 (%) 93  -LE     O2 Delivery Post Treatment nasal cannula  -LE     Pre Patient Position Supine  -LE     Intra Patient Position Standing  -LE     Post Patient Position Supine  -LE       Row Name 02/24/25 1018          Positioning and Restraints    Pre-Treatment Position in bed  -LE     Post Treatment Position bed  -LE     In Bed notified nsg;fowlers;call light within reach;encouraged to call for assist;exit alarm on  pillow under knees.  -LE               User Key  (r) = Recorded By, (t) = Taken By, (c) =  Cosigned By      Initials Name Provider Type    Beulah Duque OTR Occupational Therapist                   Outcome Measures       Row Name 02/24/25 1025          How much help from another is currently needed...    Putting on and taking off regular lower body clothing? 1  -LE     Bathing (including washing, rinsing, and drying) 2  -LE     Toileting (which includes using toilet bed pan or urinal) 1  -LE     Putting on and taking off regular upper body clothing 2  -LE     Taking care of personal grooming (such as brushing teeth) 2  -LE     Eating meals 3  -LE     AM-PAC 6 Clicks Score (OT) 11  -LE       Row Name 02/24/25 0822          How much help from another person do you currently need...    Turning from your back to your side while in flat bed without using bedrails? 4  -ES     Moving from lying on back to sitting on the side of a flat bed without bedrails? 4  -ES     Moving to and from a bed to a chair (including a wheelchair)? 3  -ES     Standing up from a chair using your arms (e.g., wheelchair, bedside chair)? 3  -ES     Climbing 3-5 steps with a railing? 1  -ES     To walk in hospital room? 2  -ES     AM-PAC 6 Clicks Score (PT) 17  -ES     Highest Level of Mobility Goal 5 --> Static standing  -ES       Row Name 02/24/25 1025          Functional Assessment    Outcome Measure Options AM-PAC 6 Clicks Daily Activity (OT)  -LE               User Key  (r) = Recorded By, (t) = Taken By, (c) = Cosigned By      Initials Name Provider Type    Beulah Duque OTR Occupational Therapist    Radha Manriquez RN Registered Nurse                    Occupational Therapy Education       Title: PT OT SLP Therapies (Done)       Topic: Occupational Therapy (Done)       Point: ADL training (Done)       Description:   Instruct learner(s) on proper safety adaptation and remediation techniques during self care or transfers.   Instruct in proper use of assistive devices.                  Learning Progress Summary             Patient Acceptance, E, VU by ER at 2/23/2025 1514    Acceptance, E, VU,NR by CE at 2/21/2025 1239                      Point: Home exercise program (Done)       Description:   Instruct learner(s) on appropriate technique for monitoring, assisting and/or progressing therapeutic exercises/activities.                  Learning Progress Summary            Patient Acceptance, E, VU by ER at 2/23/2025 1514    Acceptance, E, VU,NR by CE at 2/21/2025 1239                      Point: Precautions (Done)       Description:   Instruct learner(s) on prescribed precautions during self-care and functional transfers.                  Learning Progress Summary            Patient Acceptance, E, VU by ER at 2/23/2025 1514    Acceptance, E, VU,NR by CE at 2/21/2025 1239                      Point: Body mechanics (Done)       Description:   Instruct learner(s) on proper positioning and spine alignment during self-care, functional mobility activities and/or exercises.                  Learning Progress Summary            Patient Acceptance, E, VU by ER at 2/23/2025 1514    Acceptance, E, VU,NR by CE at 2/21/2025 1239                                      User Key       Initials Effective Dates Name Provider Type Discipline    CE 10/17/22 -  Amberly Alcaraz OT Occupational Therapist OT    ER 10/15/23 -  Ivy Anderson PT Physical Therapist PT                  OT Recommendation and Plan     Plan of Care Review  Plan of Care Reviewed With: patient  Outcome Evaluation: Pt presents in bed, just finished breathing treatment and on 1L NC with sats 88-93% during session. Pt is visibly SOA throughout session. Pt is mod A to move to/from EOB.  Demo's improved sitting balance at SBA today.   OT assist pt to stand twice with mod A and pt needs to sit within 5 seconds each time due to SOA.   Unable to stand long enough to xfer to chair.  OT works with pt on scooting along EOB while seated before return to supine. At this time anticipate assistance  needed for all ADL due to SOA with tasks.   Will cont to follow for skilled OT and agree with plan for rehab at d/c.     Time Calculation:         Time Calculation- OT       Row Name 02/24/25 1025             Time Calculation- OT    OT Start Time 0959  -LE      OT Stop Time 1015  -LE      OT Time Calculation (min) 16 min  -LE      Total Timed Code Minutes- OT 16 minute(s)  -LE      OT Received On 02/24/25  -LE      OT - Next Appointment 02/25/25  -LE         Timed Charges    95638 - OT Therapeutic Activity Minutes 16  -LE         Total Minutes    Timed Charges Total Minutes 16  -LE       Total Minutes 16  -LE                User Key  (r) = Recorded By, (t) = Taken By, (c) = Cosigned By      Initials Name Provider Type    eBulah Duque OTR Occupational Therapist                  Therapy Charges for Today       Code Description Service Date Service Provider Modifiers Qty    [id number]  OT THERAPEUTIC ACT EA 15 MIN 2/24/2025 Beulah Mendez OTR GO 1                 BENITEZ Krishnamurthy  2/24/2025

## 2025-02-24 NOTE — PLAN OF CARE
Goal Outcome Evaluation:           Problem: Adult Inpatient Plan of Care  Goal: Absence of Hospital-Acquired Illness or Injury  Outcome: Progressing  Intervention: Identify and Manage Fall Risk  Recent Flowsheet Documentation  Taken 2/24/2025 1600 by Radha Duggan RN  Safety Promotion/Fall Prevention:   activity supervised   assistive device/personal items within reach   clutter free environment maintained   fall prevention program maintained   nonskid shoes/slippers when out of bed   room organization consistent   safety round/check completed  Taken 2/24/2025 1400 by Radha Duggan RN  Safety Promotion/Fall Prevention:   activity supervised   assistive device/personal items within reach   clutter free environment maintained   fall prevention program maintained   nonskid shoes/slippers when out of bed   room organization consistent   safety round/check completed  Taken 2/24/2025 1200 by Radha Duggan RN  Safety Promotion/Fall Prevention:   activity supervised   assistive device/personal items within reach   clutter free environment maintained   fall prevention program maintained   nonskid shoes/slippers when out of bed   room organization consistent   safety round/check completed  Taken 2/24/2025 1000 by Radha Duggan RN  Safety Promotion/Fall Prevention:   activity supervised   assistive device/personal items within reach   clutter free environment maintained   fall prevention program maintained   nonskid shoes/slippers when out of bed   room organization consistent   safety round/check completed  Taken 2/24/2025 0822 by Radha Duggan, RN  Safety Promotion/Fall Prevention:   activity supervised   assistive device/personal items within reach   clutter free environment maintained   fall prevention program maintained   nonskid shoes/slippers when out of bed   room organization consistent   safety round/check completed  Intervention: Prevent Skin Injury  Recent Flowsheet  Documentation  Taken 2/24/2025 1600 by Radha Duggan RN  Body Position: position changed independently  Taken 2/24/2025 1400 by Radha Duggan RN  Body Position: position changed independently  Taken 2/24/2025 1200 by Radha Duggan RN  Body Position: position changed independently  Taken 2/24/2025 1000 by Radha Duggan RN  Body Position: position changed independently  Taken 2/24/2025 0822 by Radha Duggan RN  Body Position: position changed independently  Intervention: Prevent and Manage VTE (Venous Thromboembolism) Risk  Recent Flowsheet Documentation  Taken 2/24/2025 1400 by Radha Duggan RN  VTE Prevention/Management: (eliquis) other (see comments)  Taken 2/24/2025 0822 by Radha Duggan RN  VTE Prevention/Management: (eliquis)   other (see comments)   patient refused intervention  Intervention: Prevent Infection  Recent Flowsheet Documentation  Taken 2/24/2025 1600 by Radha Duggan RN  Infection Prevention:   hand hygiene promoted   rest/sleep promoted  Taken 2/24/2025 1400 by Radha Duggan RN  Infection Prevention:   hand hygiene promoted   rest/sleep promoted  Taken 2/24/2025 1200 by Radha Duggan RN  Infection Prevention:   hand hygiene promoted   rest/sleep promoted  Taken 2/24/2025 1000 by Radha Duggan RN  Infection Prevention:   hand hygiene promoted   rest/sleep promoted  Taken 2/24/2025 0822 by Radha Duggan RN  Infection Prevention:   hand hygiene promoted   rest/sleep promoted  Goal: Optimal Comfort and Wellbeing  Outcome: Progressing  Intervention: Provide Person-Centered Care  Recent Flowsheet Documentation  Taken 2/24/2025 1400 by Radha Duggan RN  Trust Relationship/Rapport:   care explained   choices provided   thoughts/feelings acknowledged  Taken 2/24/2025 0822 by Radha Duggan RN  Trust Relationship/Rapport:   care explained   choices provided   thoughts/feelings acknowledged      Problem: Fall Injury Risk  Goal: Absence of Fall and Fall-Related Injury  Outcome: Progressing  Intervention: Identify and Manage Contributors  Recent Flowsheet Documentation  Taken 2/24/2025 1600 by Radha Duggan RN  Medication Review/Management: medications reviewed  Taken 2/24/2025 1400 by Radha Duggan RN  Medication Review/Management: medications reviewed  Taken 2/24/2025 1200 by Radha Duggan RN  Medication Review/Management: medications reviewed  Taken 2/24/2025 1000 by Radha Duggan RN  Medication Review/Management: medications reviewed  Taken 2/24/2025 0822 by Radha Duggan RN  Medication Review/Management: medications reviewed  Intervention: Promote Injury-Free Environment  Recent Flowsheet Documentation  Taken 2/24/2025 1600 by Radha Duggan RN  Safety Promotion/Fall Prevention:   activity supervised   assistive device/personal items within reach   clutter free environment maintained   fall prevention program maintained   nonskid shoes/slippers when out of bed   room organization consistent   safety round/check completed  Taken 2/24/2025 1400 by Radha Duggan RN  Safety Promotion/Fall Prevention:   activity supervised   assistive device/personal items within reach   clutter free environment maintained   fall prevention program maintained   nonskid shoes/slippers when out of bed   room organization consistent   safety round/check completed  Taken 2/24/2025 1200 by Radha Duggan RN  Safety Promotion/Fall Prevention:   activity supervised   assistive device/personal items within reach   clutter free environment maintained   fall prevention program maintained   nonskid shoes/slippers when out of bed   room organization consistent   safety round/check completed  Taken 2/24/2025 1000 by Radha Duggan RN  Safety Promotion/Fall Prevention:   activity supervised   assistive device/personal items within reach   clutter free environment  maintained   fall prevention program maintained   nonskid shoes/slippers when out of bed   room organization consistent   safety round/check completed  Taken 2/24/2025 0822 by Radha Duggan RN  Safety Promotion/Fall Prevention:   activity supervised   assistive device/personal items within reach   clutter free environment maintained   fall prevention program maintained   nonskid shoes/slippers when out of bed   room organization consistent   safety round/check completed     Problem: Skin Injury Risk Increased  Goal: Skin Health and Integrity  Intervention: Optimize Skin Protection  Recent Flowsheet Documentation  Taken 2/24/2025 1600 by Radha Duggan RN  Activity Management: bedrest  Head of Bed (HOB) Positioning: HOB elevated  Taken 2/24/2025 1400 by Radha Duggan RN  Activity Management: bedrest  Pressure Reduction Techniques: frequent weight shift encouraged  Head of Bed (HOB) Positioning: HOB elevated  Pressure Reduction Devices: alternating pressure pump (LONG)  Taken 2/24/2025 1200 by Radha Duggan RN  Activity Management: bedrest  Head of Bed (HOB) Positioning: HOB elevated  Taken 2/24/2025 1000 by Radha Duggan RN  Head of Bed (HOB) Positioning: HOB elevated  Taken 2/24/2025 0822 by Radha Duggan RN  Head of Bed (HOB) Positioning: HOB elevated     Problem: Restraint, Nonviolent  Goal: Absence of Harm or Injury  Intervention: Implement Least Restrictive Safety Strategies  Recent Flowsheet Documentation  Taken 2/24/2025 1400 by Radha Duggan RN  Diversional Activities:   television   smartphone  Taken 2/24/2025 0822 by Radha Duggan RN  Diversional Activities:   television   smartphone  Intervention: Protect Dignity, Rights and Personal Wellbeing  Recent Flowsheet Documentation  Taken 2/24/2025 1400 by Radha Duggan RN  Trust Relationship/Rapport:   care explained   choices provided   thoughts/feelings acknowledged  Taken 2/24/2025 0822 by  Summarell, Radha, RN  Trust Relationship/Rapport:   care explained   choices provided   thoughts/feelings acknowledged  Intervention: Protect Skin and Joint Integrity  Recent Flowsheet Documentation  Taken 2/24/2025 1600 by Radha Duggan RN  Body Position: position changed independently  Taken 2/24/2025 1400 by Radha Duggan RN  Body Position: position changed independently  Range of Motion: active ROM (range of motion) encouraged  Taken 2/24/2025 1200 by Radha Duggan RN  Body Position: position changed independently  Taken 2/24/2025 1000 by Radha Duggan RN  Body Position: position changed independently  Taken 2/24/2025 0822 by Radha Duggan RN  Body Position: position changed independently  Range of Motion: active ROM (range of motion) encouraged     Problem: Sepsis/Septic Shock  Goal: Optimal Coping  Intervention: Support Patient and Family Response  Recent Flowsheet Documentation  Taken 2/24/2025 1400 by Radha Duggan RN  Family/Support System Care:   self-care encouraged   support provided  Taken 2/24/2025 0822 by Radha Duggan RN  Family/Support System Care:   self-care encouraged   support provided  Goal: Absence of Infection Signs and Symptoms  Intervention: Initiate Sepsis Management  Recent Flowsheet Documentation  Taken 2/24/2025 1600 by Radha Duggan RN  Infection Prevention:   hand hygiene promoted   rest/sleep promoted  Taken 2/24/2025 1400 by Radha Duggan RN  Infection Prevention:   hand hygiene promoted   rest/sleep promoted  Taken 2/24/2025 1200 by Radha Duggan RN  Infection Prevention:   hand hygiene promoted   rest/sleep promoted  Taken 2/24/2025 1000 by Radha Duggan RN  Infection Prevention:   hand hygiene promoted   rest/sleep promoted  Taken 2/24/2025 0822 by Radha Duggan RN  Infection Prevention:   hand hygiene promoted   rest/sleep promoted  Intervention: Promote Recovery  Recent Flowsheet  Documentation  Taken 2/24/2025 1600 by Radha Duggan RN  Activity Management: bedrest  Taken 2/24/2025 1400 by Radha Duggan RN  Activity Management: bedrest  Taken 2/24/2025 1200 by Radha Duggan RN  Activity Management: bedrest     Problem: Mechanical Ventilation Invasive  Goal: Effective Communication  Intervention: Ensure Effective Communication  Recent Flowsheet Documentation  Taken 2/24/2025 1400 by Radha Duggan RN  Trust Relationship/Rapport:   care explained   choices provided   thoughts/feelings acknowledged  Diversional Activities:   television   smartphone  Family/Support System Care:   self-care encouraged   support provided  Taken 2/24/2025 0822 by Radha Duggan RN  Trust Relationship/Rapport:   care explained   choices provided   thoughts/feelings acknowledged  Diversional Activities:   [employer]  Family/Support System Care:   self-care encouraged   support provided  Goal: Optimal Device Function  Intervention: Optimize Device Care and Function  Recent Flowsheet Documentation  Taken 2/24/2025 1600 by Radha Duggan RN  Airway Safety Measures: oxygen flowmeter at bedside  Taken 2/24/2025 1400 by Radha Duggan RN  Airway Safety Measures: oxygen flowmeter at bedside  Taken 2/24/2025 1200 by Radha Duggan RN  Airway Safety Measures: oxygen flowmeter at bedside  Taken 2/24/2025 1000 by Radha Duggan RN  Airway Safety Measures: oxygen flowmeter at bedside  Taken 2/24/2025 0822 by Radha Duggan RN  Airway Safety Measures: oxygen flowmeter at bedside  Goal: Mechanical Ventilation Liberation  Intervention: Promote Extubation and Mechanical Ventilation Liberation  Recent Flowsheet Documentation  Taken 2/24/2025 1600 by Radha Duggan RN  Medication Review/Management: medications reviewed  Taken 2/24/2025 1400 by Radha Duggan RN  Medication Review/Management: medications reviewed  Taken 2/24/2025 1200 by  Radha Duggan RN  Medication Review/Management: medications reviewed  Taken 2/24/2025 1000 by Radha Duggan RN  Medication Review/Management: medications reviewed  Taken 2/24/2025 0822 by Radha Duggan RN  Medication Review/Management: medications reviewed  Goal: Absence of Ventilator-Induced Lung Injury  Intervention: Prevent Ventilator-Associated Pneumonia  Recent Flowsheet Documentation  Taken 2/24/2025 1600 by Radha Duggan RN  Head of Bed (HOB) Positioning: HOB elevated  Taken 2/24/2025 1400 by Radha Duggan RN  Head of Bed (HOB) Positioning: HOB elevated  Taken 2/24/2025 1200 by Radha Duggan RN  Head of Bed (HOB) Positioning: HOB elevated  Taken 2/24/2025 1000 by Radha Duggan RN  Head of Bed (HOB) Positioning: HOB elevated  Taken 2/24/2025 0822 by Radha Duggan RN  Head of Bed (HOB) Positioning: HOB elevated     Problem: Enteral Nutrition  Goal: Absence of Aspiration Signs and Symptoms  Intervention: Minimize Aspiration Risk  Recent Flowsheet Documentation  Taken 2/24/2025 1600 by Radha Duggan RN  Head of Bed (HOB) Positioning: HOB elevated  Taken 2/24/2025 1400 by Radha Duggan RN  Head of Bed (HOB) Positioning: HOB elevated  Taken 2/24/2025 1200 by Radha Duggan RN  Head of Bed (HOB) Positioning: HOB elevated  Taken 2/24/2025 1000 by Radha Duggan RN  Head of Bed (HOB) Positioning: HOB elevated  Taken 2/24/2025 0822 by Radha Duggan RN  Head of Bed (HOB) Positioning: HOB elevated

## 2025-02-24 NOTE — PLAN OF CARE
Problem: Adult Inpatient Plan of Care  Goal: Plan of Care Review  Outcome: Progressing  Flowsheets (Taken 2/24/2025 0633)  Progress: no change  Outcome Evaluation: A&Ox4, VSS, no complaints of pain or discomfort, encourage to turn, plan for rehab at discharge, plan of care continues                Problem: Adult Inpatient Plan of Care  Goal: Absence of Hospital-Acquired Illness or Injury  Intervention: Prevent and Manage VTE (Venous Thromboembolism) Risk  Recent Flowsheet Documentation  Taken 2/23/2025 2055 by Nona Moran, RN  VTE Prevention/Management: (patient on eliquis)   patient refused intervention   SCDs (sequential compression devices) off   other (see comments)

## 2025-02-24 NOTE — CASE MANAGEMENT/SOCIAL WORK
Continued Stay Note  [facility]     Patient Name: Scarlet Chakraborty  MRN: [id number]  Today's Date: 2/24/2025    Admit Date: 2/10/2025    Plan: IRF [facility] pending accepting & pre-cert.   Discharge Plan       Row Name 02/24/25 1126       Plan    Plan IRF [facility] pending accepting & pre-cert.    Patient/Family in Agreement with Plan yes    Plan Comments CCP SW G[facility] who reports pt physican had not yet been accepted or denied, was following for medical readiness. Giselle reports she will send physican updated clinicals & if approves pt, she can send for pre-cert authorization today. DC plan IRF [facility] pending accepting & pre-cert. BISI Oliver/JAMI      Row Name 02/24/25 1005       Plan    Plan IRF referrals pending [facility]    Patient/Family in Agreement with Plan yes    Plan Comments Clinicals reviewed. CCP rec'd VM from pt's sister, Yvonne Cobb, asking for an update in regards to rehab referrals. CCP met with pt at bedside who gave CCP permission to update her sister. CCP updated pt [facility] & [facility] referrals both remain considering. CCP updated pt awaiting Giselle/[facility] update to see if pt has been accepted.  Pt verbalized understanding. Pt reports 1st preference remains for [facility]. CCP called pt's sister, Yvonen, updated regarding above. Yvonne verbalized understanding. DC plan IRF referrals pending [facility]. Partial packet in chart. BISI Oliver/JAMI Bennett RN

## 2025-02-24 NOTE — THERAPY TREATMENT NOTE
Patient Name: Scarlet Chakraborty  : 1960    MRN: [id number]                              Today's Date: 2025       Admit Date: 2/10/2025    Visit Dx:     ICD-10-CM ICD-9-CM   1. Acute respiratory failure with hypoxia and hypercapnia  J96.01 518.81    J96.02    2. COPD exacerbation  J44.1 491.21   3. Coronavirus infection  B34.2 079.89   4. Anticoagulated by anticoagulation treatment  Z79.01 V58.61   5. Elevated d-dimer  R79.89 790.92     Patient Active Problem List   Diagnosis    Acute respiratory failure with hypoxia    Cerebrovascular accident (CVA) due to thrombosis    Stress-induced cardiomyopathy    Chronic obstructive pulmonary disease with acute exacerbation    Tobacco abuse    Mixed hyperlipidemia    Hx of non-ST elevation myocardial infarction (NSTEMI)    History of stroke    Tobacco dependence    COPD exacerbation    Breast mass    Cerebrovascular accident (CVA) due to occlusion of left middle cerebral artery    Congestive heart failure    Chronic obstructive lung disease    Non-ischemic cardiomyopathy    Acute respiratory failure    Status post placement of implantable loop recorder    SOB (shortness of breath)    COPD with acute exacerbation    Acute on chronic respiratory failure with hypercapnia    Severe malnutrition    Acute hypercapnic respiratory failure     Past Medical History:   Diagnosis Date    Asthma     Cerebrovascular accident (CVA) due to thrombosis of right middle cerebral artery     COPD (chronic obstructive pulmonary disease)     Nonischemic cardiomyopathy     NSTEMI (non-ST elevated myocardial infarction)     Stroke     Takotsubo cardiomyopathy     Tobacco abuse      Past Surgical History:   Procedure Laterality Date    CARDIAC CATHETERIZATION N/A 2018    Procedure: Left Heart Cath and cors;  Surgeon: Gabino Dozier MD;  Location: [facility] INVASIVE LOCATION;  Service: Cardiology    CARDIAC CATHETERIZATION N/A 2018    Procedure: Left ventriculography;   Surgeon: Gabino Dozier MD;  Location:  [facility];  Service: Cardiology    CARDIAC ELECTROPHYSIOLOGY PROCEDURE N/A 09/27/2018    Procedure: Loop insertion  LINQ;  Surgeon: Jose R Barker MD;  Location:  [facility];  Service: Cardiovascular      General Information       Row Name 02/24/25 1643          Physical Therapy Time and Intention    Document Type therapy note (daily note)  -EJ     Mode of Treatment physical therapy  -EJ       Row Name 02/24/25 1643          General Information    Existing Precautions/Restrictions fall;oxygen therapy device and L/min  -EJ               User Key  (r) = Recorded By, (t) = Taken By, (c) = Cosigned By      Initials Name Provider Type    EJ Miriam Gill, PT Physical Therapist                   Mobility       Row Name 02/24/25 1643          Bed Mobility    Supine-Sit [place] (Bed Mobility) verbal cues;minimum assist (75% patient effort)  -EJ     Sit-Supine [place] (Bed Mobility) not tested  -EJ     Assistive Device (Bed Mobility) head of bed elevated;bed rails  -EJ     Comment, (Bed Mobility) cues for sequencing and positioning at EOB  -EJ       Row Name 02/24/25 1643          Bed-Chair Transfer    Bed-Chair [place] (Transfers) verbal cues;moderate assist (50% patient effort);2 person assist  -EJ     Comment, (Bed-Chair Transfer) HHA x 2, L knee buckling w transfer from bed to chair, therapist blocking knees  -EJ       Row Name 02/24/25 1643          Sit-Stand Transfer    Sit-Stand [place] (Transfers) verbal cues;moderate assist (50% patient effort);2 person assist  -EJ     Comment, (Sit-Stand Transfer) HHA x 2, therapist blocking knees (L>R)  -EJ       Row Name 02/24/25 1643          Gait/Stairs (Locomotion)    [place] Level (Gait) unable to assess  -EJ               User Key  (r) = Recorded By, (t) = Taken By, (c) = Cosigned By      Initials Name Provider Type    EJ Miriam Gill, PT Physical Therapist                    Obj/Interventions    No documentation.                  Goals/Plan    No documentation.                  Clinical Impression       Row Name 02/24/25 1645          Pain    Pretreatment Pain Rating 0/10 - no pain  -EJ     Posttreatment Pain Rating 0/10 - no pain  -EJ       Row Name 02/24/25 1645          Plan of Care Review    Plan of Care Reviewed With patient  -EJ     Progress improving  -EJ     Outcome Evaluation Pt seen for PT this afternoon. SHe is in bed upon entry to room w no complaints at this time. Pt able to transition to EOB w min A and cues for sequencing and positioning at EOB. Pt agreeable to stand w HHA/min A x 2. She was also able to pivot from bed > chair w mod/HHA x 2. L knee buckling with attempted weight bearing; however, therapist blocking knee to assist w transfer.  Pt somewhat anxious during session, but overall tolerated activity well.  Pt left in chair at end of session. RN notified. Plans for rehab at [place]. Will continue to progress as tolerated.  -EJ       Row Name 02/24/25 1645          Vital Signs    O2 Delivery Pre Treatment supplemental O2  -EJ     O2 Delivery Intra Treatment supplemental O2  -EJ     O2 Delivery Post Treatment supplemental O2  -EJ     Pre Patient Position Supine  -EJ     Intra Patient Position Standing  -EJ     Post Patient Position Sitting  -EJ       Row Name 02/24/25 1645          Positioning and Restraints    Pre-Treatment Position in bed  -EJ     Post Treatment Position chair  -EJ     In Chair notified nsg;sitting;call light within reach;encouraged to call for assist;exit alarm on  -EJ               User Key  (r) = Recorded By, (t) = Taken By, (c) = Cosigned By      Initials Name Provider Type    Miriam Britt, PT Physical Therapist                   Outcome Measures       Row Name 02/24/25 1648 02/24/25 0822       How much help from another person do you currently need...    Turning from your back to your side while in flat bed without using  bedrails? 3  -EJ 4  -ES    Moving from lying on back to sitting on the side of a flat bed without bedrails? 3  -EJ 4  -ES    Moving to and from a bed to a chair (including a wheelchair)? 2  -EJ 3  -ES    Standing up from a chair using your arms (e.g., wheelchair, bedside chair)? 2  -EJ 3  -ES    Climbing 3-5 steps with a railing? 1  -EJ 1  -ES    To walk in hospital room? 2  -EJ 2  -ES    AM-PAC 6 Clicks Score (PT) 13  -EJ 17  -ES    Highest Level of Mobility Goal 4 --> Transfer to chair/commode  -EJ 5 --> Static standing  -ES      Row Name 02/24/25 1025          Functional Assessment    Outcome Measure Options AM-PAC 6 Clicks Daily Activity (OT)  -LE               User Key  (r) = Recorded By, (t) = Taken By, (c) = Cosigned By      Initials Name Provider Type    Beulah Duque OTR Occupational Therapist    Miriam Britt, PT Physical Therapist    Radha Manriquez, RN Registered Nurse                                 Physical Therapy Education       Title: PT OT SLP Therapies (Done)       Topic: Physical Therapy (Done)       Point: Mobility training (Done)       Learning Progress Summary            Patient Acceptance, E, VU by ER at 2/23/2025 1514                      Point: Home exercise program (Done)       Learning Progress Summary            Patient Acceptance, E, VU by ER at 2/23/2025 1514                      Point: Body mechanics (Done)       Learning Progress Summary            Patient Acceptance, E, VU by ER at 2/23/2025 1514                      Point: Precautions (Done)       Learning Progress Summary            Patient Acceptance, E, VU by ER at 2/23/2025 1514                                      User Key       Initials Effective Dates Name Provider Type Discipline    ER 10/15/23 -  Ivy Anderson, PT Physical Therapist PT                  PT Recommendation and Plan  Planned Therapy Interventions (PT): balance training, bed mobility training, gait training, home exercise program, transfer  training, stretching, strengthening, stair training, ROM (range of motion), patient/family education  Progress: improving  Outcome Evaluation: Pt seen for PT this afternoon. SHe is in bed upon entry to room w no complaints at this time. Pt able to transition to EOB w min A and cues for sequencing and positioning at EOB. Pt agreeable to stand w HHA/min A x 2. She was also able to pivot from bed > chair w mod/HHA x 2. L knee buckling with attempted weight bearing; however, therapist blocking knee to assist w transfer.  Pt somewhat anxious during session, but overall tolerated activity well.  Pt left in chair at end of session. RN notified. Plans for rehab at [place]. Will continue to progress as tolerated.     Time Calculation:         PT Charges       Row Name 02/24/25 1649             Time Calculation    Start Time 1508  -EJ      Stop Time 1526  -EJ      Time Calculation (min) 18 min  -EJ      PT Received On 02/24/25  -EJ      PT - Next Appointment 02/25/25  -EJ                User Key  (r) = Recorded By, (t) = Taken By, (c) = Cosigned By      Initials Name Provider Type    Miriam Britt, PT Physical Therapist                  Therapy Charges for Today       Code Description Service Date Service Provider Modifiers Qty    28398950146 HC PT THERAPEUTIC ACT EA 15 MIN 2/24/2025 Miriam Gill, PT GP 1    81452972977 HC PT THER SUPP EA 15 MIN 2/24/2025 Miriam Gill, PT GP 1            PT G-Codes  Outcome Measure Options: AM-PAC 6 Clicks Daily Activity (OT)  AM-PAC 6 Clicks Score (PT): 13  AM-PAC 6 Clicks Score (OT): 11  Modified Ally Scale: 4 - Moderately severe disability.  Unable to walk without assistance, and unable to attend to own bodily needs without assistance.  PT Discharge Summary  Anticipated Discharge Disposition (PT): inpatient rehabilitation facility    Miriam Gill PT  2/24/2025

## 2025-02-24 NOTE — CASE MANAGEMENT/SOCIAL WORK
Continued Stay Note  [facility]     Patient Name: Scarlet Chakraborty  MRN: [id number]  Today's Date: 2/24/2025    Admit Date: 2/10/2025    Plan: IRF referrals pending [facility]   Discharge Plan       Row Name 02/24/25 1005       Plan    Plan IRF referrals pending [facility]    Patient/Family in Agreement with Plan yes    Plan Comments Clinicals reviewed. CCP rec'd VM from pt's sister, Yvonne Cobb, asking for an update in regards to rehab referrals. CCP met with pt at bedside who gave CCP permission to update her sister. CCP updated pt [facility] & [facility] referrals both remain considering. CCP updated pt awaiting [facility] update to see if pt has been accepted.  Pt verbalized understanding. Pt reports 1st preference remains for [facility]. CCP called pt's sister, Yvonne, updated regarding above. Yvonne verbalized understanding. DC plan IRF referrals pending [facility]. Partial packet in chart. BISI Oliver/JAMI Bennett RN

## 2025-02-25 VITALS
HEIGHT: 60 IN | RESPIRATION RATE: 18 BRPM | DIASTOLIC BLOOD PRESSURE: 55 MMHG | BODY MASS INDEX: 12.81 KG/M2 | WEIGHT: 65.26 LBS | OXYGEN SATURATION: 93 % | SYSTOLIC BLOOD PRESSURE: 96 MMHG | TEMPERATURE: 97.9 F | HEART RATE: 103 BPM

## 2025-02-25 LAB
DEPRECATED RDW RBC AUTO: 41.2 FL (ref 37–54)
ERYTHROCYTE [DISTWIDTH] IN BLOOD BY AUTOMATED COUNT: 12.3 % (ref 12.3–15.4)
GLUCOSE BLDC GLUCOMTR-MCNC: 201 MG/DL (ref 70–130)
GLUCOSE BLDC GLUCOMTR-MCNC: 83 MG/DL (ref 70–130)
HCT VFR BLD AUTO: 40.7 % (ref 34–46.6)
HGB BLD-MCNC: 13.6 G/DL (ref 12–15.9)
MCH RBC QN AUTO: 31.1 PG (ref 26.6–33)
MCHC RBC AUTO-ENTMCNC: 33.4 G/DL (ref 31.5–35.7)
MCV RBC AUTO: 93.1 FL (ref 79–97)
PLATELET # BLD AUTO: 346 10*3/MM3 (ref 140–450)
PMV BLD AUTO: 10.7 FL (ref 6–12)
RBC # BLD AUTO: 4.37 10*6/MM3 (ref 3.77–5.28)
WBC NRBC COR # BLD AUTO: 13.39 10*3/MM3 (ref 3.4–10.8)

## 2025-02-25 PROCEDURE — 63710000001 PREDNISONE PER 1 MG: Performed by: INTERNAL MEDICINE

## 2025-02-25 PROCEDURE — 94761 N-INVAS EAR/PLS OXIMETRY MLT: CPT

## 2025-02-25 PROCEDURE — 92526 ORAL FUNCTION THERAPY: CPT

## 2025-02-25 PROCEDURE — 82948 REAGENT STRIP/BLOOD GLUCOSE: CPT

## 2025-02-25 PROCEDURE — 94664 DEMO&/EVAL PT USE INHALER: CPT

## 2025-02-25 PROCEDURE — 97530 THERAPEUTIC ACTIVITIES: CPT

## 2025-02-25 PROCEDURE — 63710000001 INSULIN REGULAR HUMAN PER 5 UNITS: Performed by: INTERNAL MEDICINE

## 2025-02-25 PROCEDURE — 85027 COMPLETE CBC AUTOMATED: CPT | Performed by: INTERNAL MEDICINE

## 2025-02-25 PROCEDURE — 94799 UNLISTED PULMONARY SVC/PX: CPT

## 2025-02-25 RX ORDER — NYSTATIN 100000 [USP'U]/ML
500000 SUSPENSION ORAL 4 TIMES DAILY
Qty: 200 ML | Refills: 0 | Status: SHIPPED | OUTPATIENT
Start: 2025-02-25 | End: 2025-03-07

## 2025-02-25 RX ORDER — PREDNISONE 10 MG/1
10 TABLET ORAL
Qty: 2 TABLET | Refills: 0 | Status: SHIPPED | OUTPATIENT
Start: 2025-02-26 | End: 2025-02-28

## 2025-02-25 RX ADMIN — BUDESONIDE 0.5 MG: 0.5 INHALANT RESPIRATORY (INHALATION) at 07:25

## 2025-02-25 RX ADMIN — Medication 10 ML: at 08:47

## 2025-02-25 RX ADMIN — PREDNISONE 10 MG: 20 TABLET ORAL at 08:38

## 2025-02-25 RX ADMIN — APIXABAN 5 MG: 5 TABLET, FILM COATED ORAL at 08:38

## 2025-02-25 RX ADMIN — LANSOPRAZOLE 30 MG: 15 TABLET, ORALLY DISINTEGRATING ORAL at 08:38

## 2025-02-25 RX ADMIN — IPRATROPIUM BROMIDE AND ALBUTEROL SULFATE 3 ML: .5; 3 SOLUTION RESPIRATORY (INHALATION) at 07:25

## 2025-02-25 RX ADMIN — INSULIN HUMAN 2 UNITS: 100 INJECTION, SOLUTION PARENTERAL at 11:36

## 2025-02-25 RX ADMIN — ARFORMOTEROL TARTRATE 15 MCG: 15 SOLUTION RESPIRATORY (INHALATION) at 07:25

## 2025-02-25 NOTE — CASE MANAGEMENT/SOCIAL WORK
Case Management Discharge Note      Final Note: Pt DC to IRF [facility] via [facility] w/c van, no needs identified. Benito, RN/CCP    Provided Post Acute Provider List?: N/A  N/A Provider List Comment: Watch for discharge needs, still intubated but responsive  Provided Post Acute Provider Quality & Resource List?: N/A    Selected Continued Care - Admitted Since 2/10/2025       Destination Coordination complete.      Service Provider Services Address Phone Fax Patient Preferred    [facility] [address] [address] [id number] [phone] --             Transportation Services  W/[facility]    Final Discharge Disposition Code: 62 - inpatient rehab facility

## 2025-02-25 NOTE — PLAN OF CARE
Goal Outcome Evaluation:  Plan of Care Reviewed With: patient        Progress: improving  Outcome Evaluation: Pt seen for OT this AM with good participation. Pt in agreement to sit EOB, Min/Mod A x1. Pt stood with HHAx1, L knee blocked. Attempted to stand pivot to chair from bed, pt unable to stand for longer than ~10 sec. Performed 2 STS from EOB ~10 sec standing. c/o SOB once seated. Pt able to brush teeth sitting EOB, sitting balance SBA. Performed AROM/AAROM in LUE shoulder/elbow. Educated pt on completing these exs throughout day. O2 sat 92% end of sesion. Cont OT services to increase strength and activity tolerance for independence in ADLs.    Anticipated Discharge Disposition (OT): inpatient rehabilitation facility

## 2025-02-25 NOTE — THERAPY TREATMENT NOTE
Patient Name: Scarlet Chakraborty  : 1960    MRN: [id number]                              Today's Date: 2025       Admit Date: 2/10/2025    Visit Dx:     ICD-10-CM ICD-9-CM   1. Acute respiratory failure with hypoxia and hypercapnia  J96.01 518.81    J96.02    2. COPD exacerbation  J44.1 491.21   3. Coronavirus infection  B34.2 079.89   4. Anticoagulated by anticoagulation treatment  Z79.01 V58.61   5. Elevated d-dimer  R79.89 790.92     Patient Active Problem List   Diagnosis    Acute respiratory failure with hypoxia    Cerebrovascular accident (CVA) due to thrombosis    Stress-induced cardiomyopathy    Chronic obstructive pulmonary disease with acute exacerbation    Tobacco abuse    Mixed hyperlipidemia    Hx of non-ST elevation myocardial infarction (NSTEMI)    History of stroke    Tobacco dependence    COPD exacerbation    Breast mass    Cerebrovascular accident (CVA) due to occlusion of left middle cerebral artery    Congestive heart failure    Chronic obstructive lung disease    Non-ischemic cardiomyopathy    Acute respiratory failure    Status post placement of implantable loop recorder    SOB (shortness of breath)    COPD with acute exacerbation    Acute on chronic respiratory failure with hypercapnia    Severe malnutrition    Acute hypercapnic respiratory failure     Past Medical History:   Diagnosis Date    Asthma     Cerebrovascular accident (CVA) due to thrombosis of right middle cerebral artery     COPD (chronic obstructive pulmonary disease)     Nonischemic cardiomyopathy     NSTEMI (non-ST elevated myocardial infarction)     Stroke     Takotsubo cardiomyopathy     Tobacco abuse      Past Surgical History:   Procedure Laterality Date    CARDIAC CATHETERIZATION N/A 2018    Procedure: Left Heart Cath and cors;  Surgeon: Gabino Dozier MD;  Location: [facility] INVASIVE LOCATION;  Service: Cardiology    CARDIAC CATHETERIZATION N/A 2018    Procedure: Left ventriculography;   Surgeon: Gabino Dozier MD;  Location:  [facility];  Service: Cardiology    CARDIAC ELECTROPHYSIOLOGY PROCEDURE N/A 09/27/2018    Procedure: Loop insertion  LINQ;  Surgeon: Jose R Barker MD;  Location:  [facility];  Service: Cardiovascular      General Information       Row Name 02/25/25 0957          OT Time and Intention    Subjective Information no complaints  -SM (r) KL (t) SM (c)     Document Type therapy note (daily note)  -SM (r) KL (t) SM (c)     Mode of Treatment occupational therapy;individual therapy  -SM (r) KL (t) SM (c)     Patient Effort good  -SM (r) KL (t) SM (c)       Row Name 02/25/25 0957          General Information    Patient Profile Reviewed yes  -SM (r) KL (t) SM (c)     Existing Precautions/Restrictions fall;oxygen therapy device and L/min  -SM (r) KL (t) SM (c)       Row Name 02/25/25 0957          Cognition    Orientation Status (Cognition) oriented x 4  -SM (r) KL (t) SM (c)       Row Name 02/25/25 0957          Safety Issues/Impairments Affecting Functional Mobility    Impairments Affecting Function (Mobility) endurance/activity tolerance;balance;strength;shortness of breath  -SM (r) KL (t) SM (c)               User Key  (r) = Recorded By, (t) = Taken By, (c) = Cosigned By      Initials Name Provider Type    Anny Hodge, OT Occupational Therapist    Salima Coles OT Student OT Student                     Mobility/ADL's       Row Name 02/25/25 0958          Bed Mobility    Bed Mobility supine-sit;sit-supine  -SM (r) KL (t) SM (c)     Supine-Sit Hamblen (Bed Mobility) verbal cues;minimum assist (75% patient effort)  -SM (r) KL (t) SM (c)     Sit-Supine Hamblen (Bed Mobility) minimum assist (75% patient effort);moderate assist (50% patient effort)  -SM (r) KL (t) SM (c)     Bed Mobility, Safety Issues decreased use of legs for bridging/pushing;decreased use of arms for pushing/pulling  -SM (r) KL (t) SM (c)     Assistive  Device (Bed Mobility) head of bed elevated;bed rails  -SM (r) KL (t) SM (c)       Row Name 02/25/25 0958          Transfers    Transfers sit-stand transfer;stand-sit transfer  -SM (r) KL (t) SM (c)       Row Name 02/25/25 0958          Sit-Stand Transfer    Sit-Stand Matthews (Transfers) verbal cues;moderate assist (50% patient effort);1 person assist  -SM (r) KL (t) SM (c)     Comment, (Sit-Stand Transfer) HHAx1, blocking L knee  -SM (r) KL (t) SM (c)       Row Name 02/25/25 0958          Stand-Sit Transfer    Stand-Sit Matthews (Transfers) moderate assist (50% patient effort);verbal cues;1 person assist  -SM (r) KL (t) SM (c)     Comment, (Stand-Sit Transfer) HHAx1  -SM (r) KL (t) SM (c)       Row Name 02/25/25 0958          Functional Mobility    Functional Mobility- Ind. Level unable to perform;not tested  -SM (r) KL (t) SM (c)     Patient was able to Ambulate no, other medical factors prevent ambulation  -SM (r) KL (t) SM (c)     Reason Patient was unable to Ambulate Excessive Weakness  -SM (r) KL (t) SM (c)       Row Name 02/25/25 0958          Activities of Daily Living    BADL Assessment/Intervention grooming;lower body dressing  -SM (r) KL (t) SM (c)       [facility] Name 02/25/25 0958          Lower Body Dressing Assessment/Training    Matthews Level (Lower Body Dressing) dependent (less than 25% patient effort);socks;doff;don  -SM (r) KL (t) SM (c)     Position (Lower Body Dressing) edge of bed sitting  -SM (r) KL (t) SM (c)       Row Name 02/25/25 0958          Grooming Assessment/Training    Matthews Level (Grooming) oral care regimen;set up;minimum assist (75% patient effort)  -SM (r) KL (t) SM (c)     Position (Grooming) edge of bed sitting;unsupported sitting  -SM (r) KL (t) SM (c)               User Key  (r) = Recorded By, (t) = Taken By, (c) = Cosigned By      Initials Name Provider Type    Anny Hodge, OT Occupational Therapist    Salima Coles, OT Student OT Student                    Obj/Interventions       Row Name 02/25/25 1001          Shoulder (Therapeutic Exercise)    Shoulder (Therapeutic Exercise) AROM (active range of motion);AAROM (active assistive range of motion)  -SM (r) KL (t) SM (c)     Shoulder AROM (Therapeutic Exercise) left;flexion;extension;aBduction;aDduction;10 repetitions  -SM (r) KL (t) SM (c)     Shoulder AAROM (Therapeutic Exercise) right assist left;left;flexion;extension;aBduction;aDduction;10 repetitions  -SM (r) KL (t) SM (c)       Row Name 02/25/25 1001          Elbow/Forearm (Therapeutic Exercise)    Elbow/Forearm (Therapeutic Exercise) AROM (active range of motion)  -SM (r) KL (t) SM (c)     Elbow/Forearm AROM (Therapeutic Exercise) left;flexion;extension;10 repetitions  -SM (r) KL (t) SM (c)       Row Name 02/25/25 1001          Motor Skills    Therapeutic Exercise shoulder;elbow/forearm  -SM (r) KL (t) SM (c)       Row Name 02/25/25 1001          Balance    Balance Assessment sitting static balance;standing static balance  -SM (r) KL (t) SM (c)     Static Sitting Balance standby assist  -SM (r) KL (t) SM (c)     Position, Sitting Balance unsupported;sitting edge of bed  -SM (r) KL (t) SM (c)     Static Standing Balance moderate assist  -SM (r) KL (t) SM (c)     Position/Device Used, Standing Balance other (see comments)  HHA  -SM (r) KL (t) SM (c)               User Key  (r) = Recorded By, (t) = Taken By, (c) = Cosigned By      Initials Name Provider Type    Anny Hodge, OT Occupational Therapist    Salima Coles OT Student OT Student                   Goals/Plan    No documentation.                  Clinical Impression       Row Name 02/25/25 1004          Pain Assessment    Pretreatment Pain Rating 0/10 - no pain  -SM (r) KL (t) SM (c)     Posttreatment Pain Rating 0/10 - no pain  -SM (r) KL (t) SM (c)       Row Name 02/25/25 1004          Plan of Care Review    Plan of Care Reviewed With patient  -SM (r) KL (t) SM (c)      Progress improving  -SM (r) KL (t) SM (c)     Outcome Evaluation Pt seen for OT this AM with good participation. Pt in agreement to sit EOB, Min/Mod A x1. Pt stood with HHAx1, L knee blocked. Attempted to stand pivot to chair from bed, pt unable to stand for longer than ~10 sec. Performed 2 STS from EOB ~10 sec standing. c/o SOB once seated. Pt able to brush teeth sitting EOB, sitting balance SBA. Performed AROM/AAROM in LUE shoulder/elbow. Educated pt on completing these exs throughout day. O2 sat 92% end of sesion. Cont OT services to increase strength and activity tolerance for independence in ADLs.  -SM (r) KL (t) SM (c)       Row Name 02/25/25 1004          Therapy Plan Review/Discharge Plan (OT)    Anticipated Discharge Disposition (OT) inpatient rehabilitation facility  -SM (r) KL (t) SM (c)       Row Name 02/25/25 1004          Vital Signs    O2 Delivery Pre Treatment supplemental O2  -SM (r) KL (t) SM (c)     O2 Delivery Intra Treatment supplemental O2  -SM (r) KL (t) SM (c)     Post SpO2 (%) 92  -SM (r) KL (t) SM (c)     O2 Delivery Post Treatment supplemental O2  -SM (r) KL (t) SM (c)       Row Name 02/25/25 1004          Positioning and Restraints    Pre-Treatment Position in bed  -SM (r) KL (t) SM (c)     Post Treatment Position bed  -SM (r) KL (t) SM (c)     In Bed notified nsg;supine;call light within reach;encouraged to call for assist  -SM (r) KL (t) SM (c)               User Key  (r) = Recorded By, (t) = Taken By, (c) = Cosigned By      Initials Name Provider Type    Anny Hodge, OT Occupational Therapist    Salima Coles OT Student OT Student                   Outcome Measures       Row Name 02/25/25 1005          How much help from another is currently needed...    Putting on and taking off regular lower body clothing? 1  -SM (r) KL (t) SM (c)     Bathing (including washing, rinsing, and drying) 2  -SM (r) KL (t) SM (c)     Toileting (which includes using toilet bed pan or  urinal) 1  -SM (r) KL (t) SM (c)     Putting on and taking off regular upper body clothing 2  -SM (r) KL (t) SM (c)     Taking care of personal grooming (such as brushing teeth) 3  -SM (r) KL (t) SM (c)     Eating meals 3  -SM (r) KL (t) SM (c)     AM-PAC 6 Clicks Score (OT) 12  -SM (r) KL (t)       Row Name 02/25/25 0837          How much help from another person do you currently need...    Turning from your back to your side while in flat bed without using bedrails? 3  -ES     Moving from lying on back to sitting on the side of a flat bed without bedrails? 3  -ES     Moving to and from a bed to a chair (including a wheelchair)? 2  -ES     Standing up from a chair using your arms (e.g., wheelchair, bedside chair)? 2  -ES     Climbing 3-5 steps with a railing? 1  -ES     To walk in hospital room? 2  -ES     AM-PAC 6 Clicks Score (PT) 13  -ES     Highest Level of Mobility Goal 4 --> Transfer to chair/commode  -ES       Row Name 02/25/25 1005          Functional Assessment    Outcome Measure Options AM-PAC 6 Clicks Daily Activity (OT)  -SM (r) KL (t) SM (c)               User Key  (r) = Recorded By, (t) = Taken By, (c) = Cosigned By      Initials Name Provider Type    Anny Hodge OT Occupational Therapist    Radha Manriquez, RN Registered Nurse    Salima Coles, RONNY Student OT Student                    Occupational Therapy Education       Title: PT OT SLP Therapies (Done)       Topic: Occupational Therapy (Done)       Point: ADL training (Done)       Description:   Instruct learner(s) on proper safety adaptation and remediation techniques during self care or transfers.   Instruct in proper use of assistive devices.                  Learning Progress Summary            Patient Acceptance, E, VU,DU by MAEGAN at 2/25/2025 1006    Comment: role of OT, dc rec, OT POC, LUE exs to complete throughout the day    Acceptance, E, VU by ER at 2/23/2025 1514    Acceptance, E, VU,NR by CE at 2/21/2025 1239                       Point: Home exercise program (Done)       Description:   Instruct learner(s) on appropriate technique for monitoring, assisting and/or progressing therapeutic exercises/activities.                  Learning Progress Summary            Patient Acceptance, E, VU by ER at 2/23/2025 1514    Acceptance, E, VU,NR by CE at 2/21/2025 1239                      Point: Precautions (Done)       Description:   Instruct learner(s) on prescribed precautions during self-care and functional transfers.                  Learning Progress Summary            Patient Acceptance, E, VU by ER at 2/23/2025 1514    Acceptance, E, VU,NR by CE at 2/21/2025 1239                      Point: Body mechanics (Done)       Description:   Instruct learner(s) on proper positioning and spine alignment during self-care, functional mobility activities and/or exercises.                  Learning Progress Summary            Patient Acceptance, E, VU by ER at 2/23/2025 1514    Acceptance, E, VU,NR by CE at 2/21/2025 1239                                      User Key       Initials Effective Dates Name Provider Type Discipline    CE 10/17/22 -  Amberly Alcaraz, OT Occupational Therapist OT    ER 10/15/23 -  Ivy Anderson, PT Physical Therapist PT     01/10/25 -  Salima Hodges OT Student OT Student OT                  OT Recommendation and Plan     Plan of Care Review  Plan of Care Reviewed With: patient  Progress: improving  Outcome Evaluation: Pt seen for OT this AM with good participation. Pt in agreement to sit EOB, Min/Mod A x1. Pt stood with HHAx1, L knee blocked. Attempted to stand pivot to chair from bed, pt unable to stand for longer than ~10 sec. Performed 2 STS from EOB ~10 sec standing. c/o SOB once seated. Pt able to brush teeth sitting EOB, sitting balance SBA. Performed AROM/AAROM in LUE shoulder/elbow. Educated pt on completing these exs throughout day. O2 sat 92% end of sesion. Cont OT services to increase strength and  activity tolerance for independence in ADLs.     Time Calculation:         Time Calculation- OT       Row Name 02/25/25 1006             Time Calculation- OT    OT Start Time 0821  -SM (r) KL (t) SM (c)      OT Stop Time 0843  -SM (r) KL (t) SM (c)      OT Time Calculation (min) 22 min  -SM (r) KL (t)      OT Received On 02/25/25  -SM (r) KL (t) SM (c)      OT - Next Appointment 02/26/25  -SM (r) KL (t) SM (c)         Timed Charges    65501 - OT Therapeutic Activity Minutes 22  -SM (r) KL (t) SM (c)         Total Minutes    Timed Charges Total Minutes 22  -SM (r) KL (t)       Total Minutes 22  -SM (r) KL (t)                User Key  (r) = Recorded By, (t) = Taken By, (c) = Cosigned By      Initials Name Provider Type    Anny Hodge, OT Occupational Therapist    Salima Coles, OT Student OT Student                           Salima Hodges, OT Student  2/25/2025

## 2025-02-25 NOTE — CASE MANAGEMENT/SOCIAL WORK
Continued Stay Note  [facility]     Patient Name: Scarlet Chakraborty  MRN: [id number]  Today's Date: 2/25/2025    Admit Date: 2/10/2025    Plan: IRF [facility] via [employer] w[facility]c van scheduled for 1400   Discharge Plan       Row Name 02/25/25 1144       Plan    Plan IRF [facility] via [employer] [facility] van scheduled for 1400    Patient/Family in Agreement with Plan yes    Plan Comments CCP GUY Kaye[facility] who reports she has arranged [employer] [facility] van transport for pt today at 1400. Mikki ask for report to please be called to [phone] & DC summary faxed to [phone]. CCP updated pt at bedside transport arranged for 1400. Pt verbalized understanding. DC plan IRF [facility] via [employer] w[facility] van scheduled for 1400. Packet given to Betty/JESSA Oliver RN/JAMI      Row Name 02/25/25 1015       Plan    Plan IRF [facility], Giselle/[facility] to arrange transport    Patient/Family in Agreement with Plan yes    Plan Comments [facility] notified CCP that pre-cert approved. CCP notified Dr. Mckeon & Betty/BISI. [facility] will arrange transport & let CCP know. CCP updated pt & pt's sister, Yvonne Cobb, at bedside. Pt verbalized understanding. DC plan IR[facility], [facility] to arrange transport. Packet given to Betty/JESSA Oliver RN/CCP             Expected Discharge Date and Time       Expected Discharge Date Expected Discharge Time    Feb 25, 2025      Paola Bennett RN

## 2025-02-25 NOTE — PAYOR COMM NOTE
"Scarlet Chavez (65 y.o. Female)        PLEASE SEE ATTACHED DC SUMMARY    REF#[id number]    THANK YOU    GEMINI HARRIS LPN CCP   Date of Birth   1960    Social Security Number       Address   [address] [place] [address]    Home Phone       MRN   [id number]       Bahai   Protestant    Marital Status   Single                            Admission Date   2/10/25    Admission Type   Emergency    Admitting Provider   Zainab Regalado MD    Attending Provider       Department, Room/Bed   [facility]       Discharge Date   2025    Discharge Disposition   Rehab Facility or Unit (DC - External)    Discharge Destination                                 Attending Provider: (none)   Allergies: No Known Allergies    Isolation: None   Infection: None   Code Status: No CPR    Ht: 152.4 cm (60\")   Wt: 29.6 kg (65 lb 4.1 oz)    Admission Cmt: None   Principal Problem: Acute hypercapnic respiratory failure [J96.02]                   Active Insurance as of 2/10/2025       Primary Coverage       Payor Plan Insurance Group Employer/Plan Group    [employer] H[employer] [id number]       Payor Plan Address Payor Plan Phone Number Payor Plan Fax Number Effective Dates    [address] [phone]  2024 - None Entered    [facility] [id number]         Subscriber Name Subscriber Birth Date Member ID       SCARLET CHAVEZ 1960 [id number]                     Emergency Contacts        (Rel.) Home Phone Work Phone Mobile Phone    Yvonne Cobb (Sister) [phone] -- --    Don Taylor (Significant Other) -- -- [phone]                 Discharge Summary        Gabo Mckeon MD at 25 1026            DISCHARGE SUMMARY    Patient Name: Scarlet Chavez  Age/Sex: 65 y.o. 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 Mukherjee.  Patient also has underlying history of nonischemic cardiomyopathy previous CVA non-ST elevation MI with Takotsubo cardiomyopathy.  Patient presented to the emergency room with acute shortness of breath ongoing for the last several days.  No fever chills or aspiration was reported.  In the emergency room patient was noted to be in acute respiratory distress and placed on noninvasive ventilation.  At the time of my evaluation she is synchronizing with the noninvasive ventilation AVAPS.  Currently on 100% FiO2 maintaining sats 96 to 97%.  History is somewhat limited with patient's current clinical condition.     Hospital Course:   Scarlet Chakraborty presented to [facility] with complaints of respiratory distress, and had a COPD exacerbation.  Patient had significant weakness and slow recovery but eventually she was able to wean down to low-flow nasal cannula oxygen, she did require noninvasive positive pressure ventilation on initial presentation  She is being weaned on her steroid and should be done in a couple of days  She was having some sore throat on the day of discharge with  evidence of thrush and she will be discharged on nystatin for a few more days  Patient has deconditioning with generalized weakness but no focal deficit  She is back on oral anticoagulation, she has been afebrile for several days and she is no longer on any antibiotics.  She had ischemic cardiomyopathy with history of Takotsubo, but she was doing better clinically and she is to follow-up with cardiology    Consults:   IP CONSULT TO PULMONOLOGY  IP CONSULT TO NUTRITION SERVICES  IP CONSULT TO NUTRITION SERVICES  IP CONSULT TO PALLIATIVE CARE TEAM  IP CONSULT TO NEUROLOGY    Significant Discharge Diagnostics   Procedures Performed:         Pertinent Lab Results:  Results from last 7 days   Lab Units 02/22/25  0437 02/21/25  0733 02/20/25 0417 02/19/25  0407   SODIUM mmol/L 137 140 138 142   POTASSIUM mmol/L 4.0 4.7 4.7 4.6   CHLORIDE mmol/L 100 102 105 106   CO2 mmol/L 29.6* 27.0 28.4 30.4*   BUN mg/dL 25* 18 18 19   CREATININE mg/dL 0.35* 0.28* 0.30* 0.35*   GLUCOSE mg/dL 87 144* 115* 147*   CALCIUM mg/dL 9.3 9.4 8.7 8.7         Results from last 7 days   Lab Units 02/25/25  0559 02/24/25  0505 02/23/25  0503 02/22/25  0437 02/21/25  0733 02/20/25 0417 02/19/25  0407   WBC 10*3/mm3 13.39* 13.98* 12.19* 15.55* 16.32* 14.98* 16.57*   HEMOGLOBIN g/dL 13.6 15.4 14.4 14.4 15.2 13.9 13.3   HEMATOCRIT % 40.7 45.8 43.0 45.2 45.7 42.1 40.5   PLATELETS 10*3/mm3 346 319 395 377 347 308 287   MCV fL 93.1 92.0 91.7 94.0 93.6 93.8 92.0   MCH pg 31.1 30.9 30.7 29.9 31.1 31.0 30.2   MCHC g/dL 33.4 33.6 33.5 31.9 33.3 33.0 32.8   RDW % 12.3 12.7 12.5 12.4 12.4 12.5 12.2*   RDW-SD fl 41.2 42.6 41.3 42.5 42.0 42.6 41.5   MPV fL 10.7 11.9 10.8 11.2 11.5 11.2 11.1                         Results from last 7 days   Lab Units 02/19/25  0928 02/19/25  0414   PH, ARTERIAL pH units 7.429 7.477*   PCO2, ARTERIAL mm Hg 43.7 44.0   PO2 ART mm Hg 89.5 95.3   HCO3 ART mmol/L 29.0* 32.5*                                   Imaging Results:  Imaging  Results (All)       Procedure Component Value Units Date/Time    SLP FEES - Fiberoptic Endo Eval Swallow [[id number]] Resulted: 02/21/25 1004     Updated: 02/21/25 1004    Narrative:      This procedure was auto-finalized with no dictation required.    MRI Brain Without Contrast [[id number]] Collected: 02/21/25 0537     Updated: 02/21/25 0547    Narrative:      BRAIN MRI WITHOUT CONTRAST     HISTORY: left sided weakness; J96.01-Acute respiratory failure with  hypoxia; J96.02-Acute respiratory failure with hypercapnia;  J44.1-Chronic obstructive pulmonary disease with (acute) exacerbation;  B34.2-Coronavirus infection, unspecified; Z79.01-Long term (current) use  of anticoagulants; R79.89-Other specified abnormal findings of blood  chemistry     COMPARISON: February 20, 2025.     FINDINGS:  Multiplanar images of the head were obtained without  gadolinium. There is some subtle increased signal intensity noted within  the right paramedian darvin on diffusion-weighted imaging, with some  corresponding decreased signal intensity noted on the ADC map. I don't  see a corresponding abnormality on the T2 and FLAIR sequences. The  appearance may be artifactual, although a subacute infarct is not  excluded. There is a tiny old right cerebellar infarct. The intracranial  flow voids appear intact. There is periventricular and deep white matter  microangiopathic change. The patient is noted to have encephalomalacia  within the right MCA territory. No additional areas of restricted  diffusion are seen. Further areas of encephalomalacia are noted within  the occipital lobes bilaterally. Mild gyriform susceptibility artifact  is noted within the area of encephalomalacia within the right MCA  territory. This may be related to chronic hemosiderin deposition.  Mucosal thickening is noted within the ethmoid sinuses. There are  bilateral mastoid effusions, left greater than right.       Impression:      1. There is some subtle increased  signal intensity on diffusion-weighted  imaging within the right paramedian darvin. There is some corresponding  decrease signal intensity on the ADC map within this area. However, no  abnormality is seen on the T2 or FLAIR sequences. The appearance may be  artifactual, although infarct is not excluded.        This report was finalized on 2/21/2025 5:44 AM by Dr. Faby Quiros M.D on Workstation: [id number]       CT Angiogram Neck [[id number]] Collected: 02/20/25 1509     Updated: 02/20/25 2057    Narrative:      CT ANGIOGRAM NECK AND HEAD WITH CONTRAST AND CT PERFUSION WITH CONTRAST     HISTORY: Left-sided weakness.     COMPARISON: CT head 09/29/2018. MRI examination of the brain 09/24/2018,  CT angiogram neck and head 09/24/2018.     FINDINGS: Initially, a noncontrasted CT examination of the brain was  performed. A remote infarct involving the right frontal lobe laterally  is appreciated which extends to and involves the insular cortex.  Encephalomalacia involving the right temporal lobe is noted superiorly.  The area of encephalomalacia corresponds to the evolving infarct which  was present on 09/29/2018. An area of encephalomalacia is appreciated  involving the right parietal lobe which corresponds to the area of  infarction present on the CT examination 09/29/2018. It is larger in  size as compared to the MRI examination of 09/24/2018. Areas of  decreased attenuation are appreciated involving the occipital lobes  posteriorly bilaterally which are new versus the CT examination of  09/29/2018. These are age-indeterminate. There is no evidence of  enlargement of the occipital horns. These may represent remote infarcts  although acute to subacute or subacute infarcts cannot be excluded.     CT PERFUSION: There is no evidence of abnormal perfusion.     A CT angiogram of the neck and head was then performed. Multiplanar as  well as three-dimensional reconstructions were generated. Severe  emphysematous disease is  appreciated bilaterally, more prominent on the  right. The great vessels are arranged in a classic configuration. Both  vertebral arteries were opacified. The right vertebral artery is  slightly larger than that of the left. The basilar artery and the  proximal aspects of the posterior cerebral arteries appear unremarkable.  Evaluation of the carotid bifurcations is hampered by patient motion.  There is no evidence of an ostial stenosis using NASCET criteria. The  proximal to mid segments of the internal carotid arteries are the  segments most degraded by patient motion. Distally, the internal carotid  arteries are of normal caliber. The proximal aspects of the anterior and  middle cerebral arteries appear unremarkable. A standard postcontrast CT  examination of the brain showed no evidence of abnormal enhancement.       Impression:      1.  There is no evidence of hemorrhage or of acute infarction. Remote  right MCA distribution infarcts are appreciated which were present on  02/29/2018. New areas of decreased attenuation are appreciated more in  the occipital lobes bilaterally more prominent in the left consistent  with infarcts. These are age indeterminant. There is no evidence of  associated volume loss. The potentially may represent subacute infarcts.  Further evaluation could be performed with MRI examination of the brain.  2.  The study is hampered by patient motion but there was no evidence of  an ostial stenosis involving the internal carotid arteries. There is no  evidence of a proximal intracranial arterial high-grade stenosis or  occlusion.           The noncontrasted CT examination was made available for interpretation  at 1414 hours and a preliminary report called at 1416 hours. The CT  angiogram was made available for interpretation at 1428 hours and a  preliminary report called at 1432 hours.     This report was finalized on 2/20/2025 8:54 PM by Dr. Ellis Amador M.D  on Workstation: [id number]        CT CEREBRAL PERFUSION WITH & WITHOUT CONTRAST [[id number]] Collected: 02/20/25 1509     Updated: 02/20/25 2057    Narrative:      CT ANGIOGRAM NECK AND HEAD WITH CONTRAST AND CT PERFUSION WITH CONTRAST     HISTORY: Left-sided weakness.     COMPARISON: CT head 09/29/2018. MRI examination of the brain 09/24/2018,  CT angiogram neck and head 09/24/2018.     FINDINGS: Initially, a noncontrasted CT examination of the brain was  performed. A remote infarct involving the right frontal lobe laterally  is appreciated which extends to and involves the insular cortex.  Encephalomalacia involving the right temporal lobe is noted superiorly.  The area of encephalomalacia corresponds to the evolving infarct which  was present on 09/29/2018. An area of encephalomalacia is appreciated  involving the right parietal lobe which corresponds to the area of  infarction present on the CT examination 09/29/2018. It is larger in  size as compared to the MRI examination of 09/24/2018. Areas of  decreased attenuation are appreciated involving the occipital lobes  posteriorly bilaterally which are new versus the CT examination of  09/29/2018. These are age-indeterminate. There is no evidence of  enlargement of the occipital horns. These may represent remote infarcts  although acute to subacute or subacute infarcts cannot be excluded.     CT PERFUSION: There is no evidence of abnormal perfusion.     A CT angiogram of the neck and head was then performed. Multiplanar as  well as three-dimensional reconstructions were generated. Severe  emphysematous disease is appreciated bilaterally, more prominent on the  right. The great vessels are arranged in a classic configuration. Both  vertebral arteries were opacified. The right vertebral artery is  slightly larger than that of the left. The basilar artery and the  proximal aspects of the posterior cerebral arteries appear unremarkable.  Evaluation of the carotid bifurcations is hampered by patient  motion.  There is no evidence of an ostial stenosis using NASCET criteria. The  proximal to mid segments of the internal carotid arteries are the  segments most degraded by patient motion. Distally, the internal carotid  arteries are of normal caliber. The proximal aspects of the anterior and  middle cerebral arteries appear unremarkable. A standard postcontrast CT  examination of the brain showed no evidence of abnormal enhancement.       Impression:      1.  There is no evidence of hemorrhage or of acute infarction. Remote  right MCA distribution infarcts are appreciated which were present on  02/29/2018. New areas of decreased attenuation are appreciated more in  the occipital lobes bilaterally more prominent in the left consistent  with infarcts. These are age indeterminant. There is no evidence of  associated volume loss. The potentially may represent subacute infarcts.  Further evaluation could be performed with MRI examination of the brain.  2.  The study is hampered by patient motion but there was no evidence of  an ostial stenosis involving the internal carotid arteries. There is no  evidence of a proximal intracranial arterial high-grade stenosis or  occlusion.           The noncontrasted CT examination was made available for interpretation  at 1414 hours and a preliminary report called at 1416 hours. The CT  angiogram was made available for interpretation at 1428 hours and a  preliminary report called at 1432 hours.     This report was finalized on 2/20/2025 8:54 PM by Dr. Ellis Amador M.D  on Workstation: [id number]       CT Angiogram Head w AI Analysis of LVO [[id number]] Collected: 02/20/25 1509     Updated: 02/20/25 2057    Narrative:      CT ANGIOGRAM NECK AND HEAD WITH CONTRAST AND CT PERFUSION WITH CONTRAST     HISTORY: Left-sided weakness.     COMPARISON: CT head 09/29/2018. MRI examination of the brain 09/24/2018,  CT angiogram neck and head 09/24/2018.     FINDINGS: Initially, a noncontrasted  CT examination of the brain was  performed. A remote infarct involving the right frontal lobe laterally  is appreciated which extends to and involves the insular cortex.  Encephalomalacia involving the right temporal lobe is noted superiorly.  The area of encephalomalacia corresponds to the evolving infarct which  was present on 09/29/2018. An area of encephalomalacia is appreciated  involving the right parietal lobe which corresponds to the area of  infarction present on the CT examination 09/29/2018. It is larger in  size as compared to the MRI examination of 09/24/2018. Areas of  decreased attenuation are appreciated involving the occipital lobes  posteriorly bilaterally which are new versus the CT examination of  09/29/2018. These are age-indeterminate. There is no evidence of  enlargement of the occipital horns. These may represent remote infarcts  although acute to subacute or subacute infarcts cannot be excluded.     CT PERFUSION: There is no evidence of abnormal perfusion.     A CT angiogram of the neck and head was then performed. Multiplanar as  well as three-dimensional reconstructions were generated. Severe  emphysematous disease is appreciated bilaterally, more prominent on the  right. The great vessels are arranged in a classic configuration. Both  vertebral arteries were opacified. The right vertebral artery is  slightly larger than that of the left. The basilar artery and the  proximal aspects of the posterior cerebral arteries appear unremarkable.  Evaluation of the carotid bifurcations is hampered by patient motion.  There is no evidence of an ostial stenosis using NASCET criteria. The  proximal to mid segments of the internal carotid arteries are the  segments most degraded by patient motion. Distally, the internal carotid  arteries are of normal caliber. The proximal aspects of the anterior and  middle cerebral arteries appear unremarkable. A standard postcontrast CT  examination of the brain  showed no evidence of abnormal enhancement.       Impression:      1.  There is no evidence of hemorrhage or of acute infarction. Remote  right MCA distribution infarcts are appreciated which were present on  02/29/2018. New areas of decreased attenuation are appreciated more in  the occipital lobes bilaterally more prominent in the left consistent  with infarcts. These are age indeterminant. There is no evidence of  associated volume loss. The potentially may represent subacute infarcts.  Further evaluation could be performed with MRI examination of the brain.  2.  The study is hampered by patient motion but there was no evidence of  an ostial stenosis involving the internal carotid arteries. There is no  evidence of a proximal intracranial arterial high-grade stenosis or  occlusion.           The noncontrasted CT examination was made available for interpretation  at 1414 hours and a preliminary report called at 1416 hours. The CT  angiogram was made available for interpretation at 1428 hours and a  preliminary report called at 1432 hours.     This report was finalized on 2/20/2025 8:54 PM by Dr. Ellis Amador M.D  on Workstation: [id number]       XR Chest 1 View [[id number]] Collected: 02/19/25 0619     Updated: 02/19/25 0754    Narrative:      CHEST SINGLE VIEW     HISTORY: 65 years of age, Female. On ventilator.     COMPARISON: AP chest 02/18/2025, 02/17/2025.     FINDINGS: Endotracheal tube tip 4.2 cm above the brianna. Heart size  within normal limits. Feeding tube courses into the stomach and off the  radiograph inferiorly. There is advanced pulmonary emphysema. Right  infrahilar opacity and medial left basilar opacities are without change.  Suspect small effusions with blunting of the costophrenic angles. No new  airspace disease or evidence for pulmonary edema or pneumothorax.       Impression:      No interval change.     This report was finalized on 2/19/2025 7:51 AM by Jean Chin M.D  on  Workstation: [id number]       XR Chest 1 View [[id number]] Collected: 02/18/25 0706     Updated: 02/18/25 0711    Narrative:      XR CHEST 1 VW-     DATE OF EXAM: 2/18/2025 4:42 AM     INDICATION: Intubated.     COMPARISON: Radiographs 2/17/2025, 2/16/2025, 2/14/2025, and 2/10/2025.  CT 2/10/2025 and 5/6/2024.     TECHNIQUE: A single portable AP view of the chest was obtained.     FINDINGS:  Lordotic positioning. Multiple overlying artifacts but stable  endotracheal tube and partially imaged enteric tube. Loop recorder  overlying the mid left chest. Similar-appearing bilateral perihilar and  bibasilar opacities. Superimposed chronic bullous emphysematous changes  in both lungs. No pneumothorax. Unchanged cardiac and mediastinal  contours. No acute osseous abnormality is identified.       Impression:         1. Similar-appearing support tubes.  2. Similar-appearing bilateral perihilar and bibasilar opacities  superimposed on chronic bullous emphysematous changes.     This report was finalized on 2/18/2025 7:08 AM by Kale Braga MD on  Workstation: [id number]       XR Chest 1 View [[id number]] Collected: 02/17/25 0635     Updated: 02/17/25 0709    Narrative:      CHEST SINGLE VIEW     HISTORY: 65 years of age, Female. On ventilator.     COMPARISON: AP chest 02/16/2025, 02/14/2025, 02/13/2025, CT chest  02/10/2025.     FINDINGS: Heart size is within normal limits. Endotracheal tube and  feeding tube are without evidence for change. The feeding tube extends  off the radiograph inferiorly. There is advanced pulmonary emphysema.  Increased density medial right lung base and left mid lower lung  suspicious for infiltrates without interval change. Mild blunting of the  costophrenic angles. No evidence for pneumothorax.       Impression:      No interval change.     This report was finalized on 2/17/2025 7:06 AM by Jean Chin M.D  on Workstation: [id number]       XR Chest 1 View [[id number]] Collected: 02/16/25  0629     Updated: 02/16/25 0634    Narrative:      XR CHEST 1 VW-2/16/2025     HISTORY: Ventilator patient.     The heart size is within normal limits. There is patchy atelectasis or  infiltrate in the left mid to lower lung with some minimal increased  density in the right base. On the left this finding appears to have  progressed slightly since the 2/14/2025 study.     No pneumothorax is seen.     ET tube and NG tube are seen in good position.       Impression:      1. Patchy atelectasis or infiltrate in the left mid to lower lung has  progressed somewhat since the 2/14/2025 study. Minimal atelectasis or  infiltrate in the right base appears similar to the previous study.  2. The chest otherwise appears largely unchanged from the previous  study.              This report was finalized on 2/16/2025 6:31 AM by Dr. Mohit Bahena M.D on Workstation: [id number]       XR Chest 1 View [[id number]] Collected: 02/14/25 0518     Updated: 02/14/25 0518    Narrative:        Patient: JEAN-PIERRE CHAVEZ  Time Out: 05:17  Exam(s): XR CXR 1 VIEW     EXAM:    XR Chest, 1 View    CLINICAL HISTORY:     Reason for exam: ventilation difficulties.    TECHNIQUE:    Frontal view of the chest.    COMPARISON:    2 13 2025    FINDINGS:    Lungs:  Emphysema.  Recommend patient be evaluated for low-dose annual   CT screening for lung cancer.  No focal consolidation, no pleural   effusion, no pneumothorax.    Pleural space:  See above.    Heart:  Unremarkable.  No cardiomegaly.    Mediastinum:  Unremarkable.  Normal mediastinal contour.    Bones joints:  Unremarkable.  No acute fracture.    Tubes, lines and devices:  Feeding tube coursing below the diaphragm   with tip not visualized.  Inner stylet has been removed.  Endotracheal   tube with tip 6.2 cm above the brianna.    IMPRESSION:       1.  No focal consolidation, no pleural effusion, no pneumothorax.  2.  Emphysema.  Recommend patient be evaluated for low-dose annual CT   screening  for lung cancer.  3.  Endotracheal tube with tip 6.2 cm above the brianna.      Impression:          Electronically signed by Jude Sutton MD on 02-14-25 at 0517    XR Chest 1 View [[id number]] Collected: 02/13/25 1942     Updated: 02/13/25 1946    Narrative:      CXR ONE VIEW      HISTORY: Worsening oxygenation; J96.01-Acute respiratory failure with  hypoxia; J96.02-Acute respiratory failure with hypercapnia;  J44.1-Chronic obstructive pulmonary disease with (acute) exacerbation;  B34.2-Coronavirus infection, unspecified; Z79.01-Long term (current) use  of anticoagulants; R79.89-Other specified abnormal findings of blood  chemistry     COMPARISON: 2/11/2025     TECHNIQUE: single portable AP       Impression:         ET tube in place, tip about 6 cm above the brianna.     Dobbhoff type feeding tube present, distal tip below the diaphragm.     The heart size is within normal limits.     Redemonstrated emphysematous changes in both lungs, with bibasilar  interstitial prominence.     No new or acute infiltrate, no apparent effusion or pneumothorax.     This report was finalized on 2/13/2025 7:43 PM by Dr. Naga Esposito M.D on Workstation: [id number]       XR Abdomen KUB [[id number]] Collected: 02/11/25 1059     Updated: 02/11/25 1103    Narrative:      XR ABDOMEN KUB-     INDICATIONS: Retract placement     TECHNIQUE: Frontal views of the abdomen     COMPARISON: 3/13/2021     FINDINGS:  In the partly included thorax, NG tube projects over the spine, then  extends to the left upper quadrant, and back medially, tip projecting  over the spine at the level of the upper pelvis, in the expected  location of the distal stomach. The bowel gas pattern is nonspecific.  Follow-up as clinically indicated.       Impression:         As described.        This report was finalized on 2/11/2025 11:00 AM by Dr. Wilber Florence M.D on Workstation: [id number]       XR Chest 1 View [[id number]] Collected: 02/11/25 0846     Updated:  02/11/25 0852    Narrative:      XR CHEST 1 VW-     HISTORY: Female who is 65 years-old, endotracheal intubation     TECHNIQUE: Frontal view of the chest     COMPARISON: 2/10/2025     FINDINGS: Endotracheal tube tip is 5.3 cm above the brianna. Heart size  is normal. Cardiac loop recorder is apparent. Pulmonary vasculature is  unremarkable. Pulmonary hyperinflation suggests COPD. Minimal  atelectasis or infiltrate at the lung bases. Blunting of the left  lateral costophrenic angle may relate to pulmonary hyperinflation or  could be minimal pleural effusion. No pneumothorax. No acute osseous  process.        Impression:      As described.     This report was finalized on 2/11/2025 8:49 AM by Dr. Wilber Florence M.D on Workstation: [id number]       CT Angiogram Chest [[id number]] Collected: 02/10/25 2145     Updated: 02/10/25 2154    Narrative:      CTA CHEST WITH IV CONTRAST     HISTORY: Shortness of breath, elevated D-dimer; J96.01-Acute respiratory  failure with hypoxia; J96.02-Acute respiratory failure with hypercapnia;  J44.1-Chronic obstructive pulmonary disease with (acute) exacerbation;  B34.2-Coronavirus infection, unspecified; Z79.01-Long term (current) use  of anticoagulants; R79.89-Other specified abnormal findings of blood  chemistry     COMPARISON: Chest CT 5/6/2024     TECHNIQUE: CT angiography was performed of the chest with axial images  as well as coronal and sagittal reformatted MIP images provided  following administration of IV contrast. 3-D surface rendered reformats  were obtained of the pulmonary arteries and aorta. Radiation dose  reduction techniques were utilized, including automated exposure  control, and exposure modulation based on body size.        FINDINGS:     Redemonstrated underlying emphysematous changes. There is coarse right  lower lobe consolidation, with bronchiectasis and endobronchial  opacities suggesting aspiration or inspissated mucus.     The thoracic aorta is normal in  caliber, and there is no evidence of  dissection.  There is no suspicious mediastinal adenopathy or other  mass.     Images of the upper abdomen show no acute abnormality.  There is no  acute bony abnormality.     Bolus timing is good and there is no evidence of pulmonary embolism.       Impression:         1.  Pulmonary arteries are well-opacified, and there is no evidence of  pulmonary embolism.     2.  Underlying pulmonary emphysema. Right lower lobe coarse  consolidation, with associated bronchiectasis and endobronchial opacity,  suggesting aspiration or inspissated mucus.           This report was finalized on 2/10/2025 9:51 PM by Dr. Naga Esposito M.D on Workstation: [id number]       XR Chest 1 View [[id number]] Collected: 02/10/25 1622     Updated: 02/10/25 1628    Narrative:      XR CHEST 1 VW-        INDICATION: Shortness of breath     COMPARISON: Chest radiograph March 16, 2024     TECHNIQUE: 1 view chest     FINDINGS:      Increased lung markings. Increased volumes. Linear opacities at the  right lung base. Obscuration of the right heart silhouette. No  effusions. Stable mediastinum. Heart is normal in size. Chest wall  implantable loop recorder.       Impression:         1. Stable chest.  2. Advanced obstructive airways disease.  3. Suspect chronic right middle lobe collapse and subsegmental  atelectasis or scarring at the right lung base     This report was finalized on 2/10/2025 4:25 PM by Dr. Jude Carroll M.D on Workstation: [id number]               Objective:   Temp:  [97.7 °F (36.5 °C)-98.1 °F (36.7 °C)] 97.9 °F (36.6 °C)  Heart Rate:  [] 91  Resp:  [18-20] 20  BP: ()/(49-61) 98/61   SpO2:  [91 %-100 %] 99 %  on  Flow (L/min) (Oxygen Therapy):  [1] 1 Device (Oxygen Therapy): nasal cannula    Intake/Output Summary (Last 24 hours) at 2/25/2025 1026  Last data filed at 2/25/2025 0837  Gross per 24 hour   Intake 1191 ml   Output 800 ml   Net 391 ml     Body mass index is 12.74  kg/m².      02/21/25  0347 02/23/25  0626 02/24/25  0500   Weight: 40.3 kg (88 lb 13.5 oz) 33.7 kg (74 lb 4.7 oz) 29.6 kg (65 lb 4.1 oz) (rezero bed when up with pt for accurae weight)     Weight change:     Physical Exam:  GEN:  No acute distress, alert,  well developed, cachectic  EYES:   Sclerae clear. No icterus. PERRL. Normal EOM  ENT:   External ears/nose normal, no oral lesions, positive thrush, mucous membranes moist  NECK:  Supple, midline trachea, no JVD  LUNGS: Normal chest on inspection, CTAB, further movement and breath sounds bilaterally, clear to auscultation, no wheezes. No rhonchi. No crackles. Respirations regular, even and unlabored.   CV:  Regular rhythm and rate. Normal S1/S2. No murmurs, gallops, or rubs noted.  ABD:  Soft, nontender and nondistended. Normal bowel sounds. No guarding  EXT:  Moves all extremities well. No cyanosis. No redness. No edema.   Skin: Dry, intact, no bleeding     Discharge Medications and Instructions:     Discharge Medications     Discharge Medications        New Medications        Instructions Start Date   nystatin 100,000 unit/mL suspension  Commonly known as: MYCOSTATIN   500,000 Units, Swish & Swallow, 4 Times Daily      predniSONE 10 MG tablet  Commonly known as: DELTASONE   10 mg, Oral, Daily With Breakfast   Start Date: February 26, 2025            Continue These Medications        Instructions Start Date   albuterol sulfate  (90 Base) MCG/ACT inhaler  Commonly known as: PROVENTIL HFA;VENTOLIN HFA;PROAIR HFA   2 puffs, Every 4 Hours PRN      budesonide-formoterol 80-4.5 MCG/ACT inhaler  Commonly known as: Symbicort   2 puffs, Inhalation, 2 Times Daily - RT      Eliquis 5 MG tablet tablet  Generic drug: apixaban   5 mg, Oral, Every 12 Hours      Erythromycin 250 MG EC tablet   250 mg, Oral, Daily      ipratropium-albuterol 0.5-2.5 mg/3 ml nebulizer  Commonly known as: DUO-NEB   3 mL, Nebulization, Every 4 Hours PRN      tiotropium bromide monohydrate 2.5  MCG/ACT aerosol solution inhaler  Commonly known as: SPIRIVA RESPIMAT   2 puffs, Daily - RT             Stop These Medications      fluticasone 44 MCG/ACT inhaler  Commonly known as: FLOVENT HFA              Discharge Diet:    Dietary Orders (From admission, onward)       Start     Ordered    02/25/25 1003  Diet: Regular/House; No Mixed Consistencies; Fluid Consistency: Thin (IDDSI 0)  Diet Effective Now        References:    Diet Order Definitions   Question Answer Comment   Diets: Regular/House    Diet Modifiers / Additional Diets: No Mixed Consistencies    Fluid Consistency: Thin (IDDSI 0)        02/25/25 1002    02/21/25 1800  Dietary Nutrition Supplements Boost Plus (Ensure Enlive, Ensure Plus)  Daily With Breakfast, Lunch & Dinner      Question:  Select Supplement:  Answer:  Boost Plus (Ensure Enlive, Ensure Plus)    02/21/25 1606    02/12/25 1742  Tube Feeding: Formula: Isosource HN (Osmolite 1.2); Feeding Type: Continuous; Start at: 10 mL/hr; Then Advance By: 10 mL/hr; Every: 8 hours; To Goal Rate of: 50 mL/hr; Water Flush: 30 mL; Every: 4 hours; Water Bolus: None  (Tube Feeding (RD to Initiate & Manage) + NPO)  Diet Effective Now        Question Answer Comment   Formula Isosource HN (Osmolite 1.2)    Feeding Type Continuous    Start at 10 mL/hr    Then Advance By 10 mL/hr    Every 8 hours    To Goal Rate of 50 mL/hr    Water Flush 30 mL    Every 4 hours    Water Bolus None        02/12/25 1742                    Activity at Discharge:   As tolerated    Discharge disposition: Rehab    Discharge Instructions and Follow ups:      Follow-up Information       Flavia Jaquez, SHANKAR .    Specialty: Nurse Practitioner  Contact information:  [address]  [facility] [place] [address] [phone]                           Future Appointments   Date Time Provider Department Center   5/12/2025  2:30 PM [facility]  [facility]   6/16/2025  8:00 AM Barber Bernal MD [facility]        Medication Reconciliation:  Please see electronically completed Med Rec.    Total time spent discharging patient including evaluation, medication reconciliation, arranging follow up, and post hospitalization instructions and education total time exceeds 30 minutes.     Cora Moses MD  02/25/25  10:26 EST      Dictated utilizing Dragon dictation    Electronically signed by Cora Moses MD at 02/25/25 1030       Discharge Order (From admission, onward)       Start     Ordered    02/25/25 1025  Discharge patient  Once        Expected Discharge Date: 02/25/25   Expected Discharge Time: Morning   Discharge Disposition: Rehab Facility or Unit (DC - External)   Physician of Record for Attribution - Please select from Treatment Team: CORA MOSES [[id number]]   Review needed by CMO to determine Physician of Record: No      Question Answer Comment   Physician of Record for Attribution - Please select from Treatment Team CORA MOSES    Review needed by CMO to determine Physician of Record No        02/25/25 1021

## 2025-02-25 NOTE — CASE MANAGEMENT/SOCIAL WORK
Continued Stay Note  [facility]     Patient Name: Scarlet Chakraborty  MRN: [id number]  Today's Date: 2/25/2025    Admit Date: 2/10/2025    Plan: IRF Giselle Khan/Johana Garcia to arrange transport   Discharge Plan       Row Name 02/25/25 1015       Plan    Plan IR[facility] Johana Garcia, Giselle/Religion Jose to arrange transport    Patient/Family in Agreement with Plan yes    Plan Comments Giselle/Johana Garcia notified CCP that pre-cert approved. CCP notified Dr. Mckeon & Betty/BISI. Giselle/Religion Jose will arrange transport & let CCP know. CCP updated pt & pt's sister, Yvonne Cobb, at bedside. Pt verbalized understanding. DC plan [facility] Giselle Khan/Johana Garcia to arrange transport. Packet given to Betty/BISI. BISI Oliver/JAMI             Expected Discharge Date and Time       Expected Discharge Date Expected Discharge Time    Feb 25, 2025      Paola Bennett RN

## 2025-02-25 NOTE — DISCHARGE SUMMARY
DISCHARGE SUMMARY    Patient Name: Scarlet Chakraborty  Age/Sex: 65 y.o. female  : 1960  MRN: [id number]  Patient Care Team:  Flavia Jaquez APRN as PCP - General (Nurse Practitioner)  Beulah Joshi APRN as Nurse Practitioner (Neurology)  Flaco Aguayo MD as Consulting Physician (Pulmonary Disease)       Date of Admit: 2/10/2025  Date of Discharge:  25  Discharge Condition: Stable    Discharge Diagnoses:  End-stage COPD, FEV1 17% on [place] 23 with current exacerbation  Acute hypoxic and hypercapnic respiratory failure  Mucous plugging, s/p bronchoscopy 2025  Sinus tachycardia  Seasonal coronavirus infection: OC43  COPD cachexia  Hypotension, secondary to sedation and positive pressure ventilation  Ischemic cardiomyopathy    History of CVA, on Eliquis  History of Takotsubo cardiomyopathy  Thrush    History of present illness from H&P from 2/10/2025:   65-year-old female with known history of severe underlying COPD multiple hospital admission for COPD exacerbations follow-up my partner Dr. Mukherjee.  Patient also has underlying history of nonischemic cardiomyopathy previous CVA non-ST elevation MI with Takotsubo cardiomyopathy.  Patient presented to the emergency room with acute shortness of breath ongoing for the last several days.  No fever chills or aspiration was reported.  In the emergency room patient was noted to be in acute respiratory distress and placed on noninvasive ventilation.  At the time of my evaluation she is synchronizing with the noninvasive ventilation AVAPS.  Currently on 100% FiO2 maintaining sats 96 to 97%.  History is somewhat limited with patient's current clinical condition.     Hospital Course:   Scarlet Chakraborty presented to [facility] with complaints of respiratory distress, and had a COPD exacerbation.  Patient had significant weakness and slow recovery but eventually she was able to wean down to low-flow nasal  cannula oxygen, she did require noninvasive positive pressure ventilation on initial presentation  She is being weaned on her steroid and should be done in a couple of days  She was having some sore throat on the day of discharge with evidence of thrush and she will be discharged on nystatin for a few more days  Patient has deconditioning with generalized weakness but no focal deficit  She is back on oral anticoagulation, she has been afebrile for several days and she is no longer on any antibiotics.  She had ischemic cardiomyopathy with history of Takotsubo, but she was doing better clinically and she is to follow-up with cardiology    Consults:   IP CONSULT TO PULMONOLOGY  IP CONSULT TO NUTRITION SERVICES  IP CONSULT TO NUTRITION SERVICES  IP CONSULT TO PALLIATIVE CARE TEAM  IP CONSULT TO NEUROLOGY    Significant Discharge Diagnostics   Procedures Performed:         Pertinent Lab Results:  Results from last 7 days   Lab Units 02/22/25  0437 02/21/25  0733 02/20/25 0417 02/19/25  0407   SODIUM mmol/L 137 140 138 142   POTASSIUM mmol/L 4.0 4.7 4.7 4.6   CHLORIDE mmol/L 100 102 105 106   CO2 mmol/L 29.6* 27.0 28.4 30.4*   BUN mg/dL 25* 18 18 19   CREATININE mg/dL 0.35* 0.28* 0.30* 0.35*   GLUCOSE mg/dL 87 144* 115* 147*   CALCIUM mg/dL 9.3 9.4 8.7 8.7         Results from last 7 days   Lab Units 02/25/25  0559 02/24/25  0505 02/23/25  0503 02/22/25  0437 02/21/25  0733 02/20/25  0417 02/19/25  0407   WBC 10*3/mm3 13.39* 13.98* 12.19* 15.55* 16.32* 14.98* 16.57*   HEMOGLOBIN g/dL 13.6 15.4 14.4 14.4 15.2 13.9 13.3   HEMATOCRIT % 40.7 45.8 43.0 45.2 45.7 42.1 40.5   PLATELETS 10*3/mm3 346 319 395 377 347 308 287   MCV fL 93.1 92.0 91.7 94.0 93.6 93.8 92.0   MCH pg 31.1 30.9 30.7 29.9 31.1 31.0 30.2   MCHC g/dL 33.4 33.6 33.5 31.9 33.3 33.0 32.8   RDW % 12.3 12.7 12.5 12.4 12.4 12.5 12.2*   RDW-SD fl 41.2 42.6 41.3 42.5 42.0 42.6 41.5   MPV fL 10.7 11.9 10.8 11.2 11.5 11.2 11.1                         Results from last  7 days   Lab Units 02/19/25  0928 02/19/25  0414   PH, ARTERIAL pH units 7.429 7.477*   PCO2, ARTERIAL mm Hg 43.7 44.0   PO2 ART mm Hg 89.5 95.3   HCO3 ART mmol/L 29.0* 32.5*                                   Imaging Results:  Imaging Results (All)       Procedure Component Value Units Date/Time    SLP FEES - Fiberoptic Endo Eval Swallow [[id number]] Resulted: 02/21/25 1004     Updated: 02/21/25 1004    Narrative:      This procedure was auto-finalized with no dictation required.    MRI Brain Without Contrast [[id number]] Collected: 02/21/25 0537     Updated: 02/21/25 0547    Narrative:      BRAIN MRI WITHOUT CONTRAST     HISTORY: left sided weakness; J96.01-Acute respiratory failure with  hypoxia; J96.02-Acute respiratory failure with hypercapnia;  J44.1-Chronic obstructive pulmonary disease with (acute) exacerbation;  B34.2-Coronavirus infection, unspecified; Z79.01-Long term (current) use  of anticoagulants; R79.89-Other specified abnormal findings of blood  chemistry     COMPARISON: February 20, 2025.     FINDINGS:  Multiplanar images of the head were obtained without  gadolinium. There is some subtle increased signal intensity noted within  the right paramedian darvin on diffusion-weighted imaging, with some  corresponding decreased signal intensity noted on the ADC map. I don't  see a corresponding abnormality on the T2 and FLAIR sequences. The  appearance may be artifactual, although a subacute infarct is not  excluded. There is a tiny old right cerebellar infarct. The intracranial  flow voids appear intact. There is periventricular and deep white matter  microangiopathic change. The patient is noted to have encephalomalacia  within the right MCA territory. No additional areas of restricted  diffusion are seen. Further areas of encephalomalacia are noted within  the occipital lobes bilaterally. Mild gyriform susceptibility artifact  is noted within the area of encephalomalacia within the right MCA  territory.  This may be related to chronic hemosiderin deposition.  Mucosal thickening is noted within the ethmoid sinuses. There are  bilateral mastoid effusions, left greater than right.       Impression:      1. There is some subtle increased signal intensity on diffusion-weighted  imaging within the right paramedian darvin. There is some corresponding  decrease signal intensity on the ADC map within this area. However, no  abnormality is seen on the T2 or FLAIR sequences. The appearance may be  artifactual, although infarct is not excluded.        This report was finalized on 2/21/2025 5:44 AM by Dr. Faby Quiros M.D on Workstation: [id number]       CT Angiogram Neck [[id number]] Collected: 02/20/25 1509     Updated: 02/20/25 2057    Narrative:      CT ANGIOGRAM NECK AND HEAD WITH CONTRAST AND CT PERFUSION WITH CONTRAST     HISTORY: Left-sided weakness.     COMPARISON: CT head 09/29/2018. MRI examination of the brain 09/24/2018,  CT angiogram neck and head 09/24/2018.     FINDINGS: Initially, a noncontrasted CT examination of the brain was  performed. A remote infarct involving the right frontal lobe laterally  is appreciated which extends to and involves the insular cortex.  Encephalomalacia involving the right temporal lobe is noted superiorly.  The area of encephalomalacia corresponds to the evolving infarct which  was present on 09/29/2018. An area of encephalomalacia is appreciated  involving the right parietal lobe which corresponds to the area of  infarction present on the CT examination 09/29/2018. It is larger in  size as compared to the MRI examination of 09/24/2018. Areas of  decreased attenuation are appreciated involving the occipital lobes  posteriorly bilaterally which are new versus the CT examination of  09/29/2018. These are age-indeterminate. There is no evidence of  enlargement of the occipital horns. These may represent remote infarcts  although acute to subacute or subacute infarcts cannot be  excluded.     CT PERFUSION: There is no evidence of abnormal perfusion.     A CT angiogram of the neck and head was then performed. Multiplanar as  well as three-dimensional reconstructions were generated. Severe  emphysematous disease is appreciated bilaterally, more prominent on the  right. The great vessels are arranged in a classic configuration. Both  vertebral arteries were opacified. The right vertebral artery is  slightly larger than that of the left. The basilar artery and the  proximal aspects of the posterior cerebral arteries appear unremarkable.  Evaluation of the carotid bifurcations is hampered by patient motion.  There is no evidence of an ostial stenosis using NASCET criteria. The  proximal to mid segments of the internal carotid arteries are the  segments most degraded by patient motion. Distally, the internal carotid  arteries are of normal caliber. The proximal aspects of the anterior and  middle cerebral arteries appear unremarkable. A standard postcontrast CT  examination of the brain showed no evidence of abnormal enhancement.       Impression:      1.  There is no evidence of hemorrhage or of acute infarction. Remote  right MCA distribution infarcts are appreciated which were present on  02/29/2018. New areas of decreased attenuation are appreciated more in  the occipital lobes bilaterally more prominent in the left consistent  with infarcts. These are age indeterminant. There is no evidence of  associated volume loss. The potentially may represent subacute infarcts.  Further evaluation could be performed with MRI examination of the brain.  2.  The study is hampered by patient motion but there was no evidence of  an ostial stenosis involving the internal carotid arteries. There is no  evidence of a proximal intracranial arterial high-grade stenosis or  occlusion.           The noncontrasted CT examination was made available for interpretation  at 1414 hours and a preliminary report called at  1416 hours. The CT  angiogram was made available for interpretation at 1428 hours and a  preliminary report called at 1432 hours.     This report was finalized on 2/20/2025 8:54 PM by Dr. Ellis Amador M.D  on Workstation: [id number]       CT CEREBRAL PERFUSION WITH & WITHOUT CONTRAST [[id number]] Collected: 02/20/25 1509     Updated: 02/20/25 2057    Narrative:      CT ANGIOGRAM NECK AND HEAD WITH CONTRAST AND CT PERFUSION WITH CONTRAST     HISTORY: Left-sided weakness.     COMPARISON: CT head 09/29/2018. MRI examination of the brain 09/24/2018,  CT angiogram neck and head 09/24/2018.     FINDINGS: Initially, a noncontrasted CT examination of the brain was  performed. A remote infarct involving the right frontal lobe laterally  is appreciated which extends to and involves the insular cortex.  Encephalomalacia involving the right temporal lobe is noted superiorly.  The area of encephalomalacia corresponds to the evolving infarct which  was present on 09/29/2018. An area of encephalomalacia is appreciated  involving the right parietal lobe which corresponds to the area of  infarction present on the CT examination 09/29/2018. It is larger in  size as compared to the MRI examination of 09/24/2018. Areas of  decreased attenuation are appreciated involving the occipital lobes  posteriorly bilaterally which are new versus the CT examination of  09/29/2018. These are age-indeterminate. There is no evidence of  enlargement of the occipital horns. These may represent remote infarcts  although acute to subacute or subacute infarcts cannot be excluded.     CT PERFUSION: There is no evidence of abnormal perfusion.     A CT angiogram of the neck and head was then performed. Multiplanar as  well as three-dimensional reconstructions were generated. Severe  emphysematous disease is appreciated bilaterally, more prominent on the  right. The great vessels are arranged in a classic configuration. Both  vertebral arteries were  opacified. The right vertebral artery is  slightly larger than that of the left. The basilar artery and the  proximal aspects of the posterior cerebral arteries appear unremarkable.  Evaluation of the carotid bifurcations is hampered by patient motion.  There is no evidence of an ostial stenosis using NASCET criteria. The  proximal to mid segments of the internal carotid arteries are the  segments most degraded by patient motion. Distally, the internal carotid  arteries are of normal caliber. The proximal aspects of the anterior and  middle cerebral arteries appear unremarkable. A standard postcontrast CT  examination of the brain showed no evidence of abnormal enhancement.       Impression:      1.  There is no evidence of hemorrhage or of acute infarction. Remote  right MCA distribution infarcts are appreciated which were present on  02/29/2018. New areas of decreased attenuation are appreciated more in  the occipital lobes bilaterally more prominent in the left consistent  with infarcts. These are age indeterminant. There is no evidence of  associated volume loss. The potentially may represent subacute infarcts.  Further evaluation could be performed with MRI examination of the brain.  2.  The study is hampered by patient motion but there was no evidence of  an ostial stenosis involving the internal carotid arteries. There is no  evidence of a proximal intracranial arterial high-grade stenosis or  occlusion.           The noncontrasted CT examination was made available for interpretation  at 1414 hours and a preliminary report called at 1416 hours. The CT  angiogram was made available for interpretation at 1428 hours and a  preliminary report called at 1432 hours.     This report was finalized on 2/20/2025 8:54 PM by Dr. Ellis Amador M.D  on Workstation: [id number]       CT Angiogram Head w AI Analysis of LVO [[id number]] Collected: 02/20/25 1509     Updated: 02/20/25 2057    Narrative:      CT ANGIOGRAM NECK  AND HEAD WITH CONTRAST AND CT PERFUSION WITH CONTRAST     HISTORY: Left-sided weakness.     COMPARISON: CT head 09/29/2018. MRI examination of the brain 09/24/2018,  CT angiogram neck and head 09/24/2018.     FINDINGS: Initially, a noncontrasted CT examination of the brain was  performed. A remote infarct involving the right frontal lobe laterally  is appreciated which extends to and involves the insular cortex.  Encephalomalacia involving the right temporal lobe is noted superiorly.  The area of encephalomalacia corresponds to the evolving infarct which  was present on 09/29/2018. An area of encephalomalacia is appreciated  involving the right parietal lobe which corresponds to the area of  infarction present on the CT examination 09/29/2018. It is larger in  size as compared to the MRI examination of 09/24/2018. Areas of  decreased attenuation are appreciated involving the occipital lobes  posteriorly bilaterally which are new versus the CT examination of  09/29/2018. These are age-indeterminate. There is no evidence of  enlargement of the occipital horns. These may represent remote infarcts  although acute to subacute or subacute infarcts cannot be excluded.     CT PERFUSION: There is no evidence of abnormal perfusion.     A CT angiogram of the neck and head was then performed. Multiplanar as  well as three-dimensional reconstructions were generated. Severe  emphysematous disease is appreciated bilaterally, more prominent on the  right. The great vessels are arranged in a classic configuration. Both  vertebral arteries were opacified. The right vertebral artery is  slightly larger than that of the left. The basilar artery and the  proximal aspects of the posterior cerebral arteries appear unremarkable.  Evaluation of the carotid bifurcations is hampered by patient motion.  There is no evidence of an ostial stenosis using NASCET criteria. The  proximal to mid segments of the internal carotid arteries are  the  segments most degraded by patient motion. Distally, the internal carotid  arteries are of normal caliber. The proximal aspects of the anterior and  middle cerebral arteries appear unremarkable. A standard postcontrast CT  examination of the brain showed no evidence of abnormal enhancement.       Impression:      1.  There is no evidence of hemorrhage or of acute infarction. Remote  right MCA distribution infarcts are appreciated which were present on  02/29/2018. New areas of decreased attenuation are appreciated more in  the occipital lobes bilaterally more prominent in the left consistent  with infarcts. These are age indeterminant. There is no evidence of  associated volume loss. The potentially may represent subacute infarcts.  Further evaluation could be performed with MRI examination of the brain.  2.  The study is hampered by patient motion but there was no evidence of  an ostial stenosis involving the internal carotid arteries. There is no  evidence of a proximal intracranial arterial high-grade stenosis or  occlusion.           The noncontrasted CT examination was made available for interpretation  at 1414 hours and a preliminary report called at 1416 hours. The CT  angiogram was made available for interpretation at 1428 hours and a  preliminary report called at 1432 hours.     This report was finalized on 2/20/2025 8:54 PM by Dr. Ellis Amador M.D  on Workstation: [id number]       XR Chest 1 View [[id number]] Collected: 02/19/25 0619     Updated: 02/19/25 0754    Narrative:      CHEST SINGLE VIEW     HISTORY: 65 years of age, Female. On ventilator.     COMPARISON: AP chest 02/18/2025, 02/17/2025.     FINDINGS: Endotracheal tube tip 4.2 cm above the brianna. Heart size  within normal limits. Feeding tube courses into the stomach and off the  radiograph inferiorly. There is advanced pulmonary emphysema. Right  infrahilar opacity and medial left basilar opacities are without change.  Suspect small  effusions with blunting of the costophrenic angles. No new  airspace disease or evidence for pulmonary edema or pneumothorax.       Impression:      No interval change.     This report was finalized on 2/19/2025 7:51 AM by Jean Chin M.D  on Workstation: [id number]       XR Chest 1 View [[id number]] Collected: 02/18/25 0706     Updated: 02/18/25 0711    Narrative:      XR CHEST 1 VW-     DATE OF EXAM: 2/18/2025 4:42 AM     INDICATION: Intubated.     COMPARISON: Radiographs 2/17/2025, 2/16/2025, 2/14/2025, and 2/10/2025.  CT 2/10/2025 and 5/6/2024.     TECHNIQUE: A single portable AP view of the chest was obtained.     FINDINGS:  Lordotic positioning. Multiple overlying artifacts but stable  endotracheal tube and partially imaged enteric tube. Loop recorder  overlying the mid left chest. Similar-appearing bilateral perihilar and  bibasilar opacities. Superimposed chronic bullous emphysematous changes  in both lungs. No pneumothorax. Unchanged cardiac and mediastinal  contours. No acute osseous abnormality is identified.       Impression:         1. Similar-appearing support tubes.  2. Similar-appearing bilateral perihilar and bibasilar opacities  superimposed on chronic bullous emphysematous changes.     This report was finalized on 2/18/2025 7:08 AM by Kale Braga MD on  Workstation: [id number]       XR Chest 1 View [[id number]] Collected: 02/17/25 0635     Updated: 02/17/25 0709    Narrative:      CHEST SINGLE VIEW     HISTORY: 65 years of age, Female. On ventilator.     COMPARISON: AP chest 02/16/2025, 02/14/2025, 02/13/2025, CT chest  02/10/2025.     FINDINGS: Heart size is within normal limits. Endotracheal tube and  feeding tube are without evidence for change. The feeding tube extends  off the radiograph inferiorly. There is advanced pulmonary emphysema.  Increased density medial right lung base and left mid lower lung  suspicious for infiltrates without interval change. Mild blunting of  the  costophrenic angles. No evidence for pneumothorax.       Impression:      No interval change.     This report was finalized on 2/17/2025 7:06 AM by Jean Chin M.D  on Workstation: [id number]       XR Chest 1 View [[id number]] Collected: 02/16/25 0629     Updated: 02/16/25 0634    Narrative:      XR CHEST 1 VW-2/16/2025     HISTORY: Ventilator patient.     The heart size is within normal limits. There is patchy atelectasis or  infiltrate in the left mid to lower lung with some minimal increased  density in the right base. On the left this finding appears to have  progressed slightly since the 2/14/2025 study.     No pneumothorax is seen.     ET tube and NG tube are seen in good position.       Impression:      1. Patchy atelectasis or infiltrate in the left mid to lower lung has  progressed somewhat since the 2/14/2025 study. Minimal atelectasis or  infiltrate in the right base appears similar to the previous study.  2. The chest otherwise appears largely unchanged from the previous  study.              This report was finalized on 2/16/2025 6:31 AM by Dr. Mohit Bahena M.D on Workstation: [id number]       XR Chest 1 View [[id number]] Collected: 02/14/25 0518     Updated: 02/14/25 0518    Narrative:        Patient: JEAN-PIERRE CHAVEZ  Time Out: 05:17  Exam(s): XR CXR 1 VIEW     EXAM:    XR Chest, 1 View    CLINICAL HISTORY:     Reason for exam: ventilation difficulties.    TECHNIQUE:    Frontal view of the chest.    COMPARISON:    2 13 2025    FINDINGS:    Lungs:  Emphysema.  Recommend patient be evaluated for low-dose annual   CT screening for lung cancer.  No focal consolidation, no pleural   effusion, no pneumothorax.    Pleural space:  See above.    Heart:  Unremarkable.  No cardiomegaly.    Mediastinum:  Unremarkable.  Normal mediastinal contour.    Bones joints:  Unremarkable.  No acute fracture.    Tubes, lines and devices:  Feeding tube coursing below the diaphragm   with tip not visualized.   Inner stylet has been removed.  Endotracheal   tube with tip 6.2 cm above the brianna.    IMPRESSION:       1.  No focal consolidation, no pleural effusion, no pneumothorax.  2.  Emphysema.  Recommend patient be evaluated for low-dose annual CT   screening for lung cancer.  3.  Endotracheal tube with tip 6.2 cm above the brianna.      Impression:          Electronically signed by Jude Sutton MD on 02-14-25 at 0517    XR Chest 1 View [[id number]] Collected: 02/13/25 1942     Updated: 02/13/25 1946    Narrative:      CXR ONE VIEW      HISTORY: Worsening oxygenation; J96.01-Acute respiratory failure with  hypoxia; J96.02-Acute respiratory failure with hypercapnia;  J44.1-Chronic obstructive pulmonary disease with (acute) exacerbation;  B34.2-Coronavirus infection, unspecified; Z79.01-Long term (current) use  of anticoagulants; R79.89-Other specified abnormal findings of blood  chemistry     COMPARISON: 2/11/2025     TECHNIQUE: single portable AP       Impression:         ET tube in place, tip about 6 cm above the brianna.     Dobbhoff type feeding tube present, distal tip below the diaphragm.     The heart size is within normal limits.     Redemonstrated emphysematous changes in both lungs, with bibasilar  interstitial prominence.     No new or acute infiltrate, no apparent effusion or pneumothorax.     This report was finalized on 2/13/2025 7:43 PM by Dr. Naga Esposito M.D on Workstation: [id number]       XR Abdomen KUB [[id number]] Collected: 02/11/25 1059     Updated: 02/11/25 1103    Narrative:      XR ABDOMEN KUB-     INDICATIONS: Retract placement     TECHNIQUE: Frontal views of the abdomen     COMPARISON: 3/13/2021     FINDINGS:  In the partly included thorax, NG tube projects over the spine, then  extends to the left upper quadrant, and back medially, tip projecting  over the spine at the level of the upper pelvis, in the expected  location of the distal stomach. The bowel gas pattern is  nonspecific.  Follow-up as clinically indicated.       Impression:         As described.        This report was finalized on 2/11/2025 11:00 AM by Dr. Wilber Florence M.D on Workstation: [email or web address]       XR Chest 1 View [[id number]] Collected: 02/11/25 0846     Updated: 02/11/25 0852    Narrative:      XR CHEST 1 VW-     HISTORY: Female who is 65 years-old, endotracheal intubation     TECHNIQUE: Frontal view of the chest     COMPARISON: 2/10/2025     FINDINGS: Endotracheal tube tip is 5.3 cm above the brianna. Heart size  is normal. Cardiac loop recorder is apparent. Pulmonary vasculature is  unremarkable. Pulmonary hyperinflation suggests COPD. Minimal  atelectasis or infiltrate at the lung bases. Blunting of the left  lateral costophrenic angle may relate to pulmonary hyperinflation or  could be minimal pleural effusion. No pneumothorax. No acute osseous  process.        Impression:      As described.     This report was finalized on 2/11/2025 8:49 AM by Dr. Wilber Florence M.D on Workstation: [id number]       CT Angiogram Chest [[id number]] Collected: 02/10/25 2145     Updated: 02/10/25 2154    Narrative:      CTA CHEST WITH IV CONTRAST     HISTORY: Shortness of breath, elevated D-dimer; J96.01-Acute respiratory  failure with hypoxia; J96.02-Acute respiratory failure with hypercapnia;  J44.1-Chronic obstructive pulmonary disease with (acute) exacerbation;  B34.2-Coronavirus infection, unspecified; Z79.01-Long term (current) use  of anticoagulants; R79.89-Other specified abnormal findings of blood  chemistry     COMPARISON: Chest CT 5/6/2024     TECHNIQUE: CT angiography was performed of the chest with axial images  as well as coronal and sagittal reformatted MIP images provided  following administration of IV contrast. 3-D surface rendered reformats  were obtained of the pulmonary arteries and aorta. Radiation dose  reduction techniques were utilized, including automated exposure  control, and exposure modulation  based on body size.        FINDINGS:     Redemonstrated underlying emphysematous changes. There is coarse right  lower lobe consolidation, with bronchiectasis and endobronchial  opacities suggesting aspiration or inspissated mucus.     The thoracic aorta is normal in caliber, and there is no evidence of  dissection.  There is no suspicious mediastinal adenopathy or other  mass.     Images of the upper abdomen show no acute abnormality.  There is no  acute bony abnormality.     Bolus timing is good and there is no evidence of pulmonary embolism.       Impression:         1.  Pulmonary arteries are well-opacified, and there is no evidence of  pulmonary embolism.     2.  Underlying pulmonary emphysema. Right lower lobe coarse  consolidation, with associated bronchiectasis and endobronchial opacity,  suggesting aspiration or inspissated mucus.           This report was finalized on 2/10/2025 9:51 PM by Dr. Naga Esposito M.D on Workstation: [id number]       XR Chest 1 View [[id number]] Collected: 02/10/25 1622     Updated: 02/10/25 1628    Narrative:      XR CHEST 1 VW-        INDICATION: Shortness of breath     COMPARISON: Chest radiograph March 16, 2024     TECHNIQUE: 1 view chest     FINDINGS:      Increased lung markings. Increased volumes. Linear opacities at the  right lung base. Obscuration of the right heart silhouette. No  effusions. Stable mediastinum. Heart is normal in size. Chest wall  implantable loop recorder.       Impression:         1. Stable chest.  2. Advanced obstructive airways disease.  3. Suspect chronic right middle lobe collapse and subsegmental  atelectasis or scarring at the right lung base     This report was finalized on 2/10/2025 4:25 PM by Dr. Jude Carroll M.D on Workstation: [id number]               Objective:   Temp:  [97.7 °F (36.5 °C)-98.1 °F (36.7 °C)] 97.9 °F (36.6 °C)  Heart Rate:  [] 91  Resp:  [18-20] 20  BP: ()/(49-61) 98/61   SpO2:  [91 %-100 %] 99 %  on   Flow (L/min) (Oxygen Therapy):  [1] 1 Device (Oxygen Therapy): nasal cannula    Intake/Output Summary (Last 24 hours) at 2/25/2025 1026  Last data filed at 2/25/2025 0837  Gross per 24 hour   Intake 1191 ml   Output 800 ml   Net 391 ml     Body mass index is 12.74 kg/m².      02/21/25  0347 02/23/25  0626 02/24/25  0500   Weight: 40.3 kg (88 lb 13.5 oz) 33.7 kg (74 lb 4.7 oz) 29.6 kg (65 lb 4.1 oz) (rezero bed when up with pt for accurae weight)     Weight change:     Physical Exam:  GEN:  No acute distress, alert,  well developed, cachectic  EYES:   Sclerae clear. No icterus. PERRL. Normal EOM  ENT:   External ears/nose normal, no oral lesions, positive thrush, mucous membranes moist  NECK:  Supple, midline trachea, no JVD  LUNGS: Normal chest on inspection, CTAB, further movement and breath sounds bilaterally, clear to auscultation, no wheezes. No rhonchi. No crackles. Respirations regular, even and unlabored.   CV:  Regular rhythm and rate. Normal S1/S2. No murmurs, gallops, or rubs noted.  ABD:  Soft, nontender and nondistended. Normal bowel sounds. No guarding  EXT:  Moves all extremities well. No cyanosis. No redness. No edema.   Skin: Dry, intact, no bleeding     Discharge Medications and Instructions:     Discharge Medications     Discharge Medications        New Medications        Instructions Start Date   nystatin 100,000 unit/mL suspension  Commonly known as: MYCOSTATIN   500,000 Units, Swish & Swallow, 4 Times Daily      predniSONE 10 MG tablet  Commonly known as: DELTASONE   10 mg, Oral, Daily With Breakfast   Start Date: February 26, 2025            Continue These Medications        Instructions Start Date   albuterol sulfate  (90 Base) MCG/ACT inhaler  Commonly known as: PROVENTIL HFA;VENTOLIN HFA;PROAIR HFA   2 puffs, Every 4 Hours PRN      budesonide-formoterol 80-4.5 MCG/ACT inhaler  Commonly known as: Symbicort   2 puffs, Inhalation, 2 Times Daily - RT      Eliquis 5 MG tablet  tablet  Generic drug: apixaban   5 mg, Oral, Every 12 Hours      Erythromycin 250 MG EC tablet   250 mg, Oral, Daily      ipratropium-albuterol 0.5-2.5 mg/3 ml nebulizer  Commonly known as: DUO-NEB   3 mL, Nebulization, Every 4 Hours PRN      tiotropium bromide monohydrate 2.5 MCG/ACT aerosol solution inhaler  Commonly known as: SPIRIVA RESPIMAT   2 puffs, Daily - RT             Stop These Medications      fluticasone 44 MCG/ACT inhaler  Commonly known as: FLOVENT HFA              Discharge Diet:    Dietary Orders (From admission, onward)       Start     Ordered    02/25/25 1003  Diet: Regular/House; No Mixed Consistencies; Fluid Consistency: Thin (IDDSI 0)  Diet Effective Now        References:    Diet Order Definitions   Question Answer Comment   Diets: Regular/House    Diet Modifiers / Additional Diets: No Mixed Consistencies    Fluid Consistency: Thin (IDDSI 0)        02/25/25 1002    02/21/25 1800  Dietary Nutrition Supplements Boost Plus (Ensure Enlive, Ensure Plus)  Daily With Breakfast, Lunch & Dinner      Question:  Select Supplement:  Answer:  Boost Plus (Ensure Enlive, Ensure Plus)    02/21/25 1606    02/12/25 1742  Tube Feeding: Formula: Isosource HN (Osmolite 1.2); Feeding Type: Continuous; Start at: 10 mL/hr; Then Advance By: 10 mL/hr; Every: 8 hours; To Goal Rate of: 50 mL/hr; Water Flush: 30 mL; Every: 4 hours; Water Bolus: None  (Tube Feeding (RD to Initiate & Manage) + NPO)  Diet Effective Now        Question Answer Comment   Formula Isosource HN (Osmolite 1.2)    Feeding Type Continuous    Start at 10 mL/hr    Then Advance By 10 mL/hr    Every 8 hours    To Goal Rate of 50 mL/hr    Water Flush 30 mL    Every 4 hours    Water Bolus None        02/12/25 1742                    Activity at Discharge:   As tolerated    Discharge disposition: Rehab    Discharge Instructions and Follow ups:      Follow-up Information       Flavia Jaquez, SHANKAR .    Specialty: Nurse Practitioner  Contact  information:  [address]  [facility] [address]  [phone]                           Future Appointments   Date Time Provider Department Center   5/12/2025  2:30 PM [facility]   6/16/2025  8:00 AM Barber Bernal MD [facility]        Medication Reconciliation: Please see electronically completed Med Rec.    Total time spent discharging patient including evaluation, medication reconciliation, arranging follow up, and post hospitalization instructions and education total time exceeds 30 minutes.     Gabo Mckeon MD  02/25/25  10:26 EST      Dictated utilizing Dragon dictation

## 2025-02-25 NOTE — THERAPY RE-EVALUATION
Acute Care - Speech Language Pathology   Swallow Re-Evaluation [facility]     Patient Name: Scarlet Chakraborty  : 1960  MRN: [id number]  Today's Date: 2025               Admit Date: 2/10/2025    Visit Dx:     ICD-10-CM ICD-9-CM   1. Acute respiratory failure with hypoxia and hypercapnia  J96.01 518.81    J96.02    2. COPD exacerbation  J44.1 491.21   3. Coronavirus infection  B34.2 079.89   4. Anticoagulated by anticoagulation treatment  Z79.01 V58.61   5. Elevated d-dimer  R79.89 790.92     Patient Active Problem List   Diagnosis    Acute respiratory failure with hypoxia    Cerebrovascular accident (CVA) due to thrombosis    Stress-induced cardiomyopathy    Chronic obstructive pulmonary disease with acute exacerbation    Tobacco abuse    Mixed hyperlipidemia    Hx of non-ST elevation myocardial infarction (NSTEMI)    History of stroke    Tobacco dependence    COPD exacerbation    Breast mass    Cerebrovascular accident (CVA) due to occlusion of left middle cerebral artery    Congestive heart failure    Chronic obstructive lung disease    Non-ischemic cardiomyopathy    Acute respiratory failure    Status post placement of implantable loop recorder    SOB (shortness of breath)    COPD with acute exacerbation    Acute on chronic respiratory failure with hypercapnia    Severe malnutrition    Acute hypercapnic respiratory failure     Past Medical History:   Diagnosis Date    Asthma     Cerebrovascular accident (CVA) due to thrombosis of right middle cerebral artery     COPD (chronic obstructive pulmonary disease)     Nonischemic cardiomyopathy     NSTEMI (non-ST elevated myocardial infarction)     Stroke     Takotsubo cardiomyopathy     Tobacco abuse      Past Surgical History:   Procedure Laterality Date    CARDIAC CATHETERIZATION N/A 2018    Procedure: Left Heart Cath and cors;  Surgeon: Gabino Dozier MD;  Location: [facility] CATH INVASIVE LOC[facility];  Service: Cardiology    CARDIAC CATHETERIZATION  N/A 09/13/2018    Procedure: Left ventriculography;  Surgeon: Gabino Dozier MD;  [facility]:  [facility];  Service: Cardiology    CARDIAC ELECTROPHYSIOLOGY PROCEDURE N/A 09/27/2018    Procedure: Loop insertion  LINQ;  Surgeon: Jose R Barker MD;  Location:  [facility];  Service: Cardiovascular       SLP Recommendation and Plan  SLP Swallowing Diagnosis: mild, pharyngeal dysphagia (02/25/25 0900)  SLP Diet Recommendation: regular textures, no mixed consistencies, thin liquids (02/25/25 0900)  Recommended Precautions and Strategies: upright posture during/after eating, small bites of food and sips of liquid, multiple swallows per bite of food, multiple swallows per sip of liquid, volitional throat clear (02/25/25 0900)  SLP Rec. for Method of Medication Administration: meds whole, meds crushed, with puree, as tolerated (02/25/25 0900)     Monitor for Signs of Aspiration: yes, notify SLP if any concerns (02/25/25 0900)  Recommended Diagnostics: reassess via clinical swallow evaluation (02/25/25 0900)  Swallow Criteria for Skilled Therapeutic Interventions Met: demonstrates skilled criteria (02/25/25 0900)  Anticipated Discharge Disposition (SLP): inpatient rehabilitation facility, anticipate therapy at next level of care (02/25/25 0900)  Rehab Potential/Prognosis, Swallowing: good, to achieve stated therapy goals (02/25/25 0900)  Therapy Frequency (Swallow): PRN (02/25/25 0900)  Predicted Duration Therapy Intervention (Days): until discharge (02/25/25 0900)  Oral Care Recommendations: Oral Care BID/PRN (02/25/25 0900)                                        Outcome Evaluation: Swallow re-evaluation completed. Tolerating current diet (mechanical ground/thins, no mixed), per pt and RN. This date, patient demonstrated immediate productive cough with mixed consistencies. No overt s/s of aspiration with thins via cup/straw, puree, strained soft/chopped solids, or regular solids. No oral  residue. Timely rotary mastication. Recommend upgrade diet to regular solids with thin liquids, NO mixed consistencies. Meds whole in puree. Sitting upright, slow rate, small single bites/sips, double swallows, volitional throat clear. Reviewed all recommendations with pt who verbalized understanding. Recommend dysphagia therapy, possible higher level cognitive therapy, at next level of care.      SWALLOW EVALUATION (Last 72 Hours)       SLP Adult Swallow Evaluation       Row Name 02/25/25 0900                   Rehab Evaluation    Document Type re-evaluation  -CR        Subjective Information no complaints  -CR        Patient Observations alert;cooperative  -CR        Patient Effort excellent  -CR        Symptoms Noted During/After Treatment none  -CR           General Information    Patient Profile Reviewed yes  -CR           Pain    Pretreatment Pain Rating 0/10 - no pain  -CR        Posttreatment Pain Rating 0/10 - no pain  -CR           Oral Musculature and Cranial Nerve Assessment    Oral Motor General Assessment WFL  -CR           General Eating/Swallowing Observations    Respiratory Support Currently in Use nasal cannula  -CR        O2 Liters 1L  -CR           SLP Evaluation Clinical Impression    SLP Swallowing Diagnosis mild;pharyngeal dysphagia  -CR        Functional Impact risk of aspiration/pneumonia  -CR        Rehab Potential/Prognosis, Swallowing good, to achieve stated therapy goals  -CR        Swallow Criteria for Skilled Therapeutic Interventions Met demonstrates skilled criteria  -CR           Recommendations    Therapy Frequency (Swallow) PRN  -CR        Predicted Duration Therapy Intervention (Days) until discharge  -CR        SLP Diet Recommendation regular textures;no mixed consistencies;thin liquids  -CR        Recommended Diagnostics reassess via clinical swallow evaluation  -CR        Recommended Precautions and Strategies upright posture during/after eating;small bites of food and sips of  liquid;multiple swallows per bite of food;multiple swallows per sip of liquid;volitional throat clear  -CR        Oral Care Recommendations Oral Care BID/PRN  -CR        SLP Rec. for Method of Medication Administration meds whole;meds crushed;with puree;as tolerated  -CR        Monitor for Signs of Aspiration yes;notify SLP if any concerns  -CR        Anticipated Discharge Disposition (SLP) inpatient rehabilitation facility;anticipate therapy at next level of care  -CR           (LTG) Patient will demonstrate functional swallow for    Diet Texture (Demonstrate functional swallow) regular textures  -CR        Liquid viscosity (Demonstrate functional swallow) thin liquids  -CR        [place] (Demonstrate functional swallow) independently (over 90% accuracy)  -CR        Time Frame (Demonstrate functional swallow) by discharge  -CR        Progress/Outcomes (Demonstrate functional swallow) good progress toward goal  -CR        Comment (Demonstrate functional swallow) Swallow re-evaluation completed. Tolerating current diet (mechanical ground/thins, no mixed), per pt and RN. This date, patient demonstrated immediate productive cough with mixed consistencies. No overt s/s of aspiration with thins via cup/straw, puree, strained soft/chopped solids, or regular solids. No oral residue. Timely rotary mastication. Recommend upgrade diet to regular solids with thin liquids, NO mixed consistencies. Meds whole in puree. Sitting upright, slow rate, small single bites/sips, double swallows, volitional throat clear. Reviewed all recommendations with pt who verbalized understanding. Recommend dysphagia therapy, possible higher level cognitive therapy, at next level of care.  -CR                  User Key  (r) = Recorded By, (t) = Taken By, (c) = Cosigned By      Initials Name Effective Dates    Arlette Abernathy SLP 12/03/24 -                     EDUCATION  The patient has been educated in the following areas:   Dysphagia  (Swallowing Impairment).        SLP GOALS       Row Name 02/25/25 0900             (LTG) Patient will demonstrate functional swallow for    Diet Texture (Demonstrate functional swallow) regular textures  -CR      Liquid viscosity (Demonstrate functional swallow) thin liquids  -CR      [place] (Demonstrate functional swallow) independently (over 90% accuracy)  -CR      Time Frame (Demonstrate functional swallow) by discharge  -CR      Progress/Outcomes (Demonstrate functional swallow) good progress toward goal  -CR      Comment (Demonstrate functional swallow) Swallow re-evaluation completed. Tolerating current diet (mechanical ground/thins, no mixed), per pt and RN. This date, patient demonstrated immediate productive cough with mixed consistencies. No overt s/s of aspiration with thins via cup/straw, puree, strained soft/chopped solids, or regular solids. No oral residue. Timely rotary mastication. Recommend upgrade diet to regular solids with thin liquids, NO mixed consistencies. Meds whole in puree. Sitting upright, slow rate, small single bites/sips, double swallows, volitional throat clear. Reviewed all recommendations with pt who verbalized understanding. Recommend dysphagia therapy, possible higher level cognitive therapy, at next level of care.  -CR                User Key  (r) = Recorded By, (t) = Taken By, (c) = Cosigned By      Initials Name Provider Type    Arlette Abernathy SLP Speech and Language Pathologist                         Time Calculation:    Time Calculation- SLP       Row Name 02/25/25 1001             Time Calculation- SLP    SLP Start Time 0730  -CR      SLP Received On 02/25/25  -CR         Untimed Charges    31747-DD Treatment Swallow Minutes 60  -CR         Total Minutes    Untimed Charges Total Minutes 60  -CR       Total Minutes 60  -CR                User Key  (r) = Recorded By, (t) = Taken By, (c) = Cosigned By      Initials Name Provider Type    Arlette Abernathy SLP  Speech and Language Pathologist                    Therapy Charges for Today       Code Description Service Date Service Provider Modifiers Qty    [id number]  ST TREATMENT SWALLOW 4 2/25/2025 Arlette Williamson SLP GN 1                 MIESHA Guzman  2/25/2025

## 2025-02-25 NOTE — PLAN OF CARE
Problem: Adult Inpatient Plan of Care  Goal: Plan of Care Review  Outcome: Progressing  Flowsheets (Taken 2/25/2025 0549)  Outcome Evaluation: No acute events overnight  Goal: Patient-Specific Goal (Individualized)  Outcome: Progressing  Goal: Absence of Hospital-Acquired Illness or Injury  Outcome: Progressing  Intervention: Identify and Manage Fall Risk  Recent Flowsheet Documentation  Taken 2/25/2025 0200 by Eleanor Ramsey RN  Safety Promotion/Fall Prevention:   clutter free environment maintained   fall prevention program maintained   lighting adjusted   room organization consistent   safety round/check completed  Taken 2/25/2025 0000 by Eleanor Ramsey RN  Safety Promotion/Fall Prevention:   clutter free environment maintained   fall prevention program maintained   lighting adjusted   room organization consistent   safety round/check completed  Taken 2/24/2025 2200 by Eleanor Ramsey RN  Safety Promotion/Fall Prevention:   clutter free environment maintained   fall prevention program maintained   lighting adjusted   room organization consistent   safety round/check completed  Taken 2/24/2025 2005 by Eleanro Ramsey RN  Safety Promotion/Fall Prevention:   clutter free environment maintained   fall prevention program maintained   lighting adjusted   room organization consistent   safety round/check completed  Intervention: Prevent Skin Injury  Recent Flowsheet Documentation  Taken 2/25/2025 0000 by Eleanor Ramsey RN  Skin Protection:   incontinence pads utilized   transparent dressing maintained   skin sealant/moisture barrier applied  Taken 2/24/2025 2005 by Eleanor Ramsey RN  Body Position: position changed independently  Intervention: Prevent Infection  Recent Flowsheet Documentation  Taken 2/25/2025 0200 by Eleanor Ramsey RN  Infection Prevention:   environmental surveillance performed   hand hygiene promoted   rest/sleep promoted   single patient room provided  Taken 2/25/2025 0000 by Braulio  BISI Webster  Infection Prevention:   environmental surveillance performed   hand hygiene promoted   rest/sleep promoted   single patient room provided  Taken 2/24/2025 2200 by Eleanor Ramsey RN  Infection Prevention:   environmental surveillance performed   hand hygiene promoted   rest/sleep promoted   single patient room provided  Taken 2/24/2025 2005 by Eleanor Ramsey RN  Infection Prevention:   environmental surveillance performed   hand hygiene promoted   rest/sleep promoted   single patient room provided  Goal: Optimal Comfort and Wellbeing  Outcome: Progressing  Intervention: Provide Person-Centered Care  Recent Flowsheet Documentation  Taken 2/25/2025 0000 by Eleanor Ramsey RN  Trust Relationship/Rapport: care explained  Taken 2/24/2025 2005 by Eleanor Ramsey RN  Trust Relationship/Rapport:   care explained   questions encouraged  Goal: Readiness for Transition of Care  Outcome: Progressing     Problem: Adult Inpatient Plan of Care  Goal: Absence of Hospital-Acquired Illness or Injury  Intervention: Prevent Skin Injury  Recent Flowsheet Documentation  Taken 2/25/2025 0000 by Eleanor Ramsey RN  Skin Protection:   incontinence pads utilized   transparent dressing maintained   skin sealant/moisture barrier applied  Taken 2/24/2025 2005 by Eleanor Ramsey RN  Body Position: position changed independently     Problem: Adult Inpatient Plan of Care  Goal: Absence of Hospital-Acquired Illness or Injury  Intervention: Prevent Infection  Recent Flowsheet Documentation  Taken 2/25/2025 0200 by Eleanor Ramsey RN  Infection Prevention:   environmental surveillance performed   hand hygiene promoted   rest/sleep promoted   single patient room provided  Taken 2/25/2025 0000 by Eleanor Ramsey RN  Infection Prevention:   environmental surveillance performed   hand hygiene promoted   rest/sleep promoted   single patient room provided  Taken 2/24/2025 2200 by Eleanor Ramsey RN  Infection Prevention:    environmental surveillance performed   hand hygiene promoted   rest/sleep promoted   single patient room provided  Taken 2/24/2025 2005 by Eleanor Ramsey RN  Infection Prevention:   environmental surveillance performed   hand hygiene promoted   rest/sleep promoted   single patient room provided     Problem: Adult Inpatient Plan of Care  Goal: Optimal Comfort and Wellbeing  Outcome: Progressing  Intervention: Provide Person-Centered Care  Recent Flowsheet Documentation  Taken 2/25/2025 0000 by Eleanor Ramsey RN  Trust Relationship/Rapport: care explained  Taken 2/24/2025 2005 by Eleanor Ramsey RN  Trust Relationship/Rapport:   care explained   questions encouraged     Problem: Skin Injury Risk Increased  Goal: Skin Health and Integrity  Outcome: Progressing  Intervention: Optimize Skin Protection  Recent Flowsheet Documentation  Taken 2/25/2025 0200 by Eleanor Ramsey RN  Head of Bed (HOB) Positioning: HOB at 30 degrees  Taken 2/25/2025 0000 by Eleanor Ramsey RN  Pressure Reduction Techniques: weight shift assistance provided  Head of Bed (HOB) Positioning: HOB at 30 degrees  Pressure Reduction Devices: specialty bed utilized  Skin Protection:   incontinence pads utilized   transparent dressing maintained   skin sealant/moisture barrier applied  Taken 2/24/2025 2200 by Eleanor Ramsey RN  Head of Bed (HOB) Positioning: HOB at 30 degrees  Taken 2/24/2025 2005 by Eleanor Ramsey RN  Pressure Reduction Techniques:   frequent weight shift encouraged   weight shift assistance provided  Head of Bed (HOB) Positioning: HOB at 30 degrees  Pressure Reduction Devices:   specialty bed utilized   pressure-redistributing mattress utilized     Problem: Fall Injury Risk  Goal: Absence of Fall and Fall-Related Injury  Outcome: Progressing  Intervention: Identify and Manage Contributors  Recent Flowsheet Documentation  Taken 2/25/2025 0200 by Eleanor Ramsey RN  Medication Review/Management: medications reviewed  Taken  2/25/2025 0000 by Eleanor Ramsey RN  Medication Review/Management: medications reviewed  Taken 2/24/2025 2200 by Eleanor Ramsey RN  Medication Review/Management: medications reviewed  Taken 2/24/2025 2005 by Eleanor Ramsey RN  Medication Review/Management: medications reviewed  Intervention: Promote Injury-Free Environment  Recent Flowsheet Documentation  Taken 2/25/2025 0200 by Eleanor Ramsey RN  Safety Promotion/Fall Prevention:   clutter free environment maintained   fall prevention program maintained   lighting adjusted   room organization consistent   safety round/check completed  Taken 2/25/2025 0000 by Eleanor Ramsey RN  Safety Promotion/Fall Prevention:   clutter free environment maintained   fall prevention program maintained   lighting adjusted   room organization consistent   safety round/check completed  Taken 2/24/2025 2200 by Eleanor Ramsey RN  Safety Promotion/Fall Prevention:   clutter free environment maintained   fall prevention program maintained   lighting adjusted   room organization consistent   safety round/check completed  Taken 2/24/2025 2005 by Eleanor Ramsey RN  Safety Promotion/Fall Prevention:   clutter free environment maintained   fall prevention program maintained   lighting adjusted   room organization consistent   safety round/check completed     Problem: Malnutrition  Goal: Improved Nutritional Intake  Outcome: Progressing   Goal Outcome Evaluation:              Outcome Evaluation: No acute events overnight

## 2025-02-25 NOTE — PLAN OF CARE
Goal Outcome Evaluation:              Outcome Evaluation: Swallow re-evaluation completed. Tolerating current diet (mechanical ground/thins, no mixed), per pt and RN. This date, patient demonstrated immediate productive cough with mixed consistencies. No overt s/s of aspiration with thins via cup/straw, puree, strained soft/chopped solids, or regular solids. No oral residue. Timely rotary mastication. Recommend upgrade diet to regular solids with thin liquids, NO mixed consistencies. Meds whole in puree. Sitting upright, slow rate, small single bites/sips, double swallows, volitional throat clear. Reviewed all recommendations with pt who verbalized understanding. Recommend dysphagia therapy, possible higher level cognitive therapy, at next level of care.    Anticipated Discharge Disposition (SLP): inpatient rehabilitation facility, anticipate therapy at next level of care          SLP Swallowing Diagnosis: mild, pharyngeal dysphagia (02/25/25 0900)

## (undated) DEVICE — CATH VENT MIV RADL PIG ST TIP 4F 110CM

## (undated) DEVICE — PK CATH CARD 40

## (undated) DEVICE — GLIDESHEATH BASIC HYDROPHILIC COATED INTRODUCER SHEATH: Brand: GLIDESHEATH

## (undated) DEVICE — LOU LOOP RECORDER PACK: Brand: MEDLINE INDUSTRIES, INC.

## (undated) DEVICE — HI-TORQUE BALANCE MIDDLEWEIGHT GUIDE WIRE .014 STRAIGHT TIP 3.0 CM X 190 CM: Brand: HI-TORQUE BALANCE MIDDLEWEIGHT

## (undated) DEVICE — KT MANIFLD CARDIAC

## (undated) DEVICE — GW EMR FIX EXCHG J STD .035 3MM 260CM

## (undated) DEVICE — CATH DIAG CARD PERFORMA JL3.5 BT 4F100CM

## (undated) DEVICE — CATH DIAG CARD PERFORM JR4.0 BT 4F100CM